# Patient Record
Sex: FEMALE | Race: WHITE | NOT HISPANIC OR LATINO | Employment: UNEMPLOYED | URBAN - NONMETROPOLITAN AREA
[De-identification: names, ages, dates, MRNs, and addresses within clinical notes are randomized per-mention and may not be internally consistent; named-entity substitution may affect disease eponyms.]

---

## 2019-01-24 ENCOUNTER — OFFICE VISIT (OUTPATIENT)
Dept: FAMILY MEDICINE CLINIC | Facility: CLINIC | Age: 44
End: 2019-01-24

## 2019-01-24 VITALS
HEART RATE: 78 BPM | HEIGHT: 62 IN | SYSTOLIC BLOOD PRESSURE: 120 MMHG | BODY MASS INDEX: 49.24 KG/M2 | OXYGEN SATURATION: 98 % | DIASTOLIC BLOOD PRESSURE: 82 MMHG | WEIGHT: 267.6 LBS | TEMPERATURE: 98.9 F | RESPIRATION RATE: 18 BRPM

## 2019-01-24 DIAGNOSIS — I10 ESSENTIAL HYPERTENSION: ICD-10-CM

## 2019-01-24 DIAGNOSIS — R07.89 CHEST TIGHTNESS: ICD-10-CM

## 2019-01-24 DIAGNOSIS — G89.4 CHRONIC PAIN SYNDROME: ICD-10-CM

## 2019-01-24 DIAGNOSIS — M79.7 FIBROMYALGIA: ICD-10-CM

## 2019-01-24 DIAGNOSIS — Z74.09 IMPAIRED MOBILITY: ICD-10-CM

## 2019-01-24 DIAGNOSIS — M25.562 ACUTE PAIN OF BOTH KNEES: ICD-10-CM

## 2019-01-24 DIAGNOSIS — Z13.29 SCREENING FOR HYPOTHYROIDISM: ICD-10-CM

## 2019-01-24 DIAGNOSIS — M25.561 ACUTE PAIN OF BOTH KNEES: ICD-10-CM

## 2019-01-24 DIAGNOSIS — E53.8 VITAMIN B12 DEFICIENCY: ICD-10-CM

## 2019-01-24 DIAGNOSIS — R00.0 TACHYCARDIA: ICD-10-CM

## 2019-01-24 DIAGNOSIS — R73.03 PREDIABETES: ICD-10-CM

## 2019-01-24 DIAGNOSIS — Z13.220 SCREENING FOR HYPERLIPIDEMIA: ICD-10-CM

## 2019-01-24 DIAGNOSIS — J98.9 RESPIRATORY DISEASE: ICD-10-CM

## 2019-01-24 DIAGNOSIS — E66.01 CLASS 3 SEVERE OBESITY WITH SERIOUS COMORBIDITY AND BODY MASS INDEX (BMI) OF 45.0 TO 49.9 IN ADULT, UNSPECIFIED OBESITY TYPE (HCC): Primary | ICD-10-CM

## 2019-01-24 DIAGNOSIS — R06.02 SOB (SHORTNESS OF BREATH): ICD-10-CM

## 2019-01-24 DIAGNOSIS — R05.9 COUGH: ICD-10-CM

## 2019-01-24 DIAGNOSIS — Z13.0 SCREENING FOR DEFICIENCY ANEMIA: ICD-10-CM

## 2019-01-24 DIAGNOSIS — E55.9 VITAMIN D DEFICIENCY: ICD-10-CM

## 2019-01-24 PROCEDURE — 99212 OFFICE O/P EST SF 10 MIN: CPT | Performed by: NURSE PRACTITIONER

## 2019-01-24 RX ORDER — AMITRIPTYLINE HYDROCHLORIDE 10 MG/1
10 TABLET, FILM COATED ORAL
Qty: 90 TABLET | Refills: 1 | Status: SHIPPED | OUTPATIENT
Start: 2019-01-24 | End: 2019-04-24 | Stop reason: SDUPTHER

## 2019-01-24 RX ORDER — LISINOPRIL 10 MG/1
10 TABLET ORAL DAILY
COMMUNITY
End: 2019-01-24 | Stop reason: SDUPTHER

## 2019-01-24 RX ORDER — AMITRIPTYLINE HYDROCHLORIDE 10 MG/1
10 TABLET, FILM COATED ORAL
COMMUNITY
End: 2019-01-24 | Stop reason: SDUPTHER

## 2019-01-24 RX ORDER — TRAMADOL HYDROCHLORIDE 50 MG/1
50 TABLET ORAL EVERY 6 HOURS PRN
COMMUNITY
End: 2019-01-24 | Stop reason: SDUPTHER

## 2019-01-24 RX ORDER — LISINOPRIL 10 MG/1
10 TABLET ORAL DAILY
Qty: 90 TABLET | Refills: 1 | Status: SHIPPED | OUTPATIENT
Start: 2019-01-24 | End: 2019-04-24 | Stop reason: SDUPTHER

## 2019-01-24 RX ORDER — ALBUTEROL SULFATE 90 UG/1
2 AEROSOL, METERED RESPIRATORY (INHALATION) EVERY 6 HOURS PRN
Qty: 1 INHALER | Refills: 5 | Status: SHIPPED | OUTPATIENT
Start: 2019-01-24 | End: 2019-04-24 | Stop reason: SDUPTHER

## 2019-01-24 RX ORDER — ALBUTEROL SULFATE 90 UG/1
2 AEROSOL, METERED RESPIRATORY (INHALATION) EVERY 6 HOURS PRN
COMMUNITY
End: 2019-01-24 | Stop reason: SDUPTHER

## 2019-01-24 RX ORDER — FINASTERIDE 5 MG/1
2.5 TABLET, FILM COATED ORAL DAILY
COMMUNITY
End: 2019-05-01 | Stop reason: ALTCHOICE

## 2019-01-24 RX ORDER — TRAMADOL HYDROCHLORIDE 50 MG/1
50 TABLET ORAL EVERY 6 HOURS PRN
Qty: 120 TABLET | Refills: 2 | Status: SHIPPED | OUTPATIENT
Start: 2019-01-24 | End: 2019-05-09 | Stop reason: ALTCHOICE

## 2019-01-24 RX ORDER — BUDESONIDE AND FORMOTEROL FUMARATE DIHYDRATE 160; 4.5 UG/1; UG/1
2 AEROSOL RESPIRATORY (INHALATION) 2 TIMES DAILY
COMMUNITY
End: 2019-01-24 | Stop reason: ALTCHOICE
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
## 2019-01-24 NOTE — PATIENT INSTRUCTIONS
Obesity   AMBULATORY CARE:   Obesity  is when your body mass index (BMI) is greater than 30  Your healthcare provider will use your height and weight to measure your BMI  The risks of obesity include  many health problems, such as injuries or physical disability  You may need tests to check for the following:  · Diabetes     · High blood pressure or high cholesterol     · Heart disease     · Gallbladder or liver disease     · Cancer of the colon, breast, prostate, liver, or kidney     · Sleep apnea     · Arthritis or gout  Seek care immediately if:   · You have a severe headache, confusion, or difficulty speaking  · You have weakness on one side of your body  · You have chest pain, sweating, or shortness of breath  Contact your healthcare provider if:   · You have symptoms of gallbladder or liver disease, such as pain in your upper abdomen  · You have knee or hip pain and discomfort while walking  · You have symptoms of diabetes, such as intense hunger and thirst, and frequent urination  · You have symptoms of sleep apnea, such as snoring or daytime sleepiness  · You have questions or concerns about your condition or care  Treatment for obesity  focuses on helping you lose weight to improve your health  Even a small decrease in BMI can reduce the risk for many health problems  Your healthcare provider will help you set a weight-loss goal   · Lifestyle changes  are the first step in treating obesity  These include making healthy food choices and getting regular physical activity  Your healthcare provider may suggest a weight-loss program that involves coaching, education, and therapy  · Medicine  may help you lose weight when it is used with a healthy diet and physical activity  · Surgery  can help you lose weight if you are very obese and have other health problems  There are several types of weight-loss surgery  Ask your healthcare provider for more information    Be successful losing weight:   · Set small, realistic goals  An example of a small goal is to walk for 20 minutes 5 days a week  Anther goal is to lose 5% of your body weight  · Tell friends, family members, and coworkers about your goals  and ask for their support  Ask a friend to lose weight with you, or join a weight-loss support group  · Identify foods or triggers that may cause you to overeat , and find ways to avoid them  Remove tempting high-calorie foods from your home and workplace  Place a bowl of fresh fruit on your kitchen counter  If stress causes you to eat, then find other ways to cope with stress  · Keep a diary to track what you eat and drink  Also write down how many minutes of physical activity you do each day  Weigh yourself once a week and record it in your diary  Eating changes: You will need to eat 500 to 1,000 fewer calories each day than you currently eat to lose 1 to 2 pounds a week  The following changes will help you cut calories:  · Eat smaller portions  Use small plates, no larger than 9 inches in diameter  Fill your plate half full of fruits and vegetables  Measure your food using measuring cups until you know what a serving size looks like  · Eat 3 meals and 1 or 2 snacks each day  Plan your meals in advance  Greg Smith and eat at home most of the time  Eat slowly  · Eat fruits and vegetables at every meal   They are low in calories and high in fiber, which makes you feel full  Do not add butter, margarine, or cream sauce to vegetables  Use herbs to season steamed vegetables  · Eat less fat and fewer fried foods  Eat more baked or grilled chicken and fish  These protein sources are lower in calories and fat than red meat  Limit fast food  Dress your salads with olive oil and vinegar instead of bottled dressing  · Limit the amount of sugar you eat  Do not drink sugary beverages  Limit alcohol  Activity changes:  Physical activity is good for your body in many ways   It helps you burn calories and build strong muscles  It decreases stress and depression, and improves your mood  It can also help you sleep better  Talk to your healthcare provider before you begin an exercise program   · Exercise for at least 30 minutes 5 days a week  Start slowly  Set aside time each day for physical activity that you enjoy and that is convenient for you  It is best to do both weight training and an activity that increases your heart rate, such as walking, bicycling, or swimming  · Find ways to be more active  Do yard work and housecleaning  Walk up the stairs instead of using elevators  Spend your leisure time going to events that require walking, such as outdoor festivals or fairs  This extra physical activity can help you lose weight and keep it off  Follow up with your healthcare provider as directed: You may need to meet with a dietitian  Write down your questions so you remember to ask them during your visits  © 2017 [address] Information is for End User's use only and may not be sold, redistributed or otherwise used for commercial purposes  All illustrations and images included in CareNotes® are the copyrighted property of [employer]  or Jose Alfredo Chavez  The above information is an  only  It is not intended as medical advice for individual conditions or treatments  Talk to your doctor, nurse or pharmacist before following any medical regimen to see if it is safe and effective for you  Weight Management   AMBULATORY CARE:   Why it is important to manage your weight:  Being overweight increases your risk of health conditions such as heart disease, high blood pressure, type 2 diabetes, and certain types of cancer  It can also increase your risk for osteoarthritis, sleep apnea, and other respiratory problems  Aim for a slow, steady weight loss  Even a small amount of weight loss can lower your risk of health problems    How to lose weight safely:  A safe and healthy way to lose weight is to eat fewer calories and get regular exercise  You can lose up about 1 pound a week by decreasing the number of calories you eat by 500 calories each day  You can decrease calories by eating smaller portion sizes or by cutting out high-calorie foods  Read labels to find out how many calories are in the foods you eat  You can also burn calories with exercise such as walking, swimming, or biking  You will be more likely to keep weight off if you make these changes part of your lifestyle  Healthy meal plan for weight management:  A healthy meal plan includes a variety of foods, contains fewer calories, and helps you stay healthy  A healthy meal plan includes the following:  · Eat whole-grain foods more often  A healthy meal plan should contain fiber  Fiber is the part of grains, fruits, and vegetables that is not broken down by your body  Whole-grain foods are healthy and provide extra fiber in your diet  Some examples of whole-grain foods are whole-wheat breads and pastas, oatmeal, brown rice, and bulgur  · Eat a variety of vegetables every day  Include dark, leafy greens such as spinach, kale, francois greens, and mustard greens  Eat yellow and orange vegetables such as carrots, sweet potatoes, and winter squash  · Eat a variety of fruits every day  Choose fresh or canned fruit (canned in its own juice or light syrup) instead of juice  Fruit juice has very little or no fiber  · Eat low-fat dairy foods  Drink fat-free (skim) milk or 1% milk  Eat fat-free yogurt and low-fat cottage cheese  Try low-fat cheeses such as mozzarella and other reduced-fat cheeses  · Choose meat and other protein foods that are low in fat  Choose beans or other legumes such as split peas or lentils  Choose fish, skinless poultry (chicken or turkey), or lean cuts of red meat (beef or pork)  Before you cook meat or poultry, cut off any visible fat  · Use less fat and oil  Try baking foods instead of frying them  Add less fat, such as margarine, sour cream, regular salad dressing and mayonnaise to foods  Eat fewer high-fat foods  Some examples of high-fat foods include french fries, doughnuts, ice cream, and cakes  · Eat fewer sweets  Limit foods and drinks that are high in sugar  This includes candy, cookies, regular soda, and sweetened drinks  Ways to decrease calories:   · Eat smaller portions  ¨ Use a small plate with smaller servings  ¨ Do not eat second helpings  ¨ When you eat at a restaurant, ask for a box and place half of your meal in the box before you eat  ¨ Share an entrée with someone else  · Replace high-calorie snacks with healthy, low-calorie snacks  ¨ Choose fresh fruit, vegetables, fat-free rice cakes, or air-popped popcorn instead of potato chips, nuts, or chocolate  ¨ Choose water or calorie-free drinks instead of soda or sweetened drinks  · Eat regular meals  Skipping meals can lead to overeating later in the day  Eat a healthy snack in place of a meal if you do not have time to eat a regular meal      · Do not shop for groceries when you are hungry  You may be more likely to make unhealthy food choices  Take a grocery list of healthy foods and shop after you have eaten  Exercise:  Exercise at least 30 minutes per day on most days of the week  Some examples of exercise include walking, biking, dancing, and swimming  You can also fit in more physical activity by taking the stairs instead of the elevator or parking farther away from stores  Ask your healthcare provider about the best exercise plan for you  Other things to consider as you try to lose weight:   · Be aware of situations that may give you the urge to overeat, such as eating while watching television  Find ways to avoid these situations  For example, read a book, go for a walk, or do crafts      · Meet with a weight loss support group or friends who are also trying to lose weight  This may help you stay motivated to continue working on your weight loss goals  © 2017 [address] Information is for End User's use only and may not be sold, redistributed or otherwise used for commercial purposes  All illustrations and images included in CareNotes® are the copyrighted property of [employer]  or Jose Alfredo Chvaez  The above information is an  only  It is not intended as medical advice for individual conditions or treatments  Talk to your doctor, nurse or pharmacist before following any medical regimen to see if it is safe and effective for you  Low Fat Diet   AMBULATORY CARE:   A low-fat diet  is an eating plan that is low in total fat, unhealthy fat, and cholesterol  You may need to follow a low-fat diet if you have trouble digesting or absorbing fat  You may also need to follow this diet if you have high cholesterol  You can also lower your cholesterol by increasing the amount of fiber in your diet  Soluble fiber is a type of fiber that helps to decrease cholesterol levels  Different types of fat in food:   · Limit unhealthy fats  A diet that is high in cholesterol, saturated fat, and trans fat may cause unhealthy cholesterol levels  Unhealthy cholesterol levels increase your risk of heart disease  ¨ Cholesterol:  Limit intake of cholesterol to less than 200 mg per day  Cholesterol is found in meat, eggs, and dairy  ¨ Saturated fat:  Limit saturated fat to less than 7% of your total daily calories  Ask your dietitian how many calories you need each day  Saturated fat is found in butter, cheese, ice cream, whole milk, and palm oil  Saturated fat is also found in meat, such as beef, pork, chicken skin, and processed meats  Processed meats include sausage, hot dogs, and bologna  ¨ Trans fat:  Avoid trans fat as much as possible  Trans fat is used in fried and baked foods   Foods that say trans fat free on the label may still have up to 0 5 grams of trans fat per serving  · Include healthy fats  Replace foods that are high in saturated and trans fat with foods high in healthy fats  This may help to decrease high cholesterol levels  ¨ Monounsaturated fats: These are found in avocados, nuts, and vegetable oils, such as olive, canola, and sunflower oil  ¨ Polyunsaturated fats: These can be found in vegetable oils, such as soybean or corn oil  Omega-3 fats can help to decrease the risk of heart disease  Omega-3 fats are found in fish, such as salmon, herring, trout, and tuna  Omega-3 fats can also be found in plant foods, such as walnuts, flaxseed, soybeans, and canola oil    Foods to limit or avoid:   · Grains:      ¨ Snacks that are made with partially hydrogenated oils, such as chips, regular crackers, and butter-flavored popcorn    ¨ High-fat baked goods, such as biscuits, croissants, doughnuts, pies, cookies, and pastries    · Dairy:      ¨ Whole milk, 2% milk, and yogurt and ice cream made with whole milk    ¨ Half and half creamer, heavy cream, and whipping cream    ¨ Cheese, cream cheese, and sour cream    · Meats and proteins:      ¨ High-fat cuts of meat (T-bone steak, regular hamburger, and ribs)    ¨ Fried meat, poultry (turkey and chicken), and fish    ¨ Poultry (chicken and turkey) with skin    ¨ Cold cuts (salami or bologna), hot dogs, roberts, and sausage    ¨ Whole eggs and egg yolks    · Vegetables and fruits with added fat:      ¨ Fried vegetables or vegetables in butter or high-fat sauces, such as cream or cheese sauces    ¨ Fried fruit or fruit served with butter or cream    · Fats:      ¨ Butter, stick margarine, and shortening    ¨ Coconut, palm oil, and palm kernel oil  Foods to include:   · Grains:      ¨ Whole-grain breads, cereals, pasta, and brown rice    ¨ Low-fat crackers and pretzels    · Vegetables and fruits:      ¨ Fresh, frozen, or canned vegetables (no salt or low-sodium)    ¨ Fresh, frozen, dried, or canned fruit (canned in light syrup or fruit juice)    ¨ Avocado    · Low-fat dairy products:      ¨ Nonfat (skim) or 1% milk    ¨ Nonfat or low-fat cheese, yogurt, and cottage cheese    · Meats and proteins:      ¨ Chicken or turkey with no skin    ¨ Baked or broiled fish    ¨ Lean beef and pork (loin, round, extra lean hamburger)    ¨ Beans and peas, unsalted nuts, soy products    ¨ Egg whites and substitutes    ¨ Seeds and nuts    · Fats:      ¨ Unsaturated oil, such as canola, olive, peanut, soybean, or sunflower oil    ¨ Soft or liquid margarine and vegetable oil spread    ¨ Low-fat salad dressing  Other ways to decrease fat:   · Read food labels before you buy foods  Choose foods that have less than 30% of calories from fat  Choose low-fat or fat-free dairy products  Remember that fat free does not mean calorie free  These foods still contain calories, and too many calories can lead to weight gain  · Trim fat from meat and avoid fried food  Trim all visible fat from meat before you cook it  Remove the skin from poultry  Do not trujillo meat, fish, or poultry  Bake, roast, boil, or broil these foods instead  Avoid fried foods  Eat a baked potato instead of [place] fries  Steam vegetables instead of sautéing them in butter  · Add less fat to foods  Use imitation roberts bits on salads and baked potatoes instead of regular roberts bits  Use fat-free or low-fat salad dressings instead of regular dressings  Use low-fat or nonfat butter-flavored topping instead of regular butter or margarine on popcorn and other foods  Ways to decrease fat in recipes:  Replace high-fat ingredients with low-fat or nonfat ones  This may cause baked goods to be drier than usual  You may need to use nonfat cooking spray on pans to prevent food from sticking  You also may need to change the amount of other ingredients, such as water, in the recipe   Try the following:  · Use low-fat or light margarine instead of regular margarine or shortening  · Use lean ground turkey breast or chicken, or lean ground beef (less than 5% fat) instead of hamburger  · Add 1 teaspoon of canola oil to 8 ounces of skim milk instead of using cream or half and half  · Use grated zucchini, carrots, or apples in breads instead of coconut  · Use blenderized, low-fat cottage cheese, plain tofu, or low-fat ricotta cheese instead of cream cheese  · Use 1 egg white and 1 teaspoon of canola oil, or use ¼ cup (2 ounces) of fat-free egg substitute instead of a whole egg  · Replace half of the oil that is called for in a recipe with applesauce when you bake  Use 3 tablespoons of cocoa powder and 1 tablespoon of canola oil instead of a square of baking chocolate  How to increase fiber:  Eat enough high-fiber foods to get 20 to 30 grams of fiber every day  Slowly increase your fiber intake to avoid stomach cramps, gas, and other problems  · Eat 3 ounces of whole-grain foods each day  An ounce is about 1 slice of bread  Eat whole-grain breads, such as whole-wheat bread  Whole wheat, whole-wheat flour, or other whole grains should be listed as the first ingredient on the food label  Replace white flour with whole-grain flour or use half of each in recipes  Whole-grain flour is heavier than white flour, so you may have to add more yeast or baking powder  · Eat a high-fiber cereal for breakfast   Oatmeal is a good source of soluble fiber  Look for cereals that have bran or fiber in the name  Choose whole-grain products, such as brown rice, barley, and whole-wheat pasta  · Eat more beans, peas, and lentils  For example, add beans to soups or salads  Eat at least 5 cups of fruits and vegetables each day  Eat fruits and vegetables with the peel because the peel is high in fiber  © 2017 [employer] [employer] [employer] Information is for End User's use only and may not be sold, redistributed or otherwise used for commercial purposes   All illustrations and images included in CareNotes® are the copyrighted property of [employer]  or Jose Alfredo Chavez  The above information is an  only  It is not intended as medical advice for individual conditions or treatments  Talk to your doctor, nurse or pharmacist before following any medical regimen to see if it is safe and effective for you  Heart Healthy Diet   AMBULATORY CARE:   A heart healthy diet  is an eating plan low in total fat, unhealthy fats, and sodium (salt)  A heart healthy diet helps decrease your risk for heart disease and stroke  Limit the amount of fat you eat to 25% to 35% of your total daily calories  Limit sodium to less than 2,300 mg each day  Healthy fats:  Healthy fats can help improve cholesterol levels  The risk for heart disease is decreased when cholesterol levels are normal  Choose healthy fats, such as the following:  · Unsaturated fat  is found in foods such as soybean, canola, olive, corn, and safflower oils  It is also found in soft tub margarine that is made with liquid vegetable oil  · Omega-3 fat  is found in certain fish, such as salmon, tuna, and trout, and in walnuts and flaxseed  Unhealthy fats:  Unhealthy fats can cause unhealthy cholesterol levels in your blood and increase your risk of heart disease  Limit unhealthy fats, such as the following:  · Cholesterol  is found in animal foods, such as eggs and lobster, and in dairy products made from whole milk  Limit cholesterol to less than 300 milligrams (mg) each day  You may need to limit cholesterol to 200 mg each day if you have heart disease  · Saturated fat  is found in meats, such as roberts and hamburger  It is also found in chicken or turkey skin, whole milk, and butter  Limit saturated fat to less than 7% of your total daily calories  Limit saturated fat to less than 6% if you have heart disease or are at increased risk for it       · Trans fat  is found in packaged foods, such as potato chips and cookies  It is also in hard margarine, some fried foods, and shortening  Avoid trans fats as much as possible    Heart healthy foods and drinks to include:  Ask your dietitian or healthcare provider how many servings to have from each of the following food groups:  · Grains:      ¨ Whole-wheat breads, cereals, and pastas, and brown rice    ¨ Low-fat, low-sodium crackers and chips    · Vegetables:      ¨ Broccoli, green beans, green peas, and spinach    ¨ Collards, kale, and lima beans    ¨ Carrots, sweet potatoes, tomatoes, and peppers    ¨ Canned vegetables with no salt added    · Fruits:      ¨ Bananas, peaches, pears, and pineapple    ¨ Grapes, raisins, and dates    ¨ Oranges, tangerines, grapefruit, orange juice, and grapefruit juice    ¨ Apricots, mangoes, melons, and papaya    ¨ Raspberries and strawberries    ¨ Canned fruit with no added sugar    · Low-fat dairy products:      ¨ Nonfat (skim) milk, 1% milk, and low-fat almond, cashew, or soy milks fortified with calcium    ¨ Low-fat cheese, regular or frozen yogurt, and cottage cheese    · Meats and proteins , such as lean cuts of beef and pork (loin, leg, round), skinless chicken and turkey, legumes, soy products, egg whites, and nuts  Foods and drinks to limit or avoid:  Ask your dietitian or healthcare provider about these and other foods that are high in unhealthy fat, sodium, and sugar:  · Snack or packaged foods , such as frozen dinners, cookies, macaroni and cheese, and cereals with more than 300 mg of sodium per serving    · Canned or dry mixes  for cakes, soups, sauces, or gravies    · Vegetables with added sodium , such as instant potatoes, vegetables with added sauces, or regular canned vegetables    · Other foods high in sodium , such as ketchup, barbecue sauce, salad dressing, pickles, olives, soy sauce, and miso    · High-fat dairy foods  such as whole or 2% milk, cream cheese, or sour cream, and cheeses     · High-fat protein foods  such as high-fat cuts of beef (T-bone steaks, ribs), chicken or turkey with skin, and organ meats, such as liver    · Cured or smoked meats , such as hot dogs, roberts, and sausage    · Unhealthy fats and oils , such as butter, stick margarine, shortening, and cooking oils such as coconut or palm oil    · Food and drinks high in sugar , such as soft drinks (soda), sports drinks, sweetened tea, candy, cake, cookies, pies, and doughnuts  Other diet guidelines to follow:   · Eat more foods containing omega-3 fats  Eat fish high in omega-3 fats at least 2 times a week  · Limit alcohol  Too much alcohol can damage your heart and raise your blood pressure  Women should limit alcohol to 1 drink a day  Men should limit alcohol to 2 drinks a day  A drink of alcohol is 12 ounces of beer, 5 ounces of wine, or 1½ ounces of liquor  · Choose low-sodium foods  High-sodium foods can lead to high blood pressure  Add little or no salt to food you prepare  Use herbs and spices in place of salt  · Eat more fiber  to help lower cholesterol levels  Eat at least 5 servings of fruits and vegetables each day  Eat 3 ounces of whole-grain foods each day  Legumes (beans) are also a good source of fiber  Lifestyle guidelines:   · Do not smoke  Nicotine and other chemicals in cigarettes and cigars can cause lung and heart damage  Ask your healthcare provider for information if you currently smoke and need help to quit  E-cigarettes or smokeless tobacco still contain nicotine  Talk to your healthcare provider before you use these products  · Exercise regularly  to help you maintain a healthy weight and improve your blood pressure and cholesterol levels  Ask your healthcare provider about the best exercise plan for you  Do not start an exercise program without asking your healthcare provider  Follow up with your healthcare provider as directed:  Write down your questions so you remember to ask them during your visits     © 2017 [facility] [address] is for End User's use only and may not be sold, redistributed or otherwise used for commercial purposes  All illustrations and images included in CareNotes® are the copyrighted property of [employer] M [employer]  or Jose Alfredo Chavez  The above information is an  only  It is not intended as medical advice for individual conditions or treatments  Talk to your doctor, nurse or pharmacist before following any medical regimen to see if it is safe and effective for you  Calorie Counting Diet   WHAT YOU NEED TO KNOW:   What is a calorie counting diet? It is a meal plan based on counting calories each day to reach a healthy body weight  You will need to eat fewer calories if you are trying to lose weight  Weight loss may decrease your risk for certain health problems or improve your health if you have health problems  Some of these health problems include heart disease, high blood pressure, and diabetes  What foods should I avoid? Your dietitian will tell you if you need to avoid certain foods based on your body weight and health condition  You may need to avoid high-fat foods if you are at risk for or have heart disease  You may need to eat fewer foods from the breads and starches food group if you have diabetes  How many calories are in foods? The following is a list of foods and drinks with the approximate number of calories in each  Check the food label to find the exact number of calories  A dietitian can tell you how many calories you should have from each food group each day    · Carbohydrate:      ¨ ½ of a 3-inch bagel, 1 slice of bread, or ½ of a hamburger bun or hot dog bun (80)    ¨ 1 (8-inch) flour tortilla or ½ cup of cooked rice (100)    ¨ 1 (6-inch) corn tortilla (80)    ¨ 1 (6-inch) pancake or 1 cup of bran flakes cereal (110)    ¨ ½ cup of cooked cereal (80)    ¨ ½ cup of cooked pasta (85)    ¨ 1 ounce of pretzels (100)    ¨ 3 cups of air-popped popcorn without butter or oil (80)    · Dairy:      ¨ 1 cup of skim or 1% milk (90)    ¨ 1 cup of 2% milk (120)    ¨ 1 cup of whole milk (160)    ¨ 1 cup of 2% chocolate milk (220)    ¨ 1 ounce of low-fat cheese with 3 grams of fat per ounce (70)    ¨ 1 ounce of cheddar cheese (114)    ¨ ½ cup of 1% fat cottage cheese (80)    ¨ 1 cup of plain or sugar-free, fat-free yogurt (90)    · Protein foods:      ¨ 3 ounces of fish (not breaded or fried) (95)    ¨ 3 ounces of breaded, fried fish (195)    ¨ ¾ cup of tuna canned in water (105)    ¨ 3 ounces of chicken breast without skin (105)    ¨ 1 fried chicken breast with skin (350)    ¨ ¼ cup of fat free egg substitute (40)    ¨ 1 large egg (75)    ¨ 3 ounces of lean beef or pork (165)    ¨ 3 ounces of fried pork chop or ham (185)    ¨ ½ cup of cooked dried beans, such as kidney, kennedy, lentils, or navy (115)    ¨ 3 ounces of bologna or lunch meat (225)    ¨ 2 links of breakfast sausage (140)    · Vegetables:      ¨ ½ cup of sliced mushrooms (10)    ¨ 1 cup of salad greens, such as lettuce, spinach, or sharad (15)    ¨ ½ cup of steamed asparagus (20)    ¨ ½ cup of cooked summer squash, zucchini squash, or green or wax beans (25)    ¨ 1 cup of broccoli or cauliflower florets, or 1 medium tomato (25)    ¨ 1 large raw carrot or ½ cup of cooked carrots (40)    ¨ ? of a medium cucumber or 1 stalk of celery (5)    ¨ 1 small baked potato (160)    ¨ 1 cup of breaded, fried vegetables (230)    · Fruit:      ¨ 1 (6-inch) banana (55)     ¨ ½ of a 4-inch grapefruit (55)    ¨ 15 grapes (60)    ¨ 1 medium orange or apple (70)    ¨ 1 large peach (65)    ¨ 1 cup of fresh pineapple chunks (75)    ¨ 1 cup of melon cubes (50)    ¨ 1¼ cups of whole strawberries (45)    ¨ ½ cup of fruit canned in juice (55)    ¨ ½ cup of fruit canned in heavy syrup (110)    ¨ ?  cup of raisins (130)    ¨ ½ cup of unsweetened fruit juice (60)    ¨ ½ cup of grape, cranberry, or prune juice (90)    · Fat: ¨ 10 peanuts or 2 teaspoons of peanut butter (55)    ¨ 2 tablespoons of avocado or 1 tablespoon of regular salad dressing (45)    ¨ 2 slices of roberts (90)    ¨ 1 teaspoon of oil, such as safflower, canola, corn, or olive oil (45)    ¨ 2 teaspoons of low-fat margarine, or 1 tablespoon of low-fat mayonnaise (50)    ¨ 1 teaspoon of regular margarine (40)    ¨ 1 tablespoon of regular mayonnaise (135)    ¨ 1 tablespoon of cream cheese or 2 tablespoons of low-fat cream cheese (45)    ¨ 2 tablespoons of vegetable shortening (215)    · Dessert and sweets:      ¨ 8 animal crackers or 5 vanilla wafers (80)    ¨ 1 frozen fruit juice bar (80)    ¨ ½ cup of ice milk or low-fat frozen yogurt (90)    ¨ ½ cup of sherbet or sorbet (125)    ¨ ½ cup of sugar-free pudding or custard (60)    ¨ ½ cup of ice cream (140)    ¨ ½ cup of pudding or custard (175)    ¨ 1 (2-inch) square chocolate brownie (185)    · Combination foods:      ¨ Bean burrito made with an 8-inch tortilla, without cheese (275)    ¨ Chicken breast sandwich with lettuce and tomato (325)    ¨ 1 cup of chicken noodle soup (60)    ¨ 1 beef taco (175)    ¨ Regular hamburger with lettuce and tomato (310)    ¨ Regular cheeseburger with lettuce and tomato (410)     ¨ ¼ of a 12-inch cheese pizza (280)    ¨ Fried fish sandwich with lettuce and tomato (425)    ¨ Hot dog and bun (275)    ¨ 1½ cups of macaroni and cheese (310)    ¨ Taco salad with a fried tortilla shell (870)    · Low-calorie foods:      ¨ 1 tablespoon of ketchup or 1 tablespoon of fat free sour cream (15)    ¨ 1 teaspoon of mustard (5)    ¨ ¼ cup of salsa (20)    ¨ 1 large dill pickle (15)    ¨ 1 tablespoon of fat free salad dressing (10)    ¨ 2 teaspoons of low-sugar, light jam or jelly, or 1 tablespoon of sugar-free syrup (15)    ¨ 1 sugar-free popsicle (15)    ¨ 1 cup of club soda, seltzer water, or diet soda (0)  CARE AGREEMENT:   You have the right to help plan your care   Discuss treatment options with your caregivers to decide what care you want to receive  You always have the right to refuse treatment  The above information is an  only  It is not intended as medical advice for individual conditions or treatments  Talk to your doctor, nurse or pharmacist before following any medical regimen to see if it is safe and effective for you  © 2017 [address] [employer] Information is for End User's use only and may not be sold, redistributed or otherwise used for commercial purposes  All illustrations and images included in CareNotes® are the copyrighted property of [employer]  or [employer]  Wellness Visit for Adults   WHAT YOU NEED TO KNOW:   What is a wellness visit? A wellness visit is when you see your healthcare provider to get screened for health problems  You can also get advice on how to stay healthy  Write down your questions so you remember to ask them  Ask your healthcare provider how often you should have a wellness visit  What happens at a wellness visit? Your healthcare provider will ask about your health, and your family history of health problems  This includes high blood pressure, heart disease, and cancer  He or she will ask if you have symptoms that concern you, if you smoke, and about your mood  You may also be asked about your intake of medicines, supplements, food, and alcohol  Any of the following may be done:  · Your weight  will be checked  Your height may also be checked so your body mass index (BMI) can be calculated  Your BMI shows if you are at a healthy weight  · Your blood pressure  and heart rate will be checked  Your temperature may also be checked  · Blood and urine tests  may be done  Blood tests may be done to check your cholesterol levels  Abnormal cholesterol levels increase your risk for heart disease and stroke  You may also need a blood or urine test to check for diabetes if you are at increased risk   Urine tests may be done to look for signs of an infection or kidney disease  · A physical exam  includes checking your heartbeat and lungs with a stethoscope  Your healthcare provider may also check your skin to look for sun damage  · Screening tests  may be recommended  A screening test is done to check for diseases that may not cause symptoms  The screening tests you may need depend on your age, gender, family history, and lifestyle habits  For example, colorectal screening may be recommended if you are 48years old or older  What screening tests do I need if I am a woman? · A Pap smear  is used to screen for cervical cancer  Pap smears are usually done every 3 to 5 years depending on your age  You may need them more often if you have had abnormal Pap smear test results in the past  Ask your healthcare provider how often you should have a Pap smear  · A mammogram  is an x-ray of your breasts to screen for breast cancer  Experts recommend mammograms every 2 years starting at age 48 years  You may need a mammogram at age 52 years or younger if you have an increased risk for breast cancer  Talk to your healthcare provider about when you should start having mammograms and how often you need them  What vaccines might I need? · Get an influenza vaccine  every year  The influenza vaccine protects you from the flu  Several types of viruses cause the flu  The viruses change over time, so new vaccines are made each year  · Get a tetanus-diphtheria (Td) booster vaccine  every 10 years  This vaccine protects you against tetanus and diphtheria  Tetanus is a severe infection that may cause painful muscle spasms and lockjaw  Diphtheria is a severe bacterial infection that causes a thick covering in the back of your mouth and throat  · Get a human papillomavirus (HPV) vaccine  if you are female and aged 23 to 32 or male 23 to 24 and never received it  This vaccine protects you from HPV infection   HPV is the most common infection spread by sexual contact  HPV may also cause vaginal, penile, and anal cancers  · Get a pneumococcal vaccine  if you are aged 72 years or older  The pneumococcal vaccine is an injection given to protect you from pneumococcal disease  Pneumococcal disease is an infection caused by pneumococcal bacteria  The infection may cause pneumonia, meningitis, or an ear infection  · Get a shingles vaccine  if you are aged 61 or older, even if you have had shingles before  The shingles vaccine is an injection to protect you from the varicella-zoster virus  This is the same virus that causes chickenpox  Shingles is a painful rash that develops in people who had chickenpox or have been exposed to the virus  How can I eat healthy? My Plate is a model for planning healthy meals  It shows the types and amounts of foods that should go on your plate  Fruits and vegetables make up about half of your plate, and grains and protein make up the other half  A serving of dairy is included on the side of your plate  The amount of calories and serving sizes you need depends on your age, gender, weight, and height  Examples of healthy foods are listed below:  · Eat a variety of vegetables  such as dark green, red, and orange vegetables  You can also include canned vegetables low in sodium (salt) and frozen vegetables without added butter or sauces  · Eat a variety of fresh fruits , canned fruit in 100% juice, frozen fruit, and dried fruit  · Include whole grains  At least half of the grains you eat should be whole grains  Examples include whole-wheat bread, wheat pasta, brown rice, and whole-grain cereals such as oatmeal     · Eat a variety of protein foods such as seafood (fish and shellfish), lean meat, and poultry without skin (turkey and chicken)  Examples of lean meats include pork leg, shoulder, or tenderloin, and beef round, sirloin, tenderloin, and extra lean ground beef   Other protein foods include eggs and egg substitutes, beans, peas, soy products, nuts, and seeds  · Choose low-fat dairy products such as skim or 1% milk or low-fat yogurt, cheese, and cottage cheese  · Limit unhealthy fats  such as butter, hard margarine, and shortening  How much exercise do I need? Exercise at least 30 minutes per day on most days of the week  Some examples of exercise include walking, biking, dancing, and swimming  You can also fit in more physical activity by taking the stairs instead of the elevator or parking farther away from stores  Include muscle strengthening activities 2 days each week  Regular exercise provides many health benefits  It helps you manage your weight, and decreases your risk for type 2 diabetes, heart disease, stroke, and high blood pressure  Exercise can also help improve your mood  Ask your healthcare provider about the best exercise plan for you  What are some general health and safety guidelines I should follow? · Do not smoke  Nicotine and other chemicals in cigarettes and cigars can cause lung damage  Ask your healthcare provider for information if you currently smoke and need help to quit  E-cigarettes or smokeless tobacco still contain nicotine  Talk to your healthcare provider before you use these products  · Limit alcohol  A drink of alcohol is 12 ounces of beer, 5 ounces of wine, or 1½ ounces of liquor  · Lose weight, if needed  Being overweight increases your risk of certain health conditions  These include heart disease, high blood pressure, type 2 diabetes, and certain types of cancer  · Protect your skin  Do not sunbathe or use tanning beds  Use sunscreen with a SPF 15 or higher  Apply sunscreen at least 15 minutes before you go outside  Reapply sunscreen every 2 hours  Wear protective clothing, hats, and sunglasses when you are outside  · Drive safely  Always wear your seatbelt  Make sure everyone in your car wears a seatbelt   A seatbelt can save your life if you are in an accident  Do not use your cell phone when you are driving  This could distract you and cause an accident  Pull over if you need to make a call or send a text message  · Practice safe sex  Use latex condoms if are sexually active and have more than one partner  Your healthcare provider may recommend screening tests for sexually transmitted infections (STIs)  · Wear helmets, lifejackets, and protective gear  Always wear a helmet when you ride a bike or motorcycle, go skiing, or play sports that could cause a head injury  Wear protective equipment when you play sports  Wear a lifejacket when you are on a boat or doing water sports  CARE AGREEMENT:   You have the right to help plan your care  Learn about your health condition and how it may be treated  Discuss treatment options with your caregivers to decide what care you want to receive  You always have the right to refuse treatment  The above information is an  only  It is not intended as medical advice for individual conditions or treatments  Talk to your doctor, nurse or pharmacist before following any medical regimen to see if it is safe and effective for you  © 2017 [facility] Information is for End User's use only and may not be sold, redistributed or otherwise used for commercial purposes  All illustrations and images included in CareNotes® are the copyrighted property of [employer]  or [employer]  Low-Sodium Diet   WHAT YOU NEED TO KNOW:   What is a low-sodium diet? A low-sodium diet limits foods that are high in sodium (salt)  You will need to follow a low-sodium diet if you have high blood pressure, kidney disease, or heart failure  You may also need to follow this diet if you have a condition that is causing your body to retain (hold) extra fluid  You may need to limit the amount of sodium you eat to 1,500 mg  Ask your healthcare provider how much sodium you can have each day    How can I use food labels to choose foods that are low in sodium? Read food labels to find the amount of sodium they contain  The amount of sodium is listed in milligrams (mg)  The % Daily Value (DV) column tells you how much of your daily needs are met by 1 serving of the food for each nutrient listed  Choose foods that have less than 5% of the DV of sodium  These foods are considered low in sodium  Foods that have 20% or more of the DV of sodium are considered high in sodium  Some food labels may also list any of the following terms that tell you about the sodium content in the food:  · Sodium-free:  Less than 5 mg in each serving    · Very low sodium:  35 mg of sodium or less in each serving    · Low sodium:  140 mg of sodium or less in each serving    · Reduced sodium: At least 25% less sodium in each serving than the regular type    · Light in sodium:  50% less sodium in each serving    · Unsalted or no added salt:  No extra salt is added during processing (the food may still contain sodium)  Which foods should I avoid? Salty foods are high in sodium   You should avoid the following:  · Processed foods:      ¨ Mixes for cornbread, biscuits, cake, and pudding     ¨ Instant foods, such as potatoes, cereals, noodles, and rice     ¨ Packaged foods, such as bread stuffing, rice and pasta mixes, snack dip mixes, and macaroni and cheese     ¨ Canned foods, such as canned vegetables, soups, broths, sauces, and vegetable or tomato juice    ¨ Snack foods, such as salted chips, popcorn, pretzels, pork rinds, salted crackers, and salted nuts    ¨ Frozen foods, such as dinners, entrees, vegetables with sauces, and breaded meats    ¨ Sauerkraut, pickled vegetables, and other foods prepared in brine    · Meats and cheeses:      ¨ Smoked or cured meat, such as corned beef, roberts, ham, hot dogs, and sausage    ¨ Canned meats or spreads, such as potted meats, sardines, anchovies, and imitation seafood    ¨ Deli or lunch meats, such as bologna, ham, turkey, and roast beef    ¨ Processed cheese, such as American cheese and cheese spreads    · Condiments, sauces, and seasonings:      ¨ Salt (¼ teaspoon of salt contains 575 mg of sodium)    ¨ Seasonings made with salt, such as garlic salt, celery salt, onion salt, and seasoned salt    ¨ Regular soy sauce, barbecue sauce, teriyaki sauce, steak sauce, Worcestershire sauce, and most flavored vinegars    ¨ Canned gravy and mixes     ¨ Regular condiments, such as mustard, ketchup, and salad dressings    ¨ Pickles and olives    ¨ Meat tenderizers and monosodium glutamate (MSG)  Which foods can I include? Read the food label to find the amount of sodium in each serving  · Bread and cereal:  Try to choose breads with less than 80 mg of sodium per serving  ¨ Bread, roll, farhana, tortilla, or unsalted crackers  ¨ Ready-to-eat cereals with less than 5% DV of sodium (examples include shredded wheat and puffed rice)    ¨ Pasta    · Vegetables and fruits:      ¨ Unsalted fresh, frozen, or canned vegetables    ¨ Fresh, frozen, or canned fruits    ¨ Fruit juice    · Dairy:  One serving has about 150 mg of sodium  ¨ Milk, all types    ¨ Yogurt    ¨ Hard cheese, such as cheddar, Swiss, [employer], or mozzarella    · Meat and other protein foods:  Some raw meats may have added sodium  ¨ Plain meats, fish, and poultry     ¨ Egg    · Other foods:      ¨ Homemade pudding    ¨ Unsalted nuts, popcorn, or pretzels    ¨ Unsalted butter or margarine  What are some ways that I can decrease sodium? · Add spices and herbs to foods instead of salt during cooking  Use salt-free seasonings to add flavor to foods  Examples include onion powder, garlic powder, basil, mota powder, paprika, and parsley  Try lemon or lime juice or vinegar to give foods a tart flavor  Use hot peppers, pepper, or cayenne pepper to add a spicy flavor to foods  · Do not keep a salt shaker at your kitchen table    This may help keep you from adding salt to food at the table  It may take time to get used to enjoying the natural flavor of food instead of adding salt  Talk to your healthcare provider before you use salt substitutes  Some salt substitutes have a high amount of potassium and need to be avoided if you have kidney disease  · Choose low-sodium foods at restaurants  Meals from restaurants are often high in sodium  Some restaurants have nutrition information on the menu that tells you the amount of sodium in their foods  If possible, ask for your food to be prepared with less, or no salt  · Shop for unsalted or low-sodium foods and snacks at the grocery store  Examples include unsalted or low-sodium broths, soups, and canned vegetables  Choose fresh or frozen vegetables instead  Choose unsalted nuts or seeds or fresh fruits or vegetables as snacks  Read food labels and choose salt-free, very low-sodium, or low-sodium foods  CARE AGREEMENT:   You have the right to help plan your care  Discuss treatment options with your caregivers to decide what care you want to receive  You always have the right to refuse treatment  The above information is an  only  It is not intended as medical advice for individual conditions or treatments  Talk to your doctor, nurse or pharmacist before following any medical regimen to see if it is safe and effective for you  © 2017 [address]  Information is for End User's use only and may not be sold, redistributed or otherwise used for commercial purposes  All illustrations and images included in CareNotes® are the copyrighted property of [employer]  or [employer]  Meal Planning with Diabetes Exchanges   AMBULATORY CARE:   Diabetes exchanges  are servings of food that contain similar amounts of carbohydrate, fat, protein, and calories within a food group   The exchanges can be used to develop a healthy meal plan that helps to keep your blood sugar within the recommended levels  A meal plan with the right amount of carbohydrates is especially important  Your blood sugar naturally rises after you eat carbohydrates  Too many carbohydrates in 1 meal or snack can raise your blood sugar level  Carbohydrates are found in starches, fruit, milk, yogurt, and sweets  How to create a meal plan with exchanges:  A dietitian will work with you to develop a healthy meal plan that is right for you  This meal plan will include the amount of exchanges you can have from each food group throughout the day  Follow your meal plan by keeping track of the amount of exchanges you eat for each meal and snack  Your meal plan will be based on your age, weight, blood sugar levels, medicine, and activity level  Starch food group exchanges:  Each exchange below contains about 15 grams of carbohydrate , 3 grams of protein, 1 gram of fat, and 80 calories  · 1 ounce of white, whole wheat or rye bread (1 slice)    · 1 ounce of bagel (about ¼ of a bagel)    · 1 6-inch flour or corn tortilla or 1 4-inch pancake (about ¼ inch thick)    · ? cup of cooked pasta or rice    · ¾ cup of dry, ready-to-eat cereal with no sugar added     · ½ cup of cooked cereal, such as oatmeal    · 3 alvino cracker squares or 8 animal crackers    · 6 saltine-type crackers or     · 3 cups of popcorn or ¾ ounce of pretzels     · Starchy vegetables and cooked legumes:      ¨ ½ cup of corn, green peas, sweet potatoes, or mashed potatoes     ¨ ¼ of a large baked potato     ¨ 1 cup of acorn, butternut squash, or pumpkin     ¨ ½ cup of beans, lentils, or peas (such as kennedy, kidney, or black-eyed)    ¨ ? cup of lima beans  Fruit group exchanges:  Each exchange contains about 15 grams of carbohydrate  and 60 calories    · 1 small (4 ounce) apple, banana orange, or nectarine    · ½ cup of canned or fresh fruit    · ½ cup (4 ounces) of unsweetened fruit juice    · 2 tablespoons of dried fruit  Milk group exchanges:  Each exchange contains about 12 grams of carbohydrate  and 8 grams of protein  The amount of fat and calories in each serving depends on the type of milk (such as whole, low-fat, or fat-free)  · 1 cup fat-free or low-fat milk    · ¾ cup of plain, nonfat yogurt    · 1 cup fat-free, flavored yogurt with artificial (no calorie) sweetener  Non-starchy vegetable group exchanges:  Each exchange contains about 5 grams of carbohydrate , 2 grams of protein, and 25 calories  Examples include beets, broccoli, cabbage, carrots, cauliflower, cucumber, mushrooms, tomatoes, and zucchini  · ½ cup of cooked vegetables or 1 cup of raw vegetables     · ½ cup of vegetable juice  Meat and meat substitute group exchanges:  Each exchange of a lean meat  listed below contains about 7 grams of protein, 0 to 3 grams of fat, and 45 calories  The meat and meat substitutes food group does not contain any carbohydrates  Medium and high-fat meats have more calories  · 1 ounce of chicken or turkey without skin, or 1 ounce of fish (not breaded or fried)     · 1 ounce of lean beef, pork, or lamb     · 1-inch cube or 1 ounce of low-fat cheese     · 2 egg whites or ¼ cup of egg substitute     · ½ cup of tofu  Sweets, desserts, and other carbohydrate group exchanges:   · Sweets and other desserts:  Each exchange has about 15 grams of carbohydrate   ¨ 1 ounce of raghu food cake or 2-inch square cake (unfrosted)    ¨ 2 small cookies     ¨ ½ cup of sugar-free, fat-free ice cream    ¨ 1 tablespoon of syrup, jam, jelly, table sugar, or honey    · Combination foods:     ¨ 1 cup of an entrée, such as lasagna, spaghetti with meatballs, macaroni and cheese, and chili with beans (each serving counts as 2 carbohydrate exchanges )     ¨ 1 cup of tomato or vegetable beef soup (each serving counts as 1 carbohydrate exchange )  Fat group exchanges:  Each exchange contains 5 grams of fat and 45 calories    · 1 teaspoon of oil (such as canola, olive, or corn oil) · 6 almonds or cashews, 10 peanuts, or 4 pecan halves     · 2 tablespoons of avocado     · ½ tablespoon of peanut butter     · 1 teaspoon of regular margarine or 2 teaspoons of low-fat margarine     · 1 teaspoon of regular butter or 1 tablespoon of low-fat butter     · 1 teaspoon of regular mayonnaise or 1 tablespoon of low-fat mayonnaise     · 1 tablespoon of regular salad dressing or 2 tablespoons of low-fat salad dressing  Free foods: The foods on this list are called free foods because they have very few calories  Free foods usually do not increase your blood sugar if you limit them  · 1 tablespoon of catsup or taco sauce     · ¼ cup of salsa     · 2 tablespoons of sugar-free syrup or 2 teaspoons of light jam or jelly     · 1 tablespoon of fat-free salad dressing     · 4 tablespoons of fat-free margarine or fat-free mayonnaise     · Sugar-free drinks: diet soda, sugar-free drink mixes, or mineral water     · Low-sodium bouillon or fat-free broth     · Mustard     · Seasonings such as spices, herbs, and garlic     · Sugar-free gelatin without added fruit  Other healthy nutrition guidelines:   · Eat more fiber  Choose foods that are good sources of fiber, such as fruits, vegetables, and whole grains  Cereals that contain 5 or more grams of fiber per serving are good sources of fiber  Legumes such as garbanzo, kennedy beans, kidney beans, and lentils are also good sources  · Limit fat  Ask your dietitian or healthcare provider how much fat you should eat each day  Choose foods low in fat, saturated fat, trans fat, and cholesterol  Examples include turkey or chicken without the skin, fish, lean cuts of meat, and beans  Low-fat dairy foods, such as low-fat or fat-free milk and low-fat yogurt are also good choices  Omega-3 fatty acids are healthy fats that are found in canola oil, soybean oil and fatty fish  [place], albacore tuna, and sardines are good sources of omega 3 fatty acids   Eat 2 servings of these types of fish each week  Do not eat fried fish  · Limit sugar  Sugar and sweets must be counted toward the carbohydrate exchanges that you can have within your meal plan  Limit sugar and sweets because they are usually also high in calories and fat  Eat smaller portions of sweets by sharing a dessert or asking for a child-size portion at a restaurant  · Limit sodium  (salt) to about 2,300 mg per day  You may need to eat even less sodium if you have certain medical conditions  Foods high in sodium include soy sauce, potato chips, and soup  · Limit alcohol  Ask your healthcare provider if it is safe for you to drink alcohol  If alcohol is safe for you to have, eat a meal when you drink alcohol  If you drink alcohol on an empty stomach, your blood sugar may drop to a low level  Women should limit alcohol to 1 drink per day  Men should limit alcohol to 2 drinks per day  A drink of alcohol is 5 ounces of wine, 12 ounces of beer, or 1½ ounces of liquor  Other ways to manage your diabetes:   · Control your blood sugar level  Test your blood sugar level regularly and keep a record of the results  Ask your healthcare provider when and how often to test your blood sugar  You may need to check your blood sugar level at least 3 times each day  · Talk to your healthcare provider about your weight  Ask if you need to lose weight, and how much you need to lose  If you are overweight, you may need to make other changes to lose weight  Ask your healthcare provider to help you create a weight loss program      · Exercise  can help to control your blood sugar levels and decrease your risk of heart disease  It can also help you lose or maintain your weight  Get at least 30 minutes of exercise, 5 times each week  Do resistance training (using weights) 2 times each week  Do not sit for longer than 90 minutes  Work with your healthcare provider to plan the best exercise program for you    Contact your healthcare provider if:   · You have high blood sugar levels during a certain time of day, or almost all of the time  · You often have low blood sugar levels  · You have questions or concerns about your condition or care  © 2017 [address] [employer] [employer] Information is for End User's use only and may not be sold, redistributed or otherwise used for commercial purposes  All illustrations and images included in CareNotes® are the copyrighted property of A D A M , Inc  or Jose Alfredo Chavez  The above information is an  only  It is not intended as medical advice for individual conditions or treatments  Talk to your doctor, nurse or pharmacist before following any medical regimen to see if it is safe and effective for you  Narcotic Pain Management   WHAT YOU NEED TO KNOW:   What do I need to know about narcotics? A narcotic is a type of medicine used to treat pain  Examples of narcotics are codeine, oxycodone, and fentanyl  Why is it important to manage my pain? Pain can cause changes in your physical and emotional health, such as depression and sleep problems  Pain control and management may help you rest, heal, and return to your daily activities  What are the side effects of narcotic medicines? The most common side effect is constipation  Drink more liquids and eat high-fiber foods to help prevent constipation  Ask your healthcare provider what liquids are right for you and how much you should drink  Also ask for a list of foods that contain fiber  Other side effects include nausea, sleepiness, and itchiness  You may need to take your narcotic medicine with food to decrease nausea  Ask your healthcare provider other ways to manage side effects  Why it is important that I take narcotic medicines as directed? · Health problems such as  trouble breathing, liver or kidney damage, or stomach bleeding may occur  Any of these problems can become life-threatening       · Acetaminophen or ibuprofen may be included in some narcotic medicines  Too much of these medicines can cause liver or kidney damage, or stomach bleeding  These problems can become life-threatening  · Dependence  means your body needs the medicine to keep it from going through withdrawal      · Tolerance  means the medicine does not control pain as well as it used to  You need higher doses of the medicine to get pain relief  · Addiction  means you are not able to control the use of the medicine  You use it when you do not have pain and you have cravings for the medicine  What do I need to know about narcotic safety? · Take your medicine as directed  Ask if you need more information on how to take your medicine correctly  Follow up with your healthcare provider regularly  You may need to have your dose adjusted  Do not use narcotic medicine if you are pregnant or breastfeeding  Narcotic medicines can be transferred to your baby through your blood and breast milk  · Give your healthcare provider a list of all your medicines  Include any over-the-counter medicines, vitamins, and herbs  It can be dangerous to take narcotics with certain other medicines, such as antihistamines  · Keep your medicine in a safe place  Store your narcotic medicine in a locked cabinet to keep it away from children and others  · Do not drink alcohol while you use narcotics  Alcohol use with a narcotic medicine can make you sleepy and slow your breathing rate  You may stop breathing completely  · Do not drive or operate heavy machinery after you take narcotic medicine  Narcotic medicine can make you drowsy and make it hard to concentrate  You may injure yourself or others if you drive or operate heavy machinery while taking your medicine  Call 911 or have someone call 911 for any of the following:   · You are breathing slower than normal, or you have trouble breathing  · You cannot be woken  · You have a seizure    When should I seek immediate care? · Your heart is beating slower than usual     · Your heart feels like it is jumping or fluttering  · You have trouble staying awake  · You have severe muscle pain or weakness  · You see or hear things that are not real   When should I contact my healthcare provider? · You are too dizzy to stand up  · Your pain gets worse or you have new pain  · Your pain does not get better after you use your narcotic medicine  · You cannot do your usual activities because of side effects from the narcotic  · You are constipated or have abdominal pain  · You have questions or concerns about your condition or care  CARE AGREEMENT:   You have the right to help plan your care  Learn about your health condition and how it may be treated  Discuss treatment options with your caregivers to decide what care you want to receive  You always have the right to refuse treatment  The above information is an  only  It is not intended as medical advice for individual conditions or treatments  Talk to your doctor, nurse or pharmacist before following any medical regimen to see if it is safe and effective for you  © 2017 [facility] Information is for End User's use only and may not be sold, redistributed or otherwise used for commercial purposes  All illustrations and images included in CareNotes® are the copyrighted property of A MILA A M , Inc  or Jose Alfredo GREENP Checked WNL

## 2019-01-24 NOTE — LETTER
2019     To Whom It May Concern:    Cristy Garcia : 1975 is currently under my professional care and is currently disabled with the diagnoses of Fibromyalgia causing Impaired Mobility requiring the use of a single point cane for ambulation in addition to a walker for walking long distances  She also has respiratory disease causing impaired breathing with exertion  Therefore, I request that you would consider her for housing assistance  Please contact me with any questions or concerns      Thank You,        Dr Moris Faulkner, JUAN ANTONIO, [facility] [place]
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
## 2019-04-11 ENCOUNTER — HOSPITAL ENCOUNTER (OUTPATIENT)
Dept: RADIOLOGY | Facility: HOSPITAL | Age: 44
Discharge: HOME | End: 2019-04-11
Payer: COMMERCIAL

## 2019-04-11 ENCOUNTER — APPOINTMENT (OUTPATIENT)
Dept: LAB | Facility: HOSPITAL | Age: 44
End: 2019-04-11
Payer: COMMERCIAL

## 2019-04-11 ENCOUNTER — HOSPITAL ENCOUNTER (OUTPATIENT)
Dept: NON INVASIVE DIAGNOSTICS | Facility: HOSPITAL | Age: 44
Discharge: HOME | End: 2019-04-11
Payer: COMMERCIAL

## 2019-04-11 ENCOUNTER — HOSPITAL ENCOUNTER (OUTPATIENT)
Dept: PULMONOLOGY | Facility: HOSPITAL | Age: 44
Discharge: HOME | End: 2019-04-11
Payer: COMMERCIAL

## 2019-04-11 DIAGNOSIS — M25.561 ACUTE PAIN OF BOTH KNEES: ICD-10-CM

## 2019-04-11 DIAGNOSIS — R05.9 COUGH: ICD-10-CM

## 2019-04-11 DIAGNOSIS — J98.9 RESPIRATORY DISEASE: ICD-10-CM

## 2019-04-11 DIAGNOSIS — R07.89 CHEST TIGHTNESS: ICD-10-CM

## 2019-04-11 DIAGNOSIS — R06.02 SOB (SHORTNESS OF BREATH): ICD-10-CM

## 2019-04-11 DIAGNOSIS — E53.8 VITAMIN B12 DEFICIENCY: ICD-10-CM

## 2019-04-11 DIAGNOSIS — Z13.0 SCREENING FOR DEFICIENCY ANEMIA: ICD-10-CM

## 2019-04-11 DIAGNOSIS — Z13.220 SCREENING FOR HYPERLIPIDEMIA: ICD-10-CM

## 2019-04-11 DIAGNOSIS — R73.03 PREDIABETES: ICD-10-CM

## 2019-04-11 DIAGNOSIS — Z13.29 SCREENING FOR HYPOTHYROIDISM: ICD-10-CM

## 2019-04-11 DIAGNOSIS — E55.9 VITAMIN D DEFICIENCY: ICD-10-CM

## 2019-04-11 DIAGNOSIS — R00.0 TACHYCARDIA: ICD-10-CM

## 2019-04-11 DIAGNOSIS — Z74.09 IMPAIRED MOBILITY: ICD-10-CM

## 2019-04-11 DIAGNOSIS — M25.562 ACUTE PAIN OF BOTH KNEES: ICD-10-CM

## 2019-04-11 LAB
25(OH)D3 SERPL-MCNC: 21.2 NG/ML (ref 30–100)
ALBUMIN SERPL BCP-MCNC: 3.6 G/DL (ref 3.5–5)
ALP SERPL-CCNC: 49 U/L (ref 46–116)
ALT SERPL W P-5'-P-CCNC: 43 U/L (ref 12–78)
ANION GAP SERPL CALCULATED.3IONS-SCNC: 12 MMOL/L (ref 4–13)
AST SERPL W P-5'-P-CCNC: 21 U/L (ref 5–45)
ATRIAL RATE: 84 BPM
BASOPHILS # BLD AUTO: 0.1 THOUSANDS/ΜL (ref 0–0.1)
BASOPHILS NFR BLD AUTO: 1 % (ref 0–1)
BILIRUB SERPL-MCNC: 0.6 MG/DL (ref 0.2–1)
BUN SERPL-MCNC: 13 MG/DL (ref 5–25)
CALCIUM SERPL-MCNC: 9 MG/DL (ref 8.3–10.1)
CHLORIDE SERPL-SCNC: 103 MMOL/L (ref 100–108)
CHOLEST SERPL-MCNC: 178 MG/DL (ref 50–200)
CO2 SERPL-SCNC: 24 MMOL/L (ref 21–32)
CREAT SERPL-MCNC: 0.74 MG/DL (ref 0.6–1.3)
EOSINOPHIL # BLD AUTO: 0.42 THOUSAND/ΜL (ref 0–0.61)
EOSINOPHIL NFR BLD AUTO: 5 % (ref 0–6)
ERYTHROCYTE [DISTWIDTH] IN BLOOD BY AUTOMATED COUNT: 12.5 % (ref 11.6–15.1)
EST. AVERAGE GLUCOSE BLD GHB EST-MCNC: 137 MG/DL
GFR SERPL CREATININE-BSD FRML MDRD: 98 ML/MIN/1.73SQ M
GLUCOSE P FAST SERPL-MCNC: 144 MG/DL (ref 65–99)
HBA1C MFR BLD: 6.4 % (ref 4.2–6.3)
HCT VFR BLD AUTO: 48.5 % (ref 34.8–46.1)
HDLC SERPL-MCNC: 45 MG/DL (ref 40–60)
HGB BLD-MCNC: 16 G/DL (ref 11.5–15.4)
IMM GRANULOCYTES # BLD AUTO: 0.06 THOUSAND/UL (ref 0–0.2)
IMM GRANULOCYTES NFR BLD AUTO: 1 % (ref 0–2)
INSULIN SERPL-ACNC: 25.9 MU/L (ref 3–25)
LDLC SERPL CALC-MCNC: 101 MG/DL (ref 0–100)
LYMPHOCYTES # BLD AUTO: 3.13 THOUSANDS/ΜL (ref 0.6–4.47)
LYMPHOCYTES NFR BLD AUTO: 36 % (ref 14–44)
MCH RBC QN AUTO: 30.1 PG (ref 26.8–34.3)
MCHC RBC AUTO-ENTMCNC: 33 G/DL (ref 31.4–37.4)
MCV RBC AUTO: 91 FL (ref 82–98)
MONOCYTES # BLD AUTO: 0.53 THOUSAND/ΜL (ref 0.17–1.22)
MONOCYTES NFR BLD AUTO: 6 % (ref 4–12)
NEUTROPHILS # BLD AUTO: 4.43 THOUSANDS/ΜL (ref 1.85–7.62)
NEUTS SEG NFR BLD AUTO: 51 % (ref 43–75)
NONHDLC SERPL-MCNC: 133 MG/DL
NRBC BLD AUTO-RTO: 0 /100 WBCS
P AXIS: 49 DEGREES
PLATELET # BLD AUTO: 218 THOUSANDS/UL (ref 149–390)
PMV BLD AUTO: 9.5 FL (ref 8.9–12.7)
POTASSIUM SERPL-SCNC: 3.6 MMOL/L (ref 3.5–5.3)
PR INTERVAL: 140 MS
PROT SERPL-MCNC: 7.6 G/DL (ref 6.4–8.2)
QRS AXIS: 44 DEGREES
QRSD INTERVAL: 86 MS
QT INTERVAL: 376 MS
QTC INTERVAL: 444 MS
RBC # BLD AUTO: 5.32 MILLION/UL (ref 3.81–5.12)
SODIUM SERPL-SCNC: 139 MMOL/L (ref 136–145)
T WAVE AXIS: 59 DEGREES
TRIGL SERPL-MCNC: 162 MG/DL
TSH SERPL DL<=0.05 MIU/L-ACNC: 1.49 UIU/ML (ref 0.36–3.74)
VENTRICULAR RATE: 84 BPM
VIT B12 SERPL-MCNC: 963 PG/ML (ref 100–900)
WBC # BLD AUTO: 8.67 THOUSAND/UL (ref 4.31–10.16)

## 2019-04-11 PROCEDURE — 94060 EVALUATION OF WHEEZING: CPT | Performed by: INTERNAL MEDICINE

## 2019-04-11 PROCEDURE — 94760 N-INVAS EAR/PLS OXIMETRY 1: CPT

## 2019-04-11 PROCEDURE — 36415 COLL VENOUS BLD VENIPUNCTURE: CPT

## 2019-04-11 PROCEDURE — 93010 ELECTROCARDIOGRAM REPORT: CPT | Performed by: INTERNAL MEDICINE

## 2019-04-11 PROCEDURE — 94727 GAS DIL/WSHOT DETER LNG VOL: CPT

## 2019-04-11 PROCEDURE — 73564 X-RAY EXAM KNEE 4 OR MORE: CPT

## 2019-04-11 PROCEDURE — 71046 X-RAY EXAM CHEST 2 VIEWS: CPT

## 2019-04-11 PROCEDURE — 84443 ASSAY THYROID STIM HORMONE: CPT

## 2019-04-11 PROCEDURE — 94729 DIFFUSING CAPACITY: CPT

## 2019-04-11 PROCEDURE — 82306 VITAMIN D 25 HYDROXY: CPT

## 2019-04-11 PROCEDURE — 83525 ASSAY OF INSULIN: CPT

## 2019-04-11 PROCEDURE — 94726 PLETHYSMOGRAPHY LUNG VOLUMES: CPT | Performed by: INTERNAL MEDICINE

## 2019-04-11 PROCEDURE — 94729 DIFFUSING CAPACITY: CPT | Performed by: INTERNAL MEDICINE

## 2019-04-11 PROCEDURE — 94060 EVALUATION OF WHEEZING: CPT

## 2019-04-11 PROCEDURE — 80053 COMPREHEN METABOLIC PANEL: CPT

## 2019-04-11 PROCEDURE — 93005 ELECTROCARDIOGRAM TRACING: CPT

## 2019-04-11 PROCEDURE — 82607 VITAMIN B-12: CPT

## 2019-04-11 PROCEDURE — 80061 LIPID PANEL: CPT

## 2019-04-11 PROCEDURE — 83036 HEMOGLOBIN GLYCOSYLATED A1C: CPT

## 2019-04-11 PROCEDURE — 85025 COMPLETE CBC W/AUTO DIFF WBC: CPT

## 2019-04-11 RX ORDER — ALBUTEROL SULFATE 2.5 MG/3ML
2.5 SOLUTION RESPIRATORY (INHALATION) ONCE AS NEEDED
Status: COMPLETED | OUTPATIENT
Start: 2019-04-11 | End: 2019-04-11

## 2019-04-11 RX ADMIN — ALBUTEROL SULFATE 2.5 MG: 2.5 SOLUTION RESPIRATORY (INHALATION) at 07:30

## 2019-04-24 ENCOUNTER — OFFICE VISIT (OUTPATIENT)
Dept: FAMILY MEDICINE CLINIC | Facility: CLINIC | Age: 44
End: 2019-04-24
Payer: COMMERCIAL

## 2019-04-24 VITALS
SYSTOLIC BLOOD PRESSURE: 120 MMHG | WEIGHT: 262.2 LBS | DIASTOLIC BLOOD PRESSURE: 80 MMHG | HEART RATE: 98 BPM | TEMPERATURE: 97.6 F | BODY MASS INDEX: 48.25 KG/M2 | HEIGHT: 62 IN | OXYGEN SATURATION: 97 % | RESPIRATION RATE: 18 BRPM

## 2019-04-24 DIAGNOSIS — M62.830 SPASM OF MUSCLE OF LOWER BACK: ICD-10-CM

## 2019-04-24 DIAGNOSIS — Z13.0 SCREENING FOR DEFICIENCY ANEMIA: ICD-10-CM

## 2019-04-24 DIAGNOSIS — J45.40 MODERATE PERSISTENT ASTHMA, UNSPECIFIED WHETHER COMPLICATED: Primary | ICD-10-CM

## 2019-04-24 DIAGNOSIS — R73.03 PREDIABETES: ICD-10-CM

## 2019-04-24 DIAGNOSIS — K21.9 GASTROESOPHAGEAL REFLUX DISEASE, ESOPHAGITIS PRESENCE NOT SPECIFIED: ICD-10-CM

## 2019-04-24 DIAGNOSIS — M54.50 ACUTE BILATERAL LOW BACK PAIN WITHOUT SCIATICA: ICD-10-CM

## 2019-04-24 DIAGNOSIS — Z12.39 BREAST CANCER SCREENING: ICD-10-CM

## 2019-04-24 DIAGNOSIS — J98.9 RESPIRATORY DISEASE: ICD-10-CM

## 2019-04-24 DIAGNOSIS — R06.02 SOB (SHORTNESS OF BREATH): ICD-10-CM

## 2019-04-24 DIAGNOSIS — M79.7 FIBROMYALGIA: ICD-10-CM

## 2019-04-24 DIAGNOSIS — I10 ESSENTIAL HYPERTENSION: ICD-10-CM

## 2019-04-24 DIAGNOSIS — R05.9 COUGH: ICD-10-CM

## 2019-04-24 DIAGNOSIS — Z13.29 SCREENING FOR THYROID DISORDER: ICD-10-CM

## 2019-04-24 DIAGNOSIS — Z12.4 CERVICAL CANCER SCREENING: ICD-10-CM

## 2019-04-24 DIAGNOSIS — R73.9 HYPERGLYCEMIA: ICD-10-CM

## 2019-04-24 DIAGNOSIS — Z99.89 OSA ON CPAP: ICD-10-CM

## 2019-04-24 DIAGNOSIS — E78.49 OTHER HYPERLIPIDEMIA: ICD-10-CM

## 2019-04-24 DIAGNOSIS — G47.33 OSA ON CPAP: ICD-10-CM

## 2019-04-24 DIAGNOSIS — R00.0 TACHYCARDIA: ICD-10-CM

## 2019-04-24 DIAGNOSIS — E53.8 VITAMIN B12 DEFICIENCY: ICD-10-CM

## 2019-04-24 DIAGNOSIS — E55.9 VITAMIN D DEFICIENCY: ICD-10-CM

## 2019-04-24 DIAGNOSIS — L65.9 ALOPECIA: ICD-10-CM

## 2019-04-24 PROCEDURE — 99214 OFFICE O/P EST MOD 30 MIN: CPT | Performed by: NURSE PRACTITIONER

## 2019-04-24 PROCEDURE — 3074F SYST BP LT 130 MM HG: CPT | Performed by: NURSE PRACTITIONER

## 2019-04-24 PROCEDURE — 3079F DIAST BP 80-89 MM HG: CPT | Performed by: NURSE PRACTITIONER

## 2019-04-24 RX ORDER — CYCLOBENZAPRINE HCL 10 MG
TABLET ORAL
Qty: 60 TABLET | Refills: 3 | Status: SHIPPED | OUTPATIENT
Start: 2019-04-24 | End: 2020-06-11 | Stop reason: SDUPTHER

## 2019-04-24 RX ORDER — ALBUTEROL SULFATE 90 UG/1
2 AEROSOL, METERED RESPIRATORY (INHALATION) EVERY 6 HOURS PRN
Qty: 1 INHALER | Refills: 5 | Status: SHIPPED | OUTPATIENT
Start: 2019-04-24 | End: 2020-06-11 | Stop reason: SDUPTHER

## 2019-04-24 RX ORDER — FLUTICASONE FUROATE AND VILANTEROL 200; 25 UG/1; UG/1
1 POWDER RESPIRATORY (INHALATION) DAILY
Qty: 1 INHALER | Refills: 5 | Status: SHIPPED | OUTPATIENT
Start: 2019-04-24 | End: 2019-09-05 | Stop reason: SDUPTHER

## 2019-04-24 RX ORDER — AMITRIPTYLINE HYDROCHLORIDE 10 MG/1
10 TABLET, FILM COATED ORAL
Qty: 90 TABLET | Refills: 1 | Status: SHIPPED | OUTPATIENT
Start: 2019-04-24 | End: 2020-06-11 | Stop reason: ALTCHOICE

## 2019-04-24 RX ORDER — LISINOPRIL 10 MG/1
10 TABLET ORAL DAILY
Qty: 90 TABLET | Refills: 1 | Status: SHIPPED | OUTPATIENT
Start: 2019-04-24 | End: 2019-08-20 | Stop reason: SDUPTHER

## 2019-04-25 ENCOUNTER — TELEPHONE (OUTPATIENT)
Dept: FAMILY MEDICINE CLINIC | Facility: CLINIC | Age: 44
End: 2019-04-25

## 2019-04-25 DIAGNOSIS — M25.559 ARTHRALGIA OF HIP, UNSPECIFIED LATERALITY: ICD-10-CM

## 2019-04-25 DIAGNOSIS — M79.7 FIBROMYALGIA: Primary | ICD-10-CM

## 2019-04-25 DIAGNOSIS — M16.50: ICD-10-CM

## 2019-04-25 DIAGNOSIS — Z87.81 HISTORY OF HIP FRACTURE: ICD-10-CM

## 2019-05-01 ENCOUNTER — OFFICE VISIT (OUTPATIENT)
Dept: OBGYN CLINIC | Facility: CLINIC | Age: 44
End: 2019-05-01
Payer: COMMERCIAL

## 2019-05-01 VITALS
WEIGHT: 268 LBS | BODY MASS INDEX: 49.32 KG/M2 | SYSTOLIC BLOOD PRESSURE: 135 MMHG | DIASTOLIC BLOOD PRESSURE: 99 MMHG | HEART RATE: 103 BPM | HEIGHT: 62 IN

## 2019-05-01 DIAGNOSIS — M16.50: ICD-10-CM

## 2019-05-01 DIAGNOSIS — G89.29 CHRONIC LEFT-SIDED LOW BACK PAIN WITHOUT SCIATICA: Primary | ICD-10-CM

## 2019-05-01 DIAGNOSIS — M25.562 CHRONIC PAIN OF LEFT KNEE: ICD-10-CM

## 2019-05-01 DIAGNOSIS — M54.50 CHRONIC LEFT-SIDED LOW BACK PAIN WITHOUT SCIATICA: Primary | ICD-10-CM

## 2019-05-01 DIAGNOSIS — M25.561 CHRONIC PAIN OF RIGHT KNEE: ICD-10-CM

## 2019-05-01 DIAGNOSIS — G89.29 CHRONIC PAIN OF LEFT KNEE: ICD-10-CM

## 2019-05-01 DIAGNOSIS — Z87.81 HISTORY OF HIP FRACTURE: ICD-10-CM

## 2019-05-01 DIAGNOSIS — M25.559 ARTHRALGIA OF HIP, UNSPECIFIED LATERALITY: ICD-10-CM

## 2019-05-01 DIAGNOSIS — G89.29 CHRONIC PAIN OF RIGHT KNEE: ICD-10-CM

## 2019-05-01 PROCEDURE — 99243 OFF/OP CNSLTJ NEW/EST LOW 30: CPT | Performed by: ORTHOPAEDIC SURGERY

## 2019-05-09 ENCOUNTER — TELEPHONE (OUTPATIENT)
Dept: FAMILY MEDICINE CLINIC | Facility: CLINIC | Age: 44
End: 2019-05-09

## 2019-05-09 DIAGNOSIS — E55.9 VITAMIN D DEFICIENCY: Primary | ICD-10-CM

## 2019-05-09 DIAGNOSIS — M79.7 FIBROMYALGIA: ICD-10-CM

## 2019-05-09 DIAGNOSIS — M15.9 PRIMARY OSTEOARTHRITIS INVOLVING MULTIPLE JOINTS: ICD-10-CM

## 2019-05-09 RX ORDER — CELECOXIB 100 MG/1
100 CAPSULE ORAL 2 TIMES DAILY
Qty: 60 CAPSULE | Refills: 5 | Status: SHIPPED | OUTPATIENT
Start: 2019-05-09 | End: 2020-06-11 | Stop reason: SDUPTHER

## 2019-05-09 RX ORDER — ERGOCALCIFEROL 1.25 MG/1
50000 CAPSULE ORAL WEEKLY
Qty: 4 CAPSULE | Refills: 5 | Status: SHIPPED | OUTPATIENT
Start: 2019-05-09 | End: 2020-06-11 | Stop reason: SDUPTHER

## 2019-05-16 ENCOUNTER — TELEPHONE (OUTPATIENT)
Dept: FAMILY MEDICINE CLINIC | Facility: CLINIC | Age: 44
End: 2019-05-16

## 2019-08-20 DIAGNOSIS — R73.03 PREDIABETES: ICD-10-CM

## 2019-08-20 DIAGNOSIS — I10 ESSENTIAL HYPERTENSION: ICD-10-CM

## 2019-08-20 RX ORDER — LISINOPRIL 10 MG/1
10 TABLET ORAL DAILY
Qty: 90 TABLET | Refills: 1 | Status: SHIPPED | OUTPATIENT
Start: 2019-08-20 | End: 2020-06-11 | Stop reason: SDUPTHER

## 2019-08-27 ENCOUNTER — EVALUATION (OUTPATIENT)
Dept: PHYSICAL THERAPY | Facility: CLINIC | Age: 44
End: 2019-08-27
Payer: COMMERCIAL

## 2019-08-27 DIAGNOSIS — G89.29 CHRONIC LEFT-SIDED LOW BACK PAIN WITHOUT SCIATICA: ICD-10-CM

## 2019-08-27 DIAGNOSIS — M54.50 ACUTE LEFT-SIDED LOW BACK PAIN WITHOUT SCIATICA: Primary | ICD-10-CM

## 2019-08-27 DIAGNOSIS — R26.2 DIFFICULTY WALKING: ICD-10-CM

## 2019-08-27 DIAGNOSIS — M54.50 CHRONIC LEFT-SIDED LOW BACK PAIN WITHOUT SCIATICA: ICD-10-CM

## 2019-08-27 DIAGNOSIS — M25.561 CHRONIC PAIN OF RIGHT KNEE: ICD-10-CM

## 2019-08-27 DIAGNOSIS — G89.29 CHRONIC PAIN OF RIGHT KNEE: ICD-10-CM

## 2019-08-27 DIAGNOSIS — S80.01XD CONTUSION OF RIGHT KNEE, SUBSEQUENT ENCOUNTER: ICD-10-CM

## 2019-08-27 PROCEDURE — 97140 MANUAL THERAPY 1/> REGIONS: CPT | Performed by: PHYSICAL THERAPIST

## 2019-08-27 PROCEDURE — 97162 PT EVAL MOD COMPLEX 30 MIN: CPT | Performed by: PHYSICAL THERAPIST

## 2019-08-27 PROCEDURE — 97535 SELF CARE MNGMENT TRAINING: CPT | Performed by: PHYSICAL THERAPIST

## 2019-08-27 PROCEDURE — 97014 ELECTRIC STIMULATION THERAPY: CPT | Performed by: PHYSICAL THERAPIST

## 2019-08-27 PROCEDURE — 97035 APP MDLTY 1+ULTRASOUND EA 15: CPT | Performed by: PHYSICAL THERAPIST

## 2019-08-27 PROCEDURE — 97112 NEUROMUSCULAR REEDUCATION: CPT | Performed by: PHYSICAL THERAPIST
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
## 2019-08-29 ENCOUNTER — OFFICE VISIT (OUTPATIENT)
Dept: PHYSICAL THERAPY | Facility: CLINIC | Age: 44
End: 2019-08-29
Payer: COMMERCIAL

## 2019-08-29 DIAGNOSIS — G89.29 CHRONIC PAIN OF RIGHT KNEE: ICD-10-CM

## 2019-08-29 DIAGNOSIS — M25.561 CHRONIC PAIN OF RIGHT KNEE: ICD-10-CM

## 2019-08-29 DIAGNOSIS — M54.50 ACUTE LEFT-SIDED LOW BACK PAIN WITHOUT SCIATICA: Primary | ICD-10-CM

## 2019-08-29 DIAGNOSIS — S80.01XD CONTUSION OF RIGHT KNEE, SUBSEQUENT ENCOUNTER: ICD-10-CM

## 2019-08-29 DIAGNOSIS — G89.29 CHRONIC LEFT-SIDED LOW BACK PAIN WITHOUT SCIATICA: ICD-10-CM

## 2019-08-29 DIAGNOSIS — M54.50 CHRONIC LEFT-SIDED LOW BACK PAIN WITHOUT SCIATICA: ICD-10-CM

## 2019-08-29 DIAGNOSIS — R26.2 DIFFICULTY WALKING: ICD-10-CM

## 2019-08-29 PROCEDURE — 97112 NEUROMUSCULAR REEDUCATION: CPT | Performed by: PHYSICAL THERAPIST

## 2019-08-29 PROCEDURE — 97014 ELECTRIC STIMULATION THERAPY: CPT | Performed by: PHYSICAL THERAPIST

## 2019-08-29 PROCEDURE — 97140 MANUAL THERAPY 1/> REGIONS: CPT | Performed by: PHYSICAL THERAPIST

## 2019-08-29 PROCEDURE — 97035 APP MDLTY 1+ULTRASOUND EA 15: CPT | Performed by: PHYSICAL THERAPIST

## 2019-08-29 NOTE — PROGRESS NOTES
Today's date: 2019  Patient name: Crow Soliz  : 1975  MRN: [id number]  Referring provider: Benjamin Garcia DO  Dx:   Encounter Diagnosis     ICD-10-CM    1  Acute left-sided low back pain without sciatica M54 5    2  Chronic left-sided low back pain without sciatica M54 5     G89 29    3  Chronic pain of right knee M25 561     G89 29    4  Difficulty walking R26 2    5  Contusion of right knee, subsequent encounter S80  01XD      Subjective:  Poppy states her knees are sore and so is her hip today  Objective: See treatment log below    Assessment: Tolerated treatment well  Patient exhibited good technique with therapeutic exercises and would benefit from continued PT    Plan: Continue per plan of care  Progress treatment as tolerated         Precautions:  Left Anterior Hip Pain / Right Anterior Medial Patellar Region Pain    Daily Treatment Log  Manual        MT, ROM 15' 15'      HEP 15'       Exercise Log       Balance Board  30x      Chair Squats        P-Bar-GT-Forward, Backward,Side-Even & Dips        BOSU-Walk  5'      Foam Pad SLR,Hip/KneeFl,Step Ups        Foam Beam        Fitter-Slalom        Monster Steps        BAPS- L-2        T/G-Squats PF        W/P-Hip-Abd,Add,Flex,Ext  7 5#-30x      WP-Squats        Trimax-TKE        Khrifzt-XU-Bl        NK Table Exer        NK Table ROM        TM        Stepper        Bike        ME, PE 15' 15'              Modalities       MH&ES 20' 20'      US 10' 10'

## 2019-09-03 ENCOUNTER — OFFICE VISIT (OUTPATIENT)
Dept: PHYSICAL THERAPY | Facility: CLINIC | Age: 44
End: 2019-09-03
Payer: COMMERCIAL

## 2019-09-03 DIAGNOSIS — G89.29 CHRONIC LEFT-SIDED LOW BACK PAIN WITHOUT SCIATICA: ICD-10-CM

## 2019-09-03 DIAGNOSIS — M25.561 CHRONIC PAIN OF RIGHT KNEE: ICD-10-CM

## 2019-09-03 DIAGNOSIS — M54.50 CHRONIC LEFT-SIDED LOW BACK PAIN WITHOUT SCIATICA: ICD-10-CM

## 2019-09-03 DIAGNOSIS — R26.2 DIFFICULTY WALKING: ICD-10-CM

## 2019-09-03 DIAGNOSIS — S80.01XD CONTUSION OF RIGHT KNEE, SUBSEQUENT ENCOUNTER: ICD-10-CM

## 2019-09-03 DIAGNOSIS — M54.50 ACUTE LEFT-SIDED LOW BACK PAIN WITHOUT SCIATICA: Primary | ICD-10-CM

## 2019-09-03 DIAGNOSIS — G89.29 CHRONIC PAIN OF RIGHT KNEE: ICD-10-CM

## 2019-09-03 PROCEDURE — 97140 MANUAL THERAPY 1/> REGIONS: CPT | Performed by: PHYSICAL THERAPIST

## 2019-09-03 PROCEDURE — 97035 APP MDLTY 1+ULTRASOUND EA 15: CPT | Performed by: PHYSICAL THERAPIST

## 2019-09-03 PROCEDURE — 97014 ELECTRIC STIMULATION THERAPY: CPT | Performed by: PHYSICAL THERAPIST

## 2019-09-03 PROCEDURE — 97112 NEUROMUSCULAR REEDUCATION: CPT | Performed by: PHYSICAL THERAPIST

## 2019-09-03 NOTE — PROGRESS NOTES
Today's date: 9/3/2019  Patient name: Yazmin Bautista  : 1975  MRN: [id number]  Referring provider: Adrianna Castro DO  Dx:   Encounter Diagnosis     ICD-10-CM    1  Acute left-sided low back pain without sciatica M54 5    2  Chronic left-sided low back pain without sciatica M54 5     G89 29    3  Chronic pain of right knee M25 561     G89 29    4  Difficulty walking R26 2    5  Contusion of right knee, subsequent encounter S80  01XD      Subjective:  Cristy states her right knee is slowly feeling better  Objective: See treatment log below    Assessment: Tolerated treatment well  Patient exhibited good technique with therapeutic exercises and would benefit from continued PT    Plan: Continue per plan of care  Progress treatment as tolerated         Precautions:  Left Anterior Hip Pain / Right Anterior Medial Patellar Region Pain    Daily Treatment Log  Manual  3     MT, ROM 15' 15' 15'     HEP 15'       Exercise Log 8/27 8/29 9/3     Balance Board  30x 30x     Chair Squats   30x     P-Bar-GT-Forward, Backward,Side-Even & Dips        BOSU-Walk  5' 5'     Foam Pad SLR,Hip/KneeFl,Step Ups        Foam Beam        Fitter-Slalom        Monster Steps        BAPS- L-2        T/G-Squats PF        W/P-Hip-Abd,Add,Flex,Ext  7 5#-30x 7 5#-30x     WP-Squats        Trimax-TKE        [id number]-BW-[id number]        NK Table Exer        NK Table ROM        [employer]        ME, PE 15' 15' 15'             Modalities 3     MH&ES 20' 20' 20'     US 10' 10' 10'

## 2019-09-05 ENCOUNTER — OFFICE VISIT (OUTPATIENT)
Dept: PHYSICAL THERAPY | Facility: CLINIC | Age: 44
End: 2019-09-05
Payer: COMMERCIAL

## 2019-09-05 DIAGNOSIS — M54.50 CHRONIC LEFT-SIDED LOW BACK PAIN WITHOUT SCIATICA: ICD-10-CM

## 2019-09-05 DIAGNOSIS — S80.01XD CONTUSION OF RIGHT KNEE, SUBSEQUENT ENCOUNTER: ICD-10-CM

## 2019-09-05 DIAGNOSIS — G89.29 CHRONIC LEFT-SIDED LOW BACK PAIN WITHOUT SCIATICA: ICD-10-CM

## 2019-09-05 DIAGNOSIS — R26.2 DIFFICULTY WALKING: ICD-10-CM

## 2019-09-05 DIAGNOSIS — G89.29 CHRONIC PAIN OF RIGHT KNEE: ICD-10-CM

## 2019-09-05 DIAGNOSIS — M54.50 ACUTE LEFT-SIDED LOW BACK PAIN WITHOUT SCIATICA: Primary | ICD-10-CM

## 2019-09-05 DIAGNOSIS — M25.561 CHRONIC PAIN OF RIGHT KNEE: ICD-10-CM

## 2019-09-05 DIAGNOSIS — J45.40 MODERATE PERSISTENT ASTHMA, UNSPECIFIED WHETHER COMPLICATED: ICD-10-CM

## 2019-09-05 PROCEDURE — 97035 APP MDLTY 1+ULTRASOUND EA 15: CPT | Performed by: PHYSICAL THERAPIST

## 2019-09-05 PROCEDURE — 97112 NEUROMUSCULAR REEDUCATION: CPT | Performed by: PHYSICAL THERAPIST

## 2019-09-05 PROCEDURE — 97140 MANUAL THERAPY 1/> REGIONS: CPT | Performed by: PHYSICAL THERAPIST

## 2019-09-05 PROCEDURE — 97014 ELECTRIC STIMULATION THERAPY: CPT | Performed by: PHYSICAL THERAPIST

## 2019-09-05 RX ORDER — FLUTICASONE FUROATE AND VILANTEROL 200; 25 UG/1; UG/1
1 POWDER RESPIRATORY (INHALATION) DAILY
Qty: 1 INHALER | Refills: 5 | Status: SHIPPED | OUTPATIENT
Start: 2019-09-05 | End: 2020-06-11 | Stop reason: SDUPTHER

## 2019-09-05 NOTE — PROGRESS NOTES
Today's date: 2019  Patient name: Logan Albarran  : 1975  MRN: [id number]  Referring provider: Leigh Connolly DO  Dx:   Encounter Diagnosis     ICD-10-CM    1  Acute left-sided low back pain without sciatica M54 5    2  Chronic left-sided low back pain without sciatica M54 5     G89 29    3  Chronic pain of right knee M25 561     G89 29    4  Difficulty walking R26 2    5  Contusion of right knee, subsequent encounter S80  01XD      Subjective:  Cristy states her right knee is feeling a little better  Objective: See treatment log below    Assessment: Tolerated treatment well  Patient exhibited good technique with therapeutic exercises and would benefit from continued PT    Plan: Continue per plan of care  Progress treatment as tolerated         Precautions:  Left Anterior Hip Pain / Right Anterior Medial Patellar Region Pain    Daily Treatment Log  Manual  8/27 8/29 9/3 9/5    MT, ROM 15' 15' 15' 15'    HEP 15'       Exercise Log 8/27 8/29 9/3 9/5    Balance Board  30x 30x 30x    Chair Squats   30x 30x    P-Bar-GT-Forward, Backward,Side-Even & Dips        BOSU-Walk  5' 5' 5'    Foam Pad SLR,Hip/KneeFl,Step Ups        Foam Beam        Fitter-Slalom        Monster Steps        BAPS- L-2        T/G-Squats PF        W/P-Hip-Abd,Add,Flex,Ext  7 5#-30x 7 5#-30x 7 5#-30x    WP-Squats        Trimax-TKE        Mkapczi-UK-[id number]        NK Table Exer        NK Table ROM        Jaelyn SCOTT, PE 15' 15' 15' 15'            Modalities 8/27 8/29 9/3 9/5    MH&ES 20' 20' 20' 20'    US 10' 10' 10' 10'

## 2019-09-10 ENCOUNTER — OFFICE VISIT (OUTPATIENT)
Dept: PHYSICAL THERAPY | Facility: CLINIC | Age: 44
End: 2019-09-10
Payer: COMMERCIAL

## 2019-09-10 DIAGNOSIS — M54.50 ACUTE LEFT-SIDED LOW BACK PAIN WITHOUT SCIATICA: Primary | ICD-10-CM

## 2019-09-10 DIAGNOSIS — M54.50 CHRONIC LEFT-SIDED LOW BACK PAIN WITHOUT SCIATICA: ICD-10-CM

## 2019-09-10 DIAGNOSIS — R26.2 DIFFICULTY WALKING: ICD-10-CM

## 2019-09-10 DIAGNOSIS — G89.29 CHRONIC PAIN OF RIGHT KNEE: ICD-10-CM

## 2019-09-10 DIAGNOSIS — G89.29 CHRONIC LEFT-SIDED LOW BACK PAIN WITHOUT SCIATICA: ICD-10-CM

## 2019-09-10 DIAGNOSIS — M25.561 CHRONIC PAIN OF RIGHT KNEE: ICD-10-CM

## 2019-09-10 DIAGNOSIS — S80.01XD CONTUSION OF RIGHT KNEE, SUBSEQUENT ENCOUNTER: ICD-10-CM

## 2019-09-10 PROCEDURE — 97140 MANUAL THERAPY 1/> REGIONS: CPT | Performed by: PHYSICAL THERAPIST

## 2019-09-10 PROCEDURE — 97035 APP MDLTY 1+ULTRASOUND EA 15: CPT | Performed by: PHYSICAL THERAPIST

## 2019-09-10 PROCEDURE — 97014 ELECTRIC STIMULATION THERAPY: CPT | Performed by: PHYSICAL THERAPIST

## 2019-09-10 PROCEDURE — 97112 NEUROMUSCULAR REEDUCATION: CPT | Performed by: PHYSICAL THERAPIST

## 2019-09-10 NOTE — PROGRESS NOTES
Today's date: 9/10/2019  Patient name: Joseluis Mcgee  : 1975  MRN: [id number]  Referring provider: Mer Arora DO  Dx:   Encounter Diagnosis     ICD-10-CM    1  Acute left-sided low back pain without sciatica M54 5    2  Chronic left-sided low back pain without sciatica M54 5     G89 29    3  Chronic pain of right knee M25 561     G89 29    4  Difficulty walking R26 2    5  Contusion of right knee, subsequent encounter S80  01XD      Subjective:  Cristy states her right knee is slowly feeling better  She reports she can stand longer and walk further  Objective: See treatment log below    Assessment: Tolerated treatment well  Patient exhibited good technique with therapeutic exercises and would benefit from continued PT    Plan: Continue per plan of care  Progress treatment as tolerated         Precautions:  Left Anterior Hip Pain / Right Anterior Medial Patellar Region Pain    Daily Treatment Log  Manual  8/27 8/29 9/3 9/5 9/10   MT, ROM 15' 15' 15' 15' 15'   HEP 15'       Exercise Log 8/27 8/29 9/3 9/5 9/10   Balance Board  30x 30x 30x 30x   Chair Squats   30x 30x 30x   P-Bar-GT-Forward, Backward,Side-Even & Dips        BOSU-Walk  5' 5' 5' 5'   Foam Pad SLR,Hip/KneeFl,Step Ups        Foam Beam        Fitter-Slalom        Monster Steps        BAPS- L-2        T/G-Squats PF     30x   W/P-Hip-Abd,Add,Flex,Ext  7 5#-30x 7 5#-30x 7 5#-30x 7 5#-30x   WP-Squats        Trimax-TKE        Tivzcog-EL-Cw        NK Table Exer        NK Table ROM        TM        Tangerine Gurinder        ME, PE 15' 15' 15' 15' 15'           Modalities 8/27 8/29 9/3 9/5 9/10   MH&ES 20' 20' 20' 20' 20'   US 10' 10' 10' 10' 10'

## 2019-09-12 ENCOUNTER — OFFICE VISIT (OUTPATIENT)
Dept: PHYSICAL THERAPY | Facility: CLINIC | Age: 44
End: 2019-09-12
Payer: COMMERCIAL

## 2019-09-12 DIAGNOSIS — R26.2 DIFFICULTY WALKING: ICD-10-CM

## 2019-09-12 DIAGNOSIS — G89.29 CHRONIC PAIN OF RIGHT KNEE: ICD-10-CM

## 2019-09-12 DIAGNOSIS — M25.561 CHRONIC PAIN OF RIGHT KNEE: ICD-10-CM

## 2019-09-12 DIAGNOSIS — G89.29 CHRONIC LEFT-SIDED LOW BACK PAIN WITHOUT SCIATICA: ICD-10-CM

## 2019-09-12 DIAGNOSIS — S80.01XD CONTUSION OF RIGHT KNEE, SUBSEQUENT ENCOUNTER: ICD-10-CM

## 2019-09-12 DIAGNOSIS — M54.50 CHRONIC LEFT-SIDED LOW BACK PAIN WITHOUT SCIATICA: ICD-10-CM

## 2019-09-12 DIAGNOSIS — M54.50 ACUTE LEFT-SIDED LOW BACK PAIN WITHOUT SCIATICA: Primary | ICD-10-CM

## 2019-09-12 PROCEDURE — 97140 MANUAL THERAPY 1/> REGIONS: CPT

## 2019-09-12 PROCEDURE — 97110 THERAPEUTIC EXERCISES: CPT

## 2019-09-12 PROCEDURE — 97014 ELECTRIC STIMULATION THERAPY: CPT

## 2019-09-12 NOTE — PROGRESS NOTES
Today's date: 2019  Patient name: Nadir Hein  : 1975  MRN: [id number]  Referring provider: Agnes Franco DO  Dx:   Encounter Diagnosis     ICD-10-CM    1  Acute left-sided low back pain without sciatica M54 5    2  Chronic left-sided low back pain without sciatica M54 5     G89 29    3  Chronic pain of right knee M25 561     G89 29    4  Difficulty walking R26 2    5  Contusion of right knee, subsequent encounter S80  01XD      Subjective:  No new c/o pain today  Objective: See treatment log below    Assessment: Tolerated treatment well  Patient exhibited good technique with therapeutic exercises and would benefit from continued PT to increase ROM/strength and endurance to improve mobility and gait  Plan: Continue per plan of care  Progress treatment as tolerated         Precautions:  Left Anterior Hip Pain / Right Anterior Medial Patellar Region Pain    Daily Treatment Log  Manual  9/12    9/10   MT, ROM 30'    15'   HEP        Exercise Log 9/12    9/10   Balance Board 30x ea    30x   Chair Squats     30x   P-Bar-GT-Forward, Backward,Side-Even & Dips        BOSU-Walk 5'    5'   Foam Pad SLR,Hip/KneeFl,Step Ups        Foam Beam        Fitter-Slalom        Monster Steps        BAPS- L-2        T/G-Squats PF 30x ea    30x   W/P-Hip-Abd,Add,Flex,Ext 7 5# 30x ea    7 5#-30x   WP-Squats        Trimax-TKE        Szbfdvy-EM-Ik 2x2'       NK Table Exer        NK Table ROM        TM        Stepper        Bike        ME, PE 15'    15'           Modalities 9/12    9/10   MH&ES 20'    20'   US NT    10'

## 2019-09-17 ENCOUNTER — OFFICE VISIT (OUTPATIENT)
Dept: PHYSICAL THERAPY | Facility: CLINIC | Age: 44
End: 2019-09-17
Payer: COMMERCIAL

## 2019-09-17 DIAGNOSIS — M54.50 CHRONIC LEFT-SIDED LOW BACK PAIN WITHOUT SCIATICA: ICD-10-CM

## 2019-09-17 DIAGNOSIS — S80.01XD CONTUSION OF RIGHT KNEE, SUBSEQUENT ENCOUNTER: ICD-10-CM

## 2019-09-17 DIAGNOSIS — M25.561 CHRONIC PAIN OF RIGHT KNEE: ICD-10-CM

## 2019-09-17 DIAGNOSIS — G89.29 CHRONIC PAIN OF RIGHT KNEE: ICD-10-CM

## 2019-09-17 DIAGNOSIS — M54.50 ACUTE LEFT-SIDED LOW BACK PAIN WITHOUT SCIATICA: Primary | ICD-10-CM

## 2019-09-17 DIAGNOSIS — R26.2 DIFFICULTY WALKING: ICD-10-CM

## 2019-09-17 DIAGNOSIS — G89.29 CHRONIC LEFT-SIDED LOW BACK PAIN WITHOUT SCIATICA: ICD-10-CM

## 2019-09-17 PROCEDURE — 97110 THERAPEUTIC EXERCISES: CPT

## 2019-09-17 PROCEDURE — 97140 MANUAL THERAPY 1/> REGIONS: CPT

## 2019-09-17 PROCEDURE — 97014 ELECTRIC STIMULATION THERAPY: CPT

## 2019-09-17 NOTE — PROGRESS NOTES
Today's date: 2019  Patient name: Jaz Mcghee  : 1975  MRN: [id number]  Referring provider: Collette File, DO  Dx:   Encounter Diagnosis     ICD-10-CM    1  Acute left-sided low back pain without sciatica M54 5    2  Chronic left-sided low back pain without sciatica M54 5     G89 29    3  Chronic pain of right knee M25 561     G89 29    4  Difficulty walking R26 2    5  Contusion of right knee, subsequent encounter S80  01XD      Subjective:  No new c/o pain today  Objective: See treatment log below    Assessment: Tolerated treatment well  Patient exhibited good technique with therapeutic exercises and would benefit from continued PT to increase ROM/strength and endurance to improve mobility and gait  Plan: Continue per plan of care  Progress treatment as tolerated         Precautions:  Left Anterior Hip Pain / Right Anterior Medial Patellar Region Pain    Daily Treatment Log  Manual        MT, ROM 30' 20'      HEP        Exercise Log       Balance Board 30x ea 30x ea      Chair Squats        P-Bar-GT-Forward, Backward,Side-Even & Dips        BOSU-Walk 5' 5'      Foam Pad SLR,Hip/KneeFl,Step Ups  30x ea      Foam Beam        Fitter-Slalom        Monster Steps        T/G-Squats PF 30x ea 30x ea      W/P-Hip-Abd,Add,Flex,Ext 7 5# 30x ea 7 5# 30x ea      WP-Squats        Trimax-TKE        Fqauvic-CN-Hh 2x2' 2x2'      NK Table Exer        NK Table ROM        TM        Bike        ME, PE 15' 15'              Modalities       MH&ES 20' 20'      US NT NT

## 2019-09-19 ENCOUNTER — OFFICE VISIT (OUTPATIENT)
Dept: PHYSICAL THERAPY | Facility: CLINIC | Age: 44
End: 2019-09-19
Payer: COMMERCIAL

## 2019-09-19 DIAGNOSIS — M54.50 ACUTE LEFT-SIDED LOW BACK PAIN WITHOUT SCIATICA: Primary | ICD-10-CM

## 2019-09-19 DIAGNOSIS — M25.561 CHRONIC PAIN OF RIGHT KNEE: ICD-10-CM

## 2019-09-19 DIAGNOSIS — G89.29 CHRONIC PAIN OF RIGHT KNEE: ICD-10-CM

## 2019-09-19 DIAGNOSIS — G89.29 CHRONIC LEFT-SIDED LOW BACK PAIN WITHOUT SCIATICA: ICD-10-CM

## 2019-09-19 DIAGNOSIS — S80.01XD CONTUSION OF RIGHT KNEE, SUBSEQUENT ENCOUNTER: ICD-10-CM

## 2019-09-19 DIAGNOSIS — R26.2 DIFFICULTY WALKING: ICD-10-CM

## 2019-09-19 DIAGNOSIS — M54.50 CHRONIC LEFT-SIDED LOW BACK PAIN WITHOUT SCIATICA: ICD-10-CM

## 2019-09-19 PROCEDURE — 97014 ELECTRIC STIMULATION THERAPY: CPT | Performed by: PHYSICAL THERAPIST

## 2019-09-19 PROCEDURE — 97110 THERAPEUTIC EXERCISES: CPT | Performed by: PHYSICAL THERAPIST

## 2019-09-19 PROCEDURE — 97112 NEUROMUSCULAR REEDUCATION: CPT | Performed by: PHYSICAL THERAPIST

## 2019-09-19 PROCEDURE — 97140 MANUAL THERAPY 1/> REGIONS: CPT | Performed by: PHYSICAL THERAPIST

## 2019-09-19 NOTE — PROGRESS NOTES
Today's date: 2019  Patient name: Jeffry Baca  : 1975  MRN: [id number]  Referring provider: Tamica Lee DO  Dx:   Encounter Diagnosis     ICD-10-CM    1  Acute left-sided low back pain without sciatica M54 5    2  Chronic left-sided low back pain without sciatica M54 5     G89 29    3  Chronic pain of right knee M25 561     G89 29    4  Difficulty walking R26 2    5  Contusion of right knee, subsequent encounter S80  01XD      Subjective:  Cristy states her back is feeling better  She can stand better and walk further  Objective: See treatment log below    Assessment: Tolerated treatment well  Patient exhibited good technique with therapeutic exercises and would benefit from continued PT    Plan: Continue per plan of care  Progress treatment as tolerated         Precautions:  Left Anterior Hip Pain / Right Anterior Medial Patellar Region Pain    Daily Treatment Log  Manual       MT, ROM 30' 20' 20'     HEP        Exercise Log      Balance Board 30x ea 30x ea 30x     Chair Squats   30x     P-Bar-GT-Forward, Backward,Side-Even & Dips        BOSU-Walk 5' 5' 5'     Foam Pad SLR,Hip/KneeFl,Step Ups  30x ea 30x     Foam Beam        Fitter-Slalom        Monster Steps        T/G-Squats PF 30x ea 30x ea 30x     W/P-Hip-Abd,Add,Flex,Ext 7 5# 30x ea 7 5# 30x ea 7 5#-30x     WP-Squats        Trimax-TKE        Euvxwlc-VF-Ja 2x2' 2x2' 2x2'     NK Table Exer        NK Table ROM        TM        Bike        ME, PE 15' 15' 15'             Modalities      MH&ES 20' 20' 20'     US NT NT 10'

## 2019-09-24 ENCOUNTER — OFFICE VISIT (OUTPATIENT)
Dept: PHYSICAL THERAPY | Facility: CLINIC | Age: 44
End: 2019-09-24
Payer: COMMERCIAL

## 2019-09-24 DIAGNOSIS — S80.01XD CONTUSION OF RIGHT KNEE, SUBSEQUENT ENCOUNTER: ICD-10-CM

## 2019-09-24 DIAGNOSIS — R26.2 DIFFICULTY WALKING: ICD-10-CM

## 2019-09-24 DIAGNOSIS — M25.561 CHRONIC PAIN OF RIGHT KNEE: ICD-10-CM

## 2019-09-24 DIAGNOSIS — M54.50 CHRONIC LEFT-SIDED LOW BACK PAIN WITHOUT SCIATICA: ICD-10-CM

## 2019-09-24 DIAGNOSIS — M54.50 ACUTE LEFT-SIDED LOW BACK PAIN WITHOUT SCIATICA: Primary | ICD-10-CM

## 2019-09-24 DIAGNOSIS — G89.29 CHRONIC PAIN OF RIGHT KNEE: ICD-10-CM

## 2019-09-24 DIAGNOSIS — G89.29 CHRONIC LEFT-SIDED LOW BACK PAIN WITHOUT SCIATICA: ICD-10-CM

## 2019-09-24 PROCEDURE — 97140 MANUAL THERAPY 1/> REGIONS: CPT | Performed by: PHYSICAL THERAPIST

## 2019-09-24 PROCEDURE — 97112 NEUROMUSCULAR REEDUCATION: CPT | Performed by: PHYSICAL THERAPIST

## 2019-09-24 PROCEDURE — 97110 THERAPEUTIC EXERCISES: CPT | Performed by: PHYSICAL THERAPIST

## 2019-09-24 PROCEDURE — 97014 ELECTRIC STIMULATION THERAPY: CPT | Performed by: PHYSICAL THERAPIST

## 2019-09-24 NOTE — PROGRESS NOTES
Today's date: 2019  Patient name: Nicole Coats  : 1975  MRN: [id number]  Referring provider: Leslie Calix DO  Dx:   Encounter Diagnosis     ICD-10-CM    1  Acute left-sided low back pain without sciatica M54 5    2  Chronic left-sided low back pain without sciatica M54 5     G89 29    3  Chronic pain of right knee M25 561     G89 29    4  Difficulty walking R26 2    5  Contusion of right knee, subsequent encounter S80  01XD      Subjective:  Poppy states her left hip and left lateral SIJ region is bothering her today  Her right knee feels pretty good today  Objective: See treatment log below    Assessment: Tolerated treatment well  Patient exhibited good technique with therapeutic exercises and would benefit from continued PT    Plan: Continue per plan of care  Progress treatment as tolerated         Precautions:  Left Anterior Hip Pain / Right Anterior Medial Patellar Region Pain    Daily Treatment Log  Manual      MT, ROM 30' 20' 20'     HEP        Exercise Log     Balance Board 30x ea 30x ea 30x 30x    Chair Squats   30x 30x    P-Bar-GT-Forward, Backward,Side-Even & Dips        BOSU-Walk 5' 5' 5' 5'    Foam Pad SLR,Hip/KneeFl,Step Ups  30x ea 30x 30x    Foam Beam    12x    Fitter-Slalom        Monster Steps        T/G-Squats PF 30x ea 30x ea 30x 30x    W/P-Hip-Abd,Add,Flex,Ext 7 5# 30x ea 7 5# 30x ea 7 5#-30x 7 5#-30x    WP-Squats        Trimax-TKE        Fnuanka-ZP-Iu 2x2' 2x2' 2x2' 2x2'    NK Table Exer        NK Table ROM        TM        Bike        ME, PE 15' 15' 15' 15'            Modalities     MH&ES 20' 20' 20' 20'    US NT NT 10' NA

## 2019-09-26 ENCOUNTER — OFFICE VISIT (OUTPATIENT)
Dept: PHYSICAL THERAPY | Facility: CLINIC | Age: 44
End: 2019-09-26
Payer: COMMERCIAL

## 2019-09-26 DIAGNOSIS — M25.561 CHRONIC PAIN OF RIGHT KNEE: ICD-10-CM

## 2019-09-26 DIAGNOSIS — S80.01XD CONTUSION OF RIGHT KNEE, SUBSEQUENT ENCOUNTER: ICD-10-CM

## 2019-09-26 DIAGNOSIS — M54.50 ACUTE LEFT-SIDED LOW BACK PAIN WITHOUT SCIATICA: Primary | ICD-10-CM

## 2019-09-26 DIAGNOSIS — G89.29 CHRONIC LEFT-SIDED LOW BACK PAIN WITHOUT SCIATICA: ICD-10-CM

## 2019-09-26 DIAGNOSIS — G89.29 CHRONIC PAIN OF RIGHT KNEE: ICD-10-CM

## 2019-09-26 DIAGNOSIS — R26.2 DIFFICULTY WALKING: ICD-10-CM

## 2019-09-26 DIAGNOSIS — M54.50 CHRONIC LEFT-SIDED LOW BACK PAIN WITHOUT SCIATICA: ICD-10-CM

## 2019-09-26 PROCEDURE — 97110 THERAPEUTIC EXERCISES: CPT | Performed by: PHYSICAL THERAPIST

## 2019-09-26 PROCEDURE — 97112 NEUROMUSCULAR REEDUCATION: CPT | Performed by: PHYSICAL THERAPIST

## 2019-09-26 PROCEDURE — 97140 MANUAL THERAPY 1/> REGIONS: CPT | Performed by: PHYSICAL THERAPIST

## 2019-09-26 PROCEDURE — 97014 ELECTRIC STIMULATION THERAPY: CPT | Performed by: PHYSICAL THERAPIST

## 2019-10-01 ENCOUNTER — OFFICE VISIT (OUTPATIENT)
Dept: PHYSICAL THERAPY | Facility: CLINIC | Age: 44
End: 2019-10-01
Payer: COMMERCIAL

## 2019-10-01 DIAGNOSIS — S80.01XD CONTUSION OF RIGHT KNEE, SUBSEQUENT ENCOUNTER: ICD-10-CM

## 2019-10-01 DIAGNOSIS — M25.561 CHRONIC PAIN OF RIGHT KNEE: ICD-10-CM

## 2019-10-01 DIAGNOSIS — R26.2 DIFFICULTY WALKING: ICD-10-CM

## 2019-10-01 DIAGNOSIS — G89.29 CHRONIC LEFT-SIDED LOW BACK PAIN WITHOUT SCIATICA: ICD-10-CM

## 2019-10-01 DIAGNOSIS — G89.29 CHRONIC PAIN OF RIGHT KNEE: ICD-10-CM

## 2019-10-01 DIAGNOSIS — M54.50 ACUTE LEFT-SIDED LOW BACK PAIN WITHOUT SCIATICA: Primary | ICD-10-CM

## 2019-10-01 DIAGNOSIS — M54.50 CHRONIC LEFT-SIDED LOW BACK PAIN WITHOUT SCIATICA: ICD-10-CM

## 2019-10-01 PROCEDURE — 97110 THERAPEUTIC EXERCISES: CPT | Performed by: PHYSICAL THERAPIST

## 2019-10-01 PROCEDURE — 97014 ELECTRIC STIMULATION THERAPY: CPT | Performed by: PHYSICAL THERAPIST

## 2019-10-01 PROCEDURE — 97112 NEUROMUSCULAR REEDUCATION: CPT | Performed by: PHYSICAL THERAPIST

## 2019-10-01 PROCEDURE — 97164 PT RE-EVAL EST PLAN CARE: CPT | Performed by: PHYSICAL THERAPIST

## 2019-10-01 PROCEDURE — 97140 MANUAL THERAPY 1/> REGIONS: CPT | Performed by: PHYSICAL THERAPIST

## 2019-10-01 NOTE — PROGRESS NOTES
Today's date: 10/1/2019  Patient name: Festus Brewster  : 1975  MRN: [id number]  Referring provider: Paulino Jimenez DO  Dx:   Encounter Diagnosis     ICD-10-CM    1  Acute left-sided low back pain without sciatica M54 5    2  Chronic left-sided low back pain without sciatica M54 5     G89 29    3  Chronic pain of right knee M25 561     G89 29    4  Difficulty walking R26 2    5  Contusion of right knee, subsequent encounter S80  01XD      Subjective:  Cristy states her back is slowly improving with occasional set backs but she is progressing  Objective: See treatment log below    Assessment: Tolerated treatment well  Patient exhibited good technique with therapeutic exercises and would benefit from continued PT    Plan: Continue per plan of care  Progress treatment as tolerated         Precautions:  Left Anterior Hip Pain / Right Anterior Medial Patellar Region Pain    Daily Treatment Log  Manual  10/1       MT, ROM 15' '      HEP        Exercise Log 10/1       Balance Board 30x       Chair Squats 30x       P-Bar-GT-Forward, Backward,Side-Even & Dips        BOSU-Walk 5'       Foam Pad SLR,Hip/KneeFl,Step Ups 30x       Foam Beam 12x       Fitter-Slalom        Monster Steps        T/G-Squats PF 30x       W/P-Hip-Abd,Add,Flex,Ext 7 5#-30x       WP-Squats        Trimax-TKE        Jfexrtn-TS-Rg 2x2'    '   NK Table Exer        NK Table ROM        TM        Bike        ME, PE 15'               Modalities 10/1       MH&ES 20'       US T

## 2019-10-01 NOTE — LETTER
2020  Signature Required / Plan Of Care / PT Reevaluation  Jovanna Watson DO  [address] [address]    Patient: Kateri Kanner   YOB: 1975   Date of Visit: 10/1/2019     Encounter Diagnosis     ICD-10-CM    1  Acute left-sided low back pain without sciatica M54 5    2  Chronic left-sided low back pain without sciatica M54 5     G89 29    3  Chronic pain of right knee M25 561     G89 29    4  Difficulty walking R26 2    5  Contusion of right knee, subsequent encounter S80  01XD      Dear Dr Adele Jiménez:   Thank you for your recent referral of Kateri Kanner  Please review the attached evaluation summary from Cristy's recent visit  Please verify that you agree with the plan of care by signing the attached order  If you have any questions or concerns, please do not hesitate to call  I sincerely appreciate the opportunity to share in the care of one of your patients and hope to have another opportunity to work with you in the near future  Sincerely,   Reno Garcia, PT    Referring Provider:    I certify that I have read the below Plan of Care and certify the need for these services furnished under this plan of treatment while under my care  Jovanna Watson DO  [address]  VIA In Basket    Please sign above and return this page to fax number [phone]          Today's date: 10/1/2019  Patient name: Kateri Kanner  : 1975  MRN: [id number]  Referring provider: Nate Merlos DO  Dx:   Encounter Diagnosis     ICD-10-CM    1  Acute left-sided low back pain without sciatica M54 5    2  Chronic left-sided low back pain without sciatica M54 5     G89 29    3  Chronic pain of right knee M25 561     G89 29    4  Difficulty walking R26 2    5  Contusion of right knee, subsequent encounter S80  01XD      Subjective:  Cristy states her back is slowly improving with occasional set backs but she is progressing      Objective: See treatment log below    Assessment: Tolerated treatment well  Patient exhibited good technique with therapeutic exercises and would benefit from continued PT    Plan: Continue per plan of care  Progress treatment as tolerated  Precautions:  Left Anterior Hip Pain / Right Anterior Medial Patellar Region Pain    Daily Treatment Log  Manual  10/1       MT, ROM 15' '      HEP        Exercise Log 10/1       Balance Board 30x       Chair Squats 30x       P-Bar-GT-Forward, Backward,Side-Even & Dips        BOSU-Walk 5'       Foam Pad SLR,Hip/KneeFl,Step Ups 30x       Foam Beam 12x       Fitter-Slalom        Monster Steps        T/G-Squats PF 30x       W/P-Hip-Abd,Add,Flex,Ext 7 5#-30x       WP-Squats        Trimax-TKE        Zwueiot-GH-Up 2x2'    '   NK Table Exer        NK Table ROM        TM        Bike        ME, PE 15'               Modalities 10/1       MH&ES 20'       US T           PT Re-Evaluation   Today's date: 10/1/2019    Patient name: Isaiah Figueroa  : 1975  MRN: [id number]  Referring provider: Carter Sorto DO  Dx:   Encounter Diagnosis     ICD-10-CM    1  Acute left-sided low back pain without sciatica M54 5    2  Chronic left-sided low back pain without sciatica M54 5     G89 29    3  Chronic pain of right knee M25 561     G89 29    4  Difficulty walking R26 2    5  Contusion of right knee, subsequent encounter S80  01XD      Assessment  Assessment details: Patient states she feels better since she started and is able to stand longer and walk further now  Impairments: abnormal gait, abnormal or restricted ROM, abnormal movement, activity intolerance, impaired balance, pain with function, safety issue and poor posture   Understanding of Dx/Px/POC: excellent   Prognosis: good    Goals  STG  2-4 weeks:   Increase lumbar spine AROM by 2-4 degrees  Increase lower extremity strength by 2-4 lbs in all weak areas  Improve sitting posture and body mechanics  Increase standing/ADL tolerance minutes  Decrease pain by 25-50% with standing, walking, and stairs  Initiate HEP  LTG  6-8 weeks:   Demonstrate normal lumbar rotation  Decrease pain to 1-2/10 with activity  Increase lower extremity strength by 10-20 lbs in all weak areas  Improve spinal stability  Improve gait pattern and balance  Decrease limitations with standing, walking and ADL's  DC with HEP  Plan  Patient would benefit from: PT eval and skilled physical therapy  Planned modality interventions: TENS, ultrasound and unattended electrical stimulation  Planned therapy interventions: joint mobilization, manual therapy, balance, balance/weight bearing training, patient education, postural training, self care, coordination, strengthening, stretching, therapeutic activities, therapeutic exercise, therapeutic training, transfer training, home exercise program, graded exercise, gait training and flexibility  Frequency: 3x week  Duration in weeks: 6  Treatment plan discussed with: patient      Subjective Evaluation    Pain  Quality: discomfort, knife-like, pulling, squeezing, sharp, tight and throbbing  Relieving factors: heat, medications, relaxation, rest and support  Aggravating factors: lifting, running, stair climbing, sitting, standing and walking  Progression: improved    Treatments  Previous treatment: physical therapy  Current treatment: physical therapy  Patient Goals  Patient goals for therapy: decreased pain, improved balance, increased motion, return to work, return to [employer] activities, independence with ADLs/IADLs and increased strength        Objective     Date of onset:  11/03/2018    Date of Surgery:  None    History of Present Episode: 8/27/2019  Poppy states she was at home and was walking out front carrying items from her car  She tripped on the driveway / side walk and fell  She injured herself immediately with impact  She has had a lot of issues since this accident    She has become worse and now comes here for treatment  Past Medical History:    8/27/2019  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     Previous Level of Functional Ability:  8/27/2019  Poppy states she was walking well before she fell  She now has limitations  Inspection / Palpation:  Knee:  8/27/2019  Mesomorphic / Endomorphic body type  No signs of infection  No signs of wounds  No signs of drainage  No signs of ecchymotic regions  No signs of erythremic regions  Moderate signs of muscle spasm  Moderate signs of muscle guarding  Moderate signs of tenderness reported to palpation  No signs of swelling  No signs of a surgery site  Current conditions appears consistent with recent acute episode  Chief Complaints:  8/27/2019  Poppy reports mild to moderate difficulty with standing  Poppy reports mild to moderate difficulty with walking  Poppy reports mild to moderate difficulty with movement / use of her right knee  Poppy reports moderate to severe difficulty with use of stairs  Poppy reports severe difficulty with running  Poppy reports severe difficulty with jumping  Poppy reports moderate difficulty with use of inclines  Poppy reports moderate difficulty with sleeping  Poppy reports moderate difficulty with her strength and endurance  Poppy reports mild to moderate limitations with her range of motion  Poppy reports moderate to severe difficulty lying on her right knee region      KNEE PAIN Resting Moving Palpation Standing Walking   8/27/2019 Lt 0 0 0 0 0   8/27/2019 Rt 3 3 7 4-7 3-6 3-6   10/1/2019 Rt 2 2-6 3-6 2-5 2-5     KNEE PAIN Running Stairs Sleeping Twisting Jumping   8/27/2019 Lt 0 0 0 0 0   8/27/2019 Rt NA 5-8 3 3-5 NA   10/1/2019 Rt NA 4-7 2 2-4 NA     KNEE AROM Flexion Extension SLR   8/27/2019 Lt 130° 0° 85°   8/27/2019 Rt 130° 0° 85°     KNEE MMT Flexion Extension Varus Stress Valgus Stress   8/27/2019 Rt 0/10  18 lbs 0/10  16 lbs 0/10  15 lbs 0/10  16 lbs   8/27/2019 Lt 0/10  17 lbs 0/10  17 lbs 0/10  15 lbs 0/10  16 lbs Knee Screen MCL LCL ACL PCL   8/27/2019 Rt Negative Negative Negative Negative   8/27/2019 Lt Negative Negative Negative Negative     Knee Screen Patellar Quad Stress Meniscal Medial Meniscal Lateral   8/27/2019 Rt POSITIVE Negative Negative Negative   8/27/2019 Lt Negative Negative Negative Negative     Precautions:  Left Anterior Hip Pain / Right Anterior Medial Patellar Region Pain

## 2019-10-02 NOTE — PROGRESS NOTES
PT Re-Evaluation   Today's date: 10/1/2019    Patient name: Cornel Osler  : 1975  MRN: [id number]  Referring provider: Danae Beasley DO  Dx:   Encounter Diagnosis     ICD-10-CM    1  Acute left-sided low back pain without sciatica M54 5    2  Chronic left-sided low back pain without sciatica M54 5     G89 29    3  Chronic pain of right knee M25 561     G89 29    4  Difficulty walking R26 2    5  Contusion of right knee, subsequent encounter S80  01XD      Assessment  Assessment details: Patient states she feels better since she started and is able to stand longer and walk further now  Impairments: abnormal gait, abnormal or restricted ROM, abnormal movement, activity intolerance, impaired balance, pain with function, safety issue and poor posture   Understanding of Dx/Px/POC: excellent   Prognosis: good    Goals  STG  2-4 weeks:   Increase lumbar spine AROM by 2-4 degrees  Increase lower extremity strength by 2-4 lbs in all weak areas  Improve sitting posture and body mechanics  Increase standing/ADL tolerance minutes  Decrease pain by 25-50% with standing, walking, and stairs  Initiate HEP  LTG  6-8 weeks:   Demonstrate normal lumbar rotation  Decrease pain to 1-2/10 with activity  Increase lower extremity strength by 10-20 lbs in all weak areas  Improve spinal stability  Improve gait pattern and balance  Decrease limitations with standing, walking and ADL's  DC with HEP        Plan  Patient would benefit from: PT eval and skilled physical therapy  Planned modality interventions: TENS, ultrasound and unattended electrical stimulation  Planned therapy interventions: joint mobilization, manual therapy, balance, balance/weight bearing training, patient education, postural training, self care, coordination, strengthening, stretching, therapeutic activities, therapeutic exercise, therapeutic training, transfer training, home exercise program, graded exercise, gait training and flexibility  Frequency: 3x week  Duration in weeks: 6  Treatment plan discussed with: patient      Subjective Evaluation    Pain  Quality: discomfort, knife-like, pulling, squeezing, sharp, tight and throbbing  Relieving factors: heat, medications, relaxation, rest and support  Aggravating factors: lifting, running, stair climbing, sitting, standing and walking  Progression: improved    Treatments  Previous treatment: physical therapy  Current treatment: physical therapy  Patient Goals  Patient goals for therapy: decreased pain, improved balance, increased motion, return to work, return to [employer] activities, independence with ADLs/IADLs and increased strength        Objective     Date of onset:  11/03/2018    Date of Surgery:  None    History of Present Episode: 8/27/2019  Poppy states she was at home and was walking out front carrying items from her car  She tripped on the driveway / side walk and fell  She injured herself immediately with impact  She has had a lot of issues since this accident  She has become worse and now comes here for treatment  Past Medical History:    8/27/2019  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     Previous Level of Functional Ability:  8/27/2019  Poppy states she was walking well before she fell  She now has limitations  Inspection / Palpation:  Knee:  8/27/2019  Mesomorphic / Endomorphic body type  No signs of infection  No signs of wounds  No signs of drainage  No signs of ecchymotic regions  No signs of erythremic regions  Moderate signs of muscle spasm  Moderate signs of muscle guarding  Moderate signs of tenderness reported to palpation  No signs of swelling  No signs of a surgery site  Current conditions appears consistent with recent acute episode  Chief Complaints:  8/27/2019  Poppy reports mild to moderate difficulty with standing  Poppy reports mild to moderate difficulty with walking    Poppy reports mild to moderate difficulty with movement / use of her right knee  Poppy reports moderate to severe difficulty with use of stairs  Poppy reports severe difficulty with running  Poppy reports severe difficulty with jumping  Poppy reports moderate difficulty with use of inclines  Poppy reports moderate difficulty with sleeping  Poppy reports moderate difficulty with her strength and endurance  Poppy reports mild to moderate limitations with her range of motion  Poppy reports moderate to severe difficulty lying on her right knee region      KNEE PAIN Resting Moving Palpation Standing Walking   8/27/2019 Lt 0 0 0 0 0   8/27/2019 Rt 3 3 7 4-7 3-6 3-6   10/1/2019 Rt 2 2-6 3-6 2-5 2-5     KNEE PAIN Running Stairs Sleeping Twisting Jumping   8/27/2019 Lt 0 0 0 0 0   8/27/2019 Rt NA 5-8 3 3-5 NA   10/1/2019 Rt NA 4-7 2 2-4 NA     KNEE AROM Flexion Extension SLR   8/27/2019 Lt 130° 0° 85°   8/27/2019 Rt 130° 0° 85°     KNEE MMT Flexion Extension Varus Stress Valgus Stress   8/27/2019 Rt 0/10  18 lbs 0/10  16 lbs 0/10  15 lbs 0/10  16 lbs   8/27/2019 Lt 0/10  17 lbs 0/10  17 lbs 0/10  15 lbs 0/10  16 lbs     Knee Screen MCL LCL ACL PCL   8/27/2019 Rt Negative Negative Negative Negative   8/27/2019 Lt Negative Negative Negative Negative     Knee Screen Patellar Quad Stress Meniscal Medial Meniscal Lateral   8/27/2019 Rt POSITIVE Negative Negative Negative   8/27/2019 Lt Negative Negative Negative Negative     Precautions:  Left Anterior Hip Pain / Right Anterior Medial Patellar Region Pain

## 2019-10-03 ENCOUNTER — OFFICE VISIT (OUTPATIENT)
Dept: PHYSICAL THERAPY | Facility: CLINIC | Age: 44
End: 2019-10-03
Payer: COMMERCIAL

## 2019-10-03 DIAGNOSIS — M25.561 CHRONIC PAIN OF RIGHT KNEE: ICD-10-CM

## 2019-10-03 DIAGNOSIS — R26.2 DIFFICULTY WALKING: ICD-10-CM

## 2019-10-03 DIAGNOSIS — G89.29 CHRONIC LEFT-SIDED LOW BACK PAIN WITHOUT SCIATICA: ICD-10-CM

## 2019-10-03 DIAGNOSIS — M54.50 CHRONIC LEFT-SIDED LOW BACK PAIN WITHOUT SCIATICA: ICD-10-CM

## 2019-10-03 DIAGNOSIS — S80.01XD CONTUSION OF RIGHT KNEE, SUBSEQUENT ENCOUNTER: ICD-10-CM

## 2019-10-03 DIAGNOSIS — G89.29 CHRONIC PAIN OF RIGHT KNEE: ICD-10-CM

## 2019-10-03 DIAGNOSIS — M54.50 ACUTE LEFT-SIDED LOW BACK PAIN WITHOUT SCIATICA: Primary | ICD-10-CM

## 2019-10-03 PROCEDURE — 97140 MANUAL THERAPY 1/> REGIONS: CPT | Performed by: PHYSICAL THERAPIST

## 2019-10-03 PROCEDURE — 97112 NEUROMUSCULAR REEDUCATION: CPT | Performed by: PHYSICAL THERAPIST

## 2019-10-03 PROCEDURE — 97014 ELECTRIC STIMULATION THERAPY: CPT | Performed by: PHYSICAL THERAPIST

## 2019-10-03 PROCEDURE — 97110 THERAPEUTIC EXERCISES: CPT | Performed by: PHYSICAL THERAPIST

## 2019-10-03 NOTE — PROGRESS NOTES
Today's date: 10/3/2019  Patient name: Kateri Kanner  : 1975  MRN: [id number]  Referring provider: Nate Merlos DO  Dx:   Encounter Diagnosis     ICD-10-CM    1  Acute left-sided low back pain without sciatica M54 5    2  Chronic left-sided low back pain without sciatica M54 5     G89 29    3  Chronic pain of right knee M25 561     G89 29    4  Difficulty walking R26 2    5  Contusion of right knee, subsequent encounter S80  01XD      Subjective:  Cristy states her back and knee are feeling better  Objective: See treatment log below    Assessment: Tolerated treatment well  Patient exhibited good technique with therapeutic exercises and would benefit from continued PT    Plan: Continue per plan of care  Progress treatment as tolerated         Precautions:  Left Anterior Hip Pain / Right Anterior Medial Patellar Region Pain    Daily Treatment Log  Manual  10/1 10/3      MT, ROM 15' '15'      HEP        Exercise Log 10/1 10/3      Balance Board 30x 30x      Chair Squats 30x 30x      P-Bar-GT-Forward, Backward,Side-Even & Dips        BOSU-Walk 5' 5'      Foam Pad SLR,Hip/KneeFl,Step Ups 30x 30x      Foam Beam 12x 12x      Fitter-Slalom        Monster Steps        T/G-Squats PF 30x 30x      W/P-Hip-Abd,Add,Flex,Ext 7 5#-30x 7 5#-30x      WP-Squats        Trimax-TKE        Dwhjqcu-VP-Qh 2x2' 2x2'   '   NK Table Exer        NK Table ROM        TM        Bike        ME, PE 15' 15'              Modalities 10/1 10/3      MH&ES 20' 20'      US T

## 2019-10-08 ENCOUNTER — OFFICE VISIT (OUTPATIENT)
Dept: PHYSICAL THERAPY | Facility: CLINIC | Age: 44
End: 2019-10-08
Payer: COMMERCIAL

## 2019-10-08 DIAGNOSIS — G89.29 CHRONIC PAIN OF RIGHT KNEE: ICD-10-CM

## 2019-10-08 DIAGNOSIS — S80.01XD CONTUSION OF RIGHT KNEE, SUBSEQUENT ENCOUNTER: ICD-10-CM

## 2019-10-08 DIAGNOSIS — G89.29 CHRONIC LEFT-SIDED LOW BACK PAIN WITHOUT SCIATICA: ICD-10-CM

## 2019-10-08 DIAGNOSIS — M54.50 CHRONIC LEFT-SIDED LOW BACK PAIN WITHOUT SCIATICA: ICD-10-CM

## 2019-10-08 DIAGNOSIS — M25.561 CHRONIC PAIN OF RIGHT KNEE: ICD-10-CM

## 2019-10-08 DIAGNOSIS — R26.2 DIFFICULTY WALKING: ICD-10-CM

## 2019-10-08 DIAGNOSIS — M54.50 ACUTE LEFT-SIDED LOW BACK PAIN WITHOUT SCIATICA: Primary | ICD-10-CM

## 2019-10-08 PROCEDURE — 97014 ELECTRIC STIMULATION THERAPY: CPT | Performed by: PHYSICAL THERAPIST

## 2019-10-08 PROCEDURE — 97035 APP MDLTY 1+ULTRASOUND EA 15: CPT | Performed by: PHYSICAL THERAPIST

## 2019-10-08 PROCEDURE — 97140 MANUAL THERAPY 1/> REGIONS: CPT | Performed by: PHYSICAL THERAPIST

## 2019-10-08 PROCEDURE — 97110 THERAPEUTIC EXERCISES: CPT | Performed by: PHYSICAL THERAPIST

## 2019-10-08 PROCEDURE — 97112 NEUROMUSCULAR REEDUCATION: CPT | Performed by: PHYSICAL THERAPIST

## 2019-10-08 NOTE — PROGRESS NOTES
Today's date: 10/8/2019  Patient name: Randal Rodrigues  : 1975  MRN: [id number]  Referring provider: Gómez Arenas DO  Dx:   Encounter Diagnosis     ICD-10-CM    1  Acute left-sided low back pain without sciatica M54 5    2  Chronic left-sided low back pain without sciatica M54 5     G89 29    3  Chronic pain of right knee M25 561     G89 29    4  Difficulty walking R26 2    5  Contusion of right knee, subsequent encounter S80  01XD      Subjective:  Cristy states her back is slowly feeling better  Objective: See treatment log below    Assessment: Tolerated treatment well  Patient exhibited good technique with therapeutic exercises and would benefit from continued PT    Plan: Continue per plan of care  Progress treatment as tolerated         Precautions:  Left Anterior Hip Pain / Right Anterior Medial Patellar Region Pain    Daily Treatment Log  Manual  10/1 10/3 10/8     MT, ROM 15' '15' 15'     HEP        Exercise Log 10/1 10/3 10/8     Balance Board 30x 30x 30x     Chair Squats 30x 30x 30x     P-Bar-GT-Forward, Backward,Side-Even & Dips        BOSU-Walk 5' 5' 5'     Foam Pad SLR,Hip/KneeFl,Step Ups 30x 30x 30x     Foam Beam 12x 12x 12x     Fitter-Slalom        Monster Steps        T/G-Squats PF 30x 30x 30x     W/P-Hip-Abd,Add,Flex,Ext 7 5#-30x 7 5#-30x 7 5#-30x     WP-Squats        Trimax-TKE        Bbeaftm-XH-Ie 2x2' 2x2' 2x2'  '   NK Table Exer        NK Table ROM        TM        Bike        ME, PE 15' 15' 15'             Modalities 10/1 10/3 10/8     MH&ES 20' 20' 20'     US   10'

## 2019-10-10 ENCOUNTER — OFFICE VISIT (OUTPATIENT)
Dept: PHYSICAL THERAPY | Facility: CLINIC | Age: 44
End: 2019-10-10
Payer: COMMERCIAL

## 2019-10-10 DIAGNOSIS — R26.2 DIFFICULTY WALKING: ICD-10-CM

## 2019-10-10 DIAGNOSIS — M54.50 CHRONIC LEFT-SIDED LOW BACK PAIN WITHOUT SCIATICA: ICD-10-CM

## 2019-10-10 DIAGNOSIS — S80.01XD CONTUSION OF RIGHT KNEE, SUBSEQUENT ENCOUNTER: ICD-10-CM

## 2019-10-10 DIAGNOSIS — M25.561 CHRONIC PAIN OF RIGHT KNEE: ICD-10-CM

## 2019-10-10 DIAGNOSIS — G89.29 CHRONIC LEFT-SIDED LOW BACK PAIN WITHOUT SCIATICA: ICD-10-CM

## 2019-10-10 DIAGNOSIS — G89.29 CHRONIC PAIN OF RIGHT KNEE: ICD-10-CM

## 2019-10-10 DIAGNOSIS — M54.50 ACUTE LEFT-SIDED LOW BACK PAIN WITHOUT SCIATICA: Primary | ICD-10-CM

## 2019-10-10 PROCEDURE — 97110 THERAPEUTIC EXERCISES: CPT | Performed by: PHYSICAL THERAPIST

## 2019-10-10 PROCEDURE — 97035 APP MDLTY 1+ULTRASOUND EA 15: CPT | Performed by: PHYSICAL THERAPIST

## 2019-10-10 PROCEDURE — 97014 ELECTRIC STIMULATION THERAPY: CPT | Performed by: PHYSICAL THERAPIST

## 2019-10-10 PROCEDURE — 97140 MANUAL THERAPY 1/> REGIONS: CPT | Performed by: PHYSICAL THERAPIST

## 2019-10-10 PROCEDURE — 97112 NEUROMUSCULAR REEDUCATION: CPT | Performed by: PHYSICAL THERAPIST

## 2019-10-10 NOTE — PROGRESS NOTES
Today's date: 10/10/2019  Patient name: Sally Lomeli  : 1975  MRN: [id number]  Referring provider: Lokesh Hector DO  Dx:   Encounter Diagnosis     ICD-10-CM    1  Acute left-sided low back pain without sciatica M54 5    2  Chronic pain of right knee M25 561     G89 29    3  Difficulty walking R26 2    4  Contusion of right knee, subsequent encounter S80  01XD    5  Chronic left-sided low back pain without sciatica M54 5     G89 29      Subjective:  Poppy states her back is slowly feeling better  Her right knee is also feeling a lot better  Objective: See treatment log below    Assessment: Tolerated treatment well  Patient exhibited good technique with therapeutic exercises and would benefit from continued PT    Plan: Continue per plan of care  Progress treatment as tolerated         Precautions:  Left Anterior Hip Pain / Right Anterior Medial Patellar Region Pain    Daily Treatment Log  Manual  10/1 10/3 10/8 10/10    MT, ROM 15' '15' 15' 15'    HEP        Exercise Log 10/1 10/3 10/8 10/10    Balance Board 30x 30x 30x 30x    Chair Squats 30x 30x 30x 30x    P-Bar-GT-Forward, Backward,Side-Even & Dips        BOSU-Walk 5' 5' 5' 5'    Foam Pad SLR,Hip/KneeFl,Step Ups 30x 30x 30x 30x    Foam Beam 12x 12x 12x 12x    Fitter-Slalom        Monster Steps        T/G-Squats PF 30x 30x 30x 30x    W/P-Hip-Abd,Add,Flex,Ext 7 5#-30x 7 5#-30x 7 5#-30x 7 5#-30x    WP-Squats        Trimax-TKE        Ydxfbyb-DL-Sb 2x2' 2x2' 2x2' 2x2' '   NK Table Exer        NK Table ROM        TM        Bike        ME, PE 15' 15' 15' 15'            Modalities 10/1 10/3 10/8 10/10    MH&ES 20' 20' 20' 20'    US   10' 10'

## 2019-10-15 ENCOUNTER — OFFICE VISIT (OUTPATIENT)
Dept: PHYSICAL THERAPY | Facility: CLINIC | Age: 44
End: 2019-10-15
Payer: COMMERCIAL

## 2019-10-15 DIAGNOSIS — R26.2 DIFFICULTY WALKING: ICD-10-CM

## 2019-10-15 DIAGNOSIS — G89.29 CHRONIC LEFT-SIDED LOW BACK PAIN WITHOUT SCIATICA: ICD-10-CM

## 2019-10-15 DIAGNOSIS — S80.01XD CONTUSION OF RIGHT KNEE, SUBSEQUENT ENCOUNTER: ICD-10-CM

## 2019-10-15 DIAGNOSIS — M54.50 CHRONIC LEFT-SIDED LOW BACK PAIN WITHOUT SCIATICA: ICD-10-CM

## 2019-10-15 DIAGNOSIS — M25.561 CHRONIC PAIN OF RIGHT KNEE: ICD-10-CM

## 2019-10-15 DIAGNOSIS — M54.50 ACUTE LEFT-SIDED LOW BACK PAIN WITHOUT SCIATICA: Primary | ICD-10-CM

## 2019-10-15 DIAGNOSIS — G89.29 CHRONIC PAIN OF RIGHT KNEE: ICD-10-CM

## 2019-10-15 PROCEDURE — 97014 ELECTRIC STIMULATION THERAPY: CPT

## 2019-10-15 PROCEDURE — 97140 MANUAL THERAPY 1/> REGIONS: CPT

## 2019-10-15 PROCEDURE — 97110 THERAPEUTIC EXERCISES: CPT

## 2019-10-15 NOTE — PROGRESS NOTES
Today's date: 10/15/2019  Patient name: Yoana Velasquez  : 1975  MRN: [id number]  Referring provider: Charito Mazariegos DO  Dx:   Encounter Diagnosis     ICD-10-CM    1  Acute left-sided low back pain without sciatica M54 5    2  Chronic pain of right knee M25 561     G89 29    3  Difficulty walking R26 2    4  Contusion of right knee, subsequent encounter S80  01XD    5  Chronic left-sided low back pain without sciatica M54 5     G89 29      Subjective:  No new c/o pain today  Objective: See treatment log below    Assessment: Tolerated treatment well  Patient exhibited good technique with therapeutic exercises and would benefit from continued PT to increase ROM/strength and endurance to improve mobility and gait  Plan: Continue per plan of care  Progress treatment as tolerated         Precautions:  Left Anterior Hip Pain / Right Anterior Medial Patellar Region Pain    Daily Treatment Log  Manual  10/1 10/3 10/8 10/10 10/15   MT, ROM 15' '15' 15' 15' 20'   HEP        Exercise Log 10/1 10/3 10/8 10/10 10/15   Balance Board 30x 30x 30x 30x 30x ea   Chair Squats 30x 30x 30x 30x    P-Bar-GT-Forward, Backward,Side-Even & Dips        BOSU-Walk 5' 5' 5' 5' 5'   Foam Pad SLR,Hip/KneeFl,Step Ups 30x 30x 30x 30x 30x ea   Foam Beam 12x 12x 12x 12x 6x ea   Fitter-Slalom        Monster Steps        T/G-Squats PF 30x 30x 30x 30x    W/P-Hip-Abd,Add,Flex,Ext 7 5#-30x 7 5#-30x 7 5#-30x 7 5#-30x    WP-Squats        Trimax-TKE        Jvobnlk-AJ-Jd 2x2' 2x2' 2x2' 2x2' '   NK Table Exer        NK Table ROM        TM        Bike        ME, PE 15' 15' 15' 15' 15'           Modalities 10/1 10/3 10/8 10/10 10/15   MH&ES 20' 20' 20' 20' 20'   US   10' 10' NT

## 2019-10-17 ENCOUNTER — OFFICE VISIT (OUTPATIENT)
Dept: PHYSICAL THERAPY | Facility: CLINIC | Age: 44
End: 2019-10-17
Payer: COMMERCIAL

## 2019-10-17 DIAGNOSIS — S80.01XD CONTUSION OF RIGHT KNEE, SUBSEQUENT ENCOUNTER: ICD-10-CM

## 2019-10-17 DIAGNOSIS — M54.50 CHRONIC LEFT-SIDED LOW BACK PAIN WITHOUT SCIATICA: ICD-10-CM

## 2019-10-17 DIAGNOSIS — M54.50 ACUTE LEFT-SIDED LOW BACK PAIN WITHOUT SCIATICA: Primary | ICD-10-CM

## 2019-10-17 DIAGNOSIS — R26.2 DIFFICULTY WALKING: ICD-10-CM

## 2019-10-17 DIAGNOSIS — G89.29 CHRONIC PAIN OF RIGHT KNEE: ICD-10-CM

## 2019-10-17 DIAGNOSIS — M25.561 CHRONIC PAIN OF RIGHT KNEE: ICD-10-CM

## 2019-10-17 DIAGNOSIS — G89.29 CHRONIC LEFT-SIDED LOW BACK PAIN WITHOUT SCIATICA: ICD-10-CM

## 2019-10-17 PROCEDURE — 97014 ELECTRIC STIMULATION THERAPY: CPT | Performed by: PHYSICAL THERAPIST

## 2019-10-17 PROCEDURE — 97140 MANUAL THERAPY 1/> REGIONS: CPT | Performed by: PHYSICAL THERAPIST

## 2019-10-17 PROCEDURE — 97112 NEUROMUSCULAR REEDUCATION: CPT | Performed by: PHYSICAL THERAPIST

## 2019-10-22 ENCOUNTER — OFFICE VISIT (OUTPATIENT)
Dept: PHYSICAL THERAPY | Facility: CLINIC | Age: 44
End: 2019-10-22
Payer: COMMERCIAL

## 2019-10-22 DIAGNOSIS — S80.01XD CONTUSION OF RIGHT KNEE, SUBSEQUENT ENCOUNTER: ICD-10-CM

## 2019-10-22 DIAGNOSIS — M54.50 CHRONIC LEFT-SIDED LOW BACK PAIN WITHOUT SCIATICA: ICD-10-CM

## 2019-10-22 DIAGNOSIS — M25.561 CHRONIC PAIN OF RIGHT KNEE: ICD-10-CM

## 2019-10-22 DIAGNOSIS — M54.50 ACUTE LEFT-SIDED LOW BACK PAIN WITHOUT SCIATICA: Primary | ICD-10-CM

## 2019-10-22 DIAGNOSIS — G89.29 CHRONIC LEFT-SIDED LOW BACK PAIN WITHOUT SCIATICA: ICD-10-CM

## 2019-10-22 DIAGNOSIS — R26.2 DIFFICULTY WALKING: ICD-10-CM

## 2019-10-22 DIAGNOSIS — G89.29 CHRONIC PAIN OF RIGHT KNEE: ICD-10-CM

## 2019-10-22 PROCEDURE — 97112 NEUROMUSCULAR REEDUCATION: CPT | Performed by: PHYSICAL THERAPIST

## 2019-10-22 PROCEDURE — 97014 ELECTRIC STIMULATION THERAPY: CPT | Performed by: PHYSICAL THERAPIST

## 2019-10-22 PROCEDURE — 97140 MANUAL THERAPY 1/> REGIONS: CPT | Performed by: PHYSICAL THERAPIST

## 2019-10-22 PROCEDURE — 97035 APP MDLTY 1+ULTRASOUND EA 15: CPT | Performed by: PHYSICAL THERAPIST

## 2019-10-22 NOTE — PROGRESS NOTES
Today's date: 10/22/2019  Patient name: Crystal Wayne  : 1975  MRN: [id number]  Referring provider: Indigo Bowser DO  Dx:   Encounter Diagnosis     ICD-10-CM    1  Acute left-sided low back pain without sciatica M54 5    2  Chronic pain of right knee M25 561     G89 29    3  Difficulty walking R26 2    4  Contusion of right knee, subsequent encounter S80  01XD    5  Chronic left-sided low back pain without sciatica M54 5     G89 29      Subjective:  Cristy states her back is slowly feeling better  She has more movement and less pain  Objective: See treatment log below    Assessment: Tolerated treatment well  Patient exhibited good technique with therapeutic exercises and would benefit from continued PT    Plan: Continue per plan of care  Progress treatment as tolerated         Precautions:  Left Anterior Hip Pain / Right Anterior Medial Patellar Region Pain    Daily Treatment Log  Manual  10/17 10/22      MT, ROM 15' 15'      HEP        Exercise Log 10/17 10/22      Balance Board 30x 30x      Chair Squats 30x 30x      P-Bar-GT-Forward, Backward,Side-Even & Dips        BOSU-Walk 5' 5'      Foam Pad SLR,Hip/KneeFl,Step Ups 30x 30x      Foam Beam 12x 12x      Fitter-Slalom        Monster Steps        T/G-Squats PF 30x 30x      W/P-Hip-Abd,Add,Flex,Ext 7 5#-30x 7 5#-30x      WP-Squats        Trimax-TKE        Llpllvu-SQ-In 2x2' 2x2'   '   NK Table Exer        NK Table ROM        TM        Bike        ME, PE 15' 15'              Modalities 10/17 11/22      MH&ES 20' 20'      US 10' 10'

## 2019-10-24 ENCOUNTER — OFFICE VISIT (OUTPATIENT)
Dept: PHYSICAL THERAPY | Facility: CLINIC | Age: 44
End: 2019-10-24
Payer: COMMERCIAL

## 2019-10-24 DIAGNOSIS — S80.01XD CONTUSION OF RIGHT KNEE, SUBSEQUENT ENCOUNTER: ICD-10-CM

## 2019-10-24 DIAGNOSIS — M54.50 ACUTE LEFT-SIDED LOW BACK PAIN WITHOUT SCIATICA: Primary | ICD-10-CM

## 2019-10-24 DIAGNOSIS — R26.2 DIFFICULTY WALKING: ICD-10-CM

## 2019-10-24 DIAGNOSIS — G89.29 CHRONIC LEFT-SIDED LOW BACK PAIN WITHOUT SCIATICA: ICD-10-CM

## 2019-10-24 DIAGNOSIS — M54.50 CHRONIC LEFT-SIDED LOW BACK PAIN WITHOUT SCIATICA: ICD-10-CM

## 2019-10-24 DIAGNOSIS — M25.561 CHRONIC PAIN OF RIGHT KNEE: ICD-10-CM

## 2019-10-24 DIAGNOSIS — G89.29 CHRONIC PAIN OF RIGHT KNEE: ICD-10-CM

## 2019-10-24 PROCEDURE — 97140 MANUAL THERAPY 1/> REGIONS: CPT | Performed by: PHYSICAL THERAPIST

## 2019-10-24 PROCEDURE — 97112 NEUROMUSCULAR REEDUCATION: CPT | Performed by: PHYSICAL THERAPIST

## 2019-10-24 PROCEDURE — 97014 ELECTRIC STIMULATION THERAPY: CPT | Performed by: PHYSICAL THERAPIST

## 2019-10-24 PROCEDURE — 97035 APP MDLTY 1+ULTRASOUND EA 15: CPT | Performed by: PHYSICAL THERAPIST

## 2019-10-24 NOTE — PROGRESS NOTES
Today's date: 10/24/2019  Patient name: Chani Wasserman  : 1975  MRN: [id number]  Referring provider: Claudene Favor, DO  Dx:   Encounter Diagnosis     ICD-10-CM    1  Acute left-sided low back pain without sciatica M54 5    2  Chronic pain of right knee M25 561     G89 29    3  Difficulty walking R26 2    4  Contusion of right knee, subsequent encounter S80  01XD    5  Chronic left-sided low back pain without sciatica M54 5     G89 29      Subjective:  Cristy states her back and hips are feeling better  Objective: See treatment log below    Assessment: Tolerated treatment well  Patient exhibited good technique with therapeutic exercises and would benefit from continued PT    Plan: Continue per plan of care  Progress treatment as tolerated         Precautions:  Left Anterior Hip Pain / Right Anterior Medial Patellar Region Pain    Daily Treatment Log  Manual  10/17 10/22 10/24     MT, ROM 15' 15' 15'     HEP        Exercise Log 10/17 10/22 10/24     Balance Board 30x 30x 30x     Chair Squats 30x 30x 30x     P-Bar-GT-Forward, Backward,Side-Even & Dips        BOSU-Walk 5' 5' 5'     Foam Pad SLR,Hip/KneeFl,Step Ups 30x 30x 30x     Foam Beam 12x 12x 15x     Fitter-Slalom        Monster Steps        T/G-Squats PF 30x 30x 30x     W/P-Hip-Abd,Add,Flex,Ext 7 5#-30x 7 5#-30x 7 5#-30x     WP-Squats        Trimax-TKE        Fzzvpfi-EA-Kq 2x2' 2x2' 2x2'  '   NK Table Exer        NK Table ROM        TM        Bike        ME, PE 15' 15' 15'             Modalities 10/17 10/22 10/24     MH&ES 20' 20' 20'     US 10' 10' 10'

## 2019-10-29 ENCOUNTER — OFFICE VISIT (OUTPATIENT)
Dept: PHYSICAL THERAPY | Facility: CLINIC | Age: 44
End: 2019-10-29
Payer: COMMERCIAL

## 2019-10-29 DIAGNOSIS — S80.01XD CONTUSION OF RIGHT KNEE, SUBSEQUENT ENCOUNTER: ICD-10-CM

## 2019-10-29 DIAGNOSIS — M54.50 CHRONIC LEFT-SIDED LOW BACK PAIN WITHOUT SCIATICA: ICD-10-CM

## 2019-10-29 DIAGNOSIS — M54.50 ACUTE LEFT-SIDED LOW BACK PAIN WITHOUT SCIATICA: Primary | ICD-10-CM

## 2019-10-29 DIAGNOSIS — G89.29 CHRONIC PAIN OF RIGHT KNEE: ICD-10-CM

## 2019-10-29 DIAGNOSIS — R26.2 DIFFICULTY WALKING: ICD-10-CM

## 2019-10-29 DIAGNOSIS — G89.29 CHRONIC LEFT-SIDED LOW BACK PAIN WITHOUT SCIATICA: ICD-10-CM

## 2019-10-29 DIAGNOSIS — M25.561 CHRONIC PAIN OF RIGHT KNEE: ICD-10-CM

## 2019-10-29 PROCEDURE — 97112 NEUROMUSCULAR REEDUCATION: CPT | Performed by: PHYSICAL THERAPIST

## 2019-10-29 PROCEDURE — 97035 APP MDLTY 1+ULTRASOUND EA 15: CPT | Performed by: PHYSICAL THERAPIST

## 2019-10-29 PROCEDURE — 97014 ELECTRIC STIMULATION THERAPY: CPT | Performed by: PHYSICAL THERAPIST

## 2019-10-29 PROCEDURE — 97140 MANUAL THERAPY 1/> REGIONS: CPT | Performed by: PHYSICAL THERAPIST

## 2019-10-29 NOTE — PROGRESS NOTES
Today's date: 10/29/2019  Patient name: Vincent Dawson  : 1975  MRN: [id number]  Referring provider: Kristene Soulier, DO  Dx:   Encounter Diagnosis     ICD-10-CM    1  Acute left-sided low back pain without sciatica M54 5    2  Chronic pain of right knee M25 561     G89 29    3  Difficulty walking R26 2    4  Contusion of right knee, subsequent encounter S80  01XD    5  Chronic left-sided low back pain without sciatica M54 5     G89 29      Subjective:  Cristy states her back and knees are feeling better  She is getting a little better with movement and walking  Objective: See treatment log below    Assessment: Tolerated treatment well  Patient exhibited good technique with therapeutic exercises and would benefit from continued PT    Plan: Continue per plan of care  Progress treatment as tolerated         Precautions:  Left Anterior Hip Pain / Right Anterior Medial Patellar Region Pain    Daily Treatment Log  Manual  10/17 10/22 10/24 10/29    MT, ROM 15' 15' 15' 15'    HEP        Exercise Log 10/17 10/22 10/24 10/29    Balance Board 30x 30x 30x 30x    Chair Squats 30x 30x 30x 30x    P-Bar-GT-Forward, Backward,Side-Even & Dips        BOSU-Walk 5' 5' 5' 5'    Foam Pad SLR,Hip/KneeFl,Step Ups 30x 30x 30x 30x    Foam Beam 12x 12x 15x 15x    Fitter-Slalom        Monster Steps        T/G-Squats PF 30x 30x 30x 30x    W/P-Hip-Abd,Add,Flex,Ext 7 5#-30x 7 5#-30x 7 5#-30x 7 5#-30x    WP-Squats        Trimax-TKE        Acpsmdx-SQ-Bx 2x2' 2x2' 2x2' 2x2' '   NK Table Exer        NK Table ROM        TM        Bike        ME, PE 15' 15' 15' 15'            Modalities 10/17 10/22 10/24 10/29    MH&ES 20' 20' 20' 20'    US 10' 10' 10' 10'

## 2019-10-31 ENCOUNTER — OFFICE VISIT (OUTPATIENT)
Dept: PHYSICAL THERAPY | Facility: CLINIC | Age: 44
End: 2019-10-31
Payer: COMMERCIAL

## 2019-10-31 DIAGNOSIS — R26.2 DIFFICULTY WALKING: ICD-10-CM

## 2019-10-31 DIAGNOSIS — G89.29 CHRONIC LEFT-SIDED LOW BACK PAIN WITHOUT SCIATICA: ICD-10-CM

## 2019-10-31 DIAGNOSIS — M25.561 CHRONIC PAIN OF RIGHT KNEE: ICD-10-CM

## 2019-10-31 DIAGNOSIS — G89.29 CHRONIC PAIN OF RIGHT KNEE: ICD-10-CM

## 2019-10-31 DIAGNOSIS — M54.50 ACUTE LEFT-SIDED LOW BACK PAIN WITHOUT SCIATICA: Primary | ICD-10-CM

## 2019-10-31 DIAGNOSIS — S80.01XD CONTUSION OF RIGHT KNEE, SUBSEQUENT ENCOUNTER: ICD-10-CM

## 2019-10-31 DIAGNOSIS — M54.50 CHRONIC LEFT-SIDED LOW BACK PAIN WITHOUT SCIATICA: ICD-10-CM

## 2019-10-31 PROCEDURE — 97112 NEUROMUSCULAR REEDUCATION: CPT | Performed by: PHYSICAL THERAPIST

## 2019-10-31 PROCEDURE — 97014 ELECTRIC STIMULATION THERAPY: CPT | Performed by: PHYSICAL THERAPIST

## 2019-10-31 PROCEDURE — 97035 APP MDLTY 1+ULTRASOUND EA 15: CPT | Performed by: PHYSICAL THERAPIST

## 2019-10-31 PROCEDURE — 97140 MANUAL THERAPY 1/> REGIONS: CPT | Performed by: PHYSICAL THERAPIST

## 2019-10-31 NOTE — PROGRESS NOTES
Today's date: 10/31/2019  Patient name: Yoana Velasquez  : 1975  MRN: [id number]  Referring provider: Charito Mazariegos DO  Dx:   Encounter Diagnosis     ICD-10-CM    1  Acute left-sided low back pain without sciatica M54 5    2  Chronic pain of right knee M25 561     G89 29    3  Difficulty walking R26 2    4  Contusion of right knee, subsequent encounter S80  01XD    5  Chronic left-sided low back pain without sciatica M54 5     G89 29      Subjective:  Cristy states her slowly feeling better  Objective: See treatment log below    Assessment: Tolerated treatment well  Patient exhibited good technique with therapeutic exercises and would benefit from continued PT    Plan: Continue per plan of care  Progress treatment as tolerated         Precautions:  Left Anterior Hip Pain / Right Anterior Medial Patellar Region Pain    Daily Treatment Log  Manual  10/17 10/22 10/24 10/29 10/31   MT, ROM 15' 15' 15' 15' 15'   HEP        Exercise Log 10/17 10/22 10/24 10/29 10/31   Balance Board 30x 30x 30x 30x 30x   Chair Squats 30x 30x 30x 30x 30x   P-Bar-GT-Forward, Backward,Side-Even & Dips        BOSU-Walk 5' 5' 5' 5' 5'   Foam Pad SLR,Hip/KneeFl,Step Ups 30x 30x 30x 30x 30x   Foam Beam 12x 12x 15x 15x 15x   Fitter-Slalom        Monster Steps        T/G-Squats PF 30x 30x 30x 30x 30x   W/P-Hip-Abd,Add,Flex,Ext 7 5#-30x 7 5#-30x 7 5#-30x 7 5#-30x 10#-30x   WP-Squats        Trimax-TKE        Bvpulnh-UH-Im 2x2' 2x2' 2x2' 2x2' '2x2'   NK Table Exer        NK Table ROM        TM        Bike        ME, PE 15' 15' 15' 15' 15'           Modalities 10/17 10/22 10/24 10/29 10/31   MH&ES 20' 20' 20' 20' 20'   US 10' 10' 10' 10' 10'

## 2019-11-05 ENCOUNTER — OFFICE VISIT (OUTPATIENT)
Dept: PHYSICAL THERAPY | Facility: CLINIC | Age: 44
End: 2019-11-05
Payer: COMMERCIAL

## 2019-11-05 DIAGNOSIS — G89.29 CHRONIC PAIN OF RIGHT KNEE: ICD-10-CM

## 2019-11-05 DIAGNOSIS — M54.50 CHRONIC LEFT-SIDED LOW BACK PAIN WITHOUT SCIATICA: ICD-10-CM

## 2019-11-05 DIAGNOSIS — S80.01XD CONTUSION OF RIGHT KNEE, SUBSEQUENT ENCOUNTER: ICD-10-CM

## 2019-11-05 DIAGNOSIS — M54.50 ACUTE LEFT-SIDED LOW BACK PAIN WITHOUT SCIATICA: Primary | ICD-10-CM

## 2019-11-05 DIAGNOSIS — R26.2 DIFFICULTY WALKING: ICD-10-CM

## 2019-11-05 DIAGNOSIS — G89.29 CHRONIC LEFT-SIDED LOW BACK PAIN WITHOUT SCIATICA: ICD-10-CM

## 2019-11-05 DIAGNOSIS — M25.561 CHRONIC PAIN OF RIGHT KNEE: ICD-10-CM

## 2019-11-05 PROCEDURE — 97035 APP MDLTY 1+ULTRASOUND EA 15: CPT | Performed by: PHYSICAL THERAPIST

## 2019-11-05 PROCEDURE — 97112 NEUROMUSCULAR REEDUCATION: CPT | Performed by: PHYSICAL THERAPIST

## 2019-11-05 PROCEDURE — 97164 PT RE-EVAL EST PLAN CARE: CPT | Performed by: PHYSICAL THERAPIST

## 2019-11-05 PROCEDURE — 97014 ELECTRIC STIMULATION THERAPY: CPT | Performed by: PHYSICAL THERAPIST

## 2019-11-05 PROCEDURE — 97140 MANUAL THERAPY 1/> REGIONS: CPT | Performed by: PHYSICAL THERAPIST

## 2019-11-05 NOTE — PROGRESS NOTES
Today's date: 2019  Patient name: Festus Brewster  : 1975  MRN: [id number]  Referring provider: Paulino Jimenez DO  Dx:   Encounter Diagnosis     ICD-10-CM    1  Acute left-sided low back pain without sciatica M54 5    2  Chronic pain of right knee M25 561     G89 29    3  Difficulty walking R26 2    4  Contusion of right knee, subsequent encounter S80  01XD    5  Chronic left-sided low back pain without sciatica M54 5     G89 29      Subjective:  Poppy states her back and knee are feeling better  She can stand longer and walk further  Objective: See treatment log below    Assessment: Tolerated treatment well  Patient exhibited good technique with therapeutic exercises and would benefit from continued PT    Plan: Continue per plan of care  Progress treatment as tolerated         Precautions:  Left Anterior Hip Pain / Right Anterior Medial Patellar Region Pain    Daily Treatment Log  Manual  11/5    10/31   MT, ROM 15'    15'   HEP        Exercise Log 11/5    10/31   Balance Board 30x    30x   Chair Squats 30x    30x   P-Bar-GT-Forward, Backward,Side-Even & Dips        BOSU-Walk 5'    5'   Foam Pad SLR,Hip/KneeFl,Step Ups 30x    30x   Foam Beam 15x    15x   Fitter-Slalom        Monster Steps        T/G-Squats PF 30x    30x   W/P-Hip-Abd,Add,Flex,Ext 10#-30x    10#-30x   WP-Squats        Trimax-TKE        Dtgbptd-GP-Rb 2x2'    '2x2'   NK Table Exer        NK Table ROM        TM        Bike        ME, PE 15'    15'           Modalities 11/5    10/31   MH&ES 20'    20'   US 10'    10'

## 2019-11-05 NOTE — LETTER
2019    Signature Required / [address] / PT Reevaluation  Jovanna Watson, DO  [address] [address]    Patient: Kateri Kanner   YOB: 1975   Date of Visit: 2019     Encounter Diagnosis     ICD-10-CM    1  Acute left-sided low back pain without sciatica M54 5    2  Chronic pain of right knee M25 561     G89 29    3  Difficulty walking R26 2    4  Contusion of right knee, subsequent encounter S80  01XD    5  Chronic left-sided low back pain without sciatica M54 5     G89 29      Dear Dr Adele Jiménez:   Thank you for your recent referral of Kateri Kanner  Please review the attached evaluation summary from Cristy's recent visit  Please verify that you agree with the plan of care by signing the attached order  If you have any questions or concerns, please do not hesitate to call  I sincerely appreciate the opportunity to share in the care of one of your patients and hope to have another opportunity to work with you in the near future  Sincerely,    Reno Garcia, PT    Referring Provider:   I certify that I have read the below Plan of Care and certify the need for these services furnished under this plan of treatment while under my care  Jovanna Watson,   [address] [facility] [address]  VIA In Basket          Today's date: 2019  Patient name: Kateri Kanner  : 1975  MRN: [id number]  Referring provider: Nate Merlos DO  Dx:   Encounter Diagnosis     ICD-10-CM    1  Acute left-sided low back pain without sciatica M54 5    2  Chronic pain of right knee M25 561     G89 29    3  Difficulty walking R26 2    4  Contusion of right knee, subsequent encounter S80  01XD    5  Chronic left-sided low back pain without sciatica M54 5     G89 29      Subjective:  Cristy states her back and knee are feeling better  She can stand longer and walk further  Objective: See treatment log below    Assessment: Tolerated treatment well   Patient exhibited good technique with therapeutic exercises and would benefit from continued PT    Plan: Continue per plan of care  Progress treatment as tolerated  Precautions:  Left Anterior Hip Pain / Right Anterior Medial Patellar Region Pain    Daily Treatment Log  Manual  11/5    10/31   MT, ROM 15'    15'   HEP        Exercise Log 11/5    10/31   Balance Board 30x    30x   Chair Squats 30x    30x   P-Bar-GT-Forward, Backward,Side-Even & Dips        BOSU-Walk 5'    5'   Foam Pad SLR,Hip/KneeFl,Step Ups 30x    30x   Foam Beam 15x    15x   Fitter-Slalom        Monster Steps        T/G-Squats PF 30x    30x   W/P-Hip-Abd,Add,Flex,Ext 10#-30x    10#-30x   WP-Squats        Trimax-TKE        Hcmvlxs-XG-Wv 2x2'    '2x2'   NK Table Exer        NK Table ROM        TM        Bike        ME, PE 15'    15'           Modalities 11/5    10/31   MH&ES 20'    20'   US 10'    10'       PT Re-Evaluation   Today's date: 2019    Patient name: Kateri Kanner  : 1975  MRN: [id number]  Referring provider: Nate Merlos DO  Dx:   Encounter Diagnosis     ICD-10-CM    1  Acute left-sided low back pain without sciatica M54 5    2  Chronic pain of right knee M25 561     G89 29    3  Difficulty walking R26 2    4  Contusion of right knee, subsequent encounter S80  01XD    5  Chronic left-sided low back pain without sciatica M54 5     G89 29      Assessment  Assessment details: Patient is making progress with her back, hip and knee regions  Impairments: abnormal gait, abnormal or restricted ROM, abnormal movement, activity intolerance, impaired balance, impaired physical strength, pain with function and safety issue  Understanding of Dx/Px/POC: excellent   Prognosis: good    Goals  STG  2-4 weeks:   Increase lumbar spine AROM by 2-4 degrees  Increase lower extremity strength by 2-4 lbs in all weak areas  Improve sitting posture and body mechanics  Increase standing/ADL tolerance minutes    Decrease pain by 25-50% with standing, walking, and stairs  Initiate HEP  LTG  6-8 weeks:   Demonstrate normal lumbar rotation  Decrease pain to 1-2/10 with activity  Increase lower extremity strength by 10-20 lbs in all weak areas  Improve spinal stability  Improve gait pattern and balance  Decrease limitations with standing, walking and ADL's  DC with HEP  Plan  Plan details: All planned modality interventions and planned therapy interventions are provided PRN  Patient would benefit from: PT eval and skilled physical therapy  Planned modality interventions: TENS, ultrasound and unattended electrical stimulation  Planned therapy interventions: manual therapy, joint mobilization, balance, balance/weight bearing training, neuromuscular re-education, patient education, self care, strengthening, stretching, therapeutic activities, therapeutic exercise, therapeutic training, transfer training, home exercise program, graded exercise, gait training, flexibility and coordination  Frequency: 3x week  Duration in weeks: 6  Treatment plan discussed with: patient      Subjective Evaluation    Pain  Quality: discomfort, knife-like, pulling, squeezing, sharp, tight and throbbing  Aggravating factors: running, lifting, stair climbing, walking and standing  Progression: improved    Treatments  Previous treatment: physical therapy  Current treatment: physical therapy  Patient Goals  Patient goals for therapy: decreased pain, improved balance, increased motion, return to work, return to [employer] activities, independence with ADLs/IADLs and increased strength        Objective     Date of onset:  11/03/2018    Date of Surgery:  None    History of Present Episode: 8/27/2019  Poppy states she was at home and was walking out front carrying items from her car  She tripped on the driveway / side walk and fell  She injured herself immediately with impact  She has had a lot of issues since this accident    She has become worse and now comes here for treatment  Past Medical History:    8/27/2019  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     Previous Level of Functional Ability:  8/27/2019  Poppy states she was walking well before she fell  She now has limitations  Inspection / Palpation:  Knee:  8/27/2019  Mesomorphic / Endomorphic body type  No signs of infection  No signs of wounds  No signs of drainage  No signs of ecchymotic regions  No signs of erythremic regions  Moderate signs of muscle spasm  Moderate signs of muscle guarding  Moderate signs of tenderness reported to palpation  No signs of swelling  No signs of a surgery site  Current conditions appears consistent with recent acute episode  Chief Complaints:  8/27/2019  Poppy reports mild to moderate difficulty with standing  Poppy reports mild to moderate difficulty with walking  Poppy reports mild to moderate difficulty with movement / use of her right knee  Poppy reports moderate to severe difficulty with use of stairs  Poppy reports severe difficulty with running  Poppy reports severe difficulty with jumping  Poppy reports moderate difficulty with use of inclines  Poppy reports moderate difficulty with sleeping  Poppy reports moderate difficulty with her strength and endurance  Poppy reports mild to moderate limitations with her range of motion  Poppy reports moderate to severe difficulty lying on her right knee region      KNEE PAIN Resting Moving Palpation Standing Walking   8/27/2019 Lt 0 0 0 0 0   8/27/2019 Rt 3 3 7 4-7 3-6 3-6   10/1/2019 Rt 2 2-6 3-6 2-5 2-5   11/5/2019 Rt 1-2 1-4 2-4 1-3 1-3     KNEE PAIN Running Stairs Sleeping Twisting Jumping   8/27/2019 Lt 0 0 0 0 0   8/27/2019 Rt NA 5-8 3 3-5 NA   10/1/2019 Rt NA 4-7 2 2-4 NA   11/5/2019 Rt NA 2-5 1 1-3 NA     KNEE AROM Flexion Extension SLR   8/27/2019 Lt 130° 0° 85°   8/27/2019 Rt 130° 0° 85°     KNEE MMT Flexion Extension Varus Stress Valgus Stress   8/27/2019 Rt 0/10  18 lbs 0/10  16 lbs 0/10  15 lbs 0/10  16 lbs 8/27/2019 Lt 0/10  17 lbs 0/10  17 lbs 0/10  15 lbs 0/10  16 lbs     Knee Screen MCL LCL ACL PCL   8/27/2019 Rt Negative Negative Negative Negative   8/27/2019 Lt Negative Negative Negative Negative     Knee Screen Patellar Quad Stress Meniscal Medial Meniscal Lateral   8/27/2019 Rt POSITIVE Negative Negative Negative   8/27/2019 Lt Negative Negative Negative Negative     Precautions:  Left Anterior Hip Pain / Right Anterior Medial Patellar Region Pain

## 2019-11-07 ENCOUNTER — OFFICE VISIT (OUTPATIENT)
Dept: PHYSICAL THERAPY | Facility: CLINIC | Age: 44
End: 2019-11-07
Payer: COMMERCIAL

## 2019-11-07 DIAGNOSIS — G89.29 CHRONIC PAIN OF RIGHT KNEE: ICD-10-CM

## 2019-11-07 DIAGNOSIS — M54.50 ACUTE LEFT-SIDED LOW BACK PAIN WITHOUT SCIATICA: Primary | ICD-10-CM

## 2019-11-07 DIAGNOSIS — S80.01XD CONTUSION OF RIGHT KNEE, SUBSEQUENT ENCOUNTER: ICD-10-CM

## 2019-11-07 DIAGNOSIS — M54.50 CHRONIC LEFT-SIDED LOW BACK PAIN WITHOUT SCIATICA: ICD-10-CM

## 2019-11-07 DIAGNOSIS — M25.561 CHRONIC PAIN OF RIGHT KNEE: ICD-10-CM

## 2019-11-07 DIAGNOSIS — R26.2 DIFFICULTY WALKING: ICD-10-CM

## 2019-11-07 DIAGNOSIS — G89.29 CHRONIC LEFT-SIDED LOW BACK PAIN WITHOUT SCIATICA: ICD-10-CM

## 2019-11-07 PROCEDURE — 97140 MANUAL THERAPY 1/> REGIONS: CPT

## 2019-11-07 PROCEDURE — 97110 THERAPEUTIC EXERCISES: CPT

## 2019-11-07 PROCEDURE — 97014 ELECTRIC STIMULATION THERAPY: CPT

## 2019-11-07 NOTE — PROGRESS NOTES
PT Re-Evaluation   Today's date: 2019    Patient name: Ramírez Otero  : 1975  MRN: [id number]  Referring provider: Kalee Butcher DO  Dx:   Encounter Diagnosis     ICD-10-CM    1  Acute left-sided low back pain without sciatica M54 5    2  Chronic pain of right knee M25 561     G89 29    3  Difficulty walking R26 2    4  Contusion of right knee, subsequent encounter S80  01XD    5  Chronic left-sided low back pain without sciatica M54 5     G89 29      Assessment  Assessment details: Patient is making progress with her back, hip and knee regions  Impairments: abnormal gait, abnormal or restricted ROM, abnormal movement, activity intolerance, impaired balance, impaired physical strength, pain with function and safety issue  Understanding of Dx/Px/POC: excellent   Prognosis: good    Goals  STG  2-4 weeks:   Increase lumbar spine AROM by 2-4 degrees  Increase lower extremity strength by 2-4 lbs in all weak areas  Improve sitting posture and body mechanics  Increase standing/ADL tolerance minutes  Decrease pain by 25-50% with standing, walking, and stairs  Initiate HEP  LTG  6-8 weeks:   Demonstrate normal lumbar rotation  Decrease pain to 1-2/10 with activity  Increase lower extremity strength by 10-20 lbs in all weak areas  Improve spinal stability  Improve gait pattern and balance  Decrease limitations with standing, walking and ADL's  DC with HEP  Plan  Plan details: All planned modality interventions and planned therapy interventions are provided PRN      Patient would benefit from: PT eval and skilled physical therapy  Planned modality interventions: TENS, ultrasound and unattended electrical stimulation  Planned therapy interventions: manual therapy, joint mobilization, balance, balance/weight bearing training, neuromuscular re-education, patient education, self care, strengthening, stretching, therapeutic activities, therapeutic exercise, therapeutic training, transfer training, home exercise program, graded exercise, gait training, flexibility and coordination  Frequency: 3x week  Duration in weeks: 6  Treatment plan discussed with: patient      Subjective Evaluation    Pain  Quality: discomfort, knife-like, pulling, squeezing, sharp, tight and throbbing  Aggravating factors: running, lifting, stair climbing, walking and standing  Progression: improved    Treatments  Previous treatment: physical therapy  Current treatment: physical therapy  Patient Goals  Patient goals for therapy: decreased pain, improved balance, increased motion, return to work, return to [employer] activities, independence with ADLs/IADLs and increased strength        Objective     Date of onset:  11/03/2018    Date of Surgery:  None    History of Present Episode: 8/27/2019  Poppy states she was at home and was walking out front carrying items from her car  She tripped on the driveway / side walk and fell  She injured herself immediately with impact  She has had a lot of issues since this accident  She has become worse and now comes here for treatment  Past Medical History:    8/27/2019  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     Previous Level of Functional Ability:  8/27/2019  Poppy states she was walking well before she fell  She now has limitations  Inspection / Palpation:  Knee:  8/27/2019  Mesomorphic / Endomorphic body type  No signs of infection  No signs of wounds  No signs of drainage  No signs of ecchymotic regions  No signs of erythremic regions  Moderate signs of muscle spasm  Moderate signs of muscle guarding  Moderate signs of tenderness reported to palpation  No signs of swelling  No signs of a surgery site  Current conditions appears consistent with recent acute episode  Chief Complaints:  8/27/2019  Poppy reports mild to moderate difficulty with standing  Poppy reports mild to moderate difficulty with walking    Poppy reports mild to moderate difficulty with movement / use of her right knee  Poppy reports moderate to severe difficulty with use of stairs  Poppy reports severe difficulty with running  Poppy reports severe difficulty with jumping  Poppy reports moderate difficulty with use of inclines  Poppy reports moderate difficulty with sleeping  Poppy reports moderate difficulty with her strength and endurance  Poppy reports mild to moderate limitations with her range of motion  Poppy reports moderate to severe difficulty lying on her right knee region      KNEE PAIN Resting Moving Palpation Standing Walking   8/27/2019 Lt 0 0 0 0 0   8/27/2019 Rt 3 3 7 4-7 3-6 3-6   10/1/2019 Rt 2 2-6 3-6 2-5 2-5   11/5/2019 Rt 1-2 1-4 2-4 1-3 1-3     KNEE PAIN Running Stairs Sleeping Twisting Jumping   8/27/2019 Lt 0 0 0 0 0   8/27/2019 Rt NA 5-8 3 3-5 NA   10/1/2019 Rt NA 4-7 2 2-4 NA   11/5/2019 Rt NA 2-5 1 1-3 NA     KNEE AROM Flexion Extension SLR   8/27/2019 Lt 130° 0° 85°   8/27/2019 Rt 130° 0° 85°     KNEE MMT Flexion Extension Varus Stress Valgus Stress   8/27/2019 Rt 0/10  18 lbs 0/10  16 lbs 0/10  15 lbs 0/10  16 lbs   8/27/2019 Lt 0/10  17 lbs 0/10  17 lbs 0/10  15 lbs 0/10  16 lbs     Knee Screen MCL LCL ACL PCL   8/27/2019 Rt Negative Negative Negative Negative   8/27/2019 Lt Negative Negative Negative Negative     Knee Screen Patellar Quad Stress Meniscal Medial Meniscal Lateral   8/27/2019 Rt POSITIVE Negative Negative Negative   8/27/2019 Lt Negative Negative Negative Negative     Precautions:  Left Anterior Hip Pain / Right Anterior Medial Patellar Region Pain

## 2019-11-07 NOTE — PROGRESS NOTES
Today's date: 2019  Patient name: Leslee Mercado  : 1975  MRN: [id number]  Referring provider: Annmarie Zhou DO  Dx:   Encounter Diagnosis     ICD-10-CM    1  Acute left-sided low back pain without sciatica M54 5    2  Chronic pain of right knee M25 561     G89 29    3  Difficulty walking R26 2    4  Contusion of right knee, subsequent encounter S80  01XD    5  Chronic left-sided low back pain without sciatica M54 5     G89 29      Subjective:  No new c/o pain today  Objective: See treatment log below    Assessment: Tolerated treatment well  Patient exhibited good technique with therapeutic exercises and would benefit from continued PT to increase ROM/strength and endurance to improve mobility and gait  Plan: Continue per plan of care  Progress treatment as tolerated         Precautions:  Left Anterior Hip Pain / Right Anterior Medial Patellar Region Pain    Daily Treatment Log  Manual        MT, ROM 15' 30'      HEP        Exercise Log       Balance Board 30x 30x ea      Chair Squats 30x       P-Bar-GT-Forward, Backward,Side-Even & Dips        BOSU-Walk 5' 5'      Foam Pad SLR,Hip/KneeFl,Step Ups 30x 30x ea      Foam Beam 15x 12x ea      Monster Steps        T/G-Squats PF 30x 30x ea      W/P-Hip-Abd,Add,Flex,Ext 10#-30x 10# 30x ea      WP-Squats        Trimax-TKE        Eazdksp-YY-Bl 2x2' 2x2'      NK Table Exer        StandingBackExtCounter  30x       SeatedTrunkFlexionGreen  TBall  3x Lata Dad        ME, PE 15' 15'              Modalities       MH&ES 20' 20'      US 10' NT

## 2019-11-12 ENCOUNTER — OFFICE VISIT (OUTPATIENT)
Dept: PHYSICAL THERAPY | Facility: CLINIC | Age: 44
End: 2019-11-12
Payer: COMMERCIAL

## 2019-11-12 DIAGNOSIS — M54.50 ACUTE LEFT-SIDED LOW BACK PAIN WITHOUT SCIATICA: Primary | ICD-10-CM

## 2019-11-12 DIAGNOSIS — G89.29 CHRONIC LEFT-SIDED LOW BACK PAIN WITHOUT SCIATICA: ICD-10-CM

## 2019-11-12 DIAGNOSIS — M54.50 CHRONIC LEFT-SIDED LOW BACK PAIN WITHOUT SCIATICA: ICD-10-CM

## 2019-11-12 DIAGNOSIS — G89.29 CHRONIC PAIN OF RIGHT KNEE: ICD-10-CM

## 2019-11-12 DIAGNOSIS — S80.01XD CONTUSION OF RIGHT KNEE, SUBSEQUENT ENCOUNTER: ICD-10-CM

## 2019-11-12 DIAGNOSIS — R26.2 DIFFICULTY WALKING: ICD-10-CM

## 2019-11-12 DIAGNOSIS — M25.561 CHRONIC PAIN OF RIGHT KNEE: ICD-10-CM

## 2019-11-12 PROCEDURE — 97110 THERAPEUTIC EXERCISES: CPT | Performed by: PHYSICAL THERAPIST

## 2019-11-12 PROCEDURE — 97112 NEUROMUSCULAR REEDUCATION: CPT | Performed by: PHYSICAL THERAPIST

## 2019-11-12 PROCEDURE — 97014 ELECTRIC STIMULATION THERAPY: CPT | Performed by: PHYSICAL THERAPIST

## 2019-11-12 PROCEDURE — 97140 MANUAL THERAPY 1/> REGIONS: CPT | Performed by: PHYSICAL THERAPIST

## 2019-11-14 ENCOUNTER — OFFICE VISIT (OUTPATIENT)
Dept: PHYSICAL THERAPY | Facility: CLINIC | Age: 44
End: 2019-11-14
Payer: COMMERCIAL

## 2019-11-14 DIAGNOSIS — M54.50 ACUTE LEFT-SIDED LOW BACK PAIN WITHOUT SCIATICA: Primary | ICD-10-CM

## 2019-11-14 DIAGNOSIS — S80.01XD CONTUSION OF RIGHT KNEE, SUBSEQUENT ENCOUNTER: ICD-10-CM

## 2019-11-14 DIAGNOSIS — M25.561 CHRONIC PAIN OF RIGHT KNEE: ICD-10-CM

## 2019-11-14 DIAGNOSIS — G89.29 CHRONIC LEFT-SIDED LOW BACK PAIN WITHOUT SCIATICA: ICD-10-CM

## 2019-11-14 DIAGNOSIS — G89.29 CHRONIC PAIN OF RIGHT KNEE: ICD-10-CM

## 2019-11-14 DIAGNOSIS — R26.2 DIFFICULTY WALKING: ICD-10-CM

## 2019-11-14 DIAGNOSIS — M54.50 CHRONIC LEFT-SIDED LOW BACK PAIN WITHOUT SCIATICA: ICD-10-CM

## 2019-11-14 PROCEDURE — 97110 THERAPEUTIC EXERCISES: CPT | Performed by: PHYSICAL THERAPIST

## 2019-11-14 PROCEDURE — 97140 MANUAL THERAPY 1/> REGIONS: CPT | Performed by: PHYSICAL THERAPIST

## 2019-11-14 PROCEDURE — 97112 NEUROMUSCULAR REEDUCATION: CPT | Performed by: PHYSICAL THERAPIST

## 2019-11-14 NOTE — PROGRESS NOTES
Today's date: 2019  Patient name: Louis Larson  : 1975  MRN: [id number]  Referring provider: Luciano Bronson DO  Dx:   Encounter Diagnosis     ICD-10-CM    1  Acute left-sided low back pain without sciatica M54 5    2  Chronic pain of right knee M25 561     G89 29    3  Difficulty walking R26 2    4  Contusion of right knee, subsequent encounter S80  01XD    5  Chronic left-sided low back pain without sciatica M54 5     G89 29      Subjective:  Cristy states her back and hips are feeling better  She is to call her doctor today and discuss her case  Objective: See treatment log below    Assessment: Tolerated treatment well  Patient exhibited good technique with therapeutic exercises and would benefit from continued PT    Plan: Continue per plan of care  Progress treatment as tolerated         Precautions:  Left Anterior Hip Pain / Right Anterior Medial Patellar Region Pain    Daily Treatment Log  Manual      MT, ROM 15' 30' 30' 30'    HEP        Exercise Log     Balance Board 30x 30x ea 30x 30x    Chair Squats 30x       P-Bar-GT-Forward, Backward,Side-Even & Dips        BOSU-Walk 5' 5' 5' 5'    Foam Pad SLR,Hip/KneeFl,Step Ups 30x 30x ea 30x 30x    Foam Beam 15x 12x ea 15x 15x    Monster Steps        T/G-Squats PF 30x 30x ea 30x 30x    W/P-Hip-Abd,Add,Flex,Ext 10#-30x 10# 30x ea 10#-30x 10#-30x    WP-Squats        Trimax-TKE        Pijfsch-XT-Dg 2x2' 2x2' 2x2' 2x2'    NK Table Exer        StandingBackExtCounter  30x  30x 30x    SeatedTrunkFlexionGreen  TBall  3x ea 3x     Bike        ME, PE 15' 15' 15' 15'            Modalities     MH&ES 20' 20' 20' 20'    US 10' NT  10'

## 2019-11-14 NOTE — LETTER
2020  Signature Required / Plan Of Care / PT Discharge    Patient: Louis Larson   YOB: 1975   Date of Visit: 2019     Encounter Diagnosis     ICD-10-CM    1  Acute left-sided low back pain without sciatica M54 5    2  Chronic pain of right knee M25 561     G89 29    3  Difficulty walking R26 2    4  Contusion of right knee, subsequent encounter S80  01XD    5  Chronic left-sided low back pain without sciatica M54 5     G89 29      Dear Dr Brynn Walker:   Thank you for your recent referral of Louis Larson  Please review the attached evaluation summary from Cristy's recent visit  Please verify that you agree with the plan of care by signing the attached order  If you have any questions or concerns, please do not hesitate to call  I sincerely appreciate the opportunity to share in the care of one of your patients and hope to have another opportunity to work with you in the near future  Sincerely,    Chana Marie, PT    Referring Provider:   I certify that I have read the below Plan of Care and certify the need for these services furnished under this plan of treatment while under my care  Keirra Palafox DO  [address]  VIA In Basket    Please SIGN ABOVE and return THIS PAGE ONLY to Fax # [phone]        Today's date: 2019  Patient name: Louis Larson  : 1975  MRN: [id number]  Referring provider: Luciano Bronson DO  Dx:   Encounter Diagnosis     ICD-10-CM    1  Acute left-sided low back pain without sciatica M54 5    2  Chronic pain of right knee M25 561     G89 29    3  Difficulty walking R26 2    4  Contusion of right knee, subsequent encounter S80  01XD    5  Chronic left-sided low back pain without sciatica M54 5     G89 29      Subjective:  Cristy states her back and hips are feeling better  She is to call her doctor today and discuss her case      Objective: See treatment log below    Assessment: Tolerated treatment well  Patient exhibited good technique with therapeutic exercises and would benefit from continued PT    Plan: Continue per plan of care  Progress treatment as tolerated  Precautions:  Left Anterior Hip Pain / Right Anterior Medial Patellar Region Pain    Daily Treatment Log  Manual      MT, ROM 15' 30' 30' 30'    HEP        Exercise Log     Balance Board 30x 30x ea 30x 30x    Chair Squats 30x       P-Bar-GT-Forward, Backward,Side-Even & Dips        BOSU-Walk 5' 5' 5' 5'    Foam Pad SLR,Hip/KneeFl,Step Ups 30x 30x ea 30x 30x    Foam Beam 15x 12x ea 15x 15x    Monster Steps        T/G-Squats PF 30x 30x ea 30x 30x    W/P-Hip-Abd,Add,Flex,Ext 10#-30x 10# 30x ea 10#-30x 10#-30x    WP-Squats        Trimax-TKE        Waqnugk-FM-Wz 2x2' 2x2' 2x2' 2x2'    NK Table Exer        StandingBackExtCounter  30x  30x 30x    SeatedTrunkFlexionGreen  TBall  3x ea 3x     Bike        ME, PE 15' 15' 15' 15'            Modalities     MH&ES 20' 20' 20' 20'    US 10' NT  10'        PT Discharge  Today's date: 2019  Patient name: José Miguel Pantoja  : 1975  MRN: [id number]  Referring provider: James Hernandez DO  Dx:   Encounter Diagnosis     ICD-10-CM    1  Acute left-sided low back pain without sciatica M54 5    2  Chronic pain of right knee M25 561     G89 29    3  Difficulty walking R26 2    4  Contusion of right knee, subsequent encounter S80  01XD    5  Chronic left-sided low back pain without sciatica M54 5     G89 29      Assessment/Plan    Subjective    Objective     2019  Cristy Garcia has not returned for more treatment  Cristy Garcia did not attend today's appointment  I cannot provide you with a current progress report but I can provide you with information based on previous performance  José Miguel Pantoja is discharged at this time

## 2019-12-05 NOTE — PROGRESS NOTES
PT Discharge  Today's date: 2019  Patient name: Cornel Osler  : 1975  MRN: [id number]  Referring provider: Danae Beasley DO  Dx:   Encounter Diagnosis     ICD-10-CM    1  Acute left-sided low back pain without sciatica M54 5    2  Chronic pain of right knee M25 561     G89 29    3  Difficulty walking R26 2    4  Contusion of right knee, subsequent encounter S80  01XD    5  Chronic left-sided low back pain without sciatica M54 5     G89 29      Assessment/Plan    Subjective    Objective     2019  Poppy Jose has not returned for more treatment  Poppy Salamanca did not attend today's appointment  I cannot provide you with a current progress report but I can provide you with information based on previous performance  Cornel Osler is discharged at this time

## 2020-06-11 ENCOUNTER — OFFICE VISIT (OUTPATIENT)
Dept: FAMILY MEDICINE CLINIC | Facility: CLINIC | Age: 45
End: 2020-06-11
Payer: COMMERCIAL

## 2020-06-11 VITALS
WEIGHT: 269 LBS | TEMPERATURE: 98.6 F | BODY MASS INDEX: 47.66 KG/M2 | OXYGEN SATURATION: 97 % | HEIGHT: 63 IN | HEART RATE: 84 BPM

## 2020-06-11 DIAGNOSIS — Z87.81 HISTORY OF HIP FRACTURE: ICD-10-CM

## 2020-06-11 DIAGNOSIS — M25.552 LEFT HIP PAIN: ICD-10-CM

## 2020-06-11 DIAGNOSIS — G89.29 CHRONIC BILATERAL LOW BACK PAIN WITHOUT SCIATICA: ICD-10-CM

## 2020-06-11 DIAGNOSIS — E78.2 MIXED HYPERLIPIDEMIA: ICD-10-CM

## 2020-06-11 DIAGNOSIS — M25.562 CHRONIC PAIN OF BOTH KNEES: ICD-10-CM

## 2020-06-11 DIAGNOSIS — M79.7 FIBROMYALGIA: ICD-10-CM

## 2020-06-11 DIAGNOSIS — Z01.419 WELL WOMAN EXAM WITH ROUTINE GYNECOLOGICAL EXAM: ICD-10-CM

## 2020-06-11 DIAGNOSIS — Z12.39 SCREENING FOR BREAST CANCER: ICD-10-CM

## 2020-06-11 DIAGNOSIS — Z13.0 SCREENING FOR DEFICIENCY ANEMIA: ICD-10-CM

## 2020-06-11 DIAGNOSIS — E55.9 VITAMIN D DEFICIENCY: ICD-10-CM

## 2020-06-11 DIAGNOSIS — R73.03 PREDIABETES: ICD-10-CM

## 2020-06-11 DIAGNOSIS — M15.9 PRIMARY OSTEOARTHRITIS INVOLVING MULTIPLE JOINTS: ICD-10-CM

## 2020-06-11 DIAGNOSIS — Z00.00 ENCOUNTER FOR ANNUAL PHYSICAL EXAM: Primary | ICD-10-CM

## 2020-06-11 DIAGNOSIS — M54.50 CHRONIC BILATERAL LOW BACK PAIN WITHOUT SCIATICA: ICD-10-CM

## 2020-06-11 DIAGNOSIS — I10 ESSENTIAL HYPERTENSION: ICD-10-CM

## 2020-06-11 DIAGNOSIS — J45.30 MILD PERSISTENT ASTHMA WITHOUT COMPLICATION: ICD-10-CM

## 2020-06-11 DIAGNOSIS — G89.29 CHRONIC PAIN OF BOTH KNEES: ICD-10-CM

## 2020-06-11 DIAGNOSIS — Z13.29 SCREENING FOR THYROID DISORDER: ICD-10-CM

## 2020-06-11 DIAGNOSIS — Z12.4 SCREENING FOR CERVICAL CANCER: ICD-10-CM

## 2020-06-11 DIAGNOSIS — M25.561 CHRONIC PAIN OF BOTH KNEES: ICD-10-CM

## 2020-06-11 PROBLEM — M15.0 PRIMARY OSTEOARTHRITIS INVOLVING MULTIPLE JOINTS: Status: ACTIVE | Noted: 2020-06-11

## 2020-06-11 PROCEDURE — 4010F ACE/ARB THERAPY RXD/TAKEN: CPT | Performed by: PHYSICIAN ASSISTANT

## 2020-06-11 PROCEDURE — 99396 PREV VISIT EST AGE 40-64: CPT | Performed by: NURSE PRACTITIONER

## 2020-06-11 RX ORDER — ERGOCALCIFEROL 1.25 MG/1
50000 CAPSULE ORAL WEEKLY
Qty: 4 CAPSULE | Refills: 5 | Status: SHIPPED | OUTPATIENT
Start: 2020-06-11 | End: 2021-01-07 | Stop reason: SDUPTHER

## 2020-06-11 RX ORDER — CYCLOBENZAPRINE HCL 10 MG
TABLET ORAL
Qty: 60 TABLET | Refills: 3 | Status: SHIPPED | OUTPATIENT
Start: 2020-06-11 | End: 2021-06-25

## 2020-06-11 RX ORDER — FLUTICASONE FUROATE AND VILANTEROL 200; 25 UG/1; UG/1
1 POWDER RESPIRATORY (INHALATION) DAILY
Qty: 1 INHALER | Refills: 5 | Status: SHIPPED | OUTPATIENT
Start: 2020-06-11 | End: 2020-06-18

## 2020-06-11 RX ORDER — LISINOPRIL 10 MG/1
10 TABLET ORAL DAILY
Qty: 90 TABLET | Refills: 1 | Status: SHIPPED | OUTPATIENT
Start: 2020-06-11 | End: 2021-01-07 | Stop reason: SDUPTHER

## 2020-06-11 RX ORDER — ALBUTEROL SULFATE 90 UG/1
2 AEROSOL, METERED RESPIRATORY (INHALATION) EVERY 6 HOURS PRN
Qty: 1 INHALER | Refills: 5 | Status: SHIPPED | OUTPATIENT
Start: 2020-06-11 | End: 2021-07-25 | Stop reason: SDUPTHER

## 2020-06-11 RX ORDER — CELECOXIB 100 MG/1
100 CAPSULE ORAL 2 TIMES DAILY
Qty: 60 CAPSULE | Refills: 5 | Status: SHIPPED | OUTPATIENT
Start: 2020-06-11 | End: 2021-01-07 | Stop reason: SDUPTHER

## 2020-06-18 ENCOUNTER — TELEPHONE (OUTPATIENT)
Dept: FAMILY MEDICINE CLINIC | Facility: CLINIC | Age: 45
End: 2020-06-18

## 2020-06-18 DIAGNOSIS — J45.30 MILD PERSISTENT ASTHMA WITHOUT COMPLICATION: Primary | ICD-10-CM

## 2020-06-19 ENCOUNTER — APPOINTMENT (OUTPATIENT)
Dept: LAB | Facility: HOSPITAL | Age: 45
End: 2020-06-19
Payer: COMMERCIAL

## 2020-06-19 ENCOUNTER — HOSPITAL ENCOUNTER (OUTPATIENT)
Dept: RADIOLOGY | Facility: HOSPITAL | Age: 45
Discharge: HOME | End: 2020-06-19
Payer: COMMERCIAL

## 2020-06-19 DIAGNOSIS — Z13.29 SCREENING FOR THYROID DISORDER: ICD-10-CM

## 2020-06-19 DIAGNOSIS — E55.9 VITAMIN D DEFICIENCY: ICD-10-CM

## 2020-06-19 DIAGNOSIS — E78.2 MIXED HYPERLIPIDEMIA: ICD-10-CM

## 2020-06-19 DIAGNOSIS — M54.50 CHRONIC BILATERAL LOW BACK PAIN WITHOUT SCIATICA: ICD-10-CM

## 2020-06-19 DIAGNOSIS — G89.29 CHRONIC BILATERAL LOW BACK PAIN WITHOUT SCIATICA: ICD-10-CM

## 2020-06-19 DIAGNOSIS — M25.552 LEFT HIP PAIN: ICD-10-CM

## 2020-06-19 DIAGNOSIS — Z00.00 ENCOUNTER FOR ANNUAL PHYSICAL EXAM: ICD-10-CM

## 2020-06-19 DIAGNOSIS — R73.03 PREDIABETES: ICD-10-CM

## 2020-06-19 DIAGNOSIS — Z13.0 SCREENING FOR DEFICIENCY ANEMIA: ICD-10-CM

## 2020-06-19 DIAGNOSIS — Z87.81 HISTORY OF HIP FRACTURE: ICD-10-CM

## 2020-06-19 LAB
ALBUMIN SERPL BCP-MCNC: 3.4 G/DL (ref 3.5–5)
ALP SERPL-CCNC: 46 U/L (ref 46–116)
ALT SERPL W P-5'-P-CCNC: 43 U/L (ref 12–78)
ANION GAP SERPL CALCULATED.3IONS-SCNC: 12 MMOL/L (ref 4–13)
AST SERPL W P-5'-P-CCNC: 19 U/L (ref 5–45)
BASOPHILS # BLD AUTO: 0.08 THOUSANDS/ΜL (ref 0–0.1)
BASOPHILS NFR BLD AUTO: 1 % (ref 0–1)
BILIRUB SERPL-MCNC: 0.56 MG/DL (ref 0.2–1)
BUN SERPL-MCNC: 11 MG/DL (ref 5–25)
CALCIUM SERPL-MCNC: 8.5 MG/DL (ref 8.3–10.1)
CHLORIDE SERPL-SCNC: 103 MMOL/L (ref 100–108)
CHOLEST SERPL-MCNC: 157 MG/DL (ref 50–200)
CO2 SERPL-SCNC: 23 MMOL/L (ref 21–32)
CREAT SERPL-MCNC: 0.78 MG/DL (ref 0.6–1.3)
EOSINOPHIL # BLD AUTO: 0.4 THOUSAND/ΜL (ref 0–0.61)
EOSINOPHIL NFR BLD AUTO: 5 % (ref 0–6)
ERYTHROCYTE [DISTWIDTH] IN BLOOD BY AUTOMATED COUNT: 12.6 % (ref 11.6–15.1)
EST. AVERAGE GLUCOSE BLD GHB EST-MCNC: 151 MG/DL
GFR SERPL CREATININE-BSD FRML MDRD: 91 ML/MIN/1.73SQ M
GLUCOSE P FAST SERPL-MCNC: 190 MG/DL (ref 65–99)
HBA1C MFR BLD: 6.9 %
HCT VFR BLD AUTO: 44.9 % (ref 34.8–46.1)
HDLC SERPL-MCNC: 44 MG/DL
HGB BLD-MCNC: 15.1 G/DL (ref 11.5–15.4)
IMM GRANULOCYTES # BLD AUTO: 0.05 THOUSAND/UL (ref 0–0.2)
IMM GRANULOCYTES NFR BLD AUTO: 1 % (ref 0–2)
LDLC SERPL CALC-MCNC: 72 MG/DL (ref 0–100)
LYMPHOCYTES # BLD AUTO: 2.27 THOUSANDS/ΜL (ref 0.6–4.47)
LYMPHOCYTES NFR BLD AUTO: 27 % (ref 14–44)
MCH RBC QN AUTO: 29.7 PG (ref 26.8–34.3)
MCHC RBC AUTO-ENTMCNC: 33.6 G/DL (ref 31.4–37.4)
MCV RBC AUTO: 88 FL (ref 82–98)
MONOCYTES # BLD AUTO: 0.5 THOUSAND/ΜL (ref 0.17–1.22)
MONOCYTES NFR BLD AUTO: 6 % (ref 4–12)
NEUTROPHILS # BLD AUTO: 4.98 THOUSANDS/ΜL (ref 1.85–7.62)
NEUTS SEG NFR BLD AUTO: 60 % (ref 43–75)
NONHDLC SERPL-MCNC: 113 MG/DL
NRBC BLD AUTO-RTO: 0 /100 WBCS
PLATELET # BLD AUTO: 219 THOUSANDS/UL (ref 149–390)
PMV BLD AUTO: 9.9 FL (ref 8.9–12.7)
POTASSIUM SERPL-SCNC: 4.2 MMOL/L (ref 3.5–5.3)
PROT SERPL-MCNC: 7.2 G/DL (ref 6.4–8.2)
RBC # BLD AUTO: 5.09 MILLION/UL (ref 3.81–5.12)
SODIUM SERPL-SCNC: 138 MMOL/L (ref 136–145)
TRIGL SERPL-MCNC: 207 MG/DL
TSH SERPL DL<=0.05 MIU/L-ACNC: 1.17 UIU/ML (ref 0.36–3.74)
WBC # BLD AUTO: 8.28 THOUSAND/UL (ref 4.31–10.16)

## 2020-06-19 PROCEDURE — 82652 VIT D 1 25-DIHYDROXY: CPT

## 2020-06-19 PROCEDURE — 84443 ASSAY THYROID STIM HORMONE: CPT

## 2020-06-19 PROCEDURE — 73502 X-RAY EXAM HIP UNI 2-3 VIEWS: CPT

## 2020-06-19 PROCEDURE — 72110 X-RAY EXAM L-2 SPINE 4/>VWS: CPT

## 2020-06-19 PROCEDURE — 83036 HEMOGLOBIN GLYCOSYLATED A1C: CPT

## 2020-06-19 PROCEDURE — 36415 COLL VENOUS BLD VENIPUNCTURE: CPT

## 2020-06-19 PROCEDURE — 80053 COMPREHEN METABOLIC PANEL: CPT

## 2020-06-19 PROCEDURE — 85025 COMPLETE CBC W/AUTO DIFF WBC: CPT

## 2020-06-19 PROCEDURE — 80061 LIPID PANEL: CPT

## 2020-06-22 LAB — 1,25(OH)2D3 SERPL-MCNC: 30.8 PG/ML (ref 19.9–79.3)

## 2020-06-23 ENCOUNTER — EVALUATION (OUTPATIENT)
Dept: PHYSICAL THERAPY | Facility: CLINIC | Age: 45
End: 2020-06-23
Payer: COMMERCIAL

## 2020-06-23 ENCOUNTER — TELEPHONE (OUTPATIENT)
Dept: FAMILY MEDICINE CLINIC | Facility: CLINIC | Age: 45
End: 2020-06-23

## 2020-06-23 DIAGNOSIS — R26.2 DIFFICULTY WALKING: ICD-10-CM

## 2020-06-23 DIAGNOSIS — E11.65 TYPE 2 DIABETES MELLITUS WITH HYPERGLYCEMIA, WITHOUT LONG-TERM CURRENT USE OF INSULIN (HCC): Primary | ICD-10-CM

## 2020-06-23 DIAGNOSIS — M54.50 ACUTE LEFT-SIDED LOW BACK PAIN WITHOUT SCIATICA: Primary | ICD-10-CM

## 2020-06-23 DIAGNOSIS — M25.552 LEFT HIP PAIN: ICD-10-CM

## 2020-06-23 DIAGNOSIS — E78.2 MIXED HYPERLIPIDEMIA: ICD-10-CM

## 2020-06-23 PROCEDURE — 97162 PT EVAL MOD COMPLEX 30 MIN: CPT | Performed by: PHYSICAL THERAPIST

## 2020-06-23 PROCEDURE — 97140 MANUAL THERAPY 1/> REGIONS: CPT | Performed by: PHYSICAL THERAPIST

## 2020-06-23 NOTE — TELEPHONE ENCOUNTER
A1C elevated @ 6 9  Fasting glucose high @ 190 mg/dL  Plan:  Patient currently taking Metformin 1,000 mg BID; we will continue therapy and recheck labs in 6 months  Xray of hip indicates mild degenerative joint disease and Xray of lumbar spine indicates mild degenerative disc disease  No acute findings revealed  Plan: At patient's recent office visit, we had discussed possible evaluation by Orthopedics vs  Pain Management  Based on mild nature of degenerative disease, I think evaluation by Pain Management would be more beneficial for patient's symptoms

## 2020-06-24 ENCOUNTER — OFFICE VISIT (OUTPATIENT)
Dept: PHYSICAL THERAPY | Facility: CLINIC | Age: 45
End: 2020-06-24
Payer: COMMERCIAL

## 2020-06-24 DIAGNOSIS — G89.29 CHRONIC PAIN OF RIGHT KNEE: ICD-10-CM

## 2020-06-24 DIAGNOSIS — S80.01XD CONTUSION OF RIGHT KNEE, SUBSEQUENT ENCOUNTER: ICD-10-CM

## 2020-06-24 DIAGNOSIS — G89.29 CHRONIC LEFT-SIDED LOW BACK PAIN WITHOUT SCIATICA: ICD-10-CM

## 2020-06-24 DIAGNOSIS — M25.552 LEFT HIP PAIN: ICD-10-CM

## 2020-06-24 DIAGNOSIS — M54.50 ACUTE LEFT-SIDED LOW BACK PAIN WITHOUT SCIATICA: Primary | ICD-10-CM

## 2020-06-24 DIAGNOSIS — M54.50 CHRONIC LEFT-SIDED LOW BACK PAIN WITHOUT SCIATICA: ICD-10-CM

## 2020-06-24 DIAGNOSIS — M25.561 CHRONIC PAIN OF RIGHT KNEE: ICD-10-CM

## 2020-06-24 DIAGNOSIS — R26.2 DIFFICULTY WALKING: ICD-10-CM

## 2020-06-24 PROCEDURE — 97014 ELECTRIC STIMULATION THERAPY: CPT

## 2020-06-24 PROCEDURE — 97112 NEUROMUSCULAR REEDUCATION: CPT

## 2020-06-24 PROCEDURE — 97140 MANUAL THERAPY 1/> REGIONS: CPT

## 2020-06-29 ENCOUNTER — APPOINTMENT (OUTPATIENT)
Dept: PHYSICAL THERAPY | Facility: CLINIC | Age: 45
End: 2020-06-29
Payer: COMMERCIAL

## 2020-06-30 ENCOUNTER — OFFICE VISIT (OUTPATIENT)
Dept: PHYSICAL THERAPY | Facility: CLINIC | Age: 45
End: 2020-06-30
Payer: COMMERCIAL

## 2020-06-30 DIAGNOSIS — M25.552 LEFT HIP PAIN: ICD-10-CM

## 2020-06-30 DIAGNOSIS — R26.2 DIFFICULTY WALKING: ICD-10-CM

## 2020-06-30 DIAGNOSIS — M54.50 ACUTE LEFT-SIDED LOW BACK PAIN WITHOUT SCIATICA: Primary | ICD-10-CM

## 2020-06-30 PROCEDURE — 97140 MANUAL THERAPY 1/> REGIONS: CPT | Performed by: PHYSICAL THERAPIST

## 2020-06-30 PROCEDURE — 97014 ELECTRIC STIMULATION THERAPY: CPT | Performed by: PHYSICAL THERAPIST

## 2020-06-30 PROCEDURE — 97110 THERAPEUTIC EXERCISES: CPT | Performed by: PHYSICAL THERAPIST

## 2020-06-30 PROCEDURE — 97112 NEUROMUSCULAR REEDUCATION: CPT | Performed by: PHYSICAL THERAPIST

## 2020-07-02 ENCOUNTER — OFFICE VISIT (OUTPATIENT)
Dept: PHYSICAL THERAPY | Facility: CLINIC | Age: 45
End: 2020-07-02
Payer: COMMERCIAL

## 2020-07-02 DIAGNOSIS — G89.29 CHRONIC PAIN OF RIGHT KNEE: ICD-10-CM

## 2020-07-02 DIAGNOSIS — M25.561 CHRONIC PAIN OF RIGHT KNEE: ICD-10-CM

## 2020-07-02 DIAGNOSIS — G89.29 CHRONIC LEFT-SIDED LOW BACK PAIN WITHOUT SCIATICA: ICD-10-CM

## 2020-07-02 DIAGNOSIS — M54.50 ACUTE LEFT-SIDED LOW BACK PAIN WITHOUT SCIATICA: Primary | ICD-10-CM

## 2020-07-02 DIAGNOSIS — M54.50 CHRONIC LEFT-SIDED LOW BACK PAIN WITHOUT SCIATICA: ICD-10-CM

## 2020-07-02 DIAGNOSIS — R26.2 DIFFICULTY WALKING: ICD-10-CM

## 2020-07-02 DIAGNOSIS — M25.552 LEFT HIP PAIN: ICD-10-CM

## 2020-07-02 DIAGNOSIS — S80.01XD CONTUSION OF RIGHT KNEE, SUBSEQUENT ENCOUNTER: ICD-10-CM

## 2020-07-02 PROCEDURE — 97140 MANUAL THERAPY 1/> REGIONS: CPT | Performed by: PHYSICAL THERAPIST

## 2020-07-02 PROCEDURE — 97112 NEUROMUSCULAR REEDUCATION: CPT | Performed by: PHYSICAL THERAPIST

## 2020-07-02 PROCEDURE — 97110 THERAPEUTIC EXERCISES: CPT | Performed by: PHYSICAL THERAPIST

## 2020-07-02 NOTE — PROGRESS NOTES
Today's date: 2020  Patient name: Danilo Mckeon  : 1975  MRN: [id number]  Referring provider: CHRIS Gonzalez  Dx:   Encounter Diagnosis     ICD-10-CM    1  Acute left-sided low back pain without sciatica M54 5    2  Left hip pain M25 552    3  Difficulty walking R26 2    4  Chronic pain of right knee M25 561     G89 29    5  Contusion of right knee, subsequent encounter S80  01XD    6  Chronic left-sided low back pain without sciatica M54 5     G89 29      Subjective:  Cristy states her lower left lumbar region hurts the most   My right lower lumbar also hurts  Objective: See treatment log below  Cristy continues to be advised to follow her home exercise program as tolerated  When Cristy is feeling good she follows her rehab exercises in the gym and on other days she follows her home exercise program at home as tolerated  In the future we plan on having Cristy stay at home and follow her home exercise program for four to six weeks and than assess for either more Rehab treatments or discharge from Rehab care  Assessment: Tolerated treatment well  Patient exhibited good technique with therapeutic exercises and would benefit from continued PT  Cristy's goals are to continue to improve with her rehab program and improve with functional mobility, speed, repetition and decreased c/o pain with her gym and home exercise program   Cristy's final goal for her rehab is to be discharged from Rehab care after obtaining her full functional rehab potential     Plan: Continue per plan of care  Progress treatment as tolerated         Precautions:  Low Back Pain and Left Hip Pain    All treatments below will be provided with a focus on strengthening, flexibility, ROM, postural,   endurance and any possible swelling and pain which may be present without ignoring   neural issues involving balance, coordination and proprioception which is also important   and necessary to provide full functional mobility and quality care  Daily Treatment Log  Manual  6/23 6/24 6/30 7/2    MT, ROM 15' 30' 15' STM to lumbar musculature, Grade III mobilization 15'    HEP 15'       Exercise Log 6/23 6/24 6/30 7/2    Balance Board        Chair Squats   2x10     BOSU-Walk        Foam Pad SLR,Hip/KneeFl,Step Ups        Foam Beam        P-Bar-GT-Forward, Backward,Side-Even & Dips  Back Ext  3x10 Back Ext  3x10     Monster Steps        Fitter-Slalom        T/G-Squats,PF   30x     W/P-PNF,IR,ER,PU,PS:Top,Mid,Bot   Standing rows, lat pull down   5# 3x10 ea     NuStep   5' L3 5' L3    Meena-BP,Lats,PD,Row        Hbuqiws-XP-Le  2x2'  2x2'    NK Table        TM        UBC        Bike        Stepper        MYRNA Grewal 15' 15'      Bridges    2x10     Supine march   With ab brace, 2x20     Transverse abdominus activation (ab brace)   Supine 5" x10     Modalities 6/23 6/24 6/30 7/2    MH / PE &ES 20' 20' 20' 20'

## 2020-07-07 ENCOUNTER — OFFICE VISIT (OUTPATIENT)
Dept: PHYSICAL THERAPY | Facility: CLINIC | Age: 45
End: 2020-07-07
Payer: COMMERCIAL

## 2020-07-07 DIAGNOSIS — M54.50 ACUTE LEFT-SIDED LOW BACK PAIN WITHOUT SCIATICA: Primary | ICD-10-CM

## 2020-07-07 DIAGNOSIS — S80.01XD CONTUSION OF RIGHT KNEE, SUBSEQUENT ENCOUNTER: ICD-10-CM

## 2020-07-07 DIAGNOSIS — R26.2 DIFFICULTY WALKING: ICD-10-CM

## 2020-07-07 DIAGNOSIS — M25.552 LEFT HIP PAIN: ICD-10-CM

## 2020-07-07 DIAGNOSIS — M25.561 CHRONIC PAIN OF RIGHT KNEE: ICD-10-CM

## 2020-07-07 DIAGNOSIS — M54.50 CHRONIC LEFT-SIDED LOW BACK PAIN WITHOUT SCIATICA: ICD-10-CM

## 2020-07-07 DIAGNOSIS — G89.29 CHRONIC PAIN OF RIGHT KNEE: ICD-10-CM

## 2020-07-07 DIAGNOSIS — G89.29 CHRONIC LEFT-SIDED LOW BACK PAIN WITHOUT SCIATICA: ICD-10-CM

## 2020-07-07 PROCEDURE — 97110 THERAPEUTIC EXERCISES: CPT | Performed by: PHYSICAL THERAPIST

## 2020-07-07 PROCEDURE — 97140 MANUAL THERAPY 1/> REGIONS: CPT | Performed by: PHYSICAL THERAPIST

## 2020-07-07 PROCEDURE — 97112 NEUROMUSCULAR REEDUCATION: CPT | Performed by: PHYSICAL THERAPIST

## 2020-07-07 NOTE — PROGRESS NOTES
Today's date: 2020  Patient name: Fred Downs  : 1975  MRN: [id number]  Referring provider: CHRIS Linda  Dx:   Encounter Diagnosis     ICD-10-CM    1  Acute left-sided low back pain without sciatica M54 5    2  Left hip pain M25 552    3  Difficulty walking R26 2    4  Chronic pain of right knee M25 561     G89 29    5  Contusion of right knee, subsequent encounter S80  01XD    6  Chronic left-sided low back pain without sciatica M54 5     G89 29      Subjective:  Cristy states her low back is sore today but feeling better  Objective: See treatment log below  Cristy continues to be advised to follow her home exercise program as tolerated  When Cristy is feeling good she follows her rehab exercises in the gym and on other days she follows her home exercise program at home as tolerated  In the future we plan on having Cristy stay at home and follow her home exercise program for four to six weeks and than assess for either more Rehab treatments or discharge from Rehab care  Assessment: Tolerated treatment well  Patient exhibited good technique with therapeutic exercises and would benefit from continued PT  Cristy's goals are to continue to improve with her rehab program and improve with functional mobility, speed, repetition and decreased c/o pain with her gym and home exercise program   Cristy's final goal for her rehab is to be discharged from Rehab care after obtaining her full functional rehab potential     Plan: Continue per plan of care  Progress treatment as tolerated  Precautions:  Low Back Pain and Left Hip Pain    All treatments below will be provided with a focus on strengthening, flexibility, ROM, postural,   endurance and any possible swelling and pain which may be present without ignoring   neural issues involving balance, coordination and proprioception which is also important   and necessary to provide full functional mobility and quality care        Daily Treatment Log  Manual  6/23 6/24 6/30 7/2 7/7   MT, ROM 15' 30' 15' STM to lumbar musculature, Grade III mobilization 15' 15'   HEP 15'       Exercise Log 6/23 6/24 6/30 7/2 7/7   Balance Board        Chair Squats   2x10     BOSU-Walk        Foam Pad SLR,Hip/KneeFl,Step Ups        Foam Beam        P-Bar-GT-Forward, Backward,Side-Even & Dips  Back Ext  3x10 Back Ext  3x10     Monster Steps        Fitter-Slalom        T/G-Squats,PF   30x     W/P-PNF,IR,ER,PU,PS:Top,Mid,Bot   Standing rows, lat pull down   5# 3x10 ea     NuStep   5' L3 5' L3    Meena-BP,Lats,PD,Row        Vpzbqxm-ZN-Oc  2x2'  2x2' 2x2'   NK Table        TM        UBC        Bike        Stepper        MYRNA Grewal 15' 15'   15'   Bridges    2x10     Supine march   With ab brace, 2x20     Transverse abdominus activation (ab brace)   Supine 5" x10     Modalities 6/23 6/24 6/30 7/2 7/7   MH / PE &ES 20' 20' 20' 20' 20'   US

## 2020-07-09 ENCOUNTER — OFFICE VISIT (OUTPATIENT)
Dept: PHYSICAL THERAPY | Facility: CLINIC | Age: 45
End: 2020-07-09
Payer: COMMERCIAL

## 2020-07-09 DIAGNOSIS — M54.50 ACUTE LEFT-SIDED LOW BACK PAIN WITHOUT SCIATICA: Primary | ICD-10-CM

## 2020-07-09 DIAGNOSIS — M25.561 CHRONIC PAIN OF RIGHT KNEE: ICD-10-CM

## 2020-07-09 DIAGNOSIS — S80.01XD CONTUSION OF RIGHT KNEE, SUBSEQUENT ENCOUNTER: ICD-10-CM

## 2020-07-09 DIAGNOSIS — G89.29 CHRONIC LEFT-SIDED LOW BACK PAIN WITHOUT SCIATICA: ICD-10-CM

## 2020-07-09 DIAGNOSIS — R26.2 DIFFICULTY WALKING: ICD-10-CM

## 2020-07-09 DIAGNOSIS — M54.50 CHRONIC LEFT-SIDED LOW BACK PAIN WITHOUT SCIATICA: ICD-10-CM

## 2020-07-09 DIAGNOSIS — M25.552 LEFT HIP PAIN: ICD-10-CM

## 2020-07-09 DIAGNOSIS — G89.29 CHRONIC PAIN OF RIGHT KNEE: ICD-10-CM

## 2020-07-09 PROCEDURE — 97112 NEUROMUSCULAR REEDUCATION: CPT | Performed by: PHYSICAL THERAPIST

## 2020-07-09 PROCEDURE — 97110 THERAPEUTIC EXERCISES: CPT | Performed by: PHYSICAL THERAPIST

## 2020-07-09 PROCEDURE — 97140 MANUAL THERAPY 1/> REGIONS: CPT | Performed by: PHYSICAL THERAPIST

## 2020-07-09 NOTE — PROGRESS NOTES
Today's date: 2020  Patient name: Crio Matthews  : 1975  MRN: [id number]  Referring provider: CHRIS Richard  Dx:   Encounter Diagnosis     ICD-10-CM    1  Acute left-sided low back pain without sciatica M54 5    2  Left hip pain M25 552    3  Difficulty walking R26 2    4  Chronic pain of right knee M25 561     G89 29    5  Contusion of right knee, subsequent encounter S80  01XD    6  Chronic left-sided low back pain without sciatica M54 5     G89 29      Subjective:  Cristy states her back is feeling a little better  She can stand longer and walk further  Objective: See treatment log below  Cristy continues to be advised to follow her home exercise program as tolerated  When Cristy is feeling good she follows her rehab exercises in the gym and on other days she follows her home exercise program at home as tolerated  In the future we plan on having Cristy stay at home and follow her home exercise program for four to six weeks and than assess for either more Rehab treatments or discharge from Rehab care  Assessment: Tolerated treatment well  Patient exhibited good technique with therapeutic exercises and would benefit from continued PT  Cristy's goals are to continue to improve with her rehab program and improve with functional mobility, speed, repetition and decreased c/o pain with her gym and home exercise program   Cristy's final goal for her rehab is to be discharged from Rehab care after obtaining her full functional rehab potential     Plan: Continue per plan of care  Progress treatment as tolerated         Precautions:  Low Back Pain and Left Hip Pain    All treatments below will be provided with a focus on strengthening, flexibility, ROM, postural,   endurance and any possible swelling and pain which may be present without ignoring   neural issues involving balance, coordination and proprioception which is also important   and necessary to provide full functional mobility and quality care       Daily Treatment Log  Manual  7/9 7/7   MT, ROM 45'    15'   HEP        Exercise Log 7/9 7/7   Balance Board        Chair Squats        BOSU-Walk        Foam Pad SLR,Hip/KneeFl,Step Ups        Foam Beam        P-Bar-GT-Forward, Backward,Side-Even & Dips        Monster Steps        Fitter-Slalom        T/G-Squats,PF        W/P-PNF,IR,ER,PU,PS:Top,Mid,Bot        NuStep        Meena-BP,Lats,PD,Row        Wfxoouo-DY-Fw     2x2'   NK Table        TM        UBC        Bike        Stepper        ME, PE 15' 15'   15'   Bridges    2x10     Supine march   With ab brace, 2x20     Transverse abdominus activation (ab brace)   Supine 5" x10     Modalities 7/9 6/24 6/30 7/2 7/7   MH / PE & ES 20' 20' 20' 20' 20'   US

## 2020-07-10 ENCOUNTER — TELEPHONE (OUTPATIENT)
Dept: PHYSICAL THERAPY | Facility: OTHER | Age: 45
End: 2020-07-10

## 2020-07-10 NOTE — TELEPHONE ENCOUNTER
Called patient regarding the online request form she filled out  Voice message left for patient to call back  Phone number and hours of business provided

## 2020-07-14 ENCOUNTER — OFFICE VISIT (OUTPATIENT)
Dept: PHYSICAL THERAPY | Facility: CLINIC | Age: 45
End: 2020-07-14
Payer: COMMERCIAL

## 2020-07-14 ENCOUNTER — NURSE TRIAGE (OUTPATIENT)
Dept: PHYSICAL THERAPY | Facility: OTHER | Age: 45
End: 2020-07-14

## 2020-07-14 DIAGNOSIS — M25.561 CHRONIC PAIN OF RIGHT KNEE: ICD-10-CM

## 2020-07-14 DIAGNOSIS — G89.29 CHRONIC LEFT-SIDED LOW BACK PAIN WITHOUT SCIATICA: ICD-10-CM

## 2020-07-14 DIAGNOSIS — M54.50 CHRONIC MIDLINE LOW BACK PAIN WITHOUT SCIATICA: Primary | ICD-10-CM

## 2020-07-14 DIAGNOSIS — G89.29 CHRONIC MIDLINE LOW BACK PAIN WITHOUT SCIATICA: Primary | ICD-10-CM

## 2020-07-14 DIAGNOSIS — M54.50 CHRONIC LEFT-SIDED LOW BACK PAIN WITHOUT SCIATICA: ICD-10-CM

## 2020-07-14 DIAGNOSIS — G89.29 CHRONIC PAIN OF RIGHT KNEE: ICD-10-CM

## 2020-07-14 DIAGNOSIS — M25.552 LEFT HIP PAIN: ICD-10-CM

## 2020-07-14 DIAGNOSIS — M54.50 ACUTE LEFT-SIDED LOW BACK PAIN WITHOUT SCIATICA: Primary | ICD-10-CM

## 2020-07-14 DIAGNOSIS — R26.2 DIFFICULTY WALKING: ICD-10-CM

## 2020-07-14 DIAGNOSIS — S80.01XD CONTUSION OF RIGHT KNEE, SUBSEQUENT ENCOUNTER: ICD-10-CM

## 2020-07-14 PROCEDURE — 97110 THERAPEUTIC EXERCISES: CPT | Performed by: PHYSICAL THERAPIST

## 2020-07-14 PROCEDURE — 97140 MANUAL THERAPY 1/> REGIONS: CPT | Performed by: PHYSICAL THERAPIST

## 2020-07-14 PROCEDURE — 97112 NEUROMUSCULAR REEDUCATION: CPT | Performed by: PHYSICAL THERAPIST

## 2020-07-14 NOTE — PROGRESS NOTES
Today's date: 2020  Patient name: Magalys Gaffney  : 1975  MRN: [id number]  Referring provider: CHRIS Ramirez  Dx:   Encounter Diagnosis     ICD-10-CM    1  Acute left-sided low back pain without sciatica M54 5    2  Left hip pain M25 552    3  Difficulty walking R26 2    4  Chronic pain of right knee M25 561     G89 29    5  Contusion of right knee, subsequent encounter S80  01XD    6  Chronic left-sided low back pain without sciatica M54 5     G89 29      Subjective:  Cristy states her low back and left side / SIJ region is flared up and sore / tender today  Objective: See treatment log below  Cristy continues to be advised to follow her home exercise program as tolerated  When Cristy is feeling good she follows her rehab exercises in the gym and on other days she follows her home exercise program at home as tolerated  In the future we plan on having Cristy stay at home and follow her home exercise program for four to six weeks and than assess for either more Rehab treatments or discharge from Rehab care  Assessment: Tolerated treatment well  Patient exhibited good technique with therapeutic exercises and would benefit from continued PT  Cristy's goals are to continue to improve with her rehab program and improve with functional mobility, speed, repetition and decreased c/o pain with her gym and home exercise program   Cristy's final goal for her rehab is to be discharged from Rehab care after obtaining her full functional rehab potential     Plan: Continue per plan of care  Progress treatment as tolerated         Precautions:  Low Back Pain and Left Hip Pain    All treatments below will be provided with a focus on strengthening, flexibility, ROM, postural,   endurance and any possible swelling and pain which may be present without ignoring   neural issues involving balance, coordination and proprioception which is also important   and necessary to provide full functional mobility and quality care        Daily Treatment Log  Manual  7/9 7/14      MT, ROM 45' 45'      HEP        Exercise Log 7/9 7/14      Balance Board        Chair Squats        BOSU-Walk        Foam Pad SLR,Hip/KneeFl,Step Ups        Foam Beam        P-Bar-GT-Forward, Backward,Side-Even & Dips        Monster Steps        Fitter-Slalom        T/G-Squats,PF        W/P-PNF,IR,ER,PU,PS:Top,Mid,Bot        NuStep        Meena-BP,Lats,PD,Row        [id number]-UK-[id number]        NK Table        TM        [facility]        Bike        Stepper        ME, PE 15' 15'      [facility]         Supine march        Transverse abdominus activation (ab brace)        Modalities 7/9 7/14      MH / PE / ES 21' 20'      US
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
## 2020-07-16 ENCOUNTER — OFFICE VISIT (OUTPATIENT)
Dept: PHYSICAL THERAPY | Facility: CLINIC | Age: 45
End: 2020-07-16
Payer: COMMERCIAL

## 2020-07-16 ENCOUNTER — APPOINTMENT (OUTPATIENT)
Dept: LAB | Facility: HOSPITAL | Age: 45
End: 2020-07-16
Payer: COMMERCIAL

## 2020-07-16 DIAGNOSIS — S80.01XD CONTUSION OF RIGHT KNEE, SUBSEQUENT ENCOUNTER: ICD-10-CM

## 2020-07-16 DIAGNOSIS — G89.29 CHRONIC PAIN OF RIGHT KNEE: ICD-10-CM

## 2020-07-16 DIAGNOSIS — M54.50 ACUTE LEFT-SIDED LOW BACK PAIN WITHOUT SCIATICA: ICD-10-CM

## 2020-07-16 DIAGNOSIS — M54.50 CHRONIC LEFT-SIDED LOW BACK PAIN WITHOUT SCIATICA: ICD-10-CM

## 2020-07-16 DIAGNOSIS — E78.2 MIXED HYPERLIPIDEMIA: ICD-10-CM

## 2020-07-16 DIAGNOSIS — G89.29 CHRONIC MIDLINE LOW BACK PAIN WITHOUT SCIATICA: Primary | ICD-10-CM

## 2020-07-16 DIAGNOSIS — G89.29 CHRONIC LEFT-SIDED LOW BACK PAIN WITHOUT SCIATICA: ICD-10-CM

## 2020-07-16 DIAGNOSIS — E11.65 TYPE 2 DIABETES MELLITUS WITH HYPERGLYCEMIA, WITHOUT LONG-TERM CURRENT USE OF INSULIN (HCC): ICD-10-CM

## 2020-07-16 DIAGNOSIS — M25.552 LEFT HIP PAIN: ICD-10-CM

## 2020-07-16 DIAGNOSIS — M25.561 CHRONIC PAIN OF RIGHT KNEE: ICD-10-CM

## 2020-07-16 DIAGNOSIS — M54.50 CHRONIC MIDLINE LOW BACK PAIN WITHOUT SCIATICA: Primary | ICD-10-CM

## 2020-07-16 LAB
ALBUMIN SERPL BCP-MCNC: 3.7 G/DL (ref 3.5–5)
ALP SERPL-CCNC: 42 U/L (ref 46–116)
ALT SERPL W P-5'-P-CCNC: 58 U/L (ref 12–78)
ANION GAP SERPL CALCULATED.3IONS-SCNC: 12 MMOL/L (ref 4–13)
AST SERPL W P-5'-P-CCNC: 23 U/L (ref 5–45)
BILIRUB SERPL-MCNC: 0.66 MG/DL (ref 0.2–1)
BUN SERPL-MCNC: 14 MG/DL (ref 5–25)
CALCIUM SERPL-MCNC: 8.9 MG/DL (ref 8.3–10.1)
CHLORIDE SERPL-SCNC: 103 MMOL/L (ref 100–108)
CHOLEST SERPL-MCNC: 191 MG/DL (ref 50–200)
CO2 SERPL-SCNC: 23 MMOL/L (ref 21–32)
CREAT SERPL-MCNC: 0.65 MG/DL (ref 0.6–1.3)
EST. AVERAGE GLUCOSE BLD GHB EST-MCNC: 154 MG/DL
GFR SERPL CREATININE-BSD FRML MDRD: 107 ML/MIN/1.73SQ M
GLUCOSE P FAST SERPL-MCNC: 191 MG/DL (ref 65–99)
HBA1C MFR BLD: 7 %
HDLC SERPL-MCNC: 43 MG/DL
LDLC SERPL CALC-MCNC: 99 MG/DL (ref 0–100)
NONHDLC SERPL-MCNC: 148 MG/DL
POTASSIUM SERPL-SCNC: 3.9 MMOL/L (ref 3.5–5.3)
PROT SERPL-MCNC: 7.3 G/DL (ref 6.4–8.2)
SODIUM SERPL-SCNC: 138 MMOL/L (ref 136–145)
TRIGL SERPL-MCNC: 245 MG/DL

## 2020-07-16 PROCEDURE — 83036 HEMOGLOBIN GLYCOSYLATED A1C: CPT

## 2020-07-16 PROCEDURE — 80061 LIPID PANEL: CPT

## 2020-07-16 PROCEDURE — 97140 MANUAL THERAPY 1/> REGIONS: CPT | Performed by: PHYSICAL THERAPIST

## 2020-07-16 PROCEDURE — 3051F HG A1C>EQUAL 7.0%<8.0%: CPT | Performed by: PHYSICIAN ASSISTANT

## 2020-07-16 PROCEDURE — 80053 COMPREHEN METABOLIC PANEL: CPT

## 2020-07-16 PROCEDURE — 36415 COLL VENOUS BLD VENIPUNCTURE: CPT

## 2020-07-16 PROCEDURE — 97110 THERAPEUTIC EXERCISES: CPT | Performed by: PHYSICAL THERAPIST

## 2020-07-16 PROCEDURE — 97112 NEUROMUSCULAR REEDUCATION: CPT | Performed by: PHYSICAL THERAPIST

## 2020-07-16 NOTE — PROGRESS NOTES
Today's date: 2020  Patient name: Myra Whitt  : 1975  MRN: [id number]  Referring provider: CHRIS Harris  Dx:   Encounter Diagnosis     ICD-10-CM    1  Chronic midline low back pain without sciatica M54 5     G89 29    2  Acute left-sided low back pain without sciatica M54 5    3  Left hip pain M25 552    4  Chronic pain of right knee M25 561     G89 29    5  Contusion of right knee, subsequent encounter S80  01XD    6  Chronic left-sided low back pain without sciatica M54 5     G89 29      Subjective:  Cristy states her back is slowly feeling better  She still has some bad days  Objective: See treatment log below  Cristy continues to be advised to follow her home exercise program as tolerated  When Cristy is feeling good she follows her rehab exercises in the gym and on other days she follows her home exercise program at home as tolerated  In the future we plan on having Cristy stay at home and follow her home exercise program for four to six weeks and than assess for either more Rehab treatments or discharge from Rehab care  Assessment: Tolerated treatment well  Patient exhibited good technique with therapeutic exercises and would benefit from continued PT  Cristy's goals are to continue to improve with her rehab program and improve with functional mobility, speed, repetition and decreased c/o pain with her gym and home exercise program   Cristy's final goal for her rehab is to be discharged from Rehab care after obtaining her full functional rehab potential     Plan: Continue per plan of care  Progress treatment as tolerated         Precautions:  Low Back Pain and Left Hip Pain    All treatments below will be provided with a focus on strengthening, flexibility, ROM, postural,   endurance and any possible swelling and pain which may be present without ignoring   neural issues involving balance, coordination and proprioception which is also important   and necessary to provide full functional mobility and quality care        Daily Treatment Log  Manual  7/9 7/14 7/16     MT, ROM 45' 45' 45'     HEP        Exercise Log 7/9 7/14 7/16     Balance Board        Chair Squats        BOSU-Walk        Foam Pad SLR,Hip/KneeFl,Step Ups        Foam Beam        P-Bar-GT-Forward, Backward,Side-Even & Dips        Monster Steps        Fitter-Slalom        T/G-Squats,PF        W/P-PNF,IR,ER,PU,PS:Top,Mid,Bot        NuStep        Meena-BP,Lats,PD,Row        [id number]        NK Table        TM        [facility]        Bike        Stepper        ME, PE 15' 15' 15'     [facility]         Supine march        Transverse abdominus activation (ab brace)        Modalities 7/9 7/14 7/16     MH / PE / ES 21' 20' 20'     US

## 2020-07-21 ENCOUNTER — OFFICE VISIT (OUTPATIENT)
Dept: PHYSICAL THERAPY | Facility: CLINIC | Age: 45
End: 2020-07-21
Payer: COMMERCIAL

## 2020-07-21 ENCOUNTER — TELEPHONE (OUTPATIENT)
Dept: BEHAVIORAL/MENTAL HEALTH CLINIC | Facility: CLINIC | Age: 45
End: 2020-07-21

## 2020-07-21 ENCOUNTER — OFFICE VISIT (OUTPATIENT)
Dept: FAMILY MEDICINE CLINIC | Facility: CLINIC | Age: 45
End: 2020-07-21
Payer: COMMERCIAL

## 2020-07-21 VITALS — TEMPERATURE: 98.7 F | OXYGEN SATURATION: 98 % | BODY MASS INDEX: 47.12 KG/M2 | WEIGHT: 266 LBS

## 2020-07-21 DIAGNOSIS — M54.50 CHRONIC MIDLINE LOW BACK PAIN WITHOUT SCIATICA: Primary | ICD-10-CM

## 2020-07-21 DIAGNOSIS — Z12.31 BREAST CANCER SCREENING BY MAMMOGRAM: ICD-10-CM

## 2020-07-21 DIAGNOSIS — M25.552 LEFT HIP PAIN: ICD-10-CM

## 2020-07-21 DIAGNOSIS — M54.50 CHRONIC LEFT-SIDED LOW BACK PAIN WITHOUT SCIATICA: ICD-10-CM

## 2020-07-21 DIAGNOSIS — M54.50 CHRONIC BILATERAL LOW BACK PAIN WITHOUT SCIATICA: ICD-10-CM

## 2020-07-21 DIAGNOSIS — M51.36 DDD (DEGENERATIVE DISC DISEASE), LUMBAR: Primary | ICD-10-CM

## 2020-07-21 DIAGNOSIS — G89.29 CHRONIC PAIN OF RIGHT KNEE: ICD-10-CM

## 2020-07-21 DIAGNOSIS — M25.561 CHRONIC PAIN OF RIGHT KNEE: ICD-10-CM

## 2020-07-21 DIAGNOSIS — M54.32 LEFT SCIATIC NERVE PAIN: ICD-10-CM

## 2020-07-21 DIAGNOSIS — G89.29 CHRONIC BILATERAL LOW BACK PAIN WITHOUT SCIATICA: ICD-10-CM

## 2020-07-21 DIAGNOSIS — F33.9 MAJOR DEPRESSION, RECURRENT, CHRONIC (HCC): ICD-10-CM

## 2020-07-21 DIAGNOSIS — G89.29 CHRONIC MIDLINE LOW BACK PAIN WITHOUT SCIATICA: Primary | ICD-10-CM

## 2020-07-21 DIAGNOSIS — E11.65 TYPE 2 DIABETES MELLITUS WITH HYPERGLYCEMIA, WITHOUT LONG-TERM CURRENT USE OF INSULIN (HCC): ICD-10-CM

## 2020-07-21 DIAGNOSIS — F32.9 REACTIVE DEPRESSION: ICD-10-CM

## 2020-07-21 DIAGNOSIS — S80.01XD CONTUSION OF RIGHT KNEE, SUBSEQUENT ENCOUNTER: ICD-10-CM

## 2020-07-21 DIAGNOSIS — M16.0 PRIMARY OSTEOARTHRITIS OF BOTH HIPS: ICD-10-CM

## 2020-07-21 DIAGNOSIS — R26.2 DIFFICULTY WALKING: ICD-10-CM

## 2020-07-21 DIAGNOSIS — G89.29 CHRONIC LEFT-SIDED LOW BACK PAIN WITHOUT SCIATICA: ICD-10-CM

## 2020-07-21 DIAGNOSIS — M54.50 ACUTE LEFT-SIDED LOW BACK PAIN WITHOUT SCIATICA: ICD-10-CM

## 2020-07-21 PROCEDURE — 3080F DIAST BP >= 90 MM HG: CPT | Performed by: NURSE PRACTITIONER

## 2020-07-21 PROCEDURE — 97112 NEUROMUSCULAR REEDUCATION: CPT | Performed by: PHYSICAL THERAPIST

## 2020-07-21 PROCEDURE — 97110 THERAPEUTIC EXERCISES: CPT | Performed by: PHYSICAL THERAPIST

## 2020-07-21 PROCEDURE — 99214 OFFICE O/P EST MOD 30 MIN: CPT | Performed by: NURSE PRACTITIONER

## 2020-07-21 PROCEDURE — 3051F HG A1C>EQUAL 7.0%<8.0%: CPT | Performed by: NURSE PRACTITIONER

## 2020-07-21 PROCEDURE — 3075F SYST BP GE 130 - 139MM HG: CPT | Performed by: NURSE PRACTITIONER

## 2020-07-21 PROCEDURE — 97140 MANUAL THERAPY 1/> REGIONS: CPT | Performed by: PHYSICAL THERAPIST

## 2020-07-21 PROCEDURE — 1036F TOBACCO NON-USER: CPT | Performed by: NURSE PRACTITIONER

## 2020-07-21 RX ORDER — SERTRALINE HYDROCHLORIDE 25 MG/1
25 TABLET, FILM COATED ORAL
Qty: 30 TABLET | Refills: 0 | Status: SHIPPED | OUTPATIENT
Start: 2020-07-21 | End: 2020-08-24

## 2020-07-21 RX ORDER — LANCETS
EACH MISCELLANEOUS
Qty: 100 EACH | Refills: 5 | Status: SHIPPED | OUTPATIENT
Start: 2020-07-21 | End: 2021-07-25 | Stop reason: SDUPTHER

## 2020-07-21 RX ORDER — GLIPIZIDE 5 MG/1
5 TABLET ORAL
Qty: 30 TABLET | Refills: 5 | Status: SHIPPED | OUTPATIENT
Start: 2020-07-21 | End: 2021-01-07 | Stop reason: SDUPTHER
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
## 2020-07-21 NOTE — PATIENT INSTRUCTIONS
Wellness Visit for Adults   WHAT YOU NEED TO KNOW:   What is a wellness visit? A wellness visit is when you see your healthcare provider to get screened for health problems  You can also get advice on how to stay healthy  Write down your questions so you remember to ask them  Ask your healthcare provider how often you should have a wellness visit  What happens at a wellness visit? Your healthcare provider will ask about your health, and your family history of health problems  This includes high blood pressure, heart disease, and cancer  He or she will ask if you have symptoms that concern you, if you smoke, and about your mood  You may also be asked about your intake of medicines, supplements, food, and alcohol  Any of the following may be done:  · Your weight  will be checked  Your height may also be checked so your body mass index (BMI) can be calculated  Your BMI shows if you are at a healthy weight  · Your blood pressure  and heart rate will be checked  Your temperature may also be checked  · Blood and urine tests  may be done  Blood tests may be done to check your cholesterol levels  Abnormal cholesterol levels increase your risk for heart disease and stroke  You may also need a blood or urine test to check for diabetes if you are at increased risk  Urine tests may be done to look for signs of an infection or kidney disease  · A physical exam  includes checking your heartbeat and lungs with a stethoscope  Your healthcare provider may also check your skin to look for sun damage  · Screening tests  may be recommended  A screening test is done to check for diseases that may not cause symptoms  The screening tests you may need depend on your age, gender, family history, and lifestyle habits  For example, colorectal screening may be recommended if you are 48years old or older  What screening tests do I need if I am a woman? · A Pap smear  is used to screen for cervical cancer   Pap smears are usually done every 3 to 5 years depending on your age  You may need them more often if you have had abnormal Pap smear test results in the past  Ask your healthcare provider how often you should have a Pap smear  · A mammogram  is an x-ray of your breasts to screen for breast cancer  Experts recommend mammograms every 2 years starting at age [address] years  You may need a mammogram at age 52 years or younger if you have an increased risk for breast cancer  Talk to your healthcare provider about when you should start having mammograms and how often you need them  What vaccines might I need? · Get an influenza vaccine  every year  The influenza vaccine protects you from the flu  Several types of viruses cause the flu  The viruses change over time, so new vaccines are made each year  · Get a tetanus-diphtheria (Td) booster vaccine  every 10 years  This vaccine protects you against tetanus and diphtheria  Tetanus is a severe infection that may cause painful muscle spasms and lockjaw  Diphtheria is a severe bacterial infection that causes a thick covering in the back of your mouth and throat  · Get a human papillomavirus (HPV) vaccine  if you are female and aged 23 to 32 or male 23 to 24 and never received it  This vaccine protects you from HPV infection  HPV is the most common infection spread by sexual contact  HPV may also cause vaginal, penile, and anal cancers  · Get a pneumococcal vaccine  if you are aged 72 years or older  The pneumococcal vaccine is an injection given to protect you from pneumococcal disease  Pneumococcal disease is an infection caused by pneumococcal bacteria  The infection may cause pneumonia, meningitis, or an ear infection  · Get a shingles vaccine  if you are aged 61 or older, even if you have had shingles before  The shingles vaccine is an injection to protect you from the varicella-zoster virus  This is the same virus that causes chickenpox   Shingles is a painful rash that develops in people who had chickenpox or have been exposed to the virus  How can I eat healthy? My Plate is a model for planning healthy meals  It shows the types and amounts of foods that should go on your plate  Fruits and vegetables make up about half of your plate, and grains and protein make up the other half  A serving of dairy is included on the side of your plate  The amount of calories and serving sizes you need depends on your age, gender, weight, and height  Examples of healthy foods are listed below:  · Eat a variety of vegetables  such as dark green, red, and orange vegetables  You can also include canned vegetables low in sodium (salt) and frozen vegetables without added butter or sauces  · Eat a variety of fresh fruits , canned fruit in 100% juice, frozen fruit, and dried fruit  · Include whole grains  At least half of the grains you eat should be whole grains  Examples include whole-wheat bread, wheat pasta, brown rice, and whole-grain cereals such as oatmeal     · Eat a variety of protein foods such as seafood (fish and shellfish), lean meat, and poultry without skin (turkey and chicken)  Examples of lean meats include pork leg, shoulder, or tenderloin, and beef round, sirloin, tenderloin, and extra lean ground beef  Other protein foods include eggs and egg substitutes, beans, peas, soy products, nuts, and seeds  · Choose low-fat dairy products such as skim or 1% milk or low-fat yogurt, cheese, and cottage cheese  · Limit unhealthy fats  such as butter, hard margarine, and shortening  How much exercise do I need? Exercise at least 30 minutes per day on most days of the week  Some examples of exercise include walking, biking, dancing, and swimming  You can also fit in more physical activity by taking the stairs instead of the elevator or parking farther away from stores  Include muscle strengthening activities 2 days each week  Regular exercise provides many health benefits  It helps you manage your weight, and decreases your risk for type 2 diabetes, heart disease, stroke, and high blood pressure  Exercise can also help improve your mood  Ask your healthcare provider about the best exercise plan for you  What are some general health and safety guidelines I should follow? · Do not smoke  Nicotine and other chemicals in cigarettes and cigars can cause lung damage  Ask your healthcare provider for information if you currently smoke and need help to quit  E-cigarettes or smokeless tobacco still contain nicotine  Talk to your healthcare provider before you use these products  · Limit alcohol  A drink of alcohol is 12 ounces of beer, 5 ounces of wine, or 1½ ounces of liquor  · Lose weight, if needed  Being overweight increases your risk of certain health conditions  These include heart disease, high blood pressure, type 2 diabetes, and certain types of cancer  · Protect your skin  Do not sunbathe or use tanning beds  Use sunscreen with a SPF 15 or higher  Apply sunscreen at least 15 minutes before you go outside  Reapply sunscreen every 2 hours  Wear protective clothing, hats, and sunglasses when you are outside  · Drive safely  Always wear your seatbelt  Make sure everyone in your car wears a seatbelt  A seatbelt can save your life if you are in an accident  Do not use your cell phone when you are driving  This could distract you and cause an accident  Pull over if you need to make a call or send a text message  · Practice safe sex  Use latex condoms if are sexually active and have more than one partner  Your healthcare provider may recommend screening tests for sexually transmitted infections (STIs)  · Wear helmets, lifejackets, and protective gear  Always wear a helmet when you ride a bike or motorcycle, go skiing, or play sports that could cause a head injury  Wear protective equipment when you play sports   Wear a lifejacket when you are on a boat or doing water sports  CARE AGREEMENT:   You have the right to help plan your care  Learn about your health condition and how it may be treated  Discuss treatment options with your caregivers to decide what care you want to receive  You always have the right to refuse treatment  The above information is an  only  It is not intended as medical advice for individual conditions or treatments  Talk to your doctor, nurse or pharmacist before following any medical regimen to see if it is safe and effective for you  © 2017 [facility] Information is for End User's use only and may not be sold, redistributed or otherwise used for commercial purposes  All illustrations and images included in CareNotes® are the copyrighted property of A D A M , Inc  or Jose Alfredo Chavez  Depression, Ambulatory Care   GENERAL INFORMATION:   Depression  is a medical condition that causes feelings of sadness or hopelessness that do not go away  Depression may cause you to lose interest in things you used to enjoy  These feelings may interfere with your daily life  Common symptoms include the following:   · Appetite changes, or weight gain or loss    · Trouble going to sleep or staying asleep, or sleeping too much    · Fatigue or lack of energy    · Feeling restless, irritable, or withdrawn    · Feeling worthless, hopeless, discouraged, or very guilty    · Trouble concentrating, remembering things, doing daily tasks, or making decisions    · Thoughts about hurting or killing yourself  Seek immediate care for the following symptoms:   · You think about harming yourself or someone else  Treatment for depression  may include medicine to improve or balance your mood  Therapy may also be used to treat your depression  A therapist will help you learn to cope with your thoughts and feelings  Therapy can be done alone or in a group  It may also be done with family members or a significant other     Manage depression:   · Get regular physical activity  Try to exercise for 30 minutes, 3 to 5 days a week  Work with your healthcare provider to develop an exercise plan that you enjoy  · Get enough sleep  Create a routine to help you relax before bed  Try to go to bed and wake up at the same time every day  Sleep is important for emotional health  · Eat a variety of healthy foods  Healthy foods include fruits, vegetables, whole-grain breads, low-fat dairy products, beans, lean meats, and fish  A healthy meal plan is low in fat, salt, and added sugar  · Avoid or limit alcohol  Ask your healthcare provider how much alcohol is safe for you to drink  A drink of alcohol is 12 ounces of beer, 5 ounces of wine, or 1½ ounces of liquor  Follow up with your healthcare provider as directed: You will need to return so your healthcare provider can monitor your progress  Write down your questions so you remember to ask them during your visits  CARE AGREEMENT:   You have the right to help plan your care  Learn about your health condition and how it may be treated  Discuss treatment options with your caregivers to decide what care you want to receive  You always have the right to refuse treatment  The above information is an  only  It is not intended as medical advice for individual conditions or treatments  Talk to your doctor, nurse or pharmacist before following any medical regimen to see if it is safe and effective for you  © 2014 [address] is for End User's use only and may not be sold, redistributed or otherwise used for commercial purposes  All illustrations and images included in CareNotes® are the copyrighted property of [employer]  or Jose Alfredo Chavez

## 2020-07-21 NOTE — PROGRESS NOTES
Today's date: 2020  Patient name: Festus Helm  : 1975  MRN: [id number]  Referring provider: CHRIS Malik  Dx:   Encounter Diagnosis     ICD-10-CM    1  Chronic midline low back pain without sciatica M54 5     G89 29    2  Acute left-sided low back pain without sciatica M54 5    3  Left hip pain M25 552    4  Chronic pain of right knee M25 561     G89 29    5  Contusion of right knee, subsequent encounter S80  01XD    6  Chronic left-sided low back pain without sciatica M54 5     G89 29    7  Difficulty walking R26 2      Subjective:  Cristy states her back feels better today  She still has issues but improvement today  Better weekend than before  Objective: See treatment log below  Cristy continues to be advised to follow her home exercise program as tolerated  When Cristy is feeling good she follows her rehab exercises in the gym and on other days she follows her home exercise program at home as tolerated  In the future we plan on having Cristy stay at home and follow her home exercise program for four to six weeks and than assess for either more Rehab treatments or discharge from Rehab care  Assessment: Tolerated treatment well  Patient exhibited good technique with therapeutic exercises and would benefit from continued PT  Cristy's goals are to continue to improve with her rehab program and improve with functional mobility, speed, repetition and decreased c/o pain with her gym and home exercise program   Cristy's final goal for her rehab is to be discharged from Rehab care after obtaining her full functional rehab potential     Plan: Continue per plan of care  Progress treatment as tolerated         Precautions:  Low Back Pain and Left Hip Pain    All treatments below will be provided with a focus on strengthening, flexibility, ROM, postural,   endurance and any possible swelling and pain which may be present without ignoring   neural issues involving balance, coordination and proprioception which is also important   and necessary to provide full functional mobility and quality care        Daily Treatment Log  Manual  7/9 7/14 7/16     MT, ROM 45' 45' 45'     HEP        Exercise Log 7/9 7/14 7/16     Balance Board        Chair Squats        BOSU-Walk        Foam Pad SLR,Hip/KneeFl,Step Ups        Foam Beam        P-Bar-GT-Forward, Backward,Side-Even & Dips        Monster Steps        Fitter-Slalom        T/G-Squats,PF        W/P-PNF,IR,ER,PU,PS:Top,Mid,Bot        NuStep   15' L1     Meena-BP,Lats,PD,Row        Rfjzuuv-EO-Am        NK Table        TM        UBC        Bike        Stepper        ME, PE 15' 15' 15'     Bridges         Supine march        Transverse abdominus activation (ab brace)        Modalities 7/9 7/14 7/16     MH / PE / ES 21' 20' 20'     US

## 2020-07-21 NOTE — TELEPHONE ENCOUNTER
Appointment was scheduled for intake for 08/03/2020 at the [facility] office for mental health [facility]

## 2020-07-22 ENCOUNTER — OFFICE VISIT (OUTPATIENT)
Dept: PAIN MEDICINE | Facility: CLINIC | Age: 45
End: 2020-07-22
Payer: COMMERCIAL

## 2020-07-22 VITALS
HEIGHT: 63 IN | SYSTOLIC BLOOD PRESSURE: 130 MMHG | WEIGHT: 264 LBS | DIASTOLIC BLOOD PRESSURE: 98 MMHG | BODY MASS INDEX: 46.78 KG/M2 | HEART RATE: 122 BPM | TEMPERATURE: 99.7 F

## 2020-07-22 DIAGNOSIS — M54.42 CHRONIC BILATERAL LOW BACK PAIN WITH LEFT-SIDED SCIATICA: Primary | ICD-10-CM

## 2020-07-22 DIAGNOSIS — G89.29 CHRONIC BILATERAL LOW BACK PAIN WITH LEFT-SIDED SCIATICA: Primary | ICD-10-CM

## 2020-07-22 PROCEDURE — 3051F HG A1C>EQUAL 7.0%<8.0%: CPT | Performed by: PHYSICIAN ASSISTANT

## 2020-07-22 PROCEDURE — 3080F DIAST BP >= 90 MM HG: CPT | Performed by: PHYSICIAN ASSISTANT

## 2020-07-22 PROCEDURE — 3075F SYST BP GE 130 - 139MM HG: CPT | Performed by: PHYSICIAN ASSISTANT

## 2020-07-22 PROCEDURE — 3008F BODY MASS INDEX DOCD: CPT | Performed by: PHYSICIAN ASSISTANT

## 2020-07-22 PROCEDURE — 1036F TOBACCO NON-USER: CPT | Performed by: PHYSICIAN ASSISTANT

## 2020-07-22 PROCEDURE — 99204 OFFICE O/P NEW MOD 45 MIN: CPT | Performed by: PHYSICIAN ASSISTANT

## 2020-07-22 NOTE — PROGRESS NOTES
Assessment/Plan:      Diagnoses and all orders for this visit:    Chronic bilateral low back pain with left-sided sciatica  -     MRI lumbar spine wo contrast; Future      discussion:  75-year-old female presenting for chronic back and left leg pain  As she has attempted over 6 weeks of physical therapy without significant gains in pain or symptom relief I do feel it appropriate to move forward with MRI of the lumbar spine at this time  She was advised that someone from this office will call her to review results of imaging and discuss next steps  She may continue to utilize medications prescribed from her PCP for her fibromyalgia also to be utilized for her back and joint pains  Patient expresses understanding and agrees to above plan  Subjective:     Patient ID: Festus Helm is a 53 y o  female  HPI referral from [facility] for chronic back pain  She additionally admits to history of chronic hip pain secondary to left hip fracture  She additionally has a history of fibromyalgia  Pain in back has been present since fall occurring in November of 2018 when she broke her hip  Since that time pain is primarily axial low back pain but recently has noticed pain radiating into right buttocks thigh and now numbness and tingling into left lower extremity and foot  She does admit to some weakness but denies any changes or falls  She denies any bowel or bladder changes or saddle anesthesia  She has been actively completing physical therapy but states overall it provides her with no relief of her pain or symptoms  No true comfortable position however pain is severely exacerbated with sitting for more than 20 minutes or prolonged standing  She currently utilizes cyclobenzaprine and Celebrex for pain relief  They do help with her fibromyalgia type pain but states do not benefit her regarding her back or leg pain  Recent plain films reveal mild degenerative changes      PAST MEDICAL HISTORY  Past Medical History:   Diagnosis Date    Allergic     Seasonal Allergies    Alopecia of scalp     Treated with Proscar    Asthma     Astigmatism     Carbon monoxide exposure     Carpal tunnel syndrome, left     Fibromyalgia     GERD (gastroesophageal reflux disease)     Hip fracture (HCC)     Hip fx, left, closed, with routine healing, subsequent encounter 05/2017    Hypertension     Insomnia     Irregular heartbeat     Lung disease     Mononucleosis     resolved    Obesity     VICTOR HUGO on CPAP     Prediabetes     Sleep apnea     Tachycardia      PAST SURGICAL HISTORY  Past Surgical History:   Procedure Laterality Date    CARPAL TUNNEL RELEASE Left     UPPER GASTROINTESTINAL ENDOSCOPY         FAMILY HISTORY  Family History   Problem Relation Age of Onset    Cancer Father     Hepatitis Father     Asthma Onetha Farrow Hypertension Paternal Grandmother      HOME MEDICATIONS  Current Outpatient Medications   Medication Sig Dispense Refill    albuterol (Ventolin HFA) 90 mcg/act inhaler Inhale 2 puffs every 6 (six) hours as needed for wheezing 1 Inhaler 5    celecoxib (CeleBREX) 100 mg capsule Take 1 capsule (100 mg total) by mouth 2 (two) times a day 60 capsule 5    cyclobenzaprine (FLEXERIL) 10 mg tablet 1 tab po every 12 hours x 7 days then prn spasms or pain 60 tablet 3    ergocalciferol (VITAMIN D2) 50,000 units Take 1 capsule (50,000 Units total) by mouth once a week 4 capsule 5    fluticasone-salmeterol (ADVAIR, WIXELA) 250-50 mcg/dose inhaler Inhale 1 puff 2 (two) times a day Rinse mouth after use   1 Inhaler 5    glipiZIDE (GLUCOTROL) 5 mg tablet Take 1 tablet (5 mg total) by mouth daily with breakfast 30 tablet 5    glucose blood test strip Use as instructed 100 each 5    Lancets (ONETOUCH ULTRASOFT) lancets Use as instructed 100 each 5    lisinopril (ZESTRIL) 10 mg tablet Take 1 tablet (10 mg total) by mouth daily 90 tablet 1    metFORMIN (GLUCOPHAGE) 1000 MG tablet Take 1 tablet (1,000 mg total) by mouth 2 (two) times a day with meals 180 tablet 1    sertraline (ZOLOFT) 25 mg tablet Take 1 tablet (25 mg total) by mouth daily at bedtime 30 tablet 0     No current facility-administered medications for this visit  ALLERGIES  Tdap [tetanus-diphth-acell pertussis]      SOCIAL HISTORY  Social History     Substance and Sexual Activity   Alcohol Use No     Social History     Substance and Sexual Activity   Drug Use No     Social History     Tobacco Use   Smoking Status Former Smoker   Smokeless Tobacco Never Used       Review of Systems   Constitutional: Negative for chills, diaphoresis, fever and unexpected weight change  HENT: Negative for trouble swallowing  Eyes: Negative for visual disturbance  Respiratory: Negative for shortness of breath  Cardiovascular: Negative for chest pain and leg swelling  Gastrointestinal: Negative for constipation and diarrhea  Endocrine: Negative for cold intolerance and heat intolerance  Genitourinary: Negative for decreased urine volume and difficulty urinating  Musculoskeletal: Positive for arthralgias, back pain and gait problem  Skin: Negative for color change and rash  Allergic/Immunologic: Negative for immunocompromised state  Neurological: Positive for weakness and numbness  Negative for dizziness, syncope and light-headedness  Objective:  Vitals:    07/22/20 1006   BP: 130/98   Pulse: (!) 122   Temp: 99 7 °F (37 6 °C)      Physical Exam   Constitutional: She is oriented to person, place, and time  She appears well-developed and well-nourished  No distress  HENT:   Head: Normocephalic and atraumatic  Eyes: Pupils are equal, round, and reactive to light  Conjunctivae are normal    Neck: Neck supple  Cardiovascular: Intact distal pulses  Pulmonary/Chest: Effort normal  No respiratory distress     Musculoskeletal:        Lumbar back: She exhibits decreased range of motion, tenderness (SIJs, lumbar paraspinals and midline L3-S1) and bony tenderness  Neurological: She is alert and oriented to person, place, and time  She has normal strength  She is not disoriented  She displays no atrophy  No cranial nerve deficit or sensory deficit  She exhibits normal muscle tone  Reflex Scores:       Patellar reflexes are 1+ on the right side and 1+ on the left side  Achilles reflexes are 1+ on the right side and 1+ on the left side  Skin: Skin is warm and dry  No rash noted  She is not diaphoretic  No pallor  Psychiatric: She has a normal mood and affect  Her behavior is normal  Judgment and thought content normal  She is attentive  Vitals reviewed

## 2020-07-23 ENCOUNTER — OFFICE VISIT (OUTPATIENT)
Dept: PHYSICAL THERAPY | Facility: CLINIC | Age: 45
End: 2020-07-23
Payer: COMMERCIAL

## 2020-07-23 DIAGNOSIS — G89.29 CHRONIC PAIN OF RIGHT KNEE: ICD-10-CM

## 2020-07-23 DIAGNOSIS — R26.2 DIFFICULTY WALKING: ICD-10-CM

## 2020-07-23 DIAGNOSIS — M54.50 CHRONIC MIDLINE LOW BACK PAIN WITHOUT SCIATICA: Primary | ICD-10-CM

## 2020-07-23 DIAGNOSIS — M25.552 LEFT HIP PAIN: ICD-10-CM

## 2020-07-23 DIAGNOSIS — S80.01XD CONTUSION OF RIGHT KNEE, SUBSEQUENT ENCOUNTER: ICD-10-CM

## 2020-07-23 DIAGNOSIS — M54.50 CHRONIC LEFT-SIDED LOW BACK PAIN WITHOUT SCIATICA: ICD-10-CM

## 2020-07-23 DIAGNOSIS — M54.50 ACUTE LEFT-SIDED LOW BACK PAIN WITHOUT SCIATICA: ICD-10-CM

## 2020-07-23 DIAGNOSIS — M25.561 CHRONIC PAIN OF RIGHT KNEE: ICD-10-CM

## 2020-07-23 DIAGNOSIS — G89.29 CHRONIC LEFT-SIDED LOW BACK PAIN WITHOUT SCIATICA: ICD-10-CM

## 2020-07-23 DIAGNOSIS — G89.29 CHRONIC MIDLINE LOW BACK PAIN WITHOUT SCIATICA: Primary | ICD-10-CM

## 2020-07-23 PROCEDURE — 97140 MANUAL THERAPY 1/> REGIONS: CPT | Performed by: PHYSICAL THERAPIST

## 2020-07-23 PROCEDURE — 97112 NEUROMUSCULAR REEDUCATION: CPT | Performed by: PHYSICAL THERAPIST

## 2020-07-23 PROCEDURE — 97164 PT RE-EVAL EST PLAN CARE: CPT | Performed by: PHYSICAL THERAPIST

## 2020-07-23 PROCEDURE — 97110 THERAPEUTIC EXERCISES: CPT | Performed by: PHYSICAL THERAPIST

## 2020-07-23 NOTE — LETTER
2020  PT Reevaluation Plan Of Care  Patient: El Rodriguez   YOB: 1975   Date of Visit: 2020     Encounter Diagnosis     ICD-10-CM    1  Chronic midline low back pain without sciatica M54 5     G89 29    2  Acute left-sided low back pain without sciatica M54 5    3  Left hip pain M25 552    4  Chronic pain of right knee M25 561     G89 29    5  Difficulty walking R26 2    6  Chronic left-sided low back pain without sciatica M54 5     G89 29    7  Contusion of right knee, subsequent encounter S80  XD      Dear Dr Perkins Tracy:  Thank you for your recent referral of Cristy Garcia  Please review the attached evaluation summary from Cristy's recent visit  Please verify that you agree with the plan of care by signing the attached order  If you have any questions or concerns, please do not hesitate to call  I sincerely appreciate the opportunity to share in the care of one of your patients and hope to have another opportunity to work with you in the near future  Sincerely,    Villa Echavarria, PT    Referring Provider:   I certify that I have read the below Plan of Care and certify the need for these services furnished under this plan of treatment while under my care  Morrison Apgar, [facility] [address]  VIA In Basket    Please SIGN ABOVE and return THIS PAGE ONLY to Fax # [phone]        PT Re-Evaluation   Today's date: 2020   Patient name: El Rodriguez  : 1975  MRN: [id number]  Referring provider: CHRIS Ge  Dx:   Encounter Diagnosis     ICD-10-CM    1  Chronic midline low back pain without sciatica M54 5     G89 29    2  Acute left-sided low back pain without sciatica M54 5    3  Left hip pain M25 552    4  Chronic pain of right knee M25 561     G89 29    5  Difficulty walking R26 2    6  Chronic left-sided low back pain without sciatica M54 5     G89 29    7  Contusion of right knee, subsequent encounter S80  01XD Assessment  Assessment details: Patient is progressing  She has less pain levels with her back at this time  Impairments: abnormal gait, abnormal or restricted ROM, abnormal movement, activity intolerance, impaired balance, pain with function and safety issue  Understanding of Dx/Px/POC: excellent   Prognosis: good    Goals  STG  2-4 weeks:   Increase lumbar spine AROM by 2-4 degrees  Increase lower extremity strength by 2-4 lbs in all weak areas  Improve sitting posture and body mechanics  Increase standing/ADL tolerance minutes  Decrease pain by 25-50% with standing, walking, and stairs  Initiate HEP  LTG  6-8 weeks:   Demonstrate normal lumbar rotation  Decrease pain to 1-2/10 with activity  Increase lower extremity strength by 10-20 lbs in all weak areas  Improve spinal stability  Improve gait pattern and balance  Decrease limitations with standing, walking and ADL's  DC with HEP  Plan  Plan details: All planned modality interventions and planned therapy interventions are provided PRN    Patient would benefit from: PT eval and skilled physical therapy  Planned modality interventions: ultrasound, unattended electrical stimulation and TENS  Planned therapy interventions: joint mobilization, manual therapy, neuromuscular re-education, patient education, postural training, self care, balance, balance/weight bearing training, coordination, strengthening, stretching, therapeutic activities, therapeutic exercise, therapeutic training, transfer training, flexibility, gait training, graded exercise and home exercise program  Frequency: 3x week  Duration in weeks: 12      Subjective Evaluation    Pain  Quality: discomfort, knife-like, pulling, sharp, squeezing, tight and radiating  Aggravating factors: running, stair climbing, walking and standing  Progression: improved    Treatments  Previous treatment: physical therapy  Current treatment: physical therapy  Patient Goals  Patient goals for therapy: decreased pain, improved balance, return to work, return to [employer] activities, independence with ADLs/IADLs, increased strength and increased motion      Date of onset:  5/15/2020    Date of Surgery:  None    History of Present Episode: 6/23/2020  Poppy states she has had low back pain and left hip pain since she fell last year  Past Medical History:    6/23/2020  Poppy reports chronic back and left hip pain since she fell 1 1/2 year ago  Previous Level of Functional Ability:  6/23/2020  Poppy states she was doing OK but recently her back and left hip flared up and are getting worse  Inspection / Palpation:  Lumbar:  6/23/2020  Endomorphic body type  No signs of infection  No signs of wounds  No signs of drainage  No signs of ecchymotic regions  No signs of erythremic regions  Moderate signs of muscle spasm  Moderate signs of muscle guarding  Moderate signs of tenderness reported to palpation  No signs of swelling  No signs of a surgery site  Current conditions appears consistent with recent acute episode  Chief Complaints:  6/23/2020  Poppy reports moderate to severe difficulty with standing  Poppy reports moderate to severe difficulty with walking  Poppy reports moderate difficulty with movement / use of her lumbar region  Poppy reports moderate to severe difficulty with use of stairs  Poppy reports severe difficulty with running  Poppy reports severe difficulty with jumping  Poppy reports moderate to severe difficulty with use of inclines  Poppy reports moderate difficulty with sleeping  Poppy reports moderate difficulty with her strength and endurance  Poppy reports moderate limitations with her range of motion  Poppy reports moderate difficulty lying on her lumbar region  Poppy reports moderate difficulty twisting / turning her lumbar region      LUMBAR PAIN     Resting Palpation Bending Extending Walking Lifting   6/23/2020 4-5 5-8 5-6 6-8 5-8 5-8   7/23/20 3-4 4-7 4-5 5-7 4-7 4-7     LUMBAR PAIN     Pulling Pushing Sleeping Twisting Standing   2020 4-6 4-6 4-8 4-8 5-8   20 3-5 3-5 3-7 3-7 4-7     LUMBAR AROM Flexion Extension Rotation Right   2020 45° 4° 23°   20 49° 6° 26°     LUMBAR AROM Rotation Left Side Bending Right Side Bending Left   2020 22° 21° 20°   20 25° 24° 24°     LUMBAR MMT / PAIN Flexion Extension Rotation Right   2020 0/10  18 lbs 0/10  19 lbs 0/10  15 lbs   20 0/10  21 lbs 0/10  22 lbs 0/10  18 lbs     LUMBAR MMT / PAIN Rotation Left Side Bend Right Side Bend Left   2020 0/10  15 lbs 0/10  16 lbs 0/10  15 lbs   20 0/10  18 lbs 0/10  19 lbs 0/10  18 lbs     LE MMT / PAIN Dorsiflexion Plantarflexion Knee flexion Knee extension   2020 Rt 0/10  32 lbs 0/10  41 lbs 0/10  31 lbs 0/10  32 lbs   2020 Lt 0/10  31 lbs 0/10  40 lbs 0/10  29 lbs 0/10  33 lbs     LE MMT / PAIN Hip Flexion Hip Abduction Hip Adduction   2020  Rt 0/10  21 lbs 0/10  23 lbs 0/10  22 lbs   2020  Lt 0/10  20 lbs 0/10  24 lbs 0/10  23 lbs     LUMBAR SCREEN Referred Pain Sacroiliac Joint test Squish Test Straight Leg  Raise   2020 Rt Negative Negative Negative Negative   2020 Lt Negative Negative Negative Negative     LUMBAR SCREEN Valsalva Gapping Bowstring sign Hip Bursitis   2020 Rt Negative Negative Negative Negative   2020 Lt Negative Negative Negative Negative     Precautions:  Low Back Pain and Left Hip Pain    Today's date: 2020  Patient name: Fred Downs  : 1975  MRN: [id number]  Referring provider: CHRSI Linda  Dx:   Encounter Diagnosis     ICD-10-CM    1  Chronic midline low back pain without sciatica M54 5     G89 29    2  Acute left-sided low back pain without sciatica M54 5    3  Left hip pain M25 552    4  Chronic pain of right knee M25 561     G89 29    5  Difficulty walking R26 2    6  Chronic left-sided low back pain without sciatica M54 5     G89 29    7  Contusion of right knee, subsequent encounter S80  01XD      Subjective:  Cristy states her back is feeling better  Objective: See treatment log below  Cristy continues to be advised to follow her home exercise program as tolerated  When Cristy is feeling good she follows her rehab exercises in the gym and on other days she follows her home exercise program at home as tolerated  In the future we plan on having Cristy stay at home and follow her home exercise program for four to six weeks and than assess for either more Rehab treatments or discharge from Rehab care  Assessment: Tolerated treatment well  Patient exhibited good technique with therapeutic exercises and would benefit from continued PT  Cristy's goals are to continue to improve with her rehab program and improve with functional mobility, speed, repetition and decreased c/o pain with her gym and home exercise program   Cristy's final goal for her rehab is to be discharged from Rehab care after obtaining her full functional rehab potential     Plan: Continue per plan of care  Progress treatment as tolerated  Precautions:  Low Back Pain and Left Hip Pain    All treatments below will be provided with a focus on strengthening, flexibility, ROM, postural,   endurance and any possible swelling and pain which may be present without ignoring   neural issues involving balance, coordination and proprioception which is also important   and necessary to provide full functional mobility and quality care        Daily Treatment Log  Manual  7/9 7/14 7/16 7/23    MT, ROM 45' 45' 45' 45'    HEP        Exercise Log 7/9 7/14 7/16 7/23    Balance Board        Chair Squats        BOSU-Walk        Foam Pad SLR,Hip/KneeFl,Step Ups        Foam Beam        P-Bar-GT-Forward, Backward,Side-Even & Dips        Monster Steps        Fitter-Slalom        T/G-Squats,PF        W/P-PNF,IR,ER,PU,PS:Top,Mid,Bot        NuStep   15' L1 15' L1    Meena-BP,Lats,PD,Row Fkynxna-MM-Kt        NK Table        TM        UBC        Bike        Stepper        ME, PE 15' 15' 15' 15'    Bridges         Supine march Modalities 7/9 7/14 7/16 7/23    MH / PE / ES 21' 20' 20' 20'    US

## 2020-07-23 NOTE — PROGRESS NOTES
PT Re-Evaluation   Today's date: 2020   Patient name: Francisca Barraza  : 1975  MRN: [id number]  Referring provider: CHRIS Rios  Dx:   Encounter Diagnosis     ICD-10-CM    1  Chronic midline low back pain without sciatica M54 5     G89 29    2  Acute left-sided low back pain without sciatica M54 5    3  Left hip pain M25 552    4  Chronic pain of right knee M25 561     G89 29    5  Difficulty walking R26 2    6  Chronic left-sided low back pain without sciatica M54 5     G89 29    7  Contusion of right knee, subsequent encounter S80  01XD      Assessment  Assessment details: Patient is progressing  She has less pain levels with her back at this time  Impairments: abnormal gait, abnormal or restricted ROM, abnormal movement, activity intolerance, impaired balance, pain with function and safety issue  Understanding of Dx/Px/POC: excellent   Prognosis: good    Goals  STG  2-4 weeks:   Increase lumbar spine AROM by 2-4 degrees  Increase lower extremity strength by 2-4 lbs in all weak areas  Improve sitting posture and body mechanics  Increase standing/ADL tolerance minutes  Decrease pain by 25-50% with standing, walking, and stairs  Initiate HEP  LTG  6-8 weeks:   Demonstrate normal lumbar rotation  Decrease pain to 1-2/10 with activity  Increase lower extremity strength by 10-20 lbs in all weak areas  Improve spinal stability  Improve gait pattern and balance  Decrease limitations with standing, walking and ADL's  DC with HEP  Plan  Plan details: All planned modality interventions and planned therapy interventions are provided PRN    Patient would benefit from: PT eval and skilled physical therapy  Planned modality interventions: ultrasound, unattended electrical stimulation and TENS  Planned therapy interventions: joint mobilization, manual therapy, neuromuscular re-education, patient education, postural training, self care, balance, balance/weight bearing training, coordination, strengthening, stretching, therapeutic activities, therapeutic exercise, therapeutic training, transfer training, flexibility, gait training, graded exercise and home exercise program  Frequency: 3x week  Duration in weeks: 12      Subjective Evaluation    Pain  Quality: discomfort, knife-like, pulling, sharp, squeezing, tight and radiating  Aggravating factors: running, stair climbing, walking and standing  Progression: improved    Treatments  Previous treatment: physical therapy  Current treatment: physical therapy  Patient Goals  Patient goals for therapy: decreased pain, improved balance, return to work, return to [employer] activities, independence with ADLs/IADLs, increased strength and increased motion      Date of onset:  5/15/2020    Date of Surgery:  None    History of Present Episode: 6/23/2020  Poppy states she has had low back pain and left hip pain since she fell last year  Past Medical History:    6/23/2020  Poppy reports chronic back and left hip pain since she fell 1 1/2 year ago  Previous Level of Functional Ability:  6/23/2020  Poppy states she was doing OK but recently her back and left hip flared up and are getting worse  Inspection / Palpation:  Lumbar:  6/23/2020  Endomorphic body type  No signs of infection  No signs of wounds  No signs of drainage  No signs of ecchymotic regions  No signs of erythremic regions  Moderate signs of muscle spasm  Moderate signs of muscle guarding  Moderate signs of tenderness reported to palpation  No signs of swelling  No signs of a surgery site  Current conditions appears consistent with recent acute episode  Chief Complaints:  6/23/2020  Poppy reports moderate to severe difficulty with standing  Poppy reports moderate to severe difficulty with walking  Poppy reports moderate difficulty with movement / use of her lumbar region  Poppy reports moderate to severe difficulty with use of stairs    Poppy reports severe difficulty with running  Poppy reports severe difficulty with jumping  Poppy reports moderate to severe difficulty with use of inclines  Poppy reports moderate difficulty with sleeping  Poppy reports moderate difficulty with her strength and endurance  Poppy reports moderate limitations with her range of motion  Poppy reports moderate difficulty lying on her lumbar region  Poppy reports moderate difficulty twisting / turning her lumbar region      LUMBAR PAIN     Resting Palpation Bending Extending Walking Lifting   6/23/2020 4-5 5-8 5-6 6-8 5-8 5-8   7/23/20 3-4 4-7 4-5 5-7 4-7 4-7     LUMBAR PAIN     Pulling Pushing Sleeping Twisting Standing   6/23/2020 4-6 4-6 4-8 4-8 5-8   7/23/20 3-5 3-5 3-7 3-7 4-7     LUMBAR AROM Flexion Extension Rotation Right   6/23/2020 45° 4° 23°   7/23/20 49° 6° 26°     LUMBAR AROM Rotation Left Side Bending Right Side Bending Left   6/23/2020 22° 21° 20°   7/23/20 25° 24° 24°     LUMBAR MMT / PAIN Flexion Extension Rotation Right   6/23/2020 0/10  18 lbs 0/10  19 lbs 0/10  15 lbs   7/23/20 0/10  21 lbs 0/10  22 lbs 0/10  18 lbs     LUMBAR MMT / PAIN Rotation Left Side Bend Right Side Bend Left   6/23/2020 0/10  15 lbs 0/10  16 lbs 0/10  15 lbs   7/23/20 0/10  18 lbs 0/10  19 lbs 0/10  18 lbs     LE MMT / PAIN Dorsiflexion Plantarflexion Knee flexion Knee extension   6/23/2020 Rt 0/10  32 lbs 0/10  41 lbs 0/10  31 lbs 0/10  32 lbs   6/23/2020 Lt 0/10  31 lbs 0/10  40 lbs 0/10  29 lbs 0/10  33 lbs     LE MMT / PAIN Hip Flexion Hip Abduction Hip Adduction   6/23/2020  Rt 0/10  21 lbs 0/10  23 lbs 0/10  22 lbs   6/23/2020  Lt 0/10  20 lbs 0/10  24 lbs 0/10  23 lbs     LUMBAR SCREEN Referred Pain Sacroiliac Joint test Squish Test Straight Leg  Raise   6/23/2020 Rt Negative Negative Negative Negative   6/23/2020 Lt Negative Negative Negative Negative     LUMBAR SCREEN Valsalva Gapping Bowstring sign Hip Bursitis   6/23/2020 Rt Negative Negative Negative Negative   6/23/2020 Lt Negative Negative Negative Negative     Precautions:  Low Back Pain and Left Hip Pain    Today's date: 2020  Patient name: Festus Helm  : 1975  MRN: [id number]  Referring provider: CHRIS Malik  Dx:   Encounter Diagnosis     ICD-10-CM    1  Chronic midline low back pain without sciatica M54 5     G89 29    2  Acute left-sided low back pain without sciatica M54 5    3  Left hip pain M25 552    4  Chronic pain of right knee M25 561     G89 29    5  Difficulty walking R26 2    6  Chronic left-sided low back pain without sciatica M54 5     G89 29    7  Contusion of right knee, subsequent encounter S80  01XD      Subjective:  Cristy states her back is feeling better  Objective: See treatment log below  Cristy continues to be advised to follow her home exercise program as tolerated  When Cristy is feeling good she follows her rehab exercises in the gym and on other days she follows her home exercise program at home as tolerated  In the future we plan on having Cristy stay at home and follow her home exercise program for four to six weeks and than assess for either more Rehab treatments or discharge from Rehab care  Assessment: Tolerated treatment well  Patient exhibited good technique with therapeutic exercises and would benefit from continued PT  Cristy's goals are to continue to improve with her rehab program and improve with functional mobility, speed, repetition and decreased c/o pain with her gym and home exercise program   Cristy's final goal for her rehab is to be discharged from Rehab care after obtaining her full functional rehab potential     Plan: Continue per plan of care  Progress treatment as tolerated         Precautions:  Low Back Pain and Left Hip Pain    All treatments below will be provided with a focus on strengthening, flexibility, ROM, postural,   endurance and any possible swelling and pain which may be present without ignoring   neural issues involving balance, coordination and proprioception which is also important   and necessary to provide full functional mobility and quality care        Daily Treatment Log  Manual  7/9 7/14 7/16 7/23    MT, ROM 45' 45' 45' 45'    HEP        Exercise Log 7/9 7/14 7/16 7/23    Balance Board        Chair Squats        BOSU-Walk        Foam Pad SLR,Hip/KneeFl,Step Ups        Foam Beam        P-Bar-GT-Forward, Backward,Side-Even & Dips        Monster Steps        Fitter-Slalom        T/G-Squats,PF        W/P-PNF,IR,ER,PU,PS:Top,Mid,Bot        NuStep   15' L1 15' L1    Meena-BP,Lats,PD,Row        [id number]-MM-[id number]        NK Table        TM        UBC        Bike        Stepper        ME, PE 15' 15' 15' 15'    Bridges         Supine march Modalities 7/9 7/14 7/16 7/23    MH / PE / ES 20' 20' 20' 20'    US

## 2020-07-28 ENCOUNTER — OFFICE VISIT (OUTPATIENT)
Dept: PHYSICAL THERAPY | Facility: CLINIC | Age: 45
End: 2020-07-28
Payer: COMMERCIAL

## 2020-07-28 DIAGNOSIS — M25.552 LEFT HIP PAIN: ICD-10-CM

## 2020-07-28 DIAGNOSIS — M25.561 CHRONIC PAIN OF RIGHT KNEE: ICD-10-CM

## 2020-07-28 DIAGNOSIS — M54.50 ACUTE LEFT-SIDED LOW BACK PAIN WITHOUT SCIATICA: ICD-10-CM

## 2020-07-28 DIAGNOSIS — S80.01XD CONTUSION OF RIGHT KNEE, SUBSEQUENT ENCOUNTER: ICD-10-CM

## 2020-07-28 DIAGNOSIS — G89.29 CHRONIC MIDLINE LOW BACK PAIN WITHOUT SCIATICA: Primary | ICD-10-CM

## 2020-07-28 DIAGNOSIS — M54.50 CHRONIC MIDLINE LOW BACK PAIN WITHOUT SCIATICA: Primary | ICD-10-CM

## 2020-07-28 DIAGNOSIS — M54.50 CHRONIC LEFT-SIDED LOW BACK PAIN WITHOUT SCIATICA: ICD-10-CM

## 2020-07-28 DIAGNOSIS — G89.29 CHRONIC LEFT-SIDED LOW BACK PAIN WITHOUT SCIATICA: ICD-10-CM

## 2020-07-28 DIAGNOSIS — G89.29 CHRONIC PAIN OF RIGHT KNEE: ICD-10-CM

## 2020-07-28 DIAGNOSIS — R26.2 DIFFICULTY WALKING: ICD-10-CM

## 2020-07-28 PROCEDURE — 97110 THERAPEUTIC EXERCISES: CPT | Performed by: PHYSICAL THERAPIST

## 2020-07-28 PROCEDURE — 97140 MANUAL THERAPY 1/> REGIONS: CPT | Performed by: PHYSICAL THERAPIST

## 2020-07-28 PROCEDURE — 97112 NEUROMUSCULAR REEDUCATION: CPT | Performed by: PHYSICAL THERAPIST

## 2020-07-28 NOTE — PROGRESS NOTES
Today's date: 2020  Patient name: Fred Downs  : 1975  MRN: [id number]  Referring provider: CHRIS Linda  Dx:   Encounter Diagnosis     ICD-10-CM    1  Chronic midline low back pain without sciatica M54 5     G89 29    2  Acute left-sided low back pain without sciatica M54 5    3  Left hip pain M25 552    4  Chronic pain of right knee M25 561     G89 29    5  Difficulty walking R26 2    6  Chronic left-sided low back pain without sciatica M54 5     G89 29    7  Contusion of right knee, subsequent encounter S80  01XD      Subjective:  Cristy states her left lower back is painful  Objective: See treatment log below  Cristy continues to be advised to follow her home exercise program as tolerated  When Cristy is feeling good she follows her rehab exercises in the gym and on other days she follows her home exercise program at home as tolerated  In the future we plan on having Cristy stay at home and follow her home exercise program for four to six weeks and than assess for either more Rehab treatments or discharge from Rehab care  Assessment: Tolerated treatment well  Patient exhibited good technique with therapeutic exercises and would benefit from continued PT  Cristy's goals are to continue to improve with her rehab program and improve with functional mobility, speed, repetition and decreased c/o pain with her gym and home exercise program   Cristy's final goal for her rehab is to be discharged from Rehab care after obtaining her full functional rehab potential     Plan: Continue per plan of care  Progress treatment as tolerated         Precautions:  Low Back Pain and Left Hip Pain    All treatments below will be provided with a focus on strengthening, flexibility, ROM, postural,   endurance and any possible swelling and pain which may be present without ignoring   neural issues involving balance, coordination and proprioception which is also important   and necessary to provide full functional mobility and quality care        Daily Treatment Log  Manual  7/9 7/14 7/16 7/23 7/28   MT, ROM 45' 45' 45' 45' 45'   HEP        Exercise Log 7/9 7/14 7/16 7/23 7/28   Balance Board        Chair Squats        BOSU-Walk        Foam Pad SLR,Hip/KneeFl,Step Ups        Foam Beam        P-Bar-GT-Forward, Backward,Side-Even & Dips        Monster Steps        Fitter-Slalom        T/G-Squats,PF        W/P-PNF,IR,ER,PU,PS:Top,Mid,Bot        NuStep   15' L1 15' L1 15' L1   Meena-BP,Lats,PD,Row        [id number]-SS-Rc        NK Table        TM        UBC        Bike        Stepper        ME, PE 15' 15' 15' 15' 15'   Bridges         Supine march Modalities 7/9 7/14 7/16 7/23 7/27   MH / PE / ES 20' 20' 20' 20' 20'   US

## 2020-07-30 ENCOUNTER — OFFICE VISIT (OUTPATIENT)
Dept: PHYSICAL THERAPY | Facility: CLINIC | Age: 45
End: 2020-07-30
Payer: COMMERCIAL

## 2020-07-30 DIAGNOSIS — M54.50 ACUTE LEFT-SIDED LOW BACK PAIN WITHOUT SCIATICA: ICD-10-CM

## 2020-07-30 DIAGNOSIS — G89.29 CHRONIC PAIN OF RIGHT KNEE: ICD-10-CM

## 2020-07-30 DIAGNOSIS — S80.01XD CONTUSION OF RIGHT KNEE, SUBSEQUENT ENCOUNTER: ICD-10-CM

## 2020-07-30 DIAGNOSIS — G89.29 CHRONIC LEFT-SIDED LOW BACK PAIN WITHOUT SCIATICA: ICD-10-CM

## 2020-07-30 DIAGNOSIS — M25.552 LEFT HIP PAIN: ICD-10-CM

## 2020-07-30 DIAGNOSIS — R26.2 DIFFICULTY WALKING: ICD-10-CM

## 2020-07-30 DIAGNOSIS — M54.50 CHRONIC LEFT-SIDED LOW BACK PAIN WITHOUT SCIATICA: ICD-10-CM

## 2020-07-30 DIAGNOSIS — M25.561 CHRONIC PAIN OF RIGHT KNEE: ICD-10-CM

## 2020-07-30 DIAGNOSIS — M54.50 CHRONIC MIDLINE LOW BACK PAIN WITHOUT SCIATICA: Primary | ICD-10-CM

## 2020-07-30 DIAGNOSIS — G89.29 CHRONIC MIDLINE LOW BACK PAIN WITHOUT SCIATICA: Primary | ICD-10-CM

## 2020-07-30 PROCEDURE — 97110 THERAPEUTIC EXERCISES: CPT | Performed by: PHYSICAL THERAPIST

## 2020-07-30 PROCEDURE — 97140 MANUAL THERAPY 1/> REGIONS: CPT | Performed by: PHYSICAL THERAPIST

## 2020-07-30 PROCEDURE — 97112 NEUROMUSCULAR REEDUCATION: CPT | Performed by: PHYSICAL THERAPIST

## 2020-07-30 NOTE — PROGRESS NOTES
Today's date: 2020  Patient name: Ciro Matthews  : 1975  MRN: [id number]  Referring provider: CHRIS Richard  Dx:   Encounter Diagnosis     ICD-10-CM    1  Chronic midline low back pain without sciatica M54 5     G89 29    2  Acute left-sided low back pain without sciatica M54 5    3  Left hip pain M25 552    4  Chronic pain of right knee M25 561     G89 29    5  Difficulty walking R26 2    6  Contusion of right knee, subsequent encounter S80  01XD    7  Chronic left-sided low back pain without sciatica M54 5     G89 29      Subjective:  Cristy states her back is slowly improving  She still has issues and pain but better  Objective: See treatment log below  Cristy continues to be advised to follow her home exercise program as tolerated  When Cristy is feeling good she follows her rehab exercises in the gym and on other days she follows her home exercise program at home as tolerated  In the future we plan on having Cristy stay at home and follow her home exercise program for four to six weeks and than assess for either more Rehab treatments or discharge from Rehab care  Assessment: Tolerated treatment well  Patient exhibited good technique with therapeutic exercises and would benefit from continued PT  Cristy's goals are to continue to improve with her rehab program and improve with functional mobility, speed, repetition and decreased c/o pain with her gym and home exercise program   Cristy's final goal for her rehab is to be discharged from Rehab care after obtaining her full functional rehab potential     Plan: Continue per plan of care  Progress treatment as tolerated         Precautions:  Low Back Pain and Left Hip Pain    All treatments below will be provided with a focus on strengthening, flexibility, ROM, postural,   endurance and any possible swelling and pain which may be present without ignoring   neural issues involving balance, coordination and proprioception which is also important   and necessary to provide full functional mobility and quality care        Daily Treatment Log  Manual  7/30       MT, ROM 45'       HEP        Exercise Log 7/30       Balance Board        Chair Squats        BOSU-Walk        Foam Pad SLR,Hip/KneeFl,Step Ups        Foam Beam        P-Bar-GT-Forward, Backward,Side-Even & Dips        Monster Steps        Fitter-Slalom        T/G-Squats,PF        W/P-PNF,IR,ER,PU,PS:Top,Mid,Bot        NuStep 15' L5       Meena-BP,Lats,PD,Row        [id number]-WH-Js        NK Table        TM        UBC        Bike        Stepper        ME, PE 15'       Bridges         Supine march        [facility] 7/30       MH / PE / ES 21'       US

## 2020-07-31 ENCOUNTER — HOSPITAL ENCOUNTER (OUTPATIENT)
Dept: MRI IMAGING | Facility: HOSPITAL | Age: 45
Discharge: HOME | End: 2020-07-31
Payer: COMMERCIAL

## 2020-07-31 ENCOUNTER — HOSPITAL ENCOUNTER (OUTPATIENT)
Dept: RADIOLOGY | Facility: CLINIC | Age: 45
Discharge: HOME | End: 2020-07-31
Payer: COMMERCIAL

## 2020-07-31 VITALS — WEIGHT: 264 LBS | BODY MASS INDEX: 46.78 KG/M2 | HEIGHT: 63 IN

## 2020-07-31 DIAGNOSIS — M54.42 CHRONIC BILATERAL LOW BACK PAIN WITH LEFT-SIDED SCIATICA: ICD-10-CM

## 2020-07-31 DIAGNOSIS — G89.29 CHRONIC BILATERAL LOW BACK PAIN WITH LEFT-SIDED SCIATICA: ICD-10-CM

## 2020-07-31 DIAGNOSIS — Z12.31 BREAST CANCER SCREENING BY MAMMOGRAM: ICD-10-CM

## 2020-07-31 PROCEDURE — 72148 MRI LUMBAR SPINE W/O DYE: CPT

## 2020-07-31 PROCEDURE — 77067 SCR MAMMO BI INCL CAD: CPT

## 2020-07-31 PROCEDURE — 77063 BREAST TOMOSYNTHESIS BI: CPT

## 2020-08-03 ENCOUNTER — TELEPHONE (OUTPATIENT)
Dept: PAIN MEDICINE | Facility: CLINIC | Age: 45
End: 2020-08-03

## 2020-08-03 ENCOUNTER — SOCIAL WORK (OUTPATIENT)
Dept: BEHAVIORAL/MENTAL HEALTH CLINIC | Facility: CLINIC | Age: 45
End: 2020-08-03
Payer: COMMERCIAL

## 2020-08-03 DIAGNOSIS — F33.1 MAJOR DEPRESSIVE DISORDER, RECURRENT EPISODE, MODERATE (HCC): Primary | ICD-10-CM

## 2020-08-03 PROCEDURE — T1015 CLINIC SERVICE: HCPCS | Performed by: SOCIAL WORKER

## 2020-08-03 NOTE — TELEPHONE ENCOUNTER
Please see task below regarding significant MRI findings-        RM has not seen this pt before but has a consult on 8/13  MRI was ordered by Jacque LOPEZ  And office note was signed by Dr Ellis Manuel
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
## 2020-08-03 NOTE — PSYCH
Psychotherapy Provided: Individual Psychotherapy 60  minutes 11:00 Am to 12:33 PM          Goals addressed in session:(Intake) The Client was referred for mental health services by her PCP due to an increase in her anxiety and depression  During the session we created her recovery intake, screenings on depression, anxiety, trauma, D&A, stress inventory  She was offered a referral for supports coordination but at the present time the Client turned down the referral, information was given on medical transportation, and community supports program    It is hoped that by addressing her weaknesses this will act as a preventive measure against the possible need for higher level of care and services  Interventions: Supportive Therapy, Strengths Therapy,  and Cognitive Behavioral Therapy where the treatment modalities utilized during the session  Assessment and Plan:The Client presented depressed anxious mood that was congruent in effect  She was alert, goal directed and participated with prompts during the session The Client was oriented to person, place, time, situation, and reported no suicidal/homicidal ideation, plan, or intent  As team we created the Client's intake, conducted screening on her depression using the PHQ-9 screening with the Client scoring in the moderate severe depression severity range  On the anxiety screening EVELINA-7 the Client scored in the mild anxiety range, and did not meet the criteria for trauma on the PCL-5   She had a negative screening on CHEIKH and AUDIT-C substance usage screening so no other interventions are needed at this time  The Client also had a negative screening for suicide on the ask suicide question screening, so again no other interventions are needed at this time  On the Homes-Carolyn scale she scored 190 points on the life crisis units so she has 50% chance of stress-induced health breakdown in the next 2 years   As her home commitment the Client and her support team set for her to create a list of 3 short term and 3 long term goals  Next scheduled appointment was set for 2 weeks  Pain:      PSYCH MENTAL STATUS PAIN : 5 as reported by the client     PHYSICAL PAIN SCALE NUMBERS:8 as reported by the Client     Current suicide risk : Low/Phone number for [facility] was given  to the Client [phone]  The Client was given phone number for the [facility] [phone]  Behavioral Health Treatment Plan [facility]: Diagnosis and Treatment Plan explained to Cristy Garcia, Cristy Garcia  relates understanding diagnosis and is agreeable to Treatment Plan  Yes

## 2020-08-03 NOTE — TELEPHONE ENCOUNTER
Will Garcia from Radiology has "significant findings" MRI Lumbar  Will Garcia can be reached [phone]    Please advise       Thank you

## 2020-08-03 NOTE — TELEPHONE ENCOUNTER
Please let patient know that MRI showed disc bulging that Dr Elmer Harmon will review at upcoming appt  Also, they saw fluid    If she is postmenopausal, consider follow-up ultrasound imaging which her GYN can order but if she's got normal cycles it's normal

## 2020-08-04 ENCOUNTER — OFFICE VISIT (OUTPATIENT)
Dept: PHYSICAL THERAPY | Facility: CLINIC | Age: 45
End: 2020-08-04
Payer: COMMERCIAL

## 2020-08-04 DIAGNOSIS — S80.01XD CONTUSION OF RIGHT KNEE, SUBSEQUENT ENCOUNTER: ICD-10-CM

## 2020-08-04 DIAGNOSIS — M25.561 CHRONIC PAIN OF RIGHT KNEE: ICD-10-CM

## 2020-08-04 DIAGNOSIS — M54.50 ACUTE LEFT-SIDED LOW BACK PAIN WITHOUT SCIATICA: ICD-10-CM

## 2020-08-04 DIAGNOSIS — M54.50 CHRONIC LEFT-SIDED LOW BACK PAIN WITHOUT SCIATICA: ICD-10-CM

## 2020-08-04 DIAGNOSIS — G89.29 CHRONIC MIDLINE LOW BACK PAIN WITHOUT SCIATICA: Primary | ICD-10-CM

## 2020-08-04 DIAGNOSIS — G89.29 CHRONIC LEFT-SIDED LOW BACK PAIN WITHOUT SCIATICA: ICD-10-CM

## 2020-08-04 DIAGNOSIS — M54.50 CHRONIC MIDLINE LOW BACK PAIN WITHOUT SCIATICA: Primary | ICD-10-CM

## 2020-08-04 DIAGNOSIS — G89.29 CHRONIC PAIN OF RIGHT KNEE: ICD-10-CM

## 2020-08-04 DIAGNOSIS — M25.552 LEFT HIP PAIN: ICD-10-CM

## 2020-08-04 DIAGNOSIS — R26.2 DIFFICULTY WALKING: ICD-10-CM

## 2020-08-04 PROCEDURE — 97140 MANUAL THERAPY 1/> REGIONS: CPT

## 2020-08-04 PROCEDURE — 97112 NEUROMUSCULAR REEDUCATION: CPT

## 2020-08-04 PROCEDURE — 97014 ELECTRIC STIMULATION THERAPY: CPT

## 2020-08-04 NOTE — PROGRESS NOTES
Today's date: 2020  Patient name: Talmage Holstein  : 1975  MRN: [id number]  Referring provider: CHRIS Bean  Dx:   Encounter Diagnosis     ICD-10-CM    1  Chronic midline low back pain without sciatica  M54 5     G89 29    2  Acute left-sided low back pain without sciatica  M54 5    3  Left hip pain  M25 552    4  Chronic pain of right knee  M25 561     G89 29    5  Difficulty walking  R26 2    6  Contusion of right knee, subsequent encounter  S80  01XD    7  Chronic left-sided low back pain without sciatica  M54 5     G89 29      Subjective:  No new c/o pain today  Objective: See treatment log below  Cristy continues to be advised to follow her home exercise program as tolerated  When Cristy is feeling good she follows her rehab exercises in the gym and on other days she follows her home exercise program at home as tolerated  In the future we plan on having Cristy stay at home and follow her home exercise program for four to six weeks and than assess for either more Rehab treatments or discharge from Rehab care  Assessment: Tolerated treatment well  Patient exhibited good technique with therapeutic exercises and would benefit from continued PT to increase ROM/strength and endurance to improve mobility and gait  Cristy's goals are to continue to improve with her rehab program and improve with functional mobility, speed, repetition and decreased c/o pain with her gym and home exercise program   Cristy's final goal for her rehab is to be discharged from Rehab care after obtaining her full functional rehab potential     Plan: Continue per plan of care  Progress treatment as tolerated         Precautions:  Low Back Pain and Left Hip Pain    All treatments below will be provided with a focus on strengthening, flexibility, ROM, postural,   endurance and any possible swelling and pain which may be present without ignoring   neural issues involving balance, coordination and proprioception which is also important   and necessary to provide full functional mobility and quality care        Daily Treatment Log  Manual  7/30 8/4      MT, ROM 45' 30'      HEP        Exercise Log 7/30 8/4      Balance Board        Chair Squats        BOSU-Walk        Foam Pad SLR,Hip/KneeFl,Step Ups        Foam Beam        P-Bar-GT-Forward, Backward,Side-Even & Dips        Monster Steps        Fitter-Slalom        T/G-Squats,PF        W/P-PNF,IR,ER,PU,PS:Top,Mid,Bot        NuStep 15' L5 10' L5      Meena-BP,Lats,PD,Row        Elzjrbi-WS-Ny  2x2'      NK Table        TM        UBC        Bike        Stepper        ME, PE 15' 15'      Bridges         Supine march        Modalities 7/30 8/4      MH / PE / ES 21' 20'      US

## 2020-08-04 NOTE — TELEPHONE ENCOUNTER
S/w pt, advised of FQ's notation  Pt verbalized understanding and appreciation  Advised to cb with any further questions or concerns

## 2020-08-06 ENCOUNTER — OFFICE VISIT (OUTPATIENT)
Dept: PHYSICAL THERAPY | Facility: CLINIC | Age: 45
End: 2020-08-06
Payer: COMMERCIAL

## 2020-08-06 DIAGNOSIS — M54.50 CHRONIC LEFT-SIDED LOW BACK PAIN WITHOUT SCIATICA: ICD-10-CM

## 2020-08-06 DIAGNOSIS — M54.50 ACUTE LEFT-SIDED LOW BACK PAIN WITHOUT SCIATICA: ICD-10-CM

## 2020-08-06 DIAGNOSIS — M25.552 LEFT HIP PAIN: ICD-10-CM

## 2020-08-06 DIAGNOSIS — M25.561 CHRONIC PAIN OF RIGHT KNEE: ICD-10-CM

## 2020-08-06 DIAGNOSIS — G89.29 CHRONIC LEFT-SIDED LOW BACK PAIN WITHOUT SCIATICA: ICD-10-CM

## 2020-08-06 DIAGNOSIS — S80.01XD CONTUSION OF RIGHT KNEE, SUBSEQUENT ENCOUNTER: ICD-10-CM

## 2020-08-06 DIAGNOSIS — R26.2 DIFFICULTY WALKING: ICD-10-CM

## 2020-08-06 DIAGNOSIS — G89.29 CHRONIC MIDLINE LOW BACK PAIN WITHOUT SCIATICA: Primary | ICD-10-CM

## 2020-08-06 DIAGNOSIS — M54.50 CHRONIC MIDLINE LOW BACK PAIN WITHOUT SCIATICA: Primary | ICD-10-CM

## 2020-08-06 DIAGNOSIS — G89.29 CHRONIC PAIN OF RIGHT KNEE: ICD-10-CM

## 2020-08-06 PROCEDURE — 97110 THERAPEUTIC EXERCISES: CPT | Performed by: PHYSICAL THERAPIST

## 2020-08-06 PROCEDURE — 97140 MANUAL THERAPY 1/> REGIONS: CPT | Performed by: PHYSICAL THERAPIST

## 2020-08-06 PROCEDURE — 97112 NEUROMUSCULAR REEDUCATION: CPT | Performed by: PHYSICAL THERAPIST

## 2020-08-06 NOTE — PROGRESS NOTES
Today's date: 2020  Patient name: Talmage Holstein  : 1975  MRN: [id number]  Referring provider: CHRIS Bean  Dx:   Encounter Diagnosis     ICD-10-CM    1  Chronic midline low back pain without sciatica  M54 5     G89 29    2  Acute left-sided low back pain without sciatica  M54 5    3  Left hip pain  M25 552    4  Chronic pain of right knee  M25 561     G89 29    5  Difficulty walking  R26 2    6  Chronic left-sided low back pain without sciatica  M54 5     G89 29    7  Contusion of right knee, subsequent encounter  S80  01XD      Subjective:  Cristy states her back is slowly improving  Objective: See treatment log below  Cristy continues to be advised to follow her home exercise program as tolerated  When Cristy is feeling good she follows her rehab exercises in the gym and on other days she follows her home exercise program at home as tolerated  In the future we plan on having Cristy stay at home and follow her home exercise program for four to six weeks and than assess for either more Rehab treatments or discharge from Rehab care  Assessment: Tolerated treatment well  Patient exhibited good technique with therapeutic exercises and would benefit from continued PT  Cristy's goals are to continue to improve with her rehab program and improve with functional mobility, speed, repetition and decreased c/o pain with her gym and home exercise program   Cristy's final goal for her rehab is to be discharged from Rehab care after obtaining her full functional rehab potential     Plan: Continue per plan of care  Progress treatment as tolerated         Precautions:  Low Back Pain and Left Hip Pain    All treatments below will be provided with a focus on strengthening, flexibility, ROM, postural,   endurance and any possible swelling and pain which may be present without ignoring   neural issues involving balance, coordination and proprioception which is also important   and necessary to provide full functional mobility and quality care        Daily Treatment Log  Manual  7/30 8/4 8/6     MT, ROM 45' 30' 30'     HEP        Exercise Log 7/30 8/4 8/6     Balance Board        Chair Squats        BOSU-Walk        Foam Pad SLR,Hip/KneeFl,Step Ups        Foam Beam        P-Bar-GT-Forward, Backward,Side-Even & Dips        Monster Steps        Fitter-Slalom        T/G-Squats,PF        W/P-PNF,IR,ER,PU,PS:Top,Mid,Bot        NuStep 15' L5 10' L5 10' L5     Meena-BP,Lats,PD,Row        Dmdtcwn-GD-Se  2x2' 2x2'     NK Table        TM        UBC        Bike        Stepper        [place], [place] 40' 15' 15'     Bridges         Supine march        [facility] 7/30 8/4 8/6     MH / PE / ES 20' 20' 20'     US

## 2020-08-10 ENCOUNTER — OFFICE VISIT (OUTPATIENT)
Dept: PHYSICAL THERAPY | Facility: CLINIC | Age: 45
End: 2020-08-10
Payer: COMMERCIAL

## 2020-08-10 DIAGNOSIS — G89.29 CHRONIC MIDLINE LOW BACK PAIN WITHOUT SCIATICA: Primary | ICD-10-CM

## 2020-08-10 DIAGNOSIS — M54.50 CHRONIC MIDLINE LOW BACK PAIN WITHOUT SCIATICA: Primary | ICD-10-CM

## 2020-08-10 DIAGNOSIS — G89.29 CHRONIC LEFT-SIDED LOW BACK PAIN WITHOUT SCIATICA: ICD-10-CM

## 2020-08-10 DIAGNOSIS — G89.29 CHRONIC PAIN OF RIGHT KNEE: ICD-10-CM

## 2020-08-10 DIAGNOSIS — M54.50 ACUTE LEFT-SIDED LOW BACK PAIN WITHOUT SCIATICA: ICD-10-CM

## 2020-08-10 DIAGNOSIS — M25.561 CHRONIC PAIN OF RIGHT KNEE: ICD-10-CM

## 2020-08-10 DIAGNOSIS — S80.01XD CONTUSION OF RIGHT KNEE, SUBSEQUENT ENCOUNTER: ICD-10-CM

## 2020-08-10 DIAGNOSIS — R26.2 DIFFICULTY WALKING: ICD-10-CM

## 2020-08-10 DIAGNOSIS — M25.552 LEFT HIP PAIN: ICD-10-CM

## 2020-08-10 DIAGNOSIS — M54.50 CHRONIC LEFT-SIDED LOW BACK PAIN WITHOUT SCIATICA: ICD-10-CM

## 2020-08-10 PROCEDURE — 97140 MANUAL THERAPY 1/> REGIONS: CPT | Performed by: PHYSICAL THERAPIST

## 2020-08-10 PROCEDURE — 97110 THERAPEUTIC EXERCISES: CPT | Performed by: PHYSICAL THERAPIST

## 2020-08-10 NOTE — PROGRESS NOTES
Daily Note     Today's date: 8/10/2020  Patient name: Juanjo De Los Santos  : 1975  MRN: [id number]  Referring provider: CHRIS Turner  Dx:   Encounter Diagnosis     ICD-10-CM    1  Chronic midline low back pain without sciatica  M54 5     G89 29    2  Acute left-sided low back pain without sciatica  M54 5    3  Left hip pain  M25 552    4  Chronic pain of right knee  M25 561     G89 29    5  Difficulty walking  R26 2    6  Chronic left-sided low back pain without sciatica  M54 5     G89 29    7  Contusion of right knee, subsequent encounter  S80  01XD                         Subjective:  Cristy states her back is still hurting her and that she will be seeing pain management later this week  Objective: See treatment log below  Cristy continues to be advised to follow her home exercise program as tolerated  When Cristy is feeling good she follows her rehab exercises in the gym and on other days she follows her home exercise program at home as tolerated  In the future we plan on having Cristy stay at home and follow her home exercise program for four to six weeks and than assess for either more Rehab treatments or discharge from Rehab care  Assessment: Pt demonstrated a favorable response to gr 4 lumbar mobilizations with verbal reports of improvement  Pt was also educated on the importance of footwear, as she is wearing flip flops with no support and demonstrated over pronation at the feet  Instructed patient on proper footwear and the importance of wearing a supportive shoe with having back pain  Pt verbalized and demonstrated understanding  Plan: Continue per plan of care  Progress treatment as tolerated         Precautions:  Low Back Pain and Left Hip Pain    All treatments below will be provided with a focus on strengthening, flexibility, ROM, postural,   endurance and any possible swelling and pain which may be present without ignoring   neural issues involving balance, coordination and proprioception which is also important   and necessary to provide full functional mobility and quality care        Daily Treatment Log  Manual  7/30 8/4 8/6 8/10    MT, ROM 45' 30' 30' x20min    HEP        Prone grade 4 PA lumbar mobilizations     x5min    Exercise Log 7/30 8/4 8/6 8/10    Balance Board        Chair Squats        BOSU-Walk        Foam Pad SLR,Hip/KneeFl,Step Ups        Foam Beam        P-Bar-GT-Forward, Backward,Side-Even & Dips        Monster Steps        Fitter-Slalom        T/G-Squats,PF        W/P-PNF,IR,ER,PU,PS:Top,Mid,Bot        NuStep 15' L5 10' L5 10' L5 10' L5    Meena-BP,Lats,PD,Row        Nsudtpz-UE-Oz  2x2' 2x2' 2x2'    NK Table        TM        UBC        Bike        Stepper        [place] 83' 15' 15'     Bridges         Supine march        Modalities 7/30 8/4 8/6 8/10    MH / PE / ES 20' 20' 20'     US

## 2020-08-11 ENCOUNTER — OFFICE VISIT (OUTPATIENT)
Dept: FAMILY MEDICINE CLINIC | Facility: CLINIC | Age: 45
End: 2020-08-11
Payer: COMMERCIAL

## 2020-08-11 VITALS
HEART RATE: 88 BPM | TEMPERATURE: 97.3 F | RESPIRATION RATE: 18 BRPM | OXYGEN SATURATION: 97 % | WEIGHT: 264.8 LBS | HEIGHT: 63 IN | SYSTOLIC BLOOD PRESSURE: 140 MMHG | DIASTOLIC BLOOD PRESSURE: 96 MMHG | BODY MASS INDEX: 46.92 KG/M2

## 2020-08-11 DIAGNOSIS — M79.7 FIBROMYALGIA: ICD-10-CM

## 2020-08-11 DIAGNOSIS — M15.9 PRIMARY OSTEOARTHRITIS INVOLVING MULTIPLE JOINTS: ICD-10-CM

## 2020-08-11 DIAGNOSIS — Z02.71 DISABILITY EXAMINATION: Primary | ICD-10-CM

## 2020-08-11 DIAGNOSIS — J45.30 MILD PERSISTENT ASTHMA WITHOUT COMPLICATION: ICD-10-CM

## 2020-08-11 DIAGNOSIS — Z71.89 CARE PLAN DISCUSSED WITH PATIENT: ICD-10-CM

## 2020-08-11 PROCEDURE — 3077F SYST BP >= 140 MM HG: CPT | Performed by: NURSE PRACTITIONER

## 2020-08-11 PROCEDURE — 99213 OFFICE O/P EST LOW 20 MIN: CPT | Performed by: NURSE PRACTITIONER

## 2020-08-11 PROCEDURE — 3008F BODY MASS INDEX DOCD: CPT | Performed by: NURSE PRACTITIONER

## 2020-08-11 PROCEDURE — 99905: CPT | Performed by: NURSE PRACTITIONER

## 2020-08-11 PROCEDURE — 3051F HG A1C>EQUAL 7.0%<8.0%: CPT | Performed by: NURSE PRACTITIONER

## 2020-08-11 PROCEDURE — 3080F DIAST BP >= 90 MM HG: CPT | Performed by: NURSE PRACTITIONER

## 2020-08-11 PROCEDURE — 1036F TOBACCO NON-USER: CPT | Performed by: NURSE PRACTITIONER

## 2020-08-11 NOTE — PROGRESS NOTES
Assessment/Plan:     Diagnoses and all orders for this visit:    Disability examination    Care plan discussed with patient    Fibromyalgia    Primary osteoarthritis involving multiple joints    Mild persistent asthma without complication        Subjective:      Patient ID: Marcia Duval is a 53 y o  female  Patient presents to [address] [place] [address] to discuss Disability paperwork  Allergies, medical history and current medications reviewed with patient; patient reports taking all medications as prescribed without issues  Patient is requesting additional documentation regarding disability due to difficulty obtaining approval  Patient states she is scheduled to follow up with Pain Management Dr Arina West on 8/13/20 and continues to follow with Physical Therapy  Patient Care Team:  Maegan Voss as PCP - General (Family Medicine)  Ti Machado MD as PCP - [address] [place] [address] (RTE)    Review of Systems   Musculoskeletal: Positive for arthralgias and back pain  All other systems reviewed and are negative  Objective:    /96 (BP Location: Left arm, Patient Position: Sitting, Cuff Size: Large)   Pulse 88   Temp (!) 97 3 °F (36 3 °C) (Temporal)   Resp 18   Ht 5' 3" (1 6 m)   Wt 120 kg (264 lb 12 8 oz)   LMP 07/17/2020   SpO2 97%   BMI 46 91 kg/m²      Physical Exam  Vitals signs and nursing note reviewed  Constitutional:       General: She is not in acute distress  Appearance: Normal appearance  She is well-developed  HENT:      Head: Normocephalic and atraumatic  Eyes:      General: Lids are normal       Conjunctiva/sclera: Conjunctivae normal    Neck:      Musculoskeletal: Normal range of motion  Trachea: No tracheal deviation  Cardiovascular:      Rate and Rhythm: Normal rate  Pulmonary:      Effort: Pulmonary effort is normal  No respiratory distress  Abdominal:      General: There is no distension  Tenderness: There is no guarding  Musculoskeletal: Normal range of motion  Skin:     General: Skin is warm and dry  Neurological:      Mental Status: She is alert and oriented to person, place, and time     Psychiatric:         Speech: Speech normal

## 2020-08-12 ENCOUNTER — TELEPHONE (OUTPATIENT)
Dept: MAMMOGRAPHY | Facility: CLINIC | Age: 45
End: 2020-08-12

## 2020-08-12 ENCOUNTER — OFFICE VISIT (OUTPATIENT)
Dept: PHYSICAL THERAPY | Facility: CLINIC | Age: 45
End: 2020-08-12
Payer: COMMERCIAL

## 2020-08-12 DIAGNOSIS — S80.01XD CONTUSION OF RIGHT KNEE, SUBSEQUENT ENCOUNTER: ICD-10-CM

## 2020-08-12 DIAGNOSIS — G89.29 CHRONIC LEFT-SIDED LOW BACK PAIN WITHOUT SCIATICA: ICD-10-CM

## 2020-08-12 DIAGNOSIS — M54.50 CHRONIC MIDLINE LOW BACK PAIN WITHOUT SCIATICA: Primary | ICD-10-CM

## 2020-08-12 DIAGNOSIS — M54.50 CHRONIC LEFT-SIDED LOW BACK PAIN WITHOUT SCIATICA: ICD-10-CM

## 2020-08-12 DIAGNOSIS — G89.29 CHRONIC PAIN OF RIGHT KNEE: ICD-10-CM

## 2020-08-12 DIAGNOSIS — M25.561 CHRONIC PAIN OF RIGHT KNEE: ICD-10-CM

## 2020-08-12 DIAGNOSIS — R26.2 DIFFICULTY WALKING: ICD-10-CM

## 2020-08-12 DIAGNOSIS — G89.29 CHRONIC MIDLINE LOW BACK PAIN WITHOUT SCIATICA: Primary | ICD-10-CM

## 2020-08-12 DIAGNOSIS — M25.552 LEFT HIP PAIN: ICD-10-CM

## 2020-08-12 DIAGNOSIS — M54.50 ACUTE LEFT-SIDED LOW BACK PAIN WITHOUT SCIATICA: ICD-10-CM

## 2020-08-12 PROCEDURE — 97140 MANUAL THERAPY 1/> REGIONS: CPT | Performed by: PHYSICAL THERAPIST

## 2020-08-12 PROCEDURE — 97110 THERAPEUTIC EXERCISES: CPT | Performed by: PHYSICAL THERAPIST

## 2020-08-12 NOTE — PROGRESS NOTES
Daily Note     Today's date: 2020  Patient name: David Cardenas  : 1975  MRN: [id number]  Referring provider: CHRIS Coppola  Dx:   Encounter Diagnosis     ICD-10-CM    1  Chronic midline low back pain without sciatica  M54 5     G89 29    2  Acute left-sided low back pain without sciatica  M54 5    3  Left hip pain  M25 552    4  Chronic pain of right knee  M25 561     G89 29    5  Difficulty walking  R26 2    6  Chronic left-sided low back pain without sciatica  M54 5     G89 29    7  Contusion of right knee, subsequent encounter  S80  01XD                         Subjective:  Cristy notes that she felt good after last time but then the next day she had to do a lot of driving which started giving her back issues  Objective: See treatment log below  Cristy continues to be advised to follow her home exercise program as tolerated  When Cristy is feeling good she follows her rehab exercises in the gym and on other days she follows her home exercise program at home as tolerated  In the future we plan on having Cristy stay at home and follow her home exercise program for four to six weeks and than assess for either more Rehab treatments or discharge from Rehab care  Assessment: Pt was able to integrate light exercises without reproduction of symptoms  Pt had difficulty with abdominal recruitment  Continue to progress core stabilization as able  Plan: Continue per plan of care  Progress treatment as tolerated  Precautions:  Low Back Pain and Left Hip Pain    All treatments below will be provided with a focus on strengthening, flexibility, ROM, postural,   endurance and any possible swelling and pain which may be present without ignoring   neural issues involving balance, coordination and proprioception which is also important   and necessary to provide full functional mobility and quality care        Daily Treatment Log  Manual  7/30 8/4 8/6 8/10 8/12   MT, ROM 45' 30' 30' x20min x20min   HEP        Prone grade 4 PA lumbar mobilizations     x5min x5min   Exercise Log 7/30 8/4 8/6 8/10 8/12   Balance Board        Chair Squats        BOSU-Walk        Foam Pad SLR,Hip/KneeFl,Step Ups        Foam Beam        P-Bar-GT-Forward, Backward,Side-Even & Dips        Monster Steps        Fitter-Slalom        T/G-Squats,PF        W/P-PNF,IR,ER,PU,PS:Top,Mid,Bot        NuStep 15' L5 10' L5 10' L5 10' L5 10' L5   Meena-BP,Lats,PD,Row        Odyvejx-IR-Hs  2x2' 2x2' 2x2' 2x2'   NK Table        TM        UBC        Bike        Stepper        [place], [place] 22' 15' 15'     Bridges      2x10   Supine march     2x20   Modalities 7/30 8/4 8/6 8/10 8/12   MH / PE / ES 20' 20' 20'     US

## 2020-08-13 ENCOUNTER — CONSULT (OUTPATIENT)
Dept: PAIN MEDICINE | Facility: CLINIC | Age: 45
End: 2020-08-13
Payer: COMMERCIAL

## 2020-08-13 VITALS
TEMPERATURE: 98.2 F | SYSTOLIC BLOOD PRESSURE: 132 MMHG | HEIGHT: 63 IN | DIASTOLIC BLOOD PRESSURE: 88 MMHG | WEIGHT: 264 LBS | BODY MASS INDEX: 46.78 KG/M2

## 2020-08-13 DIAGNOSIS — M25.552 LEFT HIP PAIN: ICD-10-CM

## 2020-08-13 DIAGNOSIS — M54.16 LUMBAR RADICULOPATHY: Primary | ICD-10-CM

## 2020-08-13 DIAGNOSIS — G89.29 CHRONIC BILATERAL LOW BACK PAIN WITHOUT SCIATICA: ICD-10-CM

## 2020-08-13 DIAGNOSIS — M54.50 CHRONIC BILATERAL LOW BACK PAIN WITHOUT SCIATICA: ICD-10-CM

## 2020-08-13 DIAGNOSIS — M16.0 PRIMARY OSTEOARTHRITIS OF BOTH HIPS: ICD-10-CM

## 2020-08-13 PROCEDURE — 3075F SYST BP GE 130 - 139MM HG: CPT | Performed by: ANESTHESIOLOGY

## 2020-08-13 PROCEDURE — 3008F BODY MASS INDEX DOCD: CPT | Performed by: ANESTHESIOLOGY

## 2020-08-13 PROCEDURE — 99244 OFF/OP CNSLTJ NEW/EST MOD 40: CPT | Performed by: ANESTHESIOLOGY

## 2020-08-13 PROCEDURE — 3051F HG A1C>EQUAL 7.0%<8.0%: CPT | Performed by: ANESTHESIOLOGY

## 2020-08-13 PROCEDURE — 1036F TOBACCO NON-USER: CPT | Performed by: ANESTHESIOLOGY

## 2020-08-13 PROCEDURE — 3079F DIAST BP 80-89 MM HG: CPT | Performed by: ANESTHESIOLOGY

## 2020-08-13 NOTE — PROGRESS NOTES
Assessment:  1  Lumbar radiculopathy    2  Primary osteoarthritis of both hips    3  Chronic bilateral low back pain without sciatica    4  Left hip pain        Plan:  Patient is a 52-year-old female with complaints of low back pain and left hip pain in addition to left leg pain with chronic pain syndrome secondary to lumbar disc herniation, lumbar radiculopathy presents office for initial consultation  MRI lumbar spine was reviewed with the patient which shows evidence of L3-4 disc herniation Gold towards the left, L4-5 small disc herniation which is central   1  We will trial Lyrica 50 mg p o  T i d  For peripheral neuropathy  2  We will schedule patient for a left L3-L4 transforaminal epidural steroid injection for lumbar radicular symptoms  3  Follow-up in 1 month after injection      [place] Prescription Drug Monitoring Program report was reviewed and was appropriate     Complete risks and benefits including bleeding, infection, tissue reaction, nerve injury and allergic reaction were discussed  The approach was demonstrated using models and literature was provided  Verbal and written consent was obtained  History of Present Illness: The patient is a 53 y o  female who presents for consultation in regards to low back pain and left leg pain  Symptoms have been present for 1 year  Symptoms began following a non work related injury  Pain is reported to be 7 on the numeric rating scale  Symptoms are felt constantly and worst in the morning  Symptoms are characterized as sharp and dull/aching  Symptoms are associated with left leg weakness  Aggravating factors include standing, bending, leaning forward, leaning bckward, sitting, walking and exercise  Relieving factors include lying down and relaxation  No change in symptoms with kneeling, turning the head, coughing/sneezing and bowel movements  Treatments that have been helpful include nothing   physical therapy, chiropractic manipulation, home exercise, TENS unit and heat/ice have provided no relief  Medications to relieve symptoms include Savella, Celebrex  Review of Systems:    Review of Systems   Respiratory: Positive for shortness of breath  Musculoskeletal: Positive for arthralgias and myalgias  Psychiatric/Behavioral:        Anxiety   Depression     All other systems reviewed and are negative          Past Medical History:   Diagnosis Date    Allergic     Seasonal Allergies    Alopecia of scalp     Treated with Proscar    Asthma     Astigmatism     Carbon monoxide exposure     Carpal tunnel syndrome, left     Fibromyalgia     GERD (gastroesophageal reflux disease)     Hip fracture (HCC)     Hip fx, left, closed, with routine healing, subsequent encounter 05/2017    Hypertension     Insomnia     Irregular heartbeat     Lung disease     Mononucleosis     resolved    Obesity     VICTOR HUGO on CPAP     Prediabetes     Sleep apnea     Tachycardia        Past Surgical History:   Procedure Laterality Date    CARPAL TUNNEL RELEASE Left     UPPER GASTROINTESTINAL ENDOSCOPY         Family History   Problem Relation Age of Onset    Cancer Father     Hepatitis Father     Asthma Cousin     Hypertension Paternal Grandmother     No Known Problems Mother     No Known Problems Sister     No Known Problems Maternal Grandmother     No Known Problems Maternal Grandfather     No Known Problems Paternal Grandfather     No Known Problems Paternal Aunt        Social History     Occupational History    Not on file   Tobacco Use    Smoking status: Former Smoker    Smokeless tobacco: Never Used   Substance and Sexual Activity    Alcohol use: No    Drug use: No    Sexual activity: Not on file         Current Outpatient Medications:     albuterol (Ventolin HFA) 90 mcg/act inhaler, Inhale 2 puffs every 6 (six) hours as needed for wheezing, Disp: 1 Inhaler, Rfl: 5    celecoxib (CeleBREX) 100 mg capsule, Take 1 capsule (100 mg total) by mouth 2 (two) times a day, Disp: 60 capsule, Rfl: 5    cyclobenzaprine (FLEXERIL) 10 mg tablet, 1 tab po every 12 hours x 7 days then prn spasms or pain, Disp: 60 tablet, Rfl: 3    ergocalciferol (VITAMIN D2) 50,000 units, Take 1 capsule (50,000 Units total) by mouth once a week, Disp: 4 capsule, Rfl: 5    fluticasone-salmeterol (ADVAIR, WIXELA) 250-50 mcg/dose inhaler, Inhale 1 puff 2 (two) times a day Rinse mouth after use , Disp: 1 Inhaler, Rfl: 5    glipiZIDE (GLUCOTROL) 5 mg tablet, Take 1 tablet (5 mg total) by mouth daily with breakfast, Disp: 30 tablet, Rfl: 5    glucose blood test strip, Use as instructed, Disp: 100 each, Rfl: 5    Lancets (ONETOUCH ULTRASOFT) lancets, Use as instructed, Disp: 100 each, Rfl: 5    lisinopril (ZESTRIL) 10 mg tablet, Take 1 tablet (10 mg total) by mouth daily, Disp: 90 tablet, Rfl: 1    metFORMIN (GLUCOPHAGE) 1000 MG tablet, Take 1 tablet (1,000 mg total) by mouth 2 (two) times a day with meals, Disp: 180 tablet, Rfl: 1    sertraline (ZOLOFT) 25 mg tablet, Take 1 tablet (25 mg total) by mouth daily at bedtime, Disp: 30 tablet, Rfl: 0    Allergies   Allergen Reactions    Tdap [Tetanus-Diphth-Acell Pertussis] Rash       Physical Exam:    /88 (BP Location: Left arm, Patient Position: Sitting, Cuff Size: Large)   Temp 98 2 °F (36 8 °C)   Ht 5' 3" (1 6 m)   Wt 120 kg (264 lb)   LMP 07/17/2020   BMI 46 77 kg/m²     Constitutional: normal, well developed, well nourished, alert, in no distress and non-toxic and no overt pain behavior  and obese  Eyes: anicteric  HEENT: grossly intact  Neck: supple, symmetric, trachea midline and no masses   Pulmonary:even and unlabored  Cardiovascular:No edema or pitting edema present  Skin:Normal without rashes or lesions and well hydrated  Psychiatric:Mood and affect appropriate  Neurologic:Cranial Nerves II-XII grossly intact  Musculoskeletal:antalgic     Lumbar/Sacral Spine examination demonstrates    Full range of motion lumbar spine with pain upon: flexion, lateral rotation to the left/right, and bending to the left/right  Bilateral lumbar paraspinals tender to palpation  Muscle spasms noted in the lumbar area bilaterally  4/5 left lower extremity strength in all quadriceps, hip flexors, hip abductors, hip adductors  Positive seated straight leg raise for bilateral lower extremities  Sensitivity to light touch intact bilateral lower extremities  2+ reflexes in the patella and Achilles  No ankle clonus     Imaging    MRI LUMBAR SPINE WITHOUT CONTRAST     INDICATION: M54 42: Lumbago with sciatica, left side  G89 29: Other chronic pain      COMPARISON:  None      TECHNIQUE:  Sagittal T1, sagittal T2, sagittal inversion recovery, axial T1 and axial T2, coronal T2     IMAGE QUALITY:  Diagnostic     FINDINGS:     VERTEBRAL BODIES:  There are 5 lumbar type vertebral bodies  Normal alignment of the lumbar spine  No spondylolysis or spondylolisthesis  No scoliosis  No compression fracture  Normal marrow signal is identified within the visualized bony   structures  No discrete marrow lesion      SACRUM:  Normal signal within the sacrum  No evidence of insufficiency or stress fracture      DISTAL CORD AND CONUS:  Normal size and signal within the distal cord and conus      PARASPINAL SOFT TISSUES:  There is thickening of the endometrial cavity partially visualized on this examination towards the fundus  There is a dominant follicle identified within the left ovary      LOWER THORACIC DISC SPACES:  Normal disc height and signal   No disc herniation, canal stenosis or foraminal narrowing      LUMBAR DISC SPACES:     L1-L2:  Normal      L2-L3:  Normal      L3-L4:  Annular bulging with a small broad-based left foraminal and extraforaminal disc protrusion best seen on series 6 image 13  There is no canal stenosis    Only minimal left foraminal narrowing without nerve impingement      L4-L5:  Disc desiccation and loss of disc height with mild annular bulging  Small central disc herniation without canal stenosis or foraminal nerve impingement      L5-S1:  Normal disc height and signal without canal stenosis or foraminal narrowing      IMPRESSION:     Mild noncompressive lumbar degenerative change at the L3-4 and L4-5 levels      Fluid signal in the endometrial cavity partially visualized at the fundus  If patient is postmenopausal, consider follow-up ultrasound imaging  No orders of the defined types were placed in this encounter

## 2020-08-17 ENCOUNTER — OFFICE VISIT (OUTPATIENT)
Dept: PHYSICAL THERAPY | Facility: CLINIC | Age: 45
End: 2020-08-17
Payer: COMMERCIAL

## 2020-08-17 DIAGNOSIS — M54.50 CHRONIC MIDLINE LOW BACK PAIN WITHOUT SCIATICA: Primary | ICD-10-CM

## 2020-08-17 DIAGNOSIS — G89.29 CHRONIC MIDLINE LOW BACK PAIN WITHOUT SCIATICA: Primary | ICD-10-CM

## 2020-08-17 DIAGNOSIS — M25.552 LEFT HIP PAIN: ICD-10-CM

## 2020-08-17 DIAGNOSIS — R26.2 DIFFICULTY WALKING: ICD-10-CM

## 2020-08-17 DIAGNOSIS — M54.50 ACUTE LEFT-SIDED LOW BACK PAIN WITHOUT SCIATICA: ICD-10-CM

## 2020-08-17 DIAGNOSIS — S80.01XD CONTUSION OF RIGHT KNEE, SUBSEQUENT ENCOUNTER: ICD-10-CM

## 2020-08-17 DIAGNOSIS — M54.50 CHRONIC LEFT-SIDED LOW BACK PAIN WITHOUT SCIATICA: ICD-10-CM

## 2020-08-17 DIAGNOSIS — M25.561 CHRONIC PAIN OF RIGHT KNEE: ICD-10-CM

## 2020-08-17 DIAGNOSIS — G89.29 CHRONIC LEFT-SIDED LOW BACK PAIN WITHOUT SCIATICA: ICD-10-CM

## 2020-08-17 DIAGNOSIS — G89.29 CHRONIC PAIN OF RIGHT KNEE: ICD-10-CM

## 2020-08-17 PROCEDURE — 97140 MANUAL THERAPY 1/> REGIONS: CPT | Performed by: PHYSICAL THERAPIST

## 2020-08-17 PROCEDURE — 97110 THERAPEUTIC EXERCISES: CPT | Performed by: PHYSICAL THERAPIST

## 2020-08-17 NOTE — PROGRESS NOTES
Today's date: 2020  Patient name: Anthony Keyes  : 1975  MRN: [id number]  Referring provider: CHRIS Rahman  Dx:   Encounter Diagnosis     ICD-10-CM    1  Chronic midline low back pain without sciatica  M54 5     G89 29    2  Acute left-sided low back pain without sciatica  M54 5    3  Left hip pain  M25 552    4  Chronic pain of right knee  M25 561     G89 29    5  Difficulty walking  R26 2    6  Chronic left-sided low back pain without sciatica  M54 5     G89 29    7  Contusion of right knee, subsequent encounter  S80  01XD      Subjective:  Cristy states her back is slowly feeling better  Objective: See treatment log below  Cristy continues to be advised to follow her home exercise program as tolerated  When Cristy is feeling good she follows her rehab exercises in the gym and on other days she follows her home exercise program at home as tolerated  In the future we plan on having Cristy stay at home and follow her home exercise program for four to six weeks and than assess for either more Rehab treatments or discharge from Rehab care  Assessment: Tolerated treatment well  Patient exhibited good technique with therapeutic exercises and would benefit from continued PT  Cristy's goals are to continue to improve with her rehab program and improve with functional mobility, speed, repetition and decreased c/o pain with her gym and home exercise program   Cristy's final goal for her rehab is to be discharged from Rehab care after obtaining her full functional rehab potential     Plan: Continue per plan of care  Progress treatment as tolerated         Precautions:  Low Back Pain and Left Hip Pain    All treatments below will be provided with a focus on strengthening, flexibility, ROM, postural,   endurance and any possible swelling and pain which may be present without ignoring   neural issues involving balance, coordination and proprioception which is also important   and necessary to provide full functional mobility and quality care        Daily Treatment Log  Manual  8/17       MT, ROM 45'       HEP        Prone grade 4 PA lumbar mobilizations         Exercise Log 8/17       Balance Board        Chair Squats        BOSU-Walk        Foam Pad SLR,Hip/KneeFl,Step Ups        Foam Beam        P-Bar-GT-Forward, Backward,Side-Even & Dips        Monster Steps        Fitter-Slalom        T/G-Squats,PF        W/P-PNF,IR,ER,PU,PS:Top,Mid,Bot        NuStep 15' L5       Meena-BP,Lats,PD,Row        Xqrtcwd-KP-An        NK Table        TM        UBC        Bike        Stepper        ME, PE 15'       Bridges         Supine march        Modalities 8/17       MH / PE / ES 21'       US

## 2020-08-19 ENCOUNTER — OFFICE VISIT (OUTPATIENT)
Dept: PHYSICAL THERAPY | Facility: CLINIC | Age: 45
End: 2020-08-19
Payer: COMMERCIAL

## 2020-08-19 ENCOUNTER — TELEPHONE (OUTPATIENT)
Dept: PAIN MEDICINE | Facility: CLINIC | Age: 45
End: 2020-08-19

## 2020-08-19 DIAGNOSIS — G89.29 CHRONIC MIDLINE LOW BACK PAIN WITHOUT SCIATICA: Primary | ICD-10-CM

## 2020-08-19 DIAGNOSIS — G89.29 CHRONIC PAIN OF RIGHT KNEE: ICD-10-CM

## 2020-08-19 DIAGNOSIS — M54.50 CHRONIC MIDLINE LOW BACK PAIN WITHOUT SCIATICA: Primary | ICD-10-CM

## 2020-08-19 DIAGNOSIS — G89.29 CHRONIC LEFT-SIDED LOW BACK PAIN WITHOUT SCIATICA: ICD-10-CM

## 2020-08-19 DIAGNOSIS — M25.561 CHRONIC PAIN OF RIGHT KNEE: ICD-10-CM

## 2020-08-19 DIAGNOSIS — M54.50 CHRONIC LEFT-SIDED LOW BACK PAIN WITHOUT SCIATICA: ICD-10-CM

## 2020-08-19 DIAGNOSIS — M54.50 ACUTE LEFT-SIDED LOW BACK PAIN WITHOUT SCIATICA: ICD-10-CM

## 2020-08-19 DIAGNOSIS — R26.2 DIFFICULTY WALKING: ICD-10-CM

## 2020-08-19 DIAGNOSIS — S80.01XD CONTUSION OF RIGHT KNEE, SUBSEQUENT ENCOUNTER: ICD-10-CM

## 2020-08-19 DIAGNOSIS — M25.552 LEFT HIP PAIN: ICD-10-CM

## 2020-08-19 PROCEDURE — 97140 MANUAL THERAPY 1/> REGIONS: CPT | Performed by: PHYSICAL THERAPIST

## 2020-08-19 PROCEDURE — 97110 THERAPEUTIC EXERCISES: CPT | Performed by: PHYSICAL THERAPIST

## 2020-08-19 NOTE — TELEPHONE ENCOUNTER
Patient called requesting to reschedule her procedure scheduled on 9/3/20   Please advise, sadie    Call back# [phone]

## 2020-08-19 NOTE — PROGRESS NOTES
Today's date: 2020  Patient name: Kamari Walters  : 1975  MRN: [id number]  Referring provider: CHRIS Gupta  Dx:   Encounter Diagnosis     ICD-10-CM    1  Chronic midline low back pain without sciatica  M54 5     G89 29    2  Acute left-sided low back pain without sciatica  M54 5    3  Left hip pain  M25 552    4  Chronic pain of right knee  M25 561     G89 29    5  Difficulty walking  R26 2    6  Chronic left-sided low back pain without sciatica  M54 5     G89 29    7  Contusion of right knee, subsequent encounter  S80  01XD      Subjective:  Cristy states her she is slowly feeling better  Objective: See treatment log below  Cristy continues to be advised to follow her home exercise program as tolerated  When Cristy is feeling good she follows her rehab exercises in the gym and on other days she follows her home exercise program at home as tolerated  In the future we plan on having Cristy stay at home and follow her home exercise program for four to six weeks and than assess for either more Rehab treatments or discharge from Rehab care  Assessment: Tolerated treatment well  Patient exhibited good technique with therapeutic exercises and would benefit from continued PT  Cristy's goals are to continue to improve with her rehab program and improve with functional mobility, speed, repetition and decreased c/o pain with her gym and home exercise program   Cristy's final goal for her rehab is to be discharged from Rehab care after obtaining her full functional rehab potential     Plan: Continue per plan of care  Progress treatment as tolerated         Precautions:  Low Back Pain and Left Hip Pain    All treatments below will be provided with a focus on strengthening, flexibility, ROM, postural,   endurance and any possible swelling and pain which may be present without ignoring   neural issues involving balance, coordination and proprioception which is also important   and necessary to provide full functional mobility and quality care        Daily Treatment Log  Manual  8/17 8/19      MT, ROM 45' 45'      HEP        Exercise Log 8/17 8/19      Balance Board        Chair Squats        BOSU-Walk        Foam Pad SLR,Hip/KneeFl,Step Ups        Foam Beam        P-Bar-GT-Forward, Backward,Side-Even & Dips        Monster Steps        Fitter-Slalom        T/G-Squats,PF        W/P-PNF,IR,ER,PU,PS:Top,Mid,Bot        NuStep 15' L5 10' L5      Meena-BP,Lats,PD,Row        Ectqwvv-ZX-Is        NK Table        TM        UBC        Bike        Stepper        ME, PE 15' 15'      Bridges         Supine march        Modalities 8/17 8/19      MH / PE / ES 21' 20'      US

## 2020-08-20 ENCOUNTER — TELEMEDICINE (OUTPATIENT)
Dept: BEHAVIORAL/MENTAL HEALTH CLINIC | Facility: CLINIC | Age: 45
End: 2020-08-20
Payer: COMMERCIAL

## 2020-08-20 DIAGNOSIS — F33.1 MAJOR DEPRESSIVE DISORDER, RECURRENT EPISODE, MODERATE (HCC): Primary | ICD-10-CM

## 2020-08-20 PROCEDURE — T1015 CLINIC SERVICE: HCPCS | Performed by: SOCIAL WORKER

## 2020-08-20 NOTE — PSYCH
Cristy Garcia  1975       Date of Initial Treatment Plan:08/20/20  Date of Current Treatment Plan: 08/20/20    Treatment Plan Number: Initial Treatment Plan    Strengths/Personal Resources for Self Care: The Client has supportive extended family of origin, and peer relationships  She is able to utilize community supports when needed  At the present time she is receiving her physical health care through [facility] in [place], and mental health therapy with this therapist Rosa OSWALD LCSW    Diagnosis:   1  Major depressive disorder, recurrent episode, moderate ([address] )         Area of Needs: The Client has a long history of depression and anxiety, which is effecting her different relationships and environments  It is hoped that The Client will achieve or maintain maximum functional capacity in performing daily activizes, taking into account both the functional capacity of the individual and those functional capacities appropriate for the individuals of the same age  Reduce or ameliorate the physical mental, behavioral, or developmental effects of an illness, condition, injury or disability  Present treatment plan will cover 4 months or 12 visits which ever comes first            Long Term Goal 1: The Client's depression score on her PHQ-9 will decrease from 17 to 8 or less    Target Date: 08/20/2021  Completion Date:          Short Term Objectives for Goal 1: The Client will learn 4 coping skills in addressing her depression  Long Term Goal 2: The Client will become more aware of her anxiety when presented 3 out of 5 times    Target Date: 08/20/2021  Completion Date:     Short Term Objectives for Goal 2: The Client will learn 3 coping skills in addressing her anxiety  Long Term Goal # 3:The Client will attend all of her medical appointments 100% of the time       Target Date: 08/20/21  Completion Date:         GOAL 1: Modality: The person(s) responsible for carrying out the plan is  The [facility] and this therapist Carleen [facility] LCSW    GOAL 2: Modality: The person(s) responsible for carrying out the plan is  The [facility] and this therapist Carleen OSWALD LCSW     GOAL 3: Modality: The person(s) responsible for carrying out the plan is  The [facility]       [address]: Diagnosis and Treatment Plan explained to Gordo Arriaga relates understanding diagnosis and is agreeable to Treatment Plan         Client Comments : Please share your thoughts, feelings, need and/or experiences regarding your treatment plan: "It's good"  The Client's short term treatment goals will be reviewed and or completed on or before 12/20/20  __________________________________________________________________     __________________________________________________________________     __________________________________________________________________     __________________________________________________________________     _______________________________________                 Patient signature, Date Time: __________________________________________        Physician cosigner signature, Date, Time: ________________________________

## 2020-08-20 NOTE — PSYCH
Treatment Plan Tracking    Initial Treatment Plan not completed within required time limits due to: to Client's medical appointments and this therapist being on vacation

## 2020-08-20 NOTE — TELEPHONE ENCOUNTER
Pt called stating that she needs to change her procedure to 9/18 if it is open     Pt can be reached at [phone]

## 2020-08-20 NOTE — PSYCH
Psychotherapy Provided: Individual Psychotherapy 60  minutes 08:55 AM to 9:51 Am          Goals addressed in session:(Initial Treatment Plan) The Client reported that she had a bad mammogram, and will need to be revaluated within the next week  She reported that this issue has increased her anxiety and depression  During the session we created her recovery treatment plan addressing her anxiety and depression  It is hoped that by addressing her weaknesses this will act as a preventive measure against the possible need for higher level of care and services  Interventions: Supportive Therapy, Strengths Therapy,  and Cognitive Behavioral Therapy where the treatment modalities utilized during the session  Assessment and Plan:The Client presented a depressed anxious mood that was congruent in effect  She was alert, goal directed and participated with prompts during the session  The Client was oriented to person, place, time, situation, and reported no suicidal/homicidal ideation, plan, or intent  As team we created her recovery treatment plan addressing her depression and anxiety, which has effected her different relationships and environments  The Client's present treatment plan will cover 4 months or 12 visits  As her home commitment the Client will contact [facility] ([phone]) to obtain supports coordination  Next scheduled appointment was set for 1 week    Pain:      PSYCH MENTAL STATUS PAIN :6 as reported that by Client     PHYSICAL PAIN SCALE NUMBERS:8 as reported by the Client     Current suicide risk : Low/Phone number for [facility] was given  to the Client [phone]  The Client was given phone number for the [facility] [phone]  Behavioral Health Treatment Plan [facility]: Diagnosis and Treatment Plan explained to Cristy Garcia, Cristy Garcia  relates understanding diagnosis and is agreeable to Treatment Plan  Yes    Virtual Regular Visit      Assessment/Plan:    Problem List Items Addressed This Visit     None      Visit Diagnoses     Major depressive disorder, recurrent episode, moderate ([facility] [address] )    -  Primary               Reason for visit is to create the Client's recovery treatment Plan     Encounter provider YONATAN Bray    Provider located at [address] [address]      Recent Visits  No visits were found meeting these conditions  Showing recent visits within past 7 days and meeting all other requirements     Future Appointments  No visits were found meeting these conditions  Showing future appointments within next 150 days and meeting all other requirements        The patient was identified by name and date of birth  Liset Carrasquillo was informed that this is a telemedicine visit and that the visit is being conducted through [employer] and patient was informed that this is not a secure, HIPAA-complaint platform  She agrees to proceed     My office door was closed  No one else was in the room  She acknowledged consent and understanding of privacy and security of the video platform  The patient has agreed to participate and understands they can discontinue the visit at any time  Patient is aware this is a billable service  Subjective  Liset Carrasquillo is a 53 y o  female , who was seen for individual mental health         HPI     Past Medical History:   Diagnosis Date    Allergic     Seasonal Allergies    Alopecia of scalp     Treated with Proscar    Asthma     Astigmatism     Carbon monoxide exposure     Carpal tunnel syndrome, left     Fibromyalgia     GERD (gastroesophageal reflux disease)     Hip fracture (HCC)     Hip fx, left, closed, with routine healing, subsequent encounter 05/2017    Hypertension     Insomnia     Irregular heartbeat     Lung disease     Mononucleosis     resolved    Obesity     VICTOR HUGO on CPAP     Prediabetes     Sleep apnea     Tachycardia        Past Surgical History:   Procedure Laterality Date    CARPAL TUNNEL RELEASE Left     UPPER GASTROINTESTINAL ENDOSCOPY         Current Outpatient Medications   Medication Sig Dispense Refill    albuterol (Ventolin HFA) 90 mcg/act inhaler Inhale 2 puffs every 6 (six) hours as needed for wheezing 1 Inhaler 5    celecoxib (CeleBREX) 100 mg capsule Take 1 capsule (100 mg total) by mouth 2 (two) times a day 60 capsule 5    cyclobenzaprine (FLEXERIL) 10 mg tablet 1 tab po every 12 hours x 7 days then prn spasms or pain 60 tablet 3    ergocalciferol (VITAMIN D2) 50,000 units Take 1 capsule (50,000 Units total) by mouth once a week 4 capsule 5    fluticasone-salmeterol (ADVAIR, WIXELA) 250-50 mcg/dose inhaler Inhale 1 puff 2 (two) times a day Rinse mouth after use  1 Inhaler 5    glipiZIDE (GLUCOTROL) 5 mg tablet Take 1 tablet (5 mg total) by mouth daily with breakfast 30 tablet 5    glucose blood test strip Use as instructed 100 each 5    Lancets (ONETOUCH ULTRASOFT) lancets Use as instructed 100 each 5    lisinopril (ZESTRIL) 10 mg tablet Take 1 tablet (10 mg total) by mouth daily 90 tablet 1    metFORMIN (GLUCOPHAGE) 1000 MG tablet Take 1 tablet (1,000 mg total) by mouth 2 (two) times a day with meals 180 tablet 1    sertraline (ZOLOFT) 25 mg tablet Take 1 tablet (25 mg total) by mouth daily at bedtime 30 tablet 0     No current facility-administered medications for this visit  Allergies   Allergen Reactions    Tdap [Tetanus-Diphth-Acell Pertussis] Rash       Review of Systems    Video Exam    There were no vitals filed for this visit  Physical Exam     I spent 60 minutes directly with the patient during this visit      [facility] acknowledges that she has consented to an online visit or consultation   She understands that the online visit is based solely on information provided by her, and that, in the absence of a face-to-face physical evaluation by the physician, the diagnosis she receives is both limited and provisional in terms of accuracy and completeness  This is not intended to replace a full medical face-to-face evaluation by the physician  Cristy Garcia understands and accepts these terms

## 2020-08-21 ENCOUNTER — TELEPHONE (OUTPATIENT)
Dept: PAIN MEDICINE | Facility: CLINIC | Age: 45
End: 2020-08-21

## 2020-08-21 NOTE — TELEPHONE ENCOUNTER
Pt is calling again to re-schedule    Pt has been trying for the past 2 days to get a call    Call was escalated to practice admin

## 2020-08-21 NOTE — TELEPHONE ENCOUNTER
Patient called the call center today asking to speak to someone regarding her procedure that is scheduled in September  I returned the patients phone call and left a voicemail for her to call me

## 2020-08-24 ENCOUNTER — OFFICE VISIT (OUTPATIENT)
Dept: PHYSICAL THERAPY | Facility: CLINIC | Age: 45
End: 2020-08-24
Payer: COMMERCIAL

## 2020-08-24 DIAGNOSIS — G89.29 CHRONIC MIDLINE LOW BACK PAIN WITHOUT SCIATICA: Primary | ICD-10-CM

## 2020-08-24 DIAGNOSIS — G89.29 CHRONIC PAIN OF RIGHT KNEE: ICD-10-CM

## 2020-08-24 DIAGNOSIS — M54.50 CHRONIC LEFT-SIDED LOW BACK PAIN WITHOUT SCIATICA: ICD-10-CM

## 2020-08-24 DIAGNOSIS — S80.01XD CONTUSION OF RIGHT KNEE, SUBSEQUENT ENCOUNTER: ICD-10-CM

## 2020-08-24 DIAGNOSIS — R26.2 DIFFICULTY WALKING: ICD-10-CM

## 2020-08-24 DIAGNOSIS — G89.29 CHRONIC LEFT-SIDED LOW BACK PAIN WITHOUT SCIATICA: ICD-10-CM

## 2020-08-24 DIAGNOSIS — M25.561 CHRONIC PAIN OF RIGHT KNEE: ICD-10-CM

## 2020-08-24 DIAGNOSIS — M54.50 ACUTE LEFT-SIDED LOW BACK PAIN WITHOUT SCIATICA: ICD-10-CM

## 2020-08-24 DIAGNOSIS — F33.9 MAJOR DEPRESSION, RECURRENT, CHRONIC (HCC): ICD-10-CM

## 2020-08-24 DIAGNOSIS — M54.50 CHRONIC MIDLINE LOW BACK PAIN WITHOUT SCIATICA: Primary | ICD-10-CM

## 2020-08-24 DIAGNOSIS — M25.552 LEFT HIP PAIN: ICD-10-CM

## 2020-08-24 PROCEDURE — 97110 THERAPEUTIC EXERCISES: CPT | Performed by: PHYSICAL THERAPIST

## 2020-08-24 PROCEDURE — 97140 MANUAL THERAPY 1/> REGIONS: CPT | Performed by: PHYSICAL THERAPIST

## 2020-08-24 PROCEDURE — 97164 PT RE-EVAL EST PLAN CARE: CPT | Performed by: PHYSICAL THERAPIST

## 2020-08-24 RX ORDER — SERTRALINE HYDROCHLORIDE 25 MG/1
TABLET, FILM COATED ORAL
Qty: 30 TABLET | Refills: 0 | Status: SHIPPED | OUTPATIENT
Start: 2020-08-24 | End: 2020-09-01 | Stop reason: SDUPTHER

## 2020-08-24 NOTE — PROGRESS NOTES
Today's date: 2020  Patient name: Ambrose Amador  : 1975  MRN: [id number]  Referring provider: CHRIS Lindsay  Dx:   Encounter Diagnosis     ICD-10-CM    1  Chronic midline low back pain without sciatica  M54 5     G89 29    2  Acute left-sided low back pain without sciatica  M54 5    3  Left hip pain  M25 552    4  Chronic pain of right knee  M25 561     G89 29    5  Difficulty walking  R26 2    6  Chronic left-sided low back pain without sciatica  M54 5     G89 29    7  Contusion of right knee, subsequent encounter  S80  01XD      Subjective:  Cristy states her back feels a little better  She had a better week end  She is sleeping a little better  She can still have a lot of pain at times but the constant pain is reducing  Objective: See treatment log below  Cristy continues to be advised to follow her home exercise program as tolerated  When Cristy is feeling good she follows her rehab exercises in the gym and on other days she follows her home exercise program at home as tolerated  In the future we plan on having Cristy stay at home and follow her home exercise program for four to six weeks and than assess for either more Rehab treatments or discharge from Rehab care  Assessment: Tolerated treatment well  Patient exhibited good technique with therapeutic exercises and would benefit from continued PT  Cristy's goals are to continue to improve with her rehab program and improve with functional mobility, speed, repetition and decreased c/o pain with her gym and home exercise program   Cristy's final goal for her rehab is to be discharged from Rehab care after obtaining her full functional rehab potential     Plan: Continue per plan of care  Progress treatment as tolerated         Precautions:  Low Back Pain and Left Hip Pain    All treatments below will be provided with a focus on strengthening, flexibility, ROM, postural,   endurance and any possible swelling and pain which may be present without ignoring   neural issues involving balance, coordination and proprioception which is also important   and necessary to provide full functional mobility and quality care        Daily Treatment Log  Manual  8/17 8/19 8/24     MT, ROM 45' 45' 45'     HEP        Exercise Log 8/17 8/19 8/24     Balance Board        Chair Squats        BOSU-Walk        Foam Pad SLR,Hip/KneeFl,Step Ups        Foam Beam        P-Bar-GT-Forward, Backward,Side-Even & Dips        Monster Steps        Fitter-Slalom        T/G-Squats,PF        W/P-PNF,IR,ER,PU,PS:Top,Mid,Bot        NuStep 15' L5 10' L5 10' L5     Meena-BP,Lats,PD,Row        Wsnqbyr-SJ-Ne        NK Table        TM        UBC        Bike        Stepper        ME, PE 15' 15' 15'     Bridges         Supine march        [facility] 8/17 8/19 8/24     MH / PE / ES 20' 20' 20'

## 2020-08-24 NOTE — LETTER
2020  PT Reevaluation Plan Of Care  Patient: Wendy Medina   YOB: 1975   Date of Visit: 2020     Encounter Diagnosis     ICD-10-CM    1  Chronic midline low back pain without sciatica  M54 5     G89 29    2  Acute left-sided low back pain without sciatica  M54 5    3  Left hip pain  M25 552    4  Chronic pain of right knee  M25 561     G89 29    5  Difficulty walking  R26 2    6  Chronic left-sided low back pain without sciatica  M54 5     G89 29    7  Contusion of right knee, subsequent encounter  S80  01XD      Dear Dr Sierra Rubio:  Thank you for your recent referral of Cristy Briggss  Please review the attached evaluation summary from Cristy's recent visit  Please verify that you agree with the plan of care by signing the attached order  If you have any questions or concerns, please do not hesitate to call  I sincerely appreciate the opportunity to share in the care of one of your patients and hope to have another opportunity to work with you in the near future  Sincerely,    Caterina Aaron, PT    Referring Provider:   I certify that I have read the below Plan of Care and certify the need for these services furnished under this plan of treatment while under my care  Naveen Hamlin [facility] [address]  VIA In Basket    Please SIGN ABOVE and return THIS PAGE ONLY to Fax # [phone]                Today's date: 2020  Patient name: Wendy Medina  : 1975  MRN: [id number]  Referring provider: CHRIS Perez  Dx:   Encounter Diagnosis     ICD-10-CM    1  Chronic midline low back pain without sciatica  M54 5     G89 29    2  Acute left-sided low back pain without sciatica  M54 5    3  Left hip pain  M25 552    4  Chronic pain of right knee  M25 561     G89 29    5  Difficulty walking  R26 2    6  Chronic left-sided low back pain without sciatica  M54 5     G89 29    7   Contusion of right knee, subsequent encounter S80 01XD      Subjective:  Cristy states her back feels a little better  She had a better week end  She is sleeping a little better  She can still have a lot of pain at times but the constant pain is reducing  Objective: See treatment log below  Cristy continues to be advised to follow her home exercise program as tolerated  When Cristy is feeling good she follows her rehab exercises in the gym and on other days she follows her home exercise program at home as tolerated  In the future we plan on having Cristy stay at home and follow her home exercise program for four to six weeks and than assess for either more Rehab treatments or discharge from Rehab care  Assessment: Tolerated treatment well  Patient exhibited good technique with therapeutic exercises and would benefit from continued PT  Cristy's goals are to continue to improve with her rehab program and improve with functional mobility, speed, repetition and decreased c/o pain with her gym and home exercise program   Cristy's final goal for her rehab is to be discharged from Rehab care after obtaining her full functional rehab potential     Plan: Continue per plan of care  Progress treatment as tolerated  Precautions:  Low Back Pain and Left Hip Pain    All treatments below will be provided with a focus on strengthening, flexibility, ROM, postural,   endurance and any possible swelling and pain which may be present without ignoring   neural issues involving balance, coordination and proprioception which is also important   and necessary to provide full functional mobility and quality care        Daily Treatment Log  Manual  8/17 8/19 8/24     MT, ROM 45' 45' 45'     HEP        Exercise Log 8/17 8/19 8/24     Balance Board        Chair Squats        BOSU-Walk        Foam Pad SLR,Hip/KneeFl,Step Ups        Foam Beam        P-Bar-GT-Forward, Backward,Side-Even & Dips        Monster Steps        Fitter-Slalom        T/G-Squats,PF W/P-PNF,IR,ER,PU,PS:Top,Mid,Bot        NuStep 15' L5 10' L5 10' L5     Meena-BP,Lats,PD,Row        Sqeqxqe-AH-Pa        NK Table        TM        UBC        Bike        Stepper        ME, PE 15' 15' 15'     Bridges         Supine      MH / PE / ES 20' 20' 20'     US            PT Re-Evaluation   Today's date: 2020     Patient name: Talmage Holstein  : 1975  MRN: [id number]  Referring provider: CHRIS Bean  Dx:   Encounter Diagnosis     ICD-10-CM    1  Chronic midline low back pain without sciatica  M54 5     G89 29    2  Acute left-sided low back pain without sciatica  M54 5    3  Left hip pain  M25 552    4  Chronic pain of right knee  M25 561     G89 29    5  Difficulty walking  R26 2    6  Chronic left-sided low back pain without sciatica  M54 5     G89 29    7  Contusion of right knee, subsequent encounter  S80  01XD      Assessment  Assessment details: Patient is progressing well  Impairments: abnormal gait, abnormal or restricted ROM, abnormal movement, activity intolerance, impaired balance, pain with function and safety issue  Understanding of Dx/Px/POC: excellent   Prognosis: good    Goals  STG  2-4 weeks:   Increase lumbar spine AROM by 2-4 degrees  Increase lower extremity strength by 2-4 lbs in all weak areas  Improve sitting posture and body mechanics  Increase standing/ADL tolerance minutes  Decrease pain by 25-50% with standing, walking, and stairs  Initiate HEP  LTG  6-8 weeks:   Demonstrate normal lumbar rotation  Decrease pain to 1-2/10 with activity  Increase lower extremity strength by 10-20 lbs in all weak areas  Improve spinal stability  Improve gait pattern and balance  Decrease limitations with standing, walking and ADL's  DC with HEP  Plan  Plan details: All planned modality interventions and planned therapy interventions are provided PRN    Patient would benefit from: PT eval and skilled physical therapy  Planned modality interventions: unattended electrical stimulation, ultrasound and TENS  Planned therapy interventions: joint mobilization, manual therapy, balance, balance/weight bearing training, neuromuscular re-education, patient education, self care, strengthening, stretching, therapeutic activities, therapeutic exercise, therapeutic training, transfer training, coordination, flexibility, gait training, graded exercise and home exercise program  Frequency: 3x week  Duration in weeks: 12  Treatment plan discussed with: patient      Subjective Evaluation    Pain  Quality: discomfort, knife-like, pulling, squeezing, sharp, tight and throbbing  Relieving factors: change in position, relaxation, rest and support  Aggravating factors: lifting, running, stair climbing, walking and standing  Progression: improved    Treatments  Previous treatment: physical therapy  Current treatment: physical therapy  Patient Goals  Patient goals for therapy: decreased pain, improved balance, increased motion, return to work, return to [employer] activities, independence with ADLs/IADLs and increased strength      Date of onset:  5/15/2020    Date of Surgery:  None    History of Present Episode: 6/23/2020  Poppy states she has had low back pain and left hip pain since she fell last year  Past Medical History:    6/23/2020  Poppy reports chronic back and left hip pain since she fell 1 1/2 year ago  Previous Level of Functional Ability:  6/23/2020  Poppy states she was doing OK but recently her back and left hip flared up and are getting worse  Inspection / Palpation:  Lumbar:  6/23/2020  Endomorphic body type  No signs of infection  No signs of wounds  No signs of drainage  No signs of ecchymotic regions  No signs of erythremic regions  Moderate signs of muscle spasm  Moderate signs of muscle guarding  Moderate signs of tenderness reported to palpation  No signs of swelling  No signs of a surgery site  Current conditions appears consistent with recent acute episode  Chief Complaints:  6/23/2020  Poppy reports moderate to severe difficulty with standing  Poppy reports moderate to severe difficulty with walking  Poppy reports moderate difficulty with movement / use of her lumbar region  Poppy reports moderate to severe difficulty with use of stairs  Poppy reports severe difficulty with running  Poppy reports severe difficulty with jumping  Poppy reports moderate to severe difficulty with use of inclines  Poppy reports moderate difficulty with sleeping  Poppy reports moderate difficulty with her strength and endurance  Poppy reports moderate limitations with her range of motion  Poppy reports moderate difficulty lying on her lumbar region  Poppy reports moderate difficulty twisting / turning her lumbar region      LUMBAR PAIN     Resting Palpation Bending Extending Walking Lifting   6/23/2020 4-5 5-8 5-6 6-8 5-8 5-8   7/23/20 3-4 4-7 4-5 5-7 4-7 4-7   8/24/2020 2-3 3-6 3-4 4-6 3-6 3-6     LUMBAR PAIN     Pulling Pushing Sleeping Twisting Standing   6/23/2020 4-6 4-6 4-8 4-8 5-8   7/23/20 3-5 3-5 3-7 3-7 4-7   8/24/2020 2-4 2-4 2-6 2-6 3-6     LUMBAR AROM Flexion Extension Rotation Right   6/23/2020 45° 4° 23°   7/23/20 49° 6° 26°   8/24/2020 56° 8° 30°     LUMBAR AROM Rotation Left Side Bending Right Side Bending Left   6/23/2020 22° 21° 20°   7/23/20 25° 24° 24°   8/24/2020 29° 28° 29°     LUMBAR MMT / PAIN Flexion Extension Rotation Right   6/23/2020 0/10  18 lbs 0/10  19 lbs 0/10  15 lbs   7/23/20 0/10  21 lbs 0/10  22 lbs 0/10  18 lbs   8/24/2020 0/10  25 lbs 0/10  25 lbs 0/10  22 lbs     LUMBAR MMT / PAIN Rotation Left Side Bend Right Side Bend Left   6/23/2020 0/10  15 lbs 0/10  16 lbs 0/10  15 lbs   7/23/20 0/10  18 lbs 0/10  19 lbs 0/10  18 lbs   8/24/2020 0/10  22 lbs 0/10  22 lbs 0/10  22 lbs     LE MMT / PAIN Dorsiflexion Plantarflexion Knee flexion Knee extension   6/23/2020 Rt 0/10  32 lbs 0/10  41 lbs 0/10  31 lbs 0/10  32 lbs   6/23/2020 Lt 0/10  31 lbs 0/10  40 lbs 0/10  29 lbs 0/10  33 lbs     LE MMT / PAIN Hip Flexion Hip Abduction Hip Adduction   6/23/2020  Rt 0/10  21 lbs 0/10  23 lbs 0/10  22 lbs   6/23/2020  Lt 0/10  20 lbs 0/10  24 lbs 0/10  23 lbs     LUMBAR SCREEN Referred Pain Sacroiliac Joint test Squish Test Straight Leg  Raise   6/23/2020 Rt Negative Negative Negative Negative   6/23/2020 Lt Negative Negative Negative Negative     LUMBAR SCREEN Valsalva Gapping Bowstring sign Hip Bursitis   6/23/2020 Rt Negative Negative Negative Negative   6/23/2020 Lt Negative Negative Negative Negative     Precautions:  Low Back Pain and Left Hip Pain

## 2020-08-25 ENCOUNTER — TELEMEDICINE (OUTPATIENT)
Dept: BEHAVIORAL/MENTAL HEALTH CLINIC | Facility: CLINIC | Age: 45
End: 2020-08-25
Payer: COMMERCIAL

## 2020-08-25 ENCOUNTER — OFFICE VISIT (OUTPATIENT)
Dept: PAIN MEDICINE | Facility: CLINIC | Age: 45
End: 2020-08-25
Payer: COMMERCIAL

## 2020-08-25 ENCOUNTER — TELEPHONE (OUTPATIENT)
Dept: PAIN MEDICINE | Facility: CLINIC | Age: 45
End: 2020-08-25

## 2020-08-25 VITALS
WEIGHT: 267 LBS | TEMPERATURE: 99.2 F | DIASTOLIC BLOOD PRESSURE: 90 MMHG | HEIGHT: 63 IN | HEART RATE: 76 BPM | SYSTOLIC BLOOD PRESSURE: 120 MMHG | BODY MASS INDEX: 47.31 KG/M2

## 2020-08-25 DIAGNOSIS — M54.40 CHRONIC BILATERAL LOW BACK PAIN WITH SCIATICA, SCIATICA LATERALITY UNSPECIFIED: Primary | ICD-10-CM

## 2020-08-25 DIAGNOSIS — G89.29 CHRONIC BILATERAL LOW BACK PAIN WITH SCIATICA, SCIATICA LATERALITY UNSPECIFIED: Primary | ICD-10-CM

## 2020-08-25 DIAGNOSIS — F33.1 MAJOR DEPRESSIVE DISORDER, RECURRENT EPISODE, MODERATE (HCC): Primary | ICD-10-CM

## 2020-08-25 DIAGNOSIS — M54.16 LUMBAR RADICULOPATHY: ICD-10-CM

## 2020-08-25 PROCEDURE — T1015 CLINIC SERVICE: HCPCS | Performed by: SOCIAL WORKER

## 2020-08-25 PROCEDURE — 3074F SYST BP LT 130 MM HG: CPT | Performed by: PHYSICIAN ASSISTANT

## 2020-08-25 PROCEDURE — 3051F HG A1C>EQUAL 7.0%<8.0%: CPT | Performed by: PHYSICIAN ASSISTANT

## 2020-08-25 PROCEDURE — 1036F TOBACCO NON-USER: CPT | Performed by: PHYSICIAN ASSISTANT

## 2020-08-25 PROCEDURE — 99213 OFFICE O/P EST LOW 20 MIN: CPT | Performed by: PHYSICIAN ASSISTANT

## 2020-08-25 PROCEDURE — 3080F DIAST BP >= 90 MM HG: CPT | Performed by: PHYSICIAN ASSISTANT

## 2020-08-25 PROCEDURE — 3008F BODY MASS INDEX DOCD: CPT | Performed by: PHYSICIAN ASSISTANT

## 2020-08-25 NOTE — TELEPHONE ENCOUNTER
Patient is calling to reschedule her procedure with Liset on 9/3     Please advise,     Thank you     [phone]

## 2020-08-25 NOTE — PSYCH
Psychotherapy Provided: Individual Psychotherapy 60  minutes 4:00 Pm to 4:53 Pm          Goals addressed in session:(Long Term Goal Number One) The Client reported that she has been having pain in her back, which has caused an increase in her depression and anxiety  During the session we explored the Cycle of her depression and the effects on her different relationships and environments  It is hoped that by addressing her weaknesses this will act as a preventive measure against the possible need for higher level of care and services  Interventions: Supportive Therapy, Strengths Therapy,  and Cognitive Behavioral Therapy where the treatment modalities utilized during the session  Assessment and Plan: The client presented a depressed anxious mood that was congruent in effect  She was alert, goal directed and participated with prompts during the session  The Client was oriented to person, place, time, situation, and reported no suicidal/homicidal ideation, plan, or intent  As team we explored the Client's cycle of her depression  She identified her major stressors being her physical health and financial issues, as well as her thoughts, feelings, physical symptoms, and behavioral response  Coping/Relaxation skills where reviewed during the session  As her home commitment the Client will practice her coping skills grounding when presented with anxiety and or depression  Next scheduled appointment was set for 1 week  Pain:      PSYCH MENTAL STATUS PAIN : 6 as reported by the Client due to economic issues     PHYSICAL PAIN SCALE NUMBERS: 8 as reported by the client due to back pain    Current suicide risk : Low/Phone number for [facility] was given  to the Client [phone]  The Client was given phone number for the [facility] [phone]           Behavioral Health Treatment Plan [facility]: Diagnosis and Treatment Plan explained to Voice123, Voice123 relates understanding diagnosis and is agreeable to Treatment Plan  Yes  Virtual Regular Visit      Assessment/Plan:    Problem List Items Addressed This Visit     None      Visit Diagnoses     Major depressive disorder, recurrent episode, moderate ([address] )    -  Primary               Reason for visit is No chief complaint on file  Encounter provider Rosa YONATAN Kinsey    Provider located at [address] [address]      Recent Visits  Date Type Provider Dept   08/20/20 Telemedicine RosaYONATAN [facility]   Showing recent visits within past 7 days and meeting all other requirements     Today's Visits  Date Type Provider Dept   08/25/20 Telemedicine [facility]ONATAN [facility]   Showing today's visits and meeting all other requirements     Future Appointments  No visits were found meeting these conditions  Showing future appointments within next 150 days and meeting all other requirements        The patient was identified by name and date of birth  Kaiden Reynoso was informed that this is a telemedicine visit and that the visit is being conducted through [facility] and patient was informed that this is not a secure, HIPAA-complaint platform  She agrees to proceed     My office door was closed  No one else was in the room  She acknowledged consent and understanding of privacy and security of the video platform  The patient has agreed to participate and understands they can discontinue the visit at any time  Patient is aware this is a billable service  Subjective  Kaiden Reynoso is a 53 y o  female , was seen for individual mental health therapy         HPI     Past Medical History:   Diagnosis Date    Allergic     Seasonal Allergies    Alopecia of scalp     Treated with Proscar    Asthma     Astigmatism     Carbon monoxide exposure     Carpal tunnel syndrome, left     Fibromyalgia     GERD (gastroesophageal reflux disease)     Hip fracture (HCC)     Hip fx, left, closed, with routine healing, subsequent encounter 05/2017    Hypertension     Insomnia     Irregular heartbeat     Lung disease     Mononucleosis     resolved    Obesity     VICTOR HUGO on CPAP     Prediabetes     Sleep apnea     Tachycardia        Past Surgical History:   Procedure Laterality Date    CARPAL TUNNEL RELEASE Left     UPPER GASTROINTESTINAL ENDOSCOPY         Current Outpatient Medications   Medication Sig Dispense Refill    albuterol (Ventolin HFA) 90 mcg/act inhaler Inhale 2 puffs every 6 (six) hours as needed for wheezing 1 Inhaler 5    celecoxib (CeleBREX) 100 mg capsule Take 1 capsule (100 mg total) by mouth 2 (two) times a day 60 capsule 5    cyclobenzaprine (FLEXERIL) 10 mg tablet 1 tab po every 12 hours x 7 days then prn spasms or pain 60 tablet 3    ergocalciferol (VITAMIN D2) 50,000 units Take 1 capsule (50,000 Units total) by mouth once a week 4 capsule 5    fluticasone-salmeterol (ADVAIR, WIXELA) 250-50 mcg/dose inhaler Inhale 1 puff 2 (two) times a day Rinse mouth after use  1 Inhaler 5    glipiZIDE (GLUCOTROL) 5 mg tablet Take 1 tablet (5 mg total) by mouth daily with breakfast 30 tablet 5    glucose blood test strip Use as instructed 100 each 5    Lancets (ONETOUCH ULTRASOFT) lancets Use as instructed 100 each 5    lisinopril (ZESTRIL) 10 mg tablet Take 1 tablet (10 mg total) by mouth daily 90 tablet 1    metFORMIN (GLUCOPHAGE) 1000 MG tablet Take 1 tablet (1,000 mg total) by mouth 2 (two) times a day with meals 180 tablet 1    sertraline (ZOLOFT) 25 mg tablet TAKE (1) TABLET DAILY AT BEDTIME  30 tablet 0     No current facility-administered medications for this visit  Allergies   Allergen Reactions    Tdap [Tetanus-Diphth-Acell Pertussis] Rash       Review of Systems    Video Exam    There were no vitals filed for this visit      Physical Exam     I spent 60 minutes directly with the patient during this visit      [address] [place] [address] acknowledges that she has consented to an online visit or consultation  She understands that the online visit is based solely on information provided by her, and that, in the absence of a face-to-face physical evaluation by the physician, the diagnosis she receives is both limited and provisional in terms of accuracy and completeness  This is not intended to replace a full medical face-to-face evaluation by the physician  Cristy Garcia understands and accepts these terms

## 2020-08-25 NOTE — PROGRESS NOTES
Assessment:  1  Chronic bilateral low back pain with sciatica, sciatica laterality unspecified    2  Lumbar radiculopathy        Plan:  Discussed in length MRI results, current pain, and treatment options  She was encouraged to follow up with pain management regarding any further questions about her upcoming procedure  She was additionally encouraged to continue PT  The patient will follow-up with pain management as scheduled  The patient was advised to contact the office should their symptoms worsen in the interim  The patient was agreeable and verbalized an understanding  History of Present Illness: The patient is a 53 y o  female last seen on  7/22/20 who presents for a follow up office visit in regards to chronic pain secondary to degenerative disc disease  The patient currently reports unchanged pain since last visit  Has started physical therap and additionally seen pain management for consult  She is scheduled for [facility] next month  She presents today to review her MRI  Denies any new sx  Finds physical therapy overall is not providing her any additional pain or symptom relief  It was discussed potentially starting Lyrica at Pain Management visit however she denies being provided prescription for this  She does continue to utilize Celebrex and Flexeril from her PCP  I have personally reviewed and/or updated the patient's past medical history, past surgical history, family history, social history, current medications, allergies, and vital signs today  Review of Systems:    Review of Systems   Constitutional: Negative for chills, diaphoresis, fever and unexpected weight change  HENT: Negative for trouble swallowing  Eyes: Negative for visual disturbance  Respiratory: Negative for shortness of breath  Cardiovascular: Negative for chest pain and leg swelling  Gastrointestinal: Negative for constipation and diarrhea     Endocrine: Negative for cold intolerance and heat intolerance  Genitourinary: Negative for decreased urine volume and difficulty urinating  Musculoskeletal: Positive for back pain  Skin: Negative for color change and rash  Allergic/Immunologic: Negative for immunocompromised state  Neurological: Negative for dizziness, syncope and light-headedness           Past Medical History:   Diagnosis Date    Allergic     Seasonal Allergies    Alopecia of scalp     Treated with Proscar    Asthma     Astigmatism     Carbon monoxide exposure     Carpal tunnel syndrome, left     Fibromyalgia     GERD (gastroesophageal reflux disease)     Hip fracture (HCC)     Hip fx, left, closed, with routine healing, subsequent encounter 05/2017    Hypertension     Insomnia     Irregular heartbeat     Lung disease     Mononucleosis     resolved    Obesity     VICTOR HUGO on CPAP     Prediabetes     Sleep apnea     Tachycardia        Past Surgical History:   Procedure Laterality Date    CARPAL TUNNEL RELEASE Left     UPPER GASTROINTESTINAL ENDOSCOPY         Family History   Problem Relation Age of Onset    Cancer Father     Hepatitis Father     Asthma Cousin     Hypertension Paternal Grandmother     No Known Problems Mother     No Known Problems Sister     No Known Problems Maternal Grandmother     No Known Problems Maternal Grandfather     No Known Problems Paternal Grandfather     No Known Problems Paternal Aunt        Social History     Occupational History    Not on file   Tobacco Use    Smoking status: Former Smoker    Smokeless tobacco: Never Used   Substance and Sexual Activity    Alcohol use: No    Drug use: No    Sexual activity: Not on file         Current Outpatient Medications:     albuterol (Ventolin HFA) 90 mcg/act inhaler, Inhale 2 puffs every 6 (six) hours as needed for wheezing, Disp: 1 Inhaler, Rfl: 5    celecoxib (CeleBREX) 100 mg capsule, Take 1 capsule (100 mg total) by mouth 2 (two) times a day, Disp: 60 capsule, Rfl: 5   cyclobenzaprine (FLEXERIL) 10 mg tablet, 1 tab po every 12 hours x 7 days then prn spasms or pain, Disp: 60 tablet, Rfl: 3    ergocalciferol (VITAMIN D2) 50,000 units, Take 1 capsule (50,000 Units total) by mouth once a week, Disp: 4 capsule, Rfl: 5    fluticasone-salmeterol (ADVAIR, WIXELA) 250-50 mcg/dose inhaler, Inhale 1 puff 2 (two) times a day Rinse mouth after use , Disp: 1 Inhaler, Rfl: 5    glipiZIDE (GLUCOTROL) 5 mg tablet, Take 1 tablet (5 mg total) by mouth daily with breakfast, Disp: 30 tablet, Rfl: 5    glucose blood test strip, Use as instructed, Disp: 100 each, Rfl: 5    Lancets (ONETOUCH ULTRASOFT) lancets, Use as instructed, Disp: 100 each, Rfl: 5    lisinopril (ZESTRIL) 10 mg tablet, Take 1 tablet (10 mg total) by mouth daily, Disp: 90 tablet, Rfl: 1    metFORMIN (GLUCOPHAGE) 1000 MG tablet, Take 1 tablet (1,000 mg total) by mouth 2 (two) times a day with meals, Disp: 180 tablet, Rfl: 1    sertraline (ZOLOFT) 25 mg tablet, TAKE (1) TABLET DAILY AT BEDTIME , Disp: 30 tablet, Rfl: 0    Allergies   Allergen Reactions    Tdap [Tetanus-Diphth-Acell Pertussis] Rash       Physical Exam:    /90   Pulse 76   Temp 99 2 °F (37 3 °C) (Tympanic)   Ht 5' 3" (1 6 m)   Wt 121 kg (267 lb)   BMI 47 30 kg/m²     Constitutional:normal, well developed, well nourished, alert, in no distress and non-toxic and no overt pain behavior  and overweight  Eyes:anicteric  HEENT:grossly intact  Neck:supple, symmetric, trachea midline and no masses   Pulmonary:even and unlabored  Cardiovascular:No edema or pitting edema present  Skin:Normal without rashes or lesions and well hydrated  Psychiatric:Mood and affect appropriate  Neurologic:Cranial Nerves II-XII grossly intact  Musculoskeletal:normal      Imaging  No orders to display         No orders of the defined types were placed in this encounter

## 2020-08-26 ENCOUNTER — HOSPITAL ENCOUNTER (OUTPATIENT)
Dept: RADIOLOGY | Facility: CLINIC | Age: 45
Discharge: HOME | End: 2020-08-26
Payer: COMMERCIAL

## 2020-08-26 ENCOUNTER — OFFICE VISIT (OUTPATIENT)
Dept: PHYSICAL THERAPY | Facility: CLINIC | Age: 45
End: 2020-08-26
Payer: COMMERCIAL

## 2020-08-26 VITALS — BODY MASS INDEX: 47.31 KG/M2 | WEIGHT: 267 LBS | HEIGHT: 63 IN

## 2020-08-26 DIAGNOSIS — R26.2 DIFFICULTY WALKING: ICD-10-CM

## 2020-08-26 DIAGNOSIS — G89.29 CHRONIC MIDLINE LOW BACK PAIN WITHOUT SCIATICA: Primary | ICD-10-CM

## 2020-08-26 DIAGNOSIS — R92.8 ABNORMAL MAMMOGRAM: ICD-10-CM

## 2020-08-26 DIAGNOSIS — M54.16 LUMBAR RADICULOPATHY: Primary | ICD-10-CM

## 2020-08-26 DIAGNOSIS — M54.50 CHRONIC LEFT-SIDED LOW BACK PAIN WITHOUT SCIATICA: ICD-10-CM

## 2020-08-26 DIAGNOSIS — G89.29 CHRONIC PAIN OF RIGHT KNEE: ICD-10-CM

## 2020-08-26 DIAGNOSIS — R92.8 ABNORMAL MAMMOGRAM: Primary | ICD-10-CM

## 2020-08-26 DIAGNOSIS — S80.01XD CONTUSION OF RIGHT KNEE, SUBSEQUENT ENCOUNTER: ICD-10-CM

## 2020-08-26 DIAGNOSIS — M54.50 ACUTE LEFT-SIDED LOW BACK PAIN WITHOUT SCIATICA: ICD-10-CM

## 2020-08-26 DIAGNOSIS — G89.29 CHRONIC LEFT-SIDED LOW BACK PAIN WITHOUT SCIATICA: ICD-10-CM

## 2020-08-26 DIAGNOSIS — M25.552 LEFT HIP PAIN: ICD-10-CM

## 2020-08-26 DIAGNOSIS — M54.50 CHRONIC MIDLINE LOW BACK PAIN WITHOUT SCIATICA: Primary | ICD-10-CM

## 2020-08-26 DIAGNOSIS — M25.561 CHRONIC PAIN OF RIGHT KNEE: ICD-10-CM

## 2020-08-26 PROCEDURE — 76642 ULTRASOUND BREAST LIMITED: CPT

## 2020-08-26 PROCEDURE — 97110 THERAPEUTIC EXERCISES: CPT | Performed by: PHYSICAL THERAPIST

## 2020-08-26 PROCEDURE — 77065 DX MAMMO INCL CAD UNI: CPT

## 2020-08-26 PROCEDURE — 97140 MANUAL THERAPY 1/> REGIONS: CPT | Performed by: PHYSICAL THERAPIST

## 2020-08-26 PROCEDURE — G0279 TOMOSYNTHESIS, MAMMO: HCPCS

## 2020-08-26 NOTE — LETTER
2020  PT Discharge Moriah Poon [facility] [address]    Patient: Maru Edwards   YOB: 1975   Date of Visit: 2020     Encounter Diagnosis     ICD-10-CM    1  Chronic midline low back pain without sciatica  M54 5     G89 29    2  Acute left-sided low back pain without sciatica  M54 5    3  Left hip pain  M25 552    4  Chronic pain of right knee  M25 561     G89 29    5  Difficulty walking  R26 2    6  Chronic left-sided low back pain without sciatica  M54 5     G89 29    7  Contusion of right knee, subsequent encounter  S80  01XD      Dear Dr Jimmy Wilson:  Thank you for your recent referral of Cristy Garcia  Please review the attached evaluation summary from Cristy's recent visit  Please verify that you agree with the plan of care by signing the attached order  If you have any questions or concerns, please do not hesitate to call  I sincerely appreciate the opportunity to share in the care of one of your patients and hope to have another opportunity to work with you in the near future  Sincerely,    Gordon Gómez, PT    Referring Provider:   I certify that I have read the below Plan of Care and certify the need for these services furnished under this plan of treatment while under my care  Jose Manuel Costello [facility] [address]  VIA In Basket    Please SIGN ABOVE and return THIS PAGE ONLY to Fax # [phone]        Today's date: 2020  Patient name: Maru Edwards  : 1975  MRN: [id number]  Referring provider: CHRIS Lozano  Dx:   Encounter Diagnosis     ICD-10-CM    1  Chronic midline low back pain without sciatica  M54 5     G89 29    2  Acute left-sided low back pain without sciatica  M54 5    3  Left hip pain  M25 552    4  Chronic pain of right knee  M25 561     G89 29    5  Difficulty walking  R26 2    6  Chronic left-sided low back pain without sciatica  M54 5     G89 29    7  Contusion of right knee, subsequent encounter  S80  01XD      Subjective:  Cristy states her back is feeling better  She is sleeping better  Objective: See treatment log below  Cristy continues to be advised to follow her home exercise program as tolerated  When Cristy is feeling good she follows her rehab exercises in the gym and on other days she follows her home exercise program at home as tolerated  In the future we plan on having Cristy stay at home and follow her home exercise program for four to six weeks and than assess for either more Rehab treatments or discharge from Rehab care  Assessment: Tolerated treatment well  Patient exhibited good technique with therapeutic exercises and would benefit from continued PT  rCisty's goals are to continue to improve with her rehab program and improve with functional mobility, speed, repetition and decreased c/o pain with her gym and home exercise program   Cristy's final goal for her rehab is to be discharged from Rehab care after obtaining her full functional rehab potential     Plan: Continue per plan of care  Progress treatment as tolerated  Precautions:  Low Back Pain and Left Hip Pain    All treatments below will be provided with a focus on strengthening, flexibility, ROM, postural,   endurance and any possible swelling and pain which may be present without ignoring   neural issues involving balance, coordination and proprioception which is also important   and necessary to provide full functional mobility and quality care        Daily Treatment Log  Manual  8/17 8/19 8/24 8/26    MT, ROM 45' 45' 45' 30'    HEP        Exercise Log 8/17 8/19 8/24 8/26    Balance Board        Chair Squats        BOSU-Walk        Foam Pad SLR,Hip/KneeFl,Step Ups        Foam Beam        P-Bar-GT-Forward, Backward,Side-Even & Dips        Monster Steps        Fitter-Slalom        T/G-Squats,PF        W/P-PNF,IR,ER,PU,PS:Top,Mid,Bot        NuStep 15' L5 10' L5 10' L5 10' L5 Meena-BP,Lats,PD,Row        Sgtynur-TL-St        NK Table        TM        UBC        Bike        Stepper        ME, PE 15' 15' 15' 15'    Bridges         Supine     MH / PE / ES 20' 20' 20' 20'    US            PT Discharge  Today's date: 2020  Patient name: Luda Wilcox  : 1975  MRN: [id number]  Referring provider: Chanda Essex, CRNP  Dx:   Encounter Diagnosis     ICD-10-CM    1  Chronic midline low back pain without sciatica  M54 5     G89 29    2  Acute left-sided low back pain without sciatica  M54 5    3  Left hip pain  M25 552    4  Chronic pain of right knee  M25 561     G89 29    5  Difficulty walking  R26 2    6  Chronic left-sided low back pain without sciatica  M54 5     G89 29    7  Contusion of right knee, subsequent encounter  S80  01XD      Assessment/Plan    Subjective    Objective     2020  Cristy Letcher has not returned for more treatment  Poppy Letcher did not attend today's appointment  I cannot provide you with a current progress report but I can provide you with information based on previous performance  Luda Wilcox is discharged at this time

## 2020-08-26 NOTE — PROGRESS NOTES
Today's date: 2020  Patient name: David Cardenas  : 1975  MRN: [id number]  Referring provider: CHRIS Coppola  Dx:   Encounter Diagnosis     ICD-10-CM    1  Chronic midline low back pain without sciatica  M54 5     G89 29    2  Acute left-sided low back pain without sciatica  M54 5    3  Left hip pain  M25 552    4  Chronic pain of right knee  M25 561     G89 29    5  Difficulty walking  R26 2    6  Chronic left-sided low back pain without sciatica  M54 5     G89 29    7  Contusion of right knee, subsequent encounter  S80  01XD      Subjective:  Cristy states her back is feeling better  She is sleeping better  Objective: See treatment log below  Cristy continues to be advised to follow her home exercise program as tolerated  When Cristy is feeling good she follows her rehab exercises in the gym and on other days she follows her home exercise program at home as tolerated  In the future we plan on having Cristy stay at home and follow her home exercise program for four to six weeks and than assess for either more Rehab treatments or discharge from Rehab care  Assessment: Tolerated treatment well  Patient exhibited good technique with therapeutic exercises and would benefit from continued PT  Cristy's goals are to continue to improve with her rehab program and improve with functional mobility, speed, repetition and decreased c/o pain with her gym and home exercise program   Cristy's final goal for her rehab is to be discharged from Rehab care after obtaining her full functional rehab potential     Plan: Continue per plan of care  Progress treatment as tolerated         Precautions:  Low Back Pain and Left Hip Pain    All treatments below will be provided with a focus on strengthening, flexibility, ROM, postural,   endurance and any possible swelling and pain which may be present without ignoring   neural issues involving balance, coordination and proprioception which is also important   and necessary to provide full functional mobility and quality care        Daily Treatment Log  Manual  8/17 8/19 8/24 8/26    MT, ROM 45' 45' 45' 30'    HEP        Exercise Log 8/17 8/19 8/24 8/26    Balance Board        Chair Squats        BOSU-Walk        Foam Pad SLR,Hip/KneeFl,Step Ups        Foam Beam        P-Bar-GT-Forward, Backward,Side-Even & Dips        Monster Steps        Fitter-Slalom        T/G-Squats,PF        W/P-PNF,IR,ER,PU,PS:Top,Mid,Bot        NuStep 15' L5 10' L5 10' L5 10' L5    Meena-BP,Lats,PD,Row        Hzuxygh-HX-Dh        NK Table        TM        UBC        Bike        Stepper        ME, PE 15' 15' 15' 15'    Bridges         Supine march        [facility] 8/17 8/19 8/24 8/26    Marciej [address] / PE / ES 20' 20' 20' 20'    US

## 2020-08-27 NOTE — PROGRESS NOTES
PT Re-Evaluation   Today's date: 2020     Patient name: Sophie Sommers  : 1975  MRN: [id number]  Referring provider: CHRIS Lucero  Dx:   Encounter Diagnosis     ICD-10-CM    1  Chronic midline low back pain without sciatica  M54 5     G89 29    2  Acute left-sided low back pain without sciatica  M54 5    3  Left hip pain  M25 552    4  Chronic pain of right knee  M25 561     G89 29    5  Difficulty walking  R26 2    6  Chronic left-sided low back pain without sciatica  M54 5     G89 29    7  Contusion of right knee, subsequent encounter  S80  01XD      Assessment  Assessment details: Patient is progressing well  Impairments: abnormal gait, abnormal or restricted ROM, abnormal movement, activity intolerance, impaired balance, pain with function and safety issue  Understanding of Dx/Px/POC: excellent   Prognosis: good    Goals  STG  2-4 weeks:   Increase lumbar spine AROM by 2-4 degrees  Increase lower extremity strength by 2-4 lbs in all weak areas  Improve sitting posture and body mechanics  Increase standing/ADL tolerance minutes  Decrease pain by 25-50% with standing, walking, and stairs  Initiate HEP  LTG  6-8 weeks:   Demonstrate normal lumbar rotation  Decrease pain to 1-2/10 with activity  Increase lower extremity strength by 10-20 lbs in all weak areas  Improve spinal stability  Improve gait pattern and balance  Decrease limitations with standing, walking and ADL's  DC with HEP  Plan  Plan details: All planned modality interventions and planned therapy interventions are provided PRN    Patient would benefit from: PT eval and skilled physical therapy  Planned modality interventions: unattended electrical stimulation, ultrasound and TENS  Planned therapy interventions: joint mobilization, manual therapy, balance, balance/weight bearing training, neuromuscular re-education, patient education, self care, strengthening, stretching, therapeutic activities, therapeutic exercise, therapeutic training, transfer training, coordination, flexibility, gait training, graded exercise and home exercise program  Frequency: 3x week  Duration in weeks: 12  Treatment plan discussed with: patient      Subjective Evaluation    Pain  Quality: discomfort, knife-like, pulling, squeezing, sharp, tight and throbbing  Relieving factors: change in position, relaxation, rest and support  Aggravating factors: lifting, running, stair climbing, walking and standing  Progression: improved    Treatments  Previous treatment: physical therapy  Current treatment: physical therapy  Patient Goals  Patient goals for therapy: decreased pain, improved balance, increased motion, return to work, return to [employer] activities, independence with ADLs/IADLs and increased strength      Date of onset:  5/15/2020    Date of Surgery:  None    History of Present Episode: 6/23/2020  Forrestpy states she has had low back pain and left hip pain since she fell last year  Past Medical History:    6/23/2020  Poppy reports chronic back and left hip pain since she fell 1 1/2 year ago  Previous Level of Functional Ability:  6/23/2020  Poppy states she was doing OK but recently her back and left hip flared up and are getting worse  Inspection / Palpation:  Lumbar:  6/23/2020  Endomorphic body type  No signs of infection  No signs of wounds  No signs of drainage  No signs of ecchymotic regions  No signs of erythremic regions  Moderate signs of muscle spasm  Moderate signs of muscle guarding  Moderate signs of tenderness reported to palpation  No signs of swelling  No signs of a surgery site  Current conditions appears consistent with recent acute episode  Chief Complaints:  6/23/2020  Poppy reports moderate to severe difficulty with standing  Poppy reports moderate to severe difficulty with walking  Poppy reports moderate difficulty with movement / use of her lumbar region    Poppy reports moderate to severe difficulty with use of stairs  Poppy reports severe difficulty with running  Poppy reports severe difficulty with jumping  Poppy reports moderate to severe difficulty with use of inclines  Poppy reports moderate difficulty with sleeping  Poppy reports moderate difficulty with her strength and endurance  Poppy reports moderate limitations with her range of motion  Poppy reports moderate difficulty lying on her lumbar region  Poppy reports moderate difficulty twisting / turning her lumbar region      LUMBAR PAIN     Resting Palpation Bending Extending Walking Lifting   6/23/2020 4-5 5-8 5-6 6-8 5-8 5-8   7/23/20 3-4 4-7 4-5 5-7 4-7 4-7   8/24/2020 2-3 3-6 3-4 4-6 3-6 3-6     LUMBAR PAIN     Pulling Pushing Sleeping Twisting Standing   6/23/2020 4-6 4-6 4-8 4-8 5-8   7/23/20 3-5 3-5 3-7 3-7 4-7   8/24/2020 2-4 2-4 2-6 2-6 3-6     LUMBAR AROM Flexion Extension Rotation Right   6/23/2020 45° 4° 23°   7/23/20 49° 6° 26°   8/24/2020 56° 8° 30°     LUMBAR AROM Rotation Left Side Bending Right Side Bending Left   6/23/2020 22° 21° 20°   7/23/20 25° 24° 24°   8/24/2020 29° 28° 29°     LUMBAR MMT / PAIN Flexion Extension Rotation Right   6/23/2020 0/10  18 lbs 0/10  19 lbs 0/10  15 lbs   7/23/20 0/10  21 lbs 0/10  22 lbs 0/10  18 lbs   8/24/2020 0/10  25 lbs 0/10  25 lbs 0/10  22 lbs     LUMBAR MMT / PAIN Rotation Left Side Bend Right Side Bend Left   6/23/2020 0/10  15 lbs 0/10  16 lbs 0/10  15 lbs   7/23/20 0/10  18 lbs 0/10  19 lbs 0/10  18 lbs   8/24/2020 0/10  22 lbs 0/10  22 lbs 0/10  22 lbs     LE MMT / PAIN Dorsiflexion Plantarflexion Knee flexion Knee extension   6/23/2020 Rt 0/10  32 lbs 0/10  41 lbs 0/10  31 lbs 0/10  32 lbs   6/23/2020 Lt 0/10  31 lbs 0/10  40 lbs 0/10  29 lbs 0/10  33 lbs     LE MMT / PAIN Hip Flexion Hip Abduction Hip Adduction   6/23/2020  Rt 0/10  21 lbs 0/10  23 lbs 0/10  22 lbs   6/23/2020  Lt 0/10  20 lbs 0/10  24 lbs 0/10  23 lbs     LUMBAR SCREEN Referred Pain Sacroiliac Joint test Squish Test Straight Leg  Raise   6/23/2020 Rt Negative Negative Negative Negative   6/23/2020 Lt Negative Negative Negative Negative     LUMBAR SCREEN Valsalva Gapping Bowstring sign Hip Bursitis   6/23/2020 Rt Negative Negative Negative Negative   6/23/2020 Lt Negative Negative Negative Negative     Precautions:  Low Back Pain and Left Hip Pain

## 2020-09-01 ENCOUNTER — OFFICE VISIT (OUTPATIENT)
Dept: FAMILY MEDICINE CLINIC | Facility: CLINIC | Age: 45
End: 2020-09-01
Payer: COMMERCIAL

## 2020-09-01 VITALS
BODY MASS INDEX: 46.95 KG/M2 | HEART RATE: 89 BPM | HEIGHT: 63 IN | DIASTOLIC BLOOD PRESSURE: 80 MMHG | SYSTOLIC BLOOD PRESSURE: 118 MMHG | WEIGHT: 265 LBS | TEMPERATURE: 98.1 F | OXYGEN SATURATION: 97 %

## 2020-09-01 DIAGNOSIS — I95.1 ORTHOSTATIC HYPOTENSION: ICD-10-CM

## 2020-09-01 DIAGNOSIS — F33.9 MAJOR DEPRESSION, RECURRENT, CHRONIC (HCC): Primary | ICD-10-CM

## 2020-09-01 PROCEDURE — 1036F TOBACCO NON-USER: CPT | Performed by: NURSE PRACTITIONER

## 2020-09-01 PROCEDURE — 3079F DIAST BP 80-89 MM HG: CPT | Performed by: NURSE PRACTITIONER

## 2020-09-01 PROCEDURE — 99213 OFFICE O/P EST LOW 20 MIN: CPT | Performed by: NURSE PRACTITIONER

## 2020-09-01 NOTE — PROGRESS NOTES
Assessment/Plan:     Diagnoses and all orders for this visit:    Major depression, recurrent, chronic ([facility] [address] )  Comments:  Increase Zoloft  Orders:  -     sertraline (ZOLOFT) 50 mg tablet; Take 1 tablet (50 mg total) by mouth daily at bedtime    Orthostatic hypotension  Comments:  Discussed symptoms and recommended slow position changes        Subjective:      Patient ID: Sophie Sommers is a 53 y o  female  Patient presents to [address] [place] [address] as follow up  Allergies, medical history and current medications reviewed with patient; patient reports taking all medications as prescribed without issues  Patient was previously started on Zoloft with counseling for treatment of chronic depression  Patient states she has been tolerating the medication well and has noticed an improvement of symptoms  Patient continues to follow with Rowdy Schaeffer LCSW for counseling as well  Patient also reports feeling occasional dizziness with headaches when sitting up from a laying position  Patient states symptoms last several minutes before spontaneously resolving  Patient is also requesting completion of additional forms, per the request of her disability   Patient Care Team:  Ervin Bey as PCP - General (Family Medicine)  Chavo Ocapmo MD as PCP - [address] [place] [address] ([facility])    Review of Systems   Neurological: Positive for light-headedness and headaches  Psychiatric/Behavioral: Positive for dysphoric mood  All other systems reviewed and are negative  Objective:    /80   Pulse 89   Temp 98 1 °F (36 7 °C)   Ht 5' 3" (1 6 m)   Wt 120 kg (265 lb)   LMP 08/17/2020   SpO2 97%   BMI 46 94 kg/m²      Physical Exam  Vitals signs and nursing note reviewed  Constitutional:       General: She is not in acute distress  Appearance: Normal appearance  She is well-developed  HENT:      Head: Normocephalic and atraumatic     Eyes:      General: Lids are normal  Conjunctiva/sclera: Conjunctivae normal    Neck:      Musculoskeletal: Normal range of motion  Trachea: No tracheal deviation  Cardiovascular:      Rate and Rhythm: Normal rate and regular rhythm  Heart sounds: Normal heart sounds, S1 normal and S2 normal  No murmur  Pulmonary:      Effort: Pulmonary effort is normal  No respiratory distress  Breath sounds: Normal breath sounds  Abdominal:      General: There is no distension  Tenderness: There is no guarding  Musculoskeletal: Normal range of motion  Skin:     General: Skin is warm and dry  Neurological:      Mental Status: She is alert and oriented to person, place, and time     Psychiatric:         Speech: Speech normal

## 2020-09-01 NOTE — PATIENT INSTRUCTIONS
Hypotension   WHAT YOU NEED TO KNOW:   What is hypotension? Hypotension is a condition that causes your blood pressure (BP) to drop lower than it should be  Hypotension may be mild, serious, or life-threatening  What are the most common types of hypotension? · Acute:  Acute hypotension is a sudden drop in your BP that may be life-threatening  · Constitutional:  Constitutional hypotension means your BP is lower than it should be most or all of the time  This is a chronic condition that occurs with no known medical cause  · Orthostatic:  Orthostatic hypotension normally occurs when you stand up from a sitting or lying position  It is also called postural hypotension  · Postprandial:  Postprandial hypotension means your BP becomes too low after you eat a meal  Your BP may drop within 2 hours after you eat, and is more common when you eat meals high in carbohydrates  What causes hypotension? · Anemia or blood loss    · Nervous system, heart, or adrenal disorders    · Dehydration from not drinking enough liquids, frequent vomiting, diarrhea, or severe burns    · Some medicines, such as those used to treat high blood pressure, heart conditions, pain, depression, or cancer    · A blood infection (sepsis)  What increases my risk for hypotension? · Increasing age    · Drug and alcohol use    · Being bedridden for a long period of time    · Low body weight    · Hemodialysis    · Medical conditions such as diabetes, Parkinson disease, and Alzheimer disease  What are the signs and symptoms of hypotension? · Feeling anxious, nauseated, tired, or weak    · Lightheadedness, dizziness, or fainting    · Increased sweating, palpitations (fast, forceful heartbeats), tremors, or a seizure     · Headache or pain in your neck, shoulders, chest, lower back, buttocks, or legs    · Blurred vision or changes in vision    · Confusion, decreased memory, or inability to pay attention  How is hypotension diagnosed?   Your healthcare provider will ask about your symptoms, health conditions, and medicines  Tell him how often you have symptoms, and if they change during the day  Tell him if you recently have had diarrhea, vomiting, or blood loss  Your healthcare provider will carefully examine you, listen to your heart, and may check your eyes  He may check your body movements and sensations (ability to feel something that touches you)  You may also need the following:  · Blood pressure testing: This may be done while you lie down, sit, and stand  You may need to wear a BP monitor to record your BP for up to 24 hours  · Tilt table testing: You are secured on a table that changes your position  Your BP is checked when the table moves you into each position  What tests may help find the cause of my hypotension? Many times, hypotension is a symptom of another condition  You may need any of the following tests to find the cause of your hypotension:  · Blood tests:  A sample of your blood or urine may be checked for anemia or other conditions causing your hypotension  · EKG: This test records the electrical activity of your heart  It is used to check for damage or other heart problems that may be causing your hypotension  · Autonomic nervous system tests:  Your healthcare provider may check for changes in how fast your heart beats when you take deep breaths  He may also check for changes in your BP while you put your hand in ice cold water  · 24 hour urine test: During this test you will need to collect all of your urine for 24 hours  You will urinate into a container and the urine will be put into a jug  The jug will need to be kept cold  If you urinate during the night, you will need to save that urine  Caregivers will measure and record how much you urinate  At the end of 24 hours, the urine will be sent to a lab for tests  · Echocardiogram:  This is a type of ultrasound, also called an echo   An ultrasound uses sound waves to show pictures of your heart on a monitor  An ultrasound may be done to show how your heart moves when it beats  How is hypotension treated? Your healthcare provider will work with you to find the cause of your hypotension and help treat your symptoms  You may need the following:  · Compression stockings or abdominal binder: These may help blood return to your heart and decrease your hypotension  · IV fluids: These may be used to increase your BP if you are dehydrated or have blood loss or sepsis  · Medicines:      ¨ Alpha-adrenoreceptor agonists: These medicines may increase your BP and decrease your symptoms  ¨ Steroids: This medicine helps prevent salt loss from your body  Steroids may also help increase the amount of fluid in your body and raise your BP  ¨ Vasopressors: These medicines help constrict (make smaller) your blood vessels and increase your BP  Vasopressor medicines may increase the blood flow to your brain and help decrease your symptoms  ¨ Antidiuretic hormone: This medicine helps control your BP and helps decrease your need to urinate during the night  ¨ Antiparkinson medicine: This medicine may help increase your standing BP and decrease your symptoms  What are the risks of hypotension? Without treatment, your symptoms may get worse  You may faint or fall often, which can lead to injuries, such as a broken bone  You may be at increased risk for depression, confusion, and memory problems  Hypotension may cause decreased blood flow to your brain and heart  This may lead to a stroke or heart attack and can be life-threatening  Sepsis-related hypotension is life-threatening without treatment  How can I manage my symptoms? · Change your position slowly:  When you get out of bed, sit up first, then slowly move your legs to the side of the bed  If you are not having any symptoms, slowly stand up  If you have symptoms, sit down right away      · Avoid straining: Activities and movements that cause you to strain can cause a drop in your BP  Activities to avoid include lifting, coughing, and other movements that increase the feeling of pressure in your chest     · Avoid the heat: This can cause a decrease in your BP  Stay inside during very hot days, or limit the amount of time you are outside  Do not take hot baths  · Exercise and do physical counter maneuvers:  Ask your healthcare provider about the best exercise plan for you  Physical counter maneuvers may help to increase your BP and increase blood flow to your heart  They include crossing your legs, squatting, and bending at the waist  You can also rise up on your toes while you are standing, and tighten your thigh muscles  · Drink liquids as directed:  Ask your healthcare provider how much liquid to drink each day and which liquids are best for you  Drink 500 milliliters (½ liter) of liquid quickly in the morning or before meals to help increase your BP  Your healthcare provider may tell you to drink 2 cups of coffee with, or after, breakfast and lunch  The caffeine in the coffee can help prevent a drop in your BP  Your healthcare provider may also give you caffeine pills  · Change how you eat meals: If your BP drops after you eat large meals, try to eat smaller meals more often  Eat foods low in carbohydrates and cholesterol to help prevent BP drops after you eat  Ask if you need to increase the amount of sodium (salt) you eat each day  · Raise the head of your bed:  Raise the head of your bed 4 to 8 inches  This can help prevent morning BP drops and decrease the need to urinate during the night  · Do not drink alcohol:  Alcohol can make your symptoms worse  Ask for information if you need help quitting  When should I contact my healthcare provider? · You vomit several times or have diarrhea, and you cannot drink liquid  · You have a fever       · You have new or increased symptoms, such as dizziness, weakness, or fainting  · Your legs, ankles, and feet are swollen, or you gain weight for no known reason  · You have questions or concerns about your condition or care  When should I seek immediate care or call 911? · You become confused or cannot speak  · You urinate very little or not at all  · You have a seizure  · You have chest pain or trouble breathing  · You have changes in vision or cannot see  CARE AGREEMENT:   You have the right to help plan your care  Learn about your health condition and how it may be treated  Discuss treatment options with your caregivers to decide what care you want to receive  You always have the right to refuse treatment  The above information is an  only  It is not intended as medical advice for individual conditions or treatments  Talk to your doctor, nurse or pharmacist before following any medical regimen to see if it is safe and effective for you  © 2017 [address] [employer] Information is for End User's use only and may not be sold, redistributed or otherwise used for commercial purposes  All illustrations and images included in CareNotes® are the copyrighted property of [employer]  or [employer] Alfredo Chavez  Wellness Visit for Adults   WHAT YOU NEED TO KNOW:   What is a wellness visit? A wellness visit is when you see your healthcare provider to get screened for health problems  You can also get advice on how to stay healthy  Write down your questions so you remember to ask them  Ask your healthcare provider how often you should have a wellness visit  What happens at a wellness visit? Your healthcare provider will ask about your health, and your family history of health problems  This includes high blood pressure, heart disease, and cancer  He or she will ask if you have symptoms that concern you, if you smoke, and about your mood   You may also be asked about your intake of medicines, supplements, food, and alcohol  Any of the following may be done:  · Your weight  will be checked  Your height may also be checked so your body mass index (BMI) can be calculated  Your BMI shows if you are at a healthy weight  · Your blood pressure  and heart rate will be checked  Your temperature may also be checked  · Blood and urine tests  may be done  Blood tests may be done to check your cholesterol levels  Abnormal cholesterol levels increase your risk for heart disease and stroke  You may also need a blood or urine test to check for diabetes if you are at increased risk  Urine tests may be done to look for signs of an infection or kidney disease  · A physical exam  includes checking your heartbeat and lungs with a stethoscope  Your healthcare provider may also check your skin to look for sun damage  · Screening tests  may be recommended  A screening test is done to check for diseases that may not cause symptoms  The screening tests you may need depend on your age, gender, family history, and lifestyle habits  For example, colorectal screening may be recommended if you are 48years old or older  What screening tests do I need if I am a woman? · A Pap smear  is used to screen for cervical cancer  Pap smears are usually done every 3 to 5 years depending on your age  You may need them more often if you have had abnormal Pap smear test results in the past  Ask your healthcare provider how often you should have a Pap smear  · A mammogram  is an x-ray of your breasts to screen for breast cancer  Experts recommend mammograms every 2 years starting at age 48 years  You may need a mammogram at age 52 years or younger if you have an increased risk for breast cancer  Talk to your healthcare provider about when you should start having mammograms and how often you need them  What vaccines might I need? · Get an influenza vaccine  every year  The influenza vaccine protects you from the flu   Several types of viruses cause the flu  The viruses change over time, so new vaccines are made each year  · Get a tetanus-diphtheria (Td) booster vaccine  every 10 years  This vaccine protects you against tetanus and diphtheria  Tetanus is a severe infection that may cause painful muscle spasms and lockjaw  Diphtheria is a severe bacterial infection that causes a thick covering in the back of your mouth and throat  · Get a human papillomavirus (HPV) vaccine  if you are female and aged 23 to 32 or male 23 to 24 and never received it  This vaccine protects you from HPV infection  HPV is the most common infection spread by sexual contact  HPV may also cause vaginal, penile, and anal cancers  · Get a pneumococcal vaccine  if you are aged 72 years or older  The pneumococcal vaccine is an injection given to protect you from pneumococcal disease  Pneumococcal disease is an infection caused by pneumococcal bacteria  The infection may cause pneumonia, meningitis, or an ear infection  · Get a shingles vaccine  if you are aged 61 or older, even if you have had shingles before  The shingles vaccine is an injection to protect you from the varicella-zoster virus  This is the same virus that causes chickenpox  Shingles is a painful rash that develops in people who had chickenpox or have been exposed to the virus  How can I eat healthy? My Plate is a model for planning healthy meals  It shows the types and amounts of foods that should go on your plate  Fruits and vegetables make up about half of your plate, and grains and protein make up the other half  A serving of dairy is included on the side of your plate  The amount of calories and serving sizes you need depends on your age, gender, weight, and height  Examples of healthy foods are listed below:  · Eat a variety of vegetables  such as dark green, red, and orange vegetables   You can also include canned vegetables low in sodium (salt) and frozen vegetables without added butter or sauces  · Eat a variety of fresh fruits , canned fruit in 100% juice, frozen fruit, and dried fruit  · Include whole grains  At least half of the grains you eat should be whole grains  Examples include whole-wheat bread, wheat pasta, brown rice, and whole-grain cereals such as oatmeal     · Eat a variety of protein foods such as seafood (fish and shellfish), lean meat, and poultry without skin (turkey and chicken)  Examples of lean meats include pork leg, shoulder, or tenderloin, and beef round, sirloin, tenderloin, and extra lean ground beef  Other protein foods include eggs and egg substitutes, beans, peas, soy products, nuts, and seeds  · Choose low-fat dairy products such as skim or 1% milk or low-fat yogurt, cheese, and cottage cheese  · Limit unhealthy fats  such as butter, hard margarine, and shortening  How much exercise do I need? Exercise at least 30 minutes per day on most days of the week  Some examples of exercise include walking, biking, dancing, and swimming  You can also fit in more physical activity by taking the stairs instead of the elevator or parking farther away from stores  Include muscle strengthening activities 2 days each week  Regular exercise provides many health benefits  It helps you manage your weight, and decreases your risk for type 2 diabetes, heart disease, stroke, and high blood pressure  Exercise can also help improve your mood  Ask your healthcare provider about the best exercise plan for you  What are some general health and safety guidelines I should follow? · Do not smoke  Nicotine and other chemicals in cigarettes and cigars can cause lung damage  Ask your healthcare provider for information if you currently smoke and need help to quit  E-cigarettes or smokeless tobacco still contain nicotine  Talk to your healthcare provider before you use these products  · Limit alcohol    A drink of alcohol is 12 ounces of beer, 5 ounces of wine, or 1½ ounces of liquor  · Lose weight, if needed  Being overweight increases your risk of certain health conditions  These include heart disease, high blood pressure, type 2 diabetes, and certain types of cancer  · Protect your skin  Do not sunbathe or use tanning beds  Use sunscreen with a SPF 15 or higher  Apply sunscreen at least 15 minutes before you go outside  Reapply sunscreen every 2 hours  Wear protective clothing, hats, and sunglasses when you are outside  · Drive safely  Always wear your seatbelt  Make sure everyone in your car wears a seatbelt  A seatbelt can save your life if you are in an accident  Do not use your cell phone when you are driving  This could distract you and cause an accident  Pull over if you need to make a call or send a text message  · Practice safe sex  Use latex condoms if are sexually active and have more than one partner  Your healthcare provider may recommend screening tests for sexually transmitted infections (STIs)  · Wear helmets, lifejackets, and protective gear  Always wear a helmet when you ride a bike or motorcycle, go skiing, or play sports that could cause a head injury  Wear protective equipment when you play sports  Wear a lifejacket when you are on a boat or doing water sports  CARE AGREEMENT:   You have the right to help plan your care  Learn about your health condition and how it may be treated  Discuss treatment options with your caregivers to decide what care you want to receive  You always have the right to refuse treatment  The above information is an  only  It is not intended as medical advice for individual conditions or treatments  Talk to your doctor, nurse or pharmacist before following any medical regimen to see if it is safe and effective for you  © 2017 [employer] Information is for End User's use only and may not be sold, redistributed or otherwise used for commercial purposes   All illustrations and images included in CareNotes® are the copyrighted property of [employer]  or Jose Alfredo Chavez

## 2020-09-03 ENCOUNTER — TELEMEDICINE (OUTPATIENT)
Dept: BEHAVIORAL/MENTAL HEALTH CLINIC | Facility: CLINIC | Age: 45
End: 2020-09-03
Payer: COMMERCIAL

## 2020-09-03 DIAGNOSIS — F33.1 MAJOR DEPRESSIVE DISORDER, RECURRENT EPISODE, MODERATE (HCC): Primary | ICD-10-CM

## 2020-09-03 PROCEDURE — T1015 CLINIC SERVICE: HCPCS | Performed by: SOCIAL WORKER

## 2020-09-03 NOTE — PSYCH
Psychotherapy Provided: Individual Psychotherapy 60  minutes 10:58 Am to 11:51 AM           Goals addressed in session:(Long Term Goal Number Two) The Client reported that she is still having pain throughout her whole body, which has caused continued anxiety and depression  " I can't get to sleep due to the pain" During the session we explored her stress and the effects on her different relationship and environments  It is hoped that by addressing her weaknesses this will act as a preventive measure against the possible need for higher level of care and services  Interventions: Supportive Therapy, Strengths Therapy,  and Cognitive Behavioral Therapy where the treatment modalities utilized during the session  Assessment and Plan: The Client presented a depressed anxious mood that was congruent in effect  She was alert, goal directed and participated with prompts during the session  The Client  Was oriented to person, place, time, situation, and reported no suicidal/homcidal ideation, plan, or intent  As team we explored factors that contribute to her stress exploring her daily hassles, major life changes, and life circumstances  During the session we also explored factors that protect daily uplifts, healthy coping strategies, and protective factors  Coping skills grounding was reviewed during the session, with the Client reporting feeling calmer  The Client's next scheduled  Appointment was set for 2 weeks  Pain:      PSYCH MENTAL STATUS PAIN :4 as reported by the Client     PHYSICAL PAIN SCALE NUMBERS:7 as reported by the Client due to pain in body    Current suicide risk : Low/Phone number for [facility] was given  to the Client [phone]  The Client was given phone number for the [facility] [phone]           Behavioral Health Treatment Plan [facility]: Diagnosis and Treatment Plan explained to Cristy Garcia, Cristy Garcia  relates understanding diagnosis and is agreeable to Treatment Plan  Yes  Virtual Regular Visit      Assessment/Plan:    Problem List Items Addressed This Visit     None      Visit Diagnoses     Major depressive disorder, recurrent episode, moderate ([facility] [address] )    -  Primary               Reason for visit is No chief complaint on file  Encounter provider YONATAN Bray    Provider located at [place] [address] PSYCH  [address] [facility] [address]      Recent Visits  No visits were found meeting these conditions  Showing recent visits within past 7 days and meeting all other requirements     Future Appointments  No visits were found meeting these conditions  Showing future appointments within next 150 days and meeting all other requirements        The patient was identified by name and date of birth  Liset Carrasquillo was informed that this is a telemedicine visit and that the visit is being conducted through [employer] and patient was informed that this is not a secure, HIPAA-complaint platform  She agrees to proceed     My office door was closed  No one else was in the room  She acknowledged consent and understanding of privacy and security of the video platform  The patient has agreed to participate and understands they can discontinue the visit at any time  Patient is aware this is a billable service  Subjective  Liset Carrasquillo is a 53 y o  female , [facility]         HPI     Past Medical History:   Diagnosis Date    Allergic     Seasonal Allergies    Alopecia of scalp     Treated with Proscar    Asthma     Astigmatism     Carbon monoxide exposure     Carpal tunnel syndrome, left     Fibromyalgia     GERD (gastroesophageal reflux disease)     Hip fracture (HCC)     Hip fx, left, closed, with routine healing, subsequent encounter 05/2017    Hypertension     Insomnia     Irregular heartbeat     Lung disease     Mononucleosis resolved    Obesity     VICTOR HUGO on CPAP     Prediabetes     Sleep apnea     Tachycardia        Past Surgical History:   Procedure Laterality Date    CARPAL TUNNEL RELEASE Left     UPPER GASTROINTESTINAL ENDOSCOPY         Current Outpatient Medications   Medication Sig Dispense Refill    albuterol (Ventolin HFA) 90 mcg/act inhaler Inhale 2 puffs every 6 (six) hours as needed for wheezing 1 Inhaler 5    celecoxib (CeleBREX) 100 mg capsule Take 1 capsule (100 mg total) by mouth 2 (two) times a day 60 capsule 5    cyclobenzaprine (FLEXERIL) 10 mg tablet 1 tab po every 12 hours x 7 days then prn spasms or pain 60 tablet 3    ergocalciferol (VITAMIN D2) 50,000 units Take 1 capsule (50,000 Units total) by mouth once a week 4 capsule 5    fluticasone-salmeterol (ADVAIR, WIXELA) 250-50 mcg/dose inhaler Inhale 1 puff 2 (two) times a day Rinse mouth after use  1 Inhaler 5    glipiZIDE (GLUCOTROL) 5 mg tablet Take 1 tablet (5 mg total) by mouth daily with breakfast 30 tablet 5    glucose blood test strip Use as instructed 100 each 5    Lancets (ONETOUCH ULTRASOFT) lancets Use as instructed 100 each 5    lisinopril (ZESTRIL) 10 mg tablet Take 1 tablet (10 mg total) by mouth daily 90 tablet 1    metFORMIN (GLUCOPHAGE) 1000 MG tablet Take 1 tablet (1,000 mg total) by mouth 2 (two) times a day with meals 180 tablet 1    sertraline (ZOLOFT) 50 mg tablet Take 1 tablet (50 mg total) by mouth daily at bedtime 30 tablet 5     No current facility-administered medications for this visit  Allergies   Allergen Reactions    Tdap [Tetanus-Diphth-Acell Pertussis] Rash       Review of Systems    Video Exam    There were no vitals filed for this visit  Physical Exam     I spent 60 minutes directly with the patient during this visit      [address] [place] [address] acknowledges that she has consented to an online visit or consultation   She understands that the online visit is based solely on information provided by her, and that, in the absence of a face-to-face physical evaluation by the physician, the diagnosis she receives is both limited and provisional in terms of accuracy and completeness  This is not intended to replace a full medical face-to-face evaluation by the physician  Cristy Garcia understands and accepts these terms

## 2020-09-10 ENCOUNTER — TELEPHONE (OUTPATIENT)
Dept: GYNECOLOGY | Facility: CLINIC | Age: 45
End: 2020-09-10

## 2020-09-17 ENCOUNTER — DOCUMENTATION (OUTPATIENT)
Dept: BEHAVIORAL/MENTAL HEALTH CLINIC | Facility: CLINIC | Age: 45
End: 2020-09-17

## 2020-09-17 ENCOUNTER — TELEMEDICINE (OUTPATIENT)
Dept: BEHAVIORAL/MENTAL HEALTH CLINIC | Facility: CLINIC | Age: 45
End: 2020-09-17
Payer: COMMERCIAL

## 2020-09-17 ENCOUNTER — TELEPHONE (OUTPATIENT)
Dept: BEHAVIORAL/MENTAL HEALTH CLINIC | Facility: CLINIC | Age: 45
End: 2020-09-17

## 2020-09-17 DIAGNOSIS — F33.1 MAJOR DEPRESSIVE DISORDER, RECURRENT EPISODE, MODERATE (HCC): Primary | ICD-10-CM

## 2020-09-17 PROCEDURE — T1015 CLINIC SERVICE: HCPCS | Performed by: SOCIAL WORKER

## 2020-09-17 NOTE — PSYCH
Psychotherapy Provided: Individual Psychotherapy 60  minutes 10:58 Am to 11:52 Am          Goals addressed in session:(Long Term Goal Number One) The Client reported that she has increased her socialization with her peers, which has helped "decrease" her depression  During the session we explored and created her recovery crisis plan, with hopes that by addressing her weaknesses this will act as a preventive measure against the possible need for higher level of care and services  Interventions: Supportive Therapy, Strengths Therapy,  and Cognitive Behavioral Therapy where the treatment modalities utilized during the session  Assessment and Plan:The Client presented a depressed mood that was congruent in effect  She as alert, goal directed and participated with prompts during the session  The Client was oriented to person, place, time, situation, and reported no suicidal/homicidal ideation, plan, or intent  As team we created her recovery crisis plan using the [employer], addressing her warning signs, internal coping strategies, people and social setting that provide distractions, individuals who she could ask for help, professional she could contact in a crisis, and ways to make environment safe  Coping skills were reviewed during the session  As her home commitment the Client will practice her coping skill grounding  Next scheduled appointment was set for 2 weeks  Pain:      PSYCH MENTAL STATUS PAIN : 5 as reported by the client     PHYSICAL PAIN SCALE NUMBERS: 6 as reported by the Client     Current suicide risk : Low/Phone number for [facility] was given  to the Client [phone]  The Client was given phone number for the [facility] [phone]  Behavioral Health Treatment Plan [facility]: Diagnosis and Treatment Plan explained to Cristy Garcia, Cristy Garcia  relates understanding diagnosis and is agreeable to Treatment Plan  Yes   Virtual Regular Visit      Assessment/Plan:    Problem List Items Addressed This Visit     None      Visit Diagnoses     Major depressive disorder, recurrent episode, moderate ([address] )    -  Primary               Reason for visit is to create the Client's Crisis plan     Encounter provider YONATAN Gomez    Provider located at [address] [address]      Recent Visits  No visits were found meeting these conditions  Showing recent visits within past 7 days and meeting all other requirements     Future Appointments  No visits were found meeting these conditions  Showing future appointments within next 150 days and meeting all other requirements        The patient was identified by name and date of birth  Festus Helm was informed that this is a telemedicine visit and that the visit is being conducted through [employer] and patient was informed that this is not a secure, HIPAA-complaint platform  She agrees to proceed     My office door was closed  No one else was in the room  She acknowledged consent and understanding of privacy and security of the video platform  The patient has agreed to participate and understands they can discontinue the visit at any time  Patient is aware this is a billable service  Subjective  Festus Helm is a 53 y o  female , who was seen for individual mental health therapy         HPI     Past Medical History:   Diagnosis Date    Allergic     Seasonal Allergies    Alopecia of scalp     Treated with Proscar    Asthma     Astigmatism     Carbon monoxide exposure     Carpal tunnel syndrome, left     Fibromyalgia     GERD (gastroesophageal reflux disease)     Hip fracture (HCC)     Hip fx, left, closed, with routine healing, subsequent encounter 05/2017    Hypertension     Insomnia     Irregular heartbeat     Lung disease     Mononucleosis     resolved    Obesity     VICTOR HUGO on CPAP     Prediabetes     Sleep apnea     Tachycardia        Past Surgical History:   Procedure Laterality Date    CARPAL TUNNEL RELEASE Left     UPPER GASTROINTESTINAL ENDOSCOPY         Current Outpatient Medications   Medication Sig Dispense Refill    albuterol (Ventolin HFA) 90 mcg/act inhaler Inhale 2 puffs every 6 (six) hours as needed for wheezing 1 Inhaler 5    celecoxib (CeleBREX) 100 mg capsule Take 1 capsule (100 mg total) by mouth 2 (two) times a day 60 capsule 5    cyclobenzaprine (FLEXERIL) 10 mg tablet 1 tab po every 12 hours x 7 days then prn spasms or pain 60 tablet 3    ergocalciferol (VITAMIN D2) 50,000 units Take 1 capsule (50,000 Units total) by mouth once a week 4 capsule 5    fluticasone-salmeterol (ADVAIR, WIXELA) 250-50 mcg/dose inhaler Inhale 1 puff 2 (two) times a day Rinse mouth after use  1 Inhaler 5    glipiZIDE (GLUCOTROL) 5 mg tablet Take 1 tablet (5 mg total) by mouth daily with breakfast 30 tablet 5    glucose blood test strip Use as instructed 100 each 5    Lancets (ONETOUCH ULTRASOFT) lancets Use as instructed 100 each 5    lisinopril (ZESTRIL) 10 mg tablet Take 1 tablet (10 mg total) by mouth daily 90 tablet 1    metFORMIN (GLUCOPHAGE) 1000 MG tablet Take 1 tablet (1,000 mg total) by mouth 2 (two) times a day with meals 180 tablet 1    sertraline (ZOLOFT) 50 mg tablet Take 1 tablet (50 mg total) by mouth daily at bedtime 30 tablet 5     No current facility-administered medications for this visit  Allergies   Allergen Reactions    Tdap [Tetanus-Diphth-Acell Pertussis] Rash       Review of Systems    Video Exam    There were no vitals filed for this visit  Physical Exam     I spent 60 minutes directly with the patient during this visit      [facility] acknowledges that she has consented to an online visit or consultation   She understands that the online visit is based solely on information provided by her, and that, in the absence of a face-to-face physical evaluation by the physician, the diagnosis she receives is both limited and provisional in terms of accuracy and completeness  This is not intended to replace a full medical face-to-face evaluation by the physician  Cristy Garcia understands and accepts these terms

## 2020-09-17 NOTE — TELEPHONE ENCOUNTER
Contacted [facility] for MH case management  (release signed) Talked with  [facility] supports coordinator, who will e-mail the intake application to this therapist for referral to [facility] for supports coordination for the Client

## 2020-09-18 ENCOUNTER — HOSPITAL ENCOUNTER (OUTPATIENT)
Dept: RADIOLOGY | Facility: HOSPITAL | Age: 45
Discharge: HOME | End: 2020-09-18
Admitting: ANESTHESIOLOGY
Payer: COMMERCIAL

## 2020-09-18 VITALS
HEART RATE: 112 BPM | SYSTOLIC BLOOD PRESSURE: 141 MMHG | OXYGEN SATURATION: 96 % | RESPIRATION RATE: 20 BRPM | TEMPERATURE: 96.7 F | DIASTOLIC BLOOD PRESSURE: 74 MMHG

## 2020-09-18 DIAGNOSIS — M54.16 LUMBAR RADICULOPATHY: ICD-10-CM

## 2020-09-18 RX ORDER — LIDOCAINE HYDROCHLORIDE 10 MG/ML
5 INJECTION, SOLUTION EPIDURAL; INFILTRATION; INTRACAUDAL; PERINEURAL ONCE
Status: COMPLETED | OUTPATIENT
Start: 2020-09-18 | End: 2020-09-18

## 2020-09-18 RX ORDER — PAPAVERINE HCL 150 MG
7.5 CAPSULE, EXTENDED RELEASE ORAL ONCE
Status: COMPLETED | OUTPATIENT
Start: 2020-09-18 | End: 2020-09-18

## 2020-09-18 RX ADMIN — IOHEXOL 1 ML: 300 INJECTION, SOLUTION INTRAVENOUS at 10:37

## 2020-09-18 RX ADMIN — DEXAMETHASONE SODIUM PHOSPHATE 7.5 MG: 10 INJECTION, SOLUTION INTRAMUSCULAR; INTRAVENOUS at 10:38

## 2020-09-18 RX ADMIN — LIDOCAINE HYDROCHLORIDE 3 ML: 10 INJECTION, SOLUTION EPIDURAL; INFILTRATION; INTRACAUDAL; PERINEURAL at 10:35

## 2020-09-18 NOTE — H&P
Assessment:  1  Lumbar radiculopathy  FL spine and pain procedure    FL spine and pain procedure         Plan:  Nils Flood is a 53 y o  female with complaints of left leg pain presents to surgical center for procedure  1  We will perform a left L3-L4 transforaminal epidural steroid injection  2  2  Follow-up 1 month after injection    Complete risks and benefits including bleeding, infection, tissue reaction, nerve injury and allergic reaction were discussed  The approach was demonstrated using models and literature was provided  Verbal and written consent was obtained  My impressions and treatment recommendations were discussed in detail with the patient who verbalized understanding and had no further questions  Discharge instructions were provided  I personally saw and examined the patient and I agree with the above discussed plan of care  Orders Placed This Encounter   Procedures    FL spine and pain procedure     Standing Status:   Standing     Number of Occurrences:   1     Order Specific Question:   Reason for Exam:     Answer:   L3-4 transforaminal epidural steriod injection     Order Specific Question:   Is the patient pregnant? Answer:   Unknown     Order Specific Question:   Anticoagulant hold needed? Answer:   no     No orders of the defined types were placed in this encounter  History of Present Illness:  Nils Flood is a 53 y o  female who presents for a follow up office visit in regards to left leg pain  The patients current symptoms include 7/10 constant throbbing, cramping, shooting, numbness, pins and needles any particular time pattern  I have personally reviewed and/or updated the patient's past medical history, past surgical history, family history, social history, current medications, allergies, and vital signs today  Review of Systems   All other systems reviewed and are negative        Patient Active Problem List   Diagnosis    SOB (shortness of breath)  Chronic bilateral low back pain without sciatica    Chronic pain of left knee    Chronic pain of right knee    Fibromyalgia    Primary osteoarthritis involving multiple joints    Mild persistent asthma without complication    Lumbar radiculopathy    Primary osteoarthritis of both hips    Left hip pain    Major depression, recurrent, chronic (HCC)       Past Medical History:   Diagnosis Date    Allergic     Seasonal Allergies    Alopecia of scalp     Treated with Proscar    Asthma     Astigmatism     Carbon monoxide exposure     Carpal tunnel syndrome, left     Fibromyalgia     GERD (gastroesophageal reflux disease)     Hip fracture (HCC)     Hip fx, left, closed, with routine healing, subsequent encounter 05/2017    Hypertension     Insomnia     Irregular heartbeat     Lung disease     Mononucleosis     resolved    Obesity     VICTOR HUGO on CPAP     Prediabetes     Sleep apnea     Tachycardia        Past Surgical History:   Procedure Laterality Date    CARPAL TUNNEL RELEASE Left     UPPER GASTROINTESTINAL ENDOSCOPY         Family History   Problem Relation Age of Onset    Cancer Father     Hepatitis Father     Asthma Cousin     Hypertension Paternal Grandmother     No Known Problems Mother     No Known Problems Sister     No Known Problems Maternal Grandmother     No Known Problems Maternal Grandfather     No Known Problems Paternal Grandfather     No Known Problems Paternal Aunt        Social History     Occupational History    Not on file   Tobacco Use    Smoking status: Former Smoker    Smokeless tobacco: Never Used   Substance and Sexual Activity    Alcohol use: No    Drug use: No    Sexual activity: Not on file       Current Outpatient Medications on File Prior to Encounter   Medication Sig    albuterol (Ventolin HFA) 90 mcg/act inhaler Inhale 2 puffs every 6 (six) hours as needed for wheezing    celecoxib (CeleBREX) 100 mg capsule Take 1 capsule (100 mg total) by mouth 2 (two) times a day    cyclobenzaprine (FLEXERIL) 10 mg tablet 1 tab po every 12 hours x 7 days then prn spasms or pain    ergocalciferol (VITAMIN D2) 50,000 units Take 1 capsule (50,000 Units total) by mouth once a week    fluticasone-salmeterol (ADVAIR, WIXELA) 250-50 mcg/dose inhaler Inhale 1 puff 2 (two) times a day Rinse mouth after use   glipiZIDE (GLUCOTROL) 5 mg tablet Take 1 tablet (5 mg total) by mouth daily with breakfast    glucose blood test strip Use as instructed    Lancets (ONETOUCH ULTRASOFT) lancets Use as instructed    lisinopril (ZESTRIL) 10 mg tablet Take 1 tablet (10 mg total) by mouth daily    metFORMIN (GLUCOPHAGE) 1000 MG tablet Take 1 tablet (1,000 mg total) by mouth 2 (two) times a day with meals    sertraline (ZOLOFT) 50 mg tablet Take 1 tablet (50 mg total) by mouth daily at bedtime     No current facility-administered medications on file prior to encounter  Allergies   Allergen Reactions    Tdap [Tetanus-Diphth-Acell Pertussis] Rash       Physical Exam:    /62   Pulse (!) 115   Temp (!) 96 7 °F (35 9 °C) (Temporal)   Resp 20   SpO2 98%     Constitutional:normal, well developed, well nourished, alert, in no distress and non-toxic and no overt pain behavior   and obese  Eyes:anicteric  HEENT:grossly intact  Neck:supple, symmetric, trachea midline and no masses   Pulmonary:even and unlabored  Cardiovascular:No edema or pitting edema present  Skin:Normal without rashes or lesions and well hydrated  Psychiatric:Mood and affect appropriate  Neurologic:Cranial Nerves II-XII grossly intact  Musculoskeletal:normal

## 2020-09-18 NOTE — DISCHARGE INSTRUCTIONS
Epidural Steroid Injection   WHAT YOU NEED TO KNOW:   An epidural steroid injection (MELI) is a procedure to inject steroid medicine into the epidural space  The epidural space is between your spinal cord and vertebrae  Steroids reduce inflammation and fluid buildup in your spine that may be causing pain  You may be given pain medicine along with the steroids  ACTIVITY  · Do not drive or operate machinery today  · No strenuous activity today - bending, lifting, etc   · You may resume normal activites starting tomorrow - start slowly and as tolerated  · You may shower today, but no tub baths or hot tubs  · You may have numbness for several hours from the local anesthetic  Please use caution and common sense, especially with weight-bearing activities  CARE OF THE INJECTION SITE  · If you have soreness or pain, apply ice to the area today (20 minutes on/20 minutes off)  · Starting tomorrow, you may use warm, moist heat or ice if needed  · You may have an increase or change in your discomfort for 36-48 hours after your treatment  · Apply ice and continue with any pain medication you have been prescribed  · [facility]ify [facility] if you have any of the following: redness, drainage, swelling, headache, stiff neck or fever above 100°F     SPECIAL INSTRUCTIONS  · Our office will contact you in approximately 7 days for a progress report  MEDICATIONS  · Continue to take all routine medications  · Our office may have instructed you to hold some medications  If you have a problem specifically related to your procedure, please call our office at [phone]  Problems not related to your procedure should be directed to your primary care physician

## 2020-09-22 ENCOUNTER — DOCUMENTATION (OUTPATIENT)
Dept: BEHAVIORAL/MENTAL HEALTH CLINIC | Facility: CLINIC | Age: 45
End: 2020-09-22

## 2020-09-22 NOTE — PROGRESS NOTES
PT Discharge  Today's date: 2020  Patient name: Festus Helm  : 1975  MRN: [id number]  Referring provider: CHRIS Malik  Dx:   Encounter Diagnosis     ICD-10-CM    1  Chronic midline low back pain without sciatica  M54 5     G89 29    2  Acute left-sided low back pain without sciatica  M54 5    3  Left hip pain  M25 552    4  Chronic pain of right knee  M25 561     G89 29    5  Difficulty walking  R26 2    6  Chronic left-sided low back pain without sciatica  M54 5     G89 29    7  Contusion of right knee, subsequent encounter  S80  01XD      Assessment/Plan    Subjective    Objective     2020  Poppy Bonaparte has not returned for more treatment  Poppy Bonaparte did not attend today's appointment  I cannot provide you with a current progress report but I can provide you with information based on previous performance  Festus Helm is discharged at this time

## 2020-09-22 NOTE — PROGRESS NOTES
09/22/20 Client was referred to [facility] for supports coordination paperwork was sent by e-mail

## 2020-09-25 ENCOUNTER — TELEPHONE (OUTPATIENT)
Dept: PAIN MEDICINE | Facility: CLINIC | Age: 45
End: 2020-09-25

## 2020-10-01 ENCOUNTER — TELEMEDICINE (OUTPATIENT)
Dept: BEHAVIORAL/MENTAL HEALTH CLINIC | Facility: CLINIC | Age: 45
End: 2020-10-01
Payer: COMMERCIAL

## 2020-10-01 DIAGNOSIS — F33.1 MAJOR DEPRESSIVE DISORDER, RECURRENT EPISODE, MODERATE (HCC): Primary | ICD-10-CM

## 2020-10-01 PROCEDURE — T1015 CLINIC SERVICE: HCPCS | Performed by: SOCIAL WORKER

## 2020-10-15 ENCOUNTER — TELEMEDICINE (OUTPATIENT)
Dept: BEHAVIORAL/MENTAL HEALTH CLINIC | Facility: CLINIC | Age: 45
End: 2020-10-15
Payer: COMMERCIAL

## 2020-10-15 DIAGNOSIS — F33.9 MAJOR DEPRESSION, RECURRENT, CHRONIC (HCC): Primary | ICD-10-CM

## 2020-10-15 PROCEDURE — T1015 CLINIC SERVICE: HCPCS | Performed by: SOCIAL WORKER

## 2020-10-29 ENCOUNTER — TELEMEDICINE (OUTPATIENT)
Dept: BEHAVIORAL/MENTAL HEALTH CLINIC | Facility: CLINIC | Age: 45
End: 2020-10-29
Payer: COMMERCIAL

## 2020-10-29 DIAGNOSIS — F33.1 MAJOR DEPRESSIVE DISORDER, RECURRENT EPISODE, MODERATE (HCC): ICD-10-CM

## 2020-10-29 DIAGNOSIS — F33.9 MAJOR DEPRESSION, RECURRENT, CHRONIC (HCC): Primary | ICD-10-CM

## 2020-10-29 PROCEDURE — T1015 CLINIC SERVICE: HCPCS | Performed by: SOCIAL WORKER

## 2020-11-13 NOTE — PROGRESS NOTES
Today's date: 10/17/2019  Patient name: Ronel Joy  : 1975  MRN: [id number]  Referring provider: Marisabel Garcia DO  Dx:   Encounter Diagnosis     ICD-10-CM    1  Acute left-sided low back pain without sciatica M54 5    2  Chronic pain of right knee M25 561     G89 29    3  Difficulty walking R26 2    4  Contusion of right knee, subsequent encounter S80  01XD    5   Chronic left-sided low back pain without sciatica M54 5     G89 29
Today's date: 10/17/2019  Patient name: Vincent Dwason  : 1975  MRN: [id number]  Referring provider: Kristene Soulier, DO  Dx:   Encounter Diagnosis     ICD-10-CM    1  Acute left-sided low back pain without sciatica M54 5    2  Chronic pain of right knee M25 561     G89 29    3  Difficulty walking R26 2    4  Contusion of right knee, subsequent encounter S80  01XD    5  Chronic left-sided low back pain without sciatica M54 5     G89 29      Subjective:  Cristy states her back is feeling better today  Objective: See treatment log below    Assessment: Tolerated treatment well  Patient exhibited good technique with therapeutic exercises and would benefit from continued PT    Plan: Continue per plan of care  Progress treatment as tolerated         Precautions:  Left Anterior Hip Pain / Right Anterior Medial Patellar Region Pain    Daily Treatment Log  Manual  10/17       MT, ROM 15'       HEP        Exercise Log 10/17       Balance Board 30x       Chair Squats 30x       P-Bar-GT-Forward, Backward,Side-Even & Dips        BOSU-Walk 5'       Foam Pad SLR,Hip/KneeFl,Step Ups 30x       Foam Beam 12x       Fitter-Slalom        Monster Steps        T/G-Squats PF 30x       W/P-Hip-Abd,Add,Flex,Ext 7 5#-30x       WP-Squats        Trimax-TKE        Uaxxhqm-SQ-Qi 2x2'    '   NK Table Exer        NK Table ROM        TM        Bike        ME, PE 15'               Modalities 10/17       MH&ES 20'       US 10'
(4) rarely moist

## 2020-11-18 ENCOUNTER — TELEMEDICINE (OUTPATIENT)
Dept: BEHAVIORAL/MENTAL HEALTH CLINIC | Facility: CLINIC | Age: 45
End: 2020-11-18
Payer: COMMERCIAL

## 2020-11-18 DIAGNOSIS — F33.9 MAJOR DEPRESSION, RECURRENT, CHRONIC (HCC): Primary | ICD-10-CM

## 2020-11-18 PROCEDURE — T1015 CLINIC SERVICE: HCPCS | Performed by: SOCIAL WORKER

## 2020-12-02 ENCOUNTER — TELEMEDICINE (OUTPATIENT)
Dept: BEHAVIORAL/MENTAL HEALTH CLINIC | Facility: CLINIC | Age: 45
End: 2020-12-02
Payer: COMMERCIAL

## 2020-12-02 DIAGNOSIS — F33.9 MAJOR DEPRESSION, RECURRENT, CHRONIC (HCC): Primary | ICD-10-CM

## 2020-12-02 PROCEDURE — T1015 CLINIC SERVICE: HCPCS | Performed by: SOCIAL WORKER

## 2020-12-08 ENCOUNTER — APPOINTMENT (OUTPATIENT)
Dept: LAB | Facility: HOSPITAL | Age: 45
End: 2020-12-08
Payer: COMMERCIAL

## 2020-12-08 DIAGNOSIS — E11.65 TYPE 2 DIABETES MELLITUS WITH HYPERGLYCEMIA, WITHOUT LONG-TERM CURRENT USE OF INSULIN (HCC): ICD-10-CM

## 2020-12-08 LAB
ALBUMIN SERPL BCP-MCNC: 3.7 G/DL (ref 3.5–5)
ALP SERPL-CCNC: 44 U/L (ref 46–116)
ALT SERPL W P-5'-P-CCNC: 50 U/L (ref 12–78)
ANION GAP SERPL CALCULATED.3IONS-SCNC: 11 MMOL/L (ref 4–13)
AST SERPL W P-5'-P-CCNC: 22 U/L (ref 5–45)
BILIRUB SERPL-MCNC: 0.63 MG/DL (ref 0.2–1)
BUN SERPL-MCNC: 13 MG/DL (ref 5–25)
CALCIUM SERPL-MCNC: 9.6 MG/DL (ref 8.3–10.1)
CHLORIDE SERPL-SCNC: 102 MMOL/L (ref 100–108)
CO2 SERPL-SCNC: 22 MMOL/L (ref 21–32)
CREAT SERPL-MCNC: 0.81 MG/DL (ref 0.6–1.3)
EST. AVERAGE GLUCOSE BLD GHB EST-MCNC: 123 MG/DL
GFR SERPL CREATININE-BSD FRML MDRD: 87 ML/MIN/1.73SQ M
GLUCOSE P FAST SERPL-MCNC: 163 MG/DL (ref 65–99)
HBA1C MFR BLD: 5.9 %
POTASSIUM SERPL-SCNC: 4 MMOL/L (ref 3.5–5.3)
PROT SERPL-MCNC: 7.6 G/DL (ref 6.4–8.2)
SODIUM SERPL-SCNC: 135 MMOL/L (ref 136–145)

## 2020-12-08 PROCEDURE — 3044F HG A1C LEVEL LT 7.0%: CPT | Performed by: NURSE PRACTITIONER

## 2020-12-08 PROCEDURE — 36415 COLL VENOUS BLD VENIPUNCTURE: CPT

## 2020-12-08 PROCEDURE — 83036 HEMOGLOBIN GLYCOSYLATED A1C: CPT

## 2020-12-08 PROCEDURE — 80053 COMPREHEN METABOLIC PANEL: CPT

## 2020-12-09 ENCOUNTER — OFFICE VISIT (OUTPATIENT)
Dept: FAMILY MEDICINE CLINIC | Facility: CLINIC | Age: 45
End: 2020-12-09
Payer: COMMERCIAL

## 2020-12-09 VITALS
OXYGEN SATURATION: 96 % | HEART RATE: 110 BPM | TEMPERATURE: 97.7 F | WEIGHT: 268 LBS | HEIGHT: 63 IN | SYSTOLIC BLOOD PRESSURE: 128 MMHG | DIASTOLIC BLOOD PRESSURE: 76 MMHG | BODY MASS INDEX: 47.48 KG/M2

## 2020-12-09 DIAGNOSIS — Z23 NEED FOR INFLUENZA VACCINATION: ICD-10-CM

## 2020-12-09 DIAGNOSIS — Z00.00 ROUTINE CHECK-UP: Primary | ICD-10-CM

## 2020-12-09 DIAGNOSIS — Z13.29 SCREENING FOR THYROID DISORDER: ICD-10-CM

## 2020-12-09 DIAGNOSIS — E11.65 TYPE 2 DIABETES MELLITUS WITH HYPERGLYCEMIA, WITHOUT LONG-TERM CURRENT USE OF INSULIN (HCC): ICD-10-CM

## 2020-12-09 DIAGNOSIS — Z13.220 SCREENING FOR LIPID DISORDERS: ICD-10-CM

## 2020-12-09 PROCEDURE — 3725F SCREEN DEPRESSION PERFORMED: CPT | Performed by: NURSE PRACTITIONER

## 2020-12-09 PROCEDURE — 3078F DIAST BP <80 MM HG: CPT | Performed by: NURSE PRACTITIONER

## 2020-12-09 PROCEDURE — 90682 RIV4 VACC RECOMBINANT DNA IM: CPT | Performed by: NURSE PRACTITIONER

## 2020-12-09 PROCEDURE — 3074F SYST BP LT 130 MM HG: CPT | Performed by: NURSE PRACTITIONER

## 2020-12-09 PROCEDURE — 3008F BODY MASS INDEX DOCD: CPT | Performed by: NURSE PRACTITIONER

## 2020-12-09 PROCEDURE — 90471 IMMUNIZATION ADMIN: CPT | Performed by: NURSE PRACTITIONER

## 2020-12-09 PROCEDURE — 99213 OFFICE O/P EST LOW 20 MIN: CPT | Performed by: NURSE PRACTITIONER

## 2020-12-09 PROCEDURE — 1036F TOBACCO NON-USER: CPT | Performed by: NURSE PRACTITIONER

## 2020-12-16 ENCOUNTER — TELEMEDICINE (OUTPATIENT)
Dept: BEHAVIORAL/MENTAL HEALTH CLINIC | Facility: CLINIC | Age: 45
End: 2020-12-16
Payer: COMMERCIAL

## 2020-12-16 DIAGNOSIS — F33.9 MAJOR DEPRESSION, RECURRENT, CHRONIC (HCC): Primary | ICD-10-CM

## 2020-12-16 PROCEDURE — T1015 CLINIC SERVICE: HCPCS | Performed by: SOCIAL WORKER

## 2020-12-23 ENCOUNTER — CONSULT (OUTPATIENT)
Dept: PAIN MEDICINE | Facility: CLINIC | Age: 45
End: 2020-12-23
Payer: COMMERCIAL

## 2020-12-23 ENCOUNTER — TELEPHONE (OUTPATIENT)
Dept: PAIN MEDICINE | Facility: CLINIC | Age: 45
End: 2020-12-23

## 2020-12-23 VITALS — RESPIRATION RATE: 18 BRPM | HEIGHT: 62 IN | BODY MASS INDEX: 49.5 KG/M2 | TEMPERATURE: 96.8 F | WEIGHT: 269 LBS

## 2020-12-23 DIAGNOSIS — M47.816 LUMBAR FACET JOINT SYNDROME: Primary | ICD-10-CM

## 2020-12-23 PROCEDURE — 99204 OFFICE O/P NEW MOD 45 MIN: CPT | Performed by: ANESTHESIOLOGY

## 2020-12-23 PROCEDURE — 3008F BODY MASS INDEX DOCD: CPT | Performed by: NURSE PRACTITIONER

## 2020-12-23 PROCEDURE — 3008F BODY MASS INDEX DOCD: CPT | Performed by: ANESTHESIOLOGY

## 2020-12-23 PROCEDURE — 1036F TOBACCO NON-USER: CPT | Performed by: ANESTHESIOLOGY

## 2020-12-23 RX ORDER — METHYLPREDNISOLONE ACETATE 80 MG/ML
80 INJECTION, SUSPENSION INTRA-ARTICULAR; INTRALESIONAL; INTRAMUSCULAR; PARENTERAL; SOFT TISSUE ONCE
Status: CANCELLED | OUTPATIENT
Start: 2020-12-23 | End: 2020-12-23

## 2020-12-23 RX ORDER — LIDOCAINE HYDROCHLORIDE 10 MG/ML
10 INJECTION, SOLUTION EPIDURAL; INFILTRATION; INTRACAUDAL; PERINEURAL ONCE
Status: CANCELLED | OUTPATIENT
Start: 2020-12-23 | End: 2020-12-23

## 2020-12-23 RX ORDER — BUPIVACAINE HCL/PF 2.5 MG/ML
10 VIAL (ML) INJECTION ONCE
Status: CANCELLED | OUTPATIENT
Start: 2020-12-23 | End: 2020-12-23

## 2020-12-23 NOTE — H&P (VIEW-ONLY)
Assessment  1  Lumbar facet joint syndrome  -     Case request operating room: BLOCK MEDIAL BRANCH Bilateral L2-L5; Standing  -     Case request operating room: BLOCK MEDIAL BRANCH Bilateral L2-L5    Chronic axial low back pain described by primarily arthritic features  Positive facet loading maneuvers as noted below  Mild noncompressive lumbar degenerative change at the L3-4 and L4-5 levels  Previously had a left L3 transforaminal steroid injection with Dr Clare Love which helped for about a week and a half  Arthritic symptomatology greater than radicular features at this time  Reasonable trial medial branch blocks as part of multimodal pain therapy plan  Plan  -continue physical therapy and core exercises for lumbar facet syndrome  -continue Celebrex 100 mg b i d  As needed for lumbar facet syndrome  Counseled regarding bleeding risk of taking this medication   -lifestyle modifications including diet, exercise and weight loss extensively discussed  -will plan for bilateral L2 through L5 medial branch blocks    There are risks associated with opioid medications, including dependence, addiction and tolerance  The patient understands and agrees to use these medications only as prescribed  Potential side effects of the medications include, but are not limited to, constipation, drowsiness, addiction, impaired judgment and risk of fatal overdose if not taken as prescribed  The patient was warned against driving while taking sedation medications  Sharing medications is a felony  At this point in time, the patient is showing no signs of addiction, abuse, diversion or suicidal ideation  [place] Prescription Drug Monitoring Program report was reviewed and was appropriate     Complete risks and benefits including bleeding, infection, tissue reaction, nerve injury and allergic reaction were discussed  The approach was demonstrated using models and literature was provided   Verbal and written consent was obtained  My impressions and treatment recommendations were discussed in detail with the patient who verbalized understanding and had no further questions  Discharge instructions were provided  I personally saw and examined the patient and I agree with the above discussed plan of care  No orders of the defined types were placed in this encounter  History of Present Illness    Randal Rodrigues is a 50 y o  female with past medical history of axial low back pain described primarily by arthritic features  She presents with a 2 year history of chronic low back pain described primarily as arthritic in nature  she describes 8/10 low back pain that is worse in the mornings and worse at the end of the day  The pain is characterized by achy, nagging, indolent, crampy, stabbing pain in his/her axial low back  The patient describes that the pain is worse with standing for long periods of time on hard surfaces as well as with walking  The patient is a very active individual and feels as though this pain compromises her participation with independent activities of daily living  The pain can be debilitating at times and contribute to significant disability, compromising overall activity and independent activities of daily living  She has tried physical therapy with limited relief of symptoms  Medications the patient has tried in the past include   Celebrex,  and Flexeril  She describes minor radicular symptoms in the L3 and L4 dermatomal distribution but otherwise has good strength  Previously she had left L3 transforaminal epidural steroid injection with relief for about 2 weeks  She denies any weakness numbness or paresthesias  The patient denies any bowel or bladder dysfunction as well  I have personally reviewed and/or updated the patient's past medical history, past surgical history, family history, social history, current medications, allergies, and vital signs today       Review of Systems    Patient Active Problem List   Diagnosis    SOB (shortness of breath)    Chronic bilateral low back pain without sciatica    Chronic pain of left knee    Chronic pain of right knee    Fibromyalgia    Primary osteoarthritis involving multiple joints    Mild persistent asthma without complication    Lumbar radiculopathy    Primary osteoarthritis of both hips    Left hip pain    Major depression, recurrent, chronic (HCC)    Lumbar facet joint syndrome       Past Medical History:   Diagnosis Date    Allergic     Seasonal Allergies    Alopecia of scalp     Treated with Proscar    Asthma     Astigmatism     Carbon monoxide exposure     Carpal tunnel syndrome, left     Fibromyalgia     GERD (gastroesophageal reflux disease)     Hip fracture (HCC)     Hip fx, left, closed, with routine healing, subsequent encounter 05/2017    Hypertension     Insomnia     Irregular heartbeat     Lung disease     Mononucleosis     resolved    Obesity     VICTOR HUGO on CPAP     Prediabetes     Sleep apnea     Tachycardia        Past Surgical History:   Procedure Laterality Date    CARPAL TUNNEL RELEASE Left     LUMBAR EPIDURAL INJECTION      UPPER GASTROINTESTINAL ENDOSCOPY         Family History   Problem Relation Age of Onset    Cancer Father     Hepatitis Father     Asthma Cousin     Hypertension Paternal Grandmother     No Known Problems Mother     No Known Problems Sister     No Known Problems Maternal Grandmother     No Known Problems Maternal Grandfather     No Known Problems Paternal Grandfather     No Known Problems Paternal Aunt        Social History     Occupational History    Not on file   Tobacco Use    Smoking status: Former Smoker    Smokeless tobacco: Never Used   Substance and Sexual Activity    Alcohol use: No    Drug use: No    Sexual activity: Not on file       Current Outpatient Medications on File Prior to Visit   Medication Sig    albuterol (Ventolin HFA) 90 mcg/act inhaler Inhale 2 puffs every 6 (six) hours as needed for wheezing    celecoxib (CeleBREX) 100 mg capsule Take 1 capsule (100 mg total) by mouth 2 (two) times a day    cyclobenzaprine (FLEXERIL) 10 mg tablet 1 tab po every 12 hours x 7 days then prn spasms or pain    ergocalciferol (VITAMIN D2) 50,000 units Take 1 capsule (50,000 Units total) by mouth once a week    fluticasone-salmeterol (ADVAIR, WIXELA) 250-50 mcg/dose inhaler Inhale 1 puff 2 (two) times a day Rinse mouth after use   glipiZIDE (GLUCOTROL) 5 mg tablet Take 1 tablet (5 mg total) by mouth daily with breakfast    glucose blood test strip Use as instructed    Lancets (ONETOUCH ULTRASOFT) lancets Use as instructed    lisinopril (ZESTRIL) 10 mg tablet Take 1 tablet (10 mg total) by mouth daily    metFORMIN (GLUCOPHAGE) 1000 MG tablet Take 1 tablet (1,000 mg total) by mouth 2 (two) times a day with meals    sertraline (ZOLOFT) 50 mg tablet Take 1 tablet (50 mg total) by mouth daily at bedtime     No current facility-administered medications on file prior to visit  Allergies   Allergen Reactions    Tdap [Tetanus-Diphth-Acell Pertussis] Rash         Physical Exam    Temp (!) 96 8 °F (36 °C)   Resp 18   Ht 5' 2" (1 575 m)   Wt 122 kg (269 lb)   BMI 49 20 kg/m²     Constitutional: normal, well developed, well nourished, alert, in no distress and non-toxic and no overt pain behavior  and obese  Eyes: anicteric  HEENT: grossly intact  Neck: supple, symmetric, trachea midline and no masses   Pulmonary:even and unlabored  Cardiovascular:No edema or pitting edema present  Skin:Normal without rashes or lesions and well hydrated  Psychiatric:Mood and affect appropriate  Neurologic:Cranial Nerves II-XII grossly intact Sensation grossly intact; no clonus negative dupree's  Reflexes 2+ and brisk  SLR negative bilaterally  Musculoskeletal:  Steppage gait  Normal heel toe and tip toe walking    Significant pain with lumbar facet loading bilaterally and with lateral spine rotation left greater than right  TTP over lumbar paraspinal muscles  Negative wilda's test, negative gaenslen's negative SIJ loading bilaterally  Imaging    MRI LUMBAR SPINE WITHOUT CONTRAST     INDICATION: M54 42: Lumbago with sciatica, left side  G89 29: Other chronic pain      COMPARISON:  None      TECHNIQUE:  Sagittal T1, sagittal T2, sagittal inversion recovery, axial T1 and axial T2, coronal T2     IMAGE QUALITY:  Diagnostic     FINDINGS:     VERTEBRAL BODIES:  There are 5 lumbar type vertebral bodies  Normal alignment of the lumbar spine  No spondylolysis or spondylolisthesis  No scoliosis  No compression fracture  Normal marrow signal is identified within the visualized bony   structures  No discrete marrow lesion      SACRUM:  Normal signal within the sacrum  No evidence of insufficiency or stress fracture      DISTAL CORD AND CONUS:  Normal size and signal within the distal cord and conus      PARASPINAL SOFT TISSUES:  There is thickening of the endometrial cavity partially visualized on this examination towards the fundus  There is a dominant follicle identified within the left ovary      LOWER THORACIC DISC SPACES:  Normal disc height and signal   No disc herniation, canal stenosis or foraminal narrowing      LUMBAR DISC SPACES:     L1-L2:  Normal      L2-L3:  Normal      L3-L4:  Annular bulging with a small broad-based left foraminal and extraforaminal disc protrusion best seen on series 6 image 13  There is no canal stenosis  Only minimal left foraminal narrowing without nerve impingement      L4-L5:  Disc desiccation and loss of disc height with mild annular bulging    Small central disc herniation without canal stenosis or foraminal nerve impingement      L5-S1:  Normal disc height and signal without canal stenosis or foraminal narrowing      IMPRESSION:     Mild noncompressive lumbar degenerative change at the L3-4 and L4-5 levels      Fluid signal in the endometrial cavity partially visualized at the fundus    If patient is postmenopausal, consider follow-up ultrasound imaging      Signal

## 2020-12-29 ENCOUNTER — APPOINTMENT (OUTPATIENT)
Dept: RADIOLOGY | Facility: HOSPITAL | Age: 45
End: 2020-12-29
Payer: COMMERCIAL

## 2020-12-29 ENCOUNTER — HOSPITAL ENCOUNTER (OUTPATIENT)
Facility: HOSPITAL | Age: 45
Setting detail: OUTPATIENT SURGERY
Discharge: HOME | End: 2020-12-29
Attending: ANESTHESIOLOGY | Admitting: ANESTHESIOLOGY
Payer: COMMERCIAL

## 2020-12-29 VITALS
OXYGEN SATURATION: 98 % | DIASTOLIC BLOOD PRESSURE: 90 MMHG | RESPIRATION RATE: 18 BRPM | HEART RATE: 98 BPM | WEIGHT: 269 LBS | HEIGHT: 62 IN | BODY MASS INDEX: 49.5 KG/M2 | SYSTOLIC BLOOD PRESSURE: 145 MMHG | TEMPERATURE: 98.7 F

## 2020-12-29 LAB — GLUCOSE SERPL-MCNC: 182 MG/DL (ref 65–140)

## 2020-12-29 PROCEDURE — 76000 FLUOROSCOPY <1 HR PHYS/QHP: CPT

## 2020-12-29 PROCEDURE — 82948 REAGENT STRIP/BLOOD GLUCOSE: CPT

## 2020-12-29 PROCEDURE — 64494 INJ PARAVERT F JNT L/S 2 LEV: CPT | Performed by: ANESTHESIOLOGY

## 2020-12-29 PROCEDURE — 64495 INJ PARAVERT F JNT L/S 3 LEV: CPT | Performed by: ANESTHESIOLOGY

## 2020-12-29 PROCEDURE — 64493 INJ PARAVERT F JNT L/S 1 LEV: CPT | Performed by: ANESTHESIOLOGY

## 2020-12-29 RX ORDER — BUPIVACAINE HYDROCHLORIDE 2.5 MG/ML
INJECTION, SOLUTION EPIDURAL; INFILTRATION; INTRACAUDAL AS NEEDED
Status: DISCONTINUED | OUTPATIENT
Start: 2020-12-29 | End: 2020-12-29 | Stop reason: HOSPADM

## 2020-12-29 RX ORDER — METHYLPREDNISOLONE ACETATE 80 MG/ML
INJECTION, SUSPENSION INTRA-ARTICULAR; INTRALESIONAL; INTRAMUSCULAR; SOFT TISSUE AS NEEDED
Status: DISCONTINUED | OUTPATIENT
Start: 2020-12-29 | End: 2020-12-29 | Stop reason: HOSPADM

## 2020-12-29 RX ORDER — LIDOCAINE HYDROCHLORIDE 10 MG/ML
INJECTION, SOLUTION EPIDURAL; INFILTRATION; INTRACAUDAL; PERINEURAL AS NEEDED
Status: DISCONTINUED | OUTPATIENT
Start: 2020-12-29 | End: 2020-12-29 | Stop reason: HOSPADM

## 2020-12-29 RX ORDER — ALPRAZOLAM 0.5 MG/1
0.5 TABLET ORAL ONCE
Status: COMPLETED | OUTPATIENT
Start: 2020-12-29 | End: 2020-12-29

## 2020-12-29 RX ADMIN — ALPRAZOLAM 0.5 MG: 0.5 TABLET ORAL at 10:31

## 2021-01-04 ENCOUNTER — TELEMEDICINE (OUTPATIENT)
Dept: BEHAVIORAL/MENTAL HEALTH CLINIC | Facility: CLINIC | Age: 46
End: 2021-01-04
Payer: COMMERCIAL

## 2021-01-04 ENCOUNTER — DOCUMENTATION (OUTPATIENT)
Dept: BEHAVIORAL/MENTAL HEALTH CLINIC | Facility: CLINIC | Age: 46
End: 2021-01-04

## 2021-01-04 DIAGNOSIS — F33.9 MAJOR DEPRESSION, RECURRENT, CHRONIC (HCC): Primary | ICD-10-CM

## 2021-01-04 PROCEDURE — T1015 CLINIC SERVICE: HCPCS | Performed by: SOCIAL WORKER

## 2021-01-04 NOTE — PSYCH
Psychotherapy Provided: Individual Psychotherapy 45  minutes 11;00 Am to 11:45Am          Goals addressed in session:(Long Term Goal Number one) The Client reported that she received back injections over the holiday season, which has helped with the pain  She reported that continued depression which has caused continued isolation  It is hoped that by addressing her weaknesses this will act as a preventive measure against the possible need for higher level of care and services  Interventions: Supportive Therapy, Strengths Therapy,  and Cognitive Behavioral Therapy where the treatment modalities utilized during the session  Assessment and Plan: The client presented a depressed mood that was congruent in effect  She was alert, goal directed and participated with prompts during the session  The Client was oriented to person, place, time, situation, and reported no suicidal/homicidal ideation, plan, or intent  As team we explore and processed the Client's depression and the effects on her different relationships  The client was able to verbalize the main trigger being the "gossipy people" in the building  During the session we explored healthy boundaries and coping skills were explored during the session to handle increased stress  The Client was an active team ember during the session  Next scheduled for 3 weeks  As her home commitment the Client will practice her coping skills when presented with anxiety and or depresssion    Pain:      PSYCH MENTAL STATUS PAIN : 4 as reported by the Client     PHYSICAL PAIN SCALE NUMBERS: 8 as reported by the Client     Current suicide risk : Low/Phone number for [facility] was given  to the Client [phone]  The Client was given phone number for the [facility] [phone]           Behavioral Health Treatment Plan [facility]: Diagnosis and Treatment Plan explained to Cristy Garcia, Cristy Garcia  relates understanding diagnosis and is agreeable to Treatment Plan  Yes  Virtual Regular Visit      Assessment/Plan:    Problem List Items Addressed This Visit        Other    Major depression, recurrent, chronic ([facility] [address] ) - Primary               Reason for visit is to address the Client's depression and the effects on her different relationships and environments     Encounter provider YONATAN David    Provider located at [address] [address]      Recent Visits  No visits were found meeting these conditions  Showing recent visits within past 7 days and meeting all other requirements     Future Appointments  No visits were found meeting these conditions  Showing future appointments within next 150 days and meeting all other requirements        The patient was identified by name and date of birth  Asif Oquendo was informed that this is a telemedicine visit and that the visit is being conducted through [employer] and patient was informed that this is not a secure, HIPAA-compliant platform  She agrees to proceed     My office door was closed  No one else was in the room  She acknowledged consent and understanding of privacy and security of the video platform  The patient has agreed to participate and understands they can discontinue the visit at any time  Patient is aware this is a billable service  Subjective  Asif Oquendo is a 50 y o  female , who was seen for individual mental health therapy         HPI     Past Medical History:   Diagnosis Date    Allergic     Seasonal Allergies    Alopecia of scalp     Treated with Proscar    Asthma     Chronic pain disorder     back, bilat knees, bilat hips    CPAP (continuous positive airway pressure) dependence     Depression     Diabetes mellitus (HCC)     Fibromyalgia, primary     GERD (gastroesophageal reflux disease)     Hip fx, left, closed, with routine healing, subsequent encounter 05/2017    Hypertension     Insomnia     Irregular heartbeat     Obesity     VICTOR HUGO on CPAP     Shortness of breath     Sleep apnea        Past Surgical History:   Procedure Laterality Date    CARPAL TUNNEL RELEASE Left     LUMBAR EPIDURAL INJECTION      NERVE BLOCK Bilateral 12/29/2020    Procedure: L2 L3 L4 L5 MEDIAL BRANCH BLOCK #1;  Surgeon: Daniella Mccollum MD;  Location[facility];  Service: Pain Management     UPPER GASTROINTESTINAL ENDOSCOPY         Current Outpatient Medications   Medication Sig Dispense Refill    albuterol (Ventolin HFA) 90 mcg/act inhaler Inhale 2 puffs every 6 (six) hours as needed for wheezing 1 Inhaler 5    celecoxib (CeleBREX) 100 mg capsule Take 1 capsule (100 mg total) by mouth 2 (two) times a day 60 capsule 5    cyclobenzaprine (FLEXERIL) 10 mg tablet 1 tab po every 12 hours x 7 days then prn spasms or pain (Patient taking differently: Take 10 mg by mouth 3 (three) times a day as needed 1 tab po every 12 hours x 7 days then prn spasms or pain) 60 tablet 3    ergocalciferol (VITAMIN D2) 50,000 units Take 1 capsule (50,000 Units total) by mouth once a week 4 capsule 5    fluticasone-salmeterol (ADVAIR, WIXELA) 250-50 mcg/dose inhaler Inhale 1 puff 2 (two) times a day Rinse mouth after use  1 Inhaler 5    glipiZIDE (GLUCOTROL) 5 mg tablet Take 1 tablet (5 mg total) by mouth daily with breakfast 30 tablet 5    glucose blood test strip Use as instructed 100 each 5    Lancets (ONETOUCH ULTRASOFT) lancets Use as instructed 100 each 5    lisinopril (ZESTRIL) 10 mg tablet Take 1 tablet (10 mg total) by mouth daily 90 tablet 1    metFORMIN (GLUCOPHAGE) 1000 MG tablet Take 1 tablet (1,000 mg total) by mouth 2 (two) times a day with meals 180 tablet 1    sertraline (ZOLOFT) 50 mg tablet Take 1 tablet (50 mg total) by mouth daily at bedtime 30 tablet 5     No current facility-administered medications for this visit           Allergies   Allergen Reactions    Tdap [Tetanus-Diphth-Acell Pertussis] Rash       Review of Systems    Video Exam    There were no vitals filed for this visit  Physical Exam     I spent 45 minutes directly with the patient during this visit      [facility] acknowledges that she has consented to an online visit or consultation  She understands that the online visit is based solely on information provided by her, and that, in the absence of a face-to-face physical evaluation by the physician, the diagnosis she receives is both limited and provisional in terms of accuracy and completeness  This is not intended to replace a full medical face-to-face evaluation by the physician  Cristy Garcia understands and accepts these terms

## 2021-01-04 NOTE — PROGRESS NOTES
1/4/21 Client was notified that annual paperwork/releases  will be sent to her home to be completed  She was asked to send the paperwork back in the envelope provided back to the [address] [place] [address]

## 2021-01-05 ENCOUNTER — TELEPHONE (OUTPATIENT)
Dept: PAIN MEDICINE | Facility: CLINIC | Age: 46
End: 2021-01-05

## 2021-01-05 NOTE — TELEPHONE ENCOUNTER
Patient states she had 85-90% relief with a great increase in IADL's that lasted 7 days from Bilateral L2-L5 MBB #1  Pain level is currently 8/10  Please advise

## 2021-01-05 NOTE — TELEPHONE ENCOUNTER
Patient is scheduled for bilateral L2-L5 MBB #2 on 1/19/21  Instructions given verbally:  Nothing to eat or drink one hour prior to procedure  Wear comfortable clothing  Will require transportation  If patient becomes ill or is placed on antibiotics will call to inform  No vaccines 2 weeks before and 2 weeks after procedure  No PRN pain meds 6 hours before and 6-8 hours after procedure  Pain level must be at least 5/10 day of procedure  Patient agrees to above

## 2021-01-07 DIAGNOSIS — I10 ESSENTIAL HYPERTENSION: ICD-10-CM

## 2021-01-07 DIAGNOSIS — E11.65 TYPE 2 DIABETES MELLITUS WITH HYPERGLYCEMIA, WITHOUT LONG-TERM CURRENT USE OF INSULIN (HCC): ICD-10-CM

## 2021-01-07 DIAGNOSIS — M79.7 FIBROMYALGIA: ICD-10-CM

## 2021-01-07 DIAGNOSIS — E55.9 VITAMIN D DEFICIENCY: ICD-10-CM

## 2021-01-07 DIAGNOSIS — M15.9 PRIMARY OSTEOARTHRITIS INVOLVING MULTIPLE JOINTS: ICD-10-CM

## 2021-01-07 PROCEDURE — 4010F ACE/ARB THERAPY RXD/TAKEN: CPT | Performed by: ANESTHESIOLOGY

## 2021-01-07 PROCEDURE — 4010F ACE/ARB THERAPY RXD/TAKEN: CPT | Performed by: NURSE PRACTITIONER

## 2021-01-07 RX ORDER — GLIPIZIDE 5 MG/1
5 TABLET ORAL
Qty: 30 TABLET | Refills: 5 | Status: SHIPPED | OUTPATIENT
Start: 2021-01-07 | End: 2021-08-19 | Stop reason: SDUPTHER

## 2021-01-07 RX ORDER — CELECOXIB 100 MG/1
100 CAPSULE ORAL 2 TIMES DAILY
Qty: 60 CAPSULE | Refills: 5 | Status: SHIPPED | OUTPATIENT
Start: 2021-01-07 | End: 2021-07-25 | Stop reason: SDUPTHER

## 2021-01-07 RX ORDER — ERGOCALCIFEROL 1.25 MG/1
50000 CAPSULE ORAL WEEKLY
Qty: 4 CAPSULE | Refills: 5 | Status: SHIPPED | OUTPATIENT
Start: 2021-01-07 | End: 2021-08-23 | Stop reason: SDUPTHER

## 2021-01-07 RX ORDER — LISINOPRIL 10 MG/1
10 TABLET ORAL DAILY
Qty: 90 TABLET | Refills: 1 | Status: SHIPPED | OUTPATIENT
Start: 2021-01-07 | End: 2021-09-08 | Stop reason: SDUPTHER

## 2021-01-18 ENCOUNTER — DOCUMENTATION (OUTPATIENT)
Dept: BEHAVIORAL/MENTAL HEALTH CLINIC | Facility: CLINIC | Age: 46
End: 2021-01-18

## 2021-01-18 NOTE — PROGRESS NOTES
01/18/21Yearly paperwork was sent via the [address] [facility] Postal service to the Client's home address on file, to be completed and returned in a self address envelope as discussed in the packet letter and by prior communication with the Client

## 2021-01-19 ENCOUNTER — APPOINTMENT (OUTPATIENT)
Dept: RADIOLOGY | Facility: HOSPITAL | Age: 46
End: 2021-01-19
Payer: COMMERCIAL

## 2021-01-19 ENCOUNTER — HOSPITAL ENCOUNTER (OUTPATIENT)
Facility: HOSPITAL | Age: 46
Setting detail: OUTPATIENT SURGERY
Discharge: HOME | End: 2021-01-19
Attending: ANESTHESIOLOGY | Admitting: ANESTHESIOLOGY
Payer: COMMERCIAL

## 2021-01-19 VITALS
BODY MASS INDEX: 47.84 KG/M2 | HEIGHT: 62 IN | OXYGEN SATURATION: 98 % | TEMPERATURE: 97.1 F | HEART RATE: 84 BPM | WEIGHT: 260 LBS | RESPIRATION RATE: 18 BRPM | DIASTOLIC BLOOD PRESSURE: 68 MMHG | SYSTOLIC BLOOD PRESSURE: 136 MMHG

## 2021-01-19 LAB
EXT PREGNANCY TEST URINE: NEGATIVE
EXT. CONTROL: NORMAL
GLUCOSE SERPL-MCNC: 90 MG/DL (ref 65–140)

## 2021-01-19 PROCEDURE — 64494 INJ PARAVERT F JNT L/S 2 LEV: CPT | Performed by: ANESTHESIOLOGY

## 2021-01-19 PROCEDURE — 82948 REAGENT STRIP/BLOOD GLUCOSE: CPT

## 2021-01-19 PROCEDURE — 64493 INJ PARAVERT F JNT L/S 1 LEV: CPT | Performed by: ANESTHESIOLOGY

## 2021-01-19 PROCEDURE — 81025 URINE PREGNANCY TEST: CPT | Performed by: ANESTHESIOLOGY

## 2021-01-19 PROCEDURE — 76000 FLUOROSCOPY <1 HR PHYS/QHP: CPT

## 2021-01-19 PROCEDURE — 64495 INJ PARAVERT F JNT L/S 3 LEV: CPT | Performed by: ANESTHESIOLOGY

## 2021-01-19 RX ORDER — ALPRAZOLAM 0.5 MG/1
1 TABLET ORAL ONCE
Status: COMPLETED | OUTPATIENT
Start: 2021-01-19 | End: 2021-01-19

## 2021-01-19 RX ORDER — LIDOCAINE HYDROCHLORIDE 10 MG/ML
INJECTION, SOLUTION EPIDURAL; INFILTRATION; INTRACAUDAL; PERINEURAL AS NEEDED
Status: DISCONTINUED | OUTPATIENT
Start: 2021-01-19 | End: 2021-01-19 | Stop reason: HOSPADM

## 2021-01-19 RX ORDER — METHYLPREDNISOLONE ACETATE 80 MG/ML
INJECTION, SUSPENSION INTRA-ARTICULAR; INTRALESIONAL; INTRAMUSCULAR; SOFT TISSUE AS NEEDED
Status: DISCONTINUED | OUTPATIENT
Start: 2021-01-19 | End: 2021-01-19 | Stop reason: HOSPADM

## 2021-01-19 RX ORDER — BUPIVACAINE HYDROCHLORIDE 2.5 MG/ML
INJECTION, SOLUTION EPIDURAL; INFILTRATION; INTRACAUDAL AS NEEDED
Status: DISCONTINUED | OUTPATIENT
Start: 2021-01-19 | End: 2021-01-19 | Stop reason: HOSPADM

## 2021-01-19 RX ADMIN — ALPRAZOLAM 1 MG: 0.5 TABLET ORAL at 08:20

## 2021-01-19 NOTE — INTERVAL H&P NOTE
H&P reviewed  After examining the patient I find no changes in the patients condition since the H&P had been written  >80% relief of pain with increased participation with IADLs after recent bilateral L2-L5 medial branch blocks  Will proceed with confirmatory procedure #2      Vitals:    01/19/21 0805   BP: 120/60   Pulse: 88   Resp: 18   Temp: (!) 96 °F (35 6 °C)   SpO2: 97%

## 2021-01-19 NOTE — PROCEDURES
Pre-procedure Diagnosis: Lumbar Facet Arthropathy  Post-procedure Diagnosis: Lumbar Facet Arthropathy  Procedure Title(s):  [Bilateral L2-L5] medial branch nerve blocks [(CONFIRMATORY)]  Attending Surgeon:   Brianne Carballo MD  Anesthesia:   Local     Indications: The patient is a 50y o  year-old female with a diagnosis of lumbar facet arthropathy  The patient's history and physical exam were reviewed  The risks, benefits and alternatives to the procedure were discussed, and all questions were answered to the patient's satisfaction  The patient agreed to proceed, and written informed consent was obtained  Procedure in Detail: The patient was brought into the procedure room and placed in the prone position on the fluoroscopy table  The area of the lumbar spine was prepped with chloraprep and then draped in a sterile manner  AP fluoroscopy was used to identify the [L2-L5] levels on the [LEFT] side  The C-arm was obliqued to visualize the junction of the superior articulate process and transverse process  The sacral ala was identified and marked  The skin in these identified areas was anesthetized with 1% lidocaine  A 22-gauge, 5-inch spinal needle was advanced toward each of these points under fluoroscopic guidance  Once bone was contacted, negative aspiration was confirmed and [1-mL] of a [8mL]mixture of [7mL] [0 25% bupivicaine] and 1mL of 80mg/mL Depomedrol was injected at each level  (The same procedure was performed on the opposite side )    After the procedure was completed, the patient's back was cleaned and bandages were placed at the needle insertion sites  Disposition: The patient tolerated the procedure well, and there were no apparent complications  The patient was taken to the recovery area where written discharge instructions for the procedure were given  The patient was given a pain diary to determine if the patient's pain improves following the injection   Once the diary is returned we will consider next appropriate course of treatment      Postoperative pain relief [WAS] significant    Estimated Blood Loss: None  Specimens Obtained: N/A

## 2021-01-19 NOTE — OP NOTE
OPERATIVE REPORT  PATIENT NAME: Cristy Garcia    :  1975  MRN: [id number]  Pt Location:  GI ROOM 01    SURGERY DATE: 2021    Surgeon(s) and Role:      Barry Harper MD - Primary    Preop Diagnosis:  Lumbar facet joint syndrome [M47 816]    Post-Op Diagnosis Codes:     * Lumbar facet joint syndrome [M47 816]    Procedure(s) (LRB):  L2 L3 L4 L5 MEDIAL BRANCH BLOCK #2 (Bilateral)    Specimen(s):  * No specimens in log *    Estimated Blood Loss:   Minimal    Drains:  * No LDAs found *    Anesthesia Type:   Local    Operative Indications:  Lumbar facet joint syndrome [M47 816]    Operative Findings:  Bilateral L2-L5 medial branch regions identified under fluoroscopic guidance    Complications:   None    Procedure and Technique:  Please see detailed procedure note     I was present for the entire procedure    Patient Disposition:  PACU     SIGNATURE: Daiana Juares MD  DATE: 2021  TIME: 9:38 AM

## 2021-01-19 NOTE — DISCHARGE INSTRUCTIONS
JANUARY MATSON WILL BE REACHING OUT WITHIN THE NEXT WEEK REGARDING THE DEGREE OF YOUR PAIN RELIEF AND TO ASK YOU ABOUT ANY INCREASE IN ABILITY TO PARTICIPATE WITH YOUR DAILY ACTIVITIES  BASED ON YOUR RESPONSE, WE WILL BE IN CONTACT WITH REGARD TO THE NEXT STEPS                                                    PLEASE CONTACT US BY CALLING THE [facility] AT [facility] WITH ANY QUESTIONS: [phone]    MEDIAL BRANCH BLOCK DISCHARGE INSTRUCTIONS      ACTIVITY  · Please do activities that will bring the normal pain that we are rating  For example, if vacuuming or walking increases the painm do that  Clarke twill give the most accurate response to the diary  · You may shower, but no tub baths today, or applied heat  CARE OF THE INJECTION SITE  · This area may be numb for several hours after the injection  · Notify the [facility] if you have any of the following: redness, drainage, swelling or fever above 100°F     SPECIAL INSTRUCTIONS  · Please return the MBB diary to our office by mail, fax, or drop it off  MEDICATIONS  · Please do not take any break through or short acting pain medications for 8 hours after the block  · Continue to take all routine medications  · Our office may have instructed you to hold some medications  · You may resume _______________________________________________  If you have any problems specifically related to your procedure, please call our office at [phone]  Problems not related to your procedure should be directed at your primary care physician  Lumbar Radiofrequency Ablation   WHAT YOU NEED TO KNOW:   What do I need to know about lumbar radiofrequency ablation? Lumbar radiofrequency ablation (RFA) is a procedure used to treat facet joint pain in your lower back  Facet joints are found at the back of each vertebra  A needle electrode is used to send electrical currents to the nerves in your facet joint   The electrical currents create heat that damages the nerve so it cannot send pain signals  How do I prepare for lumbar RFA? Your healthcare provider will talk to you about how to prepare for this procedure  He may tell you not to eat or drink anything after midnight on the day of your procedure  He will tell you what medicines to take or not take on the day of your procedure  What will happen during lumbar RFA? · You will lie on your stomach  You will be given local anesthesia to numb the area of your back where the needle electrode will be inserted  You may be given a sedative to help keep you relaxed  You may still feel pressure or pushing during the procedure, but you should not feel any pain  Your healthcare provider will use fluoroscopy (a type of x-ray) to guide the needle electrode to the nerves near your facet joint  · Your healthcare provider may touch the affected nerve to make sure the needle electrode is in the right place  You will feel tingling or pressure when he does this  He will then apply local anesthesia to the nerve to numb it  This will prevent you from feeling pain when he applies heat to the nerve  Your healthcare provider will then apply heat to the nerve using the needle electrode  He may need to apply heat to more than one nerve  He will remove the needle electrode and apply a bandage over the area  What are the risks of lumbar RFA? You may have pain, numbness, tingling, or burning in the area where the lumbar RFA was done  These normally go away within 6 weeks  The needle electrode may injure your spinal nerves  This may cause permanent leg weakness or nerve pain  CARE AGREEMENT:   You have the right to help plan your care  Learn about your health condition and how it may be treated  Discuss treatment options with your healthcare providers to decide what care you want to receive  You always have the right to refuse treatment  The above information is an  only   It is not intended as medical advice for individual conditions or treatments  Talk to your doctor, nurse or pharmacist before following any medical regimen to see if it is safe and effective for you  © Copyright [address] Information is for End User's use only and may not be sold, redistributed or otherwise used for commercial purposes   All illustrations and images included in CareNotes® are the copyrighted property of A D A M , Inc  or [address] [place] [address]

## 2021-01-25 ENCOUNTER — TELEMEDICINE (OUTPATIENT)
Dept: BEHAVIORAL/MENTAL HEALTH CLINIC | Facility: CLINIC | Age: 46
End: 2021-01-25
Payer: COMMERCIAL

## 2021-01-25 ENCOUNTER — DOCUMENTATION (OUTPATIENT)
Dept: BEHAVIORAL/MENTAL HEALTH CLINIC | Facility: CLINIC | Age: 46
End: 2021-01-25

## 2021-01-25 DIAGNOSIS — F33.9 MAJOR DEPRESSION, RECURRENT, CHRONIC (HCC): Primary | ICD-10-CM

## 2021-01-25 PROCEDURE — T1015 CLINIC SERVICE: HCPCS | Performed by: SOCIAL WORKER

## 2021-01-25 NOTE — PSYCH
Psychotherapy Provided: Individual Psychotherapy 45  minutes 11:00 Am 11:47 AM          Goals addressed in session:(Crisis Plan) The Client reported that she was turned down for SSDI and has applied to the [employer] for evaluation of her case  The Client reported continued anxiety and depression which has led to isolation  During the session we created a new crisis plan to address her relationship and environmental weaknesses  It is hoped that by addressing her weaknesses this will act as a preventive measure against the possible need for higher level of care and services  Interventions: Supportive Therapy, Strengths Therapy,  and Cognitive Behavioral Therapy where the treatment modalities utilized during the session  Assessment and Plan: The Client presented a depressed anxious mood that was congruent in effect  She was alert, goal directed and participated with prompts during the session  The Client was oriented to person, place, time, situation, and reported no suicidal/homicidal ideation, plan, or intent  As team we created the Client's recovery crisis plan addressing her warning signs, internal coping strategies, individuals and places that provide distractions, individuals she could ask for help, professions who she could contact in crisis, and ways to make environment safe  (The Client's crisis plan was sent to her residence) As her home commitment the Client will practice her coping skill grounding when presented with anxiety and or depression  Next scheduled appointment was set for 2 weeks  Pain:      PSYCH MENTAL STATUS PAIN :5 as reported by the Client     PHYSICAL PAIN SCALE NUMBERS:8 as reported by the Client due to back pain     Current suicide risk : Low/Phone number for [facility] was given  to the Client [phone]  The Client was given phone number for the [facility] [phone] [address]: Diagnosis and Treatment Plan explained to [id number] [facility]  relates understanding diagnosis and is agreeable to Treatment Plan  Yes  Virtual Regular Visit      Assessment/Plan:    Problem List Items Addressed This Visit        Other    Major depression, recurrent, chronic ([address] ) - Primary               Reason for visit is to create the Client's crisis plan     Encounter provider YONATAN Dickinson    Provider located at [place] [address] PSYCH  [address] [facility] [address]      Recent Visits  No visits were found meeting these conditions  Showing recent visits within past 7 days and meeting all other requirements     Today's Visits  Date Type Provider Dept   01/25/21 Telemedicine YONATAN Dickinson [facility]   Showing today's visits and meeting all other requirements     Future Appointments  No visits were found meeting these conditions  Showing future appointments within next 150 days and meeting all other requirements        The patient was identified by name and date of birth  Ronel Joy was informed that this is a telemedicine visit and that the visit is being conducted through [employer] and patient was informed that this is not a secure, HIPAA-compliant platform  She agrees to proceed     My office door was closed  No one else was in the room  She acknowledged consent and understanding of privacy and security of the video platform  The patient has agreed to participate and understands they can discontinue the visit at any time  Patient is aware this is a billable service  Subjective  Ronel Joy is a 50 y o  female , who was seen for individual mental health therapy         HPI     Past Medical History:   Diagnosis Date    Allergic     Seasonal Allergies    Alopecia of scalp     Treated with Proscar    Asthma     Chronic pain disorder     back, bilat knees, bilat hips    CPAP (continuous positive airway pressure) dependence     Depression     Diabetes mellitus ([facility] [address] )     Fibromyalgia, primary     GERD (gastroesophageal reflux disease)     Hip fx, left, closed, with routine healing, subsequent encounter 05/2017    Hypertension     Insomnia     Irregular heartbeat     Obesity     VICTOR HUGO on CPAP     Shortness of breath     Sleep apnea        Past Surgical History:   Procedure Laterality Date    CARPAL TUNNEL RELEASE Left     LUMBAR EPIDURAL INJECTION      NERVE BLOCK Bilateral 12/29/2020    Procedure: L2 L3 L4 L5 MEDIAL BRANCH BLOCK #1;  Surgeon: Gwynn Duane, MD;  [facility];  Service: Pain Management     NERVE BLOCK Bilateral 1/19/2021    Procedure: L2 L3 L4 L5 MEDIAL BRANCH BLOCK #2;  Surgeon: Gwynn Duane, MD;  [facility];  Service: Pain Management     UPPER GASTROINTESTINAL ENDOSCOPY         Current Outpatient Medications   Medication Sig Dispense Refill    albuterol (Ventolin HFA) 90 mcg/act inhaler Inhale 2 puffs every 6 (six) hours as needed for wheezing 1 Inhaler 5    celecoxib (CeleBREX) 100 mg capsule Take 1 capsule (100 mg total) by mouth 2 (two) times a day 60 capsule 5    cyclobenzaprine (FLEXERIL) 10 mg tablet 1 tab po every 12 hours x 7 days then prn spasms or pain (Patient taking differently: Take 10 mg by mouth 3 (three) times a day as needed 1 tab po every 12 hours x 7 days then prn spasms or pain) 60 tablet 3    ergocalciferol (VITAMIN D2) 50,000 units Take 1 capsule (50,000 Units total) by mouth once a week 4 capsule 5    fluticasone-salmeterol (ADVAIR, WIXELA) 250-50 mcg/dose inhaler Inhale 1 puff 2 (two) times a day Rinse mouth after use   1 Inhaler 5    glipiZIDE (GLUCOTROL) 5 mg tablet Take 1 tablet (5 mg total) by mouth daily with breakfast 30 tablet 5    glucose blood test strip Use as instructed 100 each 5    Lancets (ONETOUCH ULTRASOFT) lancets Use as instructed 100 each 5    lisinopril (ZESTRIL) 10 mg tablet Take 1 tablet (10 mg total) by mouth daily 90 tablet 1    metFORMIN (GLUCOPHAGE) 1000 MG tablet Take 1 tablet (1,000 mg total) by mouth 2 (two) times a day with meals 180 tablet 1    sertraline (ZOLOFT) 50 mg tablet Take 1 tablet (50 mg total) by mouth daily at bedtime 30 tablet 5     No current facility-administered medications for this visit  Allergies   Allergen Reactions    Tdap [Tetanus-Diphth-Acell Pertussis] Rash       Review of Systems    Video Exam    There were no vitals filed for this visit  Physical Exam     I spent 45 minutes directly with the patient during this visit      [address] [place] [address] acknowledges that she has consented to an online visit or consultation  She understands that the online visit is based solely on information provided by her, and that, in the absence of a face-to-face physical evaluation by the physician, the diagnosis she receives is both limited and provisional in terms of accuracy and completeness  This is not intended to replace a full medical face-to-face evaluation by the physician  Cristy Garcia understands and accepts these terms

## 2021-01-26 ENCOUNTER — TELEPHONE (OUTPATIENT)
Dept: PAIN MEDICINE | Facility: CLINIC | Age: 46
End: 2021-01-26

## 2021-01-26 NOTE — TELEPHONE ENCOUNTER
Patient is scheduled for left L2-L5 RFA on 2/2/21 and right L2-L5 RFA on 2/16/21  Instructions given verbally:  Nothing to eat or drink one hour prior to procedure  Wear comfortable clothing  Will require transportation  If patient becomes ill or is placed on antibiotics will call to inform  No vaccines 2 weeks before and 2 weeks after procedure  Patient agrees to above

## 2021-01-26 NOTE — TELEPHONE ENCOUNTER
Patient states she had 60-70% relief from bilateral L2-L5 MBB #2 that lasted 6 days  A great increase in IADL's, pain is now an 8/10  Please advise

## 2021-02-09 ENCOUNTER — TELEMEDICINE (OUTPATIENT)
Dept: BEHAVIORAL/MENTAL HEALTH CLINIC | Facility: CLINIC | Age: 46
End: 2021-02-09
Payer: COMMERCIAL

## 2021-02-09 DIAGNOSIS — F33.9 MAJOR DEPRESSION, RECURRENT, CHRONIC (HCC): Primary | ICD-10-CM

## 2021-02-09 PROCEDURE — T1015 CLINIC SERVICE: HCPCS | Performed by: SOCIAL WORKER

## 2021-02-09 NOTE — PSYCH
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, which she is obtaining approximately $400  During the session we explored the Client's continued depression over her physical health and "Personal Situation" It is hoped that by addressing her weaknesses this will act as preventive measure against the possible need for higher level of care and services  Interventions: Supportive Therapy, Strengths Therapy,  and Cognitive Behavioral Therapy where the treatment modalities utilized during the session  Assessment and Plan: The Client presented a depressed, anxious mood that was congruent in effect  She was alert, goal directed and participated with prompts during the session  The Client was oriented to person, place, time, situation, and reported no suicidal/homicidal ideation, plan, or intent  As team we explored and processed her continued anxiety and depression and how her environment has a connection with her mental health  During the session we explored her coping skills to address her anxiety and depression  As her home commitment the Client will contact the [place] [address] office to address her auto title issue  Next appointment was set for 2 weeks  Pain:      PSYCH MENTAL STATUS PAIN : 6 as reported by the Client due to her depression and anxiety  PHYSICAL PAIN SCALE NUMBERS:8 as reported by the Client due to back pain     Current suicide risk : Low/Phone number for [facility] was given  to the Client [phone]  The Client was given phone number for the [facility] [phone]  Behavioral Health Treatment Plan [facility]: Diagnosis and Treatment Plan explained to Cristy Garcia, Cristy Garcia  relates understanding diagnosis and is agreeable to Treatment Plan  Yes  Virtual Brief Visit    Assessment/Plan:    Problem List Items Addressed This Visit        Other    Major depression, recurrent, chronic ([facility] [address] ) - Primary                Reason for visit is No chief complaint on file  Encounter provider YONATAN Draper    Provider located at [address] [place] [id number]    Recent Visits  No visits were found meeting these conditions  Showing recent visits within past 7 days and meeting all other requirements     Today's Visits  Date Type Provider Dept   02/09/21 Telemedicine YONATAN Draper [facility]   Showing today's visits and meeting all other requirements     Future Appointments  No visits were found meeting these conditions  Showing future appointments within next 150 days and meeting all other requirements        After connecting through telephone, the patient was identified by name and date of birth  Clydie Najjar was informed that this is a telemedicine visit and that the visit is being conducted through telephone  My office door was closed  No one else was in the room  She acknowledged consent and understanding of privacy and security of the platform  The patient has agreed to participate and understands she can discontinue the visit at any time  Patient is aware this is a billable service  Subjective    Clydie Najjar is a 50 y o  female , who was seen for individual mental healthy therapy    HPI     Past Medical History:   Diagnosis Date    Allergic     Seasonal Allergies    Alopecia of scalp     Treated with Proscar    Asthma     Chronic pain disorder     back, bilat knees, bilat hips    CPAP (continuous positive airway pressure) dependence     Depression  Diabetes mellitus ([facility] [address] )     Fibromyalgia, primary     GERD (gastroesophageal reflux disease)     Hip fx, left, closed, with routine healing, subsequent encounter 05/2017    Hypertension     Insomnia     Irregular heartbeat     Obesity     VICTOR HUGO on CPAP     Shortness of breath     Sleep apnea        Past Surgical History:   Procedure Laterality Date    CARPAL TUNNEL RELEASE Left     LUMBAR EPIDURAL INJECTION      NERVE BLOCK Bilateral 12/29/2020    Procedure: L2 L3 L4 L5 MEDIAL BRANCH BLOCK #1;  Surgeon: Low Marques MD;  [facility];  Service: Pain Management     NERVE BLOCK Bilateral 1/19/2021    Procedure: L2 L3 L4 L5 MEDIAL BRANCH BLOCK #2;  Surgeon: Low Marques MD;  [facility];  Service: Pain Management     UPPER GASTROINTESTINAL ENDOSCOPY         Current Outpatient Medications   Medication Sig Dispense Refill    albuterol (Ventolin HFA) 90 mcg/act inhaler Inhale 2 puffs every 6 (six) hours as needed for wheezing 1 Inhaler 5    celecoxib (CeleBREX) 100 mg capsule Take 1 capsule (100 mg total) by mouth 2 (two) times a day 60 capsule 5    cyclobenzaprine (FLEXERIL) 10 mg tablet 1 tab po every 12 hours x 7 days then prn spasms or pain (Patient taking differently: Take 10 mg by mouth 3 (three) times a day as needed 1 tab po every 12 hours x 7 days then prn spasms or pain) 60 tablet 3    ergocalciferol (VITAMIN D2) 50,000 units Take 1 capsule (50,000 Units total) by mouth once a week 4 capsule 5    fluticasone-salmeterol (ADVAIR, WIXELA) 250-50 mcg/dose inhaler Inhale 1 puff 2 (two) times a day Rinse mouth after use   1 Inhaler 5    glipiZIDE (GLUCOTROL) 5 mg tablet Take 1 tablet (5 mg total) by mouth daily with breakfast 30 tablet 5    glucose blood test strip Use as instructed 100 each 5    Lancets (ONETOUCH ULTRASOFT) lancets Use as instructed 100 each 5    lisinopril (ZESTRIL) 10 mg tablet Take 1 tablet (10 mg total) by mouth daily 90 tablet 1    metFORMIN (GLUCOPHAGE) 1000 MG tablet Take 1 tablet (1,000 mg total) by mouth 2 (two) times a day with meals 180 tablet 1    sertraline (ZOLOFT) 50 mg tablet Take 1 tablet (50 mg total) by mouth daily at bedtime 30 tablet 5     No current facility-administered medications for this visit  Allergies   Allergen Reactions    Tdap [Tetanus-Diphth-Acell Pertussis] Rash       Review of Systems    There were no vitals filed for this visit  I spent 45 minutes directly with the patient during this visit    [address] [place] [address] acknowledges that she has consented to an online visit or consultation  She understands that the online visit is based solely on information provided by her, and that, in the absence of a face-to-face physical evaluation by the physician, the diagnosis she receives is both limited and provisional in terms of accuracy and completeness  This is not intended to replace a full medical face-to-face evaluation by the physician  Cristy Garcia understands and accepts these terms

## 2021-02-23 ENCOUNTER — TELEMEDICINE (OUTPATIENT)
Dept: BEHAVIORAL/MENTAL HEALTH CLINIC | Facility: CLINIC | Age: 46
End: 2021-02-23
Payer: COMMERCIAL

## 2021-02-23 DIAGNOSIS — F33.9 MAJOR DEPRESSION, RECURRENT, CHRONIC (HCC): Primary | ICD-10-CM

## 2021-02-23 PROCEDURE — T1015 CLINIC SERVICE: HCPCS | Performed by: SOCIAL WORKER

## 2021-02-23 NOTE — PSYCH
Psychotherapy Provided: Individual Psychotherapy 45  minutes 12:50 Pm to 1: 35M     Goals addressed in session: (Long Term Goal Number One) The Client reported that her surgical procedure was cancelled due to weather  to help with her back pain  She reported continued anxiety and depression over her back pain  During the session we explored her depression and anxiety and the effects on her different relationships and environments    Interventions: Supportive Therapy, Strengths Therapy,  and Cognitive Behavioral Therapy where the treatment modalities utilized during the session  Assessment and Plan: The client presented a depressed anxious mood that was congruent in effect  She was alert, goal directed and participated with prompts during the session  The client was oriented to person, place, time, situation, and reported no suicidal/homicidal ideation, plan, or intent  As team we explored and processed her depression and anxiety and the effects on her different relationships and environment  The client was able to verbalize  that the weather and pain is the main as causes for her depression and anxiety  Coping skills were reviewed during the session  As her home commitment the Client will practice 45 minutes of self-care  Next scheduled appointment was set for 2 weeks  Pain:      PSYCH MENTAL STATUS PAIN :7 as reported by the client     PHYSICAL PAIN SCALE NUMBERS:8 as reported by the client Due to pain     Current suicide risk : Low/Phone number for [facility] was given  to the Client [phone]  The Client was given phone number for the [facility] [phone]  Behavioral Health Treatment Plan [facility]: Diagnosis and Treatment Plan explained to Cristy Garcia, Cristy Briggss  relates understanding diagnosis and is agreeable to Treatment Plan  Yes    Virtual Regular Visit      Assessment/Plan:    Problem List Items Addressed This Visit        Other    Major depression, recurrent, chronic ([facility] [address] ) - Primary               Reason for visit is No chief complaint on file  Encounter provider YONATAN Gomez    Provider located at [address] [place] [phone]      Recent Visits  No visits were found meeting these conditions  Showing recent visits within past 7 days and meeting all other requirements     Future Appointments  No visits were found meeting these conditions  Showing future appointments within next 150 days and meeting all other requirements        The patient was identified by name and date of birth  Darryl Amador was informed that this is a telemedicine visit and that the visit is being conducted through [employer] and patient was informed that this is not a secure, HIPAA-compliant platform  She agrees to proceed     My office door was closed  No one else was in the room  She acknowledged consent and understanding of privacy and security of the video platform  The patient has agreed to participate and understands they can discontinue the visit at any time  Patient is aware this is a billable service  Subjective  Darryl Amador is a 50 y o  female , who was seen for individual mental health therapy          HPI     Past Medical History:   Diagnosis Date    Allergic     Seasonal Allergies    Alopecia of scalp     Treated with Proscar    Asthma     Chronic pain disorder     back, bilat knees, bilat hips    CPAP (continuous positive airway pressure) dependence     Depression     Diabetes mellitus (HCC)     Fibromyalgia, primary     GERD (gastroesophageal reflux disease)     Hip fx, left, closed, with routine healing, subsequent encounter 05/2017    Hypertension     Insomnia     Irregular heartbeat     Obesity     VICTOR HUGO on CPAP     Shortness of breath     Sleep apnea        Past Surgical History:   Procedure Laterality Date    CARPAL TUNNEL RELEASE Left     LUMBAR EPIDURAL INJECTION      NERVE BLOCK Bilateral 12/29/2020    Procedure: L2 L3 L4 L5 MEDIAL BRANCH BLOCK #1;  Surgeon: Graciela Corbett MD;  Location: [facility];  Service: Pain Management     NERVE BLOCK Bilateral 1/19/2021    Procedure: L2 L3 L4 L5 MEDIAL BRANCH BLOCK #2;  Surgeon: Graciela Corbett MD;  [facility]: [facility];  Service: Pain Management     UPPER GASTROINTESTINAL ENDOSCOPY         Current Outpatient Medications   Medication Sig Dispense Refill    albuterol (Ventolin HFA) 90 mcg/act inhaler Inhale 2 puffs every 6 (six) hours as needed for wheezing 1 Inhaler 5    celecoxib (CeleBREX) 100 mg capsule Take 1 capsule (100 mg total) by mouth 2 (two) times a day 60 capsule 5    cyclobenzaprine (FLEXERIL) 10 mg tablet 1 tab po every 12 hours x 7 days then prn spasms or pain (Patient taking differently: Take 10 mg by mouth 3 (three) times a day as needed 1 tab po every 12 hours x 7 days then prn spasms or pain) 60 tablet 3    ergocalciferol (VITAMIN D2) 50,000 units Take 1 capsule (50,000 Units total) by mouth once a week 4 capsule 5    fluticasone-salmeterol (ADVAIR, WIXELA) 250-50 mcg/dose inhaler Inhale 1 puff 2 (two) times a day Rinse mouth after use  1 Inhaler 5    glipiZIDE (GLUCOTROL) 5 mg tablet Take 1 tablet (5 mg total) by mouth daily with breakfast 30 tablet 5    glucose blood test strip Use as instructed 100 each 5    Lancets (ONETOUCH ULTRASOFT) lancets Use as instructed 100 each 5    lisinopril (ZESTRIL) 10 mg tablet Take 1 tablet (10 mg total) by mouth daily 90 tablet 1    metFORMIN (GLUCOPHAGE) 1000 MG tablet Take 1 tablet (1,000 mg total) by mouth 2 (two) times a day with meals 180 tablet 1    sertraline (ZOLOFT) 50 mg tablet Take 1 tablet (50 mg total) by mouth daily at bedtime 30 tablet 5     No current facility-administered medications for this visit           Allergies   Allergen Reactions    Tdap [Tetanus-Diphth-Acell Pertussis] Rash       Review of Systems    Video Exam    There were no vitals filed for this visit  Physical Exam     I spent 45 minutes directly with the patient during this visit      [facility] acknowledges that she has consented to an online visit or consultation  She understands that the online visit is based solely on information provided by her, and that, in the absence of a face-to-face physical evaluation by the physician, the diagnosis she receives is both limited and provisional in terms of accuracy and completeness  This is not intended to replace a full medical face-to-face evaluation by the physician  Cirsty Garcia understands and accepts these terms

## 2021-02-24 DIAGNOSIS — J45.30 MILD PERSISTENT ASTHMA WITHOUT COMPLICATION: ICD-10-CM

## 2021-02-25 ENCOUNTER — HOSPITAL ENCOUNTER (OUTPATIENT)
Facility: HOSPITAL | Age: 46
Setting detail: OUTPATIENT SURGERY
Discharge: HOME | End: 2021-02-25
Attending: ANESTHESIOLOGY | Admitting: ANESTHESIOLOGY
Payer: COMMERCIAL

## 2021-02-25 ENCOUNTER — APPOINTMENT (OUTPATIENT)
Dept: RADIOLOGY | Facility: HOSPITAL | Age: 46
End: 2021-02-25
Payer: COMMERCIAL

## 2021-02-25 VITALS
OXYGEN SATURATION: 97 % | BODY MASS INDEX: 47.84 KG/M2 | HEIGHT: 62 IN | HEART RATE: 100 BPM | RESPIRATION RATE: 18 BRPM | DIASTOLIC BLOOD PRESSURE: 89 MMHG | WEIGHT: 260 LBS | SYSTOLIC BLOOD PRESSURE: 152 MMHG | TEMPERATURE: 97.9 F

## 2021-02-25 LAB
EXT PREGNANCY TEST URINE: NEGATIVE
EXT. CONTROL: NORMAL
GLUCOSE SERPL-MCNC: 158 MG/DL (ref 65–140)

## 2021-02-25 PROCEDURE — 76000 FLUOROSCOPY <1 HR PHYS/QHP: CPT

## 2021-02-25 PROCEDURE — 64636 DESTROY L/S FACET JNT ADDL: CPT | Performed by: ANESTHESIOLOGY

## 2021-02-25 PROCEDURE — 81025 URINE PREGNANCY TEST: CPT | Performed by: ANESTHESIOLOGY

## 2021-02-25 PROCEDURE — 82948 REAGENT STRIP/BLOOD GLUCOSE: CPT

## 2021-02-25 PROCEDURE — 64635 DESTROY LUMB/SAC FACET JNT: CPT | Performed by: ANESTHESIOLOGY

## 2021-02-25 RX ORDER — LIDOCAINE HYDROCHLORIDE 20 MG/ML
INJECTION, SOLUTION EPIDURAL; INFILTRATION; INTRACAUDAL; PERINEURAL AS NEEDED
Status: DISCONTINUED | OUTPATIENT
Start: 2021-02-25 | End: 2021-02-25 | Stop reason: HOSPADM

## 2021-02-25 RX ORDER — BUPIVACAINE HYDROCHLORIDE 2.5 MG/ML
INJECTION, SOLUTION EPIDURAL; INFILTRATION; INTRACAUDAL AS NEEDED
Status: DISCONTINUED | OUTPATIENT
Start: 2021-02-25 | End: 2021-02-25 | Stop reason: HOSPADM

## 2021-02-25 RX ORDER — OXYCODONE HYDROCHLORIDE AND ACETAMINOPHEN 5; 325 MG/1; MG/1
1 TABLET ORAL ONCE
Status: DISCONTINUED | OUTPATIENT
Start: 2021-02-25 | End: 2021-02-25

## 2021-02-25 RX ORDER — METHYLPREDNISOLONE ACETATE 80 MG/ML
INJECTION, SUSPENSION INTRA-ARTICULAR; INTRALESIONAL; INTRAMUSCULAR; SOFT TISSUE AS NEEDED
Status: DISCONTINUED | OUTPATIENT
Start: 2021-02-25 | End: 2021-02-25 | Stop reason: HOSPADM

## 2021-02-25 RX ORDER — LIDOCAINE HYDROCHLORIDE 10 MG/ML
INJECTION, SOLUTION EPIDURAL; INFILTRATION; INTRACAUDAL; PERINEURAL AS NEEDED
Status: DISCONTINUED | OUTPATIENT
Start: 2021-02-25 | End: 2021-02-25 | Stop reason: HOSPADM

## 2021-02-25 RX ORDER — OXYCODONE HYDROCHLORIDE AND ACETAMINOPHEN 5; 325 MG/1; MG/1
2 TABLET ORAL ONCE
Status: COMPLETED | OUTPATIENT
Start: 2021-02-25 | End: 2021-02-25

## 2021-02-25 RX ADMIN — OXYCODONE HYDROCHLORIDE AND ACETAMINOPHEN 2 TABLET: 5; 325 TABLET ORAL at 07:48

## 2021-02-25 NOTE — OP NOTE
OPERATIVE REPORT  PATIENT NAME: Cristy Garcia    :  1975  MRN: [id number]  Pt Location:  GI ROOM 01    SURGERY DATE: 2021    Surgeon(s) and Role:      Tatiana Ambrocio MD - Primary    Preop Diagnosis:  Facet syndrome, lumbar [M47 816]    Post-Op Diagnosis Codes:     * Facet syndrome, lumbar [M47 816]    Procedure(s) (LRB):  L2 L3 L4 L5 RADIO FREQUENCY ABLATION right side (Right)    Specimen(s):  * No specimens in log *    Estimated Blood Loss:   Minimal    Drains:  * No LDAs found *    Anesthesia Type:   Local    Operative Indications:  Facet syndrome, lumbar [M47 816]    Operative Findings:    Right L2, L3, L4, L5 medial branch regions identified under fluoroscopic guidance     Complications:   None    Procedure and Technique:  Please see detailed procedure note   I was present for the entire procedure    Patient Disposition:  PACU     SIGNATURE: Graciela Corbett MD  DATE: 2021  TIME: 8:23 AM

## 2021-02-25 NOTE — DISCHARGE INSTRUCTIONS
PLEASE SCHEDULE 2 WEEK FOLLOW UP BY CALLING THE [facility] AT [facility]: [phone]      Lumbar Radiofrequency Ablation   WHAT YOU NEED TO KNOW:   Lumbar radiofrequency ablation (RFA) is a procedure used to treat facet joint pain in your lower back  Facet joints are found at the back of each vertebra  A needle electrode is used to send electrical currents to the nerves in your facet joint  The electrical currents create heat that damages the nerve so it cannot send pain signals  DISCHARGE INSTRUCTIONS:   Seek care immediately if:   · You cannot move your leg  · You cannot control your urine or bowel movements  · You have severe pain in your lower back  Contact your healthcare provider if:   · You have leg weakness  · You develop new symptoms  · You have questions or concerns about your condition or care  Medicines:   · Pain medicine  may be given  Ask how to take this medicine safely  · Take your medicine as directed  Contact your healthcare provider if you think your medicine is not helping or if you have side effects  Tell him or her if you are allergic to any medicine  Keep a list of the medicines, vitamins, and herbs you take  Include the amounts, and when and why you take them  Bring the list or the pill bottles to follow-up visits  Carry your medicine list with you in case of an emergency  Follow up with your healthcare provider as directed:  Write down your questions so you remember to ask them during your visits  Activity:  Do not drive a car or operate machinery within 24 hours after your procedure  Ask your healthcare provider about any other activities you should avoid  © Copyright [address] Information is for End User's use only and may not be sold, redistributed or otherwise used for commercial purposes   All illustrations and images included in CareNotes® are the copyrighted property of [employer]  or Brien Lopez  The above information is an  only  It is not intended as medical advice for individual conditions or treatments  Talk to your doctor, nurse or pharmacist before following any medical regimen to see if it is safe and effective for you

## 2021-02-25 NOTE — H&P (VIEW-ONLY)
Assessment     1  Lumbar facet joint syndrome   - Case request operating room: BLOCK MEDIAL BRANCH Bilateral L2-L5; Standing   - Case request operating room: BLOCK MEDIAL BRANCH Bilateral L2-L5   Chronic axial low back pain described by primarily arthritic features  Positive facet loading maneuvers as noted below  Mild noncompressive lumbar degenerative change at the L3-4 and L4-5 levels  Previously had a left L3 transforaminal steroid injection with Dr Brady Duncan which helped for about a week and a half  Arthritic symptomatology greater than radicular features at this time  >80% reduction of pain with improved ability to participate with IADLs after series of medial branch blocks bilateral L2-L5 #1, and #2; will proceed with RF lesioning  Plan     -continue physical therapy and core exercises for lumbar facet syndrome   -continue Celebrex 100 mg b i d  As needed for lumbar facet syndrome  Counseled regarding bleeding risk of taking this medication    -lifestyle modifications including diet, exercise and weight loss extensively discussed   -will plan for bilateral L2 through L5 medial branchRFA    There are risks associated with opioid medications, including dependence, addiction and tolerance  The patient understands and agrees to use these medications only as prescribed  Potential side effects of the medications include, but are not limited to, constipation, drowsiness, addiction, impaired judgment and risk of fatal overdose if not taken as prescribed  The patient was warned against driving while taking sedation medications  Sharing medications is a felony  At this point in time, the patient is showing no signs of addiction, abuse, diversion or suicidal ideation  [facility] Prescription Drug Monitoring Program report was reviewed and was appropriate     Complete risks and benefits including bleeding, infection, tissue reaction, nerve injury and allergic reaction were discussed   The approach was demonstrated using models and literature was provided  Verbal and written consent was obtained  My impressions and treatment recommendations were discussed in detail with the patient who verbalized understanding and had no further questions  Discharge instructions were provided  I personally saw and examined the patient and I agree with the above discussed plan of care  No orders of the defined types were placed in this encounter  History of Present Illness   Stephany Belcher is a 50 y o  female with past medical history of axial low back pain described primarily by arthritic features  She presents with a 2 year history of chronic low back pain described primarily as arthritic in nature  she describes 8/10 low back pain that is worse in the mornings and worse at the end of the day  The pain is characterized by achy, nagging, indolent, crampy, stabbing pain in his/her axial low back  The patient describes that the pain is worse with standing for long periods of time on hard surfaces as well as with walking  The patient is a very active individual and feels as though this pain compromises her participation with independent activities of daily living  The pain can be debilitating at times and contribute to significant disability, compromising overall activity and independent activities of daily living  She has tried physical therapy with limited relief of symptoms  Medications the patient has tried in the past include Celebrex, and Flexeril  She describes minor radicular symptoms in the L3 and L4 dermatomal distribution but otherwise has good strength  Previously she had left L3 transforaminal epidural steroid injection with relief for about 2 weeks  She denies any weakness numbness or paresthesias  The patient denies any bowel or bladder dysfunction as well       I have personally reviewed and/or updated the patient's past medical history, past surgical history, family history, social history, current medications, allergies, and vital signs today         Review of Systems       Patient Active Problem List   Diagnosis    SOB (shortness of breath)    Chronic bilateral low back pain without sciatica    Chronic pain of left knee    Chronic pain of right knee    Fibromyalgia    Primary osteoarthritis involving multiple joints    Mild persistent asthma without complication    Lumbar radiculopathy    Primary osteoarthritis of both hips    Left hip pain    Major depression, recurrent, chronic (HCC)    Lumbar facet joint syndrome     Medical History                                                                                                                                                           Surgical History                                                 Family History   Problem Relation Age of Onset    Cancer Father     Hepatitis Father     Asthma Cousin     Hypertension Paternal Grandmother     No Known Problems Mother     No Known Problems Sister     No Known Problems Maternal Grandmother     No Known Problems Maternal Grandfather     No Known Problems Paternal Grandfather     No Known Problems Paternal Aunt      Social History          Occupational History    Not on file   Tobacco Use    Smoking status: Former Smoker    Smokeless tobacco: Never Used   Substance and Sexual Activity    Alcohol use: No    Drug use: No    Sexual activity: Not on file          Current Outpatient Medications on File Prior to Visit   Medication Sig    albuterol (Ventolin HFA) 90 mcg/act inhaler Inhale 2 puffs every 6 (six) hours as needed for wheezing    celecoxib (CeleBREX) 100 mg capsule Take 1 capsule (100 mg total) by mouth 2 (two) times a day    cyclobenzaprine (FLEXERIL) 10 mg tablet 1 tab po every 12 hours x 7 days then prn spasms or pain    ergocalciferol (VITAMIN D2) 50,000 units Take 1 capsule (50,000 Units total) by mouth once a week    fluticasone-salmeterol (ADVAIR, WIXELA) 250-50 mcg/dose inhaler Inhale 1 puff 2 (two) times a day Rinse mouth after use   glipiZIDE (GLUCOTROL) 5 mg tablet Take 1 tablet (5 mg total) by mouth daily with breakfast    glucose blood test strip Use as instructed    Lancets (ONETOUCH ULTRASOFT) lancets Use as instructed    lisinopril (ZESTRIL) 10 mg tablet Take 1 tablet (10 mg total) by mouth daily    metFORMIN (GLUCOPHAGE) 1000 MG tablet Take 1 tablet (1,000 mg total) by mouth 2 (two) times a day with meals    sertraline (ZOLOFT) 50 mg tablet Take 1 tablet (50 mg total) by mouth daily at bedtime     No current facility-administered medications on file prior to visit  Allergies   Allergen Reactions    Tdap [Tetanus-Diphth-Acell Pertussis] Rash     Physical Exam   Temp (!) 96 8 °F (36 °C)  Resp 18  Ht 5' 2" (1 575 m)  Wt 122 kg (269 lb)  BMI 49 20 kg/m²   Constitutional: normal, well developed, well nourished, alert, in no distress and non-toxic and no overt pain behavior  and obese   Eyes: anicteric   HEENT: grossly intact   Neck: supple, symmetric, trachea midline and no masses   Pulmonary:even and unlabored   Cardiovascular:No edema or pitting edema present   Skin:Normal without rashes or lesions and well hydrated   Psychiatric:Mood and affect appropriate   Neurologic:Cranial Nerves II-XII grossly intact Sensation grossly intact; no clonus negative dupree's  Reflexes 2+ and brisk  SLR negative bilaterally  Musculoskeletal: Steppage gait  Normal heel toe and tip toe walking  Significant pain with lumbar facet loading bilaterally and with lateral spine rotation left greater than right  TTP over lumbar paraspinal muscles  Negative wilda's test, negative gaenslen's negative SIJ loading bilaterally  Imaging   MRI LUMBAR SPINE WITHOUT CONTRAST   INDICATION: M54 42: Lumbago with sciatica, left side   G89 29: Other chronic pain  COMPARISON: None     TECHNIQUE: Sagittal T1, sagittal T2, sagittal inversion recovery, axial T1 and axial T2, coronal T2    IMAGE QUALITY: Diagnostic   FINDINGS:   VERTEBRAL BODIES: There are 5 lumbar type vertebral bodies  Normal alignment of the lumbar spine  No spondylolysis or spondylolisthesis  No scoliosis  No compression fracture  Normal marrow signal is identified within the visualized bony   structures  No discrete marrow lesion  SACRUM: Normal signal within the sacrum  No evidence of insufficiency or stress fracture  DISTAL CORD AND CONUS: Normal size and signal within the distal cord and conus  PARASPINAL SOFT TISSUES: There is thickening of the endometrial cavity partially visualized on this examination towards the fundus  There is a dominant follicle identified within the left ovary  LOWER THORACIC DISC SPACES: Normal disc height and signal  No disc herniation, canal stenosis or foraminal narrowing  LUMBAR DISC SPACES:   L1-L2: Normal    L2-L3: Normal    L3-L4: Annular bulging with a small broad-based left foraminal and extraforaminal disc protrusion best seen on series 6 image 13  There is no canal stenosis  Only minimal left foraminal narrowing without nerve impingement  L4-L5: Disc desiccation and loss of disc height with mild annular bulging  Small central disc herniation without canal stenosis or foraminal nerve impingement  L5-S1: Normal disc height and signal without canal stenosis or foraminal narrowing  IMPRESSION:   Mild noncompressive lumbar degenerative change at the L3-4 and L4-5 levels  Fluid signal in the endometrial cavity partially visualized at the fundus  If patient is postmenopausal, consider follow-up ultrasound imaging

## 2021-02-25 NOTE — PROCEDURES
Pre-procedure Diagnosis: Lumbar facet arthropathy  Post-procedure Diagnosis: Lumbar facet arthropathy  Operation Title(s):  1  Radiofrequency ablation of [RIGHT L2, L3, L4, L5] medial branch nerves      2  Intraoperative fluoroscopy  Attending Surgeon:   Gino Ambrocio MD  Anesthesia:   Local    Indications: The patient is a 50y o  year-old female with a diagnosis of lumbar facet arthropathy  The patient's history and physical exam were reviewed  The patient has previously undergone diagnostic and confirmatory lumbar medial branch blocks  Fluoroscopy is being used for the precise placement of the needles at the lumbar medial branch nerves  The risks, benefits and alternatives to the procedure were discussed, and all questions were answered to the patient's satisfaction  The patient agreed to proceed, and written informed consent was obtained  Procedure in Detail: The patient was brought into the procedure room and placed in the prone position on the fluoroscopy table  The low back and upper buttock were prepped with chloraprep times two and draped in a sterile manner  AP fluoroscopy was used to identify the lumbar levels on the [RIGHT] side  The fluoroscope beam was then obliqued to better visualize the junction of the superior articular process and transverse process on the [RIGHT] side and then tilted caudally about 25 degrees  An 18-gauge, 150mm length, 10mm curved active tip radiofrequency probe was advanced toward the targeted points until bone was contacted  Multiple fluoroscopic views were made to ensure placement of the needle tip at the appropriate location of the medial branch nerve  Motor stimulation was performed at 2 Hz and 1 2 volts generating a twitch in the paraspinal muscles with no motor activity in the lower extremities  Next, AP fluoroscopy was used to identify the L5-S1 level  The fluoroscope been was tilted cephalad to visualize the sacral ala   The fluoroscope beam was then tilted about 45 degrees from that point and the skin and subcutaneous tissues in these identified areas were anesthetized with 1% lidocaine  An 18-gauge, 150mm length, 10mm curved active tip radiofrequency probe was advanced toward the targeted points until bone was contacted  Motor stimulation was performed at 2 Hz and 1 2 volts generating a twitch in the paraspinal muscles with no motor activity in the lower extremities  1ml of 2% lidocaine was injected prior to lesioning, which was performed for 90 seconds at 80 degrees centigrade  The lesion was repeated once more after slight repositioning of the needles on an oblique view  Once the lesions were complete, 1ml of a solution consisting of 5mL 0 25% bupivacaine and 1 mL Depo-medrol (80mg/mL) was injected through each needle and then removed with a 1% lidocaine flush  The patient's back was cleaned, and bandages were placed at the needle insertion site  Disposition: The patient tolerated the procedure well and there were no apparent complications  Vital signs remained stable throughout the procedure  The patient was taken to the recovery area where written discharge instructions for the procedure were given      Estimated Blood Loss: None  Specimens Obtained: N/A

## 2021-02-26 ENCOUNTER — TELEPHONE (OUTPATIENT)
Dept: RADIOLOGY | Facility: CLINIC | Age: 46
End: 2021-02-26

## 2021-02-26 NOTE — TELEPHONE ENCOUNTER
S/w pt, stated that she does have some pain s/t procedure however her pain is under control  Advised pt to allow 24 - 48 hrs for pain s/t procedure to improve and 2-6 weeks for the full effect  Cb if s/s infection present: redness, drainage, swelling at the site or fever 100+, or if a sunburn like sensation in the area of the procedure presents  Ok to continue w/ rest, ice / heat, medication as prescribed / directed  Pt verbalized understanding and appreciation  Confirmed 3/31 fu ov  Pt questioned 3/16 procedure  Advised pt, will confirm w/ Dr Rhett Curran and cb to advise

## 2021-03-10 ENCOUNTER — TELEMEDICINE (OUTPATIENT)
Dept: BEHAVIORAL/MENTAL HEALTH CLINIC | Facility: CLINIC | Age: 46
End: 2021-03-10
Payer: COMMERCIAL

## 2021-03-10 DIAGNOSIS — Z23 ENCOUNTER FOR IMMUNIZATION: ICD-10-CM

## 2021-03-10 DIAGNOSIS — F33.9 MAJOR DEPRESSION, RECURRENT, CHRONIC (HCC): Primary | ICD-10-CM

## 2021-03-10 PROCEDURE — T1015 CLINIC SERVICE: HCPCS | Performed by: SOCIAL WORKER

## 2021-03-10 NOTE — PSYCH
Psychotherapy Provided: Individual Psychotherapy 60  minutes 12:55 Pm to 1:36 PM It was my intent to perform this visit via video technology, but the patient was not able to do a video connection, so the visit was completed via audio telephone only  Goals addressed in session: (Long Term Goal Number One) The Client reported that her nerve block surgery went well, and that she is feeling less pain  During the session we explored the connection with her physical health and that of her mental health  It is hoped that by addressing her weaknesses this will act as a preventive measure against the possible need for higher level of care and services  Interventions: Supportive Therapy, Strengths Therapy,  and Cognitive Behavioral Therapy where the treatment modalities utilized during the session  Assessment and Plan: The Client presented a depressed anxious mood that was congruent in effect  She was alert, goal directed and participated with prompts during the session  The Client was oriented to person, place, time, situation, and reported no suicidal/homicidal ideation, plan, or intent  As team we explored the connection between her physical and mental health  The Client was able to verbalize that she has continued to be aware with her physical and mental health connection  During the session we explored her coping skills  Next scheduled appointment was set for 3 weeks  As her home commitment the Client will practice the coping skill grounding when presented with anxiety and or depression  Pain:      PSYCH MENTAL STATUS PAIN :5 as reported by the Client     PHYSICAL PAIN SCALE NUMBERS:8 as reported by the Client     Current suicide risk : Low/Phone number for [facility] was given  to the Client [phone]  The Client was given phone number for the [facility] [phone]           Behavioral Health Treatment Plan [facility]: Diagnosis and Treatment Plan explained to [address] [facility]  relates understanding diagnosis and is agreeable to Treatment Plan  Yes  Virtual Regular Visit      Assessment/Plan:    Problem List Items Addressed This Visit        Other    Major depression, recurrent, chronic ([facility] [address] ) - Primary               Reason for visit is No chief complaint on file  Encounter provider YONATAN Laguerre    Provider located at [address] [place] [address] [address]  [place] [address]      Recent Visits  No visits were found meeting these conditions  Showing recent visits within past 7 days and meeting all other requirements     Today's Visits  Date Type Provider Dept   03/10/21 Telemedicine YONATAN [facility]uer[facility]   Showing today's visits and meeting all other requirements     Future Appointments  No visits were found meeting these conditions  Showing future appointments within next 150 days and meeting all other requirements        The patient was identified by name and date of birth  Ariana Todd was informed that this is a telemedicine visit and that the visit is being conducted through [employer] and patient was informed that this is not a secure, HIPAA-compliant platform  She agrees to proceed     My office door was closed  No one else was in the room  She acknowledged consent and understanding of privacy and security of the video platform  The patient has agreed to participate and understands they can discontinue the visit at any time  Patient is aware this is a billable service  Subjective  Ariana Todd is a 50 y o  female , was seen for individual mental health therapy         HPI     Past Medical History:   Diagnosis Date    Allergic     Seasonal Allergies    Alopecia of scalp     Treated with Proscar    Asthma     Chronic pain disorder     back, bilat knees, bilat hips    CPAP (continuous positive airway pressure) dependence     Depression     Diabetes mellitus ([facility] [address] )     Fibromyalgia, primary     GERD (gastroesophageal reflux disease)     Hip fx, left, closed, with routine healing, subsequent encounter 05/2017    Hypertension     Insomnia     Irregular heartbeat     Obesity     VICTOR HUGO on CPAP     Shortness of breath     Sleep apnea        Past Surgical History:   Procedure Laterality Date    CARPAL TUNNEL RELEASE Left     LUMBAR EPIDURAL INJECTION      NERVE BLOCK Bilateral 12/29/2020    Procedure: L2 L3 L4 L5 MEDIAL BRANCH BLOCK #1;  Surgeon: Britney Mendoza MD;  [facility];  Service: Pain Management     NERVE BLOCK Bilateral 1/19/2021    Procedure: L2 L3 L4 L5 MEDIAL BRANCH BLOCK #2;  Surgeon: Britney Mendoza MD;  [facility];  Service: Pain Management     RADIOFREQUENCY ABLATION Right 2/25/2021    Procedure: L2 L3 L4 L5 RADIO FREQUENCY ABLATION right side;  Surgeon: Britney Mendoza MD;  [facility];  Service: Pain Management     UPPER GASTROINTESTINAL ENDOSCOPY         Current Outpatient Medications   Medication Sig Dispense Refill    albuterol (Ventolin HFA) 90 mcg/act inhaler Inhale 2 puffs every 6 (six) hours as needed for wheezing 1 Inhaler 5    celecoxib (CeleBREX) 100 mg capsule Take 1 capsule (100 mg total) by mouth 2 (two) times a day 60 capsule 5    cyclobenzaprine (FLEXERIL) 10 mg tablet 1 tab po every 12 hours x 7 days then prn spasms or pain (Patient taking differently: Take 10 mg by mouth 3 (three) times a day as needed 1 tab po every 12 hours x 7 days then prn spasms or pain) 60 tablet 3    ergocalciferol (VITAMIN D2) 50,000 units Take 1 capsule (50,000 Units total) by mouth once a week 4 capsule 5    fluticasone-salmeterol (ADVAIR, WIXELA) 250-50 mcg/dose inhaler ONE INHALATION TWICE DAILY, RINSE MOUTH AFTER EACH USE   60 each 0    glipiZIDE (GLUCOTROL) 5 mg tablet Take 1 tablet (5 mg total) by mouth daily with breakfast 30 tablet 5    glucose blood test strip Use as instructed 100 each 5    Lancets (ONETOUCH ULTRASOFT) lancets Use as instructed 100 each 5    lisinopril (ZESTRIL) 10 mg tablet Take 1 tablet (10 mg total) by mouth daily 90 tablet 1    metFORMIN (GLUCOPHAGE) 1000 MG tablet Take 1 tablet (1,000 mg total) by mouth 2 (two) times a day with meals 180 tablet 1    sertraline (ZOLOFT) 50 mg tablet Take 1 tablet (50 mg total) by mouth daily at bedtime 30 tablet 5     No current facility-administered medications for this visit  Allergies   Allergen Reactions    Tdap [Tetanus-Diphth-Acell Pertussis] Rash       Review of Systems    Video Exam    There were no vitals filed for this visit  Physical Exam     I spent 45 minutes directly with the patient during this visit      [facility] acknowledges that she has consented to an online visit or consultation  She understands that the online visit is based solely on information provided by her, and that, in the absence of a face-to-face physical evaluation by the physician, the diagnosis she receives is both limited and provisional in terms of accuracy and completeness  This is not intended to replace a full medical face-to-face evaluation by the physician  Cristy Garcia understands and accepts these terms

## 2021-03-15 RX ORDER — OXYCODONE HYDROCHLORIDE AND ACETAMINOPHEN 5; 325 MG/1; MG/1
1 TABLET ORAL ONCE
Status: CANCELLED | OUTPATIENT
Start: 2021-03-15 | End: 2021-03-15

## 2021-03-16 ENCOUNTER — HOSPITAL ENCOUNTER (OUTPATIENT)
Facility: HOSPITAL | Age: 46
Setting detail: OUTPATIENT SURGERY
Discharge: HOME | End: 2021-03-16
Attending: ANESTHESIOLOGY | Admitting: ANESTHESIOLOGY
Payer: COMMERCIAL

## 2021-03-16 ENCOUNTER — APPOINTMENT (OUTPATIENT)
Dept: RADIOLOGY | Facility: HOSPITAL | Age: 46
End: 2021-03-16
Payer: COMMERCIAL

## 2021-03-16 VITALS
TEMPERATURE: 98.8 F | DIASTOLIC BLOOD PRESSURE: 85 MMHG | RESPIRATION RATE: 20 BRPM | HEIGHT: 62 IN | SYSTOLIC BLOOD PRESSURE: 126 MMHG | WEIGHT: 260 LBS | OXYGEN SATURATION: 96 % | HEART RATE: 96 BPM | BODY MASS INDEX: 47.84 KG/M2

## 2021-03-16 LAB
EXT PREGNANCY TEST URINE: NEGATIVE
EXT. CONTROL: NORMAL
GLUCOSE SERPL-MCNC: 193 MG/DL (ref 65–140)

## 2021-03-16 PROCEDURE — 82948 REAGENT STRIP/BLOOD GLUCOSE: CPT

## 2021-03-16 PROCEDURE — 64635 DESTROY LUMB/SAC FACET JNT: CPT | Performed by: ANESTHESIOLOGY

## 2021-03-16 PROCEDURE — 81025 URINE PREGNANCY TEST: CPT | Performed by: ANESTHESIOLOGY

## 2021-03-16 PROCEDURE — 64636 DESTROY L/S FACET JNT ADDL: CPT | Performed by: ANESTHESIOLOGY

## 2021-03-16 PROCEDURE — 76000 FLUOROSCOPY <1 HR PHYS/QHP: CPT

## 2021-03-16 RX ORDER — METHYLPREDNISOLONE ACETATE 80 MG/ML
INJECTION, SUSPENSION INTRA-ARTICULAR; INTRALESIONAL; INTRAMUSCULAR; SOFT TISSUE AS NEEDED
Status: DISCONTINUED | OUTPATIENT
Start: 2021-03-16 | End: 2021-03-16 | Stop reason: HOSPADM

## 2021-03-16 RX ORDER — LIDOCAINE HYDROCHLORIDE 10 MG/ML
INJECTION, SOLUTION EPIDURAL; INFILTRATION; INTRACAUDAL; PERINEURAL AS NEEDED
Status: DISCONTINUED | OUTPATIENT
Start: 2021-03-16 | End: 2021-03-16 | Stop reason: HOSPADM

## 2021-03-16 RX ORDER — LIDOCAINE HYDROCHLORIDE 20 MG/ML
INJECTION, SOLUTION INFILTRATION; PERINEURAL AS NEEDED
Status: DISCONTINUED | OUTPATIENT
Start: 2021-03-16 | End: 2021-03-16 | Stop reason: HOSPADM

## 2021-03-16 RX ORDER — BUPIVACAINE HYDROCHLORIDE 2.5 MG/ML
INJECTION, SOLUTION EPIDURAL; INFILTRATION; INTRACAUDAL AS NEEDED
Status: DISCONTINUED | OUTPATIENT
Start: 2021-03-16 | End: 2021-03-16 | Stop reason: HOSPADM

## 2021-03-16 RX ORDER — OXYCODONE HYDROCHLORIDE AND ACETAMINOPHEN 5; 325 MG/1; MG/1
1 TABLET ORAL ONCE
Status: COMPLETED | OUTPATIENT
Start: 2021-03-16 | End: 2021-03-16

## 2021-03-16 RX ADMIN — OXYCODONE HYDROCHLORIDE AND ACETAMINOPHEN 1 TABLET: 5; 325 TABLET ORAL at 08:00

## 2021-03-16 NOTE — DISCHARGE INSTRUCTIONS
PLEASE SCHEDULE 2 WEEK FOLLOW UP BY CALLING THE [facility] AT [place]: [phone]      Lumbar Radiofrequency Ablation   WHAT YOU NEED TO KNOW:   Lumbar radiofrequency ablation (RFA) is a procedure used to treat facet joint pain in your lower back  Facet joints are found at the back of each vertebra  A needle electrode is used to send electrical currents to the nerves in your facet joint  The electrical currents create heat that damages the nerve so it cannot send pain signals  DISCHARGE INSTRUCTIONS:   Seek care immediately if:   · You cannot move your leg  · You cannot control your urine or bowel movements  · You have severe pain in your lower back  Contact your healthcare provider if:   · You have leg weakness  · You develop new symptoms  · You have questions or concerns about your condition or care  Medicines:   · Pain medicine  may be given  Ask how to take this medicine safely  · Take your medicine as directed  Contact your healthcare provider if you think your medicine is not helping or if you have side effects  Tell him or her if you are allergic to any medicine  Keep a list of the medicines, vitamins, and herbs you take  Include the amounts, and when and why you take them  Bring the list or the pill bottles to follow-up visits  Carry your medicine list with you in case of an emergency  Follow up with your healthcare provider as directed:  Write down your questions so you remember to ask them during your visits  Activity:  Do not drive a car or operate machinery within 24 hours after your procedure  Ask your healthcare provider about any other activities you should avoid  © Copyright [address] Information is for End User's use only and may not be sold, redistributed or otherwise used for commercial purposes   All illustrations and images included in CareNotes® are the copyrighted property of [employer]  or Brien Lopez  The above information is an  only  It is not intended as medical advice for individual conditions or treatments  Talk to your doctor, nurse or pharmacist before following any medical regimen to see if it is safe and effective for you

## 2021-03-16 NOTE — OP NOTE
OPERATIVE REPORT  PATIENT NAME: Cristy Garcia    :  1975  MRN: [id number]  Pt Location:  GI ROOM 01    SURGERY DATE: 3/16/2021    Surgeon(s) and Role:      Heri Castillo MD - Primary    Preop Diagnosis:  Facet syndrome, lumbar [M47 816]    Post-Op Diagnosis Codes:     * Facet syndrome, lumbar [M47 816]    Procedure(s) (LRB):  L2 L3 L4 L5 RADIO FREQUENCY ABLATION (Left)    Specimen(s):  * No specimens in log *    Estimated Blood Loss:   Minimal    Drains:  * No LDAs found *    Anesthesia Type:   Local    Operative Indications:  Facet syndrome, lumbar [M47 816]    Operative Findings:  Left L2, L3, L4, L5 medial branch nerve regions identified under fluoroscopic guidance    Complications:   None    Procedure and Technique:  Please see detailed procedure note     I was present for the entire procedure    Patient Disposition:  PACU     SIGNATURE: Petra Wilkerson MD  DATE: 2021  TIME: 8:58 AM

## 2021-03-16 NOTE — INTERVAL H&P NOTE
H&P reviewed  After examining the patient I find no changes in the patients condition since the H&P had been written      Vitals:    03/16/21 0800   BP: 127/85   Pulse: (!) 114   Resp: 18   Temp: (!) 97 °F (36 1 °C)   SpO2: 96%

## 2021-03-16 NOTE — PROCEDURES
Pre-procedure Diagnosis:       Lumbar facet arthropathy  Post-procedure Diagnosis:     Lumbar facet arthropathy  Operation Title(s):                   1  Radiofrequency ablation of [LEFT L2, L3, L4, L5] medial branch nerves                                                  2  Intraoperative fluoroscopy  Attending Surgeon:                 Abdulkadir Maciel MD  Anesthesia:                             Local     Indications: The patient is a 50y o  year-old female with a diagnosis of lumbar facet arthropathy  The patient's history and physical exam were reviewed  The patient has previously undergone diagnostic and confirmatory lumbar medial branch blocks  Fluoroscopy is being used for the precise placement of the needles at the lumbar medial branch nerves  The risks, benefits and alternatives to the procedure were discussed, and all questions were answered to the patient's satisfaction  The patient agreed to proceed, and written informed consent was obtained      Procedure in Detail: The patient was brought into the procedure room and placed in the prone position on the fluoroscopy table  The low back and upper buttock were prepped with chloraprep times two and draped in a sterile manner       AP fluoroscopy was used to identify the lumbar levels on the [LEFT] side  The fluoroscope beam was then obliqued to better visualize the junction of the superior articular process and transverse process on the [LEFT] side and then tilted caudally about 25 degrees  An 18-gauge, 150mm length, 10mm curved active tip radiofrequency probe was advanced toward the targeted points until bone was contacted  Multiple fluoroscopic views were made to ensure placement of the needle tip at the appropriate location of the medial branch nerve    Motor stimulation was performed at 2 Hz and 1 2 volts generating a twitch in the paraspinal muscles with no motor activity in the lower extremities      Next, AP fluoroscopy was used to identify the L5-S1 level  The fluoroscope been was tilted cephalad to visualize the sacral ala  The fluoroscope beam was then tilted about 45 degrees from that point and the skin and subcutaneous tissues in these identified areas were anesthetized with 1% lidocaine  An 18-gauge, 150mm length, 10mm curved active tip radiofrequency probe was advanced toward the targeted points until bone was contacted  Motor stimulation was performed at 2 Hz and 1 2 volts generating a twitch in the paraspinal muscles with no motor activity in the lower extremities     1ml of 2% lidocaine was injected prior to lesioning, which was performed for 90 seconds at 80 degrees centigrade  The lesion was repeated once more after slight repositioning of the needles on an oblique view  Once the lesions were complete, 1ml of a solution consisting of 5mL 0 25% bupivacaine and 1 mL Depo-medrol (80mg/mL) was injected through each needle and then removed with a 1% lidocaine flush  The patient's back was cleaned, and bandages were placed at the needle insertion site      Disposition: The patient tolerated the procedure well and there were no apparent complications  Vital signs remained stable throughout the procedure   The patient was taken to the recovery area where written discharge instructions for the procedure were given      Estimated Blood Loss: None  Specimens Obtained: N/A

## 2021-03-17 ENCOUNTER — TELEPHONE (OUTPATIENT)
Dept: RADIOLOGY | Facility: CLINIC | Age: 46
End: 2021-03-17

## 2021-03-17 NOTE — TELEPHONE ENCOUNTER
FYI-      RN s/w pt  Pt states " I feel good " pt denies s/s of infection and or sun burn like sensation  Pt reminded it takes 2 weeks to notice a difference and 4-6 weeks for the full effect  Pt verbalized understanding   RN confirmed OV for 3/31 with 0930 arrival time with VS

## 2021-03-22 ENCOUNTER — OPTICAL OFFICE (OUTPATIENT)
Dept: URBAN - NONMETROPOLITAN AREA CLINIC 4 | Facility: CLINIC | Age: 46
Setting detail: OPHTHALMOLOGY
End: 2021-03-22
Payer: COMMERCIAL

## 2021-03-22 ENCOUNTER — DOCTOR'S OFFICE (OUTPATIENT)
Dept: URBAN - NONMETROPOLITAN AREA CLINIC 1 | Facility: CLINIC | Age: 46
Setting detail: OPHTHALMOLOGY
End: 2021-03-22
Payer: COMMERCIAL

## 2021-03-22 DIAGNOSIS — E11.3293: ICD-10-CM

## 2021-03-22 DIAGNOSIS — H52.4: ICD-10-CM

## 2021-03-22 DIAGNOSIS — H52.223: ICD-10-CM

## 2021-03-22 PROBLEM — H25.013 CORTICAL CATARACT; BOTH EYES: Status: ACTIVE | Noted: 2021-03-22

## 2021-03-22 PROCEDURE — V2750 ANTI-REFLECTIVE COATING: HCPCS | Performed by: OPTOMETRIST

## 2021-03-22 PROCEDURE — 92134 CPTRZ OPH DX IMG PST SGM RTA: CPT | Performed by: OPTOMETRIST

## 2021-03-22 PROCEDURE — V2744 TINT PHOTOCHROMATIC LENS/ES: HCPCS | Performed by: OPTOMETRIST

## 2021-03-22 PROCEDURE — V2108 SPHEROCYLINDER 4.25D/2.12-4D: HCPCS | Performed by: OPTOMETRIST

## 2021-03-22 PROCEDURE — V2020 VISION SVCS FRAMES PURCHASES: HCPCS | Performed by: OPTOMETRIST

## 2021-03-22 PROCEDURE — 92004 COMPRE OPH EXAM NEW PT 1/>: CPT | Performed by: OPTOMETRIST

## 2021-03-22 PROCEDURE — 92015 DETERMINE REFRACTIVE STATE: CPT | Performed by: OPTOMETRIST

## 2021-03-22 PROCEDURE — V2103 SPHEROCYLINDR 4.00D/12-2.00D: HCPCS | Performed by: OPTOMETRIST

## 2021-03-22 ASSESSMENT — REFRACTION_MANIFEST
OD_AXIS: 015
OD_SPHERE: -3.00
OD_ADD: +2.00
OS_AXIS: 165
OD_VA2: 20/25-2
OS_VA2: 20/30-2
OS_ADD: +2.00
OS_VA1: 20/30-2
OD_VA1: 20/25-2
OD_CYLINDER: -1.00
OS_CYLINDER: -2.25
OS_SPHERE: -4.75

## 2021-03-22 ASSESSMENT — REFRACTION_CURRENTRX
OS_SPHERE: -2.50
OD_SPHERE: -2.50
OS_VPRISM_DIRECTION: SV
OD_OVR_VA: 20/
OD_AXIS: 034
OS_AXIS: 157
OD_VPRISM_DIRECTION: SV
OS_CYLINDER: -1.00
OS_OVR_VA: 20/
OD_CYLINDER: -0.75

## 2021-03-22 ASSESSMENT — REFRACTION_AUTOREFRACTION
OD_AXIS: 012
OS_AXIS: 166
OD_CYLINDER: -2.75
OS_CYLINDER: -2.00
OS_SPHERE: -4.50
OD_SPHERE: -2.75

## 2021-03-22 ASSESSMENT — TONOMETRY
OD_IOP_MMHG: 14
OS_IOP_MMHG: 14

## 2021-03-22 ASSESSMENT — SPHEQUIV_DERIVED
OS_SPHEQUIV: -5.5
OD_SPHEQUIV: -3.5
OS_SPHEQUIV: -5.875
OD_SPHEQUIV: -4.125

## 2021-03-22 ASSESSMENT — VISUAL ACUITY
OD_BCVA: 20/80-1
OS_BCVA: 20/30

## 2021-03-22 ASSESSMENT — CONFRONTATIONAL VISUAL FIELD TEST (CVF)
OD_FINDINGS: FULL
OS_FINDINGS: FULL

## 2021-03-29 ENCOUNTER — TELEMEDICINE (OUTPATIENT)
Dept: BEHAVIORAL/MENTAL HEALTH CLINIC | Facility: CLINIC | Age: 46
End: 2021-03-29
Payer: COMMERCIAL

## 2021-03-29 DIAGNOSIS — F33.9 MAJOR DEPRESSION, RECURRENT, CHRONIC (HCC): Primary | ICD-10-CM

## 2021-03-29 PROCEDURE — T1015 CLINIC SERVICE: HCPCS | Performed by: SOCIAL WORKER

## 2021-03-29 NOTE — PSYCH
Psychotherapy Provided: Individual Psychotherapy 45  minutes 12:00 Pm to 12:45 It was my intent to perform an in office visit, but the visit was completed via video technology [employer]  The Client is aware that this platform might be compromised during the therapeutic session  Goals addressed in session:(Long Term Goal Number One) The Client reported that she is still having economic issues which has caused continued and anxiety and depression  During the session we explored her boundaries in her different relationships and environments  It is hoped that by addressing her weaknesses this will act as a preventive measure against the possible need for higher level of care and services  Interventions: Supportive Therapy, Strengths Therapy,  and Cognitive Behavioral Therapy where the treatment modalities utilized during the session  Assessment and Plan: The Client presented a depressed anxious mood that was congruent in effect  She was alert, goal directed and participated with prompts during the session  The Client was oriented to person, place, time, situation, and reported no suicidal/homicidal ideation, plan, or intent  During the session we explored the Client's personal boundaries within her different relationships and enviroments  The Client was able to verbalize that she has problems with emotional, time and material boundaries within her different relationships  During the session we explored her coping skills with the client reporting feeling calmer  As her home commitment the Client will practice her coping skills grounding when presented with anxiety and or depression  Next scheduled appointment was set for 2 weeks       Pain:      PSYCH MENTAL STATUS PAIN :5 as reported by the Client     PHYSICAL PAIN SCALE NUMBERS:8 as reported by the Client     Current suicide risk : Low/Phone number for [facility] was given  to the Client [address] [place] [address] [place]: Diagnosis and Treatment Plan explained to David Shay, Cristy Garcia  relates understanding diagnosis and is agreeable to Treatment Plan  Yes  Virtual Regular Visit      Assessment/Plan:    Problem List Items Addressed This Visit        Other    Major depression, recurrent, chronic ([facility] [address] ) - Primary               Reason for visit is No chief complaint on file  Encounter provider Jasmina Mitchell YONATAN    Provider located at [place] [address] PSYCH  [address] [facility] [address]      Recent Visits  No visits were found meeting these conditions  Showing recent visits within past 7 days and meeting all other requirements     Today's Visits  Date Type Provider Dept   03/29/21 Telemedicine YONATAN [facility]   Showing today's visits and meeting all other requirements     Future Appointments  No visits were found meeting these conditions  Showing future appointments within next 150 days and meeting all other requirements        The patient was identified by name and date of birth  David Shay was informed that this is a telemedicine visit and that the visit is being conducted through [employer] and patient was informed that this is not a secure, HIPAA-compliant platform  She agrees to proceed     My office door was closed  No one else was in the room  She acknowledged consent and understanding of privacy and security of the video platform  The patient has agreed to participate and understands they can discontinue the visit at any time  Patient is aware this is a billable service  Subjective  David Shay is a 50 y o  female , was seen for individual mental health therapy          HPI     Past Medical History:   Diagnosis Date    Allergic     Seasonal Allergies    Alopecia of scalp     Treated with Proscar    Asthma     Chronic pain disorder     back, bilat knees, bilat hips    CPAP (continuous positive airway pressure) dependence     Depression     Diabetes mellitus (HCC)     Fibromyalgia, primary     GERD (gastroesophageal reflux disease)     Hip fx, left, closed, with routine healing, subsequent encounter 05/2017    Hypertension     Insomnia     Irregular heartbeat     Obesity     VICTOR HUGO on CPAP     Shortness of breath     Sleep apnea        Past Surgical History:   Procedure Laterality Date    CARPAL TUNNEL RELEASE Left     LUMBAR EPIDURAL INJECTION      NERVE BLOCK Bilateral 12/29/2020    Procedure: L2 L3 L4 L5 MEDIAL BRANCH BLOCK #1;  Surgeon: Gwynn Duane, MD;  [facility];  Service: Pain Management     NERVE BLOCK Bilateral 1/19/2021    Procedure: L2 L3 L4 L5 MEDIAL BRANCH BLOCK #2;  Surgeon: Gwynn Duane, MD;  [facility];  Service: Pain Management     RADIOFREQUENCY ABLATION Right 2/25/2021    Procedure: L2 L3 L4 L5 RADIO FREQUENCY ABLATION right side;  Surgeon: Gwynn Duane, MD;  [facility];  Service: Pain Management     RADIOFREQUENCY ABLATION Left 3/16/2021    Procedure: L2 L3 L4 L5 RADIO FREQUENCY ABLATION;  Surgeon: Gwynn Duane, MD;  [facility];  Service: Pain Management     UPPER GASTROINTESTINAL ENDOSCOPY         Current Outpatient Medications   Medication Sig Dispense Refill    albuterol (Ventolin HFA) 90 mcg/act inhaler Inhale 2 puffs every 6 (six) hours as needed for wheezing 1 Inhaler 5    celecoxib (CeleBREX) 100 mg capsule Take 1 capsule (100 mg total) by mouth 2 (two) times a day 60 capsule 5    cyclobenzaprine (FLEXERIL) 10 mg tablet 1 tab po every 12 hours x 7 days then prn spasms or pain (Patient taking differently: Take 10 mg by mouth 3 (three) times a day as needed 1 tab po every 12 hours x 7 days then prn spasms or pain) 60 tablet 3    ergocalciferol (VITAMIN D2) 50,000 units Take 1 capsule (50,000 Units total) by mouth once a week 4 capsule 5    fluticasone-salmeterol (ADVAIR, WIXELA) 250-50 mcg/dose inhaler ONE INHALATION TWICE DAILY, RINSE MOUTH AFTER EACH USE  60 each 0    glipiZIDE (GLUCOTROL) 5 mg tablet Take 1 tablet (5 mg total) by mouth daily with breakfast 30 tablet 5    glucose blood test strip Use as instructed 100 each 5    Lancets (ONETOUCH ULTRASOFT) lancets Use as instructed 100 each 5    lisinopril (ZESTRIL) 10 mg tablet Take 1 tablet (10 mg total) by mouth daily 90 tablet 1    metFORMIN (GLUCOPHAGE) 1000 MG tablet Take 1 tablet (1,000 mg total) by mouth 2 (two) times a day with meals 180 tablet 1    sertraline (ZOLOFT) 50 mg tablet Take 1 tablet (50 mg total) by mouth daily at bedtime 30 tablet 5     No current facility-administered medications for this visit  Allergies   Allergen Reactions    Tdap [Tetanus-Diphth-Acell Pertussis] Rash       Review of Systems    Video Exam    There were no vitals filed for this visit  Physical Exam     I spent 45 minutes directly with the patient during this visit      [facility] acknowledges that she has consented to an online visit or consultation  She understands that the online visit is based solely on information provided by her, and that, in the absence of a face-to-face physical evaluation by the physician, the diagnosis she receives is both limited and provisional in terms of accuracy and completeness  This is not intended to replace a full medical face-to-face evaluation by the physician  Cristy Garcia understands and accepts these terms

## 2021-03-31 ENCOUNTER — OFFICE VISIT (OUTPATIENT)
Dept: PAIN MEDICINE | Facility: CLINIC | Age: 46
End: 2021-03-31
Payer: COMMERCIAL

## 2021-03-31 VITALS
RESPIRATION RATE: 20 BRPM | HEIGHT: 62 IN | BODY MASS INDEX: 47.84 KG/M2 | DIASTOLIC BLOOD PRESSURE: 76 MMHG | HEART RATE: 95 BPM | TEMPERATURE: 97 F | SYSTOLIC BLOOD PRESSURE: 112 MMHG | WEIGHT: 260 LBS

## 2021-03-31 DIAGNOSIS — M47.816 LUMBAR FACET ARTHROPATHY: Primary | ICD-10-CM

## 2021-03-31 PROCEDURE — 1036F TOBACCO NON-USER: CPT | Performed by: ANESTHESIOLOGY

## 2021-03-31 PROCEDURE — 99214 OFFICE O/P EST MOD 30 MIN: CPT | Performed by: ANESTHESIOLOGY

## 2021-03-31 PROCEDURE — 3008F BODY MASS INDEX DOCD: CPT | Performed by: ANESTHESIOLOGY

## 2021-03-31 NOTE — PATIENT INSTRUCTIONS
Core Strengthening Exercises   WHAT YOU NEED TO KNOW:   Your core includes the muscles of your lower back, hip, pelvis, and abdomen  Core strengthening exercises help heal and strengthen these muscles  This helps prevent another injury, and keeps your pelvis, spine, and hips in the correct position  DISCHARGE INSTRUCTIONS:   Contact your healthcare provider if:   · You have sharp or worsening pain during exercise or at rest     · You have questions or concerns about your shoulder exercises  Safety tips:  Talk to your healthcare provider before you start an exercise program  A physical therapist can teach you how to do core strengthening exercises safely  · Do the exercises on a mat or firm surface  A firm surface will support your spine and prevent low back pain  Do not do these exercises on a bed  · Move slowly and smoothly  Avoid fast or jerky motions  · Stop if you feel pain  Core exercises should not be painful  Stop if you feel pain  · Breathe normally during core exercises  Do not hold your breath  This may cause an increase in blood pressure and prevent muscle strengthening  Your healthcare provider will tell you when to inhale and exhale during the exercise  · Begin all of your exercises with abdominal bracing  Abdominal bracing helps warm up your core muscles  You can also practice abdominal bracing throughout the day  Lie on your back with your knees bent and feet flat on the floor  Place your arms in a relaxed position beside your body  Tighten your abdominal muscles  Pull your belly button in and up toward your spine  Hold for 5 seconds  Relax your muscles  Repeat 10 times  Core strengthening exercises: Your healthcare provider will tell you how often to do these exercises  The provider will also tell you how many repetitions of each exercise you should do  Hold each exercise for 5 seconds or as directed  As you get stronger, increase your hold to 10 to 15 seconds   You can do some of these exercises on a stability ball, or with a weight  Ask your healthcare provider how to use a stability ball or weight for these exercises:  · Bridging:  Lie on your back with your knees bent and feet flat on the floor  Rest your arms at your side  Tighten your buttocks, and then lift your hips 1 inch off the floor  Hold for 5 seconds  When you can do this exercise without pain for 10 seconds, increase the distance you lift your hips  A good goal is to be able to lift your hips so that your shoulders, hips, and knees are in a straight line  · Dead bug:  Lie on your back with your knees bent and feet flat on the floor  Place your arms in a relaxed position beside your body  Begin with abdominal bracing  Next, raise one leg, keeping your knee bent  Hold for 5 seconds  Repeat with the other leg  When you can do this exercise without pain for 10 to 15 seconds, you may raise one straight leg and hold  Repeat with the other leg  · Quadruped:  Place your hands and knees on the floor  Keep your wrists directly below your shoulders and your knees directly below your hips  Pull your belly button in toward your spine  Do not flatten or arch your back  Tighten your abdominal muscles below your belly button  Hold for 5 seconds  When you can do this exercise without pain for 10 to 15 seconds, you may extend one arm and hold  Repeat on the other side  · Side bridge exercises:      ? Standing side bridge:  Stand next to a wall and extend one arm toward the wall  Place your palm flat on the wall with your fingers pointing upward  Begin with abdominal bracing  Next, without moving your feet, slowly bend your arm to 90 degrees  Hold for 5 seconds  Repeat on the other side  When you can do this exercise without pain for 10 to 15 seconds, you may do the bent leg side bridge on the floor  ? Bent leg side bridge:  Lie on one side with your legs, hips, and shoulders in a straight line   Prop yourself up onto your forearm so your elbow is directly below your shoulder  Bend your knees back to 90 degrees  Begin with abdominal bracing  Next, lift your hips and balance yourself on your forearm and knees  Hold for 5 seconds  Repeat on the other side  When you can do this exercise without pain for 10 to 15 seconds, you may do the straight leg side bridge on the floor  ? Straight leg side bridge:  Lie on one side with your legs, hips, and shoulders in a straight line  Prop yourself up onto your forearm so your elbow is directly below your shoulder  Begin with abdominal bracing  Lift your hips off the floor and balance yourself on your forearm and the outside of your flexed foot  Do not let your ankle bend sideways  Hold for 5 seconds  Repeat on the other side  When you can do this exercise without pain for 10 to 15 seconds, ask your healthcare provider for more advanced exercises  · Superman:  Lie on your stomach  Extend your arms forward on the floor  Tighten your abdominal muscles and lift your right hand and left leg off the floor  Hold this position  Slowly return to the starting position  Tighten your abdominal muscles and lift your left hand and right leg off the floor  Hold this position  Slowly return to the starting position  · Clam:  Lie on your side with your knees bent  Put your bottom arm under your head to keep your neck in line  Put your top hand on your hip to keep your pelvis from moving  Put your heels together, and keep them together during this exercise  Slowly raise your top knee toward the ceiling  Then lower your leg so your knees are together  Repeat this exercise 10 times  Then switch sides and do the exercise 10 times with the other leg  · Curl up:  Lie on your back with your knees bent and feet flat on the floor  Place your hands, palms down, underneath your lower back   Next, with your elbows on the floor, lift your shoulders and chest 2 to 3 inches off the floor  Keep your head in line with your shoulders  Hold this position  Slowly return to the starting position  · Straight leg raises:  Lie on your back with one leg straight  Bend the other knee and place your foot flat on the floor  Tighten your abdominal muscles  Keep your leg straight and slowly lift it straight up 6 to 12 inches off the floor  Hold this position  Lower your leg slowly  Do as many repetitions as directed on this side  Repeat with the other leg  · Plank:  Lie on your stomach  Bend your elbows and place your forearms flat on the floor  Lift your chest, stomach, and knees off the floor  Make sure your elbows are below your shoulders  Your body should be in a straight line  Do not let your hips or lower back sink to the ground  Squeeze your abdominal muscles together and hold for 15 seconds  To make this exercise harder, hold for 30 seconds or lift 1 leg at a time  · Bicycles:  Lie on your back  Bend both knees and bring them toward your chest  Your calves should be parallel to the floor  Place the palms of your hands on the back of your head  Straighten your right leg and keep it lifted 2 inches off the floor  Raise your head and shoulders off the floor and twist towards your left  Keep your head and shoulders lifted  Bend your right knee while you straighten your left leg  Keep your left leg 2 inches off the floor  Twist your head and chest towards the left leg  Continue to straighten 1 leg at a time and twist        Follow up with your healthcare provider as directed:  Write down your questions so you remember to ask them during your visits  © Copyright [address] Information is for End User's use only and may not be sold, redistributed or otherwise used for commercial purposes  All illustrations and images included in CareNotes® are the copyrighted property of [employer]  or [address] [place], [place] [address]cecilia Machado   The above information is an  only   It is not intended as medical advice for individual conditions or treatments  Talk to your doctor, nurse or pharmacist before following any medical regimen to see if it is safe and effective for you

## 2021-03-31 NOTE — PROGRESS NOTES
Assessment  1  Lumbar facet arthropathy  -     Ambulatory referral to Physical Therapy; Future    Greater than 65% improvement with recent radiofrequency ablation of bilateral Medial branch nerves at L2-L5; experiences improved ability to participate with IADLs in significantly less pain; previously reported the following symptomatology:    Chronic axial low back pain described by primarily arthritic features  Positive facet loading maneuvers as noted below  Mild noncompressive lumbar degenerative change at the L3-4 and L4-5 levels  Previously had a left L3 transforaminal steroid injection with Dr [facility] which helped for about a week and a half  Arthritic symptomatology greater than radicular features at this time  Reasonable to continue multimodal pain therapy plan  Plan  -continue physical therapy and core exercises for lumbar facet syndrome  -continue Celebrex 100 mg b i d  As needed for lumbar facet syndrome  Counseled regarding bleeding risk of taking this medication   -lifestyle modifications including diet, exercise and weight loss extensively discussed  -may follow up in 3 weeks if left sided pain in hip doesn't resolve; likely etiology is left L3 transforaminal stenosis  There are risks associated with opioid medications, including dependence, addiction and tolerance  The patient understands and agrees to use these medications only as prescribed  Potential side effects of the medications include, but are not limited to, constipation, drowsiness, addiction, impaired judgment and risk of fatal overdose if not taken as prescribed  The patient was warned against driving while taking sedation medications  Sharing medications is a felony  At this point in time, the patient is showing no signs of addiction, abuse, diversion or suicidal ideation      [place] Prescription Drug Monitoring Program report was reviewed and was appropriate     Complete risks and benefits including bleeding, infection, tissue reaction, nerve injury and allergic reaction were discussed  The approach was demonstrated using models and literature was provided  Verbal and written consent was obtained  My impressions and treatment recommendations were discussed in detail with the patient who verbalized understanding and had no further questions  Discharge instructions were provided  I personally saw and examined the patient and I agree with the above discussed plan of care  No orders of the defined types were placed in this encounter  History of Present Illness    Greater than 65% improvement with recent radiofrequency ablation of bilateral Medial branch nerves at L2-L5; experiences improved ability to participate with IADLs in significantly less pain; previously reported the following symptomatology:    Randal Rodrigues is a 50 y o  female with past medical history of axial low back pain described primarily by arthritic features  She presents with a 2 year history of chronic low back pain described primarily as arthritic in nature  she describes 8/10 low back pain that is worse in the mornings and worse at the end of the day  The pain is characterized by achy, nagging, indolent, crampy, stabbing pain in his/her axial low back  The patient describes that the pain is worse with standing for long periods of time on hard surfaces as well as with walking  The patient is a very active individual and feels as though this pain compromises her participation with independent activities of daily living  The pain can be debilitating at times and contribute to significant disability, compromising overall activity and independent activities of daily living  She has tried physical therapy with limited relief of symptoms  Medications the patient has tried in the past include   Celebrex,  and Flexeril  She describes minor radicular symptoms in the L3 and L4 dermatomal distribution but otherwise has good strength  Previously she had left L3 transforaminal epidural steroid injection with relief for about 2 weeks  She denies any weakness numbness or paresthesias  The patient denies any bowel or bladder dysfunction as well  I have personally reviewed and/or updated the patient's past medical history, past surgical history, family history, social history, current medications, allergies, and vital signs today  Review of Systems   Constitutional: Positive for activity change  HENT: Negative  Eyes: Negative  Respiratory: Negative  Cardiovascular: Negative  Gastrointestinal: Negative  Endocrine: Negative  Genitourinary: Negative  Musculoskeletal: Positive for arthralgias, back pain, gait problem and myalgias  Skin: Negative  Allergic/Immunologic: Negative  Hematological: Negative  Psychiatric/Behavioral: Negative  All other systems reviewed and are negative        Patient Active Problem List   Diagnosis    SOB (shortness of breath)    Chronic bilateral low back pain without sciatica    Chronic pain of left knee    Chronic pain of right knee    Fibromyalgia    Primary osteoarthritis involving multiple joints    Mild persistent asthma without complication    Lumbar radiculopathy    Primary osteoarthritis of both hips    Left hip pain    Major depression, recurrent, chronic (HCC)    Lumbar facet joint syndrome       Past Medical History:   Diagnosis Date    Allergic     Seasonal Allergies    Alopecia of scalp     Treated with Proscar    Asthma     Chronic pain disorder     back, bilat knees, bilat hips    CPAP (continuous positive airway pressure) dependence     Depression     Diabetes mellitus (HCC)     Fibromyalgia, primary     GERD (gastroesophageal reflux disease)     Hip fx, left, closed, with routine healing, subsequent encounter 05/2017    Hypertension     Insomnia     Irregular heartbeat     Obesity     VICTOR HUGO on CPAP     Shortness of breath     Sleep apnea        Past Surgical History:   Procedure Laterality Date    CARPAL TUNNEL RELEASE Left     LUMBAR EPIDURAL INJECTION      NERVE BLOCK Bilateral 12/29/2020    Procedure: L2 L3 L4 L5 MEDIAL BRANCH BLOCK #1;  Surgeon: Hadley Delaney MD;  [facility]: [facility];  Service: Pain Management     NERVE BLOCK Bilateral 1/19/2021    Procedure: L2 L3 L4 L5 MEDIAL BRANCH BLOCK #2;  Surgeon: Hadley Delaney MD;  [facility];  Service: Pain Management     RADIOFREQUENCY ABLATION Right 2/25/2021    Procedure: L2 L3 L4 L5 RADIO FREQUENCY ABLATION right side;  Surgeon: Hadley Delaney MD;  [facility];  Service: Pain Management     RADIOFREQUENCY ABLATION Left 3/16/2021    Procedure: L2 L3 L4 L5 RADIO FREQUENCY ABLATION;  Surgeon: Hadley Delaney MD;  [facility];  Service: Pain Management     UPPER GASTROINTESTINAL ENDOSCOPY         Family History   Problem Relation Age of Onset    Cancer Father     Hepatitis Father     Asthma Cousin     Hypertension Paternal Grandmother     No Known Problems Mother     No Known Problems Sister     No Known Problems Maternal Grandmother     No Known Problems Maternal Grandfather     No Known Problems Paternal Grandfather     No Known Problems Paternal Aunt        Social History     Occupational History    Not on file   Tobacco Use    Smoking status: Former Smoker    Smokeless tobacco: Never Used   Substance and Sexual Activity    Alcohol use: No    Drug use: No    Sexual activity: Not on file       Current Outpatient Medications on File Prior to Visit   Medication Sig    albuterol (Ventolin HFA) 90 mcg/act inhaler Inhale 2 puffs every 6 (six) hours as needed for wheezing    celecoxib (CeleBREX) 100 mg capsule Take 1 capsule (100 mg total) by mouth 2 (two) times a day    cyclobenzaprine (FLEXERIL) 10 mg tablet 1 tab po every 12 hours x 7 days then prn spasms or pain (Patient taking differently: Take 10 mg by mouth 3 (three) times a day as needed 1 tab po every 12 hours x 7 days then prn spasms or pain)    ergocalciferol (VITAMIN D2) 50,000 units Take 1 capsule (50,000 Units total) by mouth once a week    fluticasone-salmeterol (ADVAIR, WIXELA) 250-50 mcg/dose inhaler ONE INHALATION TWICE DAILY, RINSE MOUTH AFTER EACH USE   glipiZIDE (GLUCOTROL) 5 mg tablet Take 1 tablet (5 mg total) by mouth daily with breakfast    glucose blood test strip Use as instructed    Lancets (ONETOUCH ULTRASOFT) lancets Use as instructed    lisinopril (ZESTRIL) 10 mg tablet Take 1 tablet (10 mg total) by mouth daily    metFORMIN (GLUCOPHAGE) 1000 MG tablet Take 1 tablet (1,000 mg total) by mouth 2 (two) times a day with meals    sertraline (ZOLOFT) 50 mg tablet Take 1 tablet (50 mg total) by mouth daily at bedtime     No current facility-administered medications on file prior to visit  Allergies   Allergen Reactions    Tdap [Tetanus-Diphth-Acell Pertussis] Rash         Physical Exam    /76   Pulse 95   Temp (!) 97 °F (36 1 °C)   Resp 20   Ht 5' 2" (1 575 m)   Wt 118 kg (260 lb)   BMI 47 55 kg/m²     Constitutional: normal, well developed, well nourished, alert, in no distress and non-toxic and no overt pain behavior  and obese  Eyes: anicteric  HEENT: grossly intact  Neck: supple, symmetric, trachea midline and no masses   Pulmonary:even and unlabored  Cardiovascular:No edema or pitting edema present  Skin:Normal without rashes or lesions and well hydrated  Psychiatric:Mood and affect appropriate  Neurologic:Cranial Nerves II-XII grossly intact Sensation grossly intact; no clonus negative dupree's  Reflexes 2+ and brisk  SLR negative bilaterally  Musculoskeletal:  Steppage gait  Normal heel toe and tip toe walking  Significant pain with lumbar facet loading bilaterally and with lateral spine rotation left greater than right  TTP over lumbar paraspinal muscles   Negative wilda's test, negative gaenslen's negative SIJ loading bilaterally  Imaging    MRI LUMBAR SPINE WITHOUT CONTRAST     INDICATION: [id number]: Lumbago with sciatica, left side  G89 29: Other chronic pain      COMPARISON:  None      TECHNIQUE:  Sagittal T1, sagittal T2, sagittal inversion recovery, axial T1 and axial T2, coronal T2     IMAGE QUALITY:  Diagnostic     FINDINGS:     VERTEBRAL BODIES:  There are 5 lumbar type vertebral bodies  Normal alignment of the lumbar spine  No spondylolysis or spondylolisthesis  No scoliosis  No compression fracture  Normal marrow signal is identified within the visualized bony   structures  No discrete marrow lesion      SACRUM:  Normal signal within the sacrum  No evidence of insufficiency or stress fracture      DISTAL CORD AND CONUS:  Normal size and signal within the distal cord and conus      PARASPINAL SOFT TISSUES:  There is thickening of the endometrial cavity partially visualized on this examination towards the fundus  There is a dominant follicle identified within the left ovary      LOWER THORACIC DISC SPACES:  Normal disc height and signal   No disc herniation, canal stenosis or foraminal narrowing      LUMBAR DISC SPACES:     L1-L2:  Normal      L2-L3:  Normal      L3-L4:  Annular bulging with a small broad-based left foraminal and extraforaminal disc protrusion best seen on series 6 image 13  There is no canal stenosis  Only minimal left foraminal narrowing without nerve impingement      L4-L5:  Disc desiccation and loss of disc height with mild annular bulging  Small central disc herniation without canal stenosis or foraminal nerve impingement      L5-S1:  Normal disc height and signal without canal stenosis or foraminal narrowing      IMPRESSION:     Mild noncompressive lumbar degenerative change at the L3-4 and L4-5 levels      Fluid signal in the endometrial cavity partially visualized at the fundus    If patient is postmenopausal, consider follow-up ultrasound imaging      Signal

## 2021-04-07 ENCOUNTER — TRANSCRIBE ORDERS (OUTPATIENT)
Dept: CARDIOLOGY CLINIC | Facility: CLINIC | Age: 46
End: 2021-04-07

## 2021-04-07 DIAGNOSIS — Z01.419 ROUTINE GYNECOLOGICAL EXAMINATION: Primary | ICD-10-CM

## 2021-04-07 DIAGNOSIS — Z12.4 SCREENING FOR CERVICAL CANCER: ICD-10-CM

## 2021-04-08 ENCOUNTER — TELEPHONE (OUTPATIENT)
Dept: INTERNAL MEDICINE CLINIC | Facility: CLINIC | Age: 46
End: 2021-04-08

## 2021-04-08 ENCOUNTER — EVALUATION (OUTPATIENT)
Dept: PHYSICAL THERAPY | Facility: CLINIC | Age: 46
End: 2021-04-08
Payer: COMMERCIAL

## 2021-04-08 DIAGNOSIS — M47.816 LUMBAR FACET ARTHROPATHY: ICD-10-CM

## 2021-04-08 DIAGNOSIS — J45.30 MILD PERSISTENT ASTHMA WITHOUT COMPLICATION: ICD-10-CM

## 2021-04-08 PROCEDURE — 97162 PT EVAL MOD COMPLEX 30 MIN: CPT | Performed by: PHYSICAL THERAPIST

## 2021-04-08 PROCEDURE — 97535 SELF CARE MNGMENT TRAINING: CPT | Performed by: PHYSICAL THERAPIST

## 2021-04-08 PROCEDURE — 97112 NEUROMUSCULAR REEDUCATION: CPT | Performed by: PHYSICAL THERAPIST

## 2021-04-08 PROCEDURE — 97140 MANUAL THERAPY 1/> REGIONS: CPT | Performed by: PHYSICAL THERAPIST

## 2021-04-08 NOTE — LETTER
2021  [facility]  [address] [facility] [address]    Patient: Rose Hess   YOB: 1975   Date of Visit: 2021     Encounter Diagnosis     ICD-10-CM    1  Lumbar facet arthropathy  M47 [id number] Ambulatory referral to Physical Therapy       Dear Dr Mati Menendez: Thank you for your recent referral of Rose Hess  Please review the attached evaluation summary from Poppy's recent visit  Please verify that you agree with the plan of care by signing the attached order  If you have any questions or concerns, please do not hesitate to call  I sincerely appreciate the opportunity to share in the care of one of your patients and hope to have another opportunity to work with you in the near future  Sincerely,    Clyde Castrejon, PT      Referring Provider:      I certify that I have read the below Plan of Care and certify the need for these services furnished under this plan of treatment while under my care  Natalie Salcedo MD  [facility]  [id number] [place] [id number]  [place] [place]          PT Evaluation   Today's date: 2021  Patient name: Rose Hess  : 1975  MRN: [id number]  Referring provider: Tamie Lemus MD  Dx:   Encounter Diagnosis     ICD-10-CM    1  Lumbar facet arthropathy  M47 [id number] Ambulatory referral to Physical Therapy     Assessment  Assessment details: Patient has received nerve burn procedures along with spinal injections  She now comes here for treatment  Patient states she feels weak    Impairments: abnormal gait, abnormal or restricted ROM, abnormal movement, activity intolerance, impaired balance, impaired physical strength, lacks appropriate home exercise program, pain with function, safety issue, scapular dyskinesis and weight-bearing intolerance  Understanding of Dx/Px/POC: excellent   Prognosis: good    Goals  STG  2-4 weeks:   Increase lumbar spine AROM by 2-4 degrees  Increase lower extremity strength by 2-4 lbs in all weak areas  Improve sitting posture and body mechanics  Increase standing/ADL tolerance minutes  Decrease pain by 25-50% with standing, walking, and stairs  Initiate HEP  LTG  6-8 weeks:   Demonstrate normal lumbar rotation  Decrease pain to 1-2/10 with activity  Increase lower extremity strength by 10-20 lbs in all weak areas  Improve spinal stability  Improve gait pattern and balance  Decrease limitations with standing, walking and ADL's  DC with HEP  Plan  Plan details: All planned modality interventions and planned therapy interventions are provided PRN    Patient would benefit from: PT eval and skilled physical therapy  Planned modality interventions: TENS, ultrasound and unattended electrical stimulation  Planned therapy interventions: balance, balance/weight bearing training, joint mobilization, neuromuscular re-education, coordination, self care, postural training, patient education, strengthening, stretching, therapeutic activities, therapeutic exercise, therapeutic training, whirlpool, graded exercise, gait training, flexibility, home exercise program and transfer training  Frequency: 3x week  Duration in weeks: 12  Treatment plan discussed with: patient        Subjective Evaluation    Pain  Quality: discomfort, knife-like, pulling, squeezing, sharp, throbbing and tight  Relieving factors: support, rest, relaxation and change in position  Aggravating factors: lifting, running, overhead activity, stair climbing, sitting, standing and walking  Progression: worsening    Treatments  Current treatment: physical therapy  Patient Goals  Patient goals for therapy: decreased pain, improved balance, increased strength, independence with ADLs/IADLs, return to sport/leisure activities and return to work          Objective    Date of onset:  2/15/2021    Date of Surgery:  None    History of Present Episode: 4/8/2021  Poppy states she has had low back pain issues for many years  She recently had injections and nerve burns and now comes here for treatment  Patient states she did fall three weeks ago after her nerve burn but thinks she only hurt herself a little bit  Past Medical History:    4/8/2021  Poppy reports chronic low back pain with acute episodes  Asthma  Previous Level of Functional Ability:  4/8/2021  Poppy states she had been doing OK  She has some limitations but she functions  Inspection / Palpation:  Lumbar:  4/8/2021  Mesomorphic / Endomorphic body type  No signs of infection  No signs of wounds  No signs of drainage  No signs of ecchymotic regions  No signs of erythremic regions  Moderate signs of muscle spasm  Moderate signs of muscle guarding  Mild to moderate signs of tenderness reported to palpation  No signs of atrophy noted  No signs of swelling  No signs of a surgery site  Current conditions appears consistent with recent acute episode  Chief Complaints:  4/8/2021  Poppy reports mild to moderate difficulty with standing  Poppy reports mild to moderate difficulty with walking  Poppy reports moderate difficulty with movement / use of her lumbar region  Poppy reports moderate difficulty with use of stairs  Poppy reports severe difficulty with running  Poppy reports severe difficulty with jumping  Poppy reports moderate difficulty with use of inclines  Poppy reports moderate to severe difficulty with sleeping  Poppy reports moderate difficulty with her strength and endurance  Poppy reports moderate limitations with her range of motion  Poppy reports moderate to severe difficulty lying on her lumbar region  Poppy reports moderate difficulty twisting / turning her lumbar region      LUMBAR PAIN     Resting Palpation Bending Extending Walking Lifting   4/8/2021 4 7-8 4-5 4-5 4-8 4-8     LUMBAR PAIN     Pulling Pushing Sleeping Twisting Standing   4/8/2021 4-8 4-8 5-8 6-9 4-8     LUMBAR AROM Flexion Extension Rotation Right   4/8/2021 45° 4° 24°     LUMBAR AROM Rotation Left Side Bending Right Side Bending Left   4/8/2021 25° 16° 15°     LUMBAR MMT / PAIN Flexion Extension Rotation Right   4/8/2021 5/10  12 lbs 6/10  13 lbs 6/10  9 lbs     LUMBAR MMT / PAIN Rotation Left Side Bend Right Side Bend Left   4/8/2021 6/10  9 lbs 6/10  10 lbs 6/10  10 lbs     LE MMT / PAIN Dorsiflexion Plantarflexion Knee flexion Knee extension   4/8/2021 Rt 0/10  8 lbs 0/10  10 lbs 0/10  14 lbs 0/10  13 lbs   4/8/2021 Lt 0/10  7 lbs 0/10  11 lbs 0/10  13 lbs 0/10  12 lbs     LE MMT / PAIN Hip Flexion Hip Abduction Hip Adduction   4/8/2021  Rt 0/10  9 lbs 0/10  10 lbs 0/10  12 lbs   4/8/2021  Lt 0/10  10 lbs 0/10  9 lbs 0/10  11 lbs     LUMBAR SCREEN Referred Pain Sacroiliac Joint test Squish Test Straight Leg  Raise   4/8/2021 Rt Negative Negative Negative Negative   4/8/2021 Lt Negative Negative Negative Negative     LUMBAR SCREEN Valsalva Gapping Bowstring sign Hip Bursitis   4/8/2021 Rt Negative Negative Negative Negative   4/8/2021 Lt Negative Negative Negative Negative     Precautions:  Low Back Pain and Limitations    All treatments below will be provided with a focus on strengthening, flexibility, ROM, postural,   endurance and any possible swelling and pain which may be present without ignoring   neural issues involving balance, coordination and proprioception which is also important   and necessary to provide full functional mobility and quality care        Daily Treatment Log  Manual  4/8       MT, ROM 25'       HEP 15'       Neuro-Re-Ed 4/8       Balance Board        Chair Squats        BOSU-Walk        Foam Pad SLR,Hip/KneeFl,Step Ups        Foam Beam        P-Bar-GT-Forward, Backward,Side-Even & Dips        Monster Steps        Fitter-Slalom        Ther Exer        T/G-Squats,PF        W/P-PNF,IR,ER,PU,PS:Top,Mid,Bot        NuStep        Meena-BP,Lats,PD,Row        [id number]        NK Table        TM [facility]        Nichole Tomlinson        [place], [place] 33'               Modalities 4/8       MH / PE / ES 21'       US

## 2021-04-08 NOTE — PROGRESS NOTES
PT Evaluation   Today's date: 2021  Patient name: Ciro Matthews  : 1975  MRN: [id number]  Referring provider: Hang Tom MD  Dx:   Encounter Diagnosis     ICD-10-CM    1  Lumbar facet arthropathy  M47 [id number] Ambulatory referral to Physical Therapy     Assessment  Assessment details: Patient has received nerve burn procedures along with spinal injections  She now comes here for treatment  Patient states she feels weak  Impairments: abnormal gait, abnormal or restricted ROM, abnormal movement, activity intolerance, impaired balance, impaired physical strength, lacks appropriate home exercise program, pain with function, safety issue, scapular dyskinesis and weight-bearing intolerance  Understanding of Dx/Px/POC: excellent   Prognosis: good    Goals  STG  2-4 weeks:   Increase lumbar spine AROM by 2-4 degrees  Increase lower extremity strength by 2-4 lbs in all weak areas  Improve sitting posture and body mechanics  Increase standing/ADL tolerance minutes  Decrease pain by 25-50% with standing, walking, and stairs  Initiate HEP  LTG  6-8 weeks:   Demonstrate normal lumbar rotation  Decrease pain to 1-2/10 with activity  Increase lower extremity strength by 10-20 lbs in all weak areas  Improve spinal stability  Improve gait pattern and balance  Decrease limitations with standing, walking and ADL's  DC with HEP  Plan  Plan details: All planned modality interventions and planned therapy interventions are provided PRN    Patient would benefit from: PT eval and skilled physical therapy  Planned modality interventions: TENS, ultrasound and unattended electrical stimulation  Planned therapy interventions: balance, balance/weight bearing training, joint mobilization, neuromuscular re-education, coordination, self care, postural training, patient education, strengthening, stretching, therapeutic activities, therapeutic exercise, therapeutic training, whirlpool, graded exercise, gait training, flexibility, home exercise program and transfer training  Frequency: 3x week  Duration in weeks: 12  Treatment plan discussed with: patient        Subjective Evaluation    Pain  Quality: discomfort, knife-like, pulling, squeezing, sharp, throbbing and tight  Relieving factors: support, rest, relaxation and change in position  Aggravating factors: lifting, running, overhead activity, stair climbing, sitting, standing and walking  Progression: worsening    Treatments  Current treatment: physical therapy  Patient Goals  Patient goals for therapy: decreased pain, improved balance, increased strength, independence with ADLs/IADLs, return to sport/leisure activities and return to work          Objective    Date of onset:  2/15/2021    Date of Surgery:  None    History of Present Episode: 4/8/2021  Cristy states she has had low back pain issues for many years  She recently had injections and nerve burns and now comes here for treatment  Patient states she did fall three weeks ago after her nerve burn but thinks she only hurt herself a little bit  Past Medical History:    4/8/2021  Poppy reports chronic low back pain with acute episodes  Asthma  Previous Level of Functional Ability:  4/8/2021  Poppy states she had been doing OK  She has some limitations but she functions  Inspection / Palpation:  Lumbar:  4/8/2021  Mesomorphic / Endomorphic body type  No signs of infection  No signs of wounds  No signs of drainage  No signs of ecchymotic regions  No signs of erythremic regions  Moderate signs of muscle spasm  Moderate signs of muscle guarding  Mild to moderate signs of tenderness reported to palpation  No signs of atrophy noted  No signs of swelling  No signs of a surgery site  Current conditions appears consistent with recent acute episode  Chief Complaints:  4/8/2021  Poppy reports mild to moderate difficulty with standing  Poppy reports mild to moderate difficulty with walking  Poppy reports moderate difficulty with movement / use of her lumbar region  Poppy reports moderate difficulty with use of stairs  Poppy reports severe difficulty with running  Poppy reports severe difficulty with jumping  Poppy reports moderate difficulty with use of inclines  Poppy reports moderate to severe difficulty with sleeping  Poppy reports moderate difficulty with her strength and endurance  Poppy reports moderate limitations with her range of motion  Poppy reports moderate to severe difficulty lying on her lumbar region  Poppy reports moderate difficulty twisting / turning her lumbar region      LUMBAR PAIN     Resting Palpation Bending Extending Walking Lifting   4/8/2021 4 7-8 4-5 4-5 4-8 4-8     LUMBAR PAIN     Pulling Pushing Sleeping Twisting Standing   4/8/2021 4-8 4-8 5-8 6-9 4-8     LUMBAR AROM Flexion Extension Rotation Right   4/8/2021 45° 4° 24°     LUMBAR AROM Rotation Left Side Bending Right Side Bending Left   4/8/2021 25° 16° 15°     LUMBAR MMT / PAIN Flexion Extension Rotation Right   4/8/2021 5/10  12 lbs 6/10  13 lbs 6/10  9 lbs     LUMBAR MMT / PAIN Rotation Left Side Bend Right Side Bend Left   4/8/2021 6/10  9 lbs 6/10  10 lbs 6/10  10 lbs     LE MMT / PAIN Dorsiflexion Plantarflexion Knee flexion Knee extension   4/8/2021 Rt 0/10  8 lbs 0/10  10 lbs 0/10  14 lbs 0/10  13 lbs   4/8/2021 Lt 0/10  7 lbs 0/10  11 lbs 0/10  13 lbs 0/10  12 lbs     LE MMT / PAIN Hip Flexion Hip Abduction Hip Adduction   4/8/2021  Rt 0/10  9 lbs 0/10  10 lbs 0/10  12 lbs   4/8/2021  Lt 0/10  10 lbs 0/10  9 lbs 0/10  11 lbs     LUMBAR SCREEN Referred Pain Sacroiliac Joint test Squish Test Straight Leg  Raise   4/8/2021 Rt Negative Negative Negative Negative   4/8/2021 Lt Negative Negative Negative Negative     LUMBAR SCREEN Valsalva Gapping Bowstring sign Hip Bursitis   4/8/2021 Rt Negative Negative Negative Negative   4/8/2021 Lt Negative Negative Negative Negative     Precautions:  Low Back Pain and Limitations    All treatments below will be provided with a focus on strengthening, flexibility, ROM, postural,   endurance and any possible swelling and pain which may be present without ignoring   neural issues involving balance, coordination and proprioception which is also important   and necessary to provide full functional mobility and quality care        Daily Treatment Log  Manual  4/8       MT, ROM 25'       HEP 15'       Neuro-Re-Ed 4/8       Balance Board        Chair Squats        BOSU-Walk        Foam Pad SLR,Hip/KneeFl,Step Ups        Foam Beam        P-Bar-GT-Forward, Backward,Side-Even & Dips        Monster Steps        Fitter-Slalom        Ther Exer        T/G-Squats,PF        W/P-PNF,IR,ER,PU,PS:Top,Mid,Bot        NuStep        Meena-BP,Lats,PD,Row        [id number]        NK Table        TM        UBC        Bike        Stepper        ME, PE 15'               Modalities 4/8       MH / PE / ES 21'

## 2021-04-12 ENCOUNTER — TELEMEDICINE (OUTPATIENT)
Dept: BEHAVIORAL/MENTAL HEALTH CLINIC | Facility: CLINIC | Age: 46
End: 2021-04-12
Payer: COMMERCIAL

## 2021-04-12 ENCOUNTER — APPOINTMENT (OUTPATIENT)
Dept: PHYSICAL THERAPY | Facility: CLINIC | Age: 46
End: 2021-04-12
Payer: COMMERCIAL

## 2021-04-12 DIAGNOSIS — F33.9 MAJOR DEPRESSION, RECURRENT, CHRONIC (HCC): Primary | ICD-10-CM

## 2021-04-12 PROCEDURE — T1015 CLINIC SERVICE: HCPCS | Performed by: SOCIAL WORKER

## 2021-04-12 NOTE — PSYCH
Cristy Jose  1975         Date of Initial Treatment Plan:08/20/20  Date of Current Treatment Plan: 04/12/2021     Treatment Plan Number: 3nd Treatment Plan     Strengths/Personal Resources for Self Care: The Client has supportive extended family of origin, and peer relationships  She is able to utilize community supports when needed  At the present time she is receiving her physical health care through [facility] in [place], and mental health therapy with this therapist Emanuel Stover MSW LCSW     Diagnosis:   1  Major depressive disorder, recurrent episode, moderate (HCC)            Area of Needs: The Client has a long history of depression and anxiety, which is effecting her different relationships and environments  It is hoped that The Client will achieve or maintain maximum functional capacity in performing daily activizes, taking into account both the functional capacity of the individual and those functional capacities appropriate for the individuals of the same age  Reduce or ameliorate the physical mental, behavioral, or developmental effects of an illness, condition, injury or disability   Present treatment plan will cover 4 months or 12 visits which ever comes first                Long Term Goal 1: The Client's depression score on her PHQ-9 will decrease from 17 to 8 or less(04/12/2021-Score decreased by 2 points over original screening)     Target Date: 08/20/2021  Completion Date:          Short Term Objectives for Goal 1: The Client will utilize her  coping skills 3 out of 5 when presented with depression       Long Term Goal 2: The Client will become more aware of her anxiety when presented 3 out of 5 times(emerging)     Target Date: 08/20/2021  Completion Date:      Short Term Objectives for Goal 2: The Client will utilize her coping skill 3 out of 5 times when presented with anxiety            Long Term Goal # 3:The Client will attend all of her medical appointments 100% of the time       Target Date: 08/20/21  Completion Date:            GOAL 1: Modality: The person(s) responsible for carrying out the plan is  The [facility] and this therapist Nemesio OSWALD LCSW     GOAL 2: Modality: The person(s) responsible for carrying out the plan is  The [facility] and this therapist Nemesio OSWALD LCSW      GOAL 3: Modality: The person(s) responsible for carrying out the plan is  The [address] [facility]: Diagnosis and Treatment Plan explained to Poppy, Poppy relates understanding diagnosis and is agreeable to Treatment Plan          Client Comments : Please share your thoughts, feelings, need and/or experiences regarding your treatment plan:    The Client's short term treatment goals will be reviewed and or completed on or before 08/12/2021  __________________________________________________________________     __________________________________________________________________     __________________________________________________________________     __________________________________________________________________     _______________________________________                 Patient signature, Date Time: __________________________________________        Physician cosigner signature, Date, Time: ________________________________

## 2021-04-12 NOTE — PSYCH
Psychotherapy Provided: Individual Psychotherapy 45  minutes 12:50 Pm to 1:38Pm It was my intent to perform this visit via video technology, but the patient was not able to do a video connection, so the visit was completed via audio telephone only  Goals addressed in session:(Creation of 3rd Treatment Plan) The Client reported that her home passed inspection by her municipal Authority  During the session we created her recovery treatment plan, and screenings on depression and anxiety  It is hoped that by addressing her weaknesses this will act as a preventive measure against the possible need for higher level of care and services  Interventions: Supportive Therapy, Strengths Therapy,  and Cognitive Behavioral Therapy where the treatment modalities utilized during the session  Assessment and Plan:The Client presented a depressed anxious mood that was congruent in effect  She was alert, goal directed and participated with prompts during the session the session  The Client was oriented to person, place, time, situation, and reported no suicidal/homicidal ideation, plan, or intent  As team we created the Client's recovery treatment plan, as well as conducted the depression screening PHQ-9 with the Client's score decreasing by 2 points over her original screening  During the session we also conducted the anxiety  Screening EVELINA-7 with the client scored in the moderated anxiety severity screening  Her present treatment plan will cover 12 visits or 4 months which ever comes first  Next scheduled appointment was set for 2 weeks  Pain:      PSYCH MENTAL STATUS PAIN :6  as reported by the Client   PHYSICAL PAIN SCALE NUMBERS:8 as reported by the Client due to back pain    Current suicide risk : Low/Phone number for [facility] was given  to the Client [phone]  The Client was given phone number for the [facility] [phone]           Behavioral Health Treatment Plan [facility]: Diagnosis and Treatment Plan explained to [id number] [facility]  relates understanding diagnosis and is agreeable to Treatment Plan  Yes  Virtual Brief Visit    Assessment/Plan:    Problem List Items Addressed This Visit        Other    Major depression, recurrent, chronic ([facility] [address] ) - Primary                Reason for visit is No chief complaint on file  Encounter provider YONATAN Casillas    Provider located at [place] [address] [facility]  [address] [facility] [address]    Recent Visits  No visits were found meeting these conditions  Showing recent visits within past 7 days and meeting all other requirements     Today's Visits  Date Type Provider Dept   04/12/21 Telemedicine YONATAN Casillas [facility]   Showing today's visits and meeting all other requirements     Future Appointments  No visits were found meeting these conditions  Showing future appointments within next 150 days and meeting all other requirements        After connecting through telephone, the patient was identified by name and date of birth  Kamari Walters was informed that this is a telemedicine visit and that the visit is being conducted through telephone  My office door was closed  No one else was in the room  She acknowledged consent and understanding of privacy and security of the platform  The patient has agreed to participate and understands she can discontinue the visit at any time  Patient is aware this is a billable service  Subjective    Kamari Walters is a 50 y o  female, who was seen for individual mental health therapy    HPI     Past Medical History:   Diagnosis Date    Allergic     Seasonal Allergies    Alopecia of scalp     Treated with Proscar    Asthma     Chronic pain disorder     back, bilat knees, bilat hips    CPAP (continuous positive airway pressure) dependence     Depression     Diabetes mellitus ([facility] [address] )     Fibromyalgia, primary     GERD (gastroesophageal reflux disease)     Hip fx, left, closed, with routine healing, subsequent encounter 05/2017    Hypertension     Insomnia     Irregular heartbeat     Obesity     VICTOR HUGO on CPAP     Shortness of breath     Sleep apnea        Past Surgical History:   Procedure Laterality Date    CARPAL TUNNEL RELEASE Left     LUMBAR EPIDURAL INJECTION      NERVE BLOCK Bilateral 12/29/2020    Procedure: L2 L3 L4 L5 MEDIAL BRANCH BLOCK #1;  Surgeon: Harriett Drummond MD;  [facility]: [facility];  Service: Pain Management     NERVE BLOCK Bilateral 1/19/2021    Procedure: L2 L3 L4 L5 MEDIAL BRANCH BLOCK #2;  Surgeon: Harriett Drummond MD;  [facility]: [facility];  Service: Pain Management     RADIOFREQUENCY ABLATION Right 2/25/2021    Procedure: L2 L3 L4 L5 RADIO FREQUENCY ABLATION right side;  Surgeon: Harriett Drummond MD;  [facility]: [facility];  Service: Pain Management     RADIOFREQUENCY ABLATION Left 3/16/2021    Procedure: L2 L3 L4 L5 RADIO FREQUENCY ABLATION;  Surgeon: Harriett Drummond MD;  [facility]: [facility];  Service: Pain Management     UPPER GASTROINTESTINAL ENDOSCOPY         Current Outpatient Medications   Medication Sig Dispense Refill    albuterol (Ventolin HFA) 90 mcg/act inhaler Inhale 2 puffs every 6 (six) hours as needed for wheezing 1 Inhaler 5    celecoxib (CeleBREX) 100 mg capsule Take 1 capsule (100 mg total) by mouth 2 (two) times a day 60 capsule 5    cyclobenzaprine (FLEXERIL) 10 mg tablet 1 tab po every 12 hours x 7 days then prn spasms or pain (Patient taking differently: Take 10 mg by mouth 3 (three) times a day as needed 1 tab po every 12 hours x 7 days then prn spasms or pain) 60 tablet 3    ergocalciferol (VITAMIN D2) 50,000 units Take 1 capsule (50,000 Units total) by mouth once a week 4 capsule 5    fluticasone-salmeterol (ADVAIR, WIXELA) 250-50 mcg/dose inhaler ONE INHALATION TWICE DAILY, RINSE MOUTH AFTER EACH USE   60 each 0    glipiZIDE (GLUCOTROL) 5 mg tablet Take 1 tablet (5 mg total) by mouth daily with breakfast 30 tablet 5    glucose blood test strip Use as instructed 100 each 5    Lancets (ONETOUCH ULTRASOFT) lancets Use as instructed 100 each 5    lisinopril (ZESTRIL) 10 mg tablet Take 1 tablet (10 mg total) by mouth daily 90 tablet 1    metFORMIN (GLUCOPHAGE) 1000 MG tablet Take 1 tablet (1,000 mg total) by mouth 2 (two) times a day with meals 180 tablet 1    sertraline (ZOLOFT) 50 mg tablet Take 1 tablet (50 mg total) by mouth daily at bedtime 30 tablet 5     No current facility-administered medications for this visit  Allergies   Allergen Reactions    Tdap [Tetanus-Diphth-Acell Pertussis] Rash       Review of Systems    There were no vitals filed for this visit  I spent 45 minutes directly with the patient during this visit    [facility] acknowledges that she has consented to an online visit or consultation  She understands that the online visit is based solely on information provided by her, and that, in the absence of a face-to-face physical evaluation by the physician, the diagnosis she receives is both limited and provisional in terms of accuracy and completeness  This is not intended to replace a full medical face-to-face evaluation by the physician  Cristy Garcia understands and accepts these terms

## 2021-04-13 ENCOUNTER — OFFICE VISIT (OUTPATIENT)
Dept: PHYSICAL THERAPY | Facility: CLINIC | Age: 46
End: 2021-04-13
Payer: COMMERCIAL

## 2021-04-13 DIAGNOSIS — M47.816 LUMBAR FACET ARTHROPATHY: Primary | ICD-10-CM

## 2021-04-13 PROCEDURE — 97112 NEUROMUSCULAR REEDUCATION: CPT | Performed by: PHYSICAL THERAPIST

## 2021-04-13 PROCEDURE — 97140 MANUAL THERAPY 1/> REGIONS: CPT | Performed by: PHYSICAL THERAPIST

## 2021-04-13 PROCEDURE — 97014 ELECTRIC STIMULATION THERAPY: CPT | Performed by: PHYSICAL THERAPIST

## 2021-04-13 NOTE — PROGRESS NOTES
Daily Note     Today's date: 2021  Patient name: Lilly Berrios  : 1975  MRN: [id number]  Referring provider: Дмитрий Hobbs MD  Dx:   Encounter Diagnosis     ICD-10-CM    1  Lumbar facet arthropathy  M47 816                   Subjective: The patient states that she wasn't too sore after her visit the other day  She has complaints of pain today  Objective: See treatment diary below  Assessment: Initiated TE as per daily treatment diary  She had good tolerance to all TE today and no increase in pain noted  EStim/IFC with HP x 15 minutes to end session  Pain level remains the same at end of session  Continued PT would be beneficial to improve function  Plan: Continue per plan of care  Progress as able in upcoming visits         Precautions:  Low Back Pain and Limitations     All treatments below will be provided with a focus on strengthening, flexibility, ROM, postural,   endurance and any possible swelling and pain which may be present without ignoring   neural issues involving balance, coordination and proprioception which is also important   and necessary to provide full functional mobility and quality care        Daily Treatment Log  Manual  21         MT, ROM 25' 20'         HEP 15'           Neuro-Re-Ed          Balance Board             Chair Squats             BOSU-Walk             Foam Pad SLR,Hip/KneeFl,Step Ups   10x each Flex/Abd  Marches  Stepups         Foam Beam             P-Bar-GT-Forward, Backward,Side-Even & Dips             Monster Steps             Fitter-Slalom             Ther Exer             T/G-Squats,PF   30x each         W/P-PNF,IR,ER,PU,PS:Top,Mid,Bot   Seated Hip Add/Abd 10# 30x ea         NuStep   L3 10'         Meena-BP,Lats,PD,Row             Aissbvr-BV-Av             NK Table             TM             UBC             Bike             Stepper             ME, PE 13'                         Modalities           / PE / ES 20' 20' Prone         US

## 2021-04-15 ENCOUNTER — OFFICE VISIT (OUTPATIENT)
Dept: PHYSICAL THERAPY | Facility: CLINIC | Age: 46
End: 2021-04-15
Payer: COMMERCIAL

## 2021-04-15 DIAGNOSIS — M47.816 LUMBAR FACET ARTHROPATHY: Primary | ICD-10-CM

## 2021-04-15 PROCEDURE — 97112 NEUROMUSCULAR REEDUCATION: CPT | Performed by: PHYSICAL THERAPIST

## 2021-04-15 PROCEDURE — 97110 THERAPEUTIC EXERCISES: CPT | Performed by: PHYSICAL THERAPIST

## 2021-04-15 PROCEDURE — 97140 MANUAL THERAPY 1/> REGIONS: CPT | Performed by: PHYSICAL THERAPIST

## 2021-04-15 NOTE — PROGRESS NOTES
Daily Note     Today's date: 4/15/2021  Patient name: Maru Edwards  : 1975  MRN: [id number]  Referring provider: John Hart MD  Dx:   Encounter Diagnosis     ICD-10-CM    1  Lumbar facet arthropathy  M47 816                   Subjective: Patient states she is feeling a little better  She is happy for her care and treatment  Objective: See treatment diary below      Assessment: Tolerated treatment well  Patient exhibited good technique with therapeutic exercises and would benefit from continued PT      Plan: Continue per plan of care  Progress treatment as tolerated         Precautions:  Low Back Pain and Limitations     All treatments below will be provided with a focus on strengthening, flexibility, ROM, postural,   endurance and any possible swelling and pain which may be present without ignoring   neural issues involving balance, coordination and proprioception which is also important   and necessary to provide full functional mobility and quality care        Daily Treatment Log  Manual  4/8 4/13/21  4/15       MT, ROM 25' 20'  25'       HEP 15'           Neuro-Re-Ed 4/8 4/13  4/15       Balance Board             Chair Squats             BOSU-Walk             Foam Pad SLR,Hip/KneeFl,Step Ups   10x each Flex/Abd  Marches  Stepups  20x       Foam Beam             P-Bar-GT-Forward, Backward,Side-Even & Dips             Monster Steps             Fitter-Slalom             Ther Exer             T/G-Squats,PF   30x each  30x       W/P-PNF,IR,ER,PU,PS:Top,Mid,Bot   Seated Hip Add/Abd 10# 30x ea  10#-30x       NuStep   L3 10'  10' L3       Meena-BP,Lats,PD,Row             Ckqkueb-JR-Zg             NK Table             TM             UBC             Bike             Stepper             ME, PE 13'    15'                     Modalities 4/8 4/13  4/15       MH / PE / ES 21' 20' Prone  20'       US

## 2021-04-19 ENCOUNTER — OFFICE VISIT (OUTPATIENT)
Dept: PHYSICAL THERAPY | Facility: CLINIC | Age: 46
End: 2021-04-19
Payer: COMMERCIAL

## 2021-04-19 DIAGNOSIS — M47.816 LUMBAR FACET ARTHROPATHY: Primary | ICD-10-CM

## 2021-04-19 DIAGNOSIS — R73.03 PREDIABETES: ICD-10-CM

## 2021-04-19 PROCEDURE — 97140 MANUAL THERAPY 1/> REGIONS: CPT

## 2021-04-19 PROCEDURE — 97112 NEUROMUSCULAR REEDUCATION: CPT

## 2021-04-19 PROCEDURE — 97014 ELECTRIC STIMULATION THERAPY: CPT

## 2021-04-19 NOTE — PROGRESS NOTES
Daily Note     Today's date: 2021  Patient name: Marcia Duval  : 1975  MRN: [id number]  Referring provider: Marylen Miu, MD  Dx:   Encounter Diagnosis     ICD-10-CM    1  Lumbar facet arthropathy  M47 816                   Subjective: No new c/o pain today  Objective: See treatment diary below      Assessment: Tolerated treatment well  Patient exhibited good technique with therapeutic exercises and would benefit from continued PT to increase ROM/strength and endurance to improve mobility  Plan: Continue per plan of care  Progress treatment as tolerated         Precautions:  Low Back Pain and Limitations     All treatments below will be provided with a focus on strengthening, flexibility, ROM, postural,   endurance and any possible swelling and pain which may be present without ignoring   neural issues involving balance, coordination and proprioception which is also important   and necessary to provide full functional mobility and quality care        Daily Treatment Log  Manual  4/8 4/13/21  4/15  4/19     MT, ROM 25' 20'  25'  20'     HEP 15'           Neuro-Re-Ed 4/8 4/13  4/15  4/19     Balance Board             Chair Squats             BOSU-Walk             Foam Pad SLR,Hip/KneeFl,Step Ups   10x each Flex/Abd  Marches  Stepups  20x       Foam Beam             P-Bar-GT-Forward, Backward,Side-Even & Dips             Monster Steps             Fitter-Slalom             Ther Exer             T/G-Squats,PF   30x each  30x       W/P-PNF,IR,ER,PU,PS:Top,Mid,Bot   Seated Hip Add/Abd 10# 30x ea  10#-30x       NuStep   L3 10'  10' L3  10' L3     Meena-BP,Lats,PD,Row             Bzsvuyv-CN-Mn        2x2'     NK Table             TM             UBC             Bike             Stepper             ME, PE 13'    15'  15'                   Modalities 4/8 4/13  4/15  4/19     MH / PE / ES 20' 20' Prone  20'  20'     US

## 2021-04-20 ENCOUNTER — OFFICE VISIT (OUTPATIENT)
Dept: PHYSICAL THERAPY | Facility: CLINIC | Age: 46
End: 2021-04-20
Payer: COMMERCIAL

## 2021-04-20 DIAGNOSIS — M47.816 LUMBAR FACET ARTHROPATHY: Primary | ICD-10-CM

## 2021-04-20 PROCEDURE — 97112 NEUROMUSCULAR REEDUCATION: CPT | Performed by: PHYSICAL THERAPIST

## 2021-04-20 PROCEDURE — 97140 MANUAL THERAPY 1/> REGIONS: CPT | Performed by: PHYSICAL THERAPIST

## 2021-04-20 PROCEDURE — 97110 THERAPEUTIC EXERCISES: CPT | Performed by: PHYSICAL THERAPIST

## 2021-04-20 NOTE — PROGRESS NOTES
Daily Note     Today's date: 2021  Patient name: Talmage Holstein  : 1975  MRN: [id number]  Referring provider: Darrius Echeverria MD  Dx:   Encounter Diagnosis     ICD-10-CM    1  Lumbar facet arthropathy  M47 816                   Subjective: Patient states she is feeling better today  She slept better last night  She can perform her chores better  Objective: See treatment diary below      Assessment: Tolerated treatment well  Patient exhibited good technique with therapeutic exercises and would benefit from continued PT      Plan: Continue per plan of care  Progress treatment as tolerated         Precautions:  Low Back Pain and Limitations     All treatments below will be provided with a focus on strengthening, flexibility, ROM, postural,   endurance and any possible swelling and pain which may be present without ignoring   neural issues involving balance, coordination and proprioception which is also important   and necessary to provide full functional mobility and quality care        Daily Treatment Log  Manual  4/8 4/13/21  4/15  4/19  4/20   MT, ROM 25' 20'  25'  20'  25'   HEP 15'           Neuro-Re-Ed 4/8 4/13  4/15  4/19  4/20   Balance Board             Chair Squats             BOSU-Walk             Foam Pad SLR,Hip/KneeFl,Step Ups   10x each Flex/Abd  Marches  Stepups  20x    20x   Foam Beam             P-Bar-GT-Forward, Backward,Side-Even & Dips             Monster Steps             Fitter-Slalom             Ther Exer             T/G-Squats,PF   30x each  30x    30x   W/P-PNF,IR,ER,PU,PS:Top,Mid,Bot   Seated Hip Add/Abd 10# 30x ea  10#-30x    10#-30x   NuStep   L3 10'  10' L3  10' L3  10' L4   Meena-BP,Lats,PD,Row             Lsaqsys-HQ-Ia        2x2'  2x2'   NK Table             TM             UBC             Bike             Stepper             ME, PE 13'    15'  15'  15'                 Modalities 4/8 4/13  4/15  4/19  4/20   MH / PE / ES 20' 20' Prone  20'  20'  20'   US

## 2021-04-26 ENCOUNTER — OFFICE VISIT (OUTPATIENT)
Dept: PHYSICAL THERAPY | Facility: CLINIC | Age: 46
End: 2021-04-26
Payer: COMMERCIAL

## 2021-04-26 DIAGNOSIS — M47.816 LUMBAR FACET ARTHROPATHY: Primary | ICD-10-CM

## 2021-04-26 PROCEDURE — 97140 MANUAL THERAPY 1/> REGIONS: CPT

## 2021-04-26 PROCEDURE — 97112 NEUROMUSCULAR REEDUCATION: CPT

## 2021-04-26 PROCEDURE — 97110 THERAPEUTIC EXERCISES: CPT

## 2021-04-26 NOTE — PROGRESS NOTES
Daily Note     Today's date: 2021  Patient name: Sophie Sommers  : 1975  MRN: [id number]  Referring provider: Brittany Brock MD  Dx:   Encounter Diagnosis     ICD-10-CM    1  Lumbar facet arthropathy  M47 816        Start Time: 1300  Stop Time: 1400  Total time in clinic (min): 60 minutes    Subjective: No new c/o pain today, just sore and stiffness  Objective: PTA directed patient in TE program, with warm up on Nustep, See treatment diary below  Assessment: Tolerated treatment well  Patient exhibited good technique with therapeutic exercises and would benefit from continued PT to increase ROM/strength and endurance to improve mobility  Plan: Continue per plan of care  Progress treatment as tolerated         Precautions:  Low Back Pain and Limitations     All treatments below will be provided with a focus on strengthening, flexibility, ROM, postural,   endurance and any possible swelling and pain which may be present without ignoring   neural issues involving balance, coordination and proprioception which is also important   and necessary to provide full functional mobility and quality care        Daily Treatment Log  Manual  4/13/21  4/15  4/19  4/20 4/26   MT, ROM 20'  25'  20'  25' 20'   HEP            Neuro-Re-Ed 4/13  4/15  4/19  4/20 4/26   Balance Board            Chair Squats            BOSU-Walk            Foam Pad SLR,Hip/KneeFl,Step Ups 10x each Flex/Abd  Marches  Stepups  20x    20x 20x   Foam Beam            P-Bar-GT-Forward, Backward,Side-Even & Dips            Monster Steps            Fitter-Slalom            Ther Exer            T/G-Squats,PF 30x each  30x    30x 30x    W/P-PNF,IR,ER,PU,PS:Top,Mid,Bot Seated Hip Add/Abd 10# 30x ea  10#-30x    10#-30x 10# 30x    NuStep L3 10'  10' L3  10' L3  10' L4 10' L4   Meena-BP,Lats,PD,Row            Fuimsnb-BS-Am      2x2'  2x2' 2x2'   NK Table            TM            UBC            Bike            Stepper         ME, PE    15'  15'  15' 15'                Modalities 4/13  4/15  4/19  4/20 4/26   MH / PE / ES 20' Prone  20'  20'  20' 20'   US

## 2021-04-27 ENCOUNTER — TELEMEDICINE (OUTPATIENT)
Dept: BEHAVIORAL/MENTAL HEALTH CLINIC | Facility: CLINIC | Age: 46
End: 2021-04-27
Payer: COMMERCIAL

## 2021-04-27 ENCOUNTER — OFFICE VISIT (OUTPATIENT)
Dept: PHYSICAL THERAPY | Facility: CLINIC | Age: 46
End: 2021-04-27
Payer: COMMERCIAL

## 2021-04-27 DIAGNOSIS — F33.9 MAJOR DEPRESSION, RECURRENT, CHRONIC (HCC): Primary | ICD-10-CM

## 2021-04-27 DIAGNOSIS — M47.816 LUMBAR FACET ARTHROPATHY: Primary | ICD-10-CM

## 2021-04-27 DIAGNOSIS — F33.1 MAJOR DEPRESSIVE DISORDER, RECURRENT EPISODE, MODERATE (HCC): ICD-10-CM

## 2021-04-27 DIAGNOSIS — F33.9 MAJOR DEPRESSION, RECURRENT, CHRONIC (HCC): ICD-10-CM

## 2021-04-27 PROCEDURE — 97140 MANUAL THERAPY 1/> REGIONS: CPT

## 2021-04-27 PROCEDURE — 97110 THERAPEUTIC EXERCISES: CPT

## 2021-04-27 PROCEDURE — 97112 NEUROMUSCULAR REEDUCATION: CPT

## 2021-04-27 PROCEDURE — T1015 CLINIC SERVICE: HCPCS | Performed by: SOCIAL WORKER

## 2021-04-27 NOTE — PSYCH
Psychotherapy Provided: Individual Psychotherapy 45 minutes 3:00 Pm to 3:45 Pm It was my intent to perform this visit via video technology, but the patient was not able to do a video connection, so the visit was completed via audio telephone only  Goals addressed in session:(Long Term Goal Number One) The client reported that she was closed from [facility] due to no further information services as being needed  As team we explored and processed the Client's relationship weaknesses and the effects on her mental health and different relationships and environments  It is hoped that by addressing her weaknesses this will act as a preventive measure against the possible need for higher level of care and services  Interventions: Supportive Therapy, Strengths Therapy,  and Cognitive Behavioral Therapy where the treatment modalities utilized during the session  Assessment and Plan: The Client presented a depressed anxious mood that was congruent in effect  She was alert, goal directed and participated with prompts during the session  The Client was oriented to person, place, time, situation, and reported no suicidal/homicidal ideation, plan, or intent  As team we explored the Client's relationship weaknesses with communication with criticism, defensiveness, contempt and stonewalling  During the session we also explored communication resolve with gentle startup, taking responsibility, sharing admiration and using self soothing  The client was an active team member during the session  The Client set as her home commitment to chart her feelings and emotions  Next scheduled appointment was set for 2 weeks       Pain:      PSYCH MENTAL STATUS PAIN :4 as reported by the Client     PHYSICAL PAIN SCALE NUMBERS:8 as reported by the Client     Current suicide risk : Low/Phone number for [facility] was given  to the Client [phone]  The Client was given phone number for the [facility] Gini Murphy Line [phone]  Behavioral Health Treatment Plan [facility]: Diagnosis and Treatment Plan explained to Cristy Garcia, Cristy Garcia  relates understanding diagnosis and is agreeable to Treatment Plan  Yes  Virtual Brief Visit    Assessment/Plan:    Problem List Items Addressed This Visit        Other    Major depression, recurrent, chronic ([facility] [address] ) - Primary      Other Visit Diagnoses     Major depressive disorder, recurrent episode, moderate ([facility] [address] )                    Reason for visit is No chief complaint on file  Encounter provider YONATAN Montgomery    Provider located at [address] [place] [id number]    Recent Visits  No visits were found meeting these conditions  Showing recent visits within past 7 days and meeting all other requirements     Future Appointments  No visits were found meeting these conditions  Showing future appointments within next 150 days and meeting all other requirements        After connecting through telephone, the patient was identified by name and date of birth  Kaiden Reynoso was informed that this is a telemedicine visit and that the visit is being conducted through telephone  My office door was closed  No one else was in the room  She acknowledged consent and understanding of privacy and security of the platform  The patient has agreed to participate and understands she can discontinue the visit at any time  Patient is aware this is a billable service  Subjective    Kaiden Reynoso is a 50 y o  female , who was seen for individual mental health therapy     HPI     Past Medical History:   Diagnosis Date    Allergic     Seasonal Allergies    Alopecia of scalp     Treated with Proscar    Asthma     Chronic pain disorder     back, bilat knees, bilat hips    CPAP (continuous positive airway pressure) dependence     Depression     Diabetes mellitus (HCC)     Fibromyalgia, primary  GERD (gastroesophageal reflux disease)     Hip fx, left, closed, with routine healing, subsequent encounter 05/2017    Hypertension     Insomnia     Irregular heartbeat     Obesity     VICTOR HUGO on CPAP     Shortness of breath     Sleep apnea        Past Surgical History:   Procedure Laterality Date    CARPAL TUNNEL RELEASE Left     LUMBAR EPIDURAL INJECTION      NERVE BLOCK Bilateral 12/29/2020    Procedure: L2 L3 L4 L5 MEDIAL BRANCH BLOCK #1;  Surgeon: Shay Cevallos MD;  [facility]: [facility];  Service: Pain Management     NERVE BLOCK Bilateral 1/19/2021    Procedure: L2 L3 L4 L5 MEDIAL BRANCH BLOCK #2;  Surgeon: Shay Cevallos MD;  [facility];  Service: Pain Management     RADIOFREQUENCY ABLATION Right 2/25/2021    Procedure: L2 L3 L4 L5 RADIO FREQUENCY ABLATION right side;  Surgeon: Shay Cevallos MD;  [facility]: [facility];  Service: Pain Management     RADIOFREQUENCY ABLATION Left 3/16/2021    Procedure: L2 L3 L4 L5 RADIO FREQUENCY ABLATION;  Surgeon: Shay Cevallos MD;  [facility]: [facility];  Service: Pain Management     UPPER GASTROINTESTINAL ENDOSCOPY         Current Outpatient Medications   Medication Sig Dispense Refill    albuterol (Ventolin HFA) 90 mcg/act inhaler Inhale 2 puffs every 6 (six) hours as needed for wheezing 1 Inhaler 5    celecoxib (CeleBREX) 100 mg capsule Take 1 capsule (100 mg total) by mouth 2 (two) times a day 60 capsule 5    cyclobenzaprine (FLEXERIL) 10 mg tablet 1 tab po every 12 hours x 7 days then prn spasms or pain (Patient taking differently: Take 10 mg by mouth 3 (three) times a day as needed 1 tab po every 12 hours x 7 days then prn spasms or pain) 60 tablet 3    ergocalciferol (VITAMIN D2) 50,000 units Take 1 capsule (50,000 Units total) by mouth once a week 4 capsule 5    fluticasone-salmeterol (ADVAIR, WIXELA) 250-50 mcg/dose inhaler ONE INHALATION TWICE DAILY, RINSE MOUTH AFTER EACH USE   60 each 0    glipiZIDE (GLUCOTROL) 5 mg tablet Take 1 tablet (5 mg total) by mouth daily with breakfast 30 tablet 5    glucose blood test strip Use as instructed 100 each 5    Lancets (ONETOUCH ULTRASOFT) lancets Use as instructed 100 each 5    lisinopril (ZESTRIL) 10 mg tablet Take 1 tablet (10 mg total) by mouth daily 90 tablet 1    metFORMIN (GLUCOPHAGE) 1000 MG tablet Take 1 tablet (1,000 mg total) by mouth 2 (two) times a day with meals 180 tablet 1    sertraline (ZOLOFT) 50 mg tablet Take 1 tablet (50 mg total) by mouth daily at bedtime 30 tablet 5     No current facility-administered medications for this visit  Allergies   Allergen Reactions    Tdap [Tetanus-Diphth-Acell Pertussis] Rash       Review of Systems    There were no vitals filed for this visit  I spent 45 minutes directly with the patient during this visit    [address] [place] [address] acknowledges that she has consented to an online visit or consultation  She understands that the online visit is based solely on information provided by her, and that, in the absence of a face-to-face physical evaluation by the physician, the diagnosis she receives is both limited and provisional in terms of accuracy and completeness  This is not intended to replace a full medical face-to-face evaluation by the physician  Cristy Garcia understands and accepts these terms

## 2021-04-27 NOTE — PROGRESS NOTES
Daily Note     Today's date: 2021  Patient name: Wendy Medina  : 1975  MRN: [id number]  Referring provider: Reji Matt MD  Dx:   Encounter Diagnosis     ICD-10-CM    1  Lumbar facet arthropathy  M47 816                   Subjective: Pt states she is a bit sore today  Objective:  See treatment diary below  Assessment: Tolerated treatment well with no increase in pain and min to moderate fatigue  Pt demonstrates good form with standing exercises on foam pad  Pt required VC on foot placement with TG squats to decrease pressure on her knees  Patient would benefit from continued PT   Plan: Continue per plan of care  Progress treatment as tolerated         Precautions:  Low Back Pain and Limitations     All treatments below will be provided with a focus on strengthening, flexibility, ROM, postural,   endurance and any possible swelling and pain which may be present without ignoring   neural issues involving balance, coordination and proprioception which is also important   and necessary to provide full functional mobility and quality care        Daily Treatment Log  Manual     MT, ROM CF   20'  25' 20'   HEP          Neuro-Re-Ed    Balance Board          Chair Squats          BOSU-Walk          Foam Pad SLR,Hip/KneeFl,Step Ups 20x      20x 20x   Foam Beam          P-Bar-GT-Forward, Backward,Side-Even & Dips          Monster Steps          Fitter-Slalom          Ther Exer          T/G-Squats,PF 30x      30x 30x    W/P-PNF,IR,ER,PU,PS:Top,Mid,Bot 10# x 30      10#-30x 10# 30x    NuStep 10' L4    10' L3  10' L4 10' L4   Meena-BP,Lats,PD,Row          Ldzaewc-QW-Un 2 x 2'    2x2'  2x2' 2x2'   NK Table          TM          UBC          Bike          Stepper          ME, PE    15'  15' 15'              Modalities    MH / PE / ES 15"    20'  20' 20'   US       [Follow-Up - From Hospitalization] : follow-up of a recent hospitalization for [Atrial Fibrillation] : atrial fibrillation

## 2021-04-28 ENCOUNTER — APPOINTMENT (OUTPATIENT)
Dept: RADIOLOGY | Facility: CLINIC | Age: 46
End: 2021-04-28
Payer: COMMERCIAL

## 2021-04-28 ENCOUNTER — HOSPITAL ENCOUNTER (OUTPATIENT)
Dept: RADIOLOGY | Facility: CLINIC | Age: 46
Discharge: HOME | End: 2021-04-28
Payer: COMMERCIAL

## 2021-04-28 VITALS — HEIGHT: 62 IN | BODY MASS INDEX: 47.84 KG/M2 | WEIGHT: 260 LBS

## 2021-04-28 DIAGNOSIS — R92.8 ABNORMAL MAMMOGRAM: ICD-10-CM

## 2021-04-28 PROCEDURE — 76642 ULTRASOUND BREAST LIMITED: CPT

## 2021-04-28 PROCEDURE — 77065 DX MAMMO INCL CAD UNI: CPT

## 2021-04-28 PROCEDURE — G0279 TOMOSYNTHESIS, MAMMO: HCPCS

## 2021-04-29 ENCOUNTER — OFFICE VISIT (OUTPATIENT)
Dept: PHYSICAL THERAPY | Facility: CLINIC | Age: 46
End: 2021-04-29
Payer: COMMERCIAL

## 2021-04-29 ENCOUNTER — IMMUNIZATIONS (OUTPATIENT)
Dept: FAMILY MEDICINE CLINIC | Facility: HOSPITAL | Age: 46
End: 2021-04-29

## 2021-04-29 DIAGNOSIS — Z23 ENCOUNTER FOR IMMUNIZATION: Primary | ICD-10-CM

## 2021-04-29 DIAGNOSIS — M47.816 LUMBAR FACET ARTHROPATHY: Primary | ICD-10-CM

## 2021-04-29 PROCEDURE — 97140 MANUAL THERAPY 1/> REGIONS: CPT

## 2021-04-29 PROCEDURE — 97110 THERAPEUTIC EXERCISES: CPT

## 2021-04-29 PROCEDURE — 91300 SARS-COV-2 / COVID-19 MRNA VACCINE (PFIZER-BIONTECH) 30 MCG: CPT

## 2021-04-29 PROCEDURE — 0001A SARS-COV-2 / COVID-19 MRNA VACCINE (PFIZER-BIONTECH) 30 MCG: CPT

## 2021-04-29 PROCEDURE — 97014 ELECTRIC STIMULATION THERAPY: CPT

## 2021-04-29 NOTE — PROGRESS NOTES
Daily Note     Today's date: 2021  Patient name: Myra Whitt  : 1975  MRN: [id number]  Referring provider: Tan Galicia MD  Dx:   Encounter Diagnosis     ICD-10-CM    1  Lumbar facet arthropathy  M47 816                   Subjective: Patient reports her back is feeling sore at this time  Objective: See treatment diary below      Assessment: Tolerated treatment well  Patient would benefit from continued PT      Plan: Continue per plan of care        Precautions:  Low Back Pain and Limitations     All treatments below will be provided with a focus on strengthening, flexibility, ROM, postural,   endurance and any possible swelling and pain which may be present without ignoring   neural issues involving balance, coordination and proprioception which is also important   and necessary to provide full functional mobility and quality care        Daily Treatment Log  Manual     MT, ROM CF  20'  25' 20' HS   HEP          Neuro-Re-Ed    Balance Board          Chair Squats          BOSU-Walk          Foam Pad SLR,Hip/KneeFl,Step Ups 20x     20x 20x    Foam Beam          P-Bar-GT-Forward, Backward,Side-Even & Dips          Monster Steps          Fitter-Slalom          Ther Exer        T/G-Squats,PF 30x     30x 30x     W/P-PNF,IR,ER,PU,PS:Top,Mid,Bot 10# x 30     10#-30x 10# 30x  10# 30x   NuStep 10' L4   10' L3  10' L4 10' L4 10' L4   Meena-BP,Lats,PD,Row          Lkpgqhm-KE-Ij 2 x 2'   2x2'  2x2' 2x2' 2x2'   NK Table          TM          UBC          Bike          Stepper          ME, PE   15'  15' 15'               Modalities    MH / PE / ES 15"   20'  ' 20'    US

## 2021-05-10 ENCOUNTER — OFFICE VISIT (OUTPATIENT)
Dept: PHYSICAL THERAPY | Facility: CLINIC | Age: 46
End: 2021-05-10
Payer: COMMERCIAL

## 2021-05-10 DIAGNOSIS — M47.816 LUMBAR FACET ARTHROPATHY: Primary | ICD-10-CM

## 2021-05-10 PROCEDURE — 97110 THERAPEUTIC EXERCISES: CPT

## 2021-05-10 PROCEDURE — 97112 NEUROMUSCULAR REEDUCATION: CPT

## 2021-05-10 PROCEDURE — 97164 PT RE-EVAL EST PLAN CARE: CPT | Performed by: PHYSICAL THERAPIST

## 2021-05-10 PROCEDURE — 97140 MANUAL THERAPY 1/> REGIONS: CPT

## 2021-05-10 NOTE — LETTER
May 11, 2021    [address]  [address]  [address] [place] [address] Drive [id number]    Patient: Maru Edwards   YOB: 1975   Date of Visit: 5/10/2021     Encounter Diagnosis     ICD-10-CM    1  Lumbar facet arthropathy  M47 816        Dear Dr Garcia Sarai: Thank you for your recent referral of Maru Edwards  Please review the attached evaluation summary from Popkailee's recent visit  Please verify that you agree with the plan of care by signing the attached order  If you have any questions or concerns, please do not hesitate to call  I sincerely appreciate the opportunity to share in the care of one of your patients and hope to have another opportunity to work with you in the near future  Sincerely,    Gordon Gómez, PT      Referring Provider:      I certify that I have read the below Plan of Care and certify the need for these services furnished under this plan of treatment while under my care  [address]  [address]  [address] [place] [id number]  [address] [place]          Daily Note     Today's date: 5/10/2021  Patient name: Maru Edwards  : 1975  MRN: [id number]  Referring provider: John Hart MD  Dx:   Encounter Diagnosis     ICD-10-CM    1  Lumbar facet arthropathy  M47 816                   Subjective: Pt reports her LB has been "a little stiff and sore the past few days " "I think it was the weather yesterday "      Objective: See treatment diary below      Assessment: Tolerated treatment well  Continued with program as outlined below  Pt is challenged with current program, but was able to complete all assigned interventions  Slight soft tissue restriction across LB, L > R  Patient would benefit from continued PT to further improve strength, decrease pain, and maximize function  Plan: Continue per plan of care  Progress as able       Precautions:  Low Back Pain and Limitations     All treatments below will be provided with a focus on strengthening, flexibility, ROM, postural,   endurance and any possible swelling and pain which may be present without ignoring   neural issues involving balance, coordination and proprioception which is also important   and necessary to provide full functional mobility and quality care        Daily Treatment Log  Manual   4/19  4/20 4/26 4/29 5/10   MT, ROM  20'  25' 20' HS 20'   HEP          Neuro-Re-Ed  4/19  4/20 4/26 4/29 5/10   Balance Board          Chair Squats          BOSU-Walk          Foam Pad SLR,Hip/KneeFl,Step Ups    20x 20x  Step up   30x   Foam Beam          P-Bar-GT-Forward, Backward,Side-Even & Dips          Monster Steps          Fitter-Slalom          Ther Exer    4/20  4/29 5/10   T/G-Squats,PF    30x 30x   30x   W/P-PNF,IR,ER,PU,PS:Top,Mid,Bot    10#-30x 10# 30x  10# 30x 10# 30x   NuStep  10' L3  10' L4 10' L4 10' L4 10' L4   Meena-BP,Lats,PD,Row          Callqlo-SN-Ba  2x2'  2x2' 2x2' 2x2' 2x2'   NK Table          TM          UBC          Bike          Stepper          ME, PE  15'  15' 15'                Modalities  4/19  4/20 4/26 4/29 5/10   MH / PE / ES  20'  20' 20'  [address] 20'   US                                 Attestation signed by Mary Mera PT at 5/10/2021  5:32 PM:  I supervised the visit  We discussed the case to ensure appropriate continuation and progression of care and I reviewed the documentation  PT Re-Evaluation   Today's date: 5/10/2021    Patient name: Fuentes Miles  : 1975  MRN: [id number]  Referring provider: Vicky Rodriguez MD  Dx:   Encounter Diagnosis     ICD-10-CM    1  Lumbar facet arthropathy  M47 816      Assessment  Assessment details: Patient states her back is feeling a little better      Impairments: abnormal gait, abnormal or restricted ROM, abnormal movement, activity intolerance, impaired balance, impaired physical strength, pain with function and safety issue  Understanding of Dx/Px/POC: excellent   Prognosis: good    Goals  STG  2-4 weeks:   Increase lumbar spine AROM by 2-4 degrees  Increase lower extremity strength by 2-4 lbs in all weak areas  Improve sitting posture and body mechanics  Increase standing/ADL tolerance minutes  Decrease pain by 25-50% with standing, walking, and stairs  Initiate HEP  LTG  6-8 weeks:   Demonstrate normal lumbar rotation  Decrease pain to 1-2/10 with activity  Increase lower extremity strength by 10-20 lbs in all weak areas  Improve spinal stability  Improve gait pattern and balance  Decrease limitations with standing, walking and ADL's  DC with HEP  Plan  Plan details: All planned modality interventions and planned therapy interventions are provided PRN    Patient would benefit from: PT eval and skilled physical therapy  Planned modality interventions: unattended electrical stimulation, ultrasound and TENS  Planned therapy interventions: joint mobilization, manual therapy, balance, balance/weight bearing training, neuromuscular re-education, patient education, postural training, self care, strengthening, stretching, therapeutic activities, therapeutic exercise, therapeutic training, transfer training, home exercise program, graded exercise, gait training, flexibility and coordination  Frequency: 3x week  Duration in weeks: 12  Treatment plan discussed with: patient        Subjective Evaluation    Pain  Quality: discomfort, knife-like, pulling, squeezing, sharp, tight and throbbing  Relieving factors: support, rest and relaxation  Aggravating factors: lifting, running, stair climbing, walking and standing  Progression: improved    Treatments  Previous treatment: physical therapy  Current treatment: physical therapy  Patient Goals  Patient goals for therapy: decreased pain, improved balance, increased motion, return to work, return to [employer] activities, independence with ADLs/IADLs and increased strength          Objective     Date of onset:  2/15/2021    Date of Surgery:  None    History of Present Episode: 4/8/2021  Poppy states she has had low back pain issues for many years  She recently had injections and nerve burns and now comes here for treatment  Patient states she did fall three weeks ago after her nerve burn but thinks she only hurt herself a little bit  Past Medical History:    4/8/2021  Poppy reports chronic low back pain with acute episodes  Asthma  Previous Level of Functional Ability:  4/8/2021  Poppy states she had been doing OK  She has some limitations but she functions  Inspection / Palpation:  Lumbar:  4/8/2021  Mesomorphic / Endomorphic body type  No signs of infection  No signs of wounds  No signs of drainage  No signs of ecchymotic regions  No signs of erythremic regions  Moderate signs of muscle spasm  Moderate signs of muscle guarding  Mild to moderate signs of tenderness reported to palpation  No signs of atrophy noted  No signs of swelling  No signs of a surgery site  Current conditions appears consistent with recent acute episode  Chief Complaints:  4/8/2021  Poppy reports mild to moderate difficulty with standing  Poppy reports mild to moderate difficulty with walking  Poppy reports moderate difficulty with movement / use of her lumbar region  Poppy reports moderate difficulty with use of stairs  Poppy reports severe difficulty with running  Poppy reports severe difficulty with jumping  Poppy reports moderate difficulty with use of inclines  Poppy reports moderate to severe difficulty with sleeping  Poppy reports moderate difficulty with her strength and endurance  Poppy reports moderate limitations with her range of motion  Poppy reports moderate to severe difficulty lying on her lumbar region  Poppy reports moderate difficulty twisting / turning her lumbar region      LUMBAR PAIN     Resting Palpation Bending Extending Walking Lifting   4/8/2021 4 7-8 4-5 4-5 4-8 4-8   5/10/2021 3 5-7 3-4 3-4 3-7 3-7 LUMBAR PAIN     Pulling Pushing Sleeping Twisting Standing   4/8/2021 4-8 4-8 5-8 6-9 4-8   5/10/2021 3-6 3-6 4-7 5-8 3-7     LUMBAR AROM Flexion Extension Rotation Right   4/8/2021 45° 4° 24°   5/10/2021 47° 6° 27°      LUMBAR AROM Rotation Left Side Bending Right Side Bending Left   4/8/2021 25° 16° 15°   5/10/2021 28° 20° 18°     LUMBAR MMT / PAIN Flexion Extension Rotation Right   4/8/2021 5/10  12 lbs 6/10  13 lbs 6/10  9 lbs   5/10/2021 4/10  15 lbs 5/10  16 lbs 5/10  12 lbs     LUMBAR MMT / PAIN Rotation Left Side Bend Right Side Bend Left   4/8/2021 6/10  9 lbs 6/10  10 lbs 6/10  10 lbs   5/10/2021 5/10  13 lbs 5/10  14 lbs 5/10  14 lbs     LE MMT / PAIN Dorsiflexion Plantarflexion Knee flexion Knee extension   4/8/2021 Rt 0/10  8 lbs 0/10  10 lbs 0/10  14 lbs 0/10  13 lbs   4/8/2021 Lt 0/10  7 lbs 0/10  11 lbs 0/10  13 lbs 0/10  12 lbs     LE MMT / PAIN Hip Flexion Hip Abduction Hip Adduction   4/8/2021  Rt 0/10  9 lbs 0/10  10 lbs 0/10  12 lbs   4/8/2021  Lt 0/10  10 lbs 0/10  9 lbs 0/10  11 lbs     LUMBAR SCREEN Referred Pain Sacroiliac Joint test Squish Test Straight Leg  Raise   4/8/2021 Rt Negative Negative Negative Negative   4/8/2021 Lt Negative Negative Negative Negative     LUMBAR SCREEN Valsalva Gapping Bowstring sign Hip Bursitis   4/8/2021 Rt Negative Negative Negative Negative   4/8/2021 Lt Negative Negative Negative Negative     Precautions:  Low Back Pain and Limitations

## 2021-05-10 NOTE — PROGRESS NOTES
Daily Note     Today's date: 5/10/2021  Patient name: Saundra Adams  : 1975  MRN: [id number]  Referring provider: Cande Green MD  Dx:   Encounter Diagnosis     ICD-10-CM    1  Lumbar facet arthropathy  M47 816                   Subjective: Pt reports her LB has been "a little stiff and sore the past few days " "I think it was the weather yesterday "      Objective: See treatment diary below      Assessment: Tolerated treatment well  Continued with program as outlined below  Pt is challenged with current program, but was able to complete all assigned interventions  Slight soft tissue restriction across LB, L > R  Patient would benefit from continued PT to further improve strength, decrease pain, and maximize function  Plan: Continue per plan of care  Progress as able       Precautions:  Low Back Pain and Limitations     All treatments below will be provided with a focus on strengthening, flexibility, ROM, postural,   endurance and any possible swelling and pain which may be present without ignoring   neural issues involving balance, coordination and proprioception which is also important   and necessary to provide full functional mobility and quality care        Daily Treatment Log  Manual   4/19  4/20 4/26 4/29 5/10   MT, ROM  20'  25' 20' HS 20'   HEP          Neuro-Re-Ed   5/10   Balance Board          Chair Squats          BOSU-Walk          Foam Pad SLR,Hip/KneeFl,Step Ups    20x 20x  Step up   30x   Foam Beam          P-Bar-GT-Forward, Backward,Side-Even & Dips          Monster Steps          Fitter-Slalom          Ther Exer     510   T/G-Squats,PF    30x 30x   30x   W/P-PNF,IR,ER,PU,PS:Top,Mid,Bot    10#-30x 10# 30x  10# 30x 10# 30x   NuStep  10' L3  10' L4 10' L4 10' L4 10' L4   Meena-BP,Lats,PD,Row          Bbcsbvb-GI-Ba  2x2'  2x2' 2x2' 2x2' 2x2'   NK Table          TM          UBC          Bike          Stepper          ME, PE  15'  15' 15'                Modalities  4/19  4/20 4/26 4/29 5/10   MH / PE / ES  20'  20' 20'  [address] 20'   US

## 2021-05-11 NOTE — PROGRESS NOTES
PT Re-Evaluation   Today's date: 5/10/2021    Patient name: Saundra Adams  : 1975  MRN: [id number]  Referring provider: Cande Green MD  Dx:   Encounter Diagnosis     ICD-10-CM    1  Lumbar facet arthropathy  M47 816      Assessment  Assessment details: Patient states her back is feeling a little better  Impairments: abnormal gait, abnormal or restricted ROM, abnormal movement, activity intolerance, impaired balance, impaired physical strength, pain with function and safety issue  Understanding of Dx/Px/POC: excellent   Prognosis: good    Goals  STG  2-4 weeks:   Increase lumbar spine AROM by 2-4 degrees  Increase lower extremity strength by 2-4 lbs in all weak areas  Improve sitting posture and body mechanics  Increase standing/ADL tolerance minutes  Decrease pain by 25-50% with standing, walking, and stairs  Initiate HEP  LTG  6-8 weeks:   Demonstrate normal lumbar rotation  Decrease pain to 1-2/10 with activity  Increase lower extremity strength by 10-20 lbs in all weak areas  Improve spinal stability  Improve gait pattern and balance  Decrease limitations with standing, walking and ADL's  DC with HEP  Plan  Plan details: All planned modality interventions and planned therapy interventions are provided PRN    Patient would benefit from: PT eval and skilled physical therapy  Planned modality interventions: unattended electrical stimulation, ultrasound and TENS  Planned therapy interventions: joint mobilization, manual therapy, balance, balance/weight bearing training, neuromuscular re-education, patient education, postural training, self care, strengthening, stretching, therapeutic activities, therapeutic exercise, therapeutic training, transfer training, home exercise program, graded exercise, gait training, flexibility and coordination  Frequency: 3x week  Duration in weeks: 12  Treatment plan discussed with: patient        Subjective Evaluation    Pain  Quality: discomfort, knife-like, pulling, squeezing, sharp, tight and throbbing  Relieving factors: support, rest and relaxation  Aggravating factors: lifting, running, stair climbing, walking and standing  Progression: improved    Treatments  Previous treatment: physical therapy  Current treatment: physical therapy  Patient Goals  Patient goals for therapy: decreased pain, improved balance, increased motion, return to work, return to [employer] activities, independence with ADLs/IADLs and increased strength          Objective     Date of onset:  2/15/2021    Date of Surgery:  None    History of Present Episode: 4/8/2021  Forrestpy states she has had low back pain issues for many years  She recently had injections and nerve burns and now comes here for treatment  Patient states she did fall three weeks ago after her nerve burn but thinks she only hurt herself a little bit  Past Medical History:    4/8/2021  Forrestpy reports chronic low back pain with acute episodes  Asthma  Previous Level of Functional Ability:  4/8/2021  Poppy states she had been doing OK  She has some limitations but she functions  Inspection / Palpation:  Lumbar:  4/8/2021  Mesomorphic / Endomorphic body type  No signs of infection  No signs of wounds  No signs of drainage  No signs of ecchymotic regions  No signs of erythremic regions  Moderate signs of muscle spasm  Moderate signs of muscle guarding  Mild to moderate signs of tenderness reported to palpation  No signs of atrophy noted  No signs of swelling  No signs of a surgery site  Current conditions appears consistent with recent acute episode  Chief Complaints:  4/8/2021  Poppy reports mild to moderate difficulty with standing  Poppy reports mild to moderate difficulty with walking  Poppy reports moderate difficulty with movement / use of her lumbar region  Poppy reports moderate difficulty with use of stairs  Poppy reports severe difficulty with running    Poppy reports severe difficulty with jumping  Poppy reports moderate difficulty with use of inclines  Poppy reports moderate to severe difficulty with sleeping  Poppy reports moderate difficulty with her strength and endurance  Poppy reports moderate limitations with her range of motion  Poppy reports moderate to severe difficulty lying on her lumbar region  Poppy reports moderate difficulty twisting / turning her lumbar region      LUMBAR PAIN     Resting Palpation Bending Extending Walking Lifting   4/8/2021 4 7-8 4-5 4-5 4-8 4-8   5/10/2021 3 5-7 3-4 3-4 3-7 3-7     LUMBAR PAIN     Pulling Pushing Sleeping Twisting Standing   4/8/2021 4-8 4-8 5-8 6-9 4-8   5/10/2021 3-6 3-6 4-7 5-8 3-7     LUMBAR AROM Flexion Extension Rotation Right   4/8/2021 45° 4° 24°   5/10/2021 47° 6° 27°      LUMBAR AROM Rotation Left Side Bending Right Side Bending Left   4/8/2021 25° 16° 15°   5/10/2021 28° 20° 18°     LUMBAR MMT / PAIN Flexion Extension Rotation Right   4/8/2021 5/10  12 lbs 6/10  13 lbs 6/10  9 lbs   5/10/2021 4/10  15 lbs 5/10  16 lbs 5/10  12 lbs     LUMBAR MMT / PAIN Rotation Left Side Bend Right Side Bend Left   4/8/2021 6/10  9 lbs 6/10  10 lbs 6/10  10 lbs   5/10/2021 5/10  13 lbs 5/10  14 lbs 5/10  14 lbs     LE MMT / PAIN Dorsiflexion Plantarflexion Knee flexion Knee extension   4/8/2021 Rt 0/10  8 lbs 0/10  10 lbs 0/10  14 lbs 0/10  13 lbs   4/8/2021 Lt 0/10  7 lbs 0/10  11 lbs 0/10  13 lbs 0/10  12 lbs     LE MMT / PAIN Hip Flexion Hip Abduction Hip Adduction   4/8/2021  Rt 0/10  9 lbs 0/10  10 lbs 0/10  12 lbs   4/8/2021  Lt 0/10  10 lbs 0/10  9 lbs 0/10  11 lbs     LUMBAR SCREEN Referred Pain Sacroiliac Joint test Squish Test Straight Leg  Raise   4/8/2021 Rt Negative Negative Negative Negative   4/8/2021 Lt Negative Negative Negative Negative     LUMBAR SCREEN Valsalva Gapping Bowstring sign Hip Bursitis   4/8/2021 Rt Negative Negative Negative Negative   4/8/2021 Lt Negative Negative Negative Negative Precautions:  Low Back Pain and Limitations

## 2021-05-12 ENCOUNTER — TELEMEDICINE (OUTPATIENT)
Dept: BEHAVIORAL/MENTAL HEALTH CLINIC | Facility: CLINIC | Age: 46
End: 2021-05-12
Payer: COMMERCIAL

## 2021-05-12 ENCOUNTER — OFFICE VISIT (OUTPATIENT)
Dept: PHYSICAL THERAPY | Facility: CLINIC | Age: 46
End: 2021-05-12
Payer: COMMERCIAL

## 2021-05-12 DIAGNOSIS — F33.9 MAJOR DEPRESSION, RECURRENT, CHRONIC (HCC): Primary | ICD-10-CM

## 2021-05-12 DIAGNOSIS — M47.816 LUMBAR FACET ARTHROPATHY: Primary | ICD-10-CM

## 2021-05-12 PROCEDURE — 97112 NEUROMUSCULAR REEDUCATION: CPT | Performed by: PHYSICAL THERAPIST

## 2021-05-12 PROCEDURE — 97110 THERAPEUTIC EXERCISES: CPT | Performed by: PHYSICAL THERAPIST

## 2021-05-12 PROCEDURE — T1015 CLINIC SERVICE: HCPCS | Performed by: SOCIAL WORKER

## 2021-05-12 PROCEDURE — 97140 MANUAL THERAPY 1/> REGIONS: CPT | Performed by: PHYSICAL THERAPIST

## 2021-05-12 NOTE — PSYCH
Psychotherapy Provided: Individual Psychotherapy 30  minutes 2:00 PM to to 2:36 Pm          Goals addressed in session:(Long Term Goal Number 1) The Client reported that she has her anxiety has increased over the past few weeks due to her physical and economic issues  During the session we explored her communication weaknesses, in her different relationships and enviorments    It is hoped that by addressing her weaknesses this will act as a preventive measure against the possible need for higher level of care and services  Interventions: Supportive Therapy, Strengths Therapy,  and Cognitive Behavioral Therapy where the treatment modalities utilized during the session  Assessment and Plan: The Client presented a depressed anxious mood that was congruent in effect  She was alert, goal directed and particpated with prompts during the session  The Client was oriented to person, place, time, situation, and reported no suicidal/homcidal ideation, plan, or intent  As team we explored and processed the Client's communication issues with the Client reporting problems using I statements in her relationships  Healthy communication patterns and coping skills were reviewed  As her home commitment the client will practice her coping skills when presented with anxiety and or depression  Next scheduled appointment was set for     Pain:      [facility] MENTAL STATUS PAIN :5 as reported by the Client     PHYSICAL PAIN SCALE NUMBERS:3 as reported by the client     Current suicide risk : Low/Phone number for [facility] was given  to the Client [phone]  The Client was given phone number for the [facility] [phone]  Behavioral Health Treatment Plan [facility]: Diagnosis and Treatment Plan explained to Cristy Garcia, Cristy Garcia  relates understanding diagnosis and is agreeable to Treatment Plan  Yes      Virtual Regular Visit      Assessment/Plan:    Problem List Items Addressed This Visit        Other    Major depression, recurrent, chronic ([facility] [address] ) - Primary          Goals addressed in session: Goal 1          Reason for visit is No chief complaint on file  Encounter provider Nicholaus Rinne, SW    Provider located at [address] [place] [id number]      Recent Visits  No visits were found meeting these conditions  Showing recent visits within past 7 days and meeting all other requirements     Today's Visits  Date Type Provider Dept   21 Telemedicine Nicholaus Rinne, SW [facility]   Showing today's visits and meeting all other requirements     Future Appointments  No visits were found meeting these conditions  Showing future appointments within next 150 days and meeting all other requirements        The patient was identified by name and date of birth  Magalys Gaffney was informed that this is a telemedicine visit and that the visit is being conducted through [address] [place] [address] Road [employer] and patient was informed that this is a secure, HIPAA-compliant platform  She agrees to proceed     My office door was closed  No one else was in the room  She acknowledged consent and understanding of privacy and security of the video platform  The patient has agreed to participate and understands they can discontinue the visit at any time  Patient is aware this is a billable service  Subjective  Magalys Gaffney is a 50 y o  female [facility]     Past Medical History:   Diagnosis Date    Allergic     Seasonal Allergies    Alopecia of scalp     Treated with Proscar    Asthma     Chronic pain disorder     back, bilat knees, bilat hips    CPAP (continuous positive airway pressure) dependence     Depression     Diabetes mellitus (HCC)     Fibromyalgia, primary     GERD (gastroesophageal reflux disease)     Hip fx, left, closed, with routine healing, subsequent encounter 05/2017    Hypertension     Insomnia     Irregular heartbeat     Obesity     VICTOR HUGO on CPAP     Shortness of breath     Sleep apnea        Past Surgical History:   Procedure Laterality Date    CARPAL TUNNEL RELEASE Left     LUMBAR EPIDURAL INJECTION      NERVE BLOCK Bilateral 12/29/2020    Procedure: L2 L3 L4 L5 MEDIAL BRANCH BLOCK #1;  Surgeon: Skye Mtz MD;  [facility];  Service: Pain Management     NERVE BLOCK Bilateral 1/19/2021    Procedure: L2 L3 L4 L5 MEDIAL BRANCH BLOCK #2;  Surgeon: Skye Mtz MD;  [facility];  Service: Pain Management     RADIOFREQUENCY ABLATION Right 2/25/2021    Procedure: L2 L3 L4 L5 RADIO FREQUENCY ABLATION right side;  Surgeon: Skye Mtz MD;  [facility];  Service: Pain Management     RADIOFREQUENCY ABLATION Left 3/16/2021    Procedure: L2 L3 L4 L5 RADIO FREQUENCY ABLATION;  Surgeon: Skye Mtz MD;  [facility];  Service: Pain Management     UPPER GASTROINTESTINAL ENDOSCOPY         Current Outpatient Medications   Medication Sig Dispense Refill    albuterol (Ventolin HFA) 90 mcg/act inhaler Inhale 2 puffs every 6 (six) hours as needed for wheezing 1 Inhaler 5    celecoxib (CeleBREX) 100 mg capsule Take 1 capsule (100 mg total) by mouth 2 (two) times a day 60 capsule 5    cyclobenzaprine (FLEXERIL) 10 mg tablet 1 tab po every 12 hours x 7 days then prn spasms or pain (Patient taking differently: Take 10 mg by mouth 3 (three) times a day as needed 1 tab po every 12 hours x 7 days then prn spasms or pain) 60 tablet 3    ergocalciferol (VITAMIN D2) 50,000 units Take 1 capsule (50,000 Units total) by mouth once a week 4 capsule 5    fluticasone-salmeterol (ADVAIR, WIXELA) 250-50 mcg/dose inhaler ONE INHALATION TWICE DAILY, RINSE MOUTH AFTER EACH USE   60 each 0    glipiZIDE (GLUCOTROL) 5 mg tablet Take 1 tablet (5 mg total) by mouth daily with breakfast 30 tablet 5    glucose blood test strip Use as instructed 100 each 5    Lancets (ONETOUCH ULTRASOFT) lancets Use as instructed 100 each 5    lisinopril (ZESTRIL) 10 mg tablet Take 1 tablet (10 mg total) by mouth daily 90 tablet 1    metFORMIN (GLUCOPHAGE) 1000 MG tablet Take 1 tablet (1,000 mg total) by mouth 2 (two) times a day with meals 180 tablet 1    sertraline (ZOLOFT) 50 mg tablet TAKE (1) TABLET DAILY AT BEDTIME  30 tablet 0     No current facility-administered medications for this visit  Allergies   Allergen Reactions    Tdap [Tetanus-Diphth-Acell Pertussis] Rash       Review of Systems    Video Exam    There were no vitals filed for this visit  Physical Exam     I spent 30 minutes directly with the patient during this visit      [facility] acknowledges that she has consented to an online visit or consultation  She understands that the online visit is based solely on information provided by her, and that, in the absence of a face-to-face physical evaluation by the physician, the diagnosis she receives is both limited and provisional in terms of accuracy and completeness  This is not intended to replace a full medical face-to-face evaluation by the physician  Cristy Garcia understands and accepts these terms

## 2021-05-12 NOTE — PROGRESS NOTES
Daily Note     Today's date: 2021  Patient name: Saundra Adams  : 1975  MRN: [id number]  Referring provider: Cande Green MD  Dx:   Encounter Diagnosis     ICD-10-CM    1  Lumbar facet arthropathy  M47 816                   Subjective: Patient states her back is feeling a little better  She is sleeping better  Objective: See treatment diary below      Assessment: Tolerated treatment well  Patient exhibited good technique with therapeutic exercises and would benefit from continued PT      Plan: Continue per plan of care  Progress treatment as tolerated         Precautions:  Low Back Pain and Limitations     All treatments below will be provided with a focus on strengthening, flexibility, ROM, postural,   endurance and any possible swelling and pain which may be present without ignoring   neural issues involving balance, coordination and proprioception which is also important   and necessary to provide full functional mobility and quality care        Daily Treatment Log  Manual   4/20 4/26 4/29 5/10 5/12   MT, ROM  25' 20' HS 20' 20'   HEP         Neuro-Re-Ed  4/20 4/26 4/29 5/10 5/12   Balance Board      30x   Chair Squats         BOSU-Walk         Foam Pad SLR,Hip/KneeFl,Step Ups  20x 20x  Step up   30x 30x   Foam Beam         P-Bar-GT-Forward, Backward,Side-Even & Dips         Monster Steps         NuStep 10' L4 10' L4 10' L4 10# L4 10# L4   Fitter-Slalom         Ther Exer  4/20  4/29 5/10 5/12   T/G-Squats,PF  30x 30x   30x 30x   W/P-PNF,IR,ER,PU,PS:Top,Mid,Bot  10#-30x 10# 30x  10# 30x 10# 30x 10#-30x   Meena-BP,Lats,PD,Row         Eedmklb-ZB-Pa  2x2' 2x2' 2x2' 2x2' 2x2'   NK Table         TM         UBC         Bike         Stepper         ME, PE  15' 15'   15'             Modalities  4/20 4/26 4/29 5/10 5/12   MH / PE / ES  20' 20'  Saralyn Drewsey 20' 20'   US

## 2021-05-13 ENCOUNTER — OFFICE VISIT (OUTPATIENT)
Dept: PHYSICAL THERAPY | Facility: CLINIC | Age: 46
End: 2021-05-13
Payer: COMMERCIAL

## 2021-05-13 DIAGNOSIS — M47.816 LUMBAR FACET ARTHROPATHY: Primary | ICD-10-CM

## 2021-05-13 PROCEDURE — 97110 THERAPEUTIC EXERCISES: CPT

## 2021-05-13 PROCEDURE — 97140 MANUAL THERAPY 1/> REGIONS: CPT

## 2021-05-13 PROCEDURE — 97112 NEUROMUSCULAR REEDUCATION: CPT

## 2021-05-13 NOTE — PROGRESS NOTES
Daily Note     Today's date: 2021  Patient name: Saundra Adams  : 1975  MRN: [id number]  Referring provider: Cande Green MD  Dx:   Encounter Diagnosis     ICD-10-CM    1  Lumbar facet arthropathy  M47 816                   Subjective: No new c/o pain today  Objective: See treatment diary below      Assessment: Tolerated treatment well  Patient exhibited good technique with therapeutic exercises and would benefit from continued PT to increase ROM/strength and endurance to improve mobility  Plan: Continue per plan of care  Progress treatment as tolerated         Precautions:  Low Back Pain and Limitations     All treatments below will be provided with a focus on strengthening, flexibility, ROM, postural,   endurance and any possible swelling and pain which may be present without ignoring   neural issues involving balance, coordination and proprioception which is also important   and necessary to provide full functional mobility and quality care        Daily Treatment Log  Manual  4/26 4/29 5/10 5/12 5/13   MT, ROM 20' HS 20' 20' 15'   HEP        Neuro-Re-Ed 4/26 4/29 5/10 5/12 5/13   Balance Board    30x 30x   Chair Squats        BOSU-Walk        Foam Pad SLR,Hip/KneeFl,Step Ups 20x  Step up   30x 30x    Foam Beam        P-Bar-GT-Forward, Backward,Side-Even & Dips        Monster Steps        NuStep 10' L4 10' L4 10# L4 10# L4 10' L4   Fitter-Slalom        Ther Exer  4/29 5/10 5/12 5/13   T/G-Squats,PF 30x   30x 30x 30x   W/P-PNF,IR,ER,PU,PS:Top,Mid,Bot 10# 30x  10# 30x 10# 30x 10#-30x    Meena-BP,Lats,PD,Row        Ubdipoj-QL-Hj 2x2' 2x2' 2x2' 2x2' 2x2'   NK Table        TM        UBC        Bike        Stepper        [place], [place] 82'   15' 15'            Modalities 4/26 4/29 5/10 5/12 5/13   MH / PE / ES 20'  [address] 20' 20' [facility]

## 2021-05-17 ENCOUNTER — OFFICE VISIT (OUTPATIENT)
Dept: PHYSICAL THERAPY | Facility: CLINIC | Age: 46
End: 2021-05-17
Payer: COMMERCIAL

## 2021-05-17 DIAGNOSIS — M47.816 LUMBAR FACET ARTHROPATHY: Primary | ICD-10-CM

## 2021-05-17 PROCEDURE — 97110 THERAPEUTIC EXERCISES: CPT

## 2021-05-17 PROCEDURE — 97112 NEUROMUSCULAR REEDUCATION: CPT

## 2021-05-17 PROCEDURE — 97140 MANUAL THERAPY 1/> REGIONS: CPT

## 2021-05-17 NOTE — PROGRESS NOTES
Daily Note     Today's date: 2021  Patient name: José Miguel Faulkner  : 1975  MRN: [id number]  Referring provider: Lane Leroy MD  Dx:   Encounter Diagnosis     ICD-10-CM    1  Lumbar facet arthropathy  M47 816                   Subjective: No new c/o pain today  Objective: See treatment diary below      Assessment: Tolerated treatment well  Patient exhibited good technique with therapeutic exercises and would benefit from continued PT to increase ROM/strength and endurance to improve mobility  Plan: Continue per plan of care  Progress treatment as tolerated         Precautions:  Low Back Pain and Limitations     All treatments below will be provided with a focus on strengthening, flexibility, ROM, postural,   endurance and any possible swelling and pain which may be present without ignoring   neural issues involving balance, coordination and proprioception which is also important   and necessary to provide full functional mobility and quality care        Daily Treatment Log  Manual  4/29 5/10 5/12 5/13 5/17   MT, ROM HS 20' 20' 15' 15'   HEP        Neuro-Re-Ed 4/29 5/10 5/12 5/13 5/17   Balance Board   30x 30x    Chair Squats        BOSU-Walk        Foam Pad SLR,Hip/KneeFl,Step Ups  Step up   30x 30x     Foam Beam        P-Bar-GT-Forward, Backward,Side-Even & Dips        Monster Steps        NuStep 10' L4 10# L4 10# L4 10' L4 10' L4   Fitter-Slalom        Ther Exer 4/29 5/10 5/12 5/13 5/17   T/G-Squats,PF  30x 30x 30x 30x   W/P-PNF,IR,ER,PU,PS:Top,Mid,Bot 10# 30x 10# 30x 10#-30x  10#[id number]   WP-Hip ABD/  ADD     10# 30x   Meena-BP,Lats,PD,Row        Ysjjezs-MU-Nn 2x2' 2x2' 2x2' 2x2' 2x2'   NK Table        [place], [place]   52' 15' 15'            Modalities 4/29 5/10 5/12 5/13 5/17   MH / PE / ES   20' 20' [address]

## 2021-05-18 ENCOUNTER — OFFICE VISIT (OUTPATIENT)
Dept: PHYSICAL THERAPY | Facility: CLINIC | Age: 46
End: 2021-05-18
Payer: COMMERCIAL

## 2021-05-18 DIAGNOSIS — M47.816 LUMBAR FACET ARTHROPATHY: Primary | ICD-10-CM

## 2021-05-18 PROCEDURE — 97014 ELECTRIC STIMULATION THERAPY: CPT

## 2021-05-18 PROCEDURE — 97110 THERAPEUTIC EXERCISES: CPT

## 2021-05-18 PROCEDURE — 97140 MANUAL THERAPY 1/> REGIONS: CPT

## 2021-05-18 NOTE — PROGRESS NOTES
Daily Note     Today's date: 2021  Patient name: Danilo Mckeon  : 1975  MRN: [id number]  Referring provider: Stan Negrete MD  Dx:   Encounter Diagnosis     ICD-10-CM    1  Lumbar facet arthropathy  M47 816                   Subjective: No new c/o pain today  Objective: See treatment diary below      Assessment: Tolerated treatment well  Patient exhibited good technique with therapeutic exercises and would benefit from continued PT to increase ROM/strength and endurance to improve mobility  Plan: Continue per plan of care  Progress treatment as tolerated         Precautions:  Low Back Pain and Limitations     All treatments below will be provided with a focus on strengthening, flexibility, ROM, postural,   endurance and any possible swelling and pain which may be present without ignoring   neural issues involving balance, coordination and proprioception which is also important   and necessary to provide full functional mobility and quality care        Daily Treatment Log  Manual  5/10 5/12 5/13 5/17 5/18   MT, ROM 20' 20' 15' 15' 20'   HEP        Neuro-Re-Ed 5/10 5/12 5/13 5/17 5/18   Balance Board  30x 30x     Chair Squats        BOSU-Walk        Foam Pad SLR,Hip/KneeFl,Step Ups Step up   30x 30x      Foam Beam        P-Bar-GT-Forward, Backward,Side-Even & Dips        Monster Steps        NuStep 10# L4 10# L4 10' L4 10' L4    Fitter-Slalom        Ther Exer 5/10 5/12 5/13 5/17 5/18   T/G-Squats,PF 30x 30x 30x 30x 30x   W/P-PNF,IR,ER,PU,PS:Top,Mid,Bot 10# 30x 10#-30x  10#[id number] 10#TopMid  20x   WP-Hip ABD/  ADD    10# 30x 10# 30x   Meena-BP,Lats,PD,Row        Lpbvfci-DM-Kw 2x2' 2x2' 2x2' 2x2' 2x2'   NK Table        TM        UBC        Bike        Stepper        [place], [place]  29' 15' 15' 15'            Modalities 5/10 5/12 5/13 5/17 5/18   MH / PE / ES  20' 20' [address]

## 2021-05-19 ENCOUNTER — APPOINTMENT (OUTPATIENT)
Dept: PHYSICAL THERAPY | Facility: CLINIC | Age: 46
End: 2021-05-19
Payer: COMMERCIAL

## 2021-05-20 ENCOUNTER — OFFICE VISIT (OUTPATIENT)
Dept: PHYSICAL THERAPY | Facility: CLINIC | Age: 46
End: 2021-05-20
Payer: COMMERCIAL

## 2021-05-20 ENCOUNTER — IMMUNIZATIONS (OUTPATIENT)
Dept: FAMILY MEDICINE CLINIC | Facility: HOSPITAL | Age: 46
End: 2021-05-20

## 2021-05-20 DIAGNOSIS — M47.816 LUMBAR FACET ARTHROPATHY: Primary | ICD-10-CM

## 2021-05-20 DIAGNOSIS — Z23 ENCOUNTER FOR IMMUNIZATION: Primary | ICD-10-CM

## 2021-05-20 PROCEDURE — 97110 THERAPEUTIC EXERCISES: CPT

## 2021-05-20 PROCEDURE — 0002A SARS-COV-2 / COVID-19 MRNA VACCINE (PFIZER-BIONTECH) 30 MCG: CPT

## 2021-05-20 PROCEDURE — 91300 SARS-COV-2 / COVID-19 MRNA VACCINE (PFIZER-BIONTECH) 30 MCG: CPT

## 2021-05-20 PROCEDURE — 97140 MANUAL THERAPY 1/> REGIONS: CPT

## 2021-05-20 NOTE — PROGRESS NOTES
Daily Note     Today's date: 2021  Patient name: Liset Carrasquillo  : 1975  MRN: [id number]  Referring provider: Karen Knox MD  Dx:   Encounter Diagnosis     ICD-10-CM    1  Lumbar facet arthropathy  M47 816                   Subjective: Pt reports some upper body soreness from upper body exercises last session  Objective: See treatment diary below      Assessment: Tolerated treatment well  Patient would benefit from continued PT  Tenderness along R upper trap  Plan: Continue per plan of care        Precautions:  Low Back Pain and Limitations     All treatments below will be provided with a focus on strengthening, flexibility, ROM, postural,   endurance and any possible swelling and pain which may be present without ignoring   neural issues involving balance, coordination and proprioception which is also important   and necessary to provide full functional mobility and quality care        Daily Treatment Log  Manual  5/10 5/12 5/13 5/17 5/18 5/20   MT, ROM 20' 20' 15' 15' 20' 15'   HEP         Neuro-Re-Ed 5/10 5/12 5/13 5/17 5/18    Balance Board  30x 30x      Chair Squats         BOSU-Walk         Foam Pad SLR,Hip/KneeFl,Step Ups Step up   30x 30x       Foam Beam         P-Bar-GT-Forward, Backward,Side-Even & Dips         Monster Steps         NuStep 10# L4 10# L4 10' L4 10' L4  10' L4   Fitter-Slalom         Ther Exer 5/10 5/12 5/13 5/17 5/18 5/20   T/G-Squats,PF 30x 30x 30x 30x 30x 30x   W/P-PNF,IR,ER,PU,PS:Top,Mid,Bot 10# 30x 10#-30x  10#[id number] 10#TopMid  20x 10# top Mid 20x   WP-Hip ABD/  ADD    10# 30x 10# 30x 10# x30   Meena-BP,Lats,PD,Row         Twywqjz-FU-Bq 2x2' 2x2' 2x2' 2x2' 2x2' 2x2'   NK Table         TM         UBC         Bike         [place]  07' 15' 15' 15'              Modalities 5/10 5/12 5/13 5/17 5/18 5/20   MH / PE / ES  20' 20' [facility] 21' [facility]

## 2021-05-21 DIAGNOSIS — J45.30 MILD PERSISTENT ASTHMA WITHOUT COMPLICATION: ICD-10-CM

## 2021-05-24 ENCOUNTER — OFFICE VISIT (OUTPATIENT)
Dept: PHYSICAL THERAPY | Facility: CLINIC | Age: 46
End: 2021-05-24
Payer: COMMERCIAL

## 2021-05-24 DIAGNOSIS — M47.816 LUMBAR FACET ARTHROPATHY: Primary | ICD-10-CM

## 2021-05-24 PROCEDURE — 97112 NEUROMUSCULAR REEDUCATION: CPT

## 2021-05-24 PROCEDURE — 97140 MANUAL THERAPY 1/> REGIONS: CPT

## 2021-05-24 PROCEDURE — 97110 THERAPEUTIC EXERCISES: CPT

## 2021-05-24 NOTE — PROGRESS NOTES
Daily Note     Today's date: 2021  Patient name: Saundra Adams  : 1975  MRN: [id number]  Referring provider: Cande Green MD  Dx:   Encounter Diagnosis     ICD-10-CM    1  Lumbar facet arthropathy  M47 816                   Subjective: No new c/o pain today  Objective: See treatment diary below      Assessment: Tolerated treatment well  Patient exhibited good technique with therapeutic exercises and would benefit from continued PT to increase ROM/strength and endurance to improve mobility  Plan: Continue per plan of care  Progress treatment as tolerated         Precautions:  Low Back Pain and Limitations     All treatments below will be provided with a focus on strengthening, flexibility, ROM, postural,   endurance and any possible swelling and pain which may be present without ignoring   neural issues involving balance, coordination and proprioception which is also important   and necessary to provide full functional mobility and quality care        Daily Treatment Log  Manual     MT, ROM 15' 15' 20' 15' 15'   HEP        Neuro-Re-Ed    Balance Board 30x       Chair Squats        BOSU-Walk        Foam Pad SLR,Hip/KneeFl,Step Ups        Foam Beam        P-Bar-GT-Forward, Backward,Side-Even & Dips        Mat-PelvicTilts     20x   Mat-Bridges     20x   NuStep 10' L4 10' L4  10' L4 10'L4   Fitter-Slalom        Ther Exer    T/G-Squats,PF 30x 30x 30x 30x 30x   W/P-PNF,IR,ER,PU,PS:Top,Mid,Bot  10#[id number] 10#TopMid  20x 10# top Mid 20x    WP-Hip ABD/  ADD  10# 30x 10# 30x 10# x30    Meena-BP,Lats,PD,Row        Rkkqtjq-EX-Sh 2x2' 2x2' 2x2' 2x2' 2x2'   NK Table        TM        UBC        Bike        Stepper        [place], [place] 66' 15' 15'  15'            Modalities    [address] / PE / ES [facility]' [facility]

## 2021-05-26 ENCOUNTER — OFFICE VISIT (OUTPATIENT)
Dept: PHYSICAL THERAPY | Facility: CLINIC | Age: 46
End: 2021-05-26
Payer: COMMERCIAL

## 2021-05-26 DIAGNOSIS — M47.816 LUMBAR FACET ARTHROPATHY: Primary | ICD-10-CM

## 2021-05-26 PROCEDURE — 97112 NEUROMUSCULAR REEDUCATION: CPT

## 2021-05-26 PROCEDURE — 97140 MANUAL THERAPY 1/> REGIONS: CPT

## 2021-05-26 PROCEDURE — 97110 THERAPEUTIC EXERCISES: CPT

## 2021-05-26 NOTE — PROGRESS NOTES
Daily Note     Today's date: 2021  Patient name: Wilbur Tena  : 1975  MRN: [id number]  Referring provider: Ashlee Ramírez MD  Dx:   Encounter Diagnosis     ICD-10-CM    1  Lumbar facet arthropathy  M47 816        Start Time: 1257          Subjective: Pt reports feeling a little sore today  Objective: See treatment diary below      Assessment: Tolerated treatment well  Patient does well with there ex today  Pt with good mobility in B LE  Pt with tenderness to palpation in L PSIS  Pt would benefit from continued therapy for strengthening for functional mobility  Plan: Continue per plan of care        Precautions:  Low Back Pain and Limitations     All treatments below will be provided with a focus on strengthening, flexibility, ROM, postural,   endurance and any possible swelling and pain which may be present without ignoring   neural issues involving balance, coordination and proprioception which is also important   and necessary to provide full functional mobility and quality care        Daily Treatment Log  Manual     MT, ROM 15' 15' 20' 15' 15'   HEP        Neuro-Re-Ed    Balance Board        Chair Squats        BOSU-Walk        Foam Pad SLR,Hip/KneeFl,Step Ups        Foam Beam        P-Bar-GT-Forward, Backward,Side-Even & Dips        Mat-PelvicTilts 20x    20x   Mat-Bridges 20x    20x   NuStep 10' L4 10' L4  10' L4 10'L4   Fitter-Slalom        Ther Exer    T/G-Squats,PF 30x 30x 30x 30x 30x   W/P-PNF,IR,ER,PU,PS:Top,Mid,Bot  10#[id number] 10#TopMid  20x 10# top Mid 20x    WP-Hip ABD/  ADD  10# 30x 10# 30x 10# x30    Meena-BP,Lats,PD,Row        Onriipa-VC-Hb 2x2' 2x2' 2x2' 2x2' 2x2'   NK Table        TM        UBC        Bike        Stepper        [place], [place] 06' 15' 15'  15'            Modalities    [address] / PE / ES [facility] 20'[facility] 20' [facility] [address] [place] [address]

## 2021-05-26 NOTE — PROGRESS NOTES
Today's date: 2019  Patient name: Raegan Maynard  : 1975  MRN: [id number]  Referring provider: Belen Qureshi DO  Dx:   Encounter Diagnosis     ICD-10-CM    1  Acute left-sided low back pain without sciatica M54 5    2  Chronic left-sided low back pain without sciatica M54 5     G89 29    3  Chronic pain of right knee M25 561     G89 29    4  Difficulty walking R26 2    5  Contusion of right knee, subsequent encounter S80  01XD      Subjective:  Cristy states her back is slowly feeling better along with her left hip region  Objective: See treatment log below    Assessment: Tolerated treatment well  Patient exhibited good technique with therapeutic exercises and would benefit from continued PT    Plan: Continue per plan of care  Progress treatment as tolerated         Precautions:  Left Anterior Hip Pain / Right Anterior Medial Patellar Region Pain    Daily Treatment Log  Manual     MT, ROM 30' 20' 20' 15' 15'   HEP        Exercise Log    Balance Board 30x ea 30x ea 30x 30x 30x   Chair Squats   30x 30x 30x   P-Bar-GT-Forward, Backward,Side-Even & Dips        BOSU-Walk 5' 5' 5' 5' 5'   Foam Pad SLR,Hip/KneeFl,Step Ups  30x ea 30x 30x 30x   Foam Beam    12x 12x   Fitter-Slalom        Monster Steps        T/G-Squats PF 30x ea 30x ea 30x 30x 30x   W/P-Hip-Abd,Add,Flex,Ext 7 5# 30x ea 7 5# 30x ea 7 5#-30x 7 5#-30x 7 5#-30x   WP-Squats        Trimax-TKE        Hwfnwyv-EZ-Zh 2x2' 2x2' 2x2' 2x2' 2x2'   NK Table Exer        NK Table ROM        TM        Bike        ME, PE 15' 15' 15' 15' 15'           Modalities    MH&ES 20' 20' 20' 20' 20'   US NT NT 10' NA NA Alert and oriented to person, place and time/Awake

## 2021-05-27 ENCOUNTER — OFFICE VISIT (OUTPATIENT)
Dept: PHYSICAL THERAPY | Facility: CLINIC | Age: 46
End: 2021-05-27
Payer: COMMERCIAL

## 2021-05-27 ENCOUNTER — TELEMEDICINE (OUTPATIENT)
Dept: BEHAVIORAL/MENTAL HEALTH CLINIC | Facility: CLINIC | Age: 46
End: 2021-05-27
Payer: COMMERCIAL

## 2021-05-27 DIAGNOSIS — M47.816 LUMBAR FACET ARTHROPATHY: Primary | ICD-10-CM

## 2021-05-27 DIAGNOSIS — F33.9 MAJOR DEPRESSION, RECURRENT, CHRONIC (HCC): Primary | ICD-10-CM

## 2021-05-27 PROCEDURE — 97110 THERAPEUTIC EXERCISES: CPT

## 2021-05-27 PROCEDURE — T1015 CLINIC SERVICE: HCPCS | Performed by: SOCIAL WORKER

## 2021-05-27 PROCEDURE — 97112 NEUROMUSCULAR REEDUCATION: CPT

## 2021-05-27 PROCEDURE — 97140 MANUAL THERAPY 1/> REGIONS: CPT

## 2021-05-27 NOTE — PROGRESS NOTES
Daily Note     Today's date: 2021  Patient name: Sophie Sommers  : 1975  MRN: [id number]  Referring provider: Brittany Brock MD  Dx:   Encounter Diagnosis     ICD-10-CM    1  Lumbar facet arthropathy  M47 816                   Subjective: No new c/o pain today  Objective: See treatment diary below      Assessment: Tolerated treatment well  Patient exhibited good technique with therapeutic exercises and would benefit from continued PT to increase ROM/strength and endurance to improve mobility  Plan: Continue per plan of care  Progress treatment as tolerated         Precautions:  Low Back Pain and Limitations     All treatments below will be provided with a focus on strengthening, flexibility, ROM, postural,   endurance and any possible swelling and pain which may be present without ignoring   neural issues involving balance, coordination and proprioception which is also important   and necessary to provide full functional mobility and quality care        Daily Treatment Log  Manual        MT, ROM 15' 15'      HEP        Neuro-Re-Ed       Balance Board        Chair Squats        BOSU-Walk        Foam Pad SLR,Hip/KneeFl,Step Ups        Foam Beam        P-Bar-GT-Forward, Backward,Side-Even & Dips        Mat-PelvicTilts 20x       Mat-Bridges 20x       NuStep 10' L4 10' L4      Fitter-Slalom        Ther Exer       T/G-Squats,PF 30x       W/P-PNF,IR,ER,PU,PS:Top,Mid,Bot  10# 20x      WP-Hip ABD/  ADD        Meena-BP,Lats,PD,Row        [place]-ZZ-Rd 2x2' 2x2'      NK Table        TM        UBC        Bike        Stepper        ME, PE 15' 15'               Modalities       [address] / Fayrene Roy / ES [facility]

## 2021-05-27 NOTE — PSYCH
Psychotherapy Provided: Individual Psychotherapy 30  minutes 09:59 Am to 10:30 Am          Goals addressed in session:(Long Term Goal Number 1) The Client reported that she adopted a kitten, which has helped with her depression and anxiety  " I got a larry which has helped with my depression and anxiety  " During the session we explored her strengths and weaknesses within her different relationships and environments and the effects on her mental health  It is hoped that by addressing her weaknesses this will act as a preventive measure against the possible need for higher level of care and services  Interventions: Supportive Therapy, Strengths Therapy,  and Cognitive Behavioral Therapy where the treatment modalities utilized during the session  Assessment and Plan: The Client presented an anxious depressed mood that was congruent in effect  She was alert, goal directed and participated with prompts during the session  The Client was oriented to person, place, time, situation, and reported no suicidal/homicidal ideation, plan, or intent  As team we explored and processed with the client's mental health and the effects with the addition of her kitten  She was able to verbalize that the addition of her kitten has given meaning to her life, and has helped decrease in her anxiety and depression  During the session we explored her coping skills  As her home commitment the Client will practice her coping skill when presented with anxiety and or depression  Next scheduled appointment was set for 3 weeks  Pain:      PSYCH MENTAL STATUS PAIN :5 as reported by the Client     PHYSICAL PAIN SCALE NUMBERS:3 as reported by the Client     Current suicide risk : Low/Phone number for [facility] was given  to the Client [phone]  The Client was given phone number for the [facility] [phone]           Behavioral Health Treatment Plan [facility]: Diagnosis and Treatment Plan explained to David Shay, Cristy Garcia  relates understanding diagnosis and is agreeable to Treatment Plan  Yes  Virtual Regular Visit      Assessment/Plan:    Problem List Items Addressed This Visit        Other    Major depression, recurrent, chronic ([facility] [address] ) - Primary          Goals addressed in session: Goal 1          Reason for visit is No chief complaint on file  Encounter provider YONATAN Lopes    Provider located at [place] [address] PSYCH  [address] [facility] [address]      Recent Visits  No visits were found meeting these conditions  Showing recent visits within past 7 days and meeting all other requirements     Today's Visits  Date Type Provider Dept   05/27/21 Telemedicine YONATAN Lopes [facility]   Showing today's visits and meeting all other requirements     Future Appointments  No visits were found meeting these conditions  Showing future appointments within next 150 days and meeting all other requirements        The patient was identified by name and date of birth  David Shay was informed that this is a telemedicine visit and that the visit is being conducted through [address] [place] [address] Now and patient was informed that this is a secure, HIPAA-compliant platform  She agrees to proceed     My office door was closed  No one else was in the room  She acknowledged consent and understanding of privacy and security of the video platform  The patient has agreed to participate and understands they can discontinue the visit at any time  Patient is aware this is a billable service  Subjective  David Shay is a 50 y o  female ,who was seen for individual mental health therapy          HPI     Past Medical History:   Diagnosis Date    Allergic     Seasonal Allergies    Alopecia of scalp     Treated with Proscar    Asthma     Chronic pain disorder     back, bilat knees, bilat hips    CPAP (continuous positive airway pressure) dependence     Depression     Diabetes mellitus (HCC)     Fibromyalgia, primary     GERD (gastroesophageal reflux disease)     Hip fx, left, closed, with routine healing, subsequent encounter 05/2017    Hypertension     Insomnia     Irregular heartbeat     Obesity     VICTOR HUGO on CPAP     Shortness of breath     Sleep apnea        Past Surgical History:   Procedure Laterality Date    CARPAL TUNNEL RELEASE Left     LUMBAR EPIDURAL INJECTION      NERVE BLOCK Bilateral 12/29/2020    Procedure: L2 L3 L4 L5 MEDIAL BRANCH BLOCK #1;  Surgeon: Alvin Gerardo MD;  [facility];  Service: Pain Management     NERVE BLOCK Bilateral 1/19/2021    Procedure: L2 L3 L4 L5 MEDIAL BRANCH BLOCK #2;  Surgeon: Alvin Gerardo MD;  [facility];  Service: Pain Management     RADIOFREQUENCY ABLATION Right 2/25/2021    Procedure: L2 L3 L4 L5 RADIO FREQUENCY ABLATION right side;  Surgeon: Alvin Gerardo MD;  [facility];  Service: Pain Management     RADIOFREQUENCY ABLATION Left 3/16/2021    Procedure: L2 L3 L4 L5 RADIO FREQUENCY ABLATION;  Surgeon: Alvin Gerardo MD;  [facility];  Service: Pain Management     UPPER GASTROINTESTINAL ENDOSCOPY         Current Outpatient Medications   Medication Sig Dispense Refill    albuterol (Ventolin HFA) 90 mcg/act inhaler Inhale 2 puffs every 6 (six) hours as needed for wheezing 1 Inhaler 5    celecoxib (CeleBREX) 100 mg capsule Take 1 capsule (100 mg total) by mouth 2 (two) times a day 60 capsule 5    cyclobenzaprine (FLEXERIL) 10 mg tablet 1 tab po every 12 hours x 7 days then prn spasms or pain (Patient taking differently: Take 10 mg by mouth 3 (three) times a day as needed 1 tab po every 12 hours x 7 days then prn spasms or pain) 60 tablet 3    ergocalciferol (VITAMIN D2) 50,000 units Take 1 capsule (50,000 Units total) by mouth once a week 4 capsule 5    fluticasone-salmeterol (ADVAIR, WIXELA) 250-50 mcg/dose inhaler Inhale 1 puff 2 (two) times a day Rinse mouth after use  60 each 5    glipiZIDE (GLUCOTROL) 5 mg tablet Take 1 tablet (5 mg total) by mouth daily with breakfast 30 tablet 5    glucose blood test strip Use as instructed 100 each 5    Lancets (ONETOUCH ULTRASOFT) lancets Use as instructed 100 each 5    lisinopril (ZESTRIL) 10 mg tablet Take 1 tablet (10 mg total) by mouth daily 90 tablet 1    metFORMIN (GLUCOPHAGE) 1000 MG tablet Take 1 tablet (1,000 mg total) by mouth 2 (two) times a day with meals 180 tablet 1    sertraline (ZOLOFT) 50 mg tablet TAKE (1) TABLET DAILY AT BEDTIME  30 tablet 0     No current facility-administered medications for this visit  Allergies   Allergen Reactions    Tdap [Tetanus-Diphth-Acell Pertussis] Rash       Review of Systems    Video Exam    There were no vitals filed for this visit  Physical Exam     I spent 30 minutes directly with the patient during this visit      [address] [facility] acknowledges that she has consented to an online visit or consultation  She understands that the online visit is based solely on information provided by her, and that, in the absence of a face-to-face physical evaluation by the physician, the diagnosis she receives is both limited and provisional in terms of accuracy and completeness  This is not intended to replace a full medical face-to-face evaluation by the physician  Cristy Garcia understands and accepts these terms

## 2021-05-28 DIAGNOSIS — F33.9 MAJOR DEPRESSION, RECURRENT, CHRONIC (HCC): ICD-10-CM

## 2021-06-01 ENCOUNTER — OFFICE VISIT (OUTPATIENT)
Dept: PHYSICAL THERAPY | Facility: CLINIC | Age: 46
End: 2021-06-01
Payer: COMMERCIAL

## 2021-06-01 DIAGNOSIS — M47.816 LUMBAR FACET ARTHROPATHY: Primary | ICD-10-CM

## 2021-06-01 PROCEDURE — 97140 MANUAL THERAPY 1/> REGIONS: CPT

## 2021-06-01 PROCEDURE — 97112 NEUROMUSCULAR REEDUCATION: CPT

## 2021-06-01 NOTE — PROGRESS NOTES
Daily Note     Today's date: 2021  Patient name: Myah Delgado  : 1975  MRN: [id number]  Referring provider: Chuck Duval MD  Dx:   Encounter Diagnosis     ICD-10-CM    1  Lumbar facet arthropathy  M47 816                   Subjective: No new c/o pain today  "I'm sore today  I haven't really been getting enough sleep since my new larry came to my house "      Objective: See treatment diary below      Assessment: Tolerated treatment well  Patient exhibited good technique with therapeutic exercises and would benefit from continued PT to increase ROM/strength and endurance to improve mobility  Plan: Continue per plan of care       Precautions:  Low Back Pain and Limitations     All treatments below will be provided with a focus on strengthening, flexibility, ROM, postural,   endurance and any possible swelling and pain which may be present without ignoring   neural issues involving balance, coordination and proprioception which is also important   and necessary to provide full functional mobility and quality care        Daily Treatment Log  Manual       MT, ROM 15' 15' 20'     HEP        Neuro-Re-Ed      Balance Board        Chair Squats        BOSU-Walk        Foam Pad SLR,Hip/KneeFl,Step Ups        Foam Beam        P-Bar-GT-Forward, Backward,Side-Even & Dips        Mat-PelvicTilts 20x       Mat-Bridges 20x  10x     NuStep 10' L4 10' L4 10' L4     Fitter-Slalom        Ther Exer      T/G-Squats,PF 30x       W/P-PNF,IR,ER,PU,PS:Top,Mid,Bot  10# 20x      WP-Hip ABD/  ADD        Meena-BP,Lats,PD,Row        Uqknmbw-IU-Ol 2x2' 2x2' 2x2'     NK Table        TM        UBC        Bike        Stepper        ME, PE 15' 15' 15'              Modalities      [address] / Shaune Foil / ES [facility]

## 2021-06-14 ENCOUNTER — TELEMEDICINE (OUTPATIENT)
Dept: BEHAVIORAL/MENTAL HEALTH CLINIC | Facility: CLINIC | Age: 46
End: 2021-06-14
Payer: COMMERCIAL

## 2021-06-14 DIAGNOSIS — F33.9 MAJOR DEPRESSION, RECURRENT, CHRONIC (HCC): Primary | ICD-10-CM

## 2021-06-14 PROCEDURE — T1015 CLINIC SERVICE: HCPCS | Performed by: SOCIAL WORKER

## 2021-06-14 NOTE — PSYCH
Psychotherapy Provided: Individual Psychotherapy 60  minutes 10:00 Am to 10:28 Am It was my intent to perform this visit via video technology, but the patient was not able to do a video connection, so the visit was completed via audio telephone only  Goals addressed in session:(Long Term Goal Number One) The Client reported continued pain in her back that has been causing continued anxiety and depression  During the session we explored and processed the connection between her physical health issues and that of her mental health issues  It is hoped that by addressing her weaknesses this will act as a preventive measure against the possible need for higher level of care ans services  Interventions: Supportive Therapy, Strengths Therapy,  and Cognitive Behavioral Therapy where the treatment modalities utilized during the session  Assessment and Plan: The Client presented a depressed anxious mood that was congruent in effect  She was alert, goal directed and participated with prompts during the session  The Client was oriented to person, place, time, situation, and reported no suicidal/homicidal ideation, plan, or intent  As team we explored and processed the Client's anxiety and depression and the effects on her mental health  During the session we explored ways for the Client to utilize her coping skills to address her anxiety and depression  As her home commitment the Client will practice her coping skills when  presented with anxiety and or depression  Next scheduled appointment was set for 1 month  Pain:      PSYCH MENTAL STATUS PAIN :1 as reported by the Client     PHYSICAL PAIN SCALE NUMBERS:8 as reported by the Client     Current suicide risk : Low/Phone number for [facility] was given  to the Client [phone]  The Client was given phone number for the [facility] [phone]           Behavioral Health Treatment Plan [facility]: Diagnosis and Treatment Plan explained to [id number] [facility][note 2]  relates understanding diagnosis and is agreeable to Treatment Plan  Yes  Virtual Brief Visit    Assessment/Plan:    Problem List Items Addressed This Visit        Other    Major depression, recurrent, chronic ([address] ) - Primary                Reason for visit is   Chief Complaint   Patient presents with    Virtual Brief Visit        Encounter provider [employer],     Provider located at [place] [address] PSYCH  [address] [facility] [address]    Recent Visits  No visits were found meeting these conditions  Showing recent visits within past 7 days and meeting all other requirements  Today's Visits  Date Type Provider Dept   06/14/21 Telemedicine [employer],  [facility]   Showing today's visits and meeting all other requirements  Future Appointments  No visits were found meeting these conditions  Showing future appointments within next 150 days and meeting all other requirements       After connecting through telephone, the patient was identified by name and date of birth  Alen Simpson was informed that this is a telemedicine visit and that the visit is being conducted through telephone  My office door was closed  No one else was in the room  She acknowledged consent and understanding of privacy and security of the platform  The patient has agreed to participate and understands she can discontinue the visit at any time  Patient is aware this is a billable service  Subjective    Alen Simpson is a 50 y o  female , [facility]     Past Medical History:   Diagnosis Date    Allergic     Seasonal Allergies    Alopecia of scalp     Treated with Proscar    Asthma     Chronic pain disorder     back, bilat knees, bilat hips    CPAP (continuous positive airway pressure) dependence     Depression     Diabetes mellitus (HCC)     Fibromyalgia, primary     GERD (gastroesophageal reflux disease)     Hip fx, left, closed, with routine healing, subsequent encounter 05/2017    Hypertension     Insomnia     Irregular heartbeat     Obesity     VICTOR HUGO on CPAP     Shortness of breath     Sleep apnea        Past Surgical History:   Procedure Laterality Date    CARPAL TUNNEL RELEASE Left     LUMBAR EPIDURAL INJECTION      NERVE BLOCK Bilateral 12/29/2020    Procedure: L2 L3 L4 L5 MEDIAL BRANCH BLOCK #1;  Surgeon: Bijan Cooley MD;  [facility];  Service: Pain Management     NERVE BLOCK Bilateral 1/19/2021    Procedure: L2 L3 L4 L5 MEDIAL BRANCH BLOCK #2;  Surgeon: Bijan Cooley MD;  [facility];  Service: Pain Management     RADIOFREQUENCY ABLATION Right 2/25/2021    Procedure: L2 L3 L4 L5 RADIO FREQUENCY ABLATION right side;  Surgeon: Bijan Cooley MD;  [facility];  Service: Pain Management     RADIOFREQUENCY ABLATION Left 3/16/2021    Procedure: L2 L3 L4 L5 RADIO FREQUENCY ABLATION;  Surgeon: Bijan Cooley MD;  [facility];  Service: Pain Management     UPPER GASTROINTESTINAL ENDOSCOPY         Current Outpatient Medications   Medication Sig Dispense Refill    albuterol (Ventolin HFA) 90 mcg/act inhaler Inhale 2 puffs every 6 (six) hours as needed for wheezing 1 Inhaler 5    celecoxib (CeleBREX) 100 mg capsule Take 1 capsule (100 mg total) by mouth 2 (two) times a day 60 capsule 5    cyclobenzaprine (FLEXERIL) 10 mg tablet 1 tab po every 12 hours x 7 days then prn spasms or pain (Patient taking differently: Take 10 mg by mouth 3 (three) times a day as needed 1 tab po every 12 hours x 7 days then prn spasms or pain) 60 tablet 3    ergocalciferol (VITAMIN D2) 50,000 units Take 1 capsule (50,000 Units total) by mouth once a week 4 capsule 5    fluticasone-salmeterol (ADVAIR, WIXELA) 250-50 mcg/dose inhaler Inhale 1 puff 2 (two) times a day Rinse mouth after use   60 each 5    glipiZIDE (GLUCOTROL) 5 mg tablet Take 1 tablet (5 mg total) by mouth daily with breakfast 30 tablet 5    glucose blood test strip Use as instructed 100 each 5    Lancets (ONETOUCH ULTRASOFT) lancets Use as instructed 100 each 5    lisinopril (ZESTRIL) 10 mg tablet Take 1 tablet (10 mg total) by mouth daily 90 tablet 1    metFORMIN (GLUCOPHAGE) 1000 MG tablet Take 1 tablet (1,000 mg total) by mouth 2 (two) times a day with meals 180 tablet 1    sertraline (ZOLOFT) 50 mg tablet Take 1 tablet (50 mg total) by mouth daily at bedtime 30 tablet 0     No current facility-administered medications for this visit  Allergies   Allergen Reactions    Tdap [Tetanus-Diphth-Acell Pertussis] Rash       Review of Systems    There were no vitals filed for this visit  I spent 30 minutes directly with the patient during this visit    [address] [place] [address] acknowledges that she has consented to an online visit or consultation  She understands that the online visit is based solely on information provided by her, and that, in the absence of a face-to-face physical evaluation by the physician, the diagnosis she receives is both limited and provisional in terms of accuracy and completeness  This is not intended to replace a full medical face-to-face evaluation by the physician  Cristy Garcia understands and accepts these terms

## 2021-06-17 NOTE — PROGRESS NOTES
PT notes the patient with no further contact with clinic to update status or re-schedule appointments so PT with decision to DC with [facility]

## 2021-06-18 NOTE — H&P
Assessment     1  Lumbar facet joint syndrome   - Case request operating room: BLOCK MEDIAL BRANCH Bilateral L2-L5; Standing   - Case request operating room: BLOCK MEDIAL BRANCH Bilateral L2-L5   Chronic axial low back pain described by primarily arthritic features  Positive facet loading maneuvers as noted below  Mild noncompressive lumbar degenerative change at the L3-4 and L4-5 levels  Previously had a left L3 transforaminal steroid injection with Dr Brianne Cee which helped for about a week and a half  Arthritic symptomatology greater than radicular features at this time  >80% reduction of pain with improved ability to participate with IADLs after series of medial branch blocks bilateral L2-L5 #1, and #2; will proceed with RF lesioning  Plan     -continue physical therapy and core exercises for lumbar facet syndrome   -continue Celebrex 100 mg b i d  As needed for lumbar facet syndrome  Counseled regarding bleeding risk of taking this medication    -lifestyle modifications including diet, exercise and weight loss extensively discussed   -will plan for bilateral L2 through L5 medial branchRFA    There are risks associated with opioid medications, including dependence, addiction and tolerance  The patient understands and agrees to use these medications only as prescribed  Potential side effects of the medications include, but are not limited to, constipation, drowsiness, addiction, impaired judgment and risk of fatal overdose if not taken as prescribed  The patient was warned against driving while taking sedation medications  Sharing medications is a felony  At this point in time, the patient is showing no signs of addiction, abuse, diversion or suicidal ideation  [place] Prescription Drug Monitoring Program report was reviewed and was appropriate     Complete risks and benefits including bleeding, infection, tissue reaction, nerve injury and allergic reaction were discussed   The approach was demonstrated using models and literature was provided  Verbal and written consent was obtained  My impressions and treatment recommendations were discussed in detail with the patient who verbalized understanding and had no further questions  Discharge instructions were provided  I personally saw and examined the patient and I agree with the above discussed plan of care  No orders of the defined types were placed in this encounter  History of Present Illness   Humza Decker is a 50 y o  female with past medical history of axial low back pain described primarily by arthritic features  She presents with a 2 year history of chronic low back pain described primarily as arthritic in nature  she describes 8/10 low back pain that is worse in the mornings and worse at the end of the day  The pain is characterized by achy, nagging, indolent, crampy, stabbing pain in his/her axial low back  The patient describes that the pain is worse with standing for long periods of time on hard surfaces as well as with walking  The patient is a very active individual and feels as though this pain compromises her participation with independent activities of daily living  The pain can be debilitating at times and contribute to significant disability, compromising overall activity and independent activities of daily living  She has tried physical therapy with limited relief of symptoms  Medications the patient has tried in the past include Celebrex, and Flexeril  She describes minor radicular symptoms in the L3 and L4 dermatomal distribution but otherwise has good strength  Previously she had left L3 transforaminal epidural steroid injection with relief for about 2 weeks  She denies any weakness numbness or paresthesias  The patient denies any bowel or bladder dysfunction as well       I have personally reviewed and/or updated the patient's past medical history, past surgical history, family history, social history, current medications, allergies, and vital signs today         Review of Systems       Patient Active Problem List   Diagnosis    SOB (shortness of breath)    Chronic bilateral low back pain without sciatica    Chronic pain of left knee    Chronic pain of right knee    Fibromyalgia    Primary osteoarthritis involving multiple joints    Mild persistent asthma without complication    Lumbar radiculopathy    Primary osteoarthritis of both hips    Left hip pain    Major depression, recurrent, chronic (HCC)    Lumbar facet joint syndrome     Medical History                                                                                                                                                           Surgical History                                                 Family History   Problem Relation Age of Onset    Cancer Father     Hepatitis Father     Asthma Cousin     Hypertension Paternal Grandmother     No Known Problems Mother     No Known Problems Sister     No Known Problems Maternal Grandmother     No Known Problems Maternal Grandfather     No Known Problems Paternal Grandfather     No Known Problems Paternal Aunt      Social History          Occupational History    Not on file   Tobacco Use    Smoking status: Former Smoker    Smokeless tobacco: Never Used   Substance and Sexual Activity    Alcohol use: No    Drug use: No    Sexual activity: Not on file          Current Outpatient Medications on File Prior to Visit   Medication Sig    albuterol (Ventolin HFA) 90 mcg/act inhaler Inhale 2 puffs every 6 (six) hours as needed for wheezing    celecoxib (CeleBREX) 100 mg capsule Take 1 capsule (100 mg total) by mouth 2 (two) times a day    cyclobenzaprine (FLEXERIL) 10 mg tablet 1 tab po every 12 hours x 7 days then prn spasms or pain    ergocalciferol (VITAMIN D2) 50,000 units Take 1 capsule (50,000 Units total) by mouth once a week    fluticasone-salmeterol (ADVAIR, WIXELA) 250-50 mcg/dose inhaler Inhale 1 puff 2 (two) times a day Rinse mouth after use   glipiZIDE (GLUCOTROL) 5 mg tablet Take 1 tablet (5 mg total) by mouth daily with breakfast    glucose blood test strip Use as instructed    Lancets (ONETOUCH ULTRASOFT) lancets Use as instructed    lisinopril (ZESTRIL) 10 mg tablet Take 1 tablet (10 mg total) by mouth daily    metFORMIN (GLUCOPHAGE) 1000 MG tablet Take 1 tablet (1,000 mg total) by mouth 2 (two) times a day with meals    sertraline (ZOLOFT) 50 mg tablet Take 1 tablet (50 mg total) by mouth daily at bedtime     No current facility-administered medications on file prior to visit  Allergies   Allergen Reactions    Tdap [Tetanus-Diphth-Acell Pertussis] Rash     Physical Exam   Temp (!) 96 8 °F (36 °C)  Resp 18  Ht 5' 2" (1 575 m)  Wt 122 kg (269 lb)  BMI 49 20 kg/m²   Constitutional: normal, well developed, well nourished, alert, in no distress and non-toxic and no overt pain behavior  and obese   Eyes: anicteric   HEENT: grossly intact   Neck: supple, symmetric, trachea midline and no masses   Pulmonary:even and unlabored   Cardiovascular:No edema or pitting edema present   Skin:Normal without rashes or lesions and well hydrated   Psychiatric:Mood and affect appropriate   Neurologic:Cranial Nerves II-XII grossly intact Sensation grossly intact; no clonus negative dupree's  Reflexes 2+ and brisk  SLR negative bilaterally  Musculoskeletal: Steppage gait  Normal heel toe and tip toe walking  Significant pain with lumbar facet loading bilaterally and with lateral spine rotation left greater than right  TTP over lumbar paraspinal muscles  Negative wilda's test, negative gaenslen's negative SIJ loading bilaterally  Imaging   MRI LUMBAR SPINE WITHOUT CONTRAST   INDICATION: M54 [id number]: Lumbago with sciatica, left side   G89 29: Other chronic pain  COMPARISON: None     TECHNIQUE: Sagittal T1, sagittal T2, sagittal inversion recovery, axial T1 and axial T2, coronal T2    IMAGE QUALITY: Diagnostic   FINDINGS:   VERTEBRAL BODIES: There are 5 lumbar type vertebral bodies  Normal alignment of the lumbar spine  No spondylolysis or spondylolisthesis  No scoliosis  No compression fracture  Normal marrow signal is identified within the visualized bony   structures  No discrete marrow lesion  SACRUM: Normal signal within the sacrum  No evidence of insufficiency or stress fracture  DISTAL CORD AND CONUS: Normal size and signal within the distal cord and conus  PARASPINAL SOFT TISSUES: There is thickening of the endometrial cavity partially visualized on this examination towards the fundus  There is a dominant follicle identified within the left ovary  LOWER THORACIC DISC SPACES: Normal disc height and signal  No disc herniation, canal stenosis or foraminal narrowing  LUMBAR DISC SPACES:   L1-L2: Normal    L2-L3: Normal    L3-L4: Annular bulging with a small broad-based left foraminal and extraforaminal disc protrusion best seen on series 6 image 13  There is no canal stenosis  Only minimal left foraminal narrowing without nerve impingement  L4-L5: Disc desiccation and loss of disc height with mild annular bulging  Small central disc herniation without canal stenosis or foraminal nerve impingement  L5-S1: Normal disc height and signal without canal stenosis or foraminal narrowing  IMPRESSION:   Mild noncompressive lumbar degenerative change at the L3-4 and L4-5 levels  Fluid signal in the endometrial cavity partially visualized at the fundus  If patient is postmenopausal, consider follow-up ultrasound imaging  No

## 2021-06-25 DIAGNOSIS — G89.29 CHRONIC BILATERAL LOW BACK PAIN WITHOUT SCIATICA: ICD-10-CM

## 2021-06-25 DIAGNOSIS — M54.50 CHRONIC BILATERAL LOW BACK PAIN WITHOUT SCIATICA: ICD-10-CM

## 2021-06-25 RX ORDER — CYCLOBENZAPRINE HCL 10 MG
TABLET ORAL
Qty: 60 TABLET | Refills: 0 | Status: SHIPPED | OUTPATIENT
Start: 2021-06-25 | End: 2021-08-07 | Stop reason: SDUPTHER

## 2021-07-07 DIAGNOSIS — F33.9 MAJOR DEPRESSION, RECURRENT, CHRONIC (HCC): ICD-10-CM

## 2021-07-12 ENCOUNTER — OFFICE VISIT (OUTPATIENT)
Dept: FAMILY MEDICINE CLINIC | Facility: CLINIC | Age: 46
End: 2021-07-12
Payer: COMMERCIAL

## 2021-07-12 VITALS
RESPIRATION RATE: 16 BRPM | TEMPERATURE: 98.4 F | SYSTOLIC BLOOD PRESSURE: 118 MMHG | OXYGEN SATURATION: 96 % | WEIGHT: 260 LBS | HEIGHT: 62 IN | HEART RATE: 101 BPM | BODY MASS INDEX: 47.84 KG/M2 | DIASTOLIC BLOOD PRESSURE: 78 MMHG

## 2021-07-12 DIAGNOSIS — M54.16 LUMBAR RADICULOPATHY: ICD-10-CM

## 2021-07-12 DIAGNOSIS — Z99.89 OSA ON CPAP: ICD-10-CM

## 2021-07-12 DIAGNOSIS — Z13.220 SCREENING FOR HYPERLIPIDEMIA: ICD-10-CM

## 2021-07-12 DIAGNOSIS — Z11.59 NEED FOR HEPATITIS C SCREENING TEST: ICD-10-CM

## 2021-07-12 DIAGNOSIS — E11.65 TYPE 2 DIABETES MELLITUS WITH HYPERGLYCEMIA, WITHOUT LONG-TERM CURRENT USE OF INSULIN (HCC): ICD-10-CM

## 2021-07-12 DIAGNOSIS — E55.9 VITAMIN D DEFICIENCY: ICD-10-CM

## 2021-07-12 DIAGNOSIS — Z13.29 SCREENING FOR THYROID DISORDER: ICD-10-CM

## 2021-07-12 DIAGNOSIS — E53.8 VITAMIN B12 DEFICIENCY: ICD-10-CM

## 2021-07-12 DIAGNOSIS — M15.9 PRIMARY OSTEOARTHRITIS INVOLVING MULTIPLE JOINTS: ICD-10-CM

## 2021-07-12 DIAGNOSIS — J45.30 MILD PERSISTENT ASTHMA WITHOUT COMPLICATION: ICD-10-CM

## 2021-07-12 DIAGNOSIS — Z11.4 SCREENING FOR HIV (HUMAN IMMUNODEFICIENCY VIRUS): ICD-10-CM

## 2021-07-12 DIAGNOSIS — Z13.0 SCREENING FOR DEFICIENCY ANEMIA: ICD-10-CM

## 2021-07-12 DIAGNOSIS — Z23 ENCOUNTER FOR IMMUNIZATION: ICD-10-CM

## 2021-07-12 DIAGNOSIS — E66.01 CLASS 3 SEVERE OBESITY WITH SERIOUS COMORBIDITY AND BODY MASS INDEX (BMI) OF 45.0 TO 49.9 IN ADULT, UNSPECIFIED OBESITY TYPE (HCC): ICD-10-CM

## 2021-07-12 DIAGNOSIS — G47.33 OSA ON CPAP: ICD-10-CM

## 2021-07-12 DIAGNOSIS — Z00.00 ENCOUNTER FOR ANNUAL PHYSICAL EXAM: Primary | ICD-10-CM

## 2021-07-12 DIAGNOSIS — Z12.4 SCREENING FOR CERVICAL CANCER: ICD-10-CM

## 2021-07-12 PROCEDURE — 3008F BODY MASS INDEX DOCD: CPT | Performed by: NURSE PRACTITIONER

## 2021-07-12 PROCEDURE — 99396 PREV VISIT EST AGE 40-64: CPT | Performed by: NURSE PRACTITIONER

## 2021-07-12 PROCEDURE — 1036F TOBACCO NON-USER: CPT | Performed by: NURSE PRACTITIONER

## 2021-07-12 NOTE — PROGRESS NOTES
Assessment/Plan:      Diagnoses and all orders for this visit:    Encounter for annual physical exam    Type 2 diabetes mellitus with hyperglycemia, without long-term current use of insulin ([facility] )  -     Comprehensive metabolic panel; Future  -     Hemoglobin A1C; Future  -     Insulin, fasting; Future    Need for hepatitis C screening test  -     Hepatitis C Antibody ([facility]); Future    Encounter for immunization    Screening for HIV (human immunodeficiency virus)  -     HIV 1/2 Antigen/Antibody (4th Generation) w Reflex [facility]; Future  -     CBC and differential; Future    Screening for cervical cancer  -     Ambulatory referral to Obstetrics / Gynecology; Future    Mild persistent asthma without complication    Lumbar radiculopathy    Primary osteoarthritis involving multiple joints    Class 3 severe obesity with serious comorbidity and body mass index (BMI) of 45 0 to 49 9 in adult, unspecified obesity type ([facility] )    Screening for deficiency anemia  -     CBC and differential; Future    Screening for hyperlipidemia  -     Lipid panel; Future    Screening for thyroid disorder  -     TSH, 3rd generation; Future    Vitamin B12 deficiency  -     Vitamin B12; Future    Vitamin D deficiency  -     Vitamin D 25 hydroxy; Future    VICTOR HUGO on CPAP  -     Ambulatory referral to Sleep Medicine; Future    Other orders  -     Cancel: PNEUMOCOCCAL POLYSACCHARIDE VACCINE 23-VALENT =>1YO SQ IM  -     Cancel: TDAP VACCINE GREATER THAN OR EQUAL TO 6YO IM          Subjective:     Patient ID: [id number] Mireles is a 50 y o  female  Patient presents to office for annual physical exam   Complete medical history and medications reviewed with patient and tolerating all medications well without any problems  Patient is requesting STS forms to be completed for transportation to appointments  Patient continues to be followed by Pain Medicine for Lumbar Facet Ablation which she is scheduled for a follow up appointment    Patient thinks she may need a new C-Pap machine for her VICTOR HUGO due to hers is 6years old and does not feel it is working properly  Denies any new problems or concerns at the present time  Review of Systems    GENERAL:  Feels well, denies any significant changes in weight without trying  SKIN:  Denies rashes, lesions, opened areas, wounds, change in moles or any other skin changes  HEENT:  Denies any head injury or headaches  Negative blurred vision, floaters, spots before eyes, infections, or other vision problems  Negative significant changes in vision or hearing  Negative tinnitus, vertigo, or infections  Negative hay fever, sinus trouble, nasal discharge, bloody noses, or problems with smell  Negative sore throat, bleeding gums, ulcers, or sores  NECK:  Denies lumps, goiter, pain, swollen glands, or lymphadenopathy  BREASTS:  Denies lumps, pain, nipple discharge, swelling, redness, or any other changes  RESPIRATORY:  Denies cough, wheezing, shortness of breath, dyspnea, or orthopnea  Hx VICTOR HUGO using C-Pap but feels it is not working properly  CARDIOVASCULAR:  Denies chest pain or palpitations  GASTROINTESTINAL:  Appetite good, denies nausea, vomiting, or indigestion  Bowel movements normal occurring about once daily or every other day  URINARY:  Denies frequency, urgency, incontinence, dysuria, hematuria, nocturia, or recent flank pain  GENITAL:  Denies vaginal discharge, pelvic infection, lesions, ulcers, or pain  Negative dyspareunia or abnormal vaginal bleeding  PERIPHERAL VASCULAR:  Denies varicosities, swelling, skin changes, or pain  MUSCULOSKELETAL:  Continues with chronic low back pain due to Lumbar Facet Arthropathy and followed by Pain Mgt  Negative problems with mobility or movement  PSYCHIATRIC:  Denies problems with depression, anxiety, anger, or other psychiatric symptoms  NEUROLOGIC:  Denies fainting, dizziness, memory problems, seizures, tingling, motor or sensory loss     HEMATOLOGIC: Denies easy bruising, bleeding, or anemia  ENDOCRINE:  Denies thyroid problems, temperature intolerance, excessive sweating, or other endocrine symptoms  Objective:     Physical Exam  Vitals and nursing note reviewed  GENERAL:  Appears well nourished, well groomed, in no acute distress  SKIN:  Palms warm, dry, color good  Nails without clubbing or cyanosis  No lesions, ulcerations, or wounds  HEAD:  Hair is average texture  Scalp without lesions, normocephalic, and atraumatic  EYES:  Visual fields full by confrontation  Conjunctiva pink, sclera white  EARS:  B/L ear canals clear  B/L TMs clear with + light reflex  NOSE: Mucosa pink, moist, septum midline  Negative sinus tenderness  B/L turbinates pink, moist, non-edematous without exudate  MOUTH:  Oral mucosa pink  Pharynx pink, moist, without swelling, redness, or exudate  Dentition ok  Tongue midline  NECK:  Supple, trachea midline, Negative thyromegaly, lymphadenopathy, or swollen glands  LYMPH NODES:  Negative enlargement of neck, axillary, epitrochlear, or inguinal nodes  THORAX/LUNGS  Thorax symmetric with good excursion  Lungs resonant  Breath sounds vesicular with no added sounds  Diaphragm descends within normal limits  CARDIOVASCULAR:  Carotid upstrokes brisk and without bruits  Apical impulse discrete and tapping, barely palpable in the 5th ICS/MCL  Normal S1 and Normal S2, Negative S3 or S4  Negative murmurs, thrills, lifts, or heaves  ABDOMEN:  Protuberant, bowel sounds normal active x 4 quadrants  Negative tenderness  Negative masses  Negative hepatomegaly  Negative splenomegaly  Negative costovertebral tenderness  EXTREMITIES:  Warm, calves supple, non-tender, negative for edema  Negative stasis pigmentation or ulcers  +2 pulses throughout  MUSCULOSKELETAL:  Negative joint deformities      Good range of motion in hands, wrists, elbows, shoulders, spine, hips, knees, and ankles  NEUROLOGICAL:  Mental status:  Awake, alert, and oriented to person, place, time, and event  Normal thought processes  BMI Counseling: Body mass index is 47 55 kg/m²  The BMI is above normal  Nutrition recommendations include decreasing portion sizes, encouraging healthy choices of fruits and vegetables, decreasing fast food intake, consuming healthier snacks, limiting drinks that contain sugar, moderation in carbohydrate intake, increasing intake of lean protein, reducing intake of saturated and trans fat and reducing intake of cholesterol  Exercise recommendations include exercising 3-5 times per week  No pharmacotherapy was ordered  Patient referred to PCP due to patient being overweight

## 2021-07-12 NOTE — PATIENT INSTRUCTIONS
Wellness Visit for Adults   WHAT YOU NEED TO KNOW:   What is a wellness visit? A wellness visit is when you see your healthcare provider to get screened for health problems  Your healthcare provider will also give you advice on how to stay healthy  Write down your questions so you remember to ask them  Ask your healthcare provider how often you should have a wellness visit  What happens at a wellness visit? Your healthcare provider will ask about your health, and your family history of health problems  This includes high blood pressure, heart disease, and cancer  He or she will ask if you have symptoms that concern you, if you smoke, and about your mood  You may also be asked about your intake of medicines, supplements, food, and alcohol  Any of the following may be done:  · Your weight  will be checked  Your height may also be checked so your body mass index (BMI) can be calculated  Your BMI shows if you are at a healthy weight  · Your blood pressure  and heart rate will be checked  Your temperature may also be checked  · Blood and urine tests  may be done  Blood tests may be done to check your cholesterol levels  Abnormal cholesterol levels increase your risk for heart disease and stroke  You may also need a blood or urine test to check for diabetes if you are at increased risk  Urine tests may be done to look for signs of an infection or kidney disease  · A physical exam  includes checking your heartbeat and lungs with a stethoscope  Your healthcare provider may also check your skin to look for sun damage  · Screening tests  may be recommended  A screening test is done to check for diseases that may not cause symptoms  The screening tests you may need depend on your age, gender, family history, and lifestyle habits  For example, colorectal screening may be recommended if you are 48years old or older  What screening tests do I need if I am a woman?    · A Pap smear  is used to screen for cervical cancer  Pap smears are usually done every 3 to 5 years depending on your age  You may need them more often if you have had abnormal Pap smear test results in the past  Ask your healthcare provider how often you should have a Pap smear  · A mammogram  is an x-ray of your breasts to screen for breast cancer  Experts recommend mammograms every 2 years starting at age 48 years  You may need a mammogram at age 52 years or younger if you have an increased risk for breast cancer  Talk to your healthcare provider about when you should start having mammograms and how often you need them  What vaccines might I need? · Get an influenza vaccine  every year  The influenza vaccine protects you from the flu  Several types of viruses cause the flu  The viruses change over time, so new vaccines are made each year  · Get a tetanus-diphtheria (Td) booster vaccine  every 10 years  This vaccine protects you against tetanus and diphtheria  Tetanus is a severe infection that may cause painful muscle spasms and lockjaw  Diphtheria is a severe bacterial infection that causes a thick covering in the back of your mouth and throat  · Get a human papillomavirus (HPV) vaccine  if you are female and aged 23 to 32 or male 23 to 24 and never received it  This vaccine protects you from HPV infection  HPV is the most common infection spread by sexual contact  HPV may also cause vaginal, penile, and anal cancers  · Get a pneumococcal vaccine  if you are aged 72 years or older  The pneumococcal vaccine is an injection given to protect you from pneumococcal disease  Pneumococcal disease is an infection caused by pneumococcal bacteria  The infection may cause pneumonia, meningitis, or an ear infection  · Get a shingles vaccine  if you are 60 or older, even if you have had shingles before  The shingles vaccine is an injection to protect you from the varicella-zoster virus  This is the same virus that causes chickenpox   Shingles is a painful rash that develops in people who had chickenpox or have been exposed to the virus  How can I eat healthy? My Plate is a model for planning healthy meals  It shows the types and amounts of foods that should go on your plate  Fruits and vegetables make up about half of your plate, and grains and protein make up the other half  A serving of dairy is included on the side of your plate  The amount of calories and serving sizes you need depends on your age, gender, weight, and height  Examples of healthy foods are listed below:  · Eat a variety of vegetables  such as dark green, red, and orange vegetables  You can also include canned vegetables low in sodium (salt) and frozen vegetables without added butter or sauces  · Eat a variety of fresh fruits , canned fruit in 100% juice, frozen fruit, and dried fruit  · Include whole grains  At least half of the grains you eat should be whole grains  Examples include whole-wheat bread, wheat pasta, brown rice, and whole-grain cereals such as oatmeal     · Eat a variety of protein foods such as seafood (fish and shellfish), lean meat, and poultry without skin (turkey and chicken)  Examples of lean meats include pork leg, shoulder, or tenderloin, and beef round, sirloin, tenderloin, and extra lean ground beef  Other protein foods include eggs and egg substitutes, beans, peas, soy products, nuts, and seeds  · Choose low-fat dairy products such as skim or 1% milk or low-fat yogurt, cheese, and cottage cheese  · Limit unhealthy fats  such as butter, hard margarine, and shortening  How much exercise do I need? Exercise at least 30 minutes per day on most days of the week  Some examples of exercise include walking, biking, dancing, and swimming  You can also fit in more physical activity by taking the stairs instead of the elevator or parking farther away from stores  Include muscle strengthening activities 2 days each week   Regular exercise provides many health benefits  It helps you manage your weight, and decreases your risk for type 2 diabetes, heart disease, stroke, and high blood pressure  Exercise can also help improve your mood  Ask your healthcare provider about the best exercise plan for you  What are some general health and safety guidelines I should follow? · Do not smoke  Nicotine and other chemicals in cigarettes and cigars can cause lung damage  Ask your healthcare provider for information if you currently smoke and need help to quit  E-cigarettes or smokeless tobacco still contain nicotine  Talk to your healthcare provider before you use these products  · Limit alcohol  A drink of alcohol is 12 ounces of beer, 5 ounces of wine, or 1½ ounces of liquor  · Lose weight, if needed  Being overweight increases your risk of certain health conditions  These include heart disease, high blood pressure, type 2 diabetes, and certain types of cancer  · Protect your skin  Do not sunbathe or use tanning beds  Use sunscreen with a SPF 15 or higher  Apply sunscreen at least 15 minutes before you go outside  Reapply sunscreen every 2 hours  Wear protective clothing, hats, and sunglasses when you are outside  · Drive safely  Always wear your seatbelt  Make sure everyone in your car wears a seatbelt  A seatbelt can save your life if you are in an accident  Do not use your cell phone when you are driving  This could distract you and cause an accident  Pull over if you need to make a call or send a text message  · Practice safe sex  Use latex condoms if are sexually active and have more than one partner  Your healthcare provider may recommend screening tests for sexually transmitted infections (STIs)  · Wear helmets, lifejackets, and protective gear  Always wear a helmet when you ride a bike or motorcycle, go skiing, or play sports that could cause a head injury  Wear protective equipment when you play sports   Wear a lifejacket when you are on a boat or doing water sports  CARE AGREEMENT:   You have the right to help plan your care  Learn about your health condition and how it may be treated  Discuss treatment options with your healthcare providers to decide what care you want to receive  You always have the right to refuse treatment  The above information is an  only  It is not intended as medical advice for individual conditions or treatments  Talk to your doctor, nurse or pharmacist before following any medical regimen to see if it is safe and effective for you  © Copyright [address] Information is for End User's use only and may not be sold, redistributed or otherwise used for commercial purposes   All illustrations and images included in CareNotes® are the copyrighted property of [employer]  or [address] [place] [address]

## 2021-07-13 ENCOUNTER — TELEMEDICINE (OUTPATIENT)
Dept: BEHAVIORAL/MENTAL HEALTH CLINIC | Facility: CLINIC | Age: 46
End: 2021-07-13
Payer: COMMERCIAL

## 2021-07-13 DIAGNOSIS — F33.9 MAJOR DEPRESSION, RECURRENT, CHRONIC (HCC): Primary | ICD-10-CM

## 2021-07-13 PROCEDURE — T1015 CLINIC SERVICE: HCPCS | Performed by: SOCIAL WORKER

## 2021-07-13 NOTE — PSYCH
Psychotherapy Provided: Individual Psychotherapy 30 minutes 12:00 Pm to 12:30 Pm          Goals addressed in session:(Long Term Goal Number One) The Client reported that she has been having continue problems with her back due to basic housing care  During the session we explored the client's depression and the effects on her different relationships and environments  It is hoped that by addressing her weaknesses this will act as a preventive measure against the possible need for higher level of care and services  Interventions: Supportive Therapy, Strengths Therapy,  and Cognitive Behavioral Therapy where the treatment modalities utilized during the session  Assessment and Plan: The Client presented a depressed anxious mood that was congruent in effect  She was alert, goal directed and participated with prompts during the session  The Client was oriented to person, place, time, situation, and reported no suicidal/homicidal ideation, plan, or intent  As team we explored the client's depression and her continued isolation due to her physical and mental health issues  " My physical health is causing my depression" During the session we explored her coping skills with the Client reporting feeling calmer  Next scheduled appointment set for 2 weeks  As her home commitment the Client will practice her coping skills when presented with anxiety and or depression  Pain:      PSYCH MENTAL STATUS PAIN :  5 as reported by the Client     PHYSICAL PAIN SCALE NUMBERS: 8 as reported by the Client     Current suicide risk : Low/Phone number for [facility] was given  to the Client [phone]  The Client was given phone number for the [facility] [phone]  Behavioral Health Treatment Plan [facility]: Diagnosis and Treatment Plan explained to Cristy Garcia, Cristy Garcia  relates understanding diagnosis and is agreeable to Treatment Plan  Yes      Virtual Regular Visit    Verification of patient location:    Patient is currently located in the state of [place]  Patient is currently located in a state in which I am licensed      Assessment/Plan:    Problem List Items Addressed This Visit        Other    Major depression, recurrent, chronic ([facility] [address] ) - Primary          Goals addressed in session: Goal 1          Reason for visit is   Chief Complaint   Patient presents with    Virtual Regular Visit        Encounter provider YONATAN Crowe    Provider located at [address] [place] [id number]      Recent Visits  Date Type Provider Dept   07/12/21 Office Visit [facility]   Showing recent visits within past 7 days and meeting all other requirements  Future Appointments  No visits were found meeting these conditions  Showing future appointments within next 150 days and meeting all other requirements       The patient was identified by name and date of birth  Kwaku Mireles was informed that this is a telemedicine visit and that the visit is being conducted through [employer] and patient was informed that this is a secure, HIPAA-compliant platform  She agrees to proceed     My office door was closed  No one else was in the room  She acknowledged consent and understanding of privacy and security of the video platform  The patient has agreed to participate and understands they can discontinue the visit at any time  Patient is aware this is a billable service  Subjective  Kwaku Mireles is a 50 y o  female , who was [facility] for [facility] mental [facility] therapy          HPI     Past Medical History:   Diagnosis Date    Allergic     Seasonal Allergies    Alopecia of scalp     Treated with Proscar    Asthma     Chronic pain disorder     back, bilat knees, bilat hips    CPAP (continuous positive airway pressure) dependence     Depression     Diabetes mellitus (HCC)     Fibromyalgia, primary     GERD (gastroesophageal reflux disease)     Hip fx, left, closed, with routine healing, subsequent encounter 05/2017    Hypertension     Insomnia     Irregular heartbeat     Obesity     VICTOR HUGO on CPAP     Shortness of breath     Sleep apnea        Past Surgical History:   Procedure Laterality Date    CARPAL TUNNEL RELEASE Left     LUMBAR EPIDURAL INJECTION      NERVE BLOCK Bilateral 12/29/2020    Procedure: L2 L3 L4 L5 MEDIAL BRANCH BLOCK #1;  Surgeon: Laurent Colin MD;  [facility];  Service: Pain Management     NERVE BLOCK Bilateral 1/19/2021    Procedure: L2 L3 L4 L5 MEDIAL BRANCH BLOCK #2;  Surgeon: Laurent Colin MD;  [facility];  Service: Pain Management     RADIOFREQUENCY ABLATION Right 2/25/2021    Procedure: L2 L3 L4 L5 RADIO FREQUENCY ABLATION right side;  Surgeon: Laurent Colin MD;  [facility];  Service: Pain Management     RADIOFREQUENCY ABLATION Left 3/16/2021    Procedure: L2 L3 L4 L5 RADIO FREQUENCY ABLATION;  Surgeon: Laurent Colin MD;  [facility];  Service: Pain Management     UPPER GASTROINTESTINAL ENDOSCOPY         Current Outpatient Medications   Medication Sig Dispense Refill    albuterol (Ventolin HFA) 90 mcg/act inhaler Inhale 2 puffs every 6 (six) hours as needed for wheezing 1 Inhaler 5    celecoxib (CeleBREX) 100 mg capsule Take 1 capsule (100 mg total) by mouth 2 (two) times a day 60 capsule 5    cyclobenzaprine (FLEXERIL) 10 mg tablet TAKE 1 TABLET EVERY 12 HOURS FOR 7 DAYS THEN AS NEEDED FOR SPASMS OR PAIN 60 tablet 0    ergocalciferol (VITAMIN D2) 50,000 units Take 1 capsule (50,000 Units total) by mouth once a week 4 capsule 5    fluticasone-salmeterol (ADVAIR, WIXELA) 250-50 mcg/dose inhaler Inhale 1 puff 2 (two) times a day Rinse mouth after use   60 each 5    glipiZIDE (GLUCOTROL) 5 mg tablet Take 1 tablet (5 mg total) by mouth daily with breakfast 30 tablet 5    glucose blood test strip Use as instructed 100 each 5    Lancets (ONETOUCH ULTRASOFT) lancets Use as instructed 100 each 5    lisinopril (ZESTRIL) 10 mg tablet Take 1 tablet (10 mg total) by mouth daily 90 tablet 1    metFORMIN (GLUCOPHAGE) 1000 MG tablet Take 1 tablet (1,000 mg total) by mouth 2 (two) times a day with meals 180 tablet 1    sertraline (ZOLOFT) 50 mg tablet Take 1 tablet (50 mg total) by mouth daily at bedtime 30 tablet 0     No current facility-administered medications for this visit  Allergies   Allergen Reactions    Tdap [Tetanus-Diphth-Acell Pertussis] Rash       Review of Systems    Video Exam    There were no vitals filed for this visit  Physical Exam     I spent 30 minutes directly with the patient during this visit    [address] verbally agrees to participate in [employer]  Pt is aware that [employer] could be limited without vital signs or the ability to perform a full hands-on physical exam  Cristy Garcia understands she or the provider may request at any time to terminate the video visit and request the patient to seek care or treatment in person

## 2021-07-22 ENCOUNTER — APPOINTMENT (OUTPATIENT)
Dept: LAB | Facility: HOSPITAL | Age: 46
End: 2021-07-22
Payer: COMMERCIAL

## 2021-07-22 DIAGNOSIS — Z13.0 SCREENING FOR DEFICIENCY ANEMIA: ICD-10-CM

## 2021-07-22 DIAGNOSIS — Z13.220 SCREENING FOR HYPERLIPIDEMIA: ICD-10-CM

## 2021-07-22 DIAGNOSIS — E11.65 TYPE 2 DIABETES MELLITUS WITH HYPERGLYCEMIA, WITHOUT LONG-TERM CURRENT USE OF INSULIN (HCC): ICD-10-CM

## 2021-07-22 DIAGNOSIS — Z11.59 NEED FOR HEPATITIS C SCREENING TEST: ICD-10-CM

## 2021-07-22 DIAGNOSIS — E55.9 VITAMIN D DEFICIENCY: ICD-10-CM

## 2021-07-22 DIAGNOSIS — Z13.29 SCREENING FOR THYROID DISORDER: ICD-10-CM

## 2021-07-22 DIAGNOSIS — Z11.4 SCREENING FOR HIV (HUMAN IMMUNODEFICIENCY VIRUS): ICD-10-CM

## 2021-07-22 DIAGNOSIS — E53.8 VITAMIN B12 DEFICIENCY: ICD-10-CM

## 2021-07-22 LAB
25(OH)D3 SERPL-MCNC: 56.4 NG/ML (ref 30–100)
ALBUMIN SERPL BCP-MCNC: 3.5 G/DL (ref 3.5–5)
ALP SERPL-CCNC: 44 U/L (ref 46–116)
ALT SERPL W P-5'-P-CCNC: 74 U/L (ref 12–78)
ANION GAP SERPL CALCULATED.3IONS-SCNC: 11 MMOL/L (ref 4–13)
AST SERPL W P-5'-P-CCNC: 31 U/L (ref 5–45)
BASOPHILS # BLD AUTO: 0.09 THOUSANDS/ΜL (ref 0–0.1)
BASOPHILS NFR BLD AUTO: 1 % (ref 0–1)
BILIRUB SERPL-MCNC: 0.48 MG/DL (ref 0.2–1)
BUN SERPL-MCNC: 17 MG/DL (ref 5–25)
CALCIUM SERPL-MCNC: 8.8 MG/DL (ref 8.3–10.1)
CHLORIDE SERPL-SCNC: 103 MMOL/L (ref 100–108)
CHOLEST SERPL-MCNC: 180 MG/DL (ref 50–200)
CO2 SERPL-SCNC: 23 MMOL/L (ref 21–32)
CREAT SERPL-MCNC: 0.77 MG/DL (ref 0.6–1.3)
EOSINOPHIL # BLD AUTO: 0.42 THOUSAND/ΜL (ref 0–0.61)
EOSINOPHIL NFR BLD AUTO: 4 % (ref 0–6)
ERYTHROCYTE [DISTWIDTH] IN BLOOD BY AUTOMATED COUNT: 12.4 % (ref 11.6–15.1)
EST. AVERAGE GLUCOSE BLD GHB EST-MCNC: 146 MG/DL
GFR SERPL CREATININE-BSD FRML MDRD: 92 ML/MIN/1.73SQ M
GLUCOSE P FAST SERPL-MCNC: 182 MG/DL (ref 65–99)
HBA1C MFR BLD: 6.7 %
HCT VFR BLD AUTO: 44.1 % (ref 34.8–46.1)
HCV AB SER QL: NORMAL
HDLC SERPL-MCNC: 45 MG/DL
HGB BLD-MCNC: 14.9 G/DL (ref 11.5–15.4)
IMM GRANULOCYTES # BLD AUTO: 0.09 THOUSAND/UL (ref 0–0.2)
IMM GRANULOCYTES NFR BLD AUTO: 1 % (ref 0–2)
INSULIN SERPL-ACNC: 81.5 MU/L (ref 3–25)
LDLC SERPL CALC-MCNC: 96 MG/DL (ref 0–100)
LYMPHOCYTES # BLD AUTO: 2.6 THOUSANDS/ΜL (ref 0.6–4.47)
LYMPHOCYTES NFR BLD AUTO: 26 % (ref 14–44)
MCH RBC QN AUTO: 30.2 PG (ref 26.8–34.3)
MCHC RBC AUTO-ENTMCNC: 33.8 G/DL (ref 31.4–37.4)
MCV RBC AUTO: 90 FL (ref 82–98)
MONOCYTES # BLD AUTO: 0.48 THOUSAND/ΜL (ref 0.17–1.22)
MONOCYTES NFR BLD AUTO: 5 % (ref 4–12)
NEUTROPHILS # BLD AUTO: 6.15 THOUSANDS/ΜL (ref 1.85–7.62)
NEUTS SEG NFR BLD AUTO: 63 % (ref 43–75)
NONHDLC SERPL-MCNC: 135 MG/DL
NRBC BLD AUTO-RTO: 0 /100 WBCS
PLATELET # BLD AUTO: 245 THOUSANDS/UL (ref 149–390)
PMV BLD AUTO: 9.1 FL (ref 8.9–12.7)
POTASSIUM SERPL-SCNC: 3.8 MMOL/L (ref 3.5–5.3)
PROT SERPL-MCNC: 7.2 G/DL (ref 6.4–8.2)
RBC # BLD AUTO: 4.93 MILLION/UL (ref 3.81–5.12)
SODIUM SERPL-SCNC: 137 MMOL/L (ref 136–145)
TRIGL SERPL-MCNC: 193 MG/DL
TSH SERPL DL<=0.05 MIU/L-ACNC: 0.86 UIU/ML (ref 0.36–3.74)
VIT B12 SERPL-MCNC: 956 PG/ML (ref 100–900)
WBC # BLD AUTO: 9.83 THOUSAND/UL (ref 4.31–10.16)

## 2021-07-22 PROCEDURE — 82607 VITAMIN B-12: CPT

## 2021-07-22 PROCEDURE — 80061 LIPID PANEL: CPT

## 2021-07-22 PROCEDURE — 83036 HEMOGLOBIN GLYCOSYLATED A1C: CPT

## 2021-07-22 PROCEDURE — 84443 ASSAY THYROID STIM HORMONE: CPT

## 2021-07-22 PROCEDURE — 82306 VITAMIN D 25 HYDROXY: CPT

## 2021-07-22 PROCEDURE — 87389 HIV-1 AG W/HIV-1&-2 AB AG IA: CPT

## 2021-07-22 PROCEDURE — 86803 HEPATITIS C AB TEST: CPT

## 2021-07-22 PROCEDURE — 3044F HG A1C LEVEL LT 7.0%: CPT | Performed by: NURSE PRACTITIONER

## 2021-07-22 PROCEDURE — 83525 ASSAY OF INSULIN: CPT

## 2021-07-22 PROCEDURE — 36415 COLL VENOUS BLD VENIPUNCTURE: CPT

## 2021-07-22 PROCEDURE — 85025 COMPLETE CBC W/AUTO DIFF WBC: CPT

## 2021-07-22 PROCEDURE — 80053 COMPREHEN METABOLIC PANEL: CPT

## 2021-07-23 LAB — HIV 1+2 AB+HIV1 P24 AG SERPL QL IA: NORMAL

## 2021-07-24 ENCOUNTER — TELEMEDICINE (OUTPATIENT)
Dept: BEHAVIORAL/MENTAL HEALTH CLINIC | Facility: CLINIC | Age: 46
End: 2021-07-24
Payer: COMMERCIAL

## 2021-07-24 DIAGNOSIS — F33.9 MAJOR DEPRESSION, RECURRENT, CHRONIC (HCC): Primary | ICD-10-CM

## 2021-07-24 DIAGNOSIS — F33.1 MAJOR DEPRESSIVE DISORDER, RECURRENT EPISODE, MODERATE (HCC): ICD-10-CM

## 2021-07-24 PROCEDURE — T1015 CLINIC SERVICE: HCPCS | Performed by: SOCIAL WORKER

## 2021-07-24 NOTE — PSYCH
Psychotherapy Provided: Individual Psychotherapy 30  minutes 2:03 Pm to 2:30 PM          Goals addressed in session:(Long Term Goal Number One) The Client reported continued physical pain which has continued to effect her emotions and feelings  " my anxiety and depression is bad" During the session we explored her anxiety and depression and the effects on her different relationships and environments  It is hoped that by addressing her weaknesses this will act as a preventive measure against the possible need for higher level of care and services  Interventions: Supportive Therapy, Strengths Therapy,  and Cognitive Behavioral Therapy where the treatment modalities utilized during the session  Assessment and Plan: The client presented a depressed anxious mood that was congruent in effect  She was alert, goal directed and participated with prompts during the session  The Client was oriented to person, place, time, situation, and reported no suicidal/homicidal ideation, plan, or intent  As team we explored and processed the effects of the client's anxiety and depression and the effects on her different relationships and environments  The client was able to verbalize that she has seen an increase in her anxiety and depression  Which has led to an increase in her anger  Coping skills were reviewed do address her anger  As her home commitment the Client will practice her coping skills when presented with anxiety and or depression  Next appointment was set for 2 weeks    Pain:      PSYCH MENTAL STATUS PAIN : 4 as reported by the Client     PHYSICAL PAIN SCALE NUMBERS: 8 as reported by the Client due to back pain    Current suicide risk : Low/Phone number for [facility] was given  to the Client [phone]  The Client was given phone number for the [facility] [phone]           Behavioral Health Treatment Plan [facility]: Diagnosis and Treatment Plan explained to [facility] Coreybensonakshat Delmy  relates understanding diagnosis and is agreeable to Treatment Plan  Yes  Virtual Regular Visit    Verification of patient location:    Patient is currently located in the state of [place]  Patient is currently located in a state in which I am licensed      Assessment/Plan:    Problem List Items Addressed This Visit        Other    Major depression, recurrent, chronic ([facility] [address] ) - Primary      Other Visit Diagnoses     Major depressive disorder, recurrent episode, moderate ([facility] [address] )              Goals addressed in session: Goal 1          Reason for visit is   Chief Complaint   Patient presents with    Virtual Regular Visit        Encounter provider YONATAN Muñoz    Provider located at [address] [place] [phone]      Recent Visits  No visits were found meeting these conditions  Showing recent visits within past 7 days and meeting all other requirements  Future Appointments  No visits were found meeting these conditions  Showing future appointments within next 150 days and meeting all other requirements       The patient was identified by name and date of birth  Sachi Andrews was informed that this is a telemedicine visit and that the visit is being conducted [facility] and patient was informed that this is a secure, HIPAA-compliant platform  She agrees to proceed     My office door was closed  No one else was in the room  She acknowledged consent and understanding of privacy and security of the video platform  The patient has agreed to participate and understands they can discontinue the visit at any time  Patient is aware this is a billable service  Subjective  Sachi Andrews is a 50 y o  female , who was seen for individual mental health therapy         HPI     Past Medical History:   Diagnosis Date    Allergic     Seasonal Allergies    Alopecia of scalp     Treated with Proscar    Asthma     Chronic pain disorder     back, bilat knees, bilat hips    CPAP (continuous positive airway pressure) dependence     Depression     Diabetes mellitus (HCC)     Fibromyalgia, primary     GERD (gastroesophageal reflux disease)     Hip fx, left, closed, with routine healing, subsequent encounter 05/2017    Hypertension     Insomnia     Irregular heartbeat     Obesity     VICTOR HUGO on CPAP     Shortness of breath     Sleep apnea        Past Surgical History:   Procedure Laterality Date    CARPAL TUNNEL RELEASE Left     LUMBAR EPIDURAL INJECTION      NERVE BLOCK Bilateral 12/29/2020    Procedure: L2 L3 L4 L5 MEDIAL BRANCH BLOCK #1;  Surgeon: Lima Hendrix MD;  [facility];  Service: Pain Management     NERVE BLOCK Bilateral 1/19/2021    Procedure: L2 L3 L4 L5 MEDIAL BRANCH BLOCK #2;  Surgeon: Lima Hendrix MD;  [facility];  Service: Pain Management     RADIOFREQUENCY ABLATION Right 2/25/2021    Procedure: L2 L3 L4 L5 RADIO FREQUENCY ABLATION right side;  Surgeon: Lima Hendrix MD;  [facility];  Service: Pain Management     RADIOFREQUENCY ABLATION Left 3/16/2021    Procedure: L2 L3 L4 L5 RADIO FREQUENCY ABLATION;  Surgeon: Lima Hendrix MD;  [facility];  Service: Pain Management     UPPER GASTROINTESTINAL ENDOSCOPY         Current Outpatient Medications   Medication Sig Dispense Refill    albuterol (Ventolin HFA) 90 mcg/act inhaler Inhale 2 puffs every 6 (six) hours as needed for wheezing 1 Inhaler 5    celecoxib (CeleBREX) 100 mg capsule Take 1 capsule (100 mg total) by mouth 2 (two) times a day 60 capsule 5    cyclobenzaprine (FLEXERIL) 10 mg tablet TAKE 1 TABLET EVERY 12 HOURS FOR 7 DAYS THEN AS NEEDED FOR SPASMS OR PAIN 60 tablet 0    ergocalciferol (VITAMIN D2) 50,000 units Take 1 capsule (50,000 Units total) by mouth once a week 4 capsule 5    fluticasone-salmeterol (ADVAIR, WIXELA) 250-50 mcg/dose inhaler Inhale 1 puff 2 (two) times a day Rinse mouth after use  60 each 5    glipiZIDE (GLUCOTROL) 5 mg tablet Take 1 tablet (5 mg total) by mouth daily with breakfast 30 tablet 5    glucose blood test strip Use as instructed 100 each 5    Lancets (ONETOUCH ULTRASOFT) lancets Use as instructed 100 each 5    lisinopril (ZESTRIL) 10 mg tablet Take 1 tablet (10 mg total) by mouth daily 90 tablet 1    metFORMIN (GLUCOPHAGE) 1000 MG tablet Take 1 tablet (1,000 mg total) by mouth 2 (two) times a day with meals 180 tablet 1    sertraline (ZOLOFT) 50 mg tablet Take 1 tablet (50 mg total) by mouth daily at bedtime 30 tablet 0     No current facility-administered medications for this visit  Allergies   Allergen Reactions    Tdap [Tetanus-Diphth-Acell Pertussis] Rash       Review of Systems    Video Exam    There were no vitals filed for this visit  Physical Exam     I spent 30 minutes directly with the patient during this visit    [address] [employer] verbally agrees to participate in [employer]  Pt is aware that [employer] could be limited without vital signs or the ability to perform a full hands-on physical exam  Cristy Garcia understands she or the provider may request at any time to terminate the video visit and request the patient to seek care or treatment in person

## 2021-07-25 DIAGNOSIS — M15.9 PRIMARY OSTEOARTHRITIS INVOLVING MULTIPLE JOINTS: ICD-10-CM

## 2021-07-25 DIAGNOSIS — J45.30 MILD PERSISTENT ASTHMA WITHOUT COMPLICATION: ICD-10-CM

## 2021-07-25 DIAGNOSIS — E11.65 TYPE 2 DIABETES MELLITUS WITH HYPERGLYCEMIA, WITHOUT LONG-TERM CURRENT USE OF INSULIN (HCC): ICD-10-CM

## 2021-07-25 DIAGNOSIS — M79.7 FIBROMYALGIA: ICD-10-CM

## 2021-07-26 RX ORDER — LANCETS
EACH MISCELLANEOUS
Qty: 100 EACH | Refills: 0 | Status: SHIPPED | OUTPATIENT
Start: 2021-07-26 | End: 2021-12-06 | Stop reason: SDUPTHER

## 2021-07-26 RX ORDER — CELECOXIB 100 MG/1
100 CAPSULE ORAL 2 TIMES DAILY
Qty: 60 CAPSULE | Refills: 0 | Status: SHIPPED | OUTPATIENT
Start: 2021-07-26 | End: 2021-08-23 | Stop reason: SDUPTHER

## 2021-07-26 RX ORDER — ALBUTEROL SULFATE 90 UG/1
2 AEROSOL, METERED RESPIRATORY (INHALATION) EVERY 6 HOURS PRN
Qty: 18 G | Refills: 5 | Status: SHIPPED | OUTPATIENT
Start: 2021-07-26

## 2021-08-02 ENCOUNTER — TELEMEDICINE (OUTPATIENT)
Dept: BEHAVIORAL/MENTAL HEALTH CLINIC | Facility: CLINIC | Age: 46
End: 2021-08-02
Payer: COMMERCIAL

## 2021-08-02 DIAGNOSIS — F33.9 MAJOR DEPRESSION, RECURRENT, CHRONIC (HCC): Primary | ICD-10-CM

## 2021-08-02 PROCEDURE — T1015 CLINIC SERVICE: HCPCS | Performed by: SOCIAL WORKER

## 2021-08-02 NOTE — PSYCH
Psychotherapy Provided: Individual Psychotherapy 60  minutes 3:30 Pm to 4:25 Pm          Goals addressed in session:(Long Term Goal Number One) The Client reported that she has obtained a free lynette to get local [employer] stations  She reported continued anxiety and depression which is effecting her different relationships and environments  During the session we explored her self-esteem and the effects on her different relationships and environment  Is it hoped that by addressing her weaknesses this will act as a preventive measure against the possible need for higher level of care and services  Interventions: Supportive Therapy, Strengths Therapy,  and Cognitive Behavioral Therapy where the treatment modalities utilized during the session  Assessment and Plan: The client presented a depressed anxious mood that was congruent in effect  She was alert, goal directed and participated with prompts during the session  The Client was oriented to person, place, time, situation, and reported no suicidal/homicidal ideation, plan, or intent  As team we explored the Client's self-esteem and the effects on her mental health  The Client was able to see the connection between her negative self-esteem and her depression  Coping skills were reviewed during the session  Next scheduled  appointment was set for 2 weeks  Pain:      PSYCH MENTAL STATUS PAIN : 6 as reported by the Client     PHYSICAL PAIN SCALE NUMBERS: 7 as reported by the Client     Current suicide risk : Low/Phone number for [facility] was given  to the Client [phone]  The Client was given phone number for the [facility] [phone]  Behavioral Health Treatment Plan [facility]: Diagnosis and Treatment Plan explained to Cristy Garcia, Cristy Garcia  relates understanding diagnosis and is agreeable to Treatment Plan  Yes      Virtual Regular Visit    Verification of patient location:    Patient is located in the following state in which I hold an active license [place]      Assessment/Plan:    Problem List Items Addressed This Visit        Other    Major depression, recurrent, chronic ([facility] [address] ) - Primary          Goals addressed in session: Goal 1          Reason for visit is   Chief Complaint   Patient presents with    Virtual Regular Visit        Encounter provider YONATAN Vargas    Provider located at [address] [address]      Recent Visits  No visits were found meeting these conditions  Showing recent visits within past 7 days and meeting all other requirements  Future Appointments  No visits were found meeting these conditions  Showing future appointments within next 150 days and meeting all other requirements       The patient was identified by name and date of birth  Dino Leong was informed that this is a telemedicine visit and that the visit is being conducted through[employer] and patient was informed that this is a secure, HIPAA-compliant platform  She agrees to proceed     My office door was closed  No one else was in the room  She acknowledged consent and understanding of privacy and security of the video platform  The patient has agreed to participate and understands they can discontinue the visit at any time  Patient is aware this is a billable service  Subjective  Dino Leong is a 50 y o  female , who was seen for individual mental health therapy          HPI     Past Medical History:   Diagnosis Date    Allergic     Seasonal Allergies    Alopecia of scalp     Treated with Proscar    Asthma     Chronic pain disorder     back, bilat knees, bilat hips    CPAP (continuous positive airway pressure) dependence     Depression     Diabetes mellitus (HCC)     Fibromyalgia, primary     GERD (gastroesophageal reflux disease)     Hip fx, left, closed, with routine healing, subsequent encounter 05/2017    Hypertension     Insomnia     Irregular heartbeat     Obesity     VICTOR HUGO on CPAP     Shortness of breath     Sleep apnea        Past Surgical History:   Procedure Laterality Date    CARPAL TUNNEL RELEASE Left     LUMBAR EPIDURAL INJECTION      NERVE BLOCK Bilateral 12/29/2020    Procedure: L2 L3 L4 L5 MEDIAL BRANCH BLOCK #1;  Surgeon: Devorah Zambrano MD;  [facility];  Service: Pain Management     NERVE BLOCK Bilateral 1/19/2021    Procedure: L2 L3 L4 L5 MEDIAL BRANCH BLOCK #2;  Surgeon: Devorah Zambrano MD;  [facility];  Service: Pain Management     RADIOFREQUENCY ABLATION Right 2/25/2021    Procedure: L2 L3 L4 L5 RADIO FREQUENCY ABLATION right side;  Surgeon: Devorah Zambrano MD;  [facility];  Service: Pain Management     RADIOFREQUENCY ABLATION Left 3/16/2021    Procedure: L2 L3 L4 L5 RADIO FREQUENCY ABLATION;  Surgeon: Devorah Zambrano MD;  [facility];  Service: Pain Management     UPPER GASTROINTESTINAL ENDOSCOPY         Current Outpatient Medications   Medication Sig Dispense Refill    albuterol (Ventolin HFA) 90 mcg/act inhaler Inhale 2 puffs every 6 (six) hours as needed for wheezing 18 g 5    celecoxib (CeleBREX) 100 mg capsule Take 1 capsule (100 mg total) by mouth 2 (two) times a day 60 capsule 0    cyclobenzaprine (FLEXERIL) 10 mg tablet TAKE 1 TABLET EVERY 12 HOURS FOR 7 DAYS THEN AS NEEDED FOR SPASMS OR PAIN 60 tablet 0    ergocalciferol (VITAMIN D2) 50,000 units Take 1 capsule (50,000 Units total) by mouth once a week 4 capsule 5    fluticasone-salmeterol (ADVAIR, WIXELA) 250-50 mcg/dose inhaler Inhale 1 puff 2 (two) times a day Rinse mouth after use   60 each 5    glipiZIDE (GLUCOTROL) 5 mg tablet Take 1 tablet (5 mg total) by mouth daily with breakfast 30 tablet 5    glucose blood test strip Use as instructed 100 each 0    Lancets (onetouch ultrasoft) lancets Use as instructed 100 each 0    lisinopril (ZESTRIL) 10 mg tablet Take 1 tablet (10 mg total) by mouth daily 90 tablet 1    metFORMIN (GLUCOPHAGE) 1000 MG tablet Take 1 tablet (1,000 mg total) by mouth 2 (two) times a day with meals 180 tablet 1    sertraline (ZOLOFT) 50 mg tablet Take 1 tablet (50 mg total) by mouth daily at bedtime 30 tablet 0     No current facility-administered medications for this visit  Allergies   Allergen Reactions    Tdap [Tetanus-Diphth-Acell Pertussis] Rash       Review of Systems    Video Exam    There were no vitals filed for this visit  Physical Exam     I spent 60 minutes directly with the patient during this visit    [facility] verbally agrees to participate in [employer]  Pt is aware that [employer] could be limited without vital signs or the ability to perform a full hands-on physical exam  Cristy Garcia understands she or the provider may request at any time to terminate the video visit and request the patient to seek care or treatment in person

## 2021-08-16 ENCOUNTER — TELEMEDICINE (OUTPATIENT)
Dept: BEHAVIORAL/MENTAL HEALTH CLINIC | Facility: CLINIC | Age: 46
End: 2021-08-16
Payer: COMMERCIAL

## 2021-08-16 DIAGNOSIS — F33.9 MAJOR DEPRESSION, RECURRENT, CHRONIC (HCC): Primary | ICD-10-CM

## 2021-08-16 PROCEDURE — T1015 CLINIC SERVICE: HCPCS | Performed by: SOCIAL WORKER

## 2021-08-16 NOTE — PSYCH
Psychotherapy Provided: Individual Psychotherapy 45  minutes It was my intent to perform this visit via video technology, but the patient was not able to do a video connection, so the visit was completed via audio telephone only  2:30 PM-3:16 Pm          Goals addressed in session: (Creation of Recovery treatment Plan) As team we created the Client's recovery treatment plan, and conducted the depression screening It is hoped that by addressing her weaknesses this act as a preventive measure against the possible need for higher level of care and services  Interventions: Supportive Therapy, Strengths Therapy,  and Cognitive Behavioral Therapy where the treatment modalities utilized during the session  Assessment and Plan: The Client presented an anxious mood that was congruent in effect  She was alert, goal directed and participated with prompts during the session  As team we created the Client's recovery treatment plan, which will cover the next 4 months or 12 visits  During the session we the depression screening PHQ-9 with the Client scoring in the moderate severe depression  As her home commitment the client will practice her coping skills when presented with anxiety and or depression  Next scheduled appointment was set for 2 weeks  Behavioral Health Treatment Plan [facility]: Diagnosis and Treatment Plan explained to Cristy Garcia, Cristy Garcia  relates understanding diagnosis and is agreeable to Treatment Plan  Yes    Virtual Brief Visit    Verification of patient location:    Patient is located in the following state in which I hold an active license [place]      Assessment/Plan:    Problem List Items Addressed This Visit        Other    Major depression, recurrent, chronic ([facility] [address] ) - Primary                Reason for visit is   Chief Complaint   Patient presents with    Virtual Brief Visit        Encounter provider YONATAN Lomeli    Provider located at [address] [place] [address] [facility] PSYCH  [address] [facility] [address]    Recent Visits  No visits were found meeting these conditions  Showing recent visits within past 7 days and meeting all other requirements  Today's Visits  Date Type Provider Dept   08/16/21 Telemedicine YONATAN [facility]   Showing today's visits and meeting all other requirements  Future Appointments  No visits were found meeting these conditions  Showing future appointments within next 150 days and meeting all other requirements       After connecting through telephone, the patient was identified by name and date of birth  Jose Angel Mandel was informed that this is a telemedicine visit and that the visit is being conducted through telephone  My office door was closed  No one else was in the room  She acknowledged consent and understanding of privacy and security of the platform  The patient has agreed to participate and understands she can discontinue the visit at any time  Patient is aware this is a billable service  Subjective    Jose Angel Mandel is a 50 y o  female , who was seen for individual mental health therapy     HPI     Past Medical History:   Diagnosis Date    Allergic     Seasonal Allergies    Alopecia of scalp     Treated with Proscar    Asthma     Chronic pain disorder     back, bilat knees, bilat hips    CPAP (continuous positive airway pressure) dependence     Depression     Diabetes mellitus (HCC)     Fibromyalgia, primary     GERD (gastroesophageal reflux disease)     Hip fx, left, closed, with routine healing, subsequent encounter 05/2017    Hypertension     Insomnia     Irregular heartbeat     Obesity     VICTOR HUGO on CPAP     Shortness of breath     Sleep apnea        Past Surgical History:   Procedure Laterality Date    CARPAL TUNNEL RELEASE Left     LUMBAR EPIDURAL INJECTION      NERVE BLOCK Bilateral 12/29/2020    Procedure: L2 L3 L4 L5 MEDIAL BRANCH BLOCK #1;  Surgeon: Chas Liu MD;  [facility];  Service: Pain Management     NERVE BLOCK Bilateral 1/19/2021    Procedure: L2 L3 L4 L5 MEDIAL BRANCH BLOCK #2;  Surgeon: Keya Cassidy MD;  [facility];  Service: Pain Management     RADIOFREQUENCY ABLATION Right 2/25/2021    Procedure: L2 L3 L4 L5 RADIO FREQUENCY ABLATION right side;  Surgeon: Keya Cassidy MD;  [facility];  Service: Pain Management     RADIOFREQUENCY ABLATION Left 3/16/2021    Procedure: L2 L3 L4 L5 RADIO FREQUENCY ABLATION;  Surgeon: Keya Cassidy MD;  [facility];  Service: Pain Management     UPPER GASTROINTESTINAL ENDOSCOPY         Current Outpatient Medications   Medication Sig Dispense Refill    albuterol (Ventolin HFA) 90 mcg/act inhaler Inhale 2 puffs every 6 (six) hours as needed for wheezing 18 g 5    celecoxib (CeleBREX) 100 mg capsule Take 1 capsule (100 mg total) by mouth 2 (two) times a day 60 capsule 0    cyclobenzaprine (FLEXERIL) 10 mg tablet TAKE 1 TABLET EVERY 12 HOURS FOR 7 DAYS THEN AS NEEDED FOR SPASMS OR PAIN 60 tablet 5    ergocalciferol (VITAMIN D2) 50,000 units Take 1 capsule (50,000 Units total) by mouth once a week 4 capsule 5    fluticasone-salmeterol (Advair) 250-50 mcg/dose inhaler Inhale 1 puff 2 (two) times a day Rinse mouth after use  60 blister 5    glipiZIDE (GLUCOTROL) 5 mg tablet Take 1 tablet (5 mg total) by mouth daily with breakfast 30 tablet 5    glucose blood test strip Use as instructed 100 each 0    Lancets (onetouch ultrasoft) lancets Use as instructed 100 each 0    lisinopril (ZESTRIL) 10 mg tablet Take 1 tablet (10 mg total) by mouth daily 90 tablet 1    metFORMIN (GLUCOPHAGE) 1000 MG tablet Take 1 tablet (1,000 mg total) by mouth 2 (two) times a day with meals 180 tablet 1    sertraline (ZOLOFT) 50 mg tablet Take 1 tablet (50 mg total) by mouth daily at bedtime 30 tablet 5     No current facility-administered medications for this visit          Allergies   Allergen Reactions    Tdap [Tetanus-Diphth-Acell Pertussis] Rash       Review of Systems    There were no vitals filed for this visit  I spent 45 minutes directly with the patient during this visit    [address] verbally agrees to participate in [employer]  Pt is aware that [employer] could be limited without vital signs or the ability to perform a full hands-on physical exam  Cristy Garcia understands she or the provider may request at any time to terminate the video visit and request the patient to seek care or treatment in person

## 2021-08-16 NOTE — PSYCH
Treatment Plan Tracking    4th Treatment Plan not completed within required time limits due to the client not being able to schedule appointment   Forrestkailee Briggss  1975         Date of Initial Treatment Plan:08/20/20  Date of Current Treatment Plan: 08/16/2021     Treatment Plan Number: 4th Treatment Plan     Strengths/Personal Resources for Self Care: The Client has supportive extended family of origin, and peer relationships  She is able to utilize community supports when needed  At the present time she is receiving her physical health care through [facility] in [place], and mental health therapy with this therapist Jessica Rai MSW LCSW     Diagnosis:   1  Major depressive disorder, recurrent episode, moderate (HCC)            Area of Needs: The Client has a long history of depression and anxiety, which is effecting her different relationships and environments  It is hoped that The Client will achieve or maintain maximum functional capacity in performing daily activizes, taking into account both the functional capacity of the individual and those functional capacities appropriate for the individuals of the same age  Reduce or ameliorate the physical mental, behavioral, or developmental effects of an illness, condition, injury or disability   Present treatment plan will cover 4 months or 12 visits which ever comes first                Long Term Goal 1: The Client's depression score on her PHQ-9 will decrease from 17 to 8 or less (score is down 2 points from the original screening)      Target Date: 08/20/2021  Completion Date:          Short Term Objectives for Goal 1: The Client will utilize her  coping skills 3 out of 5 when presented with depression       Long Term Goal 2: The Client will become more aware of her anxiety when presented 3 out of 5 times(emerging)     Target Date: 08/20/2021  Completion Date:      Short Term Objectives for Goal 2: The Client will utilize her coping skill 3 out of 5 times when presented with anxiety            Long Term Goal # 3:The Client will attend all of her medical appointments 100% of the time       Target Date: 08/20/21  Completion Date:            GOAL 1: Modality: The person(s) responsible for carrying out the plan is  The CLient and this therapist Jeremiah OSWALD LCS     GOAL 2: Modality: The person(s) responsible for carrying out the plan is  The CLient and this therapist Jeremiah OSWALD [facility]      GOAL 3: Modality: The person(s) responsible for carrying out the plan is  The [address] [facility]: Diagnosis and Treatment Plan explained to Cristy, Forrestpy relates understanding diagnosis and is agreeable to Treatment Plan          Client Comments : Please share your thoughts, feelings, need and/or experiences regarding your treatment plan:    The Client's short term treatment goals will be reviewed and or completed on or before 12/16/2021  __________________________________________________________________     __________________________________________________________________     __________________________________________________________________     __________________________________________________________________     _______________________________________                 Patient signature, Date Time: __________________________________________        Physician cosigner signature, Date, Time: ________________________________

## 2021-08-31 ENCOUNTER — TELEMEDICINE (OUTPATIENT)
Dept: BEHAVIORAL/MENTAL HEALTH CLINIC | Facility: CLINIC | Age: 46
End: 2021-08-31
Payer: COMMERCIAL

## 2021-08-31 DIAGNOSIS — F33.9 MAJOR DEPRESSION, RECURRENT, CHRONIC (HCC): Primary | ICD-10-CM

## 2021-08-31 PROCEDURE — T1015 CLINIC SERVICE: HCPCS | Performed by: SOCIAL WORKER

## 2021-08-31 NOTE — PSYCH
Psychotherapy Provided: Individual Psychotherapy 30  minutes 2:00 Pm to 2:30 Pm          Goals addressed in session: (Long term Goal Number 1) the client reported increased in her pain and "flare up of Fibromyalgia" During the session we explored the clients mental health and the effects on her different relationships and environments  It is hoped that by addressing her weaknesses this will act as a preventive measure against the possible need for higher level of care and services  Interventions: Supportive Therapy, Strengths Therapy,  and Cognitive Behavioral Therapy where the treatment modalities utilized during the session  Assessment and Plan: The Client presented a depressed anxious moo th  that was congruent in effect  She was alert, goal directed and participated with prompts during the session  The Client was oriented to person, place, time, situation, and reported no suicidal/homicidal ideation, plan, or intent  As team we explored the Client' s physical health and the effects on her mental health  Coping skills and healthy boundaries were reviewed during the session to address her relationship weaknesses which are a result of her physical health issues  As her home commitment the client will practice her coping skills when presented with anxiety and or depression  Next appointment was set for 2 weeks  Pain:      PSYCH MENTAL STATUS PAIN : 5 as reported by the Client     PHYSICAL PAIN SCALE NUMBERS:9 as reported by the client     Current suicide risk : Low/Phone number for [facility] was given  to the Client [phone]  The Client was given phone number for the [facility] [phone]  Behavioral Health Treatment Plan [facility]: Diagnosis and Treatment Plan explained to Cristy Garcia, Cristy Garcia  relates understanding diagnosis and is agreeable to Treatment Plan  Yes      Virtual Regular Visit    Verification of patient location:    Patient is located in the following state in which I hold an active license [place]      Assessment/Plan:    Problem List Items Addressed This Visit        Other    Major depression, recurrent, chronic ([facility] [address] ) - Primary          Goals addressed in session: Goal 1          Reason for visit is   Chief Complaint   Patient presents with    Virtual Regular Visit        Encounter provider YONATAN Crowe    Provider located at [address] [place] [address] [address]  [place] [address]      Recent Visits  No visits were found meeting these conditions  Showing recent visits within past 7 days and meeting all other requirements  Future Appointments  No visits were found meeting these conditions  Showing future appointments within next 150 days and meeting all other requirements       The patient was identified by name and date of birth  Kwaku Mireles was informed that this is a telemedicine visit and that the visit is being conducted through[employer] and patient was informed that this is a secure, HIPAA-compliant platform  She agrees to proceed     My office door was closed  No one else was in the room  She acknowledged consent and understanding of privacy and security of the video platform  The patient has agreed to participate and understands they can discontinue the visit at any time  Patient is aware this is a billable service  Subjective  Kwaku Mireles is a 50 y o  female , who was seen for individual mental health therapy         HPI     Past Medical History:   Diagnosis Date    Allergic     Seasonal Allergies    Alopecia of scalp     Treated with Proscar    Asthma     Chronic pain disorder     back, bilat knees, bilat hips    CPAP (continuous positive airway pressure) dependence     Depression     Diabetes mellitus (HCC)     Fibromyalgia, primary     GERD (gastroesophageal reflux disease)     Hip fx, left, closed, with routine healing, subsequent encounter 05/2017    Hypertension     Insomnia     Irregular heartbeat     Obesity     VICTOR HUGO on CPAP     Shortness of breath     Sleep apnea        Past Surgical History:   Procedure Laterality Date    CARPAL TUNNEL RELEASE Left     LUMBAR EPIDURAL INJECTION      NERVE BLOCK Bilateral 12/29/2020    Procedure: L2 L3 L4 L5 MEDIAL BRANCH BLOCK #1;  Surgeon: Eulalio Mata MD;  [facility];  Service: Pain Management     NERVE BLOCK Bilateral 1/19/2021    Procedure: L2 L3 L4 L5 MEDIAL BRANCH BLOCK #2;  Surgeon: Eulalio Mata MD;  [facility];  Service: Pain Management     RADIOFREQUENCY ABLATION Right 2/25/2021    Procedure: L2 L3 L4 L5 RADIO FREQUENCY ABLATION right side;  Surgeon: Eulalio Mata MD;  [facility];  Service: Pain Management     RADIOFREQUENCY ABLATION Left 3/16/2021    Procedure: L2 L3 L4 L5 RADIO FREQUENCY ABLATION;  Surgeon: Eulalio Mata MD;  [facility];  Service: Pain Management     UPPER GASTROINTESTINAL ENDOSCOPY         Current Outpatient Medications   Medication Sig Dispense Refill    albuterol (Ventolin HFA) 90 mcg/act inhaler Inhale 2 puffs every 6 (six) hours as needed for wheezing 18 g 5    celecoxib (CeleBREX) 100 mg capsule Take 1 capsule (100 mg total) by mouth 2 (two) times a day 60 capsule 0    cyclobenzaprine (FLEXERIL) 10 mg tablet TAKE 1 TABLET EVERY 12 HOURS FOR 7 DAYS THEN AS NEEDED FOR SPASMS OR PAIN 60 tablet 5    ergocalciferol (VITAMIN D2) 50,000 units Take 1 capsule (50,000 Units total) by mouth once a week 4 capsule 0    fluticasone-salmeterol (Advair) 250-50 mcg/dose inhaler Inhale 1 puff 2 (two) times a day Rinse mouth after use   60 blister 5    glipiZIDE (GLUCOTROL) 5 mg tablet Take 1 tablet (5 mg total) by mouth daily with breakfast 30 tablet 0    glucose blood test strip Use as instructed 100 each 0    Lancets (onetouch ultrasoft) lancets Use as instructed 100 each 0    lisinopril (ZESTRIL) 10 mg tablet Take 1 tablet (10 mg total) by mouth daily 90 tablet 1    metFORMIN (GLUCOPHAGE) 1000 MG tablet Take 1 tablet (1,000 mg total) by mouth 2 (two) times a day with meals 180 tablet 1    sertraline (ZOLOFT) 50 mg tablet Take 1 tablet (50 mg total) by mouth daily at bedtime 30 tablet 5     No current facility-administered medications for this visit  Allergies   Allergen Reactions    Tdap [Tetanus-Diphth-Acell Pertussis] Rash       Review of Systems    Video Exam    There were no vitals filed for this visit  Physical Exam     I spent 30 minutes directly with the patient during this visit    [facility] verbally agrees to participate in [employer]  Pt is aware that [employer] could be limited without vital signs or the ability to perform a full hands-on physical exam  Cristy Garcia understands she or the provider may request at any time to terminate the video visit and request the patient to seek care or treatment in person

## 2021-09-15 ENCOUNTER — TELEMEDICINE (OUTPATIENT)
Dept: BEHAVIORAL/MENTAL HEALTH CLINIC | Facility: CLINIC | Age: 46
End: 2021-09-15
Payer: COMMERCIAL

## 2021-09-15 DIAGNOSIS — F33.9 MAJOR DEPRESSION, RECURRENT, CHRONIC (HCC): Primary | ICD-10-CM

## 2021-09-15 PROCEDURE — T1015 CLINIC SERVICE: HCPCS | Performed by: SOCIAL WORKER

## 2021-09-15 NOTE — PSYCH
Psychotherapy Provided: Individual Psychotherapy 30  minutes 3:00 Pm to 3:30 Pm          Goals addressed in session:(Long Term Goal Number One) The Client reported continued pain in her back which has limited her entrance into the community  She reported that her anxiety has been up as well as her depression  During the session we explored the Client' s personal strengths and weaknesses in her different relationships and environment  Interventions: Supportive Therapy, Strengths Therapy,  and Cognitive Behavioral Therapy where the treatment modalities utilized during the session  Assessment and Plan: The client presented a depressed anxious mood that was congruent in effect  She was alert, goal directed and participated with prompts during the session  The Client was oriented to person, place, time, situation, and reported no suicidal/homicidal ideation, plan, or intent  As team we explored the Client's mental health symptoms, warning signs, coping skills and ways to reduce her stress  The client and her support team set as her home commitment for the Client to practice grounding when presented with anxiety and or depression  Next appointment was set for 2 weeks  Pain:      PSYCH MENTAL STATUS PAIN : 5 as reported by the Client     PHYSICAL PAIN SCALE NUMBERS: 7 as reported by the Client     Current suicide risk : Low/Phone number for [facility] was given  to the Client [phone]  The Client was given phone number for the [facility] [phone]  Behavioral Health Treatment Plan [facility]: Diagnosis and Treatment Plan explained to Cristy Garcia, Cristy Garcia  relates understanding diagnosis and is agreeable to Treatment Plan  Yes      Virtual Regular Visit    Verification of patient location:    Patient is located in the following state in which I hold an active license [place]      Assessment/Plan:    Problem List Items Addressed This Visit        Other Major depression, recurrent, chronic ([facility] [address] ) - Primary          Goals addressed in session: Goal 1          Reason for visit is   Chief Complaint   Patient presents with    Virtual Regular Visit        Encounter provider YONATAN Hightower    Provider located at [address] [place] [id number]      Recent Visits  No visits were found meeting these conditions  Showing recent visits within past 7 days and meeting all other requirements  Today's Visits  Date Type Provider Dept   09/15/21 Telemedicine YONATAN Hightower [facility]   Showing today's visits and meeting all other requirements  Future Appointments  No visits were found meeting these conditions  Showing future appointments within next 150 days and meeting all other requirements       The patient was identified by name and date of birth  Augusta Petit was informed that this is a telemedicine visit and that the visit is being conducted through[employer]ift[employer] and patient was informed that this is a secure, HIPAA-compliant platform  She agrees to proceed     My office door was closed  No one else was in the room  She acknowledged consent and understanding of privacy and security of the video platform  The patient has agreed to participate and understands they can discontinue the visit at any time  Patient is aware this is a billable service  Galilea Garcia is a 50 y o  female who was seen for individual mental health therapy         HPI     Past Medical History:   Diagnosis Date    Allergic     Seasonal Allergies    Alopecia of scalp     Treated with Proscar    Asthma     Chronic pain disorder     back, bilat knees, bilat hips    CPAP (continuous positive airway pressure) dependence     Depression     Diabetes mellitus (HCC)     Fibromyalgia, primary     GERD (gastroesophageal reflux disease)     Hip fx, left, closed, with routine healing, subsequent encounter 05/2017    Hypertension     Insomnia     Irregular heartbeat     Obesity     VICTOR HUGO on CPAP     Shortness of breath     Sleep apnea        Past Surgical History:   Procedure Laterality Date    CARPAL TUNNEL RELEASE Left     LUMBAR EPIDURAL INJECTION      NERVE BLOCK Bilateral 12/29/2020    Procedure: L2 L3 L4 L5 MEDIAL BRANCH BLOCK #1;  Surgeon: Melly Owen MD;  [facility];  Service: Pain Management     NERVE BLOCK Bilateral 1/19/2021    Procedure: L2 L3 L4 L5 MEDIAL BRANCH BLOCK #2;  Surgeon: Melly Owen MD;  [facility];  Service: Pain Management     RADIOFREQUENCY ABLATION Right 2/25/2021    Procedure: L2 L3 L4 L5 RADIO FREQUENCY ABLATION right side;  Surgeon: Melly Owen MD;  [facility];  Service: Pain Management     RADIOFREQUENCY ABLATION Left 3/16/2021    Procedure: L2 L3 L4 L5 RADIO FREQUENCY ABLATION;  Surgeon: Melly Owen MD;  [facility];  Service: Pain Management     UPPER GASTROINTESTINAL ENDOSCOPY         Current Outpatient Medications   Medication Sig Dispense Refill    albuterol (Ventolin HFA) 90 mcg/act inhaler Inhale 2 puffs every 6 (six) hours as needed for wheezing 18 g 5    celecoxib (CeleBREX) 100 mg capsule Take 1 capsule (100 mg total) by mouth 2 (two) times a day 60 capsule 0    cyclobenzaprine (FLEXERIL) 10 mg tablet TAKE 1 TABLET EVERY 12 HOURS FOR 7 DAYS THEN AS NEEDED FOR SPASMS OR PAIN 60 tablet 5    ergocalciferol (VITAMIN D2) 50,000 units Take 1 capsule (50,000 Units total) by mouth once a week 4 capsule 0    fluticasone-salmeterol (Advair) 250-50 mcg/dose inhaler Inhale 1 puff 2 (two) times a day Rinse mouth after use   60 blister 5    glipiZIDE (GLUCOTROL) 5 mg tablet Take 1 tablet (5 mg total) by mouth daily with breakfast 30 tablet 0    glucose blood test strip Use as instructed 100 each 0    Lancets (onetouch ultrasoft) lancets Use as instructed 100 each 0    lisinopril (ZESTRIL) 10 mg tablet Take 1 tablet (10 mg total) by mouth daily 90 tablet 0    metFORMIN (GLUCOPHAGE) 1000 MG tablet Take 1 tablet (1,000 mg total) by mouth 2 (two) times a day with meals 180 tablet 1    sertraline (ZOLOFT) 50 mg tablet Take 1 tablet (50 mg total) by mouth daily at bedtime 30 tablet 5     No current facility-administered medications for this visit  Allergies   Allergen Reactions    Tdap [Tetanus-Diphth-Acell Pertussis] Rash       Review of Systems    Video Exam    There were no vitals filed for this visit  Physical Exam     I spent 30 minutes directly with the patient during this visit    [facility] verbally agrees to participate in [employer]  Pt is aware that [employer] could be limited without vital signs or the ability to perform a full hands-on physical exam  Cristy Garcia understands she or the provider may request at any time to terminate the video visit and request the patient to seek care or treatment in person

## 2021-09-29 ENCOUNTER — TELEMEDICINE (OUTPATIENT)
Dept: BEHAVIORAL/MENTAL HEALTH CLINIC | Facility: CLINIC | Age: 46
End: 2021-09-29
Payer: COMMERCIAL

## 2021-09-29 DIAGNOSIS — F33.9 MAJOR DEPRESSION, RECURRENT, CHRONIC (HCC): Primary | ICD-10-CM

## 2021-09-29 PROCEDURE — T1015 CLINIC SERVICE: HCPCS | Performed by: SOCIAL WORKER

## 2021-09-29 NOTE — PSYCH
Psychotherapy Provided: Individual Psychotherapy 45  minutes 3:15 Pm to 4:00 Pm          Goals addressed in session:(Long Term Goal Number One) The client reported that she has been having continued physical pain, which has caused an increase in her depression  During the session we explored her relationship and environmental weaknesses  It is hoped that by addressing her weaknesses this will act as preventive measure against the possible need for higher level of care and services  Interventions: Supportive Therapy, Strengths Therapy,  and Cognitive Behavioral Therapy where the treatment modalities utilized during the session  Assessment and Plan: The Client presented a depressed anxious mood that was congruent in effect  She was alert, goal directed and participated with problems during the session  The client was oriented to person, place, time, situation, and reported no suicidal/homicidal ideation, plan, or intent  As team we explored and preocessed her anxiety and depression and the effects on her different relationships and environments  The client was able to verbalize that her physical health is her major cause for her anxiety and depression  During the session we explored her coping skills  It is hoped that by addressing her weaknesses this will act as a preventive measure agsint the possible need for higher level of care and services  Next appointment was set for 2 weeks  Pain:      PSYCH MENTAL STATUS PAIN : 5 as reported by the Client     PHYSICAL PAIN SCALE NUMBERS: 7  as reported by the Client     Current suicide risk : Low/Phone number for [facility] was given  to the Client [phone]  The Client was given phone number for the [facility] [phone]  Behavioral Health Treatment Plan [facility]: Diagnosis and Treatment Plan explained to Cristy Garcia, Cristy Garcia  relates understanding diagnosis and is agreeable to Treatment Plan  Yes     Virtual Regular Visit    Verification of patient location:    Patient is located in the following state in which I hold an active license [place]      Assessment/Plan:    Problem List Items Addressed This Visit        Other    Major depression, recurrent, chronic ([facility] [address] ) - Primary          Goals addressed in session: Goal 1          Reason for visit is   Chief Complaint   Patient presents with    Virtual Regular Visit        Encounter provider YONATAN Benavidez    Provider located at [address] [place] [phone]      Recent Visits  No visits were found meeting these conditions  Showing recent visits within past 7 days and meeting all other requirements  Future Appointments  No visits were found meeting these conditions  Showing future appointments within next 150 days and meeting all other requirements       The patient was identified by name and date of birth  Jose E Mccray was informed that this is a telemedicine visit and that the visit is being conducted through[employer]aPaint and patient was informed that this is a secure, HIPAA-compliant platform  She agrees to proceed     My office door was closed  No one else was in the room  She acknowledged consent and understanding of privacy and security of the video platform  The patient has agreed to participate and understands they can discontinue the visit at any time  Patient is aware this is a billable service  Subjective  Jose E Mccray is a 50 y o  female , who was seen for individual mental health therapy         HPI     Past Medical History:   Diagnosis Date    Allergic     Seasonal Allergies    Alopecia of scalp     Treated with Proscar    Asthma     Chronic pain disorder     back, bilat knees, bilat hips    CPAP (continuous positive airway pressure) dependence     Depression     Diabetes mellitus (HCC)     Fibromyalgia, primary     GERD (gastroesophageal reflux disease)     Hip fx, left, closed, with routine healing, subsequent encounter 05/2017    Hypertension     Insomnia     Irregular heartbeat     Obesity     VICTOR HUGO on CPAP     Shortness of breath     Sleep apnea        Past Surgical History:   Procedure Laterality Date    CARPAL TUNNEL RELEASE Left     LUMBAR EPIDURAL INJECTION      NERVE BLOCK Bilateral 12/29/2020    Procedure: L2 L3 L4 L5 MEDIAL BRANCH BLOCK #1;  Surgeon: Padmini Harper MD;  [facility];  Service: Pain Management     NERVE BLOCK Bilateral 1/19/2021    Procedure: L2 L3 L4 L5 MEDIAL BRANCH BLOCK #2;  Surgeon: Padmini Harpre MD;  [facility];  Service: Pain Management     RADIOFREQUENCY ABLATION Right 2/25/2021    Procedure: L2 L3 L4 L5 RADIO FREQUENCY ABLATION right side;  Surgeon: Padmini Harper MD;  [facility];  Service: Pain Management     RADIOFREQUENCY ABLATION Left 3/16/2021    Procedure: L2 L3 L4 L5 RADIO FREQUENCY ABLATION;  Surgeon: Padmini Harper MD;  [facility];  Service: Pain Management     UPPER GASTROINTESTINAL ENDOSCOPY         Current Outpatient Medications   Medication Sig Dispense Refill    albuterol (Ventolin HFA) 90 mcg/act inhaler Inhale 2 puffs every 6 (six) hours as needed for wheezing 18 g 5    celecoxib (CeleBREX) 100 mg capsule Take 1 capsule (100 mg total) by mouth 2 (two) times a day 60 capsule 1    cyclobenzaprine (FLEXERIL) 10 mg tablet TAKE 1 TABLET EVERY 12 HOURS FOR 7 DAYS THEN AS NEEDED FOR SPASMS OR PAIN 60 tablet 5    ergocalciferol (VITAMIN D2) 50,000 units Take 1 capsule (50,000 Units total) by mouth once a week 4 capsule 0    fluticasone-salmeterol (Advair) 250-50 mcg/dose inhaler Inhale 1 puff 2 (two) times a day Rinse mouth after use   60 blister 5    glipiZIDE (GLUCOTROL) 5 mg tablet Take 1 tablet (5 mg total) by mouth daily with breakfast 30 tablet 5    glucose blood test strip Use as instructed 100 each 0    Lancets (onetouch ultrasoft) lancets Use as instructed 100 each 0    lisinopril (ZESTRIL) 10 mg tablet Take 1 tablet (10 mg total) by mouth daily 90 tablet 0    metFORMIN (GLUCOPHAGE) 1000 MG tablet Take 1 tablet (1,000 mg total) by mouth 2 (two) times a day with meals 180 tablet 1    sertraline (ZOLOFT) 50 mg tablet Take 1 tablet (50 mg total) by mouth daily at bedtime 30 tablet 5     No current facility-administered medications for this visit  Allergies   Allergen Reactions    Tdap [Tetanus-Diphth-Acell Pertussis] Rash       Review of Systems    Video Exam    There were no vitals filed for this visit  Physical Exam     I spent 45 minutes directly with the patient during this visit    [facility] verbally agrees to participate in [employer]  Pt is aware that [employer] could be limited without vital signs or the ability to perform a full hands-on physical exam  Cristy Garcia understands she or the provider may request at any time to terminate the video visit and request the patient to seek care or treatment in person

## 2021-10-14 ENCOUNTER — TELEMEDICINE (OUTPATIENT)
Dept: BEHAVIORAL/MENTAL HEALTH CLINIC | Facility: CLINIC | Age: 46
End: 2021-10-14
Payer: COMMERCIAL

## 2021-10-14 DIAGNOSIS — F33.9 MAJOR DEPRESSION, RECURRENT, CHRONIC (HCC): Primary | ICD-10-CM

## 2021-10-14 PROCEDURE — T1015 CLINIC SERVICE: HCPCS | Performed by: SOCIAL WORKER

## 2021-11-09 ENCOUNTER — TELEMEDICINE (OUTPATIENT)
Dept: BEHAVIORAL/MENTAL HEALTH CLINIC | Facility: CLINIC | Age: 46
End: 2021-11-09
Payer: COMMERCIAL

## 2021-11-09 DIAGNOSIS — F33.9 MAJOR DEPRESSION, RECURRENT, CHRONIC (HCC): Primary | ICD-10-CM

## 2021-11-09 PROCEDURE — T1015 CLINIC SERVICE: HCPCS | Performed by: SOCIAL WORKER

## 2021-11-23 ENCOUNTER — TELEMEDICINE (OUTPATIENT)
Dept: BEHAVIORAL/MENTAL HEALTH CLINIC | Facility: CLINIC | Age: 46
End: 2021-11-23
Payer: COMMERCIAL

## 2021-11-23 DIAGNOSIS — F33.9 MAJOR DEPRESSION, RECURRENT, CHRONIC (HCC): Primary | ICD-10-CM

## 2021-11-23 PROCEDURE — T1015 CLINIC SERVICE: HCPCS | Performed by: SOCIAL WORKER

## 2021-12-06 DIAGNOSIS — M15.9 PRIMARY OSTEOARTHRITIS INVOLVING MULTIPLE JOINTS: ICD-10-CM

## 2021-12-06 DIAGNOSIS — E55.9 VITAMIN D DEFICIENCY: ICD-10-CM

## 2021-12-06 DIAGNOSIS — E11.65 TYPE 2 DIABETES MELLITUS WITH HYPERGLYCEMIA, WITHOUT LONG-TERM CURRENT USE OF INSULIN (HCC): ICD-10-CM

## 2021-12-06 DIAGNOSIS — M79.7 FIBROMYALGIA: ICD-10-CM

## 2021-12-06 DIAGNOSIS — I10 ESSENTIAL HYPERTENSION: ICD-10-CM

## 2021-12-06 RX ORDER — LANCETS
EACH MISCELLANEOUS
Qty: 100 EACH | Refills: 0 | Status: SHIPPED | OUTPATIENT
Start: 2021-12-06

## 2021-12-06 RX ORDER — ERGOCALCIFEROL 1.25 MG/1
50000 CAPSULE ORAL WEEKLY
Qty: 4 CAPSULE | Refills: 0 | Status: SHIPPED | OUTPATIENT
Start: 2021-12-06 | End: 2022-02-09 | Stop reason: SDUPTHER

## 2021-12-06 RX ORDER — LISINOPRIL 10 MG/1
10 TABLET ORAL DAILY
Qty: 90 TABLET | Refills: 0 | Status: SHIPPED | OUTPATIENT
Start: 2021-12-06 | End: 2022-04-20 | Stop reason: SDUPTHER

## 2021-12-06 RX ORDER — CELECOXIB 100 MG/1
100 CAPSULE ORAL 2 TIMES DAILY
Qty: 60 CAPSULE | Refills: 0 | Status: SHIPPED | OUTPATIENT
Start: 2021-12-06 | End: 2022-01-06 | Stop reason: SDUPTHER

## 2021-12-08 ENCOUNTER — TELEMEDICINE (OUTPATIENT)
Dept: BEHAVIORAL/MENTAL HEALTH CLINIC | Facility: CLINIC | Age: 46
End: 2021-12-08
Payer: COMMERCIAL

## 2021-12-08 DIAGNOSIS — F33.9 MAJOR DEPRESSION, RECURRENT, CHRONIC (HCC): Primary | ICD-10-CM

## 2021-12-08 PROCEDURE — T1015 CLINIC SERVICE: HCPCS | Performed by: SOCIAL WORKER

## 2021-12-29 ENCOUNTER — TELEMEDICINE (OUTPATIENT)
Dept: BEHAVIORAL/MENTAL HEALTH CLINIC | Facility: CLINIC | Age: 46
End: 2021-12-29
Payer: COMMERCIAL

## 2021-12-29 DIAGNOSIS — F33.9 MAJOR DEPRESSION, RECURRENT, CHRONIC (HCC): Primary | ICD-10-CM

## 2021-12-29 PROCEDURE — T1015 CLINIC SERVICE: HCPCS | Performed by: SOCIAL WORKER

## 2022-01-18 ENCOUNTER — TELEMEDICINE (OUTPATIENT)
Dept: BEHAVIORAL/MENTAL HEALTH CLINIC | Facility: CLINIC | Age: 47
End: 2022-01-18
Payer: COMMERCIAL

## 2022-01-18 ENCOUNTER — DOCUMENTATION (OUTPATIENT)
Dept: BEHAVIORAL/MENTAL HEALTH CLINIC | Facility: CLINIC | Age: 47
End: 2022-01-18

## 2022-01-18 DIAGNOSIS — F33.9 MAJOR DEPRESSION, RECURRENT, CHRONIC (HCC): Primary | ICD-10-CM

## 2022-01-18 PROCEDURE — T1015 CLINIC SERVICE: HCPCS | Performed by: SOCIAL WORKER

## 2022-01-18 NOTE — PSYCH
Psychotherapy Provided: Individual Psychotherapy 45  minutes 10:30 Am to 11:15 Am It was my intent to perform this visit via video technology, but the patient was not able to do a video connection, so the visit was completed via audio telephone only  Internet not working          Goals addressed in session:(Long Term Goal Number One) The Client reported that her building has been effected with bedbugs  She reported that this has caused increase in her anxiety and depression  During the session we explored her relationship weaknesses and strengths in addressing her weaknesses  It is hoped that by addressing her weaknesses this will act as a preventive measure against the possible need for higher level of care and services  Interventions: Supportive Therapy, Strengths Therapy,  and Cognitive Behavioral Therapy where the treatment modalities utilized during the session  Assessment and Plan: The Client presented an anxious depressed mood that was congruent in effect  She was alert, goal directed and participated with prompts during the session  The Client was oriented to person, place, time, situation, and reported no suicidal/homicidal ideation, plan, or intent  As team we explored and processed the Client's environmental issues and the effects on her mental health  She was able to verbalize that the increase stress in her environment causes increase in her anxiety and depression  Coping skills were reviewed during the session  Next appointment was set for 3 weeks  Pain:      PSYCH MENTAL STATUS PAIN :6 as reported by the Client     PHYSICAL PAIN SCALE NUMBERS:6 as reported by the Client     Current suicide risk : Low/Phone number for [facility] was given  to the Client [phone]  The Client was given phone number for the [facility] [phone]           Behavioral Health Treatment Plan [facility]: Diagnosis and Treatment Plan explained to [facility] Jose  relates understanding diagnosis and is agreeable to Treatment Plan  Yes  Virtual Brief Visit    Patient is located in the following state in which I hold an active license [place]      Assessment/Plan:    Problem List Items Addressed This Visit        Other    Major depression, recurrent, chronic ([facility] [address] ) - Primary          Recent Visits  No visits were found meeting these conditions  Showing recent visits within past 7 days and meeting all other requirements  Today's Visits  Date Type Provider Dept   01/18/22 Telemedicine YONATAN [facility]   Showing today's visits and meeting all other requirements  Future Appointments  No visits were found meeting these conditions    Showing future appointments within next 150 days and meeting all other requirements         I spent 45 minutes directly with the patient during this visit

## 2022-01-24 ENCOUNTER — OFFICE VISIT (OUTPATIENT)
Dept: FAMILY MEDICINE CLINIC | Facility: CLINIC | Age: 47
End: 2022-01-24
Payer: COMMERCIAL

## 2022-01-24 VITALS
SYSTOLIC BLOOD PRESSURE: 128 MMHG | TEMPERATURE: 97.7 F | RESPIRATION RATE: 18 BRPM | BODY MASS INDEX: 48.14 KG/M2 | HEART RATE: 100 BPM | HEIGHT: 62 IN | WEIGHT: 261.6 LBS | DIASTOLIC BLOOD PRESSURE: 82 MMHG | OXYGEN SATURATION: 97 %

## 2022-01-24 DIAGNOSIS — G89.29 CHRONIC BILATERAL LOW BACK PAIN WITHOUT SCIATICA: ICD-10-CM

## 2022-01-24 DIAGNOSIS — M79.7 FIBROMYALGIA: ICD-10-CM

## 2022-01-24 DIAGNOSIS — G47.33 OSA ON CPAP: ICD-10-CM

## 2022-01-24 DIAGNOSIS — M15.9 PRIMARY OSTEOARTHRITIS INVOLVING MULTIPLE JOINTS: ICD-10-CM

## 2022-01-24 DIAGNOSIS — I10 ESSENTIAL HYPERTENSION: ICD-10-CM

## 2022-01-24 DIAGNOSIS — E55.9 VITAMIN D DEFICIENCY: ICD-10-CM

## 2022-01-24 DIAGNOSIS — Z12.4 SCREENING FOR CERVICAL CANCER: ICD-10-CM

## 2022-01-24 DIAGNOSIS — M54.16 LUMBAR RADICULOPATHY: ICD-10-CM

## 2022-01-24 DIAGNOSIS — E53.8 VITAMIN B12 DEFICIENCY: ICD-10-CM

## 2022-01-24 DIAGNOSIS — M54.50 CHRONIC BILATERAL LOW BACK PAIN WITHOUT SCIATICA: ICD-10-CM

## 2022-01-24 DIAGNOSIS — Z23 NEED FOR INFLUENZA VACCINATION: ICD-10-CM

## 2022-01-24 DIAGNOSIS — J45.30 MILD PERSISTENT ASTHMA WITHOUT COMPLICATION: ICD-10-CM

## 2022-01-24 DIAGNOSIS — F33.9 MAJOR DEPRESSION, RECURRENT, CHRONIC (HCC): ICD-10-CM

## 2022-01-24 DIAGNOSIS — Z99.89 OSA ON CPAP: ICD-10-CM

## 2022-01-24 DIAGNOSIS — Z23 ENCOUNTER FOR IMMUNIZATION: ICD-10-CM

## 2022-01-24 DIAGNOSIS — Z13.29 SCREENING FOR THYROID DISORDER: ICD-10-CM

## 2022-01-24 DIAGNOSIS — E11.65 TYPE 2 DIABETES MELLITUS WITH HYPERGLYCEMIA, WITHOUT LONG-TERM CURRENT USE OF INSULIN (HCC): Primary | ICD-10-CM

## 2022-01-24 DIAGNOSIS — E66.01 CLASS 3 SEVERE OBESITY WITH SERIOUS COMORBIDITY AND BODY MASS INDEX (BMI) OF 45.0 TO 49.9 IN ADULT, UNSPECIFIED OBESITY TYPE (HCC): ICD-10-CM

## 2022-01-24 DIAGNOSIS — Z13.0 SCREENING FOR DEFICIENCY ANEMIA: ICD-10-CM

## 2022-01-24 DIAGNOSIS — Z13.220 SCREENING FOR HYPERLIPIDEMIA: ICD-10-CM

## 2022-01-24 PROCEDURE — 3074F SYST BP LT 130 MM HG: CPT | Performed by: NURSE PRACTITIONER

## 2022-01-24 PROCEDURE — 99214 OFFICE O/P EST MOD 30 MIN: CPT | Performed by: NURSE PRACTITIONER

## 2022-01-24 PROCEDURE — 90471 IMMUNIZATION ADMIN: CPT | Performed by: FAMILY MEDICINE

## 2022-01-24 PROCEDURE — 90682 RIV4 VACC RECOMBINANT DNA IM: CPT | Performed by: FAMILY MEDICINE

## 2022-01-24 PROCEDURE — 3079F DIAST BP 80-89 MM HG: CPT | Performed by: NURSE PRACTITIONER

## 2022-01-24 NOTE — PROGRESS NOTES
Assessment/Plan:      Diagnoses and all orders for this visit:    Type 2 diabetes mellitus with hyperglycemia, without long-term current use of insulin (HCC)  -     Comprehensive metabolic panel; Future  -     Hemoglobin A1C; Future  -     Insulin, fasting; Future    Class 3 severe obesity with serious comorbidity and body mass index (BMI) of 45 0 to 49 9 in adult, unspecified obesity type (HCC)    [facility], [facility]    Encounter for immunization    Screening for cervical cancer  -     Ambulatory referral to Obstetrics / Gynecology; Future    Chronic bilateral low back pain without sciatica    Primary osteoarthritis involving multiple joints    Lumbar radiculopathy    Mild persistent asthma without complication    Fibromyalgia    Vitamin B12 deficiency  -     Vitamin B12; Future    VICTOR HUGO on CPAP    Vitamin D deficiency  -     Vitamin D 25 hydroxy; Future    Essential hypertension  -     CBC and differential; Future  -     TSH, 3rd generation    Screening for deficiency anemia  -     CBC and differential; Future    Screening for hyperlipidemia  -     Lipid panel; Future    Screening for thyroid disorder  -     TSH, 3rd generation    Need for influenza vaccination  -     influenza vaccine, quadrivalent, recombinant, PF, 0 5 mL, for patients 18 yr+ (FLUBLOK)          Subjective:     Patient ID: Jaime Tavares is a 48 y o  female  Patient presents to office for follow up and recheck  Complete medical history and medications reviewed with patient and tolerating all medications well without any problems  Vital signs reviewed and stable  Denies any acute problems or concerns at the present time  Continues with Counseling with Margarito Dill Counselor for Hx Depression  Review of Systems    GENERAL:  Feels well, denies any significant changes in weight without trying  SKIN:  Denies rashes, lesions, opened areas, wounds, change in moles or any other skin changes    HEENT:  Denies any head injury or headaches  Negative blurred vision, floaters, spots before eyes, infections, or other vision problems  Negative significant changes in vision or hearing  Negative tinnitus, vertigo, or infections  Negative hay fever, sinus trouble, nasal discharge, bloody noses, or problems with smell  Negative sore throat, bleeding gums, ulcers, or sores  NECK:  Denies lumps, goiter, pain, swollen glands, or lymphadenopathy  BREASTS:  Denies lumps, pain, nipple discharge, swelling, redness, or any other changes  RESPIRATORY:  Denies cough, wheezing, shortness of breath, dyspnea, or orthopnea  CARDIOVASCULAR:  Denies chest pain or palpitations  GASTROINTESTINAL:  Appetite good, denies nausea, vomiting, or indigestion  Bowel movements normal occurring about once daily or every other day  URINARY:  Denies frequency, urgency, incontinence, dysuria, hematuria, nocturia, or recent flank pain  GENITAL:  Denies vaginal discharge, pelvic infection, lesions, ulcers, or pain  Negative dyspareunia or abnormal vaginal bleeding  PERIPHERAL VASCULAR:  Denies varicosities, swelling, skin changes, or pain  MUSCULOSKELETAL:  Denies back, joint, or muscle pain  Negative problems with mobility or movement  PSYCHIATRIC:  Denies problems with depression, anxiety, anger, or other psychiatric symptoms  NEUROLOGIC:  Denies fainting, dizziness, memory problems, seizures, tingling, motor or sensory loss  HEMATOLOGIC:  Denies easy bruising, bleeding, or anemia  ENDOCRINE:  Denies thyroid problems, temperature intolerance, excessive sweating, or other endocrine symptoms  Objective:     Physical Exam  Vitals and nursing note reviewed  GENERAL:  Appears well nourished, well groomed, in no acute distress  SKIN:  Palms warm, dry, color good  Nails without clubbing or cyanosis  No lesions, ulcerations, or wounds  HEAD:  Hair is average texture  Scalp without lesions, normocephalic, and atraumatic    EYES: Visual fields full by confrontation  Conjunctiva pink, sclera white, PERRLA  EARS:  B/L ear canals clear  B/L TMs clear with + light reflex  NOSE: Mucosa pink, moist, septum midline  Negative sinus tenderness  B/L turbinates pink, moist, non-edematous without exudate  MOUTH:  Oral mucosa pink  Pharynx pink, moist, without swelling, redness, or exudate  Dentition ok  Tongue midline  NECK:  Supple, trachea midline, Negative thyromegaly, lymphadenopathy, or swollen glands  LYMPH NODES:  Negative enlargement of neck, axillary, epitrochlear, or inguinal nodes  THORAX/LUNGS  Thorax symmetric with good excursion  Lungs resonant  Breath sounds vesicular with no added sounds  Diaphragm descends within normal limits  CARDIOVASCULAR:  Carotid upstrokes brisk and without bruits  Apical impulse discrete and tapping, barely palpable in the 5th ICS/MCL  Normal S1 and Normal S2, Negative S3 or S4  Negative murmurs, thrills, lifts, or heaves  ABDOMEN:  Protuberant, bowel sounds normal active x 4 quadrants  Negative tenderness  Negative masses  Negative hepatomegaly  Negative splenomegaly  Negative costovertebral tenderness  EXTREMITIES:  Warm, calves supple, non-tender, negative for edema  Negative stasis pigmentation or ulcers  +2 pulses throughout  MUSCULOSKELETAL:  Negative joint deformities  Good range of motion in hands, wrists, elbows, shoulders, spine, hips, knees, and ankles  NEUROLOGICAL:  Mental status:  Awake, alert, and oriented to person, place, time, and event  Normal thought processes  BMI Counseling: Body mass index is 47 85 kg/m²   The BMI is above normal  Nutrition recommendations include decreasing portion sizes, encouraging healthy choices of fruits and vegetables, decreasing fast food intake, consuming healthier snacks, limiting drinks that contain sugar, moderation in carbohydrate intake, increasing intake of lean protein, reducing intake of saturated and trans fat and reducing intake of cholesterol  Exercise recommendations include exercising 3-5 times per week  No pharmacotherapy was ordered  Rationale for BMI follow-up plan is due to patient being overweight or obese

## 2022-01-24 NOTE — PATIENT INSTRUCTIONS
Wellness Visit for Adults   WHAT YOU NEED TO KNOW:   What is a wellness visit? A wellness visit is when you see your healthcare provider to get screened for health problems  Your healthcare provider will also give you advice on how to stay healthy  Write down your questions so you remember to ask them  Ask your healthcare provider how often you should have a wellness visit  What happens at a wellness visit? Your healthcare provider will ask about your health, and your family history of health problems  This includes high blood pressure, heart disease, and cancer  He or she will ask if you have symptoms that concern you, if you smoke, and about your mood  You may also be asked about your intake of medicines, supplements, food, and alcohol  Any of the following may be done:  · Your weight  will be checked  Your height may also be checked so your body mass index (BMI) can be calculated  Your BMI shows if you are at a healthy weight  · Your blood pressure  and heart rate will be checked  Your temperature may also be checked  · Blood and urine tests  may be done  Blood tests may be done to check your cholesterol levels  Abnormal cholesterol levels increase your risk for heart disease and stroke  You may also need a blood or urine test to check for diabetes if you are at increased risk  Urine tests may be done to look for signs of an infection or kidney disease  · A physical exam  includes checking your heartbeat and lungs with a stethoscope  Your healthcare provider may also check your skin to look for sun damage  · Screening tests  may be recommended  A screening test is done to check for diseases that may not cause symptoms  The screening tests you may need depend on your age, gender, family history, and lifestyle habits  For example, colorectal screening may be recommended if you are 48years old or older  What screening tests do I need if I am a woman?    · A Pap smear  is used to screen for cervical cancer  Pap smears are usually done every 3 to 5 years depending on your age  You may need them more often if you have had abnormal Pap smear test results in the past  Ask your healthcare provider how often you should have a Pap smear  · A mammogram  is an x-ray of your breasts to screen for breast cancer  Experts recommend mammograms every 2 years starting at age 48 years  You may need a mammogram at age 52 years or younger if you have an increased risk for breast cancer  Talk to your healthcare provider about when you should start having mammograms and how often you need them  What vaccines might I need? · Get an influenza vaccine  every year  The influenza vaccine protects you from the flu  Several types of viruses cause the flu  The viruses change over time, so new vaccines are made each year  · Get a tetanus-diphtheria (Td) booster vaccine  every 10 years  This vaccine protects you against tetanus and diphtheria  Tetanus is a severe infection that may cause painful muscle spasms and lockjaw  Diphtheria is a severe bacterial infection that causes a thick covering in the back of your mouth and throat  · Get a human papillomavirus (HPV) vaccine  if you are female and aged 23 to 32 or male 23 to 24 and never received it  This vaccine protects you from HPV infection  HPV is the most common infection spread by sexual contact  HPV may also cause vaginal, penile, and anal cancers  · Get a pneumococcal vaccine  if you are aged 72 years or older  The pneumococcal vaccine is an injection given to protect you from pneumococcal disease  Pneumococcal disease is an infection caused by pneumococcal bacteria  The infection may cause pneumonia, meningitis, or an ear infection  · Get a shingles vaccine  if you are 60 or older, even if you have had shingles before  The shingles vaccine is an injection to protect you from the varicella-zoster virus  This is the same virus that causes chickenpox   Shingles is a painful rash that develops in people who had chickenpox or have been exposed to the virus  How can I eat healthy? My Plate is a model for planning healthy meals  It shows the types and amounts of foods that should go on your plate  Fruits and vegetables make up about half of your plate, and grains and protein make up the other half  A serving of dairy is included on the side of your plate  The amount of calories and serving sizes you need depends on your age, gender, weight, and height  Examples of healthy foods are listed below:  · Eat a variety of vegetables  such as dark green, red, and orange vegetables  You can also include canned vegetables low in sodium (salt) and frozen vegetables without added butter or sauces  · Eat a variety of fresh fruits , canned fruit in 100% juice, frozen fruit, and dried fruit  · Include whole grains  At least half of the grains you eat should be whole grains  Examples include whole-wheat bread, wheat pasta, brown rice, and whole-grain cereals such as oatmeal     · Eat a variety of protein foods such as seafood (fish and shellfish), lean meat, and poultry without skin (turkey and chicken)  Examples of lean meats include pork leg, shoulder, or tenderloin, and beef round, sirloin, tenderloin, and extra lean ground beef  Other protein foods include eggs and egg substitutes, beans, peas, soy products, nuts, and seeds  · Choose low-fat dairy products such as skim or 1% milk or low-fat yogurt, cheese, and cottage cheese  · Limit unhealthy fats  such as butter, hard margarine, and shortening  How much exercise do I need? Exercise at least 30 minutes per day on most days of the week  Some examples of exercise include walking, biking, dancing, and swimming  You can also fit in more physical activity by taking the stairs instead of the elevator or parking farther away from stores  Include muscle strengthening activities 2 days each week   Regular exercise provides many health benefits  It helps you manage your weight, and decreases your risk for type 2 diabetes, heart disease, stroke, and high blood pressure  Exercise can also help improve your mood  Ask your healthcare provider about the best exercise plan for you  What are some general health and safety guidelines I should follow? · Do not smoke  Nicotine and other chemicals in cigarettes and cigars can cause lung damage  Ask your healthcare provider for information if you currently smoke and need help to quit  E-cigarettes or smokeless tobacco still contain nicotine  Talk to your healthcare provider before you use these products  · Limit alcohol  A drink of alcohol is 12 ounces of beer, 5 ounces of wine, or 1½ ounces of liquor  · Lose weight, if needed  Being overweight increases your risk of certain health conditions  These include heart disease, high blood pressure, type 2 diabetes, and certain types of cancer  · Protect your skin  Do not sunbathe or use tanning beds  Use sunscreen with a SPF 15 or higher  Apply sunscreen at least 15 minutes before you go outside  Reapply sunscreen every 2 hours  Wear protective clothing, hats, and sunglasses when you are outside  · Drive safely  Always wear your seatbelt  Make sure everyone in your car wears a seatbelt  A seatbelt can save your life if you are in an accident  Do not use your cell phone when you are driving  This could distract you and cause an accident  Pull over if you need to make a call or send a text message  · Practice safe sex  Use latex condoms if are sexually active and have more than one partner  Your healthcare provider may recommend screening tests for sexually transmitted infections (STIs)  · Wear helmets, lifejackets, and protective gear  Always wear a helmet when you ride a bike or motorcycle, go skiing, or play sports that could cause a head injury  Wear protective equipment when you play sports   Wear a lifejacket when you are on a boat or doing water sports  CARE AGREEMENT:   You have the right to help plan your care  Learn about your health condition and how it may be treated  Discuss treatment options with your healthcare providers to decide what care you want to receive  You always have the right to refuse treatment  The above information is an  only  It is not intended as medical advice for individual conditions or treatments  Talk to your doctor, nurse or pharmacist before following any medical regimen to see if it is safe and effective for you  © Copyright [employer] 2021 Information is for End User's use only and may not be sold, redistributed or otherwise used for commercial purposes   All illustrations and images included in CareNotes® are the copyrighted property of A D A M , Inc  or [address] [place] [address]

## 2022-02-08 ENCOUNTER — TELEMEDICINE (OUTPATIENT)
Dept: BEHAVIORAL/MENTAL HEALTH CLINIC | Facility: CLINIC | Age: 47
End: 2022-02-08
Payer: COMMERCIAL

## 2022-02-08 DIAGNOSIS — F33.9 MAJOR DEPRESSION, RECURRENT, CHRONIC (HCC): Primary | ICD-10-CM

## 2022-02-08 DIAGNOSIS — F33.1 MAJOR DEPRESSIVE DISORDER, RECURRENT EPISODE, MODERATE (HCC): ICD-10-CM

## 2022-02-08 PROCEDURE — T1015 CLINIC SERVICE: HCPCS | Performed by: SOCIAL WORKER

## 2022-02-08 NOTE — PSYCH
Psychotherapy Provided: Individual Psychotherapy 30  minutes 1:00 PM to 1:30 Pm   Goals addressed in session:(Long Term Goal Number One) The Client reported that she is still having physical health issues, which has caused continued anxiety and depression  The Client reported that her physical pain is effecting her ability to clean her home  She was encouraged to discuss her needs within her supports coordinator  about applying for [employer] services to strengthen her environment  During the session we explored her anxiety and depression and the effects on her different relationships and environment  Interventions: Supportive Therapy, Strengths Therapy,  and Cognitive Behavioral Therapy where the treatment modalities utilized during the session  Assessment and Plan: The Client presented an anxious depressed mood that was congruent in effect  She was alert, goal directed and participated with prompts during the session  The Client was oriented to person, place, time, situation, and reported no suicidal/homicidal ideation, plan, or intent  As team we explored and processed the Client's anxiety and depression due to stressors within her home environment, due to individuals in her building arguing over COVID vaccine   Healthy boundaries were reviewed during the session as well as her Coping skills  Next appointment was set for 2 weeks  Pain:      PSYCH MENTAL STATUS PAIN :6 as reported by the Client     PHYSICAL PAIN SCALE NUMBERS:9 as reported by the Client     Current suicide risk : Low/Phone number for [facility] was given  to the Client [phone]  The Client was given phone number for the [facility] [phone]  Behavioral Health Treatment Plan [facility]: Diagnosis and Treatment Plan explained to Cristy Garcia, Cristy Garcia  relates understanding diagnosis and is agreeable to Treatment Plan  Yes      Virtual Regular Visit    Verification of patient location:    Patient is located in the following state in which I hold an active license [place]      Assessment/Plan:    Problem List Items Addressed This Visit        Other    Major depression, recurrent, chronic ([facility] [address] ) - Primary      Other Visit Diagnoses     Major depressive disorder, recurrent episode, moderate ([facility] [address] )              Goals addressed in session: Goal 1          Reason for visit is   Chief Complaint   Patient presents with    Virtual Regular Visit        Encounter provider YONATAN Lopez    Provider located at [address] [place] [id number]      Recent Visits  No visits were found meeting these conditions  Showing recent visits within past 7 days and meeting all other requirements  Today's Visits  Date Type Provider Dept   02/08/22 Telemedicine YONATAN Lopez [facility]   Showing today's visits and meeting all other requirements  Future Appointments  No visits were found meeting these conditions  Showing future appointments within next 150 days and meeting all other requirements       The patient was identified by name and date of birth  Wilbur Tena was informed that this is a telemedicine visit and that the [facility] being [facility] and patient was informed that this is a secure, HIPAA-compliant platform  She agrees to proceed     My office door was closed  No one else was in the room  She acknowledged consent and understanding of privacy and security of the video platform  The patient has agreed to participate and understands they can discontinue the visit at any time  Patient is aware this is a billable service  Subjective  Wilbur Tena is a 48 y o  female , who was seen for individual mental dilip therapy         HPI     Past Medical History:   Diagnosis Date    Allergic     Seasonal Allergies    Alopecia of scalp     Treated with Proscar    Asthma     Chronic pain disorder     back, bilat knees, bilat hips    CPAP (continuous positive airway pressure) dependence     Depression     Diabetes mellitus (HCC)     Fibromyalgia, primary     GERD (gastroesophageal reflux disease)     Hip fx, left, closed, with routine healing, subsequent encounter 05/2017    Hypertension     Insomnia     Irregular heartbeat     Obesity     VICTOR HUGO on CPAP     Shortness of breath     Sleep apnea        Past Surgical History:   Procedure Laterality Date    CARPAL TUNNEL RELEASE Left     LUMBAR EPIDURAL INJECTION      NERVE BLOCK Bilateral 12/29/2020    Procedure: L2 L3 L4 L5 MEDIAL BRANCH BLOCK #1;  Surgeon: Minoo Cameron MD;  [facility];  Service: Pain Management     NERVE BLOCK Bilateral 1/19/2021    Procedure: L2 L3 L4 L5 MEDIAL BRANCH BLOCK #2;  Surgeon: Minoo Cameron MD;  [facility];  Service: Pain Management     RADIOFREQUENCY ABLATION Right 2/25/2021    Procedure: L2 L3 L4 L5 RADIO FREQUENCY ABLATION right side;  Surgeon: Minoo Cameron MD;  [facility];  Service: Pain Management     RADIOFREQUENCY ABLATION Left 3/16/2021    Procedure: L2 L3 L4 L5 RADIO FREQUENCY ABLATION;  Surgeon: Minoo Cameron MD;  [facility];  Service: Pain Management     UPPER GASTROINTESTINAL ENDOSCOPY         Current Outpatient Medications   Medication Sig Dispense Refill    albuterol (Ventolin HFA) 90 mcg/act inhaler Inhale 2 puffs every 6 (six) hours as needed for wheezing 18 g 5    celecoxib (CeleBREX) 100 mg capsule Take 1 capsule (100 mg total) by mouth 2 (two) times a day 180 capsule 1    cyclobenzaprine (FLEXERIL) 10 mg tablet TAKE 1 TABLET EVERY 12 HOURS FOR 7 DAYS THEN AS NEEDED FOR SPASMS OR PAIN 60 tablet 5    ergocalciferol (VITAMIN D2) 50,000 units Take 1 capsule (50,000 Units total) by mouth once a week 4 capsule 0    fluticasone-salmeterol (Advair) 250-50 mcg/dose inhaler Inhale 1 puff 2 (two) times a day Rinse mouth after use   60 blister 5    glipiZIDE (GLUCOTROL) 5 mg tablet Take 1 tablet (5 mg total) by mouth daily with breakfast 90 tablet 1    glucose blood test strip Use as instructed 100 each 0    Lancets (onetouch ultrasoft) lancets Use as instructed 100 each 0    lisinopril (ZESTRIL) 10 mg tablet Take 1 tablet (10 mg total) by mouth daily 90 tablet 0    metFORMIN (GLUCOPHAGE) 1000 MG tablet Take 1 tablet (1,000 mg total) by mouth 2 (two) times a day with meals 180 tablet 1    sertraline (ZOLOFT) 50 mg tablet Take 1 tablet (50 mg total) by mouth daily at bedtime 30 tablet 5     No current facility-administered medications for this visit  Allergies   Allergen Reactions    Tdap [Tetanus-Diphth-Acell Pertussis] Rash       Review of Systems    Video Exam    There were no vitals filed for this visit  Physical Exam     I spent 30 minutes directly with the patient during this visit    [facility] verbally agrees to participate in [employer]  Pt is aware that [employer] could be limited without vital signs or the ability to perform a full hands-on physical exam  Cristy Garcia understands she or the provider may request at any time to terminate the video visit and request the patient to seek care or treatment in person

## 2022-02-09 DIAGNOSIS — E55.9 VITAMIN D DEFICIENCY: ICD-10-CM

## 2022-02-09 RX ORDER — ERGOCALCIFEROL 1.25 MG/1
50000 CAPSULE ORAL WEEKLY
Qty: 4 CAPSULE | Refills: 0 | Status: SHIPPED | OUTPATIENT
Start: 2022-02-09 | End: 2022-03-13 | Stop reason: SDUPTHER

## 2022-02-21 NOTE — PSYCH
Psychotherapy Provided: Individual Psychotherapy 60  minutes  2:20 Pm to 3:00 Pm          Goals addressed in session:(Long Term Goal Number One) The client reported increased physically health issues which has caused an increase in her anxiety and depression  " I hurt my back while cleaning and it is making my back hurt" During the session we explored the Client's continued issues with her physical health and the effects on her mental health  Interventions: Supportive Therapy, Strengths Therapy,  and Cognitive Behavioral Therapy where the treatment modalities utilized during the session  Assessment and Plan: The Client presented an anxious mood that was congruent in effect  She was alert, goal directed and participated with prompts during the session  The Client was oriented to person, place, time, situation, and reported no suicidal/homicidal ideation, plan, or intent  As team we explored and processed the client's anxiety and depression due to her increased physical health issues  Cooping skills were reviewed during the session  Next appointment was set for 2 weeks  Pain:      PSYCH MENTAL STATUS PAIN : 6 as reported by the client     PHYSICAL PAIN SCALE NUMBERS: 9 as reported by the client due to hurting of her back while cleaning      Current suicide risk : Low/Phone number for [facility] was given  to the Client [phone]  The Client was given phone number for the [facility] [phone]  Behavioral Health Treatment Plan [facility]: Diagnosis and Treatment Plan explained to Cristy Garcia, Cristy Garcia  relates understanding diagnosis and is agreeable to Treatment Plan  Yes      Virtual Regular Visit    Verification of patient location:    Patient is located in the following state in which I hold an active license [place]      Assessment/Plan:    Problem List Items Addressed This Visit        Other    Major depression, recurrent, chronic ([facility] ) - Primary Other Visit Diagnoses     Major depressive disorder, recurrent episode, moderate ([facility] )              Goals addressed in session: Goal 1          Reason for visit is   Chief Complaint   Patient presents with    Virtual Regular Visit        Encounter provider YONATAN Salcido    Provider located at [address] [place] [address] [address]  [place] [address]      Recent Visits  No visits were found meeting these conditions  Showing recent visits within past 7 days and meeting all other requirements  Future Appointments  No visits were found meeting these conditions  Showing future appointments within next 150 days and meeting all other requirements       The patient was identified by name and date of birth  Yonathan Lopez was informed that this is a telemedicine visit and that the visit is being conducted through[employer] and patient was informed that this is a secure, HIPAA-compliant platform  She agrees to proceed     My office door was closed  No one else was in the room  She acknowledged consent and understanding of privacy and security of the video platform  The patient has agreed to participate and understands they can discontinue the visit at any time  Patient is aware this is a billable service  Subjective  Yonathan Lopez is a 48 y o  female , who was seen for individual mental health therapy         HPI     Past Medical History:   Diagnosis Date    Allergic     Seasonal Allergies    Alopecia of scalp     Treated with Proscar    Asthma     Chronic pain disorder     back, bilat knees, bilat hips    CPAP (continuous positive airway pressure) dependence     Depression     Diabetes mellitus (HCC)     Fibromyalgia, primary     GERD (gastroesophageal reflux disease)     Hip fx, left, closed, with routine healing, subsequent encounter 05/2017    Hypertension     Insomnia     Irregular heartbeat     Obesity     VICTOR HUGO on CPAP     Shortness of breath     Sleep apnea        Past Surgical History:   Procedure Laterality Date    CARPAL TUNNEL RELEASE Left     LUMBAR EPIDURAL INJECTION      NERVE BLOCK Bilateral 12/29/2020    Procedure: L2 L3 L4 L5 MEDIAL BRANCH BLOCK #1;  Surgeon: Skye Mtz MD;  [facility];  Service: Pain Management     NERVE BLOCK Bilateral 1/19/2021    Procedure: L2 L3 L4 L5 MEDIAL BRANCH BLOCK #2;  Surgeon: Skye Mtz MD;  [facility];  Service: Pain Management     RADIOFREQUENCY ABLATION Right 2/25/2021    Procedure: L2 L3 L4 L5 RADIO FREQUENCY ABLATION right side;  Surgeon: Skye Mtz MD;  [facility];  Service: Pain Management     RADIOFREQUENCY ABLATION Left 3/16/2021    Procedure: L2 L3 L4 L5 RADIO FREQUENCY ABLATION;  Surgeon: Skye Mtz MD;  [facility];  Service: Pain Management     UPPER GASTROINTESTINAL ENDOSCOPY         Current Outpatient Medications   Medication Sig Dispense Refill    albuterol (Ventolin HFA) 90 mcg/act inhaler Inhale 2 puffs every 6 (six) hours as needed for wheezing 18 g 5    celecoxib (CeleBREX) 100 mg capsule Take 1 capsule (100 mg total) by mouth 2 (two) times a day 180 capsule 1    cyclobenzaprine (FLEXERIL) 10 mg tablet TAKE 1 TABLET EVERY 12 HOURS FOR 7 DAYS THEN AS NEEDED FOR SPASMS OR PAIN 60 tablet 5    ergocalciferol (VITAMIN D2) 50,000 units Take 1 capsule (50,000 Units total) by mouth once a week 4 capsule 0    fluticasone-salmeterol (Advair) 250-50 mcg/dose inhaler Inhale 1 puff 2 (two) times a day Rinse mouth after use   60 blister 5    glipiZIDE (GLUCOTROL) 5 mg tablet Take 1 tablet (5 mg total) by mouth daily with breakfast 90 tablet 1    glucose blood test strip Use as instructed 100 each 0    Lancets (onetouch ultrasoft) lancets Use as instructed 100 each 0    lisinopril (ZESTRIL) 10 mg tablet Take 1 tablet (10 mg total) by mouth daily 90 tablet 0    metFORMIN (GLUCOPHAGE) 1000 MG tablet Take 1 tablet (1,000 mg total) by mouth 2 (two) times a day with meals 180 tablet 1    sertraline (ZOLOFT) 50 mg tablet Take 1 tablet (50 mg total) by mouth daily at bedtime 30 tablet 5     No current facility-administered medications for this visit  Allergies   Allergen Reactions    Tdap [Tetanus-Diphth-Acell Pertussis] Rash       Review of Systems    Video Exam    There were no vitals filed for this visit  Physical Exam     I spent 45 minutes directly with the patient during this visit    [facility] verbally agrees to participate in [employer]  Pt is aware that [employer] could be limited without vital signs or the ability to perform a full hands-on physical exam  Cristy Garcia understands she or the provider may request at any time to terminate the video visit and request the patient to seek care or treatment in person

## 2022-02-22 ENCOUNTER — SOCIAL WORK (OUTPATIENT)
Dept: BEHAVIORAL/MENTAL HEALTH CLINIC | Facility: CLINIC | Age: 47
End: 2022-02-22
Payer: COMMERCIAL

## 2022-02-22 DIAGNOSIS — F33.9 MAJOR DEPRESSION, RECURRENT, CHRONIC (HCC): Primary | ICD-10-CM

## 2022-02-22 DIAGNOSIS — F33.1 MAJOR DEPRESSIVE DISORDER, RECURRENT EPISODE, MODERATE (HCC): ICD-10-CM

## 2022-02-22 PROCEDURE — T1015 CLINIC SERVICE: HCPCS | Performed by: SOCIAL WORKER

## 2022-03-08 ENCOUNTER — TELEMEDICINE (OUTPATIENT)
Dept: BEHAVIORAL/MENTAL HEALTH CLINIC | Facility: CLINIC | Age: 47
End: 2022-03-08
Payer: COMMERCIAL

## 2022-03-08 DIAGNOSIS — F33.1 MAJOR DEPRESSIVE DISORDER, RECURRENT EPISODE, MODERATE (HCC): ICD-10-CM

## 2022-03-08 DIAGNOSIS — F33.9 MAJOR DEPRESSION, RECURRENT, CHRONIC (HCC): Primary | ICD-10-CM

## 2022-03-08 PROCEDURE — T1015 CLINIC SERVICE: HCPCS | Performed by: SOCIAL WORKER

## 2022-03-08 NOTE — PSYCH
Psychotherapy Provided: Individual Psychotherapy 30  minutes 1:00 Pm to 1:30 Pm          Goals addressed in session: (Long Term Goal Number One) The client reported continued pain in her back and leg, which has led to anxiety and depression  She reported this has led to isolation  During the session we explored and processed the connection with her physical health and her mental health  Interventions: Supportive Therapy, Strengths Therapy,  and Cognitive Behavioral Therapy where the treatment modalities utilized during the session  Assessment and Plan: The client presented a depressed anxious mood that was alert, goal directed and participated with prompts during the session  The Client was oriented to person, place, time, situation, and reported no suicidal/homicidal ideation, plan, or intent  As team we explored and processed the Client's weaknesses with her physical health and her increased isolation  Different community supports  Were reviewed to help with her entrance into the community  Next appointment was set for 2 weeks  Pain:      PSYCH MENTAL STATUS PAIN : 5 as reported by the client     PHYSICAL PAIN SCALE NUMBERS:3 as reported by the Client     Current suicide risk : Low/Phone number for [facility] was given  to the Client [phone]  The Client was given phone number for the [facility] [phone]  Behavioral Health Treatment Plan [facility]: Diagnosis and Treatment Plan explained to Cristy Garcia, Cristy Garcia  relates understanding diagnosis and is agreeable to Treatment Plan  Yes      Virtual Regular Visit    Verification of patient location:    Patient is located in the following state in which I hold an active license [place]      Assessment/Plan:    Problem List Items Addressed This Visit        Other    Major depression, recurrent, chronic ([facility] [address] ) - Primary      Other Visit Diagnoses     Major depressive disorder, recurrent episode, moderate [facility])              Goals addressed in session: Goal 1          Reason for visit is   Chief Complaint   Patient presents with    Virtual Regular Visit        Encounter provider YONATAN Lennon    Provider located at [address] [place] [id number]      Recent Visits  No visits were found meeting these conditions  Showing recent visits within past 7 days and meeting all other requirements  Future Appointments  No visits were found meeting these conditions  Showing future appointments within next 150 days and meeting all other requirements       The patient was identified by name and date of birth  Wendy Medina was informed that this is a telemedicine visit and that the visit is being conducted through[employer] and patient was informed that this is a secure, HIPAA-compliant platform  She agrees to proceed     My office door was closed  No one else was in the room  She acknowledged consent and understanding of privacy and security of the video platform  The patient has agreed to participate and understands they can discontinue the visit at any time  Patient is aware this is a billable service  Subjective  Wendy Medina is a 48 y o  female , who was seen for individual mental health Therapy         HPI     Past Medical History:   Diagnosis Date    Allergic     Seasonal Allergies    Alopecia of scalp     Treated with Proscar    Asthma     Chronic pain disorder     back, bilat knees, bilat hips    CPAP (continuous positive airway pressure) dependence     Depression     Diabetes mellitus (HCC)     Fibromyalgia, primary     GERD (gastroesophageal reflux disease)     Hip fx, left, closed, with routine healing, subsequent encounter 05/2017    Hypertension     Insomnia     Irregular heartbeat     Obesity     VICTOR HUGO on CPAP     Shortness of breath     Sleep apnea        Past Surgical History:   Procedure Laterality Date    CARPAL TUNNEL RELEASE Left     LUMBAR EPIDURAL INJECTION      NERVE BLOCK Bilateral 12/29/2020    Procedure: L2 L3 L4 L5 MEDIAL BRANCH BLOCK #1;  Surgeon: Orville Espinal MD;  [facility];  Service: Pain Management     NERVE BLOCK Bilateral 1/19/2021    Procedure: L2 L3 L4 L5 MEDIAL BRANCH BLOCK #2;  Surgeon: Orville Espinal MD;  [facility];  Service: Pain Management     RADIOFREQUENCY ABLATION Right 2/25/2021    Procedure: L2 L3 L4 L5 RADIO FREQUENCY ABLATION right side;  Surgeon: Orville Espinal MD;  [facility];  Service: Pain Management     RADIOFREQUENCY ABLATION Left 3/16/2021    Procedure: L2 L3 L4 L5 RADIO FREQUENCY ABLATION;  Surgeon: Orville Espinal MD;  [facility];  Service: Pain Management     UPPER GASTROINTESTINAL ENDOSCOPY         Current Outpatient Medications   Medication Sig Dispense Refill    albuterol (Ventolin HFA) 90 mcg/act inhaler Inhale 2 puffs every 6 (six) hours as needed for wheezing 18 g 5    celecoxib (CeleBREX) 100 mg capsule Take 1 capsule (100 mg total) by mouth 2 (two) times a day 180 capsule 1    cyclobenzaprine (FLEXERIL) 10 mg tablet TAKE 1 TABLET EVERY 12 HOURS FOR 7 DAYS THEN AS NEEDED FOR SPASMS OR PAIN 60 tablet 5    ergocalciferol (VITAMIN D2) 50,000 units Take 1 capsule (50,000 Units total) by mouth once a week 4 capsule 0    fluticasone-salmeterol (Advair) 250-50 mcg/dose inhaler Inhale 1 puff 2 (two) times a day Rinse mouth after use   60 blister 5    glipiZIDE (GLUCOTROL) 5 mg tablet Take 1 tablet (5 mg total) by mouth daily with breakfast 90 tablet 1    glucose blood test strip Use as instructed 100 each 0    Lancets (onetouch ultrasoft) lancets Use as instructed 100 each 0    lisinopril (ZESTRIL) 10 mg tablet Take 1 tablet (10 mg total) by mouth daily 90 tablet 0    metFORMIN (GLUCOPHAGE) 1000 MG tablet Take 1 tablet (1,000 mg total) by mouth 2 (two) times a day with meals 180 tablet 1    sertraline (ZOLOFT) 50 mg tablet Take 1 tablet (50 mg total) by mouth daily at bedtime 30 tablet 5     No current facility-administered medications for this visit  Allergies   Allergen Reactions    Tdap [Tetanus-Diphth-Acell Pertussis] Rash       Review of Systems    Video Exam    There were no vitals filed for this visit  Physical Exam     I spent 30 minutes directly with the patient during this visit    [facility] verbally agrees to participate in [employer]  Pt is aware that [employer] could be limited without vital signs or the ability to perform a full hands-on physical exam  Cristy Garcia understands she or the provider may request at any time to terminate the video visit and request the patient to seek care or treatment in person

## 2022-03-23 ENCOUNTER — TELEMEDICINE (OUTPATIENT)
Dept: BEHAVIORAL/MENTAL HEALTH CLINIC | Facility: CLINIC | Age: 47
End: 2022-03-23
Payer: COMMERCIAL

## 2022-03-23 DIAGNOSIS — F33.9 MAJOR DEPRESSION, RECURRENT, CHRONIC (HCC): Primary | ICD-10-CM

## 2022-03-23 PROCEDURE — T1015 CLINIC SERVICE: HCPCS | Performed by: SOCIAL WORKER

## 2022-03-23 NOTE — PSYCH
Psychotherapy Provided: Individual Psychotherapy 30  minutes 1:00 Pm to 1:30 PM  It was my intent to perform this visit via video technology, but the patient was not able to do a video connection, so the visit was completed via audio telephone only  Goals addressed in session:(Long Term Goal Number One) The Client reported increased coping skills in addressing her weaknesses  She reported that she has been utilizing her coping skills in addressing her weaknesses  During the session we explored and processed weaknesses in her different environments  relationships and the effects on her mental health  Interventions: Supportive Therapy, Strengths Therapy,  and Cognitive Behavioral Therapy where the treatment modalities utilized during the session  Assessment and Plan: The client presented a depressed anxious mood that was congruent in effect  She was alert, goal directed and participated with prompts during the session  Thel Client was oriented to person, place, time, situation, and reported no suicidal/homicidal ideation, plan, or intent  As team we explored and processed the client's relationship and environmental issues, with the Client being able to verbalize increased negative residence in her building as the cause for her increased anxiety and depression  During the session we reviewed her coping skills  Next appointment was set for 1 month  Pain:      PSYCH MENTAL STATUS PAIN :3 as reported by the client     PHYSICAL PAIN SCALE NUMBERS:2 as reported by the client     Current suicide risk : Low/Phone number for [facility] was given  to the Client [phone]  The Client was given phone number for the [facility] [phone]  Behavioral Health Treatment Plan [facility]: Diagnosis and Treatment Plan explained to Cristy Garcia, Cristy Garcia  relates understanding diagnosis and is agreeable to Treatment Plan  Yes    Virtual Brief Visit    Patient is located in the following state in which I hold an active license [place]      Assessment/Plan:    Problem List Items Addressed This Visit        Other    Major depression, recurrent, chronic ([facility] [address] ) - Primary          Recent Visits  No visits were found meeting these conditions  Showing recent visits within past 7 days and meeting all other requirements  Today's Visits  Date Type Provider Dept   03/23/22 Telemedicine Nicholaus Rinne, SW [facility]  Cl[facility] Psych   Showing today's visits and meeting all other requirements  Future Appointments  No visits were found meeting these conditions    Showing future appointments within next 150 days and meeting all other requirements         I spent 30 minutes directly with the patient during this visit

## 2022-04-13 ENCOUNTER — TELEMEDICINE (OUTPATIENT)
Dept: BEHAVIORAL/MENTAL HEALTH CLINIC | Facility: CLINIC | Age: 47
End: 2022-04-13
Payer: COMMERCIAL

## 2022-04-13 DIAGNOSIS — F33.9 MAJOR DEPRESSION, RECURRENT, CHRONIC (HCC): Primary | ICD-10-CM

## 2022-04-13 PROCEDURE — T1015 CLINIC SERVICE: HCPCS | Performed by: SOCIAL WORKER

## 2022-04-13 NOTE — PSYCH
Psychotherapy Provided: Individual Psychotherapy 30  minutes 1:00 pm to 1:30 Pm          Goals addressed in session:(Creation of 6th Treatment Plan) The Client reported continued depression and anxiety due to her economic and physical health situation  " My physical and mental health is driving me crazy" During the session we created the Client's recovery treatment plan  It is hoped that by addressing her weaknesses this will act as a preventive measure against the possible need for higher level of care and services  Interventions: Supportive Therapy, Strengths Therapy,  and Cognitive Behavioral Therapy where the treatment modalities utilized during the session  Assessment and Plan: The Client presented a depressed anxious mood that was congruent in effect  She was alert, goal directed and participated with prompts during the session  The Client was oriented to person, place, time, situation, and reported no suicidal/homicidal ideation, plan, or intent  As team we created the Client's recovery treatment plan and conducted the depression screening PHQ-9 with the Client's score going down 4 points over original screening  Next appointment was set for 2 weeks  Pain:      PSYCH MENTAL STATUS PAIN :4 as reported by the Client     PHYSICAL PAIN SCALE NUMBERS:6 as reported by the client     Current suicide risk : Low Phone number for [facility] was given  to the Client [phone]  The Client was given phone number for the [facility] [phone]  Behavioral Health Treatment Plan [facility]: Diagnosis and Treatment Plan explained to Cristy Garcia, Cristy Garcia  relates understanding diagnosis and is agreeable to Treatment Plan  Yes      Virtual Regular Visit    Verification of patient location:    Patient is located in the following state in which I hold an active license [place]      Assessment/Plan:    Problem List Items Addressed This Visit        Other    Major depression, recurrent, chronic ([facility] [address] ) - Primary          Goals addressed in session: Creation of Recovery treatment plan/6th          Reason for visit is   Chief Complaint   Patient presents with    Virtual Regular Visit        Encounter provider YONATAN Vaughan    Provider located at [address] [place] [id number]      Recent Visits  No visits were found meeting these conditions  Showing recent visits within past 7 days and meeting all other requirements  Today's Visits  Date Type Provider Dept   04/13/22 Telemedicine YONATAN Vaughan [facility] Psych   Showing today's visits and meeting all other requirements  Future Appointments  No visits were found meeting these conditions  Showing future appointments within next 150 days and meeting all other requirements       The patient was identified by name and date of birth  Toro Fragoso was informed that this is a telemedicine visit and that the visit is being conducted through[employer] and patient was informed that this is a secure, HIPAA-compliant platform  She agrees to proceed     My office door was closed  No one else was in the room  She acknowledged consent and understanding of privacy and security of the video platform  The patient has agreed to participate and understands they can discontinue the visit at any time  Patient is aware this is a billable service  Subjective  Toro Fragoso is a 48 y o  female , who was seen for individual mental health therapy          HPI     Past Medical History:   Diagnosis Date    Allergic     Seasonal Allergies    Alopecia of scalp     Treated with Proscar    Asthma     Chronic pain disorder     back, bilat knees, bilat hips    CPAP (continuous positive airway pressure) dependence     Depression     Diabetes mellitus (HCC)     Fibromyalgia, primary     GERD (gastroesophageal reflux disease)     Hip fx, left, closed, with routine healing, subsequent encounter 05/2017    Hypertension     Insomnia     Irregular heartbeat     Obesity     VICTOR HUGO on CPAP     Shortness of breath     Sleep apnea        Past Surgical History:   Procedure Laterality Date    CARPAL TUNNEL RELEASE Left     LUMBAR EPIDURAL INJECTION      NERVE BLOCK Bilateral 12/29/2020    Procedure: L2 L3 L4 L5 MEDIAL BRANCH BLOCK #1;  Surgeon: Jeanne Choudhary MD;  [facility];  Service: Pain Management     NERVE BLOCK Bilateral 1/19/2021    Procedure: L2 L3 L4 L5 MEDIAL BRANCH BLOCK #2;  Surgeon: Jeanne Choudhary MD;  [facility];  Service: Pain Management     RADIOFREQUENCY ABLATION Right 2/25/2021    Procedure: L2 L3 L4 L5 RADIO FREQUENCY ABLATION right side;  Surgeon: Jeanne Choudhary MD;  [facility];  Service: Pain Management     RADIOFREQUENCY ABLATION Left 3/16/2021    Procedure: L2 L3 L4 L5 RADIO FREQUENCY ABLATION;  Surgeon: Jeanne Choudhary MD;  [facility];  Service: Pain Management     UPPER GASTROINTESTINAL ENDOSCOPY         Current Outpatient Medications   Medication Sig Dispense Refill    albuterol (Ventolin HFA) 90 mcg/act inhaler Inhale 2 puffs every 6 (six) hours as needed for wheezing 18 g 5    celecoxib (CeleBREX) 100 mg capsule Take 1 capsule (100 mg total) by mouth 2 (two) times a day 180 capsule 1    cyclobenzaprine (FLEXERIL) 10 mg tablet TAKE 1 TABLET EVERY 12 HOURS FOR 7 DAYS THEN AS NEEDED FOR SPASMS OR PAIN 60 tablet 5    ergocalciferol (VITAMIN D2) 50,000 units Take 1 capsule (50,000 Units total) by mouth once a week 12 capsule 1    fluticasone-salmeterol (Advair) 250-50 mcg/dose inhaler Inhale 1 puff 2 (two) times a day Rinse mouth after use   60 blister 5    glipiZIDE (GLUCOTROL) 5 mg tablet Take 1 tablet (5 mg total) by mouth daily with breakfast 90 tablet 1    glucose blood test strip Use as instructed 100 each 0    Lancets (onetouch ultrasoft) lancets Use as instructed 100 each 0    lisinopril (ZESTRIL) 10 mg tablet Take 1 tablet (10 mg total) by mouth daily 90 tablet 0    metFORMIN (GLUCOPHAGE) 1000 MG tablet Take 1 tablet (1,000 mg total) by mouth 2 (two) times a day with meals 180 tablet 1    sertraline (ZOLOFT) 50 mg tablet Take 1 tablet (50 mg total) by mouth daily at bedtime 90 tablet 1     No current facility-administered medications for this visit  Allergies   Allergen Reactions    Tdap [Tetanus-Diphth-Acell Pertussis] Rash       Review of Systems    Video Exam    There were no vitals filed for this visit  Physical Exam     I spent 30 minutes directly with the patient during this visit    [facility] verbally agrees to participate in [employer]  Pt is aware that [employer] could be limited without vital signs or the ability to perform a full hands-on physical exam  Cristy Garcia understands she or the provider may request at any time to terminate the video visit and request the patient to seek care or treatment in person

## 2022-04-13 NOTE — PSYCH
Treatment Plan Tracking    6th Treatment Plan not completed within required time limits due to: Appointment could not be scheduled due to doctor visits

## 2022-04-13 NOTE — PSYCH
Cristy Garcia  1975         Date of Initial Treatment Plan:08/20/20  Date of Current Treatment Plan: 04/13/21     Treatment Plan Number: 6th Treatment Plan     Strengths/Personal Resources for Self Care: The Client has supportive extended family of origin, and peer relationships  She is able to utilize community supports when needed  At the present time she is receiving her physical health care through [facility] practice in [place], and mental health therapy with this therapist Misa Zaman MSW LCSW     Diagnosis:   1  Major depressive disorder, recurrent episode, moderate (HCC)            Area of Needs: The Client has a long history of depression and anxiety, which is effecting her different relationships and environments  It is hoped that The Client will achieve or maintain maximum functional capacity in performing daily activizes, taking into account both the functional capacity of the individual and those functional capacities appropriate for the individuals of the same age  Reduce or ameliorate the physical mental, behavioral, or developmental effects of an illness, condition, injury or disability   Present treatment plan will cover 4 months or 12 visits which ever comes first                Long Term Goal 1: The Client's depression score on her PHQ-9 will decrease from 17 to 8 or less (The Client's score decreased by 4 points over her original screening 4/13/22)      Target Date: 08/20/2021  Completion Date:          Short Term Objectives for Goal 1: The Client will utilize her  coping skills 3 out of 5 when presented with depression  (emerging)      Long Term Goal 2: The Client will become more aware of her anxiety when presented 3 out of 5 times(emerging)     Target Date: 08/20/2021  Completion Date:      Short Term Objectives for Goal 2: The Client will utilize her coping skill 3 out of 5 times when presented with anxiety  (emerging)          Long Term Goal # 3:The Client will attend all of her medical appointments 100% of the time       Target Date: 08/20/21  Completion Date:            GOAL 1: Modality: The person(s) responsible for carrying out the plan is  The CLient and this therapist Edna OSWALD LCSTABITHA     GOAL 2: Modality: The person(s) responsible for carrying out the plan is  The CLient and this therapist Edna OSWALD LCSW      GOAL 3: Modality: The person(s) responsible for carrying out the plan is  The [facility]: Diagnosis and Treatment Plan explained to Cristy, [employer] relates understanding diagnosis and is agreeable to Treatment Plan          Client Comments : Please share your thoughts, feelings, need and/or experiences regarding your treatment plan:    The Client's short term treatment goals will be reviewed and or completed on or before 08/13/2022  __________________________________________________________________     __________________________________________________________________     __________________________________________________________________     __________________________________________________________________     _______________________________________                 Patient signature, Date Time: __________________________________________        Physician cosigner signature, Date, Time: ________________________________

## 2022-04-20 DIAGNOSIS — I10 ESSENTIAL HYPERTENSION: ICD-10-CM

## 2022-04-21 RX ORDER — LISINOPRIL 10 MG/1
10 TABLET ORAL DAILY
Qty: 90 TABLET | Refills: 0 | Status: SHIPPED | OUTPATIENT
Start: 2022-04-21 | End: 2022-08-01 | Stop reason: SDUPTHER

## 2022-04-27 ENCOUNTER — TELEMEDICINE (OUTPATIENT)
Dept: BEHAVIORAL/MENTAL HEALTH CLINIC | Facility: CLINIC | Age: 47
End: 2022-04-27
Payer: COMMERCIAL

## 2022-04-27 DIAGNOSIS — F33.9 MAJOR DEPRESSION, RECURRENT, CHRONIC (HCC): Primary | ICD-10-CM

## 2022-04-27 PROCEDURE — T1015 CLINIC SERVICE: HCPCS | Performed by: SOCIAL WORKER

## 2022-04-27 NOTE — PSYCH
Psychotherapy Provided: Individual Psychotherapy 30  minutes 1:00 Pm to 1:28 Pm It was my intent to perform this visit via video technology, but the patient was not able to do a video connection, so the visit was completed via audio telephone only  Goals addressed in session:(Long Term Goal Number One) The client reported that she is still having economic issues, which has caused increase in her anxiety and depression  During the session we explored and processed weaknesses in her different relationship and environments and the effects on her mental health  Interventions: Supportive Therapy, Strengths Therapy,  and Cognitive Behavioral Therapy where the treatment modalities utilized during the session  Assessment and Plan: The Client presented a depressed anxious mood that was congruent in effect  She was alert, goal directed and participated with prompts during the session  The Client was oriented to person, place, time, situation, and reported no suicidal/homicidal ideation, plan, or intent  As team we explored and processed relationship issues due to issues with her peers, as well as issues within her environment and lack of maintenance   by the building owner  During the session we explored her coping skills and the effects on her mental health  As her home commitment the client will practice her coping skills when presented with anxiety and or depression  Next appointment was set for 3 weeks  Pain:      PSYCH MENTAL STATUS PAIN :6  as reported by the Client     PHYSICAL PAIN SCALE NUMBERS:6  as reported by the client     Current suicide risk : Low/Phone number for [facility] was given  to the Client [phone]  The Client was given phone number for the [facility] [phone]           Behavioral Health Treatment Plan [facility]: Diagnosis and Treatment Plan explained to Cristy Garcia, Cristy Garcia  relates understanding diagnosis and is agreeable to Treatment Plan  Yes  Virtual Brief Visit    Patient is located in the following state in which I hold an active license [place]      Assessment/Plan:    Problem List Items Addressed This Visit        Other    Major depression, recurrent, chronic ([facility] [address] ) - Primary          Recent Visits  No visits were found meeting these conditions  Showing recent visits within past 7 days and meeting all other requirements  Future Appointments  No visits were found meeting these conditions    Showing future appointments within next 150 days and meeting all other requirements         I spent 30 minutes directly with the patient during this visit

## 2022-05-18 ENCOUNTER — TELEMEDICINE (OUTPATIENT)
Dept: BEHAVIORAL/MENTAL HEALTH CLINIC | Facility: CLINIC | Age: 47
End: 2022-05-18
Payer: COMMERCIAL

## 2022-05-18 DIAGNOSIS — F33.9 MAJOR DEPRESSION, RECURRENT, CHRONIC (HCC): Primary | ICD-10-CM

## 2022-05-18 PROCEDURE — T1015 CLINIC SERVICE: HCPCS | Performed by: SOCIAL WORKER

## 2022-05-18 NOTE — PSYCH
Psychotherapy Provided: Individual Psychotherapy 30  minutes 1:30 Pm to 2:00 Pm It was my intent to perform this visit via video technology, but the patient was not able to do a video connection, so the visit was completed via audio telephone only  Goals addressed in session: (Long Term Goal Number One) The client reported continued pain in her legs and arms due to her fibromyalgia  She reported continued isolation due to transportation issues  She reported that she still has not applied for Medical Transportation at the present time  During the session we explored her physical activity and the effects on her mental health  Interventions: Supportive Therapy, Strengths Therapy,  and Cognitive Behavioral Therapy where the treatment modalities utilized during the session  Assessment and Plan: The Client presented a depressed anxious mood that was congruent in effect  She was alert, goal directed and participated with prompts during the session  The Client was oriented to person, place, time, situation, and reported no suicidal/homicidal ideation, plan, or intent  As team we explored her physical health and the connection with her increased anxiety and depression  She was able to verbalize increased  Awareness of her physical pain and her increased anxiety and depression  " Fibromyalgia  is causing my arms to swell" As her home commitment the Client set for herself to contact her PCP  Next appointment was set for 2 weeks  Pain:      PSYCH MENTAL STATUS PAIN : 6 as reported by Client due to money worry    PHYSICAL PAIN SCALE NUMBERS:8 as reported by the client due to pain in her arms    Current suicide risk : Low/Phone number for [facility] was given  to the Client [phone]  The Client was given phone number for the [facility] [phone]           Behavioral Health Treatment Plan [facility]: Diagnosis and Treatment Plan explained to Kiara Adam Jose  relates understanding diagnosis and is agreeable to Treatment Plan  Yes  Virtual Brief Visit    Patient is located in the following state in which I hold an active license [place]      Assessment/Plan:    Problem List Items Addressed This Visit        Other    Major depression, recurrent, chronic ([facility] ) - Primary          Recent Visits  No visits were found meeting these conditions  Showing recent visits within past 7 days and meeting all other requirements  Future Appointments  No visits were found meeting these conditions    Showing future appointments within next 150 days and meeting all other requirements         I spent 30 minutes directly with the patient during this visit

## 2022-06-03 ENCOUNTER — TELEMEDICINE (OUTPATIENT)
Dept: BEHAVIORAL/MENTAL HEALTH CLINIC | Facility: CLINIC | Age: 47
End: 2022-06-03
Payer: COMMERCIAL

## 2022-06-03 DIAGNOSIS — F33.9 MAJOR DEPRESSION, RECURRENT, CHRONIC (HCC): Primary | ICD-10-CM

## 2022-06-03 PROCEDURE — T1015 CLINIC SERVICE: HCPCS | Performed by: SOCIAL WORKER

## 2022-06-03 NOTE — PSYCH
Psychotherapy Provided: Individual Psychotherapy 30  minutes 12:01 Pm to 1230 Pm It was my intent to perform this visit via video technology, but the patient was not able to do a video connection, so the visit was completed via audio telephone only  Goals addressed in session: (Long Term Goal Number One) The Client reported continued  Pain in her body  She reported that she is going to discuss with her PCP obtaining an Aide to help with her home care  " I need someone to help me" She turned down referral to the [facility] program at the present time  During the session we explored and processed her depression and anxiety  Interventions: Supportive Therapy, Strengths Therapy,  and Cognitive Behavioral Therapy where the treatment modalities utilized during the session  Assessment and Plan: The Client presented an anxious depressed mood that was congruent in effect  Shew as alert, goal directed and participated with prompts during the session  The Client was oriented to person, place, time, situation, and reported no suicidal/homicidal ideation, plan, or intent  As team we explored and processed the Client' depression and anxiety and the connection with her pain  During the session we explored coping skills Next appointment was set for 2 weeks  As her home commitment the Client will contact her PCP to discuss AIDE services  Pain:      PSYCH MENTAL STATUS PAIN :5 as reported by the Client     PHYSICAL PAIN SCALE NUMBERS:8 as reported by the Client     Current suicide risk : Low/Phone number for [facility] was given  to the Client [phone]  The Client was given phone number for the [facility] [phone]  Behavioral Health Treatment Plan [facility]: Diagnosis and Treatment Plan explained to Cristy Garcia, Cristy Garcia  relates understanding diagnosis and is agreeable to Treatment Plan  Yes    Virtual Brief Visit    Patient is located in the following state in which I hold an active license [place]      Assessment/Plan:    Problem List Items Addressed This Visit        Other    Major depression, recurrent, chronic ([facility] ) - Primary          Recent Visits  No visits were found meeting these conditions  Showing recent visits within past 7 days and meeting all other requirements  Today's Visits  Date Type Provider Dept   06/03/22 Telemedicine YONATAN Draper  Cln Psych   Showing today's visits and meeting all other requirements  Future Appointments  No visits were found meeting these conditions    Showing future appointments within next 150 days and meeting all other requirements         I spent 30 minutes directly with the patient during this visit

## 2022-06-09 ENCOUNTER — VBI (OUTPATIENT)
Dept: ADMINISTRATIVE | Facility: OTHER | Age: 47
End: 2022-06-09

## 2022-06-20 DIAGNOSIS — E55.9 VITAMIN D DEFICIENCY: ICD-10-CM

## 2022-06-20 RX ORDER — ERGOCALCIFEROL 1.25 MG/1
50000 CAPSULE ORAL WEEKLY
Qty: 12 CAPSULE | Refills: 0 | Status: SHIPPED | OUTPATIENT
Start: 2022-06-20

## 2022-08-05 ENCOUNTER — TELEMEDICINE (OUTPATIENT)
Dept: BEHAVIORAL/MENTAL HEALTH CLINIC | Facility: CLINIC | Age: 47
End: 2022-08-05
Payer: COMMERCIAL

## 2022-08-05 DIAGNOSIS — F33.9 MAJOR DEPRESSION, RECURRENT, CHRONIC (HCC): Primary | ICD-10-CM

## 2022-08-05 PROCEDURE — 90832 PSYTX W PT 30 MINUTES: CPT | Performed by: SOCIAL WORKER

## 2022-08-05 NOTE — PSYCH
Forrestkailee Garcia  1975         Date of Initial Treatment Plan:08/20/20  Date of Current Treatment Plan: 08/05/222     Treatment Plan Number: 7th Treatment Plan     Strengths/Personal Resources for Self Care: The Client has supportive extended family of origin, and peer relationships  She is able to utilize community supports when needed  At the present time she is receiving her physical health care through [facility] in [place], and mental health therapy with this therapist Eden Alanis MSW LCSW     Diagnosis:   1  Major depressive disorder, recurrent episode, moderate (HCC)            Area of Needs: The Client has a long history of depression and anxiety, which is effecting her different relationships and environments  It is hoped that The Client will achieve or maintain maximum functional capacity in performing daily activizes, taking into account both the functional capacity of the individual and those functional capacities appropriate for the individuals of the same age  Reduce or ameliorate the physical mental, behavioral, or developmental effects of an illness, condition, injury or disability   Present treatment plan will cover 4 months or 12 visits which ever comes first                Long Term Goal 1: The Client's depression score on her PHQ-9 will decrease from 17 to 8 or less (The Client's score decreased by 4 points over her original screening 4/13/22)      Target Date: 08/20/2021  Completion Date:          Short Term Objectives for Goal 1: The Client will utilize her  coping skills 3 out of 5 when presented with depression  (emerging)      Long Term Goal 2: The Client will become more aware of her anxiety when presented 3 out of 5 times(emerging)     Target Date: 08/20/2021  Completion Date:      Short Term Objectives for Goal 2: The Client will utilize her coping skill 3 out of 5 times when presented with anxiety  (emerging)          Long Term Goal # 3:The Client will attend all of her medical appointments 100% of the time       Target Date: 08/20/21  Completion Date:            GOAL 1: Modality: The person(s) responsible for carrying out the plan is  The CLient and this therapist Shavon Offer MSW LCSW     GOAL 2: Modality: The person(s) responsible for carrying out the plan is  The CLient and this therapist Shavon Offer MSW LCSW      GOAL 3: Modality: The person(s) responsible for carrying out the plan is  The [address] [facility]: Diagnosis and Treatment Plan explained to Cristy, Poppy relates understanding diagnosis and is agreeable to Treatment Plan          Client Comments : Please share your thoughts, feelings, need and/or experiences regarding your treatment plan: The Client's short term treatment goals will be reviewed and or completed on or before 12/05/22  __________________________________________________________________     __________________________________________________________________     __________________________________________________________________     __________________________________________________________________     _______________________________________                 Patient signature, Date Time: __________________________________________        Physician cristin signature, Date, Time: ________________________________         Virtual Brief Visit    Patient is located in the following state in which I hold an active license [place]      Assessment/Plan:    Problem List Items Addressed This Visit        Other    Major depression, recurrent, chronic ([facility] ) - Primary          Recent Visits  No visits were found meeting these conditions    Showing recent visits within past 7 days and meeting all other requirements  Today's Visits  Date Type Provider Dept   08/05/22 Telemedicine YONATAN [facility]  Cln Psych   Showing today's visits and meeting all other requirements  Future Appointments  No visits were found meeting these conditions    Showing future appointments within next 150 days and meeting all other requirements         I spent 30 minutes directly with the patient during this visit

## 2022-08-05 NOTE — PSYCH
Psychotherapy Provided: Individual Psychotherapy 60  minutes 1:00 Pm to 1:30 Pm It was my intent to perform this visit via video technology, but the patient was not able to do a video connection, so the visit was completed via audio telephone only  Goals addressed in session: (Creation of 7th Treatment Plan) The Client reported continued anxiety and depression due to peer relationships  During the session we created her recovery treatment plan  It is hoped that by addressing her weaknesses this will act as a preventive measure against the possible need for higher level of care and services  Interventions: Supportive Therapy, Strengths Therapy,  and Cognitive Behavioral Therapy where the treatment modalities utilized during the session  Assessment and Plan: The Client presented a depressed anxious mood that was congruent in effect  She was alert, goal directed and participated with prompts during the session  The client was oriented to person, place, time, situation, and reported no suicidal/homicidal ideation, plan, or intent  As team we created that will cover the next 12 visits or 4 months whichever comes first  Next appointment was set for 2 weeks  Pain:      PSYCH MENTAL STATUS PAIN :6 as reported by the client     PHYSICAL PAIN SCALE NUMBERS:5 as reported by the Client     Current suicide risk : Low/Phone number for [facility] was given  to the Client [phone]  The Client was given phone number for the [facility] [phone]  Phone number for [address] [place] [address] was given to the [facility]: Diagnosis and Treatment Plan explained to Xeround, Cristy Garcia  relates understanding diagnosis and is agreeable to Treatment Plan  Yes

## 2022-08-19 ENCOUNTER — TELEMEDICINE (OUTPATIENT)
Dept: BEHAVIORAL/MENTAL HEALTH CLINIC | Facility: CLINIC | Age: 47
End: 2022-08-19
Payer: COMMERCIAL

## 2022-08-19 DIAGNOSIS — F33.9 MAJOR DEPRESSION, RECURRENT, CHRONIC (HCC): Primary | ICD-10-CM

## 2022-08-19 DIAGNOSIS — F32.0 CURRENT MILD EPISODE OF MAJOR DEPRESSIVE DISORDER, UNSPECIFIED WHETHER RECURRENT (HCC): ICD-10-CM

## 2022-08-19 PROCEDURE — T1015 CLINIC SERVICE: HCPCS | Performed by: SOCIAL WORKER

## 2022-08-19 NOTE — PSYCH
Psychotherapy Provided: Individual Psychotherapy 45  minutes 1:00 Pm to 1:40 Pm          Goals addressed in session:(Long Term Goal Number One) The Client reported that she was turned down from [employer], and has started services with [facility]  During the session we explored and processed her weaknesses and strengths within her different relationships and environments  Interventions: Supportive Therapy, Strengths Therapy,  and Cognitive Behavioral Therapy where the treatment modalities utilized during the session  Assessment and Plan: The Client presented a depressed mood that was congruent in effect  She was alert, goal directed and participated with prompts during the session  The Client was oriented to person, place, time, situation, and reported no suicidal/homicidal ideation, plan, or intent  As team we explored the client's relationship and environment issues and the effects on her mental health  During the session we explored coping skills  Next appointment was set for 3 weeks  Pain:      PSYCH MENTAL STATUS PAIN :6 as reported by the Client     PHYSICAL PAIN SCALE NUMBERS:5 as reported by the Client     Current suicide risk : Low/Phone number for [facility] was given  to the Client [address]: Diagnosis and Treatment Plan explained to [address] [place] [address]  relates understanding diagnosis and is agreeable to Treatment Plan  Yes      Virtual Regular Visit    Verification of patient location:    Patient is located in the following state in which I hold an active license [place]      Assessment/Plan:    Problem List Items Addressed This Visit        Other    Major depression, recurrent, chronic ([facility] [address] ) - Primary          Goals addressed in session: Goal 1          Reason for visit is   Chief Complaint   Patient presents with    Virtual Regular Visit        Encounter provider YONATAN Lopez    Provider located at [address] [place] [address],[address]  [address]  [address] [address]      Recent Visits  No visits were found meeting these conditions  Showing recent visits within past 7 days and meeting all other requirements  Future Appointments  No visits were found meeting these conditions  Showing future appointments within next 150 days and meeting all other requirements       The patient was identified by name and date of birth  Jaime Tavares was informed that this is a telemedicine visit and that the visit is being conducted through[employer] and patient was informed that this is a secure, HIPAA-compliant platform  She agrees to proceed     My office door was closed  No one else was in the room  She acknowledged consent and understanding of privacy and security of the video platform  The patient has agreed to participate and understands they can discontinue the visit at any time  Patient is aware this is a billable service  Subjective  Cristy Garcia is a 48 y o  female who was seen for individual mental health therapy          HPI     Past Medical History:   Diagnosis Date    Allergic     Seasonal Allergies    Alopecia of scalp     Treated with Proscar    Asthma     Chronic pain disorder     back, bilat knees, bilat hips    CPAP (continuous positive airway pressure) dependence     Depression     Diabetes mellitus (HCC)     Fibromyalgia, primary     GERD (gastroesophageal reflux disease)     Hip fx, left, closed, with routine healing, subsequent encounter 05/2017    Hypertension     Insomnia     Irregular heartbeat     Obesity     VICTOR HUGO on CPAP     Shortness of breath     Sleep apnea        Past Surgical History:   Procedure Laterality Date    CARPAL TUNNEL RELEASE Left     LUMBAR EPIDURAL INJECTION      NERVE BLOCK Bilateral 12/29/2020    Procedure: L2 L3 L4 L5 MEDIAL BRANCH BLOCK #1;  Surgeon: Sadia Duvall MD;  [facility]: [facility];  Service: Pain Management     NERVE BLOCK Bilateral 1/19/2021    Procedure: L2 L3 L4 L5 MEDIAL BRANCH BLOCK #2;  Surgeon: aJcklyn Parrish MD;  [facility];  Service: Pain Management     RADIOFREQUENCY ABLATION Right 2/25/2021    Procedure: L2 L3 L4 L5 RADIO FREQUENCY ABLATION right side;  Surgeon: Jacklyn Parrish MD;  [facility];  Service: Pain Management     RADIOFREQUENCY ABLATION Left 3/16/2021    Procedure: L2 L3 L4 L5 RADIO FREQUENCY ABLATION;  Surgeon: Jacklyn Parrish MD;  [facility];  Service: Pain Management     UPPER GASTROINTESTINAL ENDOSCOPY         Current Outpatient Medications   Medication Sig Dispense Refill    celecoxib (CeleBREX) 100 mg capsule Take 1 capsule (100 mg total) by mouth 2 (two) times a day 180 capsule 1    lisinopril (ZESTRIL) 10 mg tablet Take 1 tablet (10 mg total) by mouth daily 90 tablet 0    albuterol (Ventolin HFA) 90 mcg/act inhaler Inhale 2 puffs every 6 (six) hours as needed for wheezing 18 g 5    cyclobenzaprine (FLEXERIL) 10 mg tablet TAKE 1 TABLET EVERY 12 HOURS FOR 7 DAYS THEN AS NEEDED FOR SPASMS OR PAIN 60 tablet 5    ergocalciferol (VITAMIN D2) 50,000 units Take 1 capsule (50,000 Units total) by mouth once a week 12 capsule 0    fluticasone-salmeterol (Advair) 250-50 mcg/dose inhaler Inhale 1 puff 2 (two) times a day Rinse mouth after use  60 blister 5    glipiZIDE (GLUCOTROL) 5 mg tablet Take 1 tablet (5 mg total) by mouth daily with breakfast 90 tablet 1    glucose blood test strip Use as instructed 100 each 0    Lancets (onetouch ultrasoft) lancets Use as instructed 100 each 0    metFORMIN (GLUCOPHAGE) 1000 MG tablet Take 1 tablet (1,000 mg total) by mouth 2 (two) times a day with meals 180 tablet 1    sertraline (ZOLOFT) 50 mg tablet Take 1 tablet (50 mg total) by mouth daily at bedtime 90 tablet 1     No current facility-administered medications for this visit          Allergies   Allergen Reactions    Tdap [Tetanus-Diphth-Acell Pertussis] Rash       Review of Systems    Video Exam    There were no vitals filed for this visit      Physical Exam     I spent 45 minutes directly with the patient during this visit

## 2022-08-22 DIAGNOSIS — E11.65 TYPE 2 DIABETES MELLITUS WITH HYPERGLYCEMIA, WITHOUT LONG-TERM CURRENT USE OF INSULIN (HCC): ICD-10-CM

## 2022-08-22 DIAGNOSIS — R73.03 PREDIABETES: ICD-10-CM

## 2022-08-22 RX ORDER — GLIPIZIDE 5 MG/1
5 TABLET ORAL
Qty: 90 TABLET | Refills: 0 | Status: SHIPPED | OUTPATIENT
Start: 2022-08-22 | End: 2022-11-04 | Stop reason: SDUPTHER

## 2022-09-03 DIAGNOSIS — E11.65 TYPE 2 DIABETES MELLITUS WITH HYPERGLYCEMIA, WITHOUT LONG-TERM CURRENT USE OF INSULIN (HCC): ICD-10-CM

## 2022-09-06 RX ORDER — LANCETS
EACH MISCELLANEOUS
Qty: 100 EACH | Refills: 0 | Status: SHIPPED | OUTPATIENT
Start: 2022-09-06

## 2022-09-13 ENCOUNTER — TELEMEDICINE (OUTPATIENT)
Dept: BEHAVIORAL/MENTAL HEALTH CLINIC | Facility: CLINIC | Age: 47
End: 2022-09-13
Payer: COMMERCIAL

## 2022-09-13 DIAGNOSIS — F33.9 MAJOR DEPRESSION, RECURRENT, CHRONIC (HCC): Primary | ICD-10-CM

## 2022-09-13 PROCEDURE — T1015 CLINIC SERVICE: HCPCS | Performed by: SOCIAL WORKER

## 2022-09-13 NOTE — PSYCH
Psychotherapy Provided: Individual Psychotherapy 30  minutes 1:00 Pm to 1:35 Pm It was my intent to perform this visit via video technology, but the patient was not able to do a video connection, so the visit was completed via audio telephone only  Goals addressed in session:(Long Term Goal Number One) The Client reported increased problems with her pain and increased issues with her ADL's During the session we explored her weaknesses and strengths and the effects on her mental health  Interventions: Supportive Therapy, Strengths Therapy,  and Cognitive Behavioral Therapy where the treatment modalities utilized during the session  Assessment and Plan: The client presented a depressed anxious mood that was congruent in effect  She was alert, goal directed and participated with prompts during the session  The Client was oriented to person, place, time, situation, and reported no suicidal/homicidal ideation, plan, or intent  As team we explored and processed the Client's depression and problems with her ADL's Coping skills were reviewed during the session  Next appointment was set for 1 week  As her home commitment the Client will contact [facility] to restart services  Pain:      PSYCH MENTAL STATUS PAIN :7 as reported by the client     PHYSICAL PAIN SCALE NUMBERS:10 as reported by the Client due to pain    Current suicide risk : Low/Phone number for [facility] was given  to the Client [phone]  The Client was given phone number for the [facility] [phone]  Behavioral Health Treatment Plan [facility]: Diagnosis and Treatment Plan explained to Cristy Garcia, Cristy Garcia  relates understanding diagnosis and is agreeable to Treatment Plan  Yes    Virtual Brief Visit    Patient is located in the following state in which I hold an active license [place]      Assessment/Plan:    Problem List Items Addressed This Visit        Other    Major depression, recurrent, chronic ([facility] [place] [address] ) - Primary          Recent Visits  No visits were found meeting these conditions  Showing recent visits within past 7 days and meeting all other requirements  Today's Visits  Date Type Provider Dept   09/13/22 Telemedicine YONATAN Gibson  Cln Psych   Showing today's visits and meeting all other requirements  Future Appointments  No visits were found meeting these conditions    Showing future appointments within next 150 days and meeting all other requirements         I spent 30 minutes directly with the patient during this visit

## 2022-09-16 ENCOUNTER — APPOINTMENT (OUTPATIENT)
Dept: LAB | Facility: HOSPITAL | Age: 47
End: 2022-09-16
Payer: COMMERCIAL

## 2022-09-16 DIAGNOSIS — E11.65 TYPE 2 DIABETES MELLITUS WITH HYPERGLYCEMIA, WITHOUT LONG-TERM CURRENT USE OF INSULIN (HCC): ICD-10-CM

## 2022-09-16 DIAGNOSIS — Z13.0 SCREENING FOR DEFICIENCY ANEMIA: ICD-10-CM

## 2022-09-16 DIAGNOSIS — I10 ESSENTIAL HYPERTENSION: ICD-10-CM

## 2022-09-16 DIAGNOSIS — E55.9 VITAMIN D DEFICIENCY: ICD-10-CM

## 2022-09-16 DIAGNOSIS — E53.8 VITAMIN B12 DEFICIENCY: ICD-10-CM

## 2022-09-16 DIAGNOSIS — Z13.220 SCREENING FOR HYPERLIPIDEMIA: ICD-10-CM

## 2022-09-16 LAB
25(OH)D3 SERPL-MCNC: 78 NG/ML (ref 30–100)
ALBUMIN SERPL BCP-MCNC: 3.9 G/DL (ref 3.5–5)
ALP SERPL-CCNC: 49 U/L (ref 46–116)
ALT SERPL W P-5'-P-CCNC: 91 U/L (ref 12–78)
ANION GAP SERPL CALCULATED.3IONS-SCNC: 12 MMOL/L (ref 4–13)
AST SERPL W P-5'-P-CCNC: 50 U/L (ref 5–45)
BASOPHILS # BLD AUTO: 0.11 THOUSANDS/ΜL (ref 0–0.1)
BASOPHILS NFR BLD AUTO: 1 % (ref 0–1)
BILIRUB SERPL-MCNC: 0.8 MG/DL (ref 0.2–1)
BUN SERPL-MCNC: 12 MG/DL (ref 5–25)
CALCIUM SERPL-MCNC: 8.7 MG/DL (ref 8.3–10.1)
CHLORIDE SERPL-SCNC: 100 MMOL/L (ref 96–108)
CHOLEST SERPL-MCNC: 200 MG/DL
CO2 SERPL-SCNC: 23 MMOL/L (ref 21–32)
CREAT SERPL-MCNC: 0.75 MG/DL (ref 0.6–1.3)
EOSINOPHIL # BLD AUTO: 0.43 THOUSAND/ΜL (ref 0–0.61)
EOSINOPHIL NFR BLD AUTO: 5 % (ref 0–6)
ERYTHROCYTE [DISTWIDTH] IN BLOOD BY AUTOMATED COUNT: 12.6 % (ref 11.6–15.1)
EST. AVERAGE GLUCOSE BLD GHB EST-MCNC: 174 MG/DL
GFR SERPL CREATININE-BSD FRML MDRD: 94 ML/MIN/1.73SQ M
GLUCOSE P FAST SERPL-MCNC: 212 MG/DL (ref 65–99)
HBA1C MFR BLD: 7.7 %
HCT VFR BLD AUTO: 47.1 % (ref 34.8–46.1)
HDLC SERPL-MCNC: 45 MG/DL
HGB BLD-MCNC: 16.4 G/DL (ref 11.5–15.4)
IMM GRANULOCYTES # BLD AUTO: 0.06 THOUSAND/UL (ref 0–0.2)
IMM GRANULOCYTES NFR BLD AUTO: 1 % (ref 0–2)
INSULIN SERPL-ACNC: 18.4 MU/L (ref 3–25)
LDLC SERPL CALC-MCNC: 126 MG/DL (ref 0–100)
LYMPHOCYTES # BLD AUTO: 2.48 THOUSANDS/ΜL (ref 0.6–4.47)
LYMPHOCYTES NFR BLD AUTO: 28 % (ref 14–44)
MCH RBC QN AUTO: 30.5 PG (ref 26.8–34.3)
MCHC RBC AUTO-ENTMCNC: 34.8 G/DL (ref 31.4–37.4)
MCV RBC AUTO: 88 FL (ref 82–98)
MONOCYTES # BLD AUTO: 0.51 THOUSAND/ΜL (ref 0.17–1.22)
MONOCYTES NFR BLD AUTO: 6 % (ref 4–12)
NEUTROPHILS # BLD AUTO: 5.43 THOUSANDS/ΜL (ref 1.85–7.62)
NEUTS SEG NFR BLD AUTO: 59 % (ref 43–75)
NONHDLC SERPL-MCNC: 155 MG/DL
NRBC BLD AUTO-RTO: 0 /100 WBCS
PLATELET # BLD AUTO: 287 THOUSANDS/UL (ref 149–390)
PMV BLD AUTO: 9.2 FL (ref 8.9–12.7)
POTASSIUM SERPL-SCNC: 3.9 MMOL/L (ref 3.5–5.3)
PROT SERPL-MCNC: 7.9 G/DL (ref 6.4–8.4)
RBC # BLD AUTO: 5.38 MILLION/UL (ref 3.81–5.12)
SODIUM SERPL-SCNC: 135 MMOL/L (ref 135–147)
TRIGL SERPL-MCNC: 147 MG/DL
TSH SERPL DL<=0.05 MIU/L-ACNC: 0.86 UIU/ML (ref 0.45–4.5)
VIT B12 SERPL-MCNC: 1571 PG/ML (ref 100–900)
WBC # BLD AUTO: 9.02 THOUSAND/UL (ref 4.31–10.16)

## 2022-09-16 PROCEDURE — 83036 HEMOGLOBIN GLYCOSYLATED A1C: CPT

## 2022-09-16 PROCEDURE — 82607 VITAMIN B-12: CPT

## 2022-09-16 PROCEDURE — 82306 VITAMIN D 25 HYDROXY: CPT

## 2022-09-16 PROCEDURE — 80061 LIPID PANEL: CPT

## 2022-09-16 PROCEDURE — 85025 COMPLETE CBC W/AUTO DIFF WBC: CPT

## 2022-09-16 PROCEDURE — 83525 ASSAY OF INSULIN: CPT

## 2022-09-16 PROCEDURE — 80053 COMPREHEN METABOLIC PANEL: CPT

## 2022-09-23 ENCOUNTER — TELEMEDICINE (OUTPATIENT)
Dept: BEHAVIORAL/MENTAL HEALTH CLINIC | Facility: CLINIC | Age: 47
End: 2022-09-23
Payer: COMMERCIAL

## 2022-09-23 DIAGNOSIS — F33.9 MAJOR DEPRESSION, RECURRENT, CHRONIC (HCC): Primary | ICD-10-CM

## 2022-09-23 DIAGNOSIS — F33.1 MAJOR DEPRESSIVE DISORDER, RECURRENT EPISODE, MODERATE (HCC): ICD-10-CM

## 2022-09-23 PROCEDURE — T1015 CLINIC SERVICE: HCPCS | Performed by: SOCIAL WORKER

## 2022-09-26 ENCOUNTER — VBI (OUTPATIENT)
Dept: ADMINISTRATIVE | Facility: OTHER | Age: 47
End: 2022-09-26

## 2022-09-30 ENCOUNTER — TELEMEDICINE (OUTPATIENT)
Dept: BEHAVIORAL/MENTAL HEALTH CLINIC | Facility: CLINIC | Age: 47
End: 2022-09-30
Payer: COMMERCIAL

## 2022-09-30 DIAGNOSIS — F33.9 MAJOR DEPRESSION, RECURRENT, CHRONIC (HCC): Primary | ICD-10-CM

## 2022-09-30 DIAGNOSIS — F33.1 MAJOR DEPRESSIVE DISORDER, RECURRENT EPISODE, MODERATE (HCC): ICD-10-CM

## 2022-09-30 PROCEDURE — T1015 CLINIC SERVICE: HCPCS | Performed by: SOCIAL WORKER
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
## 2022-10-10 DIAGNOSIS — E55.9 VITAMIN D DEFICIENCY: ICD-10-CM

## 2022-10-10 RX ORDER — ERGOCALCIFEROL 1.25 MG/1
50000 CAPSULE ORAL WEEKLY
Qty: 12 CAPSULE | Refills: 0 | Status: SHIPPED | OUTPATIENT
Start: 2022-10-10

## 2022-10-11 ENCOUNTER — TELEMEDICINE (OUTPATIENT)
Dept: BEHAVIORAL/MENTAL HEALTH CLINIC | Facility: CLINIC | Age: 47
End: 2022-10-11
Payer: COMMERCIAL

## 2022-10-11 DIAGNOSIS — F33.9 MAJOR DEPRESSION, RECURRENT, CHRONIC (HCC): Primary | ICD-10-CM

## 2022-10-11 PROCEDURE — T1015 CLINIC SERVICE: HCPCS | Performed by: SOCIAL WORKER

## 2022-10-11 NOTE — PSYCH
Psychotherapy Provided: Individual Psychotherapy 30  minutes 11:18 Am to 11:45 Am It was my intent to perform this visit via video technology, but the patient was not able to do a video connection, so the visit was completed via audio telephone only  Goals addressed in session:(Long Term Goal Number 1) The Client reported that she has obtained food through [employer] which has helped address her food insecurity issues  During the session we explored and processed her relationship weaknesses and the strengths in her different relationships and environments  Interventions: Supportive Therapy, Strengths Therapy,  and Cognitive Behavioral Therapy where the treatment modalities utilized during the session  Assessment and Plan: The Client presented a depressed anxious mood that was congruent in effect  She was alert, goal directed and participated with prompts during the session  The Client was oriented to person, place, time, situation, and reported no suicidal/homicidal ideation, plan, or intent  As team we explored and processed the Client's environmental weaknesses and her increased anxiety and depression  Coping skills were reviewed during the session  Next appointment was set for 2 weeks  Pain:      PSYCH MENTAL STATUS PAIN : 6 as reported by the Client     PHYSICAL PAIN SCALE NUMBERS:7 as reported by  The Client     Current suicide risk : Low/Phone number for [facility] was given  to the Client [phone]  The Client was given phone number for the [facility] [phone]  Behavioral Health Treatment Plan [facility]: Diagnosis and Treatment Plan explained to Cristy Garcia, Cristy Garcia  relates understanding diagnosis and is agreeable to Treatment Plan  Yes    Virtual Brief Visit    Patient is located in the following state in which I hold an active license [place]      Assessment/Plan:    Problem List Items Addressed This Visit        Other    Major depression, recurrent, chronic ([facility] [address] ) - Primary          Recent Visits  No visits were found meeting these conditions  Showing recent visits within past 7 days and meeting all other requirements  Today's Visits  Date Type Provider Dept   10/11/22 Telemedicine YONATAN George Psych   Showing today's visits and meeting all other requirements  Future Appointments  No visits were found meeting these conditions    Showing future appointments within next 150 days and meeting all other requirements         I spent 30 minutes directly with the patient during this visit

## 2022-10-12 DIAGNOSIS — F32.0 CURRENT MILD EPISODE OF MAJOR DEPRESSIVE DISORDER, UNSPECIFIED WHETHER RECURRENT (HCC): ICD-10-CM

## 2022-10-12 DIAGNOSIS — F33.9 MAJOR DEPRESSION, RECURRENT, CHRONIC (HCC): Primary | ICD-10-CM

## 2022-10-14 DIAGNOSIS — F33.9 MAJOR DEPRESSION, RECURRENT, CHRONIC (HCC): ICD-10-CM

## 2022-10-25 ENCOUNTER — TELEMEDICINE (OUTPATIENT)
Dept: BEHAVIORAL/MENTAL HEALTH CLINIC | Facility: CLINIC | Age: 47
End: 2022-10-25
Payer: COMMERCIAL

## 2022-10-25 DIAGNOSIS — F33.1 MAJOR DEPRESSIVE DISORDER, RECURRENT EPISODE, MODERATE (HCC): ICD-10-CM

## 2022-10-25 DIAGNOSIS — F33.9 MAJOR DEPRESSION, RECURRENT, CHRONIC (HCC): Primary | ICD-10-CM

## 2022-10-25 PROCEDURE — T1015 CLINIC SERVICE: HCPCS | Performed by: SOCIAL WORKER

## 2022-10-25 NOTE — PSYCH
Psychotherapy Provided: Individual Psychotherapy 30  minutes 1:30 PM to 1:57 Pm          Goals addressed in session:(Long term goal Number One) The Client reported that she has been assigned a new case manger through [facility]  She reported that [facility]  will not help her obtain SSI but has referred her to different legal supports  During the session we explored the connection with her environment and her mental health  Interventions: Supportive Therapy, Strengths Therapy,  and Cognitive Behavioral Therapy where the treatment modalities utilized during the session  Assessment and Plan:The Client presented a depressed anxious mood that was congruent in effect  She was alert goal directed and participated with prompts during the session  The Client was oriented to person, place, time, situation, and reported no suicidal/homicidal ideation, plan, or intent  As team we explored and processed the Client's environment and the connection with her mental health  During the session we reviewed her coping skills next appointment was set for 1 week  Pain:      PSYCH MENTAL STATUS PAIN :6 as reported by  The Client     PHYSICAL PAIN SCALE NUMBERS:5 as reported by the Client     Current suicide risk : Low/Phone number for National Suicide Prevention Life Line was given to the Client/ [address]: Diagnosis and Treatment Plan explained to [address] [place] [address]  relates understanding diagnosis and is agreeable to Treatment Plan  Yes      Virtual Regular Visit    Verification of patient location:    Patient is located in the following state in which I hold an active license [place]      Assessment/Plan:    Problem List Items Addressed This Visit        Other    Major depression, recurrent, chronic ([facility] [address] ) - Primary      Other Visit Diagnoses     Major depressive disorder, recurrent episode, moderate ([facility] [address] )              Goals addressed in session: Goal 1          Reason for visit is Chief Complaint   Patient presents with   • Virtual Regular Visit        Encounter provider YONATAN Ferreira    Provider located at [address] [place] [address] [address]  [place] [address]      Recent Visits  No visits were found meeting these conditions  Showing recent visits within past 7 days and meeting all other requirements  Future Appointments  No visits were found meeting these conditions  Showing future appointments within next 150 days and meeting all other requirements       The patient was identified by name and date of birth  Tavia Quan was informed that this is a telemedicine visit and that the visit is being conducted throughthe [email or web address] platform  She agrees to proceed     My office door was closed  No one else was in the room  She acknowledged consent and understanding of privacy and security of the video platform  The patient has agreed to participate and understands they can discontinue the visit at any time  Patient is aware this is a billable service  Galilea Garcia is a 48 y o  female was seen for individual mental health          HPI     Past Medical History:   Diagnosis Date   • Allergic     Seasonal Allergies   • Alopecia of scalp     Treated with Proscar   • Asthma    • Chronic pain disorder     back, bilat knees, bilat hips   • CPAP (continuous positive airway pressure) dependence    • Depression    • Diabetes mellitus (HCC)    • Fibromyalgia, primary    • GERD (gastroesophageal reflux disease)    • Hip fx, left, closed, with routine healing, subsequent encounter 05/2017   • Hypertension    • Insomnia    • Irregular heartbeat    • Obesity    • VICTOR HUGO on CPAP    • Shortness of breath    • Sleep apnea        Past Surgical History:   Procedure Laterality Date   • CARPAL TUNNEL RELEASE Left    • LUMBAR EPIDURAL INJECTION     • NERVE BLOCK Bilateral 12/29/2020    Procedure: L2 L3 L4 L5 MEDIAL BRANCH BLOCK #1;  Surgeon: Smith Butterfield MD;  [facility];  Service: Pain Management    • NERVE BLOCK Bilateral 1/19/2021    Procedure: L2 L3 L4 L5 MEDIAL BRANCH BLOCK #2;  Surgeon: Smith Butterfield MD;  [facility];  Service: Pain Management    • RADIOFREQUENCY ABLATION Right 2/25/2021    Procedure: L2 L3 L4 L5 RADIO FREQUENCY ABLATION right side;  Surgeon: Smith Butterfield MD;  [facility];  Service: Pain Management    • RADIOFREQUENCY ABLATION Left 3/16/2021    Procedure: L2 L3 L4 L5 RADIO FREQUENCY ABLATION;  Surgeon: Smith Butterfield MD;  [facility];  Service: Pain Management    • UPPER GASTROINTESTINAL ENDOSCOPY         Current Outpatient Medications   Medication Sig Dispense Refill   • celecoxib (CeleBREX) 100 mg capsule Take 1 capsule (100 mg total) by mouth 2 (two) times a day 180 capsule 1   • lisinopril (ZESTRIL) 10 mg tablet Take 1 tablet (10 mg total) by mouth daily 90 tablet 0   • albuterol (Ventolin HFA) 90 mcg/act inhaler Inhale 2 puffs every 6 (six) hours as needed for wheezing 18 g 5   • cyclobenzaprine (FLEXERIL) 10 mg tablet TAKE 1 TABLET EVERY 12 HOURS FOR 7 DAYS THEN AS NEEDED FOR SPASMS OR PAIN 60 tablet 5   • ergocalciferol (VITAMIN D2) 50,000 units Take 1 capsule (50,000 Units total) by mouth once a week 12 capsule 0   • fluticasone-salmeterol (Advair) 250-50 mcg/dose inhaler Inhale 1 puff 2 (two) times a day Rinse mouth after use  60 blister 5   • glipiZIDE (GLUCOTROL) 5 mg tablet Take 1 tablet (5 mg total) by mouth daily with breakfast 90 tablet 0   • glucose blood test strip Use as instructed 100 each 0   • Lancets (onetouch ultrasoft) lancets Use as instructed 100 each 0   • metFORMIN (GLUCOPHAGE) 1000 MG tablet Take 1 tablet (1,000 mg total) by mouth 2 (two) times a day with meals 180 tablet 0   • sertraline (ZOLOFT) 50 mg tablet Take 1 tablet (50 mg total) by mouth daily at bedtime 90 tablet 0     No current facility-administered medications for this visit          Allergies Allergen Reactions   • Tdap [Tetanus-Diphth-Acell Pertussis] Rash       Review of Systems    Video Exam    There were no vitals filed for this visit      Physical Exam     Visit Time    Visit Start Time: 1:30 Pm   Visit Stop Time: 1:57 Pm   Total Visit Duration: 27 minutes

## 2022-11-02 ENCOUNTER — TELEMEDICINE (OUTPATIENT)
Dept: BEHAVIORAL/MENTAL HEALTH CLINIC | Facility: CLINIC | Age: 47
End: 2022-11-02

## 2022-11-02 DIAGNOSIS — F33.1 MAJOR DEPRESSIVE DISORDER, RECURRENT EPISODE, MODERATE (HCC): ICD-10-CM

## 2022-11-02 DIAGNOSIS — F33.9 MAJOR DEPRESSION, RECURRENT, CHRONIC (HCC): Primary | ICD-10-CM

## 2022-11-02 NOTE — PSYCH
Psychotherapy Provided: Individual Psychotherapy 30  minutes 2:58 Pm to 3:33 PM          Goals addressed in session:(Long Term Goal number One) The Client reported continued anxiety and depression due to her physical pain  " I am in pain all the time and it makes me anxious and depressed" As team we explored the connection with her physical health and that of her mental health  It is hoped that by addressing her weaknesses this will act as a preventive measure agisnt the possible need for higher level of care and services  Interventions: Supportive Therapy, Strengths Therapy,  and Cognitive Behavioral Therapy where the treatment modalities utilized during the session  Assessment and Plan: The Client presented a depressed anxious mood that was congaruent in effect  She was alert, goal directed and particpated with promtps during the session  The lCient was oriented to person, place, time, situation, and reported no suicidal/homcidal ideation, plan, or intent  As team we explored and processed the Cleint's continued and anxiety and depression due to her physical health issues  During the session coping skills were reviewed  Next appointment was set for 1 week  Pain:      PSYCH MENTAL STATUS PAIN :8 as reported by  The Client     PHYSICAL PAIN SCALE NUMBERS:8 as reported by the [facility]     Current suicide risk : Low/Phone number for National Suicide Prevention Life Line was given to the Client/ [address]: Diagnosis and Treatment Plan explained to [address] [place] [address]  relates understanding diagnosis and is agreeable to Treatment Plan  Yes      Virtual Regular Visit    Verification of patient location:    Patient is located in the following state in which I hold an active license [place]      Assessment/Plan:    Problem List Items Addressed This Visit        Other    Major depression, recurrent, chronic ([facility] [address] ) - Primary      Other Visit Diagnoses     Major depressive disorder, recurrent episode, moderate ([facility] [address] )              Goals addressed in session: Goal 1          Reason for visit is   Chief Complaint   Patient presents with   • Virtual Regular Visit        Encounter provider YONATAN Pratt    Provider located at [address] [place] [id number]      Recent Visits  No visits were found meeting these conditions  Showing recent visits within past 7 days and meeting all other requirements  Today's Visits  Date Type Provider Dept   11/02/22 Telemedicine YONATAN [facility]att [facility]   Showing today's visits and meeting all other requirements  Future Appointments  No visits were found meeting these conditions  Showing future appointments within next 150 days and meeting all other requirements       The patient was identified by name and date of birth  Monika Obando was informed that this is a telemedicine visit and that the visit is being conducted throughthe [employer] platform  She agrees to proceed     My office door was closed  No one else was in the room  She acknowledged consent and understanding of privacy and security of the video platform  The patient has agreed to participate and understands they can discontinue the visit at any time  Patient is aware this is a billable service  Subjective  Monika Obando is a 48 y o  female , was seen for individual mental health therapy         HPI     Past Medical History:   Diagnosis Date   • Allergic     Seasonal Allergies   • Alopecia of scalp     Treated with Proscar   • Asthma    • Chronic pain disorder     back, bilat knees, bilat hips   • CPAP (continuous positive airway pressure) dependence    • Depression    • Diabetes mellitus (HCC)    • Fibromyalgia, primary    • GERD (gastroesophageal reflux disease)    • Hip fx, left, closed, with routine healing, subsequent encounter 05/2017   • Hypertension    • Insomnia    • Irregular heartbeat    • Obesity    • VICTOR HUGO on CPAP    • Shortness of breath    • Sleep apnea        Past Surgical History:   Procedure Laterality Date   • CARPAL TUNNEL RELEASE Left    • LUMBAR EPIDURAL INJECTION     • NERVE BLOCK Bilateral 12/29/2020    Procedure: L2 L3 L4 L5 MEDIAL BRANCH BLOCK #1;  Surgeon: Nataliia Maria MD;  [facility];  Service: Pain Management    • NERVE BLOCK Bilateral 1/19/2021    Procedure: L2 L3 L4 L5 MEDIAL BRANCH BLOCK #2;  Surgeon: Nataliia Maria MD;  [facility];  Service: Pain Management    • RADIOFREQUENCY ABLATION Right 2/25/2021    Procedure: L2 L3 L4 L5 RADIO FREQUENCY ABLATION right side;  Surgeon: Nataliia Maria MD;  [facility];  Service: Pain Management    • RADIOFREQUENCY ABLATION Left 3/16/2021    Procedure: L2 L3 L4 L5 RADIO FREQUENCY ABLATION;  Surgeon: Nataliia Maria MD;  [facility];  Service: Pain Management    • UPPER GASTROINTESTINAL ENDOSCOPY         Current Outpatient Medications   Medication Sig Dispense Refill   • celecoxib (CeleBREX) 100 mg capsule Take 1 capsule (100 mg total) by mouth 2 (two) times a day 180 capsule 1   • lisinopril (ZESTRIL) 10 mg tablet Take 1 tablet (10 mg total) by mouth daily 90 tablet 0   • albuterol (Ventolin HFA) 90 mcg/act inhaler Inhale 2 puffs every 6 (six) hours as needed for wheezing 18 g 5   • cyclobenzaprine (FLEXERIL) 10 mg tablet TAKE 1 TABLET EVERY 12 HOURS FOR 7 DAYS THEN AS NEEDED FOR SPASMS OR PAIN 60 tablet 5   • ergocalciferol (VITAMIN D2) 50,000 units Take 1 capsule (50,000 Units total) by mouth once a week 12 capsule 0   • fluticasone-salmeterol (Advair) 250-50 mcg/dose inhaler Inhale 1 puff 2 (two) times a day Rinse mouth after use   60 blister 5   • glipiZIDE (GLUCOTROL) 5 mg tablet Take 1 tablet (5 mg total) by mouth daily with breakfast 90 tablet 0   • glucose blood test strip Use as instructed 100 each 0   • Lancets (onetouch ultrasoft) lancets Use as instructed 100 each 0   • metFORMIN (GLUCOPHAGE) 1000 MG tablet Take 1 tablet (1,000 mg total) by mouth 2 (two) times a day with meals 180 tablet 0   • sertraline (ZOLOFT) 50 mg tablet Take 1 tablet (50 mg total) by mouth daily at bedtime 90 tablet 0     No current facility-administered medications for this visit  Allergies   Allergen Reactions   • Tdap [Tetanus-Diphth-Acell Pertussis] Rash       Review of Systems    Video Exam    There were no vitals filed for this visit      Physical Exam     Visit Time    Visit Start Time: 2:58 Pm    Visit Stop Time: to 3:33 PM  Total Visit Duration: 35 minutes

## 2022-11-04 DIAGNOSIS — I10 ESSENTIAL HYPERTENSION: ICD-10-CM

## 2022-11-04 DIAGNOSIS — E11.65 TYPE 2 DIABETES MELLITUS WITH HYPERGLYCEMIA, WITHOUT LONG-TERM CURRENT USE OF INSULIN (HCC): ICD-10-CM

## 2022-11-04 RX ORDER — GLIPIZIDE 5 MG/1
5 TABLET ORAL
Qty: 90 TABLET | Refills: 0 | Status: SHIPPED | OUTPATIENT
Start: 2022-11-04

## 2022-11-04 RX ORDER — LISINOPRIL 10 MG/1
10 TABLET ORAL DAILY
Qty: 90 TABLET | Refills: 0 | Status: SHIPPED | OUTPATIENT
Start: 2022-11-04

## 2022-11-04 NOTE — TELEPHONE ENCOUNTER
Please notify patient she will need to schedule an appointment with our office and be seen to continue getting her medications prescribed   Thank you

## 2022-11-09 ENCOUNTER — TELEMEDICINE (OUTPATIENT)
Dept: BEHAVIORAL/MENTAL HEALTH CLINIC | Facility: CLINIC | Age: 47
End: 2022-11-09

## 2022-11-09 DIAGNOSIS — F33.9 MAJOR DEPRESSION, RECURRENT, CHRONIC (HCC): Primary | ICD-10-CM

## 2022-11-09 NOTE — PSYCH
Psychotherapy Provided: Individual Psychotherapy 30  minutes 3:00 Pm to 3:25 Pm It was my intent to perform this visit via video technology, but the patient was not able to do a video connection, so the visit was completed via audio telephone only  Goals addressed in session:  (Long Term Goal Number One) The Client reported that her  discharged her from services  During the session we explored the effects of her economic issues has on her mental health  Interventions: Supportive Therapy, Strengths Therapy,  and Cognitive Behavioral Therapy where the treatment modalities utilized during the session  Assessment and Plan:The Client presented a depressed anxious mood that was congruent in effect  She was alert, goal directed and participated with prompts during the session  The client was oriented to person, place, time, situation, and reported no suicidal/homicidal ideation, plan, or intent  As team we explored the Client's anxiety and depression due to her economic issues  Coping skills and community supports were reviewed  Next appointment was set for 2 weeks  Pain:      PSYCH MENTAL STATUS PAIN :8 as reported by the Client     PHYSICAL PAIN SCALE NUMBERS:8 as reported by the client     Current suicide risk : Low/Phone number for National Suicide Prevention Life Line was given to the Client/ [address]: Diagnosis and Treatment Plan explained to [address] [place] [address]  relates understanding diagnosis and is agreeable to Treatment Plan  Yes    Virtual Brief Visit    Patient is located in the following state in which I hold an active license [place]      Assessment/Plan:    Problem List Items Addressed This Visit        Other    Major depression, recurrent, chronic ([facility] [address] ) - Primary          Recent Visits  Date Type Provider Dept   11/02/22 Telemedicine YONATAN Taylor  Cl[facility] Psych   Showing recent visits within past 7 days and meeting all other requirements  Today's Visits  Date Type Provider Dept   11/09/22 Telemedicine YONATAN Ng  Cl[facility] Psych   Showing today's visits and meeting all other requirements  Future Appointments  No visits were found meeting these conditions    Showing future appointments within next 150 days and meeting all other requirements         Visit Time    Visit Start Time: 3:00 Pm   Visit Stop Time: 3:25 Pm   Total Visit Duration: 25 minutes

## 2022-11-21 ENCOUNTER — TELEMEDICINE (OUTPATIENT)
Dept: BEHAVIORAL/MENTAL HEALTH CLINIC | Facility: CLINIC | Age: 47
End: 2022-11-21

## 2022-11-21 DIAGNOSIS — F33.9 MAJOR DEPRESSION, RECURRENT, CHRONIC (HCC): Primary | ICD-10-CM

## 2022-11-21 NOTE — PSYCH
Psychotherapy Provided: Individual Psychotherapy 30  minutes 3:00 Pm to 3:25 Pm It was my intent to perform this visit via video technology, but the patient was not able to do a video connection, so the visit was completed via audio telephone only  Goals addressed in session:(Long Term Goal Number one) The Client reproted continued anxiety and depression due to her physical health issues and economic situations  During the session we explored and processed the Client's environment issues  Interventions: Supportive Therapy, Strengths Therapy,  and Cognitive Behavioral Therapy where the treatment modalities utilized during the session  Assessment and Plan: The Client presented an anxious mood that was congruent in effect  She was alert, goal directed and particapted with prompts during the session  The Client was oriented to person, place, time, situation, and reported no suicidal/homcidal ideation, plan, or intent  As team we explored and processed environmental and relatiosnhip issues and the effects on her mental health  During the session we reviewed her coping skills  Next appointment was set for 2 weeks  Pain:      PSYCH MENTAL STATUS PAIN :5 as reported by the Client     PHYSICAL PAIN SCALE NUMBERS:4 as reported by the client   Current suicide risk : Low/Phone number for National Suicide Prevention Life Line was given to the Client/ [address]: Diagnosis and Treatment Plan explained to [address] [place] [address]  relates understanding diagnosis and is agreeable to Treatment Plan  Yes  Virtual Brief Visit    Patient is located in the following state in which I hold an active license [place]      Assessment/Plan:    Problem List Items Addressed This Visit        Other    Major depression, recurrent, chronic ([facility] [address] ) - Primary       Recent Visits  No visits were found meeting these conditions    Showing recent visits within past 7 days and meeting all other requirements  Today's Visits  Date Type Provider Dept   11/21/22 Telemedicine YONATAN Cai  Cln Psych   Showing today's visits and meeting all other requirements  Future Appointments  No visits were found meeting these conditions    Showing future appointments within next 150 days and meeting all other requirements         Visit Time    Visit Start Time: 03:00 PM   Visit Stop Time: 03:25 Pm   Total Visit Duration: 25 minutes

## 2022-11-23 ENCOUNTER — OFFICE VISIT (OUTPATIENT)
Dept: FAMILY MEDICINE CLINIC | Facility: CLINIC | Age: 47
End: 2022-11-23

## 2022-11-23 VITALS
BODY MASS INDEX: 47.96 KG/M2 | TEMPERATURE: 98.1 F | DIASTOLIC BLOOD PRESSURE: 82 MMHG | SYSTOLIC BLOOD PRESSURE: 137 MMHG | WEIGHT: 262.2 LBS | OXYGEN SATURATION: 96 % | HEART RATE: 106 BPM | RESPIRATION RATE: 20 BRPM

## 2022-11-23 DIAGNOSIS — Z12.31 ENCOUNTER FOR SCREENING MAMMOGRAM FOR BREAST CANCER: ICD-10-CM

## 2022-11-23 DIAGNOSIS — J45.30 MILD PERSISTENT ASTHMA WITHOUT COMPLICATION: ICD-10-CM

## 2022-11-23 DIAGNOSIS — M79.7 FIBROMYALGIA: ICD-10-CM

## 2022-11-23 DIAGNOSIS — Z12.4 SCREENING FOR CERVICAL CANCER: ICD-10-CM

## 2022-11-23 DIAGNOSIS — L65.8 FEMALE PATTERN BALDNESS: ICD-10-CM

## 2022-11-23 DIAGNOSIS — E78.2 MIXED HYPERLIPIDEMIA: ICD-10-CM

## 2022-11-23 DIAGNOSIS — Z23 NEEDS FLU SHOT: ICD-10-CM

## 2022-11-23 DIAGNOSIS — Z23 ENCOUNTER FOR IMMUNIZATION: ICD-10-CM

## 2022-11-23 DIAGNOSIS — D50.9 IRON DEFICIENCY ANEMIA, UNSPECIFIED IRON DEFICIENCY ANEMIA TYPE: ICD-10-CM

## 2022-11-23 DIAGNOSIS — M47.816 LUMBAR FACET JOINT SYNDROME: ICD-10-CM

## 2022-11-23 DIAGNOSIS — E11.65 TYPE 2 DIABETES MELLITUS WITH HYPERGLYCEMIA, WITHOUT LONG-TERM CURRENT USE OF INSULIN (HCC): Primary | ICD-10-CM

## 2022-11-23 DIAGNOSIS — G47.33 OSA (OBSTRUCTIVE SLEEP APNEA): ICD-10-CM

## 2022-11-23 DIAGNOSIS — R79.89 ELEVATED LFTS: ICD-10-CM

## 2022-11-23 DIAGNOSIS — M15.9 PRIMARY OSTEOARTHRITIS INVOLVING MULTIPLE JOINTS: ICD-10-CM

## 2022-11-23 DIAGNOSIS — Z12.11 SCREENING FOR COLON CANCER: ICD-10-CM

## 2022-11-23 DIAGNOSIS — I10 PRIMARY HYPERTENSION: ICD-10-CM

## 2022-11-23 RX ORDER — GLIPIZIDE 5 MG/1
5 TABLET ORAL
Qty: 180 TABLET | Refills: 0 | Status: SHIPPED | OUTPATIENT
Start: 2022-11-23

## 2022-11-23 RX ORDER — ATORVASTATIN CALCIUM 20 MG/1
20 TABLET, FILM COATED ORAL
Qty: 90 TABLET | Refills: 0 | Status: SHIPPED | OUTPATIENT
Start: 2022-11-23

## 2022-11-23 RX ORDER — FINASTERIDE 1 MG/1
1 TABLET, FILM COATED ORAL DAILY
Qty: 90 TABLET | Refills: 1 | Status: SHIPPED | OUTPATIENT
Start: 2022-11-23

## 2022-11-23 NOTE — PROGRESS NOTES
Name: Humza Jeronimo      : 1975      MRN: [id number]  Encounter Provider: Chantelle Rey DO  Encounter Date: 2022   Encounter department: [address] [place] [address]     1  Screening for cervical cancer  - requested a referral   [facility]  Is in need of screening     - Ambulatory Referral to Gynecology; Future    2  Encounter for screening mammogram for breast cancer  We helped her get this scheduled  3  Type 2 diabetes mellitus with hyperglycemia, without long-term current use of insulin ([address] )  - up one point  Will increase her glipizide to bid dosing  Cont metformin  On ACEi, will start statin, LDL not at goal   She is on board  Needs foot exam in f/u  Also needs ACE with next lab check  Encouraged diabetic diet and exercise, as tolerated - limited 2/2 pain  Lab Results   Component Value Date    HGBA1C 7 7 (H) 2022     - glipiZIDE (GLUCOTROL) 5 mg tablet; Take 1 tablet (5 mg total) by mouth 2 (two) times a day before meals  Dispense: 180 tablet; Refill: 0    4  VICTOR HUGO (obstructive sleep apnea)  - not compliant with CPAP as she should be, admits to this  There is mild elev in RBC and hgb, likely 2/2 such  We discussed this today  5  Lumbar facet joint syndrome  - referral to pain  She has seen them  She is requesting this  Uses a rollator to walk  Is on Celebrex and muscle relaxer  Could consider lyrica/meri in f/u, she may have already trialed these things  - Ambulatory Referral to Pain Management; Future    6  Fibromyalgia  - Ambulatory Referral to Pain Management; Future    7  Primary osteoarthritis involving multiple joints  - Ambulatory Referral to Pain Management; Future    8  Primary hypertension  - stable  Discussed the importance of maintaining a healthy lifestyle through heart healthy diet and exercise  9  Mixed hyperlipidemia  - initiated statin  Indicated due to DM and we reviewed lipids from September    Discussed the importance of maintaining a healthy lifestyle through heart healthy diet and exercise     - atorvastatin (LIPITOR) 20 mg tablet; Take 1 tablet (20 mg total) by mouth daily with dinner  Dispense: 90 tablet; Refill: 0    10  Elevated LFTs  - US  Likely fatty liver  We discussed this  She agrees to uS  - US right upper quadrant; Future    11  Iron deficiency anemia, unspecified iron deficiency anemia type  - she is not anemic but does have heavy menses  Not on iron  Can check this  - Iron Panel (Includes Ferritin, Iron Sat%, Iron, and TIBC); Future    12  Screening for colon cancer  Agrees to cologuard  - Cologuard    13  Needs flu shot  - influenza vaccine, quadrivalent, recombinant, PF, 0 5 mL, for patients 18 yr+ (FLUBLOK)    14  Encounter for immunization  - Pneumococcal Conjugate Vaccine 20-valent (Pcv20)    15  Female pattern baldness  - Rx for propecia given  Can do for 3 months and monitor therapeutic results  Did help her in the past     - finasteride (PROPECIA) 1 MG tablet; Take 1 tablet (1 mg total) by mouth daily  Dispense: 90 tablet; Refill: 1    16  Mild persistent asthma without complication  - stable  No abusing her rescue inhaler  RTC in 3 months for CDM  Patient/Caretaker verbalized understanding and were in agreement with today's assessment and plan  Time was taken to address any questions patient/caretaker had  Indication/Risks/Benefits of medication(s) as prescribed were discussed with the patient/caretaker  The patient verbalized understanding and agreement and elects to take medications as prescribed  Time was taken to answer any questions the patient/caretaker may have had  BMI Counseling: Body mass index is 47 96 kg/m²   The BMI is above normal  Nutrition recommendations include decreasing portion sizes, encouraging healthy choices of fruits and vegetables, decreasing fast food intake, consuming healthier snacks, limiting drinks that contain sugar, reducing intake of saturated and trans fat and reducing intake of cholesterol  Exercise recommendations include exercising 3-5 times per week and strength training exercises  Rationale for BMI follow-up plan is due to patient being overweight or obese  Chief Complaint   Patient presents with   • Follow-up       Subjective      DM - admits to not complying the best with diet  Willing to dial up glipizide  No cp, sob  Does have some neuropathy symptoms, needs a foot exam in f/u and to f/u with eye care  ACE is not up to date, should be done in f/u as well  Lab Results   Component Value Date    HGBA1C 7 7 (H) 09/16/2022     Elev LFTs  - willing to have US  No belly pain, changes in her stools  HTN - stable  No reported SEs to meds  No edema, cp, sob  HLD - not on statin but is willing  High B12 - she is taking MVI -- asked her to stop af the moment due to elev B12 levels  Can cont to monitor given Metformin usage and DM  Vit D deficiency - stable     Fibro/Chronic Back/Hip Pain -- uses a rollator  Would like to see Obdulioreynaldojames -- has seen him  Has had nerve ablations  She is feeling increased pain, radiating to her Extremities, hip pain, etc       Willing to have cologuard  Asking to have Finasteride Rx for her balding  This is familial   She has done well with his in the past       Lives at the housing authority in [place] [facility]  Uses STS  Review of Systems   All other systems reviewed and are negative        Current Outpatient Medications on File Prior to Visit   Medication Sig   • albuterol (Ventolin HFA) 90 mcg/act inhaler Inhale 2 puffs every 6 (six) hours as needed for wheezing   • celecoxib (CeleBREX) 100 mg capsule Take 1 capsule (100 mg total) by mouth 2 (two) times a day   • cyclobenzaprine (FLEXERIL) 10 mg tablet TAKE 1 TABLET EVERY 12 HOURS FOR 7 DAYS THEN AS NEEDED FOR SPASMS OR PAIN   • ergocalciferol (VITAMIN D2) 50,000 units Take 1 capsule (50,000 Units total) by mouth once a week   • fluticasone-salmeterol (Advair) 250-50 mcg/dose inhaler Inhale 1 puff 2 (two) times a day Rinse mouth after use  • glucose blood test strip Use as instructed   • Lancets (onetouch ultrasoft) lancets Use as instructed   • lisinopril (ZESTRIL) 10 mg tablet Take 1 tablet (10 mg total) by mouth daily   • metFORMIN (GLUCOPHAGE) 1000 MG tablet Take 1 tablet (1,000 mg total) by mouth 2 (two) times a day with meals   • sertraline (ZOLOFT) 50 mg tablet Take 1 tablet (50 mg total) by mouth daily at bedtime   • [DISCONTINUED] glipiZIDE (GLUCOTROL) 5 mg tablet Take 1 tablet (5 mg total) by mouth daily with breakfast       Objective     /82 (BP Location: Left arm, Patient Position: Sitting, Cuff Size: Large)   Pulse (!) 106   Temp 98 1 °F (36 7 °C) (Tympanic)   Resp 20   Wt 119 kg (262 lb 3 2 oz)   LMP 11/23/2022 (Exact Date)   SpO2 96%   BMI 47 96 kg/m²     Physical Exam  Vitals and nursing note reviewed  Constitutional:       General: She is not in acute distress  Appearance: Normal appearance  She is obese  She is not toxic-appearing  HENT:      Head: Normocephalic and atraumatic  Comments: Noted balding/thin hair   Eyes:      Conjunctiva/sclera: Conjunctivae normal       Pupils: Pupils are equal, round, and reactive to light  Cardiovascular:      Rate and Rhythm: Normal rate and regular rhythm  Heart sounds: Normal heart sounds  Pulmonary:      Effort: Pulmonary effort is normal       Breath sounds: Normal breath sounds  No wheezing, rhonchi or rales  Abdominal:      General: Bowel sounds are normal       Palpations: Abdomen is soft  Musculoskeletal:      Cervical back: Neck supple  Comments: Using rollator to walk  Slow, antalgic gait     Lymphadenopathy:      Cervical: No cervical adenopathy  Skin:     General: Skin is warm and dry  Neurological:      General: No focal deficit present        Mental Status: She is alert and oriented to person, place, and time  Psychiatric:         Mood and Affect: Mood normal          Behavior: Behavior normal          Thought Content: Thought content normal          Judgment: Judgment normal        Total time I spent caring for this patient on the day of the encounter - 40 minutes      10 minutes spent before the visit  25 minutes spent during the visit  5 minutes spent after the visit       Katie Hernandez DO

## 2022-12-02 ENCOUNTER — TELEMEDICINE (OUTPATIENT)
Dept: BEHAVIORAL/MENTAL HEALTH CLINIC | Facility: CLINIC | Age: 47
End: 2022-12-02

## 2022-12-02 DIAGNOSIS — F33.9 MAJOR DEPRESSION, RECURRENT, CHRONIC (HCC): Primary | ICD-10-CM

## 2022-12-02 NOTE — PSYCH
Cristy Garciamons  1975         Date of Initial Treatment Plan:08/20/20  Date of Current Treatment Plan: 12/02/22     Treatment Plan Number: 8th Treatment Plan     Strengths/Personal Resources for Self Care: The Client has supportive extended family of origin, and peer relationships  She is able to utilize community supports when needed  At the present time she is receiving her physical health care through [facility] in [place], and mental health therapy with this therapist Yuli OSWALD LCSW     Diagnosis:   1  Major depressive disorder, recurrent episode, moderate (HCC)            Area of Needs: The Client has a long history of depression and anxiety, which is effecting her different relationships and environments  It is hoped that The Client will achieve or maintain maximum functional capacity in performing daily activizes, taking into account both the functional capacity of the individual and those functional capacities appropriate for the individuals of the same age  Reduce or ameliorate the physical mental, behavioral, or developmental effects of an illness, condition, injury or disability   Present treatment plan will cover 4 months or 12 visits which ever comes first                Long Term Goal 1: The Client's depression score on her PHQ-9 will decrease from 17 to 8 or less (emerging)      Target Date: 08/20/2021  Completion Date:          Short Term Objectives for Goal 1: The Client will utilize her  coping skills 3 out of 5 when presented with depression  (emerging)      Long Term Goal 2: The Client will become more aware of her anxiety when presented 3 out of 5 times(emerging)     Target Date: 08/20/2021  Completion Date:      Short Term Objectives for Goal 2: The Client will utilize her coping skill 3 out of 5 times when presented with anxiety  (emerging)          Long Term Goal # 3:The Client will attend all of her medical appointments 100% of the time       Target Date: 08/20/21  Completion Date:            GOAL 1: Modality: The person(s) responsible for carrying out the plan is  The CLient and this therapist Marcella OSWALD LCSW     GOAL 2: Modality: The person(s) responsible for carrying out the plan is  The [facility] and this therapist Marcella OSWALD LCSW      GOAL 3: Modality: The person(s) responsible for carrying out the plan is  The [address] [facility]: Diagnosis and Treatment Plan explained to Poppy, Poppy relates understanding diagnosis and is agreeable to Treatment Plan          Client Comments : Please share your thoughts, feelings, need and/or experiences regarding your treatment plan:    The Client's short term treatment goals will be reviewed and or completed on or before 04/02/2023  The Client identified that she no longer services " I don't know"   __________________________________________________________________     __________________________________________________________________     __________________________________________________________________     __________________________________________________________________     _______________________________________                 Patient signature, Date Time: __________________________________________        Physician cosigner signature, Date, Time: ________________________________

## 2022-12-02 NOTE — PSYCH
Psychotherapy Provided: Individual Psychotherapy 30  minutes 1:00 Pm to 1:30 Pm It was my intent to perform this visit via video technology, but the patient was not able to do a video connection, so the visit was completed via audio telephone only  Goals addressed in session: (Creation of 8th treatment plan) The Client reported that her uncle  over the past week  " he  due to needing lung transplant  During the session we explored her grief and created her recovery treatment plan  Interventions: Supportive Therapy, Strengths Therapy,  and Cognitive Behavioral Therapy where the treatment modalities utilized during the session  Assessment and Plan: The client presented an anxious depressed mood that was congruent in effect  She was alert, goal directed and participated with prompts during the session  The Client was oriented to person, place, time, situation, and reported no suicidal/homicidal ideation, plan, or intent  As team we explored the client's grief and the effects on her different relationships and environments  During the session we created her recovery treatment plan  Next appointment was set for 1 week  Pain:      PSYCH MENTAL STATUS PAIN :7 as reported by the Client     PHYSICAL PAIN SCALE NUMBERS:8 as reported by  The Client     Current suicide risk : Low/It was my intent to perform this visit via video technology, but the patient was not able to do a video connection, so the visit was completed via audio telephone only  Behavioral Health Treatment Plan [facility]: Diagnosis and Treatment Plan explained to Cristy Garcia, Cristy Garcia  relates understanding diagnosis and is agreeable to Treatment Plan  Yes    Virtual Brief Visit    Patient is located in the following state in which I hold an active license [place]      Assessment/Plan:    Problem List Items Addressed This Visit        Other    Major depression, recurrent, chronic ([facility] [address] ) - Primary       Recent Visits  No visits were Recommend continued MONITORING for glaucoma. found meeting these conditions  Showing recent visits within past 7 days and meeting all other requirements  Today's Visits  Date Type Provider Dept   12/02/22 Telemedicine YONATAN Camara  Cln Psych   Showing today's visits and meeting all other requirements  Future Appointments  No visits were found meeting these conditions    Showing future appointments within next 150 days and meeting all other requirements         Visit Time    Visit Start Time: 1:00 Pm   Visit Stop Time: 1:30 Pm   Total Visit Duration: 30 minutes

## 2022-12-07 ENCOUNTER — HOSPITAL ENCOUNTER (OUTPATIENT)
Dept: RADIOLOGY | Facility: CLINIC | Age: 47
Discharge: HOME | End: 2022-12-07

## 2022-12-07 ENCOUNTER — APPOINTMENT (OUTPATIENT)
Dept: LAB | Facility: HOSPITAL | Age: 47
End: 2022-12-07

## 2022-12-07 ENCOUNTER — OFFICE VISIT (OUTPATIENT)
Dept: PAIN MEDICINE | Facility: CLINIC | Age: 47
End: 2022-12-07

## 2022-12-07 ENCOUNTER — HOSPITAL ENCOUNTER (OUTPATIENT)
Dept: ULTRASOUND IMAGING | Facility: HOSPITAL | Age: 47
Discharge: HOME | End: 2022-12-07
Attending: FAMILY MEDICINE

## 2022-12-07 VITALS
HEIGHT: 63 IN | HEART RATE: 97 BPM | TEMPERATURE: 96.4 F | DIASTOLIC BLOOD PRESSURE: 86 MMHG | BODY MASS INDEX: 46.67 KG/M2 | RESPIRATION RATE: 20 BRPM | WEIGHT: 263.4 LBS | SYSTOLIC BLOOD PRESSURE: 118 MMHG

## 2022-12-07 DIAGNOSIS — M79.7 FIBROMYALGIA: ICD-10-CM

## 2022-12-07 DIAGNOSIS — D50.9 IRON DEFICIENCY ANEMIA, UNSPECIFIED IRON DEFICIENCY ANEMIA TYPE: ICD-10-CM

## 2022-12-07 DIAGNOSIS — M15.9 PRIMARY OSTEOARTHRITIS INVOLVING MULTIPLE JOINTS: ICD-10-CM

## 2022-12-07 DIAGNOSIS — R79.89 ELEVATED LFTS: ICD-10-CM

## 2022-12-07 DIAGNOSIS — M16.0 PRIMARY OSTEOARTHRITIS OF HIPS, BILATERAL: ICD-10-CM

## 2022-12-07 DIAGNOSIS — M16.0 PRIMARY OSTEOARTHRITIS OF HIPS, BILATERAL: Primary | ICD-10-CM

## 2022-12-07 DIAGNOSIS — M16.0 PRIMARY OSTEOARTHRITIS OF BOTH HIPS: ICD-10-CM

## 2022-12-07 DIAGNOSIS — M47.816 LUMBAR FACET JOINT SYNDROME: ICD-10-CM

## 2022-12-07 LAB
FERRITIN SERPL-MCNC: 37 NG/ML (ref 8–388)
IRON SATN MFR SERPL: 21 % (ref 15–50)
IRON SERPL-MCNC: 90 UG/DL (ref 50–170)
TIBC SERPL-MCNC: 439 UG/DL (ref 250–450)

## 2022-12-07 RX ORDER — METHYLPREDNISOLONE ACETATE 80 MG/ML
80 INJECTION, SUSPENSION INTRA-ARTICULAR; INTRALESIONAL; INTRAMUSCULAR; PARENTERAL; SOFT TISSUE ONCE
OUTPATIENT
Start: 2022-12-07 | End: 2022-12-07

## 2022-12-07 RX ORDER — BUPIVACAINE HCL/PF 2.5 MG/ML
5 VIAL (ML) INJECTION ONCE
OUTPATIENT
Start: 2022-12-07 | End: 2022-12-07

## 2022-12-07 RX ORDER — LIDOCAINE HYDROCHLORIDE 10 MG/ML
5 INJECTION, SOLUTION EPIDURAL; INFILTRATION; INTRACAUDAL; PERINEURAL ONCE
OUTPATIENT
Start: 2022-12-07 | End: 2022-12-07

## 2022-12-07 RX ORDER — BUPIVACAINE HCL/PF 2.5 MG/ML
4 VIAL (ML) INJECTION ONCE
OUTPATIENT
Start: 2022-12-07 | End: 2022-12-07

## 2022-12-07 RX ORDER — LIDOCAINE HYDROCHLORIDE 10 MG/ML
10 INJECTION, SOLUTION EPIDURAL; INFILTRATION; INTRACAUDAL; PERINEURAL ONCE
OUTPATIENT
Start: 2022-12-07 | End: 2022-12-07

## 2022-12-07 NOTE — PROGRESS NOTES
Assessment  1  Primary osteoarthritis of hips, bilateral  -     XR hips bilateral 2 vw w pelvis if performed; Future; Expected date: 12/07/2022  -     Case request operating room: INJECTION JOINT HIP; Standing  -     Case request operating room: INJECTION JOINT HIP    2  Lumbar facet joint syndrome  -     Ambulatory Referral to Pain Management  -     Case request operating room: RHIZOTOMY LUMBAR L3, L4, L5 medial branch nerves; Standing  -     Case request operating room: RHIZOTOMY LUMBAR L3, L4, L5 medial branch nerves    3  Fibromyalgia  -     Ambulatory Referral to Pain Management    4  Primary osteoarthritis involving multiple joints  -     Ambulatory Referral to Pain Management    5  Primary osteoarthritis of both hips    Greater than 80% improvement with recent radiofrequency ablation of bilateral Medial branch nerves at L2-L5 done more than 1 5 years ago with mproved ability to participate with IADLs in significantly less pain; pain has subsequently returned  Additionally describes b/l hip pain with radiation to the groin; ttp over b/l gtb, pain with internal/external rotation of b/l hips; pain worse with standing/ambulation  Lifestyle modifications extensively discussed including diet, exercise and weight loss  Previously reported the following symptomatology:    Chronic axial low back pain described by primarily arthritic features  Positive facet loading maneuvers as noted below  Mild noncompressive lumbar degenerative change at the L3-4 and L4-5 levels  Previously had a left L3 transforaminal steroid injection with Dr Arina West which helped for about a week and a half  Arthritic symptomatology greater than radicular features at this time  Reasonable to continue multimodal pain therapy plan  Plan  -xray hips b/l; will f/u result with patient  -continue Celebrex 100 mg b i d  As needed for lumbar facet syndrome    Counseled regarding bleeding risk of taking this medication   -lifestyle modifications including diet, exercise and weight loss extensively discussed  -bilateral intra-articular hip joint injection; f/u 2 weeks post porcedure  -repeat bilateral L3, L4, L5 medial branch nerve RF lesioning; f/u 2 weeks post procedure  -hip exercises provided; continue physical therapy and core exercises for lumbar facet syndrome    There are risks associated with opioid medications, including dependence, addiction and tolerance  The patient understands and agrees to use these medications only as prescribed  Potential side effects of the medications include, but are not limited to, constipation, drowsiness, addiction, impaired judgment and risk of fatal overdose if not taken as prescribed  The patient was warned against driving while taking sedation medications  Sharing medications is a felony  At this point in time, the patient is showing no signs of addiction, abuse, diversion or suicidal ideation  [facility] Prescription Drug Monitoring Program report was reviewed and was appropriate     Complete risks and benefits including bleeding, infection, tissue reaction, nerve injury and allergic reaction were discussed  The approach was demonstrated using models and literature was provided  Verbal and written consent was obtained  My impressions and treatment recommendations were discussed in detail with the patient who verbalized understanding and had no further questions  Discharge instructions were provided  I personally saw and examined the patient and I agree with the above discussed plan of care  No orders of the defined types were placed in this encounter  History of Present Illness    Greater than 80% improvement with recent radiofrequency ablation of bilateral Medial branch nerves at L2-L5 done more than 1 5 years ago with mproved ability to participate with IADLs in significantly less pain; pain has subsequently returned   Additionally describes b/l hip pain with radiation to the groin; ttp over b/l gtb, pain with internal/external rotation of b/l hips; pain worse with standing/ambulation  Previously reported the following symptomatology:    Sophie Sommers is a 48 y o  female with past medical history of axial low back pain described primarily by arthritic features  She presents with a 2 year history of chronic low back pain described primarily as arthritic in nature  she describes 8/10 low back pain that is worse in the mornings and worse at the end of the day  The pain is characterized by achy, nagging, indolent, crampy, stabbing pain in his/her axial low back  The patient describes that the pain is worse with standing for long periods of time on hard surfaces as well as with walking  The patient is a very active individual and feels as though this pain compromises her participation with independent activities of daily living  The pain can be debilitating at times and contribute to significant disability, compromising overall activity and independent activities of daily living  She has tried physical therapy with limited relief of symptoms  Medications the patient has tried in the past include   Celebrex,  and Flexeril  She describes minor radicular symptoms in the L3 and L4 dermatomal distribution but otherwise has good strength  Previously she had left L3 transforaminal epidural steroid injection with relief for about 2 weeks  She denies any weakness numbness or paresthesias  The patient denies any bowel or bladder dysfunction as well  I have personally reviewed and/or updated the patient's past medical history, past surgical history, family history, social history, current medications, allergies, and vital signs today  Review of Systems   Constitutional: Positive for activity change  HENT: Negative  Eyes: Negative  Respiratory: Negative  Cardiovascular: Negative  Gastrointestinal: Negative  Endocrine: Negative  Genitourinary: Negative      Musculoskeletal: Positive for arthralgias, back pain, gait problem and myalgias  Skin: Negative  Allergic/Immunologic: Negative  Hematological: Negative  Psychiatric/Behavioral: Negative  All other systems reviewed and are negative        Patient Active Problem List   Diagnosis   • SOB (shortness of breath)   • Chronic bilateral low back pain without sciatica   • Chronic pain of left knee   • Chronic pain of right knee   • Fibromyalgia   • Primary osteoarthritis involving multiple joints   • Mild persistent asthma without complication   • Lumbar radiculopathy   • Primary osteoarthritis of both hips   • Left hip pain   • Major depression, recurrent, chronic (HCC)   • Lumbar facet joint syndrome       Past Medical History:   Diagnosis Date   • Allergic     Seasonal Allergies   • Alopecia of scalp     Treated with Proscar   • Asthma    • Chronic pain disorder     back, bilat knees, bilat hips   • CPAP (continuous positive airway pressure) dependence    • Depression    • Diabetes mellitus (HCC)    • Fibromyalgia, primary    • GERD (gastroesophageal reflux disease)    • Hip fx, left, closed, with routine healing, subsequent encounter 05/2017   • Hypertension    • Insomnia    • Irregular heartbeat    • Obesity    • VICTOR HUGO on CPAP    • Shortness of breath    • Sleep apnea        Past Surgical History:   Procedure Laterality Date   • CARPAL TUNNEL RELEASE Left    • LUMBAR EPIDURAL INJECTION     • NERVE BLOCK Bilateral 12/29/2020    Procedure: L2 L3 L4 L5 MEDIAL BRANCH BLOCK #1;  Surgeon: Jacob Chew MD;  Location: [facility];  Service: Pain Management    • NERVE BLOCK Bilateral 1/19/2021    Procedure: L2 L3 L4 L5 MEDIAL BRANCH BLOCK #2;  Surgeon: Jacob Chew MD;  Location: [facility];  Service: Pain Management    • RADIOFREQUENCY ABLATION Right 2/25/2021    Procedure: L2 L3 L4 L5 RADIO FREQUENCY ABLATION right side;  Surgeon: Jacob Chew MD;  Location: [facility];  Service: Pain Management    • RADIOFREQUENCY ABLATION Left 3/16/2021    Procedure: L2 L3 L4 L5 RADIO FREQUENCY ABLATION;  Surgeon: Skye Mtz MD;  Location: [facility];  Service: Pain Management    • UPPER GASTROINTESTINAL ENDOSCOPY         Family History   Problem Relation Age of Onset   • Cancer Father    • Hepatitis Father    • Asthma Cousin    • Hypertension Paternal Grandmother    • No Known Problems Mother    • No Known Problems Sister    • No Known Problems Maternal Grandmother    • No Known Problems Maternal Grandfather    • No Known Problems Paternal Grandfather    • No Known Problems Paternal Aunt    • BRCA2 Positive Neg Hx    • BRCA2 Negative Neg Hx    • BRCA1 Positive Neg Hx    • BRCA1 Negative Neg Hx    • BRCA 1/2 Neg Hx    • Ovarian cancer Neg Hx    • Endometrial cancer Neg Hx    • Colon cancer Neg Hx    • Breast cancer additional onset Neg Hx    • Breast cancer Neg Hx        Social History     Occupational History   • Not on file   Tobacco Use   • Smoking status: Former   • Smokeless tobacco: Never   Vaping Use   • Vaping Use: Never used   Substance and Sexual Activity   • Alcohol use: No   • Drug use: No   • Sexual activity: Not on file       Current Outpatient Medications on File Prior to Visit   Medication Sig   • albuterol (Ventolin HFA) 90 mcg/act inhaler Inhale 2 puffs every 6 (six) hours as needed for wheezing   • atorvastatin (LIPITOR) 20 mg tablet Take 1 tablet (20 mg total) by mouth daily with dinner   • celecoxib (CeleBREX) 100 mg capsule Take 1 capsule (100 mg total) by mouth 2 (two) times a day   • cyclobenzaprine (FLEXERIL) 10 mg tablet TAKE 1 TABLET EVERY 12 HOURS FOR 7 DAYS THEN AS NEEDED FOR SPASMS OR PAIN   • ergocalciferol (VITAMIN D2) 50,000 units Take 1 capsule (50,000 Units total) by mouth once a week   • finasteride (PROPECIA) 1 MG tablet Take 1 tablet (1 mg total) by mouth daily   • fluticasone-salmeterol (Advair) 250-50 mcg/dose inhaler Inhale 1 puff 2 (two) times a day Rinse mouth after use     • glipiZIDE (GLUCOTROL) 5 mg tablet Take 1 tablet (5 mg total) by mouth 2 (two) times a day before meals   • glucose blood test strip Use as instructed   • Lancets (onetouch ultrasoft) lancets Use as instructed   • lisinopril (ZESTRIL) 10 mg tablet Take 1 tablet (10 mg total) by mouth daily   • metFORMIN (GLUCOPHAGE) 1000 MG tablet Take 1 tablet (1,000 mg total) by mouth 2 (two) times a day with meals   • sertraline (ZOLOFT) 50 mg tablet Take 1 tablet (50 mg total) by mouth daily at bedtime     No current facility-administered medications on file prior to visit  Allergies   Allergen Reactions   • Tdap [Tetanus-Diphth-Acell Pertussis] Rash         Physical Exam    /86   Pulse 97   Temp (!) 96 4 °F (35 8 °C)   Resp 20   Ht 5' 2 5" (1 588 m)   Wt 119 kg (263 lb 6 4 oz)   LMP 11/23/2022 (Exact Date)   BMI 47 41 kg/m²     Constitutional: normal, well developed, well nourished, alert, in no distress and non-toxic and no overt pain behavior  and obese  Eyes: anicteric  HEENT: grossly intact  Neck: supple, symmetric, trachea midline and no masses   Pulmonary:even and unlabored  Cardiovascular:No edema or pitting edema present  Skin:Normal without rashes or lesions and well hydrated  Psychiatric:Mood and affect appropriate  Neurologic:Cranial Nerves II-XII grossly intact Sensation grossly intact; no clonus negative dupree's  Reflexes 2+ and brisk  SLR negative bilaterally  Musculoskeletal:  Steppage gait  Normal heel toe and tip toe walking  Significant pain with lumbar facet loading bilaterally and with lateral spine rotation left greater than right  TTP over lumbar paraspinal muscles  Negative wilda's test, negative gaenslen's negative SIJ loading bilaterally  ttp over b/l gtb, pain with internal/external rotation of b/l hips    Imaging    MRI LUMBAR SPINE WITHOUT CONTRAST     INDICATION: [id number]: Lumbago with sciatica, left side  G89 [id number]:  Other chronic pain      COMPARISON:  None      TECHNIQUE:  Sagittal T1, sagittal T2, sagittal inversion recovery, axial T1 and axial T2, coronal T2     IMAGE QUALITY:  Diagnostic     FINDINGS:     VERTEBRAL BODIES:  There are 5 lumbar type vertebral bodies  Normal alignment of the lumbar spine  No spondylolysis or spondylolisthesis  No scoliosis  No compression fracture  Normal marrow signal is identified within the visualized bony   structures  No discrete marrow lesion      SACRUM:  Normal signal within the sacrum  No evidence of insufficiency or stress fracture      DISTAL CORD AND CONUS:  Normal size and signal within the distal cord and conus      PARASPINAL SOFT TISSUES:  There is thickening of the endometrial cavity partially visualized on this examination towards the fundus  There is a dominant follicle identified within the left ovary      LOWER THORACIC DISC SPACES:  Normal disc height and signal   No disc herniation, canal stenosis or foraminal narrowing      LUMBAR DISC SPACES:     L1-L2:  Normal      L2-L3:  Normal      L3-L4:  Annular bulging with a small broad-based left foraminal and extraforaminal disc protrusion best seen on series 6 image 13  There is no canal stenosis  Only minimal left foraminal narrowing without nerve impingement      L4-L5:  Disc desiccation and loss of disc height with mild annular bulging  Small central disc herniation without canal stenosis or foraminal nerve impingement      L5-S1:  Normal disc height and signal without canal stenosis or foraminal narrowing      IMPRESSION:     Mild noncompressive lumbar degenerative change at the L3-4 and L4-5 levels      Fluid signal in the endometrial cavity partially visualized at the fundus    If patient is postmenopausal, consider follow-up ultrasound imaging      Signal

## 2022-12-07 NOTE — PATIENT INSTRUCTIONS
Hip Bursitis Exercises   AMBULATORY CARE:   Hip bursitis exercises  help strengthen the muscles in your hip and keep the joint flexible  Strong muscles can help reduce pain, prevent injury, and keep the joint stable  The exercises can also help increase the range of motion in your hip joint  What you need to know about exercise safety:   Move slowly and smoothly  Avoid fast or jerky motions  This will help prevent an injury  Breathe normally  Do not hold your breath  It is important to breathe in and out so you do not tense up during exercise  Tension could prevent you from moving your joint in a full range of motion  Do the exercises and stretches on both legs  Do this so both hips remain strong and flexible  Stop if you feel sharp pain or an increase in pain  Contact your healthcare provider or physical therapist  It is normal to feel some discomfort during exercise  Regular exercise will help decrease your discomfort over time  Warm up before you stretch and exercise  Walk or ride a stationary bike for 5 to 10 minutes  How to do hip stretches: Your healthcare provider or physical therapist will tell you how many times to do each stretch  Do the stretch on both sides before you move to the next stretch  Standing iliotibial band stretch:  Stand with the leg on your injured side behind your other leg  Bend sideways toward the side that is not injured  Stop when you feel a stretch in your outer hip  Hold for 5 to 10 seconds  Then return to the starting position  Lying iliotibial band stretch:  Lie on your back  Bend the knee on your injured side toward your chest  Place your hands on the outside of your knee and thigh  Slowly pull the knee across your body  Stop when you feel a stretch in your hip and outer thigh  Hold for 5 to 10 seconds  Return your leg to the starting position  Hip stretch:  Lie on your back with both legs straight and on the ground   Bend the knee on your injured side toward your chest until you can reach your lower leg  Place both hands on your shin and pull your knee toward your chest  Hold for 5 to 10 seconds  Return your leg to the starting position  Knee to chest:  Lie on your back with both knees bent and feet flat on the floor  Bend the knee on your injured side toward your chest until you can reach your lower leg  Place both hands on your shin and pull your knee toward your chest  Hold for 5 to 10 seconds  Return your leg to the starting position  Internal hip rotator stretch:  You will do this exercise on a table  Lie on your side with the injured hip on top  You may be told to keep a pillow between your thighs  Move the top leg so the foot hangs below the edge of the table  Rotate your hip to raise your foot in the opposite direction of the bottom shoulder  Raise your foot as high as you can so you feel a stretch in the back of your thigh  Hold for 5 seconds  Then slowly lower your foot to the starting position  External hip rotator stretch:  You will do this exercise on a table  Lie on your side with the injured hip on the bottom  You do not need a pillow between your thighs for this exercise  Move the bottom leg so the foot is off the edge of the table  Rotate your hip to lift the foot in the opposite direction of the bottom shoulder  Raise your foot as high as you can so you feel a stretch in your buttock  Hold for 5 seconds  Then slowly lower your foot to the starting position  Kneeling hip flexor stretch:  Kneel on your knee on the injured side  Place the foot of your other leg on the floor so the knee is bent  Put both hands on top of your thigh  Keep your back straight and abdominal muscles tight  Lean forward until you feel a stretch in your other thigh  Hold the stretch for 10 seconds  Return to the starting position  How to do hip strengthening exercises:   Your healthcare provider or physical therapist will tell you how many times to do each exercise  Do the exercise on both sides before you move to the next exercise  Straight leg lift to the side: This may also be called hip abduction  Lie on your side with straight legs, with the injured hip on top  Slowly raise your top leg toward the ceiling as high as you can  Keep your foot pointed  Hold for 5 seconds  Then slowly lower your leg to the starting position  Inner thigh lift: This may also be called hip adduction  Lie on your side with straight legs, with the injured hip on the bottom  Cross your top leg over your bottom leg  Put the foot of your top leg on the floor in front of you  Raise your bottom leg until it touches the top leg  Hold for 5 seconds  Then slowly lower the leg to the floor  Clam exercise:  Lie on your side so your injured side is on top  Bend your knees  Keep your heels together during this exercise  Slowly raise your top knee toward the ceiling  Then lower your leg so your knees are together  Call your doctor if:   You have sharp pain during exercise or at rest     You have questions or concerns about the stretches or exercises  © Copyright [employer] 2022 Information is for End User's use only and may not be sold, redistributed or otherwise used for commercial purposes  All illustrations and images included in CareNotes® are the copyrighted property of A D A M , Inc  or [facility] Amber Machado   The above information is an  only  It is not intended as medical advice for individual conditions or treatments  Talk to your doctor, nurse or pharmacist before following any medical regimen to see if it is safe and effective for you

## 2022-12-09 DIAGNOSIS — R16.0 HEPATOMEGALY: ICD-10-CM

## 2022-12-09 DIAGNOSIS — R79.89 ELEVATED LFTS: Primary | ICD-10-CM

## 2022-12-10 ENCOUNTER — TELEMEDICINE (OUTPATIENT)
Dept: BEHAVIORAL/MENTAL HEALTH CLINIC | Facility: CLINIC | Age: 47
End: 2022-12-10

## 2022-12-10 DIAGNOSIS — F33.9 MAJOR DEPRESSION, RECURRENT, CHRONIC (HCC): Primary | ICD-10-CM

## 2022-12-10 NOTE — PSYCH
Psychotherapy Provided: Individual Psychotherapy 30  minutes 12:55 Pm to 1:25 Pm It was my intent to perform this visit via video technology, but the patient was not able to do a video connection, so the visit was completed via audio telephone only  Goals addressed in session: (Long Term Goal Number One) The Client reported that she obtained a new  to obtain Social Security  She reported continued problems with her physical health  During the session we explored weaknesses in her environment and the effects on her mental health  Interventions: Supportive Therapy, Strengths Therapy,  and Cognitive Behavioral Therapy where the treatment modalities utilized during the session  Assessment and Plan: The Client presented an anxious mood that was congruent in effect  She was alert, goal directed and participated with prompts during the session  The Client was oriented to person, place, time, situation, and reported no suicidal/homicidal ideation, plan, or intent  As team we explored and processed the client's continued weaknesses and her increased anxiety and depression  During the session we reviewed her coping skills  Next appointment was set 2 weeks  Pain:      PSYCH MENTAL STATUS PAIN :6 as reported by the Client     PHYSICAL PAIN SCALE NUMBERS:8 as reported by the Client due to back pain  Current suicide risk : Low/Phone number for National Suicide Prevention Life Line was given to the Client/ [address]: Diagnosis and Treatment Plan explained to [address] [place] [address]  relates understanding diagnosis and is agreeable to Treatment Plan  Yes    Virtual Brief Visit    Patient is located in the following state in which I hold an active license [place]      Assessment/Plan:    Problem List Items Addressed This Visit        Other    Major depression, recurrent, chronic ([facility] [place] [address] ) - Primary       Recent Visits  No visits were found meeting these conditions  Showing recent visits within past 7 days and meeting all other requirements  Future Appointments  No visits were found meeting these conditions    Showing future appointments within next 150 days and meeting all other requirements         Visit Time    Visit Start Time: 12:55 Pm   Visit Stop Time: 1:25 Pm   Total Visit Duration: 30 minutes

## 2022-12-12 ENCOUNTER — TELEPHONE (OUTPATIENT)
Dept: RADIOLOGY | Facility: CLINIC | Age: 47
End: 2022-12-12

## 2022-12-12 NOTE — TELEPHONE ENCOUNTER
----- Message from Roxann Mckeon MD sent at 12/12/2022  7:45 AM EST -----  Please let her know of mild OA in b/l hips; will still plan for b/l hip intra-articular joint injections as scheduled  ----- Message -----  From: Interface, Radiology Results In  Sent: 12/11/2022   8:31 PM EST  To: Roxann Mckeon MD

## 2022-12-16 DIAGNOSIS — R73.03 PREDIABETES: ICD-10-CM

## 2022-12-19 NOTE — DISCHARGE INSTR - AVS FIRST PAGE
YOUR 2 WEEK FOLLOW UP HAS BEEN SCHEDULED; IF YOU WISH TO CHANGE THE FOLLOW UP, PLEASE CALL THE SPINE AND PAIN CENTER AT [facility]: [phone]    Steroid Joint Injection   WHAT YOU NEED TO KNOW:   A steroid joint injection is a procedure to inject steroid medicine into a joint  Steroid medicine decreases pain and inflammation  The injection may also contain an anesthetic (numbing medicine) to decrease pain  It may be done to treat conditions such as arthritis, gout, or carpal tunnel syndrome  The injections may be given in your knee, ankle, shoulder, elbow, or wrist  Injections may also be given in your hip, toe, thumb, or finger  DISCHARGE INSTRUCTIONS:   Contact your healthcare provider if:   You have fever or chills  You have redness or swelling at the injection site  You have more pain than usual in your joint for more than 72 hours  You have questions or concerns about your condition or care  Medicines:   Pain medicine  may be given  Ask how to take this medicine safely  Take your medicine as directed  Contact your healthcare provider if you think your medicine is not helping or if you have side effects  Tell him or her if you are allergic to any medicine  Keep a list of the medicines, vitamins, and herbs you take  Include the amounts, and when and why you take them  Bring the list or the pill bottles to follow-up visits  Carry your medicine list with you in case of an emergency  Self-care:   Leave the bandage on for 8 to 12 hours  Care for your wound as directed  Rest the area  as directed  You may need to decrease weight on certain joints, such as the knee, for a period of time  Ask when you can return to your daily activities  Elevate  your limb where the steroid injection was given  Elevate the limb above the level of your heart as often as you can  This will help decrease swelling and pain  Prop your limb on pillows or blankets to keep it elevated comfortably  Apply ice  on your joint for 15 to 20 minutes every hour or as directed  Use an ice pack, or put crushed ice in a plastic bag  Cover it with a towel  Ice helps prevent tissue damage and decreases swelling and pain  © Copyright [address] 2022 Information is for End User's use only and may not be sold, redistributed or otherwise used for commercial purposes  All illustrations and images included in CareNotes® are the copyrighted property of A D A M , Inc  or [facility] Amber Lopez  The above information is an  only  It is not intended as medical advice for individual conditions or treatments  Talk to your doctor, nurse or pharmacist before following any medical regimen to see if it is safe and effective for you

## 2022-12-20 ENCOUNTER — HOSPITAL ENCOUNTER (OUTPATIENT)
Facility: HOSPITAL | Age: 47
Setting detail: OUTPATIENT SURGERY
Discharge: HOME | End: 2022-12-20
Attending: ANESTHESIOLOGY | Admitting: ANESTHESIOLOGY

## 2022-12-20 ENCOUNTER — TELEPHONE (OUTPATIENT)
Dept: PAIN MEDICINE | Facility: CLINIC | Age: 47
End: 2022-12-20

## 2022-12-20 LAB — GLUCOSE SERPL-MCNC: 294 MG/DL (ref 65–140)

## 2022-12-20 NOTE — TELEPHONE ENCOUNTER
----- Message from Michael Emery MD sent at 12/20/2022 10:24 AM EST -----  Discussed hyperglycemia, possible development to DKA/HHNK if sugars go above 300  Will work with pcp to lower sugars through DM-2 medications and possibly insulin  Will come to ED if develops palpiataions, diaphoresis, lethargy, sxs of hyperglycemia  Will check sugars more regularly over next week  Understands risk of steroid injections in raising blood glucose  Decision made to cancel procedure and reschedule in 2 weeks to give patient a chance to achieve optimal blood sugar control  Today's   Declined ED referral, asymptomatic

## 2022-12-20 NOTE — QUICK NOTE
Discussed hyperglycemia, possible development to DKA/HHNK if sugars go above 300  Will work with pcp to lower sugars through DM-2 medications and possibly insulin  Will come to ED if develops palpiataions, diaphoresis, lethargy, sxs of hyperglycemia  Will check sugars more regularly over next week  Understands risk of steroid injections in raising blood glucose  Decision made to cancel procedure and reschedule in 2 weeks to give patient a chance to achieve optimal blood sugar control  Today's   Declined ED referral, asymptomatic

## 2022-12-22 ENCOUNTER — TELEMEDICINE (OUTPATIENT)
Dept: BEHAVIORAL/MENTAL HEALTH CLINIC | Facility: CLINIC | Age: 47
End: 2022-12-22

## 2022-12-22 DIAGNOSIS — F33.9 MAJOR DEPRESSION, RECURRENT, CHRONIC (HCC): Primary | ICD-10-CM

## 2022-12-22 LAB — COLOGUARD RESULT REPORTABLE: NEGATIVE

## 2022-12-22 NOTE — PSYCH
Psychotherapy Provided: Individual Psychotherapy 30  minutes 11:40 Am to 12:15 Pm          Goals addressed in session:(Long term Goal Number One) The Client reported that she obtained $123 from one main financial due to problems with her auto loan which will with auto insurance  As team we explored and processed her mental health and the holiday season  Interventions: Supportive Therapy, Strengths Therapy,  and Cognitive Behavioral Therapy where the treatment modalities utilized during the session  Assessment and Plan: The Client presented a depressed anxious mood that was congruent in effect  She was alert, goal directed and participated with prompts during the session  The client was oriented to person, place, time, situation, and reported no suicidal/homicidal ideation, plan, or intent  As team we explored and processed the Client's anxiety and depression due to the holiday season and the effects on her mental health  Coping skills were reviewed during the session  Next appointment was set for 1 week  Pain:      PSYCH MENTAL STATUS PAIN :7 as reported by the client     PHYSICAL PAIN SCALE NUMBERS:8 as reported by the client due to her " back pain"     Current suicide risk : Low/Phone number for National Suicide Prevention Life Line was given to the Client/ [address]: Diagnosis and Treatment Plan explained to [address] [place] [address]  relates understanding diagnosis and is agreeable to Treatment Plan  Yes      Virtual Regular Visit    Verification of patient location:    Patient is located in the following state in which I hold an active license [place]      Assessment/Plan:    Problem List Items Addressed This Visit        Other    Major depression, recurrent, chronic ([facility] [address] ) - Primary       Goals addressed in session: Goal 1          Reason for visit is   Chief Complaint   Patient presents with   • Virtual Regular Visit        Encounter provider Jesus Viera YONATAN Guerrero    Provider located at [address] [place] [id number]      Recent Visits  No visits were found meeting these conditions  Showing recent visits within past 7 days and meeting all other requirements  Today's Visits  Date Type Provider Dept   12/22/22 Telemedicine YONATAN Rosales [facility]   Showing today's visits and meeting all other requirements  Future Appointments  No visits were found meeting these conditions  Showing future appointments within next 150 days and meeting all other requirements       The patient was identified by name and date of birth  Moustapha Alfredo was informed that this is a telemedicine visit and that the visit is being conducted throughthe [employer] platform  She agrees to proceed     My office door was closed  No one else was in the room  She acknowledged consent and understanding of privacy and security of the video platform  The patient has agreed to participate and understands they can discontinue the visit at any time  Patient is aware this is a billable service  Subjective  Moustapha Alfredo is a 50 y o  female , who was seen for individual mental health          HPI     Past Medical History:   Diagnosis Date   • Allergic     Seasonal Allergies   • Alopecia of scalp     Treated with Proscar   • Asthma    • Chronic pain disorder     back, bilat knees, bilat hips   • CPAP (continuous positive airway pressure) dependence    • Depression    • Diabetes mellitus (HCC)    • Fibromyalgia, primary    • GERD (gastroesophageal reflux disease)    • Hip fx, left, closed, with routine healing, subsequent encounter 05/2017   • Hypertension    • Insomnia    • Irregular heartbeat    • Obesity    • VICTOR HUGO on CPAP    • Shortness of breath    • Sleep apnea        Past Surgical History:   Procedure Laterality Date   • CARPAL TUNNEL RELEASE Left    • LUMBAR EPIDURAL INJECTION     • NERVE BLOCK Bilateral 12/29/2020    Procedure: L2 L3 L4 L5 MEDIAL BRANCH BLOCK #1;  Surgeon: Luke Matthews MD;  Location: [facility];  Service: Pain Management    • NERVE BLOCK Bilateral 1/19/2021    Procedure: L2 L3 L4 L5 MEDIAL BRANCH BLOCK #2;  Surgeon: Luke Matthews MD;  Location: [facility];  Service: Pain Management    • RADIOFREQUENCY ABLATION Right 2/25/2021    Procedure: L2 L3 L4 L5 RADIO FREQUENCY ABLATION right side;  Surgeon: Luke Matthews MD;  Location: [facility];  Service: Pain Management    • RADIOFREQUENCY ABLATION Left 3/16/2021    Procedure: L2 L3 L4 L5 RADIO FREQUENCY ABLATION;  Surgeon: Luke Matthews MD;  Location: [facility];  Service: Pain Management    • UPPER GASTROINTESTINAL ENDOSCOPY         Current Outpatient Medications   Medication Sig Dispense Refill   • albuterol (Ventolin HFA) 90 mcg/act inhaler Inhale 2 puffs every 6 (six) hours as needed for wheezing 18 g 5   • atorvastatin (LIPITOR) 20 mg tablet Take 1 tablet (20 mg total) by mouth daily with dinner 90 tablet 0   • celecoxib (CeleBREX) 100 mg capsule Take 1 capsule (100 mg total) by mouth 2 (two) times a day 180 capsule 1   • cyclobenzaprine (FLEXERIL) 10 mg tablet TAKE 1 TABLET EVERY 12 HOURS FOR 7 DAYS THEN AS NEEDED FOR SPASMS OR PAIN 60 tablet 5   • ergocalciferol (VITAMIN D2) 50,000 units Take 1 capsule (50,000 Units total) by mouth once a week 12 capsule 0   • finasteride (PROPECIA) 1 MG tablet Take 1 tablet (1 mg total) by mouth daily 90 tablet 1   • fluticasone-salmeterol (Advair) 250-50 mcg/dose inhaler Inhale 1 puff 2 (two) times a day Rinse mouth after use   60 blister 5   • glipiZIDE (GLUCOTROL) 5 mg tablet Take 1 tablet (5 mg total) by mouth 2 (two) times a day before meals 180 tablet 0   • glucose blood test strip Use as instructed 100 each 0   • Lancets (onetouch ultrasoft) lancets Use as instructed 100 each 0   • lisinopril (ZESTRIL) 10 mg tablet Take 1 tablet (10 mg total) by mouth daily 90 tablet 0   • metFORMIN (GLUCOPHAGE) 1000 MG tablet TAKE (1) TABLET TWICE A DAY WITH MEALS  180 tablet 0   • sertraline (ZOLOFT) 50 mg tablet Take 1 tablet (50 mg total) by mouth daily at bedtime 90 tablet 0     No current facility-administered medications for this visit  Allergies   Allergen Reactions   • Tdap [Tetanus-Diphth-Acell Pertussis] Rash       Review of Systems    Video Exam    There were no vitals filed for this visit      Physical Exam     Visit Time    Visit Start Time: 11: 36 Am   Visit Stop Time: 12:15 PM   Total Visit Duration: 35 minutes

## 2022-12-27 ENCOUNTER — TELEMEDICINE (OUTPATIENT)
Dept: BEHAVIORAL/MENTAL HEALTH CLINIC | Facility: CLINIC | Age: 47
End: 2022-12-27

## 2022-12-27 ENCOUNTER — VBI (OUTPATIENT)
Dept: ADMINISTRATIVE | Facility: OTHER | Age: 47
End: 2022-12-27

## 2022-12-27 DIAGNOSIS — F33.9 MAJOR DEPRESSION, RECURRENT, CHRONIC (HCC): Primary | ICD-10-CM

## 2022-12-27 NOTE — PSYCH
Psychotherapy Provided: Individual Psychotherapy 30  minutes 3:28 Pm to 3:50 Pm          Goals addressed in session:(Long Term Goal Number One) The Client reported that she had a good holiday season with her Friends will be spending the new year  As team we explored her coping skills and her environment and the effects on her mental health  Interventions: Supportive Therapy, Strengths Therapy,  and Cognitive Behavioral Therapy where the treatment modalities utilized during the session  Assessment and Plan: The Client presented an euthymic mood that was congruent in effect  She was alert, goal directed and participated with prompts during the session  The Client was oriented to person, place, time, situation, and reported no suicidal/homicidal ideation, plan, or intent  As team we explored and processed her environmental weaknesses and the effects on her mental health  Coping skills were reviewed during the session as well as grounding  Next appointment was set for 2 weeks  Pain:      PSYCH MENTAL STATUS PAIN :1 as reported by the Client     PHYSICAL PAIN SCALE NUMBERS:5 as reported by the Client     Current suicide risk : Low/Phone number for National Suicide Prevention Life Line was given to the Client/ [address]: Diagnosis and Treatment Plan explained to [address] [place] [address]  relates understanding diagnosis and is agreeable to Treatment Plan  Yes    Virtual Brief Visit    Patient is located in the following state in which I hold an active license [place]      Assessment/Plan:    Problem List Items Addressed This Visit        Other    Major depression, recurrent, chronic ([facility] [address] ) - Primary       Recent Visits  Date Type Provider Dept   12/22/22 Telemedicine YONATAN Blake  Cln Psych   Showing recent visits within past 7 days and meeting all other requirements  Today's Visits  Date Type Provider Dept   12/27/22 Telemedicine Jamey Keita YONATAN Guerrero  Cln Psych   Showing today's visits and meeting all other requirements  Future Appointments  No visits were found meeting these conditions    Showing future appointments within next 150 days and meeting all other requirements         Visit Time    Visit Start Time: 3:28 Pm   Visit Stop Time: 3:50 Pm   Total Visit Duration: 22 minutes

## 2022-12-30 NOTE — DISCHARGE INSTRUCTIONS
YOUR 2 WEEK FOLLOW UP HAS BEEN SCHEDULED; IF YOU WISH TO CHANGE THE FOLLOW UP, PLEASE CALL THE SPINE AND PAIN CENTER AT [facility]: [phone]  Steroid Joint Injection   WHAT YOU NEED TO KNOW:   A steroid joint injection is a procedure to inject steroid medicine into a joint  Steroid medicine decreases pain and inflammation  The injection may also contain an anesthetic (numbing medicine) to decrease pain  It may be done to treat conditions such as arthritis, gout, or carpal tunnel syndrome  The injections may be given in your knee, ankle, shoulder, elbow, or wrist  Injections may also be given in your hip, toe, thumb, or finger  DISCHARGE INSTRUCTIONS:   Contact your healthcare provider if:   You have fever or chills  You have redness or swelling at the injection site  You have more pain than usual in your joint for more than 72 hours  You have questions or concerns about your condition or care  Medicines:   Pain medicine  may be given  Ask how to take this medicine safely  Take your medicine as directed  Contact your healthcare provider if you think your medicine is not helping or if you have side effects  Tell him or her if you are allergic to any medicine  Keep a list of the medicines, vitamins, and herbs you take  Include the amounts, and when and why you take them  Bring the list or the pill bottles to follow-up visits  Carry your medicine list with you in case of an emergency  Self-care:   Leave the bandage on for 8 to 12 hours  Care for your wound as directed  Rest the area  as directed  You may need to decrease weight on certain joints, such as the knee, for a period of time  Ask when you can return to your daily activities  Elevate  your limb where the steroid injection was given  Elevate the limb above the level of your heart as often as you can  This will help decrease swelling and pain  Prop your limb on pillows or blankets to keep it elevated comfortably  Apply ice  on your joint for 15 to 20 minutes every hour or as directed  Use an ice pack, or put crushed ice in a plastic bag  Cover it with a towel  Ice helps prevent tissue damage and decreases swelling and pain  © Copyright [employer] 2022 Information is for End User's use only and may not be sold, redistributed or otherwise used for commercial purposes  All illustrations and images included in CareNotes® are the copyrighted property of [employer] Inc  or Brien Lopez  The above information is an  only  It is not intended as medical advice for individual conditions or treatments  Talk to your doctor, nurse or pharmacist before following any medical regimen to see if it is safe and effective for you

## 2022-12-30 NOTE — DISCHARGE INSTR - AVS FIRST PAGE
YOUR 2 WEEK FOLLOW UP HAS BEEN SCHEDULED; IF YOU WISH TO CHANGE THE FOLLOW UP, PLEASE CALL THE SPINE AND PAIN CENTER AT [place]: [phone]  Steroid Joint Injection   WHAT YOU NEED TO KNOW:   A steroid joint injection is a procedure to inject steroid medicine into a joint  Steroid medicine decreases pain and inflammation  The injection may also contain an anesthetic (numbing medicine) to decrease pain  It may be done to treat conditions such as arthritis, gout, or carpal tunnel syndrome  The injections may be given in your knee, ankle, shoulder, elbow, or wrist  Injections may also be given in your hip, toe, thumb, or finger  DISCHARGE INSTRUCTIONS:   Contact your healthcare provider if:   You have fever or chills  You have redness or swelling at the injection site  You have more pain than usual in your joint for more than 72 hours  You have questions or concerns about your condition or care  Medicines:   Pain medicine  may be given  Ask how to take this medicine safely  Take your medicine as directed  Contact your healthcare provider if you think your medicine is not helping or if you have side effects  Tell him or her if you are allergic to any medicine  Keep a list of the medicines, vitamins, and herbs you take  Include the amounts, and when and why you take them  Bring the list or the pill bottles to follow-up visits  Carry your medicine list with you in case of an emergency  Self-care:   Leave the bandage on for 8 to 12 hours  Care for your wound as directed  Rest the area  as directed  You may need to decrease weight on certain joints, such as the knee, for a period of time  Ask when you can return to your daily activities  Elevate  your limb where the steroid injection was given  Elevate the limb above the level of your heart as often as you can  This will help decrease swelling and pain  Prop your limb on pillows or blankets to keep it elevated comfortably           Apply ice  on your joint for 15 to 20 minutes every hour or as directed  Use an ice pack, or put crushed ice in a plastic bag  Cover it with a towel  Ice helps prevent tissue damage and decreases swelling and pain  © Copyright [employer] 2022 Information is for End User's use only and may not be sold, redistributed or otherwise used for commercial purposes  All illustrations and images included in CareNotes® are the copyrighted property of [employer]  or Brien Lopez  The above information is an  only  It is not intended as medical advice for individual conditions or treatments  Talk to your doctor, nurse or pharmacist before following any medical regimen to see if it is safe and effective for you

## 2023-01-03 ENCOUNTER — HOSPITAL ENCOUNTER (OUTPATIENT)
Facility: HOSPITAL | Age: 48
Setting detail: OUTPATIENT SURGERY
Discharge: HOME | End: 2023-01-03
Attending: ANESTHESIOLOGY | Admitting: ANESTHESIOLOGY

## 2023-01-03 ENCOUNTER — APPOINTMENT (OUTPATIENT)
Dept: RADIOLOGY | Facility: HOSPITAL | Age: 48
End: 2023-01-03

## 2023-01-03 VITALS
HEIGHT: 63 IN | TEMPERATURE: 97.8 F | DIASTOLIC BLOOD PRESSURE: 70 MMHG | HEART RATE: 133 BPM | SYSTOLIC BLOOD PRESSURE: 151 MMHG | WEIGHT: 263 LBS | BODY MASS INDEX: 46.6 KG/M2 | RESPIRATION RATE: 20 BRPM | OXYGEN SATURATION: 96 %

## 2023-01-03 LAB — GLUCOSE SERPL-MCNC: 210 MG/DL (ref 65–140)

## 2023-01-03 NOTE — QUICK NOTE
Discussed hyperglycemia, possible development to DKA/HHNK if sugars go above 300  Will work with pcp to lower sugars through DM-2 medications and possibly insulin  Will come to ED if develops palpiataions, diaphoresis, lethargy, sxs of hyperglycemia  Will check sugars more regularly over next week  Understands risk of steroid joint injections in raising blood glucose   Will cancel patient for today and schedule office visit to consider alternate medications for pain control

## 2023-01-04 ENCOUNTER — HOSPITAL ENCOUNTER (OUTPATIENT)
Dept: RADIOLOGY | Facility: CLINIC | Age: 48
Discharge: HOME | End: 2023-01-04

## 2023-01-04 VITALS — BODY MASS INDEX: 48.4 KG/M2 | WEIGHT: 263 LBS | HEIGHT: 62 IN

## 2023-01-04 DIAGNOSIS — R92.8 ABNORMAL MAMMOGRAM: ICD-10-CM

## 2023-01-09 ENCOUNTER — OFFICE VISIT (OUTPATIENT)
Dept: PAIN MEDICINE | Facility: CLINIC | Age: 48
End: 2023-01-09

## 2023-01-09 VITALS
RESPIRATION RATE: 20 BRPM | WEIGHT: 269.6 LBS | BODY MASS INDEX: 47.77 KG/M2 | SYSTOLIC BLOOD PRESSURE: 116 MMHG | HEART RATE: 97 BPM | HEIGHT: 63 IN | DIASTOLIC BLOOD PRESSURE: 74 MMHG | TEMPERATURE: 98 F

## 2023-01-09 DIAGNOSIS — Z79.891 OPIOID USE AGREEMENT EXISTS: ICD-10-CM

## 2023-01-09 DIAGNOSIS — M47.816 LUMBAR FACET JOINT SYNDROME: ICD-10-CM

## 2023-01-09 DIAGNOSIS — G89.4 CHRONIC PAIN SYNDROME: ICD-10-CM

## 2023-01-09 DIAGNOSIS — F11.20 UNCOMPLICATED OPIOID DEPENDENCE (HCC): ICD-10-CM

## 2023-01-09 DIAGNOSIS — M16.0 PRIMARY OSTEOARTHRITIS OF BOTH HIPS: ICD-10-CM

## 2023-01-09 DIAGNOSIS — M54.16 LUMBAR RADICULOPATHY: ICD-10-CM

## 2023-01-09 RX ORDER — TRAMADOL HYDROCHLORIDE 50 MG/1
50 TABLET ORAL EVERY 8 HOURS PRN
Qty: 90 TABLET | Refills: 0 | Status: SHIPPED | OUTPATIENT
Start: 2023-01-09

## 2023-01-09 NOTE — PROGRESS NOTES
Assessment  1  Primary osteoarthritis of both hips  -     traMADol (Ultram) 50 mg tablet; Take 1 tablet (50 mg total) by mouth every 8 (eight) hours as needed for severe pain    2  Lumbar radiculopathy  -     traMADol (Ultram) 50 mg tablet; Take 1 tablet (50 mg total) by mouth every 8 (eight) hours as needed for severe pain    3  Lumbar facet joint syndrome  -     traMADol (Ultram) 50 mg tablet; Take 1 tablet (50 mg total) by mouth every 8 (eight) hours as needed for severe pain    4  Chronic pain syndrome  -     traMADol (Ultram) 50 mg tablet; Take 1 tablet (50 mg total) by mouth every 8 (eight) hours as needed for severe pain  -     Rapid drug screen, urine    5  Opioid use agreement exists  -     traMADol (Ultram) 50 mg tablet; Take 1 tablet (50 mg total) by mouth every 8 (eight) hours as needed for severe pain  -     Rapid drug screen, urine    6  Uncomplicated opioid dependence (HCC)  -     traMADol (Ultram) 50 mg tablet; Take 1 tablet (50 mg total) by mouth every 8 (eight) hours as needed for severe pain  -     Rapid drug screen, urine    Greater than 80% improvement with recent radiofrequency ablation of bilateral Medial branch nerves at L2-L5 done more than 1 5 years ago with mproved ability to participate with IADLs in significantly less pain; pain has subsequently returned  Additionally describes b/l hip pain with radiation to the groin; ttp over b/l gtb, pain with internal/external rotation of b/l hips; pain worse with standing/ambulation  Lifestyle modifications extensively discussed including diet, exercise and weight loss in conjunction with optimal DM-2 control  Had to cancel recent procedures given elevated blood glucose levels  Decision made to delay interventional procedures until patient can consistently maintain BS below 200  Voiced understanding  Risks regarding opioid therapy discussed as noted below   Previously reported the following symptomatology:    Chronic axial low back pain described by primarily arthritic features  Positive facet loading maneuvers as noted below  Mild noncompressive lumbar degenerative change at the L3-4 and L4-5 levels  Previously had a left L3 transforaminal steroid injection with Dr José Miguel Felix which helped for about a week and a half  Arthritic symptomatology greater than radicular features at this time  Reasonable to continue multimodal pain therapy plan  Plan  -tramadol 50mg tid prn severe pain rx; rapid urine drug screen obtained; opioid use contract reviewed and signed by both parties  -continue Celebrex 100 mg b i d  As needed for lumbar facet syndrome  Counseled regarding bleeding risk of taking this medication   -lifestyle modifications including diet, exercise and weight loss in addition to DM-2 control extensively discussed  -f/u 3 weeks  -bilateral intra-articular hip joint injection; f/u 2 weeks post porcedure  -repeat bilateral L3, L4, L5 medial branch nerve RF lesioning; f/u 2 weeks post procedure  -hip exercises provided; continue physical therapy and core exercises for lumbar facet syndrome    There are risks associated with opioid medications, including dependence, addiction and tolerance  The patient understands and agrees to use these medications only as prescribed  Potential side effects of the medications include, but are not limited to, constipation, drowsiness, addiction, impaired judgment and risk of fatal overdose if not taken as prescribed  The patient was warned against driving while taking sedation medications  Sharing medications is a felony  At this point in time, the patient is showing no signs of addiction, abuse, diversion or suicidal ideation  [place] Prescription Drug Monitoring Program report was reviewed and was appropriate     Complete risks and benefits including bleeding, infection, tissue reaction, nerve injury and allergic reaction were discussed   The approach was demonstrated using models and literature was provided  Verbal and written consent was obtained  My impressions and treatment recommendations were discussed in detail with the patient who verbalized understanding and had no further questions  Discharge instructions were provided  I personally saw and examined the patient and I agree with the above discussed plan of care  New Medications Ordered This Visit   Medications   • traMADol (Ultram) 50 mg tablet     Sig: Take 1 tablet (50 mg total) by mouth every 8 (eight) hours as needed for severe pain     Dispense:  90 tablet     Refill:  0       History of Present Illness    Greater than 80% improvement with recent radiofrequency ablation of bilateral Medial branch nerves at L2-L5 done more than 1 5 years ago with mproved ability to participate with IADLs in significantly less pain; pain has subsequently returned  Additionally describes b/l hip pain with radiation to the groin; ttp over b/l gtb, pain with internal/external rotation of b/l hips; pain worse with standing/ambulation  Had to cancel recent procedures given elevated blood glucose levels  Decision made to delay interventional procedures until patient can consistently maintain BS below 200  Voiced understanding  Previously reported the following symptomatology:    Lilly Berrios is a 50 y o  female with past medical history of axial low back pain described primarily by arthritic features  She presents with a 2 year history of chronic low back pain described primarily as arthritic in nature  she describes 8/10 low back pain that is worse in the mornings and worse at the end of the day  The pain is characterized by achy, nagging, indolent, crampy, stabbing pain in his/her axial low back  The patient describes that the pain is worse with standing for long periods of time on hard surfaces as well as with walking    The patient is a very active individual and feels as though this pain compromises her participation with independent activities of daily living  The pain can be debilitating at times and contribute to significant disability, compromising overall activity and independent activities of daily living  She has tried physical therapy with limited relief of symptoms  Medications the patient has tried in the past include   Celebrex,  and Flexeril  She describes minor radicular symptoms in the L3 and L4 dermatomal distribution but otherwise has good strength  Previously she had left L3 transforaminal epidural steroid injection with relief for about 2 weeks  She denies any weakness numbness or paresthesias  The patient denies any bowel or bladder dysfunction as well  I have personally reviewed and/or updated the patient's past medical history, past surgical history, family history, social history, current medications, allergies, and vital signs today  Review of Systems   Constitutional: Positive for activity change  HENT: Negative  Eyes: Negative  Respiratory: Negative  Cardiovascular: Negative  Gastrointestinal: Negative  Endocrine: Negative  Genitourinary: Negative  Musculoskeletal: Positive for arthralgias, back pain, gait problem and myalgias  Skin: Negative  Allergic/Immunologic: Negative  Hematological: Negative  Psychiatric/Behavioral: Negative  All other systems reviewed and are negative        Patient Active Problem List   Diagnosis   • SOB (shortness of breath)   • Chronic bilateral low back pain without sciatica   • Chronic pain of left knee   • Chronic pain of right knee   • Fibromyalgia   • Primary osteoarthritis involving multiple joints   • Mild persistent asthma without complication   • Lumbar radiculopathy   • Primary osteoarthritis of both hips   • Left hip pain   • Major depression, recurrent, chronic (HCC)   • Lumbar facet joint syndrome       Past Medical History:   Diagnosis Date   • Allergic     Seasonal Allergies   • Alopecia of scalp     Treated with Proscar   • Asthma    • Chronic pain disorder     back, bilat knees, bilat hips   • CPAP (continuous positive airway pressure) dependence    • Depression    • Diabetes mellitus (HCC)    • Fibromyalgia, primary    • GERD (gastroesophageal reflux disease)    • Hip fx, left, closed, with routine healing, subsequent encounter 05/2017   • Hypertension    • Insomnia    • Irregular heartbeat    • Obesity    • VICTOR HUGO on CPAP    • Shortness of breath    • Sleep apnea        Past Surgical History:   Procedure Laterality Date   • CARPAL TUNNEL RELEASE Left    • LUMBAR EPIDURAL INJECTION     • NERVE BLOCK Bilateral 12/29/2020    Procedure: L2 L3 L4 L5 MEDIAL BRANCH BLOCK #1;  Surgeon: Skye Mtz MD;  Location: [facility];  Service: Pain Management    • NERVE BLOCK Bilateral 1/19/2021    Procedure: L2 L3 L4 L5 MEDIAL BRANCH BLOCK #2;  Surgeon: Skye Mtz MD;  Location: [facility];  Service: Pain Management    • RADIOFREQUENCY ABLATION Right 2/25/2021    Procedure: L2 L3 L4 L5 RADIO FREQUENCY ABLATION right side;  Surgeon: Skye Mtz MD;  Location: [facility];  Service: Pain Management    • RADIOFREQUENCY ABLATION Left 3/16/2021    Procedure: L2 L3 L4 L5 RADIO FREQUENCY ABLATION;  Surgeon: Skye Mtz MD;  Location: [facility];  Service: Pain Management    • UPPER GASTROINTESTINAL ENDOSCOPY         Family History   Problem Relation Age of Onset   • Cancer Father    • Hepatitis Father    • Asthma Cousin    • Hypertension Paternal Grandmother    • No Known Problems Mother    • No Known Problems Sister    • No Known Problems Maternal Grandmother    • No Known Problems Maternal Grandfather    • No Known Problems Paternal Grandfather    • No Known Problems Paternal Aunt    • BRCA2 Positive Neg Hx    • BRCA2 Negative Neg Hx    • BRCA1 Positive Neg Hx    • BRCA1 Negative Neg Hx    • BRCA 1/2 Neg Hx    • Ovarian cancer Neg Hx    • Endometrial cancer Neg Hx    • Colon cancer Neg Hx    • Breast cancer additional onset Neg Hx    • Breast cancer Neg Hx Social History     Occupational History   • Not on file   Tobacco Use   • Smoking status: Former   • Smokeless tobacco: Never   Vaping Use   • Vaping Use: Never used   Substance and Sexual Activity   • Alcohol use: No   • Drug use: No   • Sexual activity: Not on file       Current Outpatient Medications on File Prior to Visit   Medication Sig   • albuterol (Ventolin HFA) 90 mcg/act inhaler Inhale 2 puffs every 6 (six) hours as needed for wheezing   • atorvastatin (LIPITOR) 20 mg tablet Take 1 tablet (20 mg total) by mouth daily with dinner   • celecoxib (CeleBREX) 100 mg capsule Take 1 capsule (100 mg total) by mouth 2 (two) times a day   • ergocalciferol (VITAMIN D2) 50,000 units Take 1 capsule (50,000 Units total) by mouth once a week   • Fluticasone-Salmeterol (Advair Diskus) 250-50 mcg/dose inhaler Inhale 1 puff 2 (two) times a day Rinse mouth after use  • glipiZIDE (GLUCOTROL) 10 mg tablet Take 1 tablet (10 mg total) by mouth 2 (two) times a day before meals   • glucose blood test strip Use as instructed   • Lancets (onetouch ultrasoft) lancets Use as instructed   • lisinopril (ZESTRIL) 10 mg tablet Take 1 tablet (10 mg total) by mouth daily   • metFORMIN (GLUCOPHAGE) 1000 MG tablet TAKE (1) TABLET TWICE A DAY WITH MEALS  • sertraline (ZOLOFT) 50 mg tablet Take 1 tablet (50 mg total) by mouth daily at bedtime   • [DISCONTINUED] cyclobenzaprine (FLEXERIL) 10 mg tablet TAKE 1 TABLET EVERY 12 HOURS FOR 7 DAYS THEN AS NEEDED FOR SPASMS OR PAIN   • finasteride (PROPECIA) 1 MG tablet Take 1 tablet (1 mg total) by mouth daily (Patient not taking: Reported on 1/9/2023)     No current facility-administered medications on file prior to visit         Allergies   Allergen Reactions   • Tdap [Tetanus-Diphth-Acell Pertussis] Rash         Physical Exam    /74   Pulse 97   Temp 98 °F (36 7 °C)   Resp 20   Ht 5' 2 5" (1 588 m)   Wt 122 kg (269 lb 9 6 oz)   BMI 48 52 kg/m²     Constitutional: normal, well developed, well nourished, alert, in no distress and non-toxic and no overt pain behavior  and obese  Eyes: anicteric  HEENT: grossly intact  Neck: supple, symmetric, trachea midline and no masses   Pulmonary:even and unlabored  Cardiovascular:No edema or pitting edema present  Skin:Normal without rashes or lesions and well hydrated  Psychiatric:Mood and affect appropriate  Neurologic:Cranial Nerves II-XII grossly intact Sensation grossly intact; no clonus negative dupree's  Reflexes 2+ and brisk  SLR negative bilaterally  Musculoskeletal:  Steppage gait  Normal heel toe and tip toe walking  Significant pain with lumbar facet loading bilaterally and with lateral spine rotation left greater than right  TTP over lumbar paraspinal muscles  Negative wilda's test, negative gaenslen's negative SIJ loading bilaterally  ttp over b/l gtb, pain with internal/external rotation of b/l hips    Imaging    MRI LUMBAR SPINE WITHOUT CONTRAST     INDICATION: [id number]: Lumbago with sciatica, left side  G89 [id number]: Other chronic pain      COMPARISON:  None      TECHNIQUE:  Sagittal T1, sagittal T2, sagittal inversion recovery, axial T1 and axial T2, coronal T2     IMAGE QUALITY:  Diagnostic     FINDINGS:     VERTEBRAL BODIES:  There are 5 lumbar type vertebral bodies  Normal alignment of the lumbar spine  No spondylolysis or spondylolisthesis  No scoliosis  No compression fracture  Normal marrow signal is identified within the visualized bony   structures  No discrete marrow lesion      SACRUM:  Normal signal within the sacrum  No evidence of insufficiency or stress fracture      DISTAL CORD AND CONUS:  Normal size and signal within the distal cord and conus      PARASPINAL SOFT TISSUES:  There is thickening of the endometrial cavity partially visualized on this examination towards the fundus    There is a dominant follicle identified within the left ovary      LOWER THORACIC DISC SPACES:  Normal disc height and signal   No disc herniation, canal stenosis or foraminal narrowing      LUMBAR DISC SPACES:     L1-L2:  Normal      L2-L3:  Normal      L3-L4:  Annular bulging with a small broad-based left foraminal and extraforaminal disc protrusion best seen on series 6 image 13  There is no canal stenosis  Only minimal left foraminal narrowing without nerve impingement      L4-L5:  Disc desiccation and loss of disc height with mild annular bulging  Small central disc herniation without canal stenosis or foraminal nerve impingement      L5-S1:  Normal disc height and signal without canal stenosis or foraminal narrowing      IMPRESSION:     Mild noncompressive lumbar degenerative change at the L3-4 and L4-5 levels      Fluid signal in the endometrial cavity partially visualized at the fundus    If patient is postmenopausal, consider follow-up ultrasound imaging      Signal

## 2023-01-09 NOTE — PATIENT INSTRUCTIONS
Core Strengthening Exercises   WHAT YOU NEED TO KNOW:   Your core includes the muscles of your lower back, hip, pelvis, and abdomen  Core strengthening exercises help heal and strengthen these muscles  This helps prevent another injury, and keeps your pelvis, spine, and hips in the correct position  DISCHARGE INSTRUCTIONS:   Contact your healthcare provider if:   You have sharp or worsening pain during exercise or at rest     You have questions or concerns about your shoulder exercises  Safety tips:  Talk to your healthcare provider before you start an exercise program  A physical therapist can teach you how to do core strengthening exercises safely  Do the exercises on a mat or firm surface  A firm surface will support your spine and prevent low back pain  Do not do these exercises on a bed  Move slowly and smoothly  Avoid fast or jerky motions  Stop if you feel pain  Core exercises should not be painful  Stop if you feel pain  Breathe normally during core exercises  Do not hold your breath  This may cause an increase in blood pressure and prevent muscle strengthening  Your healthcare provider will tell you when to inhale and exhale during the exercise  Begin all of your exercises with abdominal bracing  Abdominal bracing helps warm up your core muscles  You can also practice abdominal bracing throughout the day  Lie on your back with your knees bent and feet flat on the floor  Place your arms in a relaxed position beside your body  Tighten your abdominal muscles  Pull your belly button in and up toward your spine  Hold for 5 seconds  Relax your muscles  Repeat 10 times  Core strengthening exercises: Your healthcare provider will tell you how often to do these exercises  The provider will also tell you how many repetitions of each exercise you should do  Hold each exercise for 5 seconds or as directed  As you get stronger, increase your hold to 10 to 15 seconds   You can do some of these exercises on a stability ball, or with a weight  Ask your healthcare provider how to use a stability ball or weight for these exercises:  Bridging:  Lie on your back with your knees bent and feet flat on the floor  Rest your arms at your side  Tighten your buttocks, and then lift your hips 1 inch off the floor  Hold for 5 seconds  When you can do this exercise without pain for 10 seconds, increase the distance you lift your hips  A good goal is to be able to lift your hips so that your shoulders, hips, and knees are in a straight line  Dead bug:  Lie on your back with your knees bent and feet flat on the floor  Place your arms in a relaxed position beside your body  Begin with abdominal bracing  Next, raise one leg, keeping your knee bent  Hold for 5 seconds  Repeat with the other leg  When you can do this exercise without pain for 10 to 15 seconds, you may raise one straight leg and hold  Repeat with the other leg  Quadruped:  Place your hands and knees on the floor  Keep your wrists directly below your shoulders and your knees directly below your hips  Pull your belly button in toward your spine  Do not flatten or arch your back  Tighten your abdominal muscles below your belly button  Hold for 5 seconds  When you can do this exercise without pain for 10 to 15 seconds, you may extend one arm and hold  Repeat on the other side  Side bridge exercises:      Standing side bridge:  Stand next to a wall and extend one arm toward the wall  Place your palm flat on the wall with your fingers pointing upward  Begin with abdominal bracing  Next, without moving your feet, slowly bend your arm to 90 degrees  Hold for 5 seconds  Repeat on the other side  When you can do this exercise without pain for 10 to 15 seconds, you may do the bent leg side bridge on the floor  Bent leg side bridge:  Lie on one side with your legs, hips, and shoulders in a straight line   Prop yourself up onto your forearm so your elbow is directly below your shoulder  Bend your knees back to 90 degrees  Begin with abdominal bracing  Next, lift your hips and balance yourself on your forearm and knees  Hold for 5 seconds  Repeat on the other side  When you can do this exercise without pain for 10 to 15 seconds, you may do the straight leg side bridge on the floor  Straight leg side bridge:  Lie on one side with your legs, hips, and shoulders in a straight line  Prop yourself up onto your forearm so your elbow is directly below your shoulder  Begin with abdominal bracing  Lift your hips off the floor and balance yourself on your forearm and the outside of your flexed foot  Do not let your ankle bend sideways  Hold for 5 seconds  Repeat on the other side  When you can do this exercise without pain for 10 to 15 seconds, ask your healthcare provider for more advanced exercises  Superman:  Lie on your stomach  Extend your arms forward on the floor  Tighten your abdominal muscles and lift your right hand and left leg off the floor  Hold this position  Slowly return to the starting position  Tighten your abdominal muscles and lift your left hand and right leg off the floor  Hold this position  Slowly return to the starting position  Clam:  Lie on your side with your knees bent  Put your bottom arm under your head to keep your neck in line  Put your top hand on your hip to keep your pelvis from moving  Put your heels together, and keep them together during this exercise  Slowly raise your top knee toward the ceiling  Then lower your leg so your knees are together  Repeat this exercise 10 times  Then switch sides and do the exercise 10 times with the other leg  Curl up:  Lie on your back with your knees bent and feet flat on the floor  Place your hands, palms down, underneath your lower back  Next, with your elbows on the floor, lift your shoulders and chest 2 to 3 inches off the floor   Keep your head in line with your shoulders  Hold this position  Slowly return to the starting position  Straight leg raises:  Lie on your back with one leg straight  Bend the other knee and place your foot flat on the floor  Tighten your abdominal muscles  Keep your leg straight and slowly lift it straight up 6 to 12 inches off the floor  Hold this position  Lower your leg slowly  Do as many repetitions as directed on this side  Repeat with the other leg  Plank:  Lie on your stomach  Bend your elbows and place your forearms flat on the floor  Lift your chest, stomach, and knees off the floor  Make sure your elbows are below your shoulders  Your body should be in a straight line  Do not let your hips or lower back sink to the ground  Squeeze your abdominal muscles together and hold for 15 seconds  To make this exercise harder, hold for 30 seconds or lift 1 leg at a time  Bicycles:  Lie on your back  Bend both knees and bring them toward your chest  Your calves should be parallel to the floor  Place the palms of your hands on the back of your head  Straighten your right leg and keep it lifted 2 inches off the floor  Raise your head and shoulders off the floor and twist towards your left  Keep your head and shoulders lifted  Bend your right knee while you straighten your left leg  Keep your left leg 2 inches off the floor  Twist your head and chest towards the left leg  Continue to straighten 1 leg at a time and twist        Follow up with your doctor as directed:  Write down your questions so you remember to ask them during your visits  © Copyright [employer] 2022 Information is for End User's use only and may not be sold, redistributed or otherwise used for commercial purposes  All illustrations and images included in CareNotes® are the copyrighted property of "[employer]  or [facility] Amber Machado   The above information is an  only   It is not intended as medical advice for individual conditions or treatments  Talk to your doctor, nurse or pharmacist before following any medical regimen to see if it is safe and effective for you  Tramadol/Acetaminophen (By mouth)   Acetaminophen (m-dqry-k-MIN-oh-fen), Tramadol Hydrochloride (TRAM-a-dol pernell-droe-KLOR-evgeny)  Relieves pain  Brand Name(s): Ultracet   There may be other brand names for this medicine  When This Medicine Should Not Be Used: This medicine is not right for everyone  Do not use if you had an allergic reaction to acetaminophen, tramadol, or other narcotic pain medicines, or if you have stomach or bowel blockage (including paralytic ileus)  How to Use This Medicine:   Tablet  Your doctor will tell you how much medicine to use  Do not use more than directed  An overdose can be dangerous  Follow directions carefully so you do not get too much medicine at one time  Your doctor may also give naloxone and other medicines to treat an overdose  This medicine is not for long-term use  Swallow the tablet whole  Do not crush, break, or chew it  This medicine contains acetaminophen  Read the labels of all other medicines you are using to see if they also contain acetaminophen, or ask your doctor or pharmacist  Theron Hashimoto not use more than 4 grams (4,000 milligrams) total of acetaminophen in one day  This medicine should come with a Medication Guide  Ask your pharmacist for a copy if you do not have one  Store the medicine in a closed container at room temperature, away from heat, moisture, and direct light  Drop off any unused narcotic medicine at a drug take-back location right away  If you do not have a drug take-back location near you, flush any unused narcotic medicine down the toilet  Check your local drug store and clinics for take-back locations  You can also check the [employer] web site for locations   Here is the link to the FDA safe disposal of medicines website: www fda gov/drugs/resourcesforyou/consumers/buyingusingmedicinesafely/ensuringsafeuseofmedicine/safedisposalofmedicines/cvb151740 htm  Drugs and Foods to Avoid:   Ask your doctor or pharmacist before using any other medicine, including over-the-counter medicines, vitamins, and herbal products  Do not use this medicine if you are using or have used an MAO inhibitor within the past 14 days  This combination medicine contains acetaminophen  Carefully check the labels of all other medicines you are using because they may also contain acetaminophen  It is not safe to take more than 4 grams (4,000 milligrams) of acetaminophen in one day (24 hours)  Some medicines can affect how tramadol/acetaminophen works  Tell your doctor if you are using any of the following:  Amiodarone, carbamazepine, cyclobenzaprine, digoxin, erythromycin, ketoconazole, lithium, metaxalone, mirtazapine, rifampin, ritonavir, phenytoin, promethazine, Herminia's wort, trazodone  Benzodiazepine medicine  Blood thinner (including warfarin)  Diuretic (water pill)  Medicine to treat anxiety, depression, or mental health problems (including bupropion, fluoxetine, paroxetine, quinidine, SNRIs, SSRIs, TCAs)  Phenothiazine medicine  Triptan medicine for migraine headaches  Tell your doctor if you use anything else that makes you sleepy  Some examples are allergy medicine, narcotic pain medicine, and alcohol  Tell your doctor if you are using buprenorphine, butorphanol, nalbuphine, pentazocine, or a muscle relaxer  Do not drink alcohol while you are using this medicine  Acetaminophen can damage your liver, and alcohol can increase this risk    Warnings While Using This Medicine:   Tell your doctor if you are pregnant or breastfeeding, or if you have kidney disease, liver disease, adrenal problems, lung or breathing problems (including sleep apnea), diabetes, pancreas problems, depression, mental health problems, or a history of head injury or seizures, or you have been addicted to drugs or alcohol  This medicine may cause the following problems:  High risk of overdose, which can lead to death  Respiratory depression (serious breathing problem that can be life-threatening)  Sleep-related breathing problems (including sleep apnea, sleep-related hypoxemia)  Serotonin syndrome (when used with certain medicines)  Liver problem  Increased risk of seizures  Adrenal gland problem  Low blood pressure  Serious skin reactions (including Rodríguez-Campos syndrome, toxic epidermal necrolysis)  Hypoglycemia (low blood sugar level)  This medicine may make you dizzy or drowsy, or may cause trouble with thinking or controlling body movements, which may lead to falls, fractures or other injuries  Do not drive or do anything else that could be dangerous until you know how this medicine affects you  This medicine can be habit-forming  Do not use more than your prescribed dose  Call your doctor if you think your medicine is not working  This medicine may cause constipation, especially with long-term use  Ask your doctor if you should use a laxative to prevent and treat constipation  Do not stop using this medicine suddenly  Your doctor will need to slowly decrease your dose before you stop it completely  This medicine could cause infertility  Talk with your doctor before using this medicine if you plan to have children  Your doctor will do lab tests at regular visits to check on the effects of this medicine  Keep all appointments  Keep all medicine out of the reach of children  Never share your medicine with anyone  Possible Side Effects While Using This Medicine:   Call your doctor right away if you notice any of these side effects:   Allergic reaction: Itching or hives, swelling in your face or hands, swelling or tingling in your mouth or throat, chest tightness, trouble breathing  Anxiety, restlessness, fast heartbeat, fever, sweating, muscle spasms, nausea, vomiting, diarrhea, seeing or hearing things that are not there  Blistering, peeling, red skin rash  Blue lips, fingernails, or skin  Changes in skin color, dark freckles, cold feeling, tiredness, weight loss  Dark urine or pale stools, nausea, vomiting, loss of appetite, stomach pain, yellow skin or eyes  Extreme dizziness, drowsiness, or weakness, trouble breathing, slow heartbeat or breathing, seizures, and cold, clammy skin  Lightheadedness, dizziness, fainting  Seizures  Shaking, trembling, sweating, hunger, confusion  Unusual mood or behavior, thoughts of killing yourself or others  Weakness, muscle twitching  If you notice these less serious side effects, talk with your doctor:   Constipation  Dry mouth  If you notice other side effects that you think are caused by this medicine, tell your doctor  Call your doctor for medical advice about side effects  You may report side effects to FDA at 7-280-FDA-7930    © Copyright [employer] 2022 Information is for End User's use only and may not be sold, redistributed or otherwise used for commercial purposes  The above information is an  only  It is not intended as medical advice for individual conditions or treatments  Talk to your doctor, nurse or pharmacist before following any medical regimen to see if it is safe and effective for you

## 2023-01-10 ENCOUNTER — TELEMEDICINE (OUTPATIENT)
Dept: BEHAVIORAL/MENTAL HEALTH CLINIC | Facility: CLINIC | Age: 48
End: 2023-01-10

## 2023-01-10 DIAGNOSIS — F33.9 MAJOR DEPRESSION, RECURRENT, CHRONIC (HCC): Primary | ICD-10-CM
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
## 2023-01-11 LAB
6MAM UR QL CFM: NEGATIVE NG/ML
7AMINOCLONAZEPAM UR QL CFM: NEGATIVE NG/ML
A-OH ALPRAZ UR QL CFM: NEGATIVE NG/ML
ACCEPTABLE CREAT UR QL: NORMAL MG/DL
ACCEPTIBLE SP GR UR QL: NORMAL
AMPHET UR QL CFM: NEGATIVE NG/ML
AMPHET UR QL CFM: NEGATIVE NG/ML
BUPRENORPHINE UR QL CFM: NEGATIVE NG/ML
BZE UR QL CFM: NEGATIVE NG/ML
CARISOPRODOL UR QL CFM: NEGATIVE NG/ML
CODEINE UR QL CFM: NEGATIVE NG/ML
EDDP UR QL CFM: NEGATIVE NG/ML
ETHYL GLUCURONIDE UR QL SCN: NEGATIVE NG/ML
FENTANYL UR QL CFM: NEGATIVE NG/ML
GLIADIN IGG SER IA-ACNC: NEGATIVE NG/ML
HYDROCODONE UR QL CFM: NEGATIVE NG/ML
HYDROCODONE UR QL CFM: NEGATIVE NG/ML
HYDROMORPHONE UR QL CFM: NEGATIVE NG/ML
LORAZEPAM UR QL CFM: NEGATIVE NG/ML
MDMA UR QL CFM: NEGATIVE NG/ML
MEPROBAMATE UR QL CFM: NEGATIVE NG/ML
METHADONE UR QL CFM: NEGATIVE NG/ML
METHAMPHET UR QL CFM: NEGATIVE NG/ML
MORPHINE UR QL CFM: NEGATIVE NG/ML
MORPHINE UR QL CFM: NEGATIVE NG/ML
NITRITE UR QL: NORMAL UG/ML
NORBUPRENORPHINE UR QL CFM: NEGATIVE NG/ML
NORDIAZEPAM UR QL CFM: NEGATIVE NG/ML
NORFENTANYL UR QL CFM: NEGATIVE NG/ML
NORHYDROCODONE UR QL CFM: NEGATIVE NG/ML
NORHYDROCODONE UR QL CFM: NEGATIVE NG/ML
NOROXYCODONE UR QL CFM: NEGATIVE NG/ML
OXAZEPAM UR QL CFM: NEGATIVE NG/ML
OXYCODONE UR QL CFM: NEGATIVE NG/ML
OXYMORPHONE UR QL CFM: NEGATIVE NG/ML
OXYMORPHONE UR QL CFM: NEGATIVE NG/ML
PARA-FLUOROFENTANYL QUANTIFICATION: NORMAL NG/ML
RESULT ALL_PRESCRIBED MEDS AND SPECIAL INSTRUCTIONS: NORMAL
SL AMB 4-ANPP QUANTIFICATION: NORMAL NG/ML
SL AMB 7-OH-MITRAGYNINE (KRATOM ALKALOID) QUANTIFICATION: NEGATIVE NG/ML
SL AMB ACETYL FENTANYL QUANTIFICATION: NORMAL NG/ML
SL AMB ACETYL NORFENTANYL QUANTIFICATION: NORMAL NG/ML
SL AMB ACRYL FENTANYL QUANTIFICATION: NORMAL NG/ML
SL AMB CARFENTANIL QUANTIFICATION: NORMAL NG/ML
SL AMB CITALOPRAM/ESCITALOPRAM QUANTIFICATION: NEGATIVE NG/ML
SL AMB CITALOPRAM/ESCITALOPRAM QUANTIFICATION: NEGATIVE NG/ML
SL AMB CTHC (MARIJUANA METABOLITE) QUANTIFICATION: NEGATIVE NG/ML
SL AMB HYDROXYBUPROPION QUANTIFICATION: NEGATIVE NG/ML
SL AMB N-DESMETHYL-TRAMADOL QUANTIFICATION: ABNORMAL NG/ML
SL AMB PREGABALIN QUANTIFICATION: NEGATIVE
SPECIMEN PH ACCEPTABLE UR: NORMAL
TAPENTADOL UR QL CFM: NEGATIVE NG/ML
TEMAZEPAM UR QL CFM: NEGATIVE NG/ML
TEMAZEPAM UR QL CFM: NEGATIVE NG/ML
TRAMADOL UR QL CFM: ABNORMAL NG/ML
URATE/CREAT 24H UR: ABNORMAL NG/ML

## 2023-01-19 ENCOUNTER — CONSULT (OUTPATIENT)
Dept: GASTROENTEROLOGY | Facility: CLINIC | Age: 48
End: 2023-01-19

## 2023-01-19 VITALS
WEIGHT: 263.8 LBS | HEART RATE: 108 BPM | SYSTOLIC BLOOD PRESSURE: 148 MMHG | OXYGEN SATURATION: 97 % | RESPIRATION RATE: 16 BRPM | BODY MASS INDEX: 48.55 KG/M2 | TEMPERATURE: 98.3 F | HEIGHT: 62 IN | DIASTOLIC BLOOD PRESSURE: 88 MMHG

## 2023-01-19 DIAGNOSIS — R79.89 ELEVATED LFTS: Primary | ICD-10-CM

## 2023-01-19 DIAGNOSIS — K76.0 HEPATIC STEATOSIS: ICD-10-CM

## 2023-01-19 DIAGNOSIS — R13.10 DYSPHAGIA, UNSPECIFIED TYPE: ICD-10-CM

## 2023-01-19 DIAGNOSIS — Z12.11 SCREENING FOR COLON CANCER: ICD-10-CM

## 2023-01-19 DIAGNOSIS — R16.0 HEPATOMEGALY: ICD-10-CM

## 2023-01-19 DIAGNOSIS — R12 HEARTBURN: ICD-10-CM

## 2023-01-19 RX ORDER — OMEPRAZOLE 20 MG/1
20 CAPSULE, DELAYED RELEASE ORAL DAILY
Qty: 30 CAPSULE | Refills: 3 | Status: SHIPPED | OUTPATIENT
Start: 2023-01-19

## 2023-01-19 RX ORDER — OMEPRAZOLE 20 MG/1
20 CAPSULE, DELAYED RELEASE ORAL DAILY
COMMUNITY
End: 2023-01-19 | Stop reason: SDUPTHER

## 2023-01-19 NOTE — PATIENT INSTRUCTIONS
Please have blood work and special ultrasound of the liver completed  Discussed recommendations in regards to fatty liver including:     Strict control of contributing comorbidities (obesity, prediabetes/diabetes, hypertension, and hypertriglyceridemia)  Weight loss of approx 10-15% of patient's current body weight over a period of 6-12 months through low fat diet and cardiovascular exercise as tolerated) Limiting alcohol consumption, preferably complete abstinence  Monitor hepatic function every 6 months with routine labs  Continue on the omeprazole  Please have upper endoscopy completed  Scheduled date of EGD(as of today):Patient  is going to call me back to schedule a date  Physician performing EGD:  Location of EGD: [address] [place] [address]  Instructions reviewed with patient by: Janet Galindo   Clearances:  N/A  Follow up appointment after procedure made

## 2023-01-19 NOTE — PROGRESS NOTES
[facility] - Outpatient Consultation  Cristy Garcia 50 y o  female MRN: [id number]  Encounter: [id number]    ASSESSMENT AND PLAN:      1  Elevated LFTs  2  Hepatomegaly  3  Hepatic steatosis    Pt referred today for hepatic steatosis and elevated transaminases  Looking back in her history, it appears that her LFTs have only been elevated on 1 occasion in the past few years in 09/2022, and a hepatocellular pattern  We discussed that we should recheck her LFTs now  The elevation may have in fact be secondary to NAFLD, as given her ultrasound findings of fatty liver and lack of EtOH intake, as well as her comorbidities, she likely carries this diagnosis  However, we will recheck of viral hepatitis panel at this time  She had an iron panel checked in 12/2022 which was within normal limits  If LFTs remain elevated, would send for comprehensive serologic evaluation  Of note, her platelets and albumin appear normal, and this suggests her synthetic function is normal    Reviewed the diagnosis of fatty liver in detail, including possible sequelae of disease  Recommend control of comorbidities and weight reduction  We will also proceed with elastography at this time, as her NAFLD fibrosis score is 0 04, or indeterminant     - Ambulatory Referral to Gastroenterology  - CBC and differential; Future  - Comprehensive metabolic panel; Future  - Protime-INR; Future  - Hepatitis C antibody; Future  - Hepatitis A antibody, total; Future  - Hepatitis B core antibody, IgM; Future  - Hepatitis B core antibody, total; Future  - Hepatitis B surface antibody; Future  - Hepatitis B surface antigen; Future  - US elastography/UGAP; Future  - CBC and differential; Future  - Comprehensive metabolic panel; Future  - Protime-INR; Future    4  Dysphagia, unspecified type  5  Heartburn    Patient notes a long history of heartburn, for which she is dependent on daily PPI    Her symptoms are controlled at this time, though she is endorsing intermittent dysphagia to solids  Given this symptom, we will proceed with upper endoscopy to evaluate for esophagitis, web/ring/stricture, EOE, etc  patient should continue on PPI at this time  Pending results of endoscopy, may consider escalation of therapy  Continue with antireflux precautions  Risks associated with endoscopic evaluation were discussed with patient in detail today, including but not limited to bleeding, infection, perforation, all of which are low  Patient demonstrates understanding and is agreeable to the plan  - EGD; Future  - omeprazole (PriLOSEC) 20 mg delayed release capsule; Take 1 capsule (20 mg total) by mouth daily  Dispense: 30 capsule; Refill: 3    6  Screening for colon cancer     Patient had a negative Cologuard in 12/2022  She denies any family history of colon cancer or personal history of colon polyps, that she is average risk  She is up-to-date on colon cancer surveillance, will be due for repeat Cologuard in approximately 3 years  ______________________________________________________________________    HPI: Patient is a 50 y o  female who presents today for a consultation regarding elevated LFTS  Pmhx sig for asthma, fibromyalgia, chronic pain disorder, MDD  Pt is new to clinic  Shares she does have a history of heartburn  On PPI daily  Shares that despite PPI, she will still have some intermittent breakthrough indigestion  She also notes intermittent dysphagia to solids  Feels like they become stuck in area of throat  No issues with liquids  No odynophagia  Uncertain as to duration of symptoms  Denies nausea, emesis  No unintentional weight loss in past 6-12 months  Pertaining to bowels, having formed brown bowel movement about daily  No BRBPR or melena  No nocturnal BMs, no chronic diarrhea  No known hx of liver dz  Father had etoh cirrhosis  Pt denies etoh use, herbal supplements, or excess acetaminophen usage   She denies tattoos,  experience, blood transfusion, IVDU  Non-smoker  NSAIDs: celebrex daily     12/2022: negative cologuard, TSAT 21, TIBC 439, iron 90, ferritin 37  09/2022: Hb 16 4, Hct 47 1, Plt 287, MCV 88, BUN 12, Cr 0 75, AST 50, ALT 91, ALP 49, albumin 3 9, t bili 0 80, TSH 0 857  07/2021: AST 31, ALT 74, ALP 44, albumin 3 5, t bili 0 48, Hep C abs non-reactive     Endoscopic history:   EGD: prior to 2018   RUQ US: Hepatomegaly; Hepatic moderate steatosis    Review of Systems   Constitutional: Negative for fever  Gastrointestinal: Positive for nausea  Negative for constipation, diarrhea and vomiting  Genitourinary: Positive for frequency  Negative for dysuria and hematuria  Musculoskeletal: Positive for arthralgias and myalgias  Neurological: Positive for headaches     Otherwise Per HPI     Historical Information   Past Medical History:   Diagnosis Date   • Allergic     Seasonal Allergies   • Alopecia of scalp     Treated with Proscar   • Asthma    • Chronic pain disorder     back, bilat knees, bilat hips   • CPAP (continuous positive airway pressure) dependence    • Depression    • Diabetes mellitus (HCC)    • Fibromyalgia, primary    • GERD (gastroesophageal reflux disease)    • Hip fx, left, closed, with routine healing, subsequent encounter 05/2017   • Hypertension    • Insomnia    • Irregular heartbeat    • Obesity    • VICTOR HUGO on CPAP    • Shortness of breath    • Sleep apnea      Past Surgical History:   Procedure Laterality Date   • CARPAL TUNNEL RELEASE Left    • LUMBAR EPIDURAL INJECTION     • NERVE BLOCK Bilateral 12/29/2020    Procedure: L2 L3 L4 L5 MEDIAL BRANCH BLOCK #1;  Surgeon: Gina Henriquez MD;  Location: [facility];  Service: Pain Management    • NERVE BLOCK Bilateral 1/19/2021    Procedure: L2 L3 L4 L5 MEDIAL BRANCH BLOCK #2;  Surgeon: Gina Henriquez MD;  Location: [facility];  Service: Pain Management    • RADIOFREQUENCY ABLATION Right 2/25/2021    Procedure: L2 L3 L4 L5 RADIO FREQUENCY ABLATION right side;  Surgeon: Desean Pratt MD;  Location: [facility];  Service: Pain Management    • RADIOFREQUENCY ABLATION Left 3/16/2021    Procedure: L2 L3 L4 L5 RADIO FREQUENCY ABLATION;  Surgeon: Desean Pratt MD;  Location: [facility];  Service: Pain Management    • UPPER GASTROINTESTINAL ENDOSCOPY       Social History   Social History     Substance and Sexual Activity   Alcohol Use No     Social History     Substance and Sexual Activity   Drug Use No     Social History     Tobacco Use   Smoking Status Former   Smokeless Tobacco Never     Family History   Problem Relation Age of Onset   • Cancer Father    • Hepatitis Father    • Asthma Cousin    • Hypertension Paternal Grandmother    • No Known Problems Mother    • No Known Problems Sister    • No Known Problems Maternal Grandmother    • No Known Problems Maternal Grandfather    • No Known Problems Paternal Grandfather    • No Known Problems Paternal Aunt    • BRCA2 Positive Neg Hx    • BRCA2 Negative Neg Hx    • BRCA1 Positive Neg Hx    • BRCA1 Negative Neg Hx    • BRCA 1/2 Neg Hx    • Ovarian cancer Neg Hx    • Endometrial cancer Neg Hx    • Colon cancer Neg Hx    • Breast cancer additional onset Neg Hx    • Breast cancer Neg Hx        Meds/Allergies       Current Outpatient Medications:   •  albuterol (Ventolin HFA) 90 mcg/act inhaler  •  atorvastatin (LIPITOR) 20 mg tablet  •  celecoxib (CeleBREX) 100 mg capsule  •  ergocalciferol (VITAMIN D2) 50,000 units  •  Fluticasone-Salmeterol (Advair Diskus) 250-50 mcg/dose inhaler  •  glipiZIDE (GLUCOTROL) 10 mg tablet  •  glucose blood test strip  •  Lancets (onetouch ultrasoft) lancets  •  lisinopril (ZESTRIL) 10 mg tablet  •  metFORMIN (GLUCOPHAGE) 1000 MG tablet  •  sertraline (ZOLOFT) 50 mg tablet  •  traMADol (Ultram) 50 mg tablet  •  finasteride (PROPECIA) 1 MG tablet    Allergies   Allergen Reactions   • Tdap [Tetanus-Diphth-Acell Pertussis] Rash           Objective     Blood pressure 148/88, pulse (!) 108, temperature 98 3 °F (36 8 °C), temperature source Tympanic, resp  rate 16, height 5' 2" (1 575 m), weight 120 kg (263 lb 12 8 oz), SpO2 97 %  Body mass index is 48 25 kg/m²  Physical Exam  Vitals and nursing note reviewed  Constitutional:       General: She is not in acute distress  Appearance: She is not toxic-appearing  HENT:      Head: Normocephalic and atraumatic  Cardiovascular:      Rate and Rhythm: Normal rate  Pulmonary:      Effort: Pulmonary effort is normal       Breath sounds: Normal breath sounds  Abdominal:      General: Abdomen is protuberant  Bowel sounds are normal       Tenderness: There is no abdominal tenderness  There is no guarding or rebound  Musculoskeletal:      Right lower leg: Edema present  Left lower leg: Edema present  Skin:     General: Skin is warm and dry  Coloration: Skin is not jaundiced  Neurological:      Mental Status: She is alert  Psychiatric:         Mood and Affect: Mood normal          Behavior: Behavior normal         Lab Results:   No visits with results within 1 Day(s) from this visit     Latest known visit with results is:   Office Visit on 01/09/2023   Component Date Value   • RESULT ALL_PRESC MEDS SP* 51/96/5167 Not Applicable    • Alpha-Hydroxyalprazolam * 01/09/2023 negative    • Amphetamine Quantificati* 01/09/2023 negative    • Buprenorphine Quantifica* 01/09/2023 negative    • Norbuprenorphine Quantif* 01/09/2023 negative    • Hydroxybupropion Quantif* 01/09/2023 negative    • Carisoprodol Quantificat* 01/09/2023 negative    • Meprobamate Quantificati* 01/09/2023 negative    • Citalopram/Escitalopram * 01/09/2023 negative    • CITALOPRAM/ESCITALOPRAM * 01/09/2023 negative    • 7-Amino-Clonazepam Quant* 01/09/2023 negative    • Cocaine metabolite Quant* 01/09/2023 negative    • Codeine Quantification 01/09/2023 negative    • Morphine Quantification 01/09/2023 negative    • Hydrocodone Quantificati* 01/09/2023 negative    • Norhydrocodone Quantific* 01/09/2023 negative    • Hydromorphone Quantifica* 01/09/2023 negative    • Nordiazepam Quantificati* 01/09/2023 negative    • Temazepam Quantification 01/09/2023 negative    • Oxazepam Quantification 01/09/2023 negative    • ETHYL GLUCURONIDE 01/09/2023 negative    • Fentanyl Quantification 01/09/2023 negative    • Norfentanyl Quantificati* 01/09/2023 negative    • 4-ANPP Quantification 01/09/2023 Fen Neg    • Acetyl fentanyl Quantifi* 01/09/2023 Fen Neg    • Acetyl norfentanyl Quant* 01/09/2023 Fen Neg    • Acryl fentanyl Quantific* 01/09/2023 Fen Neg    • Carfentanil Quantificati* 01/09/2023 Fen Neg    • Para-fluorofentanyl Jake* 01/09/2023 Fen Neg    • 6-DELICIA (Heroin metabolite* 01/09/2023 negative    • Hydrocodone Quantificati* 01/09/2023 negative    • Norhydrocodone Quantific* 01/09/2023 negative    • Hydromorphone Quantifica* 01/09/2023 negative    • Hydromorphone Quantifica* 01/09/2023 negative    • Mitragynine (Kratom godwin* 01/09/2023 negative    • 1-PO-Zpjfbfdpqlv (Kratom* 01/09/2023 negative    • Lorazepam Quantification 01/09/2023 negative    • MDMA Quantification 01/09/2023 negative    • Methadone Quantification 01/09/2023 negative    • EDDP (Methadone metaboli* 01/09/2023 negative    • Amphetamine Quantificati* 01/09/2023 negative    • Methamphetamine Quantifi* 01/09/2023 negative    • Morphine Quantification 01/09/2023 negative    • Hydromorphone Quantifica* 01/09/2023 negative    • Oxazepam Quantification 01/09/2023 negative    • Oxycodone Quantification 01/09/2023 negative    • Noroxycodone Quantificat* 01/09/2023 negative    • Oxymorphone Quantificati* 01/09/2023 negative    • Oxymorphone Quantificati* 01/09/2023 negative    • PREGABALIN QUANTIFICATION 01/09/2023 negative    • Tapentadol Quantification 01/09/2023 negative    • Temazepam Quantification 01/09/2023 negative    • Oxazepam Quantification 01/09/2023 negative    • cTHC (Marijuana metaboli* 01/09/2023 negative    • Tramadol Quantification 01/09/2023 negative-I (A)    • O-desmethyl-tramadol Karel* 01/09/2023 negative-I (A)    • N-DESMETHYL-TRAMADOL KAREL* 01/09/2023 negative-I (A)    • CREATININE 01/09/2023 normal-67 5    • OXIDANT 01/09/2023 normal-0    • pH 01/09/2023 normal-5 5    • SPECIFIC GRAVITY URINE 01/09/2023 normal-1 009          Radiology Results:   Mammo diagnostic bilateral w 3d & cad, US breast right limited (diagnostic)    Result Date: 1/4/2023  Narrative: DIAGNOSIS: Abnormal mammogram TECHNIQUE: Digital diagnostic mammography was performed  Computer Aided Detection (CAD) analyzed all applicable images  Ultrasound of the right breast(s) was performed  COMPARISONS: Prior breast imaging dated: 04/28/2021, 08/26/2020, and 07/31/2020 RELEVANT HISTORY: Family Breast Cancer History: History of breast cancer in Neg Hx  Family Medical History: No known relevant family medical history  Personal History: Hormone history includes birth control  No known relevant surgical history  No known relevant medical history  RISK ASSESSMENT: 5 Year Tyrer-Cuzick: 0 86 % 10 Year Tyrer-Cuzick: 1 8 % Lifetime Tyrer-Cuzick: 8 2 % TISSUE DENSITY: The breasts are almost entirely fatty  INDICATION: Ladan Echevarria is a 50 y o  female presenting for abnormal mammogram / follow up  FINDINGS: RIGHT A) CYST: Previously questioned mass right breast 12:00 is no longer identified  Sonographic evaluation confirms interval resolution of the previous complicated cyst at 83:27 9 cm from the nipple  Oval circumscribed 5 mm nodule in the central right breast on mammography has no definite corresponding abnormality on ultrasound when scanning the retroareolar region  An incidental benign cyst 12:00 5 cm from nipple is seen on ultrasound  Overall constellation of waxing and waning circumscribed nodules bilaterally is compatible with a benign etiology   Elsewhere in both breast on mammography, there are no suspicious masses, grouped microcalcifications or areas of unexplained architectural distortion  The skin and nipple areolar complex are unremarkable  Impression: Benign findings  Patient should return to annual screening mammography  ASSESSMENT/BI-RADS CATEGORY: Left: 1 - Negative Right: 2 - Benign Overall: 2 - Benign RECOMMENDATION:      - Routine screening mammogram in 1 year for both breasts  Workstation ID: [id number]       Vasyl Salinas PA-C    **Please note:  Dictation voice to text software may have been used in the creation of this record  Occasional wrong word or “sound alike” substitutions may have occurred due to the inherent limitations of voice recognition software  Read the chart carefully and recognize, using context, where substitutions have occurred  **

## 2023-01-23 DIAGNOSIS — F33.9 MAJOR DEPRESSION, RECURRENT, CHRONIC (HCC): ICD-10-CM

## 2023-01-24 ENCOUNTER — APPOINTMENT (OUTPATIENT)
Dept: LAB | Facility: HOSPITAL | Age: 48
End: 2023-01-24

## 2023-01-24 ENCOUNTER — TELEMEDICINE (OUTPATIENT)
Dept: BEHAVIORAL/MENTAL HEALTH CLINIC | Facility: CLINIC | Age: 48
End: 2023-01-24

## 2023-01-24 DIAGNOSIS — K76.0 HEPATIC STEATOSIS: ICD-10-CM

## 2023-01-24 DIAGNOSIS — F33.9 MAJOR DEPRESSION, RECURRENT, CHRONIC (HCC): Primary | ICD-10-CM

## 2023-01-24 DIAGNOSIS — R79.89 ELEVATED LFTS: ICD-10-CM

## 2023-01-24 LAB
ALBUMIN SERPL BCP-MCNC: 4.5 G/DL (ref 3.5–5)
ALP SERPL-CCNC: 39 U/L (ref 34–104)
ALT SERPL W P-5'-P-CCNC: 33 U/L (ref 7–52)
ANION GAP SERPL CALCULATED.3IONS-SCNC: 11 MMOL/L (ref 4–13)
AST SERPL W P-5'-P-CCNC: 22 U/L (ref 13–39)
BASOPHILS # BLD AUTO: 0.07 THOUSANDS/ÂΜL (ref 0–0.1)
BASOPHILS NFR BLD AUTO: 1 % (ref 0–1)
BILIRUB SERPL-MCNC: 0.6 MG/DL (ref 0.2–1)
BUN SERPL-MCNC: 13 MG/DL (ref 5–25)
CALCIUM SERPL-MCNC: 9.2 MG/DL (ref 8.4–10.2)
CHLORIDE SERPL-SCNC: 105 MMOL/L (ref 96–108)
CO2 SERPL-SCNC: 20 MMOL/L (ref 21–32)
CREAT SERPL-MCNC: 0.62 MG/DL (ref 0.6–1.3)
EOSINOPHIL # BLD AUTO: 0.48 THOUSAND/ÂΜL (ref 0–0.61)
EOSINOPHIL NFR BLD AUTO: 6 % (ref 0–6)
ERYTHROCYTE [DISTWIDTH] IN BLOOD BY AUTOMATED COUNT: 11.9 % (ref 11.6–15.1)
GFR SERPL CREATININE-BSD FRML MDRD: 106 ML/MIN/1.73SQ M
GLUCOSE SERPL-MCNC: 166 MG/DL (ref 65–140)
HCT VFR BLD AUTO: 47.3 % (ref 34.8–46.1)
HGB BLD-MCNC: 15.9 G/DL (ref 11.5–15.4)
IMM GRANULOCYTES # BLD AUTO: 0.05 THOUSAND/UL (ref 0–0.2)
IMM GRANULOCYTES NFR BLD AUTO: 1 % (ref 0–2)
INR PPP: 0.92 (ref 0.84–1.19)
LYMPHOCYTES # BLD AUTO: 2.39 THOUSANDS/ÂΜL (ref 0.6–4.47)
LYMPHOCYTES NFR BLD AUTO: 27 % (ref 14–44)
MCH RBC QN AUTO: 30.1 PG (ref 26.8–34.3)
MCHC RBC AUTO-ENTMCNC: 33.6 G/DL (ref 31.4–37.4)
MCV RBC AUTO: 90 FL (ref 82–98)
MONOCYTES # BLD AUTO: 0.45 THOUSAND/ÂΜL (ref 0.17–1.22)
MONOCYTES NFR BLD AUTO: 5 % (ref 4–12)
NEUTROPHILS # BLD AUTO: 5.36 THOUSANDS/ÂΜL (ref 1.85–7.62)
NEUTS SEG NFR BLD AUTO: 60 % (ref 43–75)
NRBC BLD AUTO-RTO: 0 /100 WBCS
PLATELET # BLD AUTO: 247 THOUSANDS/UL (ref 149–390)
PMV BLD AUTO: 9.5 FL (ref 8.9–12.7)
POTASSIUM SERPL-SCNC: 4.1 MMOL/L (ref 3.5–5.3)
PROT SERPL-MCNC: 7.2 G/DL (ref 6.4–8.4)
PROTHROMBIN TIME: 12.5 SECONDS (ref 11.6–14.5)
RBC # BLD AUTO: 5.28 MILLION/UL (ref 3.81–5.12)
SODIUM SERPL-SCNC: 136 MMOL/L (ref 135–147)
WBC # BLD AUTO: 8.8 THOUSAND/UL (ref 4.31–10.16)

## 2023-01-24 NOTE — PSYCH
Psychotherapy Provided: Individual Psychotherapy 30  minutes 3:20 PM to 4:45 Pm  It was my intent to perform this visit via video technology, but the patient was not able to do a video connection, so the visit was completed via audio telephone only  Goals addressed in session:(Long Term Goal Number One) The client reported continued physical health issues which has effected her basic ADL's  " I am having problems with cooking and basic cooking" During the session we explored and processed the Client's weaknesses and the effects on her mental health  Interventions: Supportive Therapy, Strengths Therapy,  and Cognitive Behavioral Therapy where the treatment modalities utilized during the session  Assessment and Plan: The Client presented a depressed anxious mood that was congruent in effect  She was alert, goal directed and participated with prompts during the session  The Client was oriented to person, place, time, situation, and reported no suicidal/homicidal ideation, plan, or intent  As team we explored and processed the client's weaknesses with her basic ADL's and the effects on her mental health  She was able to verbalize increase pain has led to her isolation  During the session we reviewed coping skills and explored guided meditation  Next appointment was set for 2 weeks  Pain:      PSYCH MENTAL STATUS PAIN :5 as reported by the client     PHYSICAL PAIN SCALE NUMBERS:8 as reported by the client     Current suicide risk : Low /Phone number for [facility] [place] Suicide Prevention Life Line was given to the Client/ [phone]: Diagnosis and Treatment Plan explained to [address] [place] [address]  relates understanding diagnosis and is agreeable to Treatment Plan  Yes    Virtual Brief Visit    Patient is located in the following state in which I hold an active license [place]      Assessment/Plan:    Problem List Items Addressed This Visit        Other Major depression, recurrent, chronic ([facility] [address] ) - Primary       Recent Visits  No visits were found meeting these conditions  Showing recent visits within past 7 days and meeting all other requirements  Future Appointments  No visits were found meeting these conditions    Showing future appointments within next 150 days and meeting all other requirements         Visit Time    Visit Start Time: 3:20 PM   Visit Stop Time: 3:45 PM   Total Visit Duration: 25 minutes

## 2023-01-25 DIAGNOSIS — I10 ESSENTIAL HYPERTENSION: ICD-10-CM

## 2023-01-25 LAB
HAV AB SER QL IA: NORMAL
HBV CORE AB SER QL: NORMAL
HBV CORE IGM SER QL: NORMAL
HBV SURFACE AB SER-ACNC: <3.1 MIU/ML
HBV SURFACE AG SER QL: NORMAL
HCV AB SER QL: NORMAL

## 2023-01-25 RX ORDER — LISINOPRIL 10 MG/1
TABLET ORAL
Qty: 90 TABLET | Refills: 0 | Status: SHIPPED | OUTPATIENT
Start: 2023-01-25

## 2023-01-26 ENCOUNTER — HOSPITAL ENCOUNTER (OUTPATIENT)
Dept: ULTRASOUND IMAGING | Facility: HOSPITAL | Age: 48
End: 2023-01-26

## 2023-01-26 DIAGNOSIS — K76.0 HEPATIC STEATOSIS: ICD-10-CM

## 2023-02-01 ENCOUNTER — OFFICE VISIT (OUTPATIENT)
Dept: PAIN MEDICINE | Facility: CLINIC | Age: 48
End: 2023-02-01

## 2023-02-01 VITALS
TEMPERATURE: 96.7 F | BODY MASS INDEX: 48.53 KG/M2 | WEIGHT: 263.7 LBS | RESPIRATION RATE: 20 BRPM | HEIGHT: 62 IN | DIASTOLIC BLOOD PRESSURE: 80 MMHG | HEART RATE: 118 BPM | SYSTOLIC BLOOD PRESSURE: 128 MMHG

## 2023-02-01 DIAGNOSIS — M16.0 PRIMARY OSTEOARTHRITIS OF BOTH HIPS: ICD-10-CM

## 2023-02-01 DIAGNOSIS — M54.12 CERVICAL RADICULOPATHY: Primary | ICD-10-CM

## 2023-02-01 RX ORDER — BUPIVACAINE HCL/PF 2.5 MG/ML
4 VIAL (ML) INJECTION ONCE
OUTPATIENT
Start: 2023-02-01 | End: 2023-02-01

## 2023-02-01 RX ORDER — METHYLPREDNISOLONE ACETATE 80 MG/ML
80 INJECTION, SUSPENSION INTRA-ARTICULAR; INTRALESIONAL; INTRAMUSCULAR; PARENTERAL; SOFT TISSUE ONCE
OUTPATIENT
Start: 2023-02-01 | End: 2023-02-01

## 2023-02-01 RX ORDER — LIDOCAINE HYDROCHLORIDE 10 MG/ML
5 INJECTION, SOLUTION EPIDURAL; INFILTRATION; INTRACAUDAL; PERINEURAL ONCE
OUTPATIENT
Start: 2023-02-01 | End: 2023-02-01

## 2023-02-01 RX ORDER — HYDROCODONE BITARTRATE AND ACETAMINOPHEN 5; 325 MG/1; MG/1
1 TABLET ORAL EVERY 8 HOURS PRN
Qty: 90 TABLET | Refills: 0 | Status: ON HOLD | OUTPATIENT
Start: 2023-02-01 | End: 2023-02-07 | Stop reason: SDUPTHER

## 2023-02-01 NOTE — PROGRESS NOTES
Assessment  1  Cervical radiculopathy    2  Primary osteoarthritis of both hips  -     Case request operating room: INJECTION JOINT HIP; Standing  -     Case request operating room: INJECTION JOINT HIP  -     HYDROcodone-acetaminophen ([address]) 5-325 mg per tablet; Take 1 tablet by mouth every 8 (eight) hours as needed for pain Max Daily Amount: 3 tablets    Greater than 80% improvement with recent radiofrequency ablation of bilateral Medial branch nerves at L2-L5 done more than 1 5 years ago with mproved ability to participate with IADLs in significantly less pain; pain has subsequently returned  Additionally describes b/l hip pain with radiation to the groin; ttp over b/l gtb, pain with internal/external rotation of b/l hips; pain worse with standing/ambulation  Lifestyle modifications extensively discussed including diet, exercise and weight loss in conjunction with optimal DM-2 control  Had to cancel recent procedures given elevated blood glucose levels  Decision made to retry interventional procedures until patient can consistently maintain BS below 200  Voiced understanding  Risks regarding opioid therapy discussed as noted below  Previously reported the following symptomatology:    Chronic axial low back pain described by primarily arthritic features  Positive facet loading maneuvers as noted below  Mild noncompressive lumbar degenerative change at the L3-4 and L4-5 levels  Previously had a left L3 transforaminal steroid injection with Dr Valeria Jackson which helped for about a week and a half  Arthritic symptomatology greater than radicular features at this time  Reasonable to continue multimodal pain therapy plan  Plan  -norco 5-325mg tid prn severe pain rx; rapid urine drug screen obtained; opioid use contract reviewed and signed by both parties  -continue Celebrex 100 mg b i d  As needed for lumbar facet syndrome    Counseled regarding bleeding risk of taking this medication   -lifestyle modifications including diet, exercise and weight loss in addition to DM-2 control extensively discussed  -f/u 3 weeks  -bilateral intra-articular hip joint injection; f/u 2 weeks post porcedure  -repeat bilateral L3, L4, L5 medial branch nerve RF lesioning; f/u 2 weeks post procedure  -hip exercises provided; continue physical therapy and core exercises for lumbar facet syndrome    There are risks associated with opioid medications, including dependence, addiction and tolerance  The patient understands and agrees to use these medications only as prescribed  Potential side effects of the medications include, but are not limited to, constipation, drowsiness, addiction, impaired judgment and risk of fatal overdose if not taken as prescribed  The patient was warned against driving while taking sedation medications  Sharing medications is a felony  At this point in time, the patient is showing no signs of addiction, abuse, diversion or suicidal ideation  [place] Prescription Drug Monitoring Program report was reviewed and was appropriate     Complete risks and benefits including bleeding, infection, tissue reaction, nerve injury and allergic reaction were discussed  The approach was demonstrated using models and literature was provided  Verbal and written consent was obtained  My impressions and treatment recommendations were discussed in detail with the patient who verbalized understanding and had no further questions  Discharge instructions were provided  I personally saw and examined the patient and I agree with the above discussed plan of care      New Medications Ordered This Visit   Medications   • HYDROcodone-acetaminophen (NORCO) 5-325 mg per tablet     Sig: Take 1 tablet by mouth every 8 (eight) hours as needed for pain Max Daily Amount: 3 tablets     Dispense:  90 tablet     Refill:  0       History of Present Illness    Greater than 80% improvement with recent radiofrequency ablation of bilateral Medial branch nerves at L2-L5 done more than 1 5 years ago with mproved ability to participate with IADLs in significantly less pain; pain has subsequently returned  Additionally describes b/l hip pain with radiation to the groin; ttp over b/l gtb, pain with internal/external rotation of b/l hips; pain worse with standing/ambulation  Had to cancel recent procedures given elevated blood glucose levels  Decision made to retry interventional procedures until patient can consistently maintain BS below 200  Voiced understanding  Previously reported the following symptomatology:    Sunny Kaur is a 50 y o  female with past medical history of axial low back pain described primarily by arthritic features  She presents with a 2 year history of chronic low back pain described primarily as arthritic in nature  she describes 8/10 low back pain that is worse in the mornings and worse at the end of the day  The pain is characterized by achy, nagging, indolent, crampy, stabbing pain in his/her axial low back  The patient describes that the pain is worse with standing for long periods of time on hard surfaces as well as with walking  The patient is a very active individual and feels as though this pain compromises her participation with independent activities of daily living  The pain can be debilitating at times and contribute to significant disability, compromising overall activity and independent activities of daily living  She has tried physical therapy with limited relief of symptoms  Medications the patient has tried in the past include   Celebrex,  and Flexeril  She describes minor radicular symptoms in the L3 and L4 dermatomal distribution but otherwise has good strength  Previously she had left L3 transforaminal epidural steroid injection with relief for about 2 weeks  She denies any weakness numbness or paresthesias  The patient denies any bowel or bladder dysfunction as well          I have personally reviewed and/or updated the patient's past medical history, past surgical history, family history, social history, current medications, allergies, and vital signs today  Review of Systems   Constitutional: Positive for activity change  HENT: Negative  Eyes: Negative  Respiratory: Negative  Cardiovascular: Negative  Gastrointestinal: Negative  Endocrine: Negative  Genitourinary: Negative  Musculoskeletal: Positive for arthralgias, back pain, gait problem and myalgias  Skin: Negative  Allergic/Immunologic: Negative  Hematological: Negative  Psychiatric/Behavioral: Negative  All other systems reviewed and are negative        Patient Active Problem List   Diagnosis   • SOB (shortness of breath)   • Chronic bilateral low back pain without sciatica   • Chronic pain of left knee   • Chronic pain of right knee   • Fibromyalgia   • Primary osteoarthritis involving multiple joints   • Mild persistent asthma without complication   • Lumbar radiculopathy   • Primary osteoarthritis of both hips   • Left hip pain   • Major depression, recurrent, chronic (HCC)   • Lumbar facet joint syndrome   • Cervical radiculopathy       Past Medical History:   Diagnosis Date   • Allergic     Seasonal Allergies   • Alopecia of scalp     Treated with Proscar   • Asthma    • Chronic pain disorder     back, bilat knees, bilat hips   • CPAP (continuous positive airway pressure) dependence    • Depression    • Diabetes mellitus (HCC)    • Fibromyalgia, primary    • GERD (gastroesophageal reflux disease)    • Hip fx, left, closed, with routine healing, subsequent encounter 05/2017   • Hypertension    • Insomnia    • Irregular heartbeat    • Obesity    • VICTOR HUGO on CPAP    • Shortness of breath    • Sleep apnea        Past Surgical History:   Procedure Laterality Date   • CARPAL TUNNEL RELEASE Left    • LUMBAR EPIDURAL INJECTION     • NERVE BLOCK Bilateral 12/29/2020    Procedure: L2 L3 L4 L5 MEDIAL BRANCH BLOCK #1;  Surgeon: Leatha Faust Crystal Mayen MD;  Location: [facility];  Service: Pain Management    • NERVE BLOCK Bilateral 1/19/2021    Procedure: L2 L3 L4 L5 MEDIAL BRANCH BLOCK #2;  Surgeon: Shaji Rolle MD;  Location: [facility];  Service: Pain Management    • RADIOFREQUENCY ABLATION Right 2/25/2021    Procedure: L2 L3 L4 L5 RADIO FREQUENCY ABLATION right side;  Surgeon: Shaji Rolle MD;  Location: [facility];  Service: Pain Management    • RADIOFREQUENCY ABLATION Left 3/16/2021    Procedure: L2 L3 L4 L5 RADIO FREQUENCY ABLATION;  Surgeon: Shaji Rolle MD;  Location: [facility];  Service: Pain Management    • UPPER GASTROINTESTINAL ENDOSCOPY         Family History   Problem Relation Age of Onset   • Cancer Father    • Hepatitis Father    • Asthma Cousin    • Hypertension Paternal Grandmother    • No Known Problems Mother    • No Known Problems Sister    • No Known Problems Maternal Grandmother    • No Known Problems Maternal Grandfather    • No Known Problems Paternal Grandfather    • No Known Problems Paternal Aunt    • BRCA2 Positive Neg Hx    • BRCA2 Negative Neg Hx    • BRCA1 Positive Neg Hx    • BRCA1 Negative Neg Hx    • BRCA 1/2 Neg Hx    • Ovarian cancer Neg Hx    • Endometrial cancer Neg Hx    • Colon cancer Neg Hx    • Breast cancer additional onset Neg Hx    • Breast cancer Neg Hx        Social History     Occupational History   • Not on file   Tobacco Use   • Smoking status: Former   • Smokeless tobacco: Never   Vaping Use   • Vaping Use: Never used   Substance and Sexual Activity   • Alcohol use: No   • Drug use: No   • Sexual activity: Not on file       Current Outpatient Medications on File Prior to Visit   Medication Sig   • albuterol (Ventolin HFA) 90 mcg/act inhaler Inhale 2 puffs every 6 (six) hours as needed for wheezing   • atorvastatin (LIPITOR) 20 mg tablet Take 1 tablet (20 mg total) by mouth daily with dinner   • celecoxib (CeleBREX) 100 mg capsule Take 1 capsule (100 mg total) by mouth 2 (two) times a day   • ergocalciferol (VITAMIN D2) 50,000 units Take 1 capsule (50,000 Units total) by mouth once a week   • Fluticasone-Salmeterol (Advair Diskus) 250-50 mcg/dose inhaler Inhale 1 puff 2 (two) times a day Rinse mouth after use  • glipiZIDE (GLUCOTROL) 10 mg tablet Take 2 tablets (20 mg total) by mouth 2 (two) times a day before meals   • glucose blood test strip Use as instructed   • Lancets (onetouch ultrasoft) lancets Use as instructed   • lisinopril (ZESTRIL) 10 mg tablet TAKE (1) TABLET BY MOUTH DAILY  • metFORMIN (GLUCOPHAGE) 1000 MG tablet TAKE (1) TABLET TWICE A DAY WITH MEALS  • omeprazole (PriLOSEC) 20 mg delayed release capsule Take 1 capsule (20 mg total) by mouth daily   • sertraline (ZOLOFT) 50 mg tablet TAKE (1) TABLET BY MOUTH DAILY AT BEDTIME  • finasteride (PROPECIA) 1 MG tablet Take 1 tablet (1 mg total) by mouth daily (Patient not taking: Reported on 1/9/2023)   • traMADol (Ultram) 50 mg tablet Take 1 tablet (50 mg total) by mouth every 8 (eight) hours as needed for severe pain (Patient not taking: Reported on 2/1/2023)     No current facility-administered medications on file prior to visit  Allergies   Allergen Reactions   • Tdap [Tetanus-Diphth-Acell Pertussis] Rash         Physical Exam    /80   Pulse (!) 118   Temp (!) 96 7 °F (35 9 °C)   Resp 20   Ht 5' 2" (1 575 m)   Wt 120 kg (263 lb 11 2 oz)   BMI 48 23 kg/m²     Constitutional: normal, well developed, well nourished, alert, in no distress and non-toxic and no overt pain behavior  and obese  Eyes: anicteric  HEENT: grossly intact  Neck: supple, symmetric, trachea midline and no masses   Pulmonary:even and unlabored  Cardiovascular:No edema or pitting edema present  Skin:Normal without rashes or lesions and well hydrated  Psychiatric:Mood and affect appropriate  Neurologic:Cranial Nerves II-XII grossly intact Sensation grossly intact; no clonus negative dupree's  Reflexes 2+ and brisk   SLR negative bilaterally  Musculoskeletal:  Steppage gait  Normal heel toe and tip toe walking  Significant pain with lumbar facet loading bilaterally and with lateral spine rotation left greater than right  TTP over lumbar paraspinal muscles  Negative wilda's test, negative gaenslen's negative SIJ loading bilaterally  ttp over b/l gtb, pain with internal/external rotation of b/l hips    Imaging    MRI LUMBAR SPINE WITHOUT CONTRAST     INDICATION: [id number]: Lumbago with sciatica, left side  G89 29: Other chronic pain      COMPARISON:  None      TECHNIQUE:  Sagittal T1, sagittal T2, sagittal inversion recovery, axial T1 and axial T2, coronal T2     IMAGE QUALITY:  Diagnostic     FINDINGS:     VERTEBRAL BODIES:  There are 5 lumbar type vertebral bodies  Normal alignment of the lumbar spine  No spondylolysis or spondylolisthesis  No scoliosis  No compression fracture  Normal marrow signal is identified within the visualized bony   structures  No discrete marrow lesion      SACRUM:  Normal signal within the sacrum  No evidence of insufficiency or stress fracture      DISTAL CORD AND CONUS:  Normal size and signal within the distal cord and conus      PARASPINAL SOFT TISSUES:  There is thickening of the endometrial cavity partially visualized on this examination towards the fundus  There is a dominant follicle identified within the left ovary      LOWER THORACIC DISC SPACES:  Normal disc height and signal   No disc herniation, canal stenosis or foraminal narrowing      LUMBAR DISC SPACES:     L1-L2:  Normal      L2-L3:  Normal      L3-L4:  Annular bulging with a small broad-based left foraminal and extraforaminal disc protrusion best seen on series 6 image 13  There is no canal stenosis  Only minimal left foraminal narrowing without nerve impingement      L4-L5:  Disc desiccation and loss of disc height with mild annular bulging    Small central disc herniation without canal stenosis or foraminal nerve impingement      L5-S1:  Normal disc height and signal without canal stenosis or foraminal narrowing      IMPRESSION:     Mild noncompressive lumbar degenerative change at the L3-4 and L4-5 levels      Fluid signal in the endometrial cavity partially visualized at the fundus    If patient is postmenopausal, consider follow-up ultrasound imaging      Signal

## 2023-02-01 NOTE — H&P (VIEW-ONLY)
Assessment  1  Cervical radiculopathy    2  Primary osteoarthritis of both hips  -     Case request operating room: INJECTION JOINT HIP; Standing  -     Case request operating room: INJECTION JOINT HIP  -     HYDROcodone-acetaminophen ([address]) 5-325 mg per tablet; Take 1 tablet by mouth every 8 (eight) hours as needed for pain Max Daily Amount: 3 tablets    Greater than 80% improvement with recent radiofrequency ablation of bilateral Medial branch nerves at L2-L5 done more than 1 5 years ago with mproved ability to participate with IADLs in significantly less pain; pain has subsequently returned  Additionally describes b/l hip pain with radiation to the groin; ttp over b/l gtb, pain with internal/external rotation of b/l hips; pain worse with standing/ambulation  Lifestyle modifications extensively discussed including diet, exercise and weight loss in conjunction with optimal DM-2 control  Had to cancel recent procedures given elevated blood glucose levels  Decision made to retry interventional procedures until patient can consistently maintain BS below 200  Voiced understanding  Risks regarding opioid therapy discussed as noted below  Previously reported the following symptomatology:    Chronic axial low back pain described by primarily arthritic features  Positive facet loading maneuvers as noted below  Mild noncompressive lumbar degenerative change at the L3-4 and L4-5 levels  Previously had a left L3 transforaminal steroid injection with Dr Ilene Contreras which helped for about a week and a half  Arthritic symptomatology greater than radicular features at this time  Reasonable to continue multimodal pain therapy plan  Plan  -norco 5-325mg tid prn severe pain rx; rapid urine drug screen obtained; opioid use contract reviewed and signed by both parties  -continue Celebrex 100 mg b i d  As needed for lumbar facet syndrome    Counseled regarding bleeding risk of taking this medication   -lifestyle modifications including diet, exercise and weight loss in addition to DM-2 control extensively discussed  -f/u 3 weeks  -bilateral intra-articular hip joint injection; f/u 2 weeks post porcedure  -repeat bilateral L3, L4, L5 medial branch nerve RF lesioning; f/u 2 weeks post procedure  -hip exercises provided; continue physical therapy and core exercises for lumbar facet syndrome    There are risks associated with opioid medications, including dependence, addiction and tolerance  The patient understands and agrees to use these medications only as prescribed  Potential side effects of the medications include, but are not limited to, constipation, drowsiness, addiction, impaired judgment and risk of fatal overdose if not taken as prescribed  The patient was warned against driving while taking sedation medications  Sharing medications is a felony  At this point in time, the patient is showing no signs of addiction, abuse, diversion or suicidal ideation  [place] Prescription Drug Monitoring Program report was reviewed and was appropriate     Complete risks and benefits including bleeding, infection, tissue reaction, nerve injury and allergic reaction were discussed  The approach was demonstrated using models and literature was provided  Verbal and written consent was obtained  My impressions and treatment recommendations were discussed in detail with the patient who verbalized understanding and had no further questions  Discharge instructions were provided  I personally saw and examined the patient and I agree with the above discussed plan of care      New Medications Ordered This Visit   Medications   • HYDROcodone-acetaminophen (NORCO) 5-325 mg per tablet     Sig: Take 1 tablet by mouth every 8 (eight) hours as needed for pain Max Daily Amount: 3 tablets     Dispense:  90 tablet     Refill:  0       History of Present Illness    Greater than 80% improvement with recent radiofrequency ablation of bilateral Medial branch nerves at L2-L5 done more than 1 5 years ago with mproved ability to participate with IADLs in significantly less pain; pain has subsequently returned  Additionally describes b/l hip pain with radiation to the groin; ttp over b/l gtb, pain with internal/external rotation of b/l hips; pain worse with standing/ambulation  Had to cancel recent procedures given elevated blood glucose levels  Decision made to retry interventional procedures until patient can consistently maintain BS below 200  Voiced understanding  Previously reported the following symptomatology:    Betsy Enrique is a 50 y o  female with past medical history of axial low back pain described primarily by arthritic features  She presents with a 2 year history of chronic low back pain described primarily as arthritic in nature  she describes 8/10 low back pain that is worse in the mornings and worse at the end of the day  The pain is characterized by achy, nagging, indolent, crampy, stabbing pain in his/her axial low back  The patient describes that the pain is worse with standing for long periods of time on hard surfaces as well as with walking  The patient is a very active individual and feels as though this pain compromises her participation with independent activities of daily living  The pain can be debilitating at times and contribute to significant disability, compromising overall activity and independent activities of daily living  She has tried physical therapy with limited relief of symptoms  Medications the patient has tried in the past include   Celebrex,  and Flexeril  She describes minor radicular symptoms in the L3 and L4 dermatomal distribution but otherwise has good strength  Previously she had left L3 transforaminal epidural steroid injection with relief for about 2 weeks  She denies any weakness numbness or paresthesias  The patient denies any bowel or bladder dysfunction as well          I have personally reviewed and/or updated the patient's past medical history, past surgical history, family history, social history, current medications, allergies, and vital signs today  Review of Systems   Constitutional: Positive for activity change  HENT: Negative  Eyes: Negative  Respiratory: Negative  Cardiovascular: Negative  Gastrointestinal: Negative  Endocrine: Negative  Genitourinary: Negative  Musculoskeletal: Positive for arthralgias, back pain, gait problem and myalgias  Skin: Negative  Allergic/Immunologic: Negative  Hematological: Negative  Psychiatric/Behavioral: Negative  All other systems reviewed and are negative        Patient Active Problem List   Diagnosis   • SOB (shortness of breath)   • Chronic bilateral low back pain without sciatica   • Chronic pain of left knee   • Chronic pain of right knee   • Fibromyalgia   • Primary osteoarthritis involving multiple joints   • Mild persistent asthma without complication   • Lumbar radiculopathy   • Primary osteoarthritis of both hips   • Left hip pain   • Major depression, recurrent, chronic (HCC)   • Lumbar facet joint syndrome   • Cervical radiculopathy       Past Medical History:   Diagnosis Date   • Allergic     Seasonal Allergies   • Alopecia of scalp     Treated with Proscar   • Asthma    • Chronic pain disorder     back, bilat knees, bilat hips   • CPAP (continuous positive airway pressure) dependence    • Depression    • Diabetes mellitus (HCC)    • Fibromyalgia, primary    • GERD (gastroesophageal reflux disease)    • Hip fx, left, closed, with routine healing, subsequent encounter 05/2017   • Hypertension    • Insomnia    • Irregular heartbeat    • Obesity    • VICTOR HUGO on CPAP    • Shortness of breath    • Sleep apnea        Past Surgical History:   Procedure Laterality Date   • CARPAL TUNNEL RELEASE Left    • LUMBAR EPIDURAL INJECTION     • NERVE BLOCK Bilateral 12/29/2020    Procedure: L2 L3 L4 L5 MEDIAL BRANCH BLOCK #1;  Surgeon: Chloé Archer Guanako Rodriguez MD;  Location: [facility];  Service: Pain Management    • NERVE BLOCK Bilateral 1/19/2021    Procedure: L2 L3 L4 L5 MEDIAL BRANCH BLOCK #2;  Surgeon: Nadeem Alford MD;  Location: [facility];  Service: Pain Management    • RADIOFREQUENCY ABLATION Right 2/25/2021    Procedure: L2 L3 L4 L5 RADIO FREQUENCY ABLATION right side;  Surgeon: Nadeem Alford MD;  Location: [facility];  Service: Pain Management    • RADIOFREQUENCY ABLATION Left 3/16/2021    Procedure: L2 L3 L4 L5 RADIO FREQUENCY ABLATION;  Surgeon: Nadeem Alford MD;  Location: [facility];  Service: Pain Management    • UPPER GASTROINTESTINAL ENDOSCOPY         Family History   Problem Relation Age of Onset   • Cancer Father    • Hepatitis Father    • Asthma Cousin    • Hypertension Paternal Grandmother    • No Known Problems Mother    • No Known Problems Sister    • No Known Problems Maternal Grandmother    • No Known Problems Maternal Grandfather    • No Known Problems Paternal Grandfather    • No Known Problems Paternal Aunt    • BRCA2 Positive Neg Hx    • BRCA2 Negative Neg Hx    • BRCA1 Positive Neg Hx    • BRCA1 Negative Neg Hx    • BRCA 1/2 Neg Hx    • Ovarian cancer Neg Hx    • Endometrial cancer Neg Hx    • Colon cancer Neg Hx    • Breast cancer additional onset Neg Hx    • Breast cancer Neg Hx        Social History     Occupational History   • Not on file   Tobacco Use   • Smoking status: Former   • Smokeless tobacco: Never   Vaping Use   • Vaping Use: Never used   Substance and Sexual Activity   • Alcohol use: No   • Drug use: No   • Sexual activity: Not on file       Current Outpatient Medications on File Prior to Visit   Medication Sig   • albuterol (Ventolin HFA) 90 mcg/act inhaler Inhale 2 puffs every 6 (six) hours as needed for wheezing   • atorvastatin (LIPITOR) 20 mg tablet Take 1 tablet (20 mg total) by mouth daily with dinner   • celecoxib (CeleBREX) 100 mg capsule Take 1 capsule (100 mg total) by mouth 2 (two) times a day   • ergocalciferol (VITAMIN D2) 50,000 units Take 1 capsule (50,000 Units total) by mouth once a week   • Fluticasone-Salmeterol (Advair Diskus) 250-50 mcg/dose inhaler Inhale 1 puff 2 (two) times a day Rinse mouth after use  • glipiZIDE (GLUCOTROL) 10 mg tablet Take 2 tablets (20 mg total) by mouth 2 (two) times a day before meals   • glucose blood test strip Use as instructed   • Lancets (onetouch ultrasoft) lancets Use as instructed   • lisinopril (ZESTRIL) 10 mg tablet TAKE (1) TABLET BY MOUTH DAILY  • metFORMIN (GLUCOPHAGE) 1000 MG tablet TAKE (1) TABLET TWICE A DAY WITH MEALS  • omeprazole (PriLOSEC) 20 mg delayed release capsule Take 1 capsule (20 mg total) by mouth daily   • sertraline (ZOLOFT) 50 mg tablet TAKE (1) TABLET BY MOUTH DAILY AT BEDTIME  • finasteride (PROPECIA) 1 MG tablet Take 1 tablet (1 mg total) by mouth daily (Patient not taking: Reported on 1/9/2023)   • traMADol (Ultram) 50 mg tablet Take 1 tablet (50 mg total) by mouth every 8 (eight) hours as needed for severe pain (Patient not taking: Reported on 2/1/2023)     No current facility-administered medications on file prior to visit  Allergies   Allergen Reactions   • Tdap [Tetanus-Diphth-Acell Pertussis] Rash         Physical Exam    /80   Pulse (!) 118   Temp (!) 96 7 °F (35 9 °C)   Resp 20   Ht 5' 2" (1 575 m)   Wt 120 kg (263 lb 11 2 oz)   BMI 48 23 kg/m²     Constitutional: normal, well developed, well nourished, alert, in no distress and non-toxic and no overt pain behavior  and obese  Eyes: anicteric  HEENT: grossly intact  Neck: supple, symmetric, trachea midline and no masses   Pulmonary:even and unlabored  Cardiovascular:No edema or pitting edema present  Skin:Normal without rashes or lesions and well hydrated  Psychiatric:Mood and affect appropriate  Neurologic:Cranial Nerves II-XII grossly intact Sensation grossly intact; no clonus negative dupree's  Reflexes 2+ and brisk   SLR negative bilaterally  Musculoskeletal:  Steppage gait  Normal heel toe and tip toe walking  Significant pain with lumbar facet loading bilaterally and with lateral spine rotation left greater than right  TTP over lumbar paraspinal muscles  Negative wilda's test, negative gaenslen's negative SIJ loading bilaterally  ttp over b/l gtb, pain with internal/external rotation of b/l hips    Imaging    MRI LUMBAR SPINE WITHOUT CONTRAST     INDICATION: [id number]: Lumbago with sciatica, left side  G89 29: Other chronic pain      COMPARISON:  None      TECHNIQUE:  Sagittal T1, sagittal T2, sagittal inversion recovery, axial T1 and axial T2, coronal T2     IMAGE QUALITY:  Diagnostic     FINDINGS:     VERTEBRAL BODIES:  There are 5 lumbar type vertebral bodies  Normal alignment of the lumbar spine  No spondylolysis or spondylolisthesis  No scoliosis  No compression fracture  Normal marrow signal is identified within the visualized bony   structures  No discrete marrow lesion      SACRUM:  Normal signal within the sacrum  No evidence of insufficiency or stress fracture      DISTAL CORD AND CONUS:  Normal size and signal within the distal cord and conus      PARASPINAL SOFT TISSUES:  There is thickening of the endometrial cavity partially visualized on this examination towards the fundus  There is a dominant follicle identified within the left ovary      LOWER THORACIC DISC SPACES:  Normal disc height and signal   No disc herniation, canal stenosis or foraminal narrowing      LUMBAR DISC SPACES:     L1-L2:  Normal      L2-L3:  Normal      L3-L4:  Annular bulging with a small broad-based left foraminal and extraforaminal disc protrusion best seen on series 6 image 13  There is no canal stenosis  Only minimal left foraminal narrowing without nerve impingement      L4-L5:  Disc desiccation and loss of disc height with mild annular bulging    Small central disc herniation without canal stenosis or foraminal nerve impingement      L5-S1:  Normal disc height and signal without canal stenosis or foraminal narrowing      IMPRESSION:     Mild noncompressive lumbar degenerative change at the L3-4 and L4-5 levels      Fluid signal in the endometrial cavity partially visualized at the fundus    If patient is postmenopausal, consider follow-up ultrasound imaging      Signal

## 2023-02-01 NOTE — PATIENT INSTRUCTIONS
Hip Bursitis Exercises   AMBULATORY CARE:   Hip bursitis exercises  help strengthen the muscles in your hip and keep the joint flexible  Strong muscles can help reduce pain, prevent injury, and keep the joint stable  The exercises can also help increase the range of motion in your hip joint  What you need to know about exercise safety:   Move slowly and smoothly  Avoid fast or jerky motions  This will help prevent an injury  Breathe normally  Do not hold your breath  It is important to breathe in and out so you do not tense up during exercise  Tension could prevent you from moving your joint in a full range of motion  Do the exercises and stretches on both legs  Do this so both hips remain strong and flexible  Stop if you feel sharp pain or an increase in pain  Contact your healthcare provider or physical therapist  It is normal to feel some discomfort during exercise  Regular exercise will help decrease your discomfort over time  Warm up before you stretch and exercise  Walk or ride a stationary bike for 5 to 10 minutes  How to do hip stretches: Your healthcare provider or physical therapist will tell you how many times to do each stretch  Do the stretch on both sides before you move to the next stretch  Standing iliotibial band stretch:  Stand with the leg on your injured side behind your other leg  Bend sideways toward the side that is not injured  Stop when you feel a stretch in your outer hip  Hold for 5 to 10 seconds  Then return to the starting position  Lying iliotibial band stretch:  Lie on your back  Bend the knee on your injured side toward your chest  Place your hands on the outside of your knee and thigh  Slowly pull the knee across your body  Stop when you feel a stretch in your hip and outer thigh  Hold for 5 to 10 seconds  Return your leg to the starting position  Hip stretch:  Lie on your back with both legs straight and on the ground   Bend the knee on your injured side toward your chest until you can reach your lower leg  Place both hands on your shin and pull your knee toward your chest  Hold for 5 to 10 seconds  Return your leg to the starting position  Knee to chest:  Lie on your back with both knees bent and feet flat on the floor  Bend the knee on your injured side toward your chest until you can reach your lower leg  Place both hands on your shin and pull your knee toward your chest  Hold for 5 to 10 seconds  Return your leg to the starting position  Internal hip rotator stretch:  You will do this exercise on a table  Lie on your side with the injured hip on top  You may be told to keep a pillow between your thighs  Move the top leg so the foot hangs below the edge of the table  Rotate your hip to raise your foot in the opposite direction of the bottom shoulder  Raise your foot as high as you can so you feel a stretch in the back of your thigh  Hold for 5 seconds  Then slowly lower your foot to the starting position  External hip rotator stretch:  You will do this exercise on a table  Lie on your side with the injured hip on the bottom  You do not need a pillow between your thighs for this exercise  Move the bottom leg so the foot is off the edge of the table  Rotate your hip to lift the foot in the opposite direction of the bottom shoulder  Raise your foot as high as you can so you feel a stretch in your buttock  Hold for 5 seconds  Then slowly lower your foot to the starting position  Kneeling hip flexor stretch:  Kneel on your knee on the injured side  Place the foot of your other leg on the floor so the knee is bent  Put both hands on top of your thigh  Keep your back straight and abdominal muscles tight  Lean forward until you feel a stretch in your other thigh  Hold the stretch for 10 seconds  Return to the starting position  How to do hip strengthening exercises:   Your healthcare provider or physical therapist will tell you how many times to do each exercise  Do the exercise on both sides before you move to the next exercise  Straight leg lift to the side: This may also be called hip abduction  Lie on your side with straight legs, with the injured hip on top  Slowly raise your top leg toward the ceiling as high as you can  Keep your foot pointed  Hold for 5 seconds  Then slowly lower your leg to the starting position  Inner thigh lift: This may also be called hip adduction  Lie on your side with straight legs, with the injured hip on the bottom  Cross your top leg over your bottom leg  Put the foot of your top leg on the floor in front of you  Raise your bottom leg until it touches the top leg  Hold for 5 seconds  Then slowly lower the leg to the floor  Clam exercise:  Lie on your side so your injured side is on top  Bend your knees  Keep your heels together during this exercise  Slowly raise your top knee toward the ceiling  Then lower your leg so your knees are together  Call your doctor if:   You have sharp pain during exercise or at rest     You have questions or concerns about the stretches or exercises  © Copyright [employer] 2022 Information is for End User's use only and may not be sold, redistributed or otherwise used for commercial purposes  All illustrations and images included in CareNotes® are the copyrighted property of A D A M , Inc  or [address] [place] [address]  The above information is an  only  It is not intended as medical advice for individual conditions or treatments  Talk to your doctor, nurse or pharmacist before following any medical regimen to see if it is safe and effective for you  Core Strengthening Exercises   WHAT YOU NEED TO KNOW:   Your core includes the muscles of your lower back, hip, pelvis, and abdomen  Core strengthening exercises help heal and strengthen these muscles   This helps prevent another injury, and keeps your pelvis, spine, and hips in the correct position  DISCHARGE INSTRUCTIONS:   Contact your healthcare provider if:   You have sharp or worsening pain during exercise or at rest     You have questions or concerns about your shoulder exercises  Safety tips:  Talk to your healthcare provider before you start an exercise program  A physical therapist can teach you how to do core strengthening exercises safely  Do the exercises on a mat or firm surface  A firm surface will support your spine and prevent low back pain  Do not do these exercises on a bed  Move slowly and smoothly  Avoid fast or jerky motions  Stop if you feel pain  Core exercises should not be painful  Stop if you feel pain  Breathe normally during core exercises  Do not hold your breath  This may cause an increase in blood pressure and prevent muscle strengthening  Your healthcare provider will tell you when to inhale and exhale during the exercise  Begin all of your exercises with abdominal bracing  Abdominal bracing helps warm up your core muscles  You can also practice abdominal bracing throughout the day  Lie on your back with your knees bent and feet flat on the floor  Place your arms in a relaxed position beside your body  Tighten your abdominal muscles  Pull your belly button in and up toward your spine  Hold for 5 seconds  Relax your muscles  Repeat 10 times  Core strengthening exercises: Your healthcare provider will tell you how often to do these exercises  The provider will also tell you how many repetitions of each exercise you should do  Hold each exercise for 5 seconds or as directed  As you get stronger, increase your hold to 10 to 15 seconds  You can do some of these exercises on a stability ball, or with a weight  Ask your healthcare provider how to use a stability ball or weight for these exercises:  Bridging:  Lie on your back with your knees bent and feet flat on the floor  Rest your arms at your side   Tighten your buttocks, and then lift your hips 1 inch off the floor  Hold for 5 seconds  When you can do this exercise without pain for 10 seconds, increase the distance you lift your hips  A good goal is to be able to lift your hips so that your shoulders, hips, and knees are in a straight line  Dead bug:  Lie on your back with your knees bent and feet flat on the floor  Place your arms in a relaxed position beside your body  Begin with abdominal bracing  Next, raise one leg, keeping your knee bent  Hold for 5 seconds  Repeat with the other leg  When you can do this exercise without pain for 10 to 15 seconds, you may raise one straight leg and hold  Repeat with the other leg  Quadruped:  Place your hands and knees on the floor  Keep your wrists directly below your shoulders and your knees directly below your hips  Pull your belly button in toward your spine  Do not flatten or arch your back  Tighten your abdominal muscles below your belly button  Hold for 5 seconds  When you can do this exercise without pain for 10 to 15 seconds, you may extend one arm and hold  Repeat on the other side  Side bridge exercises:      Standing side bridge:  Stand next to a wall and extend one arm toward the wall  Place your palm flat on the wall with your fingers pointing upward  Begin with abdominal bracing  Next, without moving your feet, slowly bend your arm to 90 degrees  Hold for 5 seconds  Repeat on the other side  When you can do this exercise without pain for 10 to 15 seconds, you may do the bent leg side bridge on the floor  Bent leg side bridge:  Lie on one side with your legs, hips, and shoulders in a straight line  Prop yourself up onto your forearm so your elbow is directly below your shoulder  Bend your knees back to 90 degrees  Begin with abdominal bracing  Next, lift your hips and balance yourself on your forearm and knees  Hold for 5 seconds  Repeat on the other side   When you can do this exercise without pain for 10 to 15 seconds, you may do the straight leg side bridge on the floor  Straight leg side bridge:  Lie on one side with your legs, hips, and shoulders in a straight line  Prop yourself up onto your forearm so your elbow is directly below your shoulder  Begin with abdominal bracing  Lift your hips off the floor and balance yourself on your forearm and the outside of your flexed foot  Do not let your ankle bend sideways  Hold for 5 seconds  Repeat on the other side  When you can do this exercise without pain for 10 to 15 seconds, ask your healthcare provider for more advanced exercises  Superman:  Lie on your stomach  Extend your arms forward on the floor  Tighten your abdominal muscles and lift your right hand and left leg off the floor  Hold this position  Slowly return to the starting position  Tighten your abdominal muscles and lift your left hand and right leg off the floor  Hold this position  Slowly return to the starting position  Clam:  Lie on your side with your knees bent  Put your bottom arm under your head to keep your neck in line  Put your top hand on your hip to keep your pelvis from moving  Put your heels together, and keep them together during this exercise  Slowly raise your top knee toward the ceiling  Then lower your leg so your knees are together  Repeat this exercise 10 times  Then switch sides and do the exercise 10 times with the other leg  Curl up:  Lie on your back with your knees bent and feet flat on the floor  Place your hands, palms down, underneath your lower back  Next, with your elbows on the floor, lift your shoulders and chest 2 to 3 inches off the floor  Keep your head in line with your shoulders  Hold this position  Slowly return to the starting position  Straight leg raises:  Lie on your back with one leg straight  Bend the other knee and place your foot flat on the floor  Tighten your abdominal muscles   Keep your leg straight and slowly lift it straight up 6 to 12 inches off the floor  Hold this position  Lower your leg slowly  Do as many repetitions as directed on this side  Repeat with the other leg  Plank:  Lie on your stomach  Bend your elbows and place your forearms flat on the floor  Lift your chest, stomach, and knees off the floor  Make sure your elbows are below your shoulders  Your body should be in a straight line  Do not let your hips or lower back sink to the ground  Squeeze your abdominal muscles together and hold for 15 seconds  To make this exercise harder, hold for 30 seconds or lift 1 leg at a time  Bicycles:  Lie on your back  Bend both knees and bring them toward your chest  Your calves should be parallel to the floor  Place the palms of your hands on the back of your head  Straighten your right leg and keep it lifted 2 inches off the floor  Raise your head and shoulders off the floor and twist towards your left  Keep your head and shoulders lifted  Bend your right knee while you straighten your left leg  Keep your left leg 2 inches off the floor  Twist your head and chest towards the left leg  Continue to straighten 1 leg at a time and twist        Follow up with your doctor as directed:  Write down your questions so you remember to ask them during your visits  © Copyright [employer] 2022 Information is for End User's use only and may not be sold, redistributed or otherwise used for commercial purposes  All illustrations and images included in CareNotes® are the copyrighted property of [employer] M [employer] Inc  or Brien Machado   The above information is an  only  It is not intended as medical advice for individual conditions or treatments  Talk to your doctor, nurse or pharmacist before following any medical regimen to see if it is safe and effective for you

## 2023-02-03 ENCOUNTER — TELEPHONE (OUTPATIENT)
Dept: PAIN MEDICINE | Facility: CLINIC | Age: 48
End: 2023-02-03

## 2023-02-03 NOTE — TELEPHONE ENCOUNTER
Called patient and informed her of recommendations  For patient  [facility] wants patient to try physical therapy and medications without opioids example  Tylenol      Informed patient and she verbally understands instructions

## 2023-02-03 NOTE — TELEPHONE ENCOUNTER
Caller: Leena Scott    Doctor: Arianna    Reason for call: patient called stating that [facility] is asking for peer to peer for Hydrocodone-Acetaminophen oral table 5-325 mg     -pls call [phone]    Call back#: [phone]

## 2023-02-03 NOTE — TELEPHONE ENCOUNTER
Received letter from [facility] stating that Hydrocodone-Acetaminophen oral table 5-325 mg was denied     Can call [phone] for complaint/grievance

## 2023-02-06 NOTE — DISCHARGE INSTR - AVS FIRST PAGE
YOUR 2 WEEK FOLLOW UP HAS BEEN SCHEDULED; IF YOU WISH TO CHANGE THE FOLLOW UP, PLEASE CALL THE [facility] [place]: [phone]    Steroid Joint Injection   WHAT YOU NEED TO KNOW:   A steroid joint injection is a procedure to inject steroid medicine into a joint  Steroid medicine decreases pain and inflammation  The injection may also contain an anesthetic (numbing medicine) to decrease pain  It may be done to treat conditions such as arthritis, gout, or carpal tunnel syndrome  The injections may be given in your knee, ankle, shoulder, elbow, or wrist  Injections may also be given in your hip, toe, thumb, or finger  DISCHARGE INSTRUCTIONS:   Contact your healthcare provider if:   You have fever or chills  You have redness or swelling at the injection site  You have more pain than usual in your joint for more than 72 hours  You have questions or concerns about your condition or care  Medicines:   Pain medicine  may be given  Ask how to take this medicine safely  Take your medicine as directed  Contact your healthcare provider if you think your medicine is not helping or if you have side effects  Tell him or her if you are allergic to any medicine  Keep a list of the medicines, vitamins, and herbs you take  Include the amounts, and when and why you take them  Bring the list or the pill bottles to follow-up visits  Carry your medicine list with you in case of an emergency  Self-care:   Leave the bandage on for 8 to 12 hours  Care for your wound as directed  Rest the area  as directed  You may need to decrease weight on certain joints, such as the knee, for a period of time  Ask when you can return to your daily activities  Elevate  your limb where the steroid injection was given  Elevate the limb above the level of your heart as often as you can  This will help decrease swelling and pain  Prop your limb on pillows or blankets to keep it elevated comfortably  Apply ice  on your joint for 15 to 20 minutes every hour or as directed  Use an ice pack, or put crushed ice in a plastic bag  Cover it with a towel  Ice helps prevent tissue damage and decreases swelling and pain  © Copyright [employer] 2022 Information is for End User's use only and may not be sold, redistributed or otherwise used for commercial purposes  All illustrations and images included in CareNotes® are the copyrighted property of [employer]  or Brien Lopez  The above information is an  only  It is not intended as medical advice for individual conditions or treatments  Talk to your doctor, nurse or pharmacist before following any medical regimen to see if it is safe and effective for you

## 2023-02-06 NOTE — TELEPHONE ENCOUNTER
S/w pt who states that she s/w her insurance co and told them tylenol does not help her nerve pain and the hip injection and RFA will not give her long term relief  Pt was advised from insurance to request a P2P with VS and insurance to approve Hydrocodone  Pt provided # below to call    Please advise

## 2023-02-07 ENCOUNTER — APPOINTMENT (OUTPATIENT)
Dept: RADIOLOGY | Facility: HOSPITAL | Age: 48
End: 2023-02-07

## 2023-02-07 ENCOUNTER — HOSPITAL ENCOUNTER (OUTPATIENT)
Facility: HOSPITAL | Age: 48
Setting detail: OUTPATIENT SURGERY
Discharge: HOME | End: 2023-02-07
Attending: ANESTHESIOLOGY | Admitting: ANESTHESIOLOGY

## 2023-02-07 VITALS
HEART RATE: 78 BPM | RESPIRATION RATE: 18 BRPM | BODY MASS INDEX: 48.4 KG/M2 | OXYGEN SATURATION: 96 % | SYSTOLIC BLOOD PRESSURE: 118 MMHG | WEIGHT: 263 LBS | DIASTOLIC BLOOD PRESSURE: 59 MMHG | HEIGHT: 62 IN | TEMPERATURE: 98.3 F

## 2023-02-07 DIAGNOSIS — M16.0 PRIMARY OSTEOARTHRITIS OF BOTH HIPS: ICD-10-CM

## 2023-02-07 LAB
EXT PREGNANCY TEST URINE: NEGATIVE
EXT. CONTROL: NORMAL
GLUCOSE SERPL-MCNC: 103 MG/DL (ref 65–140)

## 2023-02-07 RX ORDER — LIDOCAINE HYDROCHLORIDE 10 MG/ML
INJECTION, SOLUTION EPIDURAL; INFILTRATION; INTRACAUDAL; PERINEURAL AS NEEDED
Status: DISCONTINUED | OUTPATIENT
Start: 2023-02-07 | End: 2023-02-07 | Stop reason: HOSPADM

## 2023-02-07 RX ORDER — HYDROCODONE BITARTRATE AND ACETAMINOPHEN 5; 325 MG/1; MG/1
1 TABLET ORAL EVERY 8 HOURS PRN
Qty: 90 TABLET | Refills: 0 | Status: SHIPPED | OUTPATIENT
Start: 2023-02-07

## 2023-02-07 RX ORDER — METHYLPREDNISOLONE ACETATE 80 MG/ML
INJECTION, SUSPENSION INTRA-ARTICULAR; INTRALESIONAL; INTRAMUSCULAR; SOFT TISSUE AS NEEDED
Status: DISCONTINUED | OUTPATIENT
Start: 2023-02-07 | End: 2023-02-07 | Stop reason: HOSPADM

## 2023-02-07 RX ORDER — BUPIVACAINE HYDROCHLORIDE 2.5 MG/ML
INJECTION, SOLUTION EPIDURAL; INFILTRATION; INTRACAUDAL AS NEEDED
Status: DISCONTINUED | OUTPATIENT
Start: 2023-02-07 | End: 2023-02-07 | Stop reason: HOSPADM

## 2023-02-07 NOTE — INTERVAL H&P NOTE
H&P reviewed  After examining the patient I find no changes in the patients condition since the H&P had been written      Vitals:    02/07/23 1041   BP: 131/76   Pulse: 99   Resp: 20   Temp: 97 5 °F (36 4 °C)   SpO2: 96%

## 2023-02-07 NOTE — OP NOTE
OPERATIVE REPORT  PATIENT NAME: Cristy Garcia    :  1975  MRN: [id number]  Pt Location:  GI ROOM 01    SURGERY DATE: 2023    Surgeon(s) and Role:      Marlon Contreras MD - Primary    Preop Diagnosis:  Primary osteoarthritis of both hips [M16 0]    Post-Op Diagnosis Codes:     * Primary osteoarthritis of both hips [M16 0]    Procedure(s):  Bilateral - INJECTION JOINT HIP    Specimen(s):  * No specimens in log *    Estimated Blood Loss:   Minimal    Drains:  * No LDAs found *    Anesthesia Type:   Local    Operative Indications:  Primary osteoarthritis of both hips [M16 0]    Operative Findings:  Contrast spread around bilateral hip joint capsules under fluoroscopic guidance    Complications:   None    Procedure and Technique:  Please see detailed procedure note    I was present for the entire procedure    Patient Disposition:  PACU     SIGNATURE: Stanislav Gustafson MD  DATE: 2023  TIME: 11:31 AM

## 2023-02-07 NOTE — TELEPHONE ENCOUNTER
This is the peer to peer number provided on the [employer] information page  [phone]     Please let me know if this does not work and I will work on contacting the insurance directly

## 2023-02-07 NOTE — PROCEDURES
Pre-procedure Diagnosis: Hip osteoarthritis/Hip pain  Post-procedure Diagnosis: Hip osteoarthritis/Hip pain  Operation Title(s):  1  [BILATERAL] hip intra-articular steroid injection      2  Intraoperative fluoroscopy  Attending Surgeon:   Fuentes Shetty MD  Anesthesia:   Local    Indications: The patient is 50y o  year-old female with a diagnosis of bilateral Hip osteoarthritis/Hip pain  The patient's history and physical exam were reviewed  The risks, benefits and alternatives to the procedure were discussed, and all questions were answered to the patient's satisfaction  The patient agreed to proceed, and written informed consent was obtained  Procedure in Detail: The patient was brought into the procedure room and placed in the supine position on the fluoroscopy table  The [RIGHT] hip(s) were prepped with chloraprep time two and draped in a sterile manner  AP fluoroscopy was used to identify and mandy the [RIGHT] femoral neck and head  The C-arm was obliqued 20° to displace the femoral nerve in vessels   The skin and subcutaneous tissues in the area was anesthetized with 1% lidocaine  A 22-gauge, 3½-inch spinal needle was advanced toward the identified point under fluoroscopic guidance until bone was contacted and the joint space was entered  Then, after negative aspiration, 1-ml Omnipaque 300 contrast solution was injected showing an appropriate arthrogram  Then, a solution consisting of [3 5-mL] 0 25% bupivacaine and [0 5-mL] Depo-Medrol (80mg/ml) was easily injected  The needle was removed with a 1% lidocaine flush  The patient's hip(s) was cleaned and a bandage was placed over the sites of needle insertion  [The same procedure was repeated for the LEFT side]    Disposition: The patient tolerated the procedure well and there were no apparent complications  The patient was taken to the recovery area where written discharge instructions for the procedure were given      Estimated Blood Loss: None  Specimens Obtained: N/A

## 2023-02-08 ENCOUNTER — TELEMEDICINE (OUTPATIENT)
Dept: BEHAVIORAL/MENTAL HEALTH CLINIC | Facility: CLINIC | Age: 48
End: 2023-02-08

## 2023-02-08 DIAGNOSIS — F33.9 MAJOR DEPRESSION, RECURRENT, CHRONIC (HCC): Primary | ICD-10-CM

## 2023-02-08 NOTE — PSYCH
Psychotherapy Provided: Individual Psychotherapy 30  minutes 2:00 Pm 2:30 PM          Goals addressed in session: (Long Term goal Number One) The Client reported that she was able to decrease her rent from 40 to 15 dollars, which will help with economic situation  During the session we explored her historical setting for her anxiety  It is hoped that by addressing her weaknesses this will act as a preventive measure against the possible need for higher level of care and services  Interventions: Supportive Therapy, Strengths Therapy,  and Cognitive Behavioral Therapy where the treatment modalities utilized during the session  Assessment and Plan: The client presented a depressed anxious mood that was congruent in effect  She was alert, goal directed and participated with prompts during the session  The client was oriented to person, place, time, situation, and reported no suicidal/homicidal ideation, plan, or intent  As team we explored and processed her family and extended history of anxiety  The Client was able to verbalize a family long history of anxiety and trauma  During the session we reviewed her coping skills  Next appointment was set for 1 week  Pain:      PSYCH MENTAL STATUS PAIN :6 as reported by the client     PHYSICAL PAIN SCALE NUMBERS:9 as reported by the client     Current suicide risk : Low/Phone number for National Suicide Prevention Life Line was given to the Client/ [address]: Diagnosis and Treatment Plan explained to [address] [place] [address]  relates understanding diagnosis and is agreeable to Treatment Plan  Yes      Virtual Regular Visit    Verification of patient location:    Patient is located in the following state in which I hold an active license [place]      Assessment/Plan:    Problem List Items Addressed This Visit        Other    Major depression, recurrent, chronic ([facility] [address] ) - Primary       Goals addressed in session: Goal 1 Reason for visit is   Chief Complaint   Patient presents with   • Virtual Regular Visit        Encounter provider Coolidge Gosselin, SW    Provider located at [address] [place] [id number]      Recent Visits  No visits were found meeting these conditions  Showing recent visits within past 7 days and meeting all other requirements  Today's Visits  Date Type Provider Dept   02/08/23 Telemedicine Coolidge Gosselin, SW [facility]   Showing today's visits and meeting all other requirements  Future Appointments  No visits were found meeting these conditions  Showing future appointments within next 150 days and meeting all other requirements       The patient was identified by name and date of birth  Lucas Nava was informed that this is a telemedicine visit and that the visit is being conducted throughthe [employer] platform  She agrees to proceed     My office door was closed  No one else was in the room  She acknowledged consent and understanding of privacy and security of the video platform  The patient has agreed to participate and understands they can discontinue the visit at any time  Patient is aware this is a billable service  Subjective  Lucas Nava is a 50 y o  female , who was seen for individual mental health therapy          HPI     Past Medical History:   Diagnosis Date   • Allergic     Seasonal Allergies   • Alopecia of scalp     Treated with Proscar   • Asthma    • Chronic pain disorder     back, bilat knees, bilat hips   • CPAP (continuous positive airway pressure) dependence    • Depression    • Diabetes mellitus (HCC)    • Fibromyalgia, primary    • GERD (gastroesophageal reflux disease)    • Hip fx, left, closed, with routine healing, subsequent encounter 05/2017   • Hypertension    • Insomnia    • Irregular heartbeat    • Obesity    • VICTOR HUGO on CPAP    • Shortness of breath    • Sleep apnea Past Surgical History:   Procedure Laterality Date   • CARPAL TUNNEL RELEASE Left    • LUMBAR EPIDURAL INJECTION     • NERVE BLOCK Bilateral 12/29/2020    Procedure: L2 L3 L4 L5 MEDIAL BRANCH BLOCK #1;  Surgeon: Luis Gtz MD;  Location: [facility];  Service: Pain Management    • NERVE BLOCK Bilateral 1/19/2021    Procedure: L2 L3 L4 L5 MEDIAL BRANCH BLOCK #2;  Surgeon: Luis Gtz MD;  Location: [facility];  Service: Pain Management    • NY ARTHROCENTESIS ASPIR&/INJ MAJOR JT/BURSA W/O US Bilateral 2/7/2023    Procedure: INJECTION JOINT HIP;  Surgeon: Luis Gtz MD;  Location: [facility];  Service: Pain Management    • RADIOFREQUENCY ABLATION Right 2/25/2021    Procedure: L2 L3 L4 L5 RADIO FREQUENCY ABLATION right side;  Surgeon: Luis Gtz MD;  Location: [facility];  Service: Pain Management    • RADIOFREQUENCY ABLATION Left 3/16/2021    Procedure: L2 L3 L4 L5 RADIO FREQUENCY ABLATION;  Surgeon: Luis Gtz MD;  Location: [facility];  Service: Pain Management    • UPPER GASTROINTESTINAL ENDOSCOPY         Current Outpatient Medications   Medication Sig Dispense Refill   • albuterol (Ventolin HFA) 90 mcg/act inhaler Inhale 2 puffs every 6 (six) hours as needed for wheezing 18 g 5   • atorvastatin (LIPITOR) 20 mg tablet Take 1 tablet (20 mg total) by mouth daily with dinner 90 tablet 0   • celecoxib (CeleBREX) 100 mg capsule Take 1 capsule (100 mg total) by mouth 2 (two) times a day 180 capsule 1   • ergocalciferol (VITAMIN D2) 50,000 units Take 1 capsule (50,000 Units total) by mouth once a week 12 capsule 0   • finasteride (PROPECIA) 1 MG tablet Take 1 tablet (1 mg total) by mouth daily (Patient not taking: Reported on 1/9/2023) 90 tablet 1   • Fluticasone-Salmeterol (Advair Diskus) 250-50 mcg/dose inhaler Inhale 1 puff 2 (two) times a day Rinse mouth after use   180 blister 3   • glipiZIDE (GLUCOTROL) 10 mg tablet Take 2 tablets (20 mg total) by mouth 2 (two) times a day before meals 360 tablet 1   • glucose blood test strip Use as instructed 100 each 5   • HYDROcodone-acetaminophen (NORCO) 5-325 mg per tablet Take 1 tablet by mouth every 8 (eight) hours as needed for pain Max Daily Amount: 3 tablets 90 tablet 0   • Lancets (onetouch ultrasoft) lancets Use as instructed 100 each 5   • lisinopril (ZESTRIL) 10 mg tablet TAKE (1) TABLET BY MOUTH DAILY  90 tablet 0   • metFORMIN (GLUCOPHAGE) 1000 MG tablet TAKE (1) TABLET TWICE A DAY WITH MEALS  180 tablet 0   • omeprazole (PriLOSEC) 20 mg delayed release capsule Take 1 capsule (20 mg total) by mouth daily 30 capsule 3   • sertraline (ZOLOFT) 50 mg tablet TAKE (1) TABLET BY MOUTH DAILY AT BEDTIME  90 tablet 0     No current facility-administered medications for this visit  Allergies   Allergen Reactions   • Tdap [Tetanus-Diphth-Acell Pertussis] Rash       Review of Systems    Video Exam    There were no vitals filed for this visit      Physical Exam     Visit Time    Visit Start Time: 2:00 Pm   Visit Stop Time: 2:30 Pm   Total Visit Duration: 30 minutes

## 2023-02-13 ENCOUNTER — TRANSCRIBE ORDERS (OUTPATIENT)
Dept: GASTROENTEROLOGY | Facility: CLINIC | Age: 48
End: 2023-02-13

## 2023-02-14 ENCOUNTER — TELEPHONE (OUTPATIENT)
Dept: PAIN MEDICINE | Facility: CLINIC | Age: 48
End: 2023-02-14

## 2023-02-14 ENCOUNTER — OFFICE VISIT (OUTPATIENT)
Dept: GYNECOLOGY | Facility: CLINIC | Age: 48
End: 2023-02-14

## 2023-02-14 VITALS
OXYGEN SATURATION: 98 % | SYSTOLIC BLOOD PRESSURE: 132 MMHG | BODY MASS INDEX: 46.56 KG/M2 | HEART RATE: 97 BPM | DIASTOLIC BLOOD PRESSURE: 80 MMHG | WEIGHT: 253 LBS | TEMPERATURE: 97.4 F | HEIGHT: 62 IN

## 2023-02-14 DIAGNOSIS — Z12.4 SCREENING FOR CERVICAL CANCER: ICD-10-CM

## 2023-02-14 DIAGNOSIS — N92.0 MENORRHAGIA WITH REGULAR CYCLE: ICD-10-CM

## 2023-02-14 DIAGNOSIS — Z01.419 ENCOUNTER FOR GYNECOLOGICAL EXAMINATION WITHOUT ABNORMAL FINDING: Primary | ICD-10-CM

## 2023-02-14 NOTE — PROGRESS NOTES
Assessment/Plan:         Diagnoses and all orders for this visit:    Encounter for gynecological examination without abnormal finding    Screening for cervical cancer  -    Menorrhagia with regular cycle return to the office for transvaginal sonography possible biopsy possible saline infusion hysterosonography;         Subjective:      Patient ID: Baeu Márquez is a 50 y o  female  HPI G0 new patient presents for GYN exam   Her last exam was many years ago  She states she has regular menses although over the past few years they have become heavier and more crampy  She denies any vaginal irritation burning or discharge  Denies any dysuria, hematuria urgency or urinary incontinence  No GI complaints  Mammogram January 2023  Normal     Colon cancer screening via Colog[facility] December 15, 2022  Negative    The following portions of the patient's history were reviewed and updated as appropriate:   She  has a past medical history of Allergic, Alopecia of scalp, Asthma, Chronic pain disorder, CPAP (continuous positive airway pressure) dependence, Depression, Diabetes mellitus ([facility] [address] ), Fibromyalgia, primary, GERD (gastroesophageal reflux disease), Hip fx, left, closed, with routine healing, subsequent encounter (05/2017), Hypertension, Insomnia, Irregular heartbeat, Obesity, VICTOR HUGO on CPAP, Shortness of breath, and Sleep apnea    She   Patient Active Problem List    Diagnosis Date Noted   • Cervical radiculopathy 02/01/2023   • Lumbar facet joint syndrome 12/23/2020   • Major depression, recurrent, chronic (HCC) 09/01/2020   • Lumbar radiculopathy 08/13/2020   • Primary osteoarthritis of both hips 08/13/2020   • Left hip pain 08/13/2020   • Fibromyalgia 06/11/2020   • Primary osteoarthritis involving multiple joints 06/11/2020   • Mild persistent asthma without complication 91/97/3395   • Chronic bilateral low back pain without sciatica 05/01/2019   • Chronic pain of left knee 05/01/2019   • Chronic pain of right knee 05/01/2019   • SOB (shortness of breath)      She  has a past surgical history that includes Carpal tunnel release (Left); Upper gastrointestinal endoscopy; Lumbar epidural injection; NERVE BLOCK (Bilateral, 12/29/2020); NERVE BLOCK (Bilateral, 1/19/2021); Radiofrequency ablation (Right, 2/25/2021); Radiofrequency ablation (Left, 3/16/2021); and pr arthrocentesis aspir&/inj major jt/bursa w/o us (Bilateral, 2/7/2023)  Her family history includes Asthma in her cousin; Cancer in her father; Hepatitis in her father; Hypertension in her paternal grandmother; No Known Problems in her maternal grandfather, maternal grandmother, mother, paternal aunt, paternal grandfather, and sister  She  reports that she has quit smoking  She has never used smokeless tobacco  She reports that she does not drink alcohol and does not use drugs  Current Outpatient Medications   Medication Sig Dispense Refill   • albuterol (Ventolin HFA) 90 mcg/act inhaler Inhale 2 puffs every 6 (six) hours as needed for wheezing 18 g 5   • atorvastatin (LIPITOR) 20 mg tablet Take 1 tablet (20 mg total) by mouth daily with dinner 90 tablet 0   • celecoxib (CeleBREX) 100 mg capsule Take 1 capsule (100 mg total) by mouth 2 (two) times a day 180 capsule 1   • ergocalciferol (VITAMIN D2) 50,000 units Take 1 capsule (50,000 Units total) by mouth once a week 12 capsule 0   • Fluticasone-Salmeterol (Advair Diskus) 250-50 mcg/dose inhaler Inhale 1 puff 2 (two) times a day Rinse mouth after use  180 blister 3   • glipiZIDE (GLUCOTROL) 10 mg tablet Take 2 tablets (20 mg total) by mouth 2 (two) times a day before meals 360 tablet 1   • glucose blood test strip Use as instructed 100 each 5   • Lancets (onetouch ultrasoft) lancets Use as instructed 100 each 5   • lisinopril (ZESTRIL) 10 mg tablet TAKE (1) TABLET BY MOUTH DAILY   90 tablet 0   • metFORMIN (GLUCOPHAGE) 1000 MG tablet TAKE (1) TABLET TWICE A DAY WITH MEALS  180 tablet 0   • omeprazole (PriLOSEC) 20 mg delayed release capsule Take 1 capsule (20 mg total) by mouth daily 30 capsule 3   • sertraline (ZOLOFT) 50 mg tablet TAKE (1) TABLET BY MOUTH DAILY AT BEDTIME  90 tablet 0   • finasteride (PROPECIA) 1 MG tablet Take 1 tablet (1 mg total) by mouth daily (Patient not taking: Reported on 1/9/2023) 90 tablet 1   • HYDROcodone-acetaminophen (NORCO) 5-325 mg per tablet Take 1 tablet by mouth every 8 (eight) hours as needed for pain Max Daily Amount: 3 tablets (Patient not taking: Reported on 2/14/2023) 90 tablet 0     No current facility-administered medications for this visit  Current Outpatient Medications on File Prior to Visit   Medication Sig   • albuterol (Ventolin HFA) 90 mcg/act inhaler Inhale 2 puffs every 6 (six) hours as needed for wheezing   • atorvastatin (LIPITOR) 20 mg tablet Take 1 tablet (20 mg total) by mouth daily with dinner   • celecoxib (CeleBREX) 100 mg capsule Take 1 capsule (100 mg total) by mouth 2 (two) times a day   • ergocalciferol (VITAMIN D2) 50,000 units Take 1 capsule (50,000 Units total) by mouth once a week   • Fluticasone-Salmeterol (Advair Diskus) 250-50 mcg/dose inhaler Inhale 1 puff 2 (two) times a day Rinse mouth after use  • glipiZIDE (GLUCOTROL) 10 mg tablet Take 2 tablets (20 mg total) by mouth 2 (two) times a day before meals   • glucose blood test strip Use as instructed   • Lancets (onetouch ultrasoft) lancets Use as instructed   • lisinopril (ZESTRIL) 10 mg tablet TAKE (1) TABLET BY MOUTH DAILY  • metFORMIN (GLUCOPHAGE) 1000 MG tablet TAKE (1) TABLET TWICE A DAY WITH MEALS  • omeprazole (PriLOSEC) 20 mg delayed release capsule Take 1 capsule (20 mg total) by mouth daily   • sertraline (ZOLOFT) 50 mg tablet TAKE (1) TABLET BY MOUTH DAILY AT BEDTIME     • finasteride (PROPECIA) 1 MG tablet Take 1 tablet (1 mg total) by mouth daily (Patient not taking: Reported on 1/9/2023)   • HYDROcodone-acetaminophen (NORCO) 5-325 mg per tablet Take 1 tablet by mouth every 8 (eight) hours as needed for pain Max Daily Amount: 3 tablets (Patient not taking: Reported on 2/14/2023)     No current facility-administered medications on file prior to visit  She is allergic to tdap [tetanus-diphth-acell pertussis]       Review of Systems   Constitutional: Negative  Gastrointestinal: Negative  Genitourinary: Positive for menstrual problem  Objective:      /80 (BP Location: Left arm, Patient Position: Sitting, Cuff Size: Standard)   Pulse 97   Temp (!) 97 4 °F (36 3 °C)   Ht 5' 2" (1 575 m)   Wt 115 kg (253 lb)   SpO2 98%   BMI 46 27 kg/m²          Physical Exam  Vitals reviewed  Cardiovascular:      Rate and Rhythm: Normal rate and regular rhythm  Pulses: Normal pulses  Heart sounds: Normal heart sounds  No murmur heard  Pulmonary:      Effort: Pulmonary effort is normal  No respiratory distress  Breath sounds: Normal breath sounds  Chest:   Breasts:     Right: No swelling, bleeding, inverted nipple, mass, nipple discharge, skin change or tenderness  Left: No swelling, bleeding, inverted nipple, mass, nipple discharge, skin change or tenderness  Abdominal:      General: There is no distension  Palpations: Abdomen is soft  There is no mass  Tenderness: There is no abdominal tenderness  There is no guarding or rebound  Hernia: No hernia is present  There is no hernia in the left inguinal area or right inguinal area  Genitourinary:     General: Normal vulva  Labia:         Right: No rash, tenderness or lesion  Left: No rash, tenderness or lesion  Vagina: Normal       Cervix: Normal       Uterus: Normal        Adnexa:         Right: No mass, tenderness or fullness  Left: No mass, tenderness or fullness  Lymphadenopathy:      Upper Body:      Right upper body: No supraclavicular, axillary or pectoral adenopathy  Left upper body: No supraclavicular, axillary or pectoral adenopathy        Lower Body: No right inguinal adenopathy  No left inguinal adenopathy  Neurological:      Mental Status: She is alert

## 2023-02-16 ENCOUNTER — TELEMEDICINE (OUTPATIENT)
Dept: BEHAVIORAL/MENTAL HEALTH CLINIC | Facility: CLINIC | Age: 48
End: 2023-02-16

## 2023-02-16 DIAGNOSIS — F33.9 MAJOR DEPRESSION, RECURRENT, CHRONIC (HCC): Primary | ICD-10-CM

## 2023-02-16 NOTE — PSYCH
Psychotherapy Provided: Individual Psychotherapy 30  minutes 12:27 Pm to 12:55 Pm It was my intent to perform this visit via video technology, but the patient was not able to do a video connection, so the visit was completed via audio telephone only  Goals addressed in session:(Long Term goal Number One) The client reported continued anxiety and depression due to environment and relationship issues  During the session we explored the connection with her physical health and the effects on her mental health  Interventions: Supportive Therapy, Strengths Therapy,  and Cognitive Behavioral Therapy where the treatment modalities utilized during the session  Assessment and Plan: The Client presented an anxious mood that was congruent in effect  She was alert, goal directed and participated with prompts during the session  The client was oriented to person, place, time, situation, and reported no suicidal/homicidal ideation, plan, or intent  As team we explored her personal relationship with individuals in her environment that have caused conflict  During the session we explored her coping skills  Next appointment was set for 1 week  Pain:      PSYCH MENTAL STATUS PAIN : 6 as reported by the Client     PHYSICAL PAIN SCALE NUMBERS:6 as reported by the Client   Current suicide risk : Low/Phone number for National Suicide Prevention Life Line was given to the Client/ [address]: Diagnosis and Treatment Plan explained to [address] [place] [address]  relates understanding diagnosis and is agreeable to Treatment Plan  Yes  Virtual Brief Visit    Patient is located in the following state in which I hold an active license [place]      Assessment/Plan:    Problem List Items Addressed This Visit        Other    Major depression, recurrent, chronic ([facility] [address] ) - Primary       Recent Visits  No visits were found meeting these conditions    Showing recent visits within past 7 days and meeting all other requirements  Future Appointments  No visits were found meeting these conditions    Showing future appointments within next 150 days and meeting all other requirements         Visit Time    Visit Start Time: 12:27 Pm   Visit Stop Time: 12:55 Pm   Total Visit Duration: 27 minutes

## 2023-02-21 ENCOUNTER — DOCUMENTATION (OUTPATIENT)
Dept: BEHAVIORAL/MENTAL HEALTH CLINIC | Facility: CLINIC | Age: 48
End: 2023-02-21

## 2023-02-21 ENCOUNTER — TELEMEDICINE (OUTPATIENT)
Dept: BEHAVIORAL/MENTAL HEALTH CLINIC | Facility: CLINIC | Age: 48
End: 2023-02-21

## 2023-02-21 DIAGNOSIS — F33.9 MAJOR DEPRESSION, RECURRENT, CHRONIC (HCC): Primary | ICD-10-CM

## 2023-02-21 LAB
LAB AP GYN PRIMARY INTERPRETATION: NORMAL
Lab: NORMAL

## 2023-02-21 NOTE — PSYCH
Psychotherapy Provided: Individual Psychotherapy 30  minutes 10:58 Am to 11:28 Am It was my intent to perform this visit via video technology, but the patient was not able to do a video connection, so the visit was completed via audio telephone only  Goals addressed in session:(Long Term goal Number One) The client report increased physical pain due to menstrual cycle  " My period I always get pain" She did report doing some personal cleaning breaking it down into smaller tasks  The Client did report that her [facility] supports coordinator is looking into the client obtaining a home health aide  During the session we explored and processed the her depression and the effects on her different environments and relationships    Interventions: Supportive Therapy, Strengths Therapy,  and Cognitive Behavioral Therapy where the treatment modalities utilized during the session  Assessment and Plan: the Client presented a depressed anxious mood that was congruent in effect  She was alert, goal directed and participated with prompts during the session  Th e client was oriented to person, place, time, situation, and reported no suicidal/homicidal ideation, plan, or intent  As team we explored and processed the Client's depression and her continued isolation and economic weaknesses  During the session we explored community activities for individuals over the age of 48 as well as explored community supports and services to address her economic weaknesses  The Client and her support team conducted the depression PHQ-9 with the client scoring in the moderate severe depression severity range  Coping skills were  Reviewed during the session  Next appointment was set for 2 weeks  The Client was referred to Roger Pinto to address her insomnia and sleep issues, that are effecting her different relationships and environments       Pain:      PSYCH MENTAL STATUS PAIN : 6 as reported by the client     PHYSICAL PAIN SCALE NUMBERS: 8 as reported by the client due to year menstrual cycle     Current suicide risk : Low/Phone number for [facility] was given to the Client/ [address]: Diagnosis and Treatment Plan explained to [place], [place]  relates understanding diagnosis and is agreeable to Treatment Plan  Yes  Virtual Brief Visit    Patient is located in the following state in which I hold an active license [place]      Assessment/Plan:    Problem List Items Addressed This Visit        Other    Major depression, recurrent, chronic ([facility] [address] ) - Primary       Recent Visits  Date Type Provider Dept   02/16/23 Telemedicine Meng Kimball, YONATAN [facility]   Showing recent visits within past 7 days and meeting all other requirements  Today's Visits  Date Type Provider Dept   02/21/23 Telemedicine Meng Kimball, YONATAN [facility]   Showing today's visits and meeting all other requirements  Future Appointments  No visits were found meeting these conditions    Showing future appointments within next 150 days and meeting all other requirements         Visit Time    Visit Start Time: 10:58 Am   Visit Stop Time: 11:28 Am   Total Visit Duration: 30 minutes

## 2023-02-21 NOTE — PROGRESS NOTES
The Client reported that she has limited money and has been saving   Information was given to the client  on community supports and services through [address] [place] [address] for rent and food assistance, as well as from [facility] for housing economical support

## 2023-02-21 NOTE — PROGRESS NOTES
Yearly paperwork was sent to the Client via [address] [facility] postal service

## 2023-02-22 ENCOUNTER — OFFICE VISIT (OUTPATIENT)
Dept: GYNECOLOGY | Facility: CLINIC | Age: 48
End: 2023-02-22

## 2023-02-22 ENCOUNTER — ULTRASOUND (OUTPATIENT)
Dept: GYNECOLOGY | Facility: CLINIC | Age: 48
End: 2023-02-22

## 2023-02-22 DIAGNOSIS — N92.0 MENORRHAGIA WITH REGULAR CYCLE: Primary | ICD-10-CM

## 2023-02-22 DIAGNOSIS — N84.0 ENDOMETRIAL POLYP: Primary | ICD-10-CM

## 2023-02-22 DIAGNOSIS — Z01.812 ENCOUNTER FOR PRE-OPERATIVE LABORATORY TESTING: Primary | ICD-10-CM

## 2023-02-22 DIAGNOSIS — N92.0 MENORRHAGIA WITH REGULAR CYCLE: ICD-10-CM

## 2023-02-22 NOTE — PROGRESS NOTES
Assessment/Plan:         Diagnoses and all orders for this visit:    Endometrial polyp; patient is admitted for hysteroscopy D&C polypectomy  Procedure and risks reviewed    Menorrhagia with regular cycle        Subjective:      Patient ID: West Mcgee is a 50 y o  female  HPI G0 new patient presented 2/14/23 for GYN exam   Her last exam was many years ago  She states she has regular menses although over the past few years they have become heavier and more crampy  She denies any vaginal irritation burning or discharge  Denies any dysuria, hematuria urgency or urinary incontinence  No GI complaints  Advised return to the office for transvaginal scan possible biopsy possible saline infusion hysterosonography  Saline infusion hysterosonography revealed an endometrial polyp measuring approximately 1 cm  The following portions of the patient's history were reviewed and updated as appropriate:   She  has a past medical history of Allergic, Alopecia of scalp, Asthma, Chronic pain disorder, CPAP (continuous positive airway pressure) dependence, Depression, Diabetes mellitus ([facility] [address] ), Fibromyalgia, primary, GERD (gastroesophageal reflux disease), Hip fx, left, closed, with routine healing, subsequent encounter (05/2017), Hypertension, Insomnia, Irregular heartbeat, Obesity, VICTOR HUGO on CPAP, Shortness of breath, and Sleep apnea    She   Patient Active Problem List    Diagnosis Date Noted   • Cervical radiculopathy 02/01/2023   • Lumbar facet joint syndrome 12/23/2020   • Major depression, recurrent, chronic (HCC) 09/01/2020   • Lumbar radiculopathy 08/13/2020   • Primary osteoarthritis of both hips 08/13/2020   • Left hip pain 08/13/2020   • Fibromyalgia 06/11/2020   • Primary osteoarthritis involving multiple joints 06/11/2020   • Mild persistent asthma without complication 12/93/6489   • Chronic bilateral low back pain without sciatica 05/01/2019   • Chronic pain of left knee 05/01/2019   • Chronic pain of right knee 05/01/2019   • SOB (shortness of breath)      She  has a past surgical history that includes Carpal tunnel release (Left); Upper gastrointestinal endoscopy; Lumbar epidural injection; NERVE BLOCK (Bilateral, 12/29/2020); NERVE BLOCK (Bilateral, 1/19/2021); Radiofrequency ablation (Right, 2/25/2021); Radiofrequency ablation (Left, 3/16/2021); and pr arthrocentesis aspir&/inj major jt/bursa w/o us (Bilateral, 2/7/2023)  Her family history includes Asthma in her cousin; Cancer in her father; Hepatitis in her father; Hypertension in her paternal grandmother; No Known Problems in her maternal grandfather, maternal grandmother, mother, paternal aunt, paternal grandfather, and sister  She  reports that she has quit smoking  She has never used smokeless tobacco  She reports that she does not drink alcohol and does not use drugs  Current Outpatient Medications   Medication Sig Dispense Refill   • albuterol (Ventolin HFA) 90 mcg/act inhaler Inhale 2 puffs every 6 (six) hours as needed for wheezing 18 g 5   • atorvastatin (LIPITOR) 20 mg tablet Take 1 tablet (20 mg total) by mouth daily with dinner 90 tablet 0   • celecoxib (CeleBREX) 100 mg capsule Take 1 capsule (100 mg total) by mouth 2 (two) times a day 180 capsule 1   • ergocalciferol (VITAMIN D2) 50,000 units Take 1 capsule (50,000 Units total) by mouth once a week 12 capsule 1   • finasteride (PROPECIA) 1 MG tablet Take 1 tablet (1 mg total) by mouth daily (Patient not taking: Reported on 1/9/2023) 90 tablet 1   • Fluticasone-Salmeterol (Advair Diskus) 250-50 mcg/dose inhaler Inhale 1 puff 2 (two) times a day Rinse mouth after use   180 blister 3   • glipiZIDE (GLUCOTROL) 10 mg tablet Take 2 tablets (20 mg total) by mouth 2 (two) times a day before meals 360 tablet 1   • glucose blood test strip Use as instructed 100 each 5   • HYDROcodone-acetaminophen (NORCO) 5-325 mg per tablet Take 1 tablet by mouth every 8 (eight) hours as needed for pain Max Daily Amount: 3 tablets (Patient not taking: Reported on 2/14/2023) 90 tablet 0   • Lancets (onetouch ultrasoft) lancets Use as instructed 100 each 5   • lisinopril (ZESTRIL) 10 mg tablet TAKE (1) TABLET BY MOUTH DAILY  90 tablet 0   • metFORMIN (GLUCOPHAGE) 1000 MG tablet TAKE (1) TABLET TWICE A DAY WITH MEALS  180 tablet 5   • omeprazole (PriLOSEC) 20 mg delayed release capsule Take 1 capsule (20 mg total) by mouth daily 30 capsule 3   • sertraline (ZOLOFT) 50 mg tablet TAKE (1) TABLET BY MOUTH DAILY AT BEDTIME  90 tablet 0     No current facility-administered medications for this visit  Current Outpatient Medications on File Prior to Visit   Medication Sig   • albuterol (Ventolin HFA) 90 mcg/act inhaler Inhale 2 puffs every 6 (six) hours as needed for wheezing   • atorvastatin (LIPITOR) 20 mg tablet Take 1 tablet (20 mg total) by mouth daily with dinner   • celecoxib (CeleBREX) 100 mg capsule Take 1 capsule (100 mg total) by mouth 2 (two) times a day   • ergocalciferol (VITAMIN D2) 50,000 units Take 1 capsule (50,000 Units total) by mouth once a week   • finasteride (PROPECIA) 1 MG tablet Take 1 tablet (1 mg total) by mouth daily (Patient not taking: Reported on 1/9/2023)   • Fluticasone-Salmeterol (Advair Diskus) 250-50 mcg/dose inhaler Inhale 1 puff 2 (two) times a day Rinse mouth after use  • glipiZIDE (GLUCOTROL) 10 mg tablet Take 2 tablets (20 mg total) by mouth 2 (two) times a day before meals   • glucose blood test strip Use as instructed   • HYDROcodone-acetaminophen (NORCO) 5-325 mg per tablet Take 1 tablet by mouth every 8 (eight) hours as needed for pain Max Daily Amount: 3 tablets (Patient not taking: Reported on 2/14/2023)   • Lancets (onetouch ultrasoft) lancets Use as instructed   • lisinopril (ZESTRIL) 10 mg tablet TAKE (1) TABLET BY MOUTH DAILY  • metFORMIN (GLUCOPHAGE) 1000 MG tablet TAKE (1) TABLET TWICE A DAY WITH MEALS     • omeprazole (PriLOSEC) 20 mg delayed release capsule Take 1 capsule (20 mg total) by mouth daily   • sertraline (ZOLOFT) 50 mg tablet TAKE (1) TABLET BY MOUTH DAILY AT BEDTIME  No current facility-administered medications on file prior to visit  She is allergic to tdap [tetanus-diphth-acell pertussis]       Review of Systems      Objective: There were no vitals taken for this visit  Physical Exam  Vitals reviewed  Cardiovascular:      Rate and Rhythm: Normal rate and regular rhythm  Pulses: Normal pulses  Heart sounds: Normal heart sounds  Pulmonary:      Effort: Pulmonary effort is normal       Breath sounds: Normal breath sounds  Abdominal:      General: There is no distension  Palpations: Abdomen is soft  There is no mass  Tenderness: There is no abdominal tenderness  There is no guarding or rebound  Hernia: No hernia is present  There is no hernia in the left inguinal area or right inguinal area  Genitourinary:     General: Normal vulva  Labia:         Right: No rash, tenderness or lesion  Left: No rash, tenderness or lesion  Vagina: Normal       Cervix: Normal       Uterus: Normal        Adnexa:         Right: No mass, tenderness or fullness  Left: No mass, tenderness or fullness  Lymphadenopathy:      Lower Body: No right inguinal adenopathy  No left inguinal adenopathy  Neurological:      Mental Status: She is alert         AMB US Pelvic Non OB     Date/Time: 2/22/2023 8:24 AM  Performed by: Debra Dodson  Authorized by: Attila Blackwell DO   Universal Protocol:  Patient identity confirmed: verbally with patient        Procedure details:     Technique:  Transvaginal US, Non-OB    Position: lithotomy exam    Uterine findings:     Length (cm): 8 94    Height (cm):  3 98    Width (cm):  4 32    Endometrial stripe: identified      Endometrium thickness (mm):  14 64  Left ovary findings:     Left ovary:  Visualized    Length (cm): 2 6    Height (cm): 1 37    Width (cm): 1 87  Right ovary findings: Right ovary:  Visualized    Length (cm): 2 61    Height (cm): 1 62    Width (cm): 1 74  Other findings:     Free pelvic fluid: not identified      Free peritoneal fluid: not identified    Post-Procedure Details:     Impression:  Anteflexed uterus and bilateral ovaries appear within normal limits where seen  The visualization is limited secondary to body habitus  No free fluid  Tolerance:   Tolerated well, no immediate complications    Complications: no complications    Additional Procedure Comments:      GE Voluson P8 transvaginal transducer RIC5-RA with Serial Number [id number] was used during procedure and subsequently cleaned with high level disinfection utilizing the [employer]       Ultrasound performed at:   [address]  [address] [employer]  [address] [facility], [address] [facility]  Phone: [phone]  Fax:  [phone]     Sonohysterogram     Date/Time: 2/22/2023 8:25 AM  Performed by: Fausto Luna DO  Authorized by: Fausto Luna DO   Universal Protocol:  Patient identity confirmed: verbally with patient        Pre-procedure:     Prepped with: Betadine    Procedure:     Cervix cleaned and prepped: yes      Uterus sounded: yes      Catheter inserted: yes      Uterine cavity distended with saline: yes    Post-procedure:     Patient observed: yes      Post procedure instructions given to patient: yes      Patient tolerated procedure well with no complications: yes    Comments:      Sonohysterogram demonstrates endometrial polyp 9mm  Endometrial biopsy     Date/Time: 2/22/2023 8:25 AM  Performed by: Fausto Luna DO  Authorized by: Fausto Luna DO   Universal Protocol:  Consent given by: patient  Patient understanding: patient states understanding of the procedure being performed  Patient identity confirmed: verbally with patient        Procedure:     Procedure: endometrial biopsy with Pipelle      A bivalve speculum was placed in the vagina: yes      Cervix cleaned and prepped: yes      Specimen collected: specimen collected and sent to pathology      Patient tolerated procedure well with no complications: yes    AMB US Pelvic Non OB     Date/Time: 2/22/2023 2:13 PM  Performed by: Evita Julien  Authorized by: Ilsa Saenz DO   Universal Protocol:  Patient identity confirmed: verbally with patient        Procedure details:     Technique:  US Pelvic, Non-OB with complete exam    Position: supine exam    Post-Procedure Details:     Impression:  Transabdominal ultrasound performed to assess overall uterine size  Please see TVS for measurements and findings

## 2023-02-22 NOTE — PROGRESS NOTES
AMB US Pelvic Non OB    Date/Time: 2/22/2023 8:24 AM  Performed by: Abdias Vega  Authorized by: Andre Ma DO   Universal Protocol:  Patient identity confirmed: verbally with patient      Procedure details:     Technique:  Transvaginal US, Non-OB    Position: lithotomy exam    Uterine findings:     Length (cm): 8 94    Height (cm):  3 98    Width (cm):  4 32    Endometrial stripe: identified      Endometrium thickness (mm):  14 64  Left ovary findings:     Left ovary:  Visualized    Length (cm): 2 6    Height (cm): 1 37    Width (cm): 1 87  Right ovary findings:     Right ovary:  Visualized    Length (cm): 2 61    Height (cm): 1 62    Width (cm): 1 74  Other findings:     Free pelvic fluid: not identified      Free peritoneal fluid: not identified    Post-Procedure Details:     Impression:  Anteflexed uterus and bilateral ovaries appear within normal limits where seen  The visualization is limited secondary to body habitus  No free fluid  Tolerance: Tolerated well, no immediate complications    Complications: no complications    Additional Procedure Comments:      GE Voluson P8 transvaginal transducer RIC5-RA with Serial Number [id number] was used during procedure and subsequently cleaned with high level disinfection utilizing the [employer]       Ultrasound performed at:     [facility] [address]  [address] [facility]  [address], [address] [facility] [place]  Phone: [phone]  Fax:  [phone]    Sonohysterogram    Date/Time: 2/22/2023 8:25 AM  Performed by: Andre Ma DO  Authorized by: Andre Ma DO   Universal Protocol:  Patient identity confirmed: verbally with patient      Pre-procedure:     Prepped with: Betadine    Procedure:     Cervix cleaned and prepped: yes      Uterus sounded: yes      Catheter inserted: yes      Uterine cavity distended with saline: yes    Post-procedure:     Patient observed: yes      Post procedure instructions given to patient: yes      Patient tolerated procedure well with no complications: yes    Comments:      Sonohysterogram demonstrates a polyp within the endometrium  Endometrial biopsy    Date/Time: 2/22/2023 8:25 AM  Performed by: Andre Ma DO  Authorized by: Andre Ma DO   Universal Protocol:  Consent given by: patient  Patient understanding: patient states understanding of the procedure being performed  Patient identity confirmed: verbally with patient      Procedure:     Procedure: endometrial biopsy with Pipelle      A bivalve speculum was placed in the vagina: yes      Cervix cleaned and prepped: yes      Specimen collected: specimen collected and sent to pathology      Patient tolerated procedure well with no complications: yes    AMB US Pelvic Non OB    Date/Time: 2/22/2023 2:13 PM  Performed by: Abdias Vega  Authorized by: Andre Ma DO   Universal Protocol:  Patient identity confirmed: verbally with patient      Procedure details:     Technique:  US Pelvic, Non-OB with complete exam    Position: supine exam    Post-Procedure Details:     Impression:  Transabdominal ultrasound performed to assess overall uterine size  Please see TVS for measurements and findings

## 2023-03-08 ENCOUNTER — TELEMEDICINE (OUTPATIENT)
Dept: BEHAVIORAL/MENTAL HEALTH CLINIC | Facility: CLINIC | Age: 48
End: 2023-03-08

## 2023-03-08 DIAGNOSIS — F33.9 MAJOR DEPRESSION, RECURRENT, CHRONIC (HCC): Primary | ICD-10-CM

## 2023-03-08 NOTE — PSYCH
Psychotherapy Provided: Individual Psychotherapy 45  minutes 12:58 Pm to 1:40 Pm   Goals addressed in session: (long term goal Number One) The client reported that she has an interview with the  admin for her case to obtain disability  She also reported that her Gynecologist has ordered a DNC due to increased bleeding  During the session we explored and processed her physical health and the effects on her mental health  Interventions: Supportive Therapy, Strengths Therapy,  and Cognitive Behavioral Therapy where the treatment modalities utilized during the session  Assessment and Plan: The client presented a depressed anxious mood that was congruent in effect  She was alert, goal directed and participated with prompts during the session  The Client was oriented to person, place, time, situation, and reported no suicidal/homicidal ideation, plan, or intent  As team we explored the Client's upcoming surgery due and the effects on her mental health  The client was able to verbalize increased anxiety and depression due to her health issues  Coping skills were reviewed during the session  Next appointment was set for 2 weeks  Pain:      PSYCH MENTAL STATUS PAIN : 5 as reported by the client     PHYSICAL PAIN SCALE NUMBERS:7 as reported by the client     Current suicide risk : Low/Phone number for National Suicide Prevention Life Line was given to the Client/ [address]: Diagnosis and Treatment Plan explained to [address] [place] [address]  relates understanding diagnosis and is agreeable to Treatment Plan  Yes      Virtual Regular Visit    Verification of patient location:    Patient is located in the following state in which I hold an active license [place]      Assessment/Plan:    Problem List Items Addressed This Visit        Other    Major depression, recurrent, chronic ([facility] [address] ) - Primary       Goals addressed in session: Goal 1          Reason for visit is   Chief Complaint   Patient presents with   • Virtual Regular Visit        Encounter provider YONATAN Handley    Provider located at [address] [place] [id number]      Recent Visits  No visits were found meeting these conditions  Showing recent visits within past 7 days and meeting all other requirements  Today's Visits  Date Type Provider Dept   03/08/23 Telemedicine YONATAN Handley [facility]   Showing today's visits and meeting all other requirements  Future Appointments  No visits were found meeting these conditions  Showing future appointments within next 150 days and meeting all other requirements       The patient was identified by name and date of birth  Brinda Tracey was informed that this is a telemedicine visit and that the visit is being conducted throughthe [employer] platform  She agrees to proceed     My office door was closed  No one else was in the room  She acknowledged consent and understanding of privacy and security of the video platform  The patient has agreed to participate and understands they can discontinue the visit at any time  Patient is aware this is a billable service       Galilea Garcia is a 50 y o  female who was seen for individual mental health therapy      HPI     Past Medical History:   Diagnosis Date   • Allergic     Seasonal Allergies   • Alopecia of scalp     Treated with Proscar   • Asthma    • Chronic pain disorder     back, bilat knees, bilat hips   • CPAP (continuous positive airway pressure) dependence    • Depression    • Diabetes mellitus (HCC)    • Fibromyalgia, primary    • GERD (gastroesophageal reflux disease)    • Hip fx, left, closed, with routine healing, subsequent encounter 05/2017   • Hypertension    • Insomnia    • Irregular heartbeat    • Obesity    • VICTOR HUGO on CPAP    • Shortness of breath    • Sleep apnea        Past Surgical History: Procedure Laterality Date   • CARPAL TUNNEL RELEASE Left    • LUMBAR EPIDURAL INJECTION     • NERVE BLOCK Bilateral 12/29/2020    Procedure: L2 L3 L4 L5 MEDIAL BRANCH BLOCK #1;  Surgeon: Stanislav Gustafson MD;  Location: [facility];  Service: Pain Management    • NERVE BLOCK Bilateral 1/19/2021    Procedure: L2 L3 L4 L5 MEDIAL BRANCH BLOCK #2;  Surgeon: Stanislav Gustafson MD;  Location: [facility];  Service: Pain Management    • RI ARTHROCENTESIS ASPIR&/INJ MAJOR JT/BURSA W/O US Bilateral 2/7/2023    Procedure: INJECTION JOINT HIP;  Surgeon: Stanislav Gustafson MD;  Location: [facility];  Service: Pain Management    • RADIOFREQUENCY ABLATION Right 2/25/2021    Procedure: L2 L3 L4 L5 RADIO FREQUENCY ABLATION right side;  Surgeon: Stanislav Gustafson MD;  Location: [facility];  Service: Pain Management    • RADIOFREQUENCY ABLATION Left 3/16/2021    Procedure: L2 L3 L4 L5 RADIO FREQUENCY ABLATION;  Surgeon: Stanislav Gustafson MD;  Location: [facility];  Service: Pain Management    • UPPER GASTROINTESTINAL ENDOSCOPY         Current Outpatient Medications   Medication Sig Dispense Refill   • albuterol (Ventolin HFA) 90 mcg/act inhaler Inhale 2 puffs every 6 (six) hours as needed for wheezing 18 g 5   • atorvastatin (LIPITOR) 20 mg tablet Take 1 tablet (20 mg total) by mouth daily with dinner 90 tablet 0   • celecoxib (CeleBREX) 100 mg capsule Take 1 capsule (100 mg total) by mouth 2 (two) times a day 180 capsule 1   • ergocalciferol (VITAMIN D2) 50,000 units Take 1 capsule (50,000 Units total) by mouth once a week 12 capsule 1   • finasteride (PROPECIA) 1 MG tablet Take 1 tablet (1 mg total) by mouth daily (Patient not taking: Reported on 1/9/2023) 90 tablet 1   • Fluticasone-Salmeterol (Advair Diskus) 250-50 mcg/dose inhaler Inhale 1 puff 2 (two) times a day Rinse mouth after use   180 blister 3   • glipiZIDE (GLUCOTROL) 10 mg tablet Take 2 tablets (20 mg total) by mouth 2 (two) times a day before meals 360 tablet 1   • glucose blood test strip Use as instructed 100 each 5   • HYDROcodone-acetaminophen (NORCO) 5-325 mg per tablet Take 1 tablet by mouth every 8 (eight) hours as needed for pain Max Daily Amount: 3 tablets (Patient not taking: Reported on 2/14/2023) 90 tablet 0   • Lancets (onetouch ultrasoft) lancets Use as instructed 100 each 5   • lisinopril (ZESTRIL) 10 mg tablet TAKE (1) TABLET BY MOUTH DAILY  90 tablet 0   • metFORMIN (GLUCOPHAGE) 1000 MG tablet TAKE (1) TABLET TWICE A DAY WITH MEALS  180 tablet 5   • omeprazole (PriLOSEC) 20 mg delayed release capsule Take 1 capsule (20 mg total) by mouth daily 30 capsule 3   • sertraline (ZOLOFT) 50 mg tablet TAKE (1) TABLET BY MOUTH DAILY AT BEDTIME  90 tablet 0     No current facility-administered medications for this visit  Allergies   Allergen Reactions   • Tdap [Tetanus-Diphth-Acell Pertussis] Rash       Review of Systems    Video Exam    There were no vitals filed for this visit  Physical Exam     Visit Time    Visit Start Time: 12:58 Pm       Visit Stop Time: 1:40 Pm   Total Visit Duration: 42 minutes

## 2023-03-10 ENCOUNTER — TRANSCRIBE ORDERS (OUTPATIENT)
Dept: GASTROENTEROLOGY | Facility: CLINIC | Age: 48
End: 2023-03-10

## 2023-03-13 ENCOUNTER — OFFICE VISIT (OUTPATIENT)
Dept: FAMILY MEDICINE CLINIC | Facility: CLINIC | Age: 48
End: 2023-03-13

## 2023-03-13 VITALS
DIASTOLIC BLOOD PRESSURE: 82 MMHG | BODY MASS INDEX: 47.26 KG/M2 | RESPIRATION RATE: 18 BRPM | OXYGEN SATURATION: 96 % | TEMPERATURE: 97.7 F | SYSTOLIC BLOOD PRESSURE: 122 MMHG | HEART RATE: 101 BPM | WEIGHT: 258.4 LBS

## 2023-03-13 DIAGNOSIS — Z13.29 SCREENING FOR THYROID DISORDER: ICD-10-CM

## 2023-03-13 DIAGNOSIS — Z13.0 SCREENING FOR DEFICIENCY ANEMIA: ICD-10-CM

## 2023-03-13 DIAGNOSIS — E78.49 OTHER HYPERLIPIDEMIA: ICD-10-CM

## 2023-03-13 DIAGNOSIS — E11.65 TYPE 2 DIABETES MELLITUS WITH HYPERGLYCEMIA, WITHOUT LONG-TERM CURRENT USE OF INSULIN (HCC): Primary | ICD-10-CM

## 2023-03-13 DIAGNOSIS — M79.7 FIBROMYALGIA: ICD-10-CM

## 2023-03-13 DIAGNOSIS — M25.40 SWELLING OF JOINT: ICD-10-CM

## 2023-03-13 DIAGNOSIS — G89.29 CHRONIC BILATERAL LOW BACK PAIN WITHOUT SCIATICA: ICD-10-CM

## 2023-03-13 DIAGNOSIS — M16.0 PRIMARY OSTEOARTHRITIS OF BOTH HIPS: ICD-10-CM

## 2023-03-13 DIAGNOSIS — J01.00 ACUTE NON-RECURRENT MAXILLARY SINUSITIS: ICD-10-CM

## 2023-03-13 DIAGNOSIS — Z99.89 OSA ON CPAP: ICD-10-CM

## 2023-03-13 DIAGNOSIS — I10 ESSENTIAL HYPERTENSION: ICD-10-CM

## 2023-03-13 DIAGNOSIS — M54.12 CERVICAL RADICULOPATHY: ICD-10-CM

## 2023-03-13 DIAGNOSIS — L30.8 OTHER ECZEMA: ICD-10-CM

## 2023-03-13 DIAGNOSIS — E55.9 VITAMIN D DEFICIENCY: ICD-10-CM

## 2023-03-13 DIAGNOSIS — J45.30 MILD PERSISTENT ASTHMA WITHOUT COMPLICATION: ICD-10-CM

## 2023-03-13 DIAGNOSIS — H65.03 NON-RECURRENT ACUTE SEROUS OTITIS MEDIA OF BOTH EARS: ICD-10-CM

## 2023-03-13 DIAGNOSIS — F33.9 MAJOR DEPRESSION, RECURRENT, CHRONIC (HCC): ICD-10-CM

## 2023-03-13 DIAGNOSIS — M54.16 LUMBAR RADICULOPATHY: ICD-10-CM

## 2023-03-13 DIAGNOSIS — M54.50 CHRONIC BILATERAL LOW BACK PAIN WITHOUT SCIATICA: ICD-10-CM

## 2023-03-13 DIAGNOSIS — G47.33 OSA ON CPAP: ICD-10-CM

## 2023-03-13 DIAGNOSIS — M25.50 POLYARTHRALGIA: ICD-10-CM

## 2023-03-13 DIAGNOSIS — E53.8 VITAMIN B12 DEFICIENCY: ICD-10-CM

## 2023-03-13 RX ORDER — AMOXICILLIN 500 MG/1
500 CAPSULE ORAL EVERY 8 HOURS SCHEDULED
Qty: 30 CAPSULE | Refills: 1 | Status: SHIPPED | OUTPATIENT
Start: 2023-03-13 | End: 2023-03-23

## 2023-03-13 RX ORDER — TRIAMCINOLONE ACETONIDE 0.25 MG/G
CREAM TOPICAL
Qty: 60 G | Refills: 3 | Status: SHIPPED | OUTPATIENT
Start: 2023-03-13

## 2023-03-13 RX ORDER — DULAGLUTIDE 0.75 MG/.5ML
INJECTION, SOLUTION SUBCUTANEOUS
Qty: 5 ML | Refills: 5 | Status: SHIPPED | OUTPATIENT
Start: 2023-03-13

## 2023-03-13 NOTE — H&P
Endometrial polyp; patient is admitted for hysteroscopy D&C polypectomy  Procedure and risks reviewed     Menorrhagia with regular cycle         Subjective:       Patient ID: Boby Soto is a 50 y o  female      HPI [facility] patient presented 2/14/23 for GYN exam  Susi Salcedo last exam was many years ago  Trini Jones states she has regular menses although over the past few years they have become heavier and more crampy   She denies any vaginal irritation burning or discharge   Denies any dysuria, hematuria urgency or urinary incontinence   No GI complaints      Advised return to the office for transvaginal scan possible biopsy possible saline infusion hysterosonography  Saline infusion hysterosonography revealed an endometrial polyp measuring approximately 1 cm      The following portions of the patient's history were reviewed and updated as appropriate:   She  has a past medical history of Allergic, Alopecia of scalp, Asthma, Chronic pain disorder, CPAP (continuous positive airway pressure) dependence, Depression, Diabetes mellitus ([facility] [address] ), Fibromyalgia, primary, GERD (gastroesophageal reflux disease), Hip fx, left, closed, with routine healing, subsequent encounter (05/2017), Hypertension, Insomnia, Irregular heartbeat, Obesity, VICTOR HUGO on CPAP, Shortness of breath, and Sleep apnea    She        Patient Active Problem List     Diagnosis Date Noted   • Cervical radiculopathy 02/01/2023   • Lumbar facet joint syndrome 12/23/2020   • Major depression, recurrent, chronic (HCC) 09/01/2020   • Lumbar radiculopathy 08/13/2020   • Primary osteoarthritis of both hips 08/13/2020   • Left hip pain 08/13/2020   • Fibromyalgia 06/11/2020   • Primary osteoarthritis involving multiple joints 06/11/2020   • Mild persistent asthma without complication 44/72/5582   • Chronic bilateral low back pain without sciatica 05/01/2019   • Chronic pain of left knee 05/01/2019   • Chronic pain of right knee 05/01/2019   • SOB (shortness of breath)        She has a past surgical history that includes Carpal tunnel release (Left); Upper gastrointestinal endoscopy; Lumbar epidural injection; NERVE BLOCK (Bilateral, 12/29/2020); NERVE BLOCK (Bilateral, 1/19/2021); Radiofrequency ablation (Right, 2/25/2021); Radiofrequency ablation (Left, 3/16/2021); and pr arthrocentesis aspir&/inj major jt/bursa w/o us (Bilateral, 2/7/2023)  Her family history includes Asthma in her cousin; Cancer in her father; Hepatitis in her father; Hypertension in her paternal grandmother; No Known Problems in her maternal grandfather, maternal grandmother, mother, paternal aunt, paternal grandfather, and sister  She  reports that she has quit smoking  She has never used smokeless tobacco  She reports that she does not drink alcohol and does not use drugs  Current Outpatient Medications   Medication Sig Dispense Refill   • albuterol (Ventolin HFA) 90 mcg/act inhaler Inhale 2 puffs every 6 (six) hours as needed for wheezing 18 g 5   • atorvastatin (LIPITOR) 20 mg tablet Take 1 tablet (20 mg total) by mouth daily with dinner 90 tablet 0   • celecoxib (CeleBREX) 100 mg capsule Take 1 capsule (100 mg total) by mouth 2 (two) times a day 180 capsule 1   • ergocalciferol (VITAMIN D2) 50,000 units Take 1 capsule (50,000 Units total) by mouth once a week 12 capsule 1   • finasteride (PROPECIA) 1 MG tablet Take 1 tablet (1 mg total) by mouth daily (Patient not taking: Reported on 1/9/2023) 90 tablet 1   • Fluticasone-Salmeterol (Advair Diskus) 250-50 mcg/dose inhaler Inhale 1 puff 2 (two) times a day Rinse mouth after use   180 blister 3   • glipiZIDE (GLUCOTROL) 10 mg tablet Take 2 tablets (20 mg total) by mouth 2 (two) times a day before meals 360 tablet 1   • glucose blood test strip Use as instructed 100 each 5   • HYDROcodone-acetaminophen (NORCO) 5-325 mg per tablet Take 1 tablet by mouth every 8 (eight) hours as needed for pain Max Daily Amount: 3 tablets (Patient not taking: Reported on 2/14/2023) 90 tablet 0   • Lancets (onetouch ultrasoft) lancets Use as instructed 100 each 5   • lisinopril (ZESTRIL) 10 mg tablet TAKE (1) TABLET BY MOUTH DAILY  90 tablet 0   • metFORMIN (GLUCOPHAGE) 1000 MG tablet TAKE (1) TABLET TWICE A DAY WITH MEALS  180 tablet 5   • omeprazole (PriLOSEC) 20 mg delayed release capsule Take 1 capsule (20 mg total) by mouth daily 30 capsule 3   • sertraline (ZOLOFT) 50 mg tablet TAKE (1) TABLET BY MOUTH DAILY AT BEDTIME  90 tablet 0      No current facility-administered medications for this visit            Current Outpatient Medications on File Prior to Visit   Medication Sig   • albuterol (Ventolin HFA) 90 mcg/act inhaler Inhale 2 puffs every 6 (six) hours as needed for wheezing   • atorvastatin (LIPITOR) 20 mg tablet Take 1 tablet (20 mg total) by mouth daily with dinner   • celecoxib (CeleBREX) 100 mg capsule Take 1 capsule (100 mg total) by mouth 2 (two) times a day   • ergocalciferol (VITAMIN D2) 50,000 units Take 1 capsule (50,000 Units total) by mouth once a week   • finasteride (PROPECIA) 1 MG tablet Take 1 tablet (1 mg total) by mouth daily (Patient not taking: Reported on 1/9/2023)   • Fluticasone-Salmeterol (Advair Diskus) 250-50 mcg/dose inhaler Inhale 1 puff 2 (two) times a day Rinse mouth after use  • glipiZIDE (GLUCOTROL) 10 mg tablet Take 2 tablets (20 mg total) by mouth 2 (two) times a day before meals   • glucose blood test strip Use as instructed   • HYDROcodone-acetaminophen (NORCO) 5-325 mg per tablet Take 1 tablet by mouth every 8 (eight) hours as needed for pain Max Daily Amount: 3 tablets (Patient not taking: Reported on 2/14/2023)   • Lancets (onetouch ultrasoft) lancets Use as instructed   • lisinopril (ZESTRIL) 10 mg tablet TAKE (1) TABLET BY MOUTH DAILY  • metFORMIN (GLUCOPHAGE) 1000 MG tablet TAKE (1) TABLET TWICE A DAY WITH MEALS     • omeprazole (PriLOSEC) 20 mg delayed release capsule Take 1 capsule (20 mg total) by mouth daily   • sertraline (ZOLOFT) 50 mg tablet TAKE (1) TABLET BY MOUTH DAILY AT BEDTIME       No current facility-administered medications on file prior to visit       She is allergic to tdap [tetanus-diphth-acell pertussis]        Review of Systems       Objective:        There were no vitals taken for this visit             Physical Exam  Vitals reviewed  Cardiovascular:      Rate and Rhythm: Normal rate and regular rhythm  Pulses: Normal pulses  Heart sounds: Normal heart sounds  Pulmonary:      Effort: Pulmonary effort is normal       Breath sounds: Normal breath sounds  Abdominal:      General: There is no distension  Palpations: Abdomen is soft  There is no mass  Tenderness: There is no abdominal tenderness  There is no guarding or rebound  Hernia: No hernia is present  There is no hernia in the left inguinal area or right inguinal area  Genitourinary:     General: Normal vulva  Labia:         Right: No rash, tenderness or lesion  Left: No rash, tenderness or lesion  Vagina: Normal       Cervix: Normal       Uterus: Normal        Adnexa:         Right: No mass, tenderness or fullness  Left: No mass, tenderness or fullness  Lymphadenopathy:      Lower Body: No right inguinal adenopathy  No left inguinal adenopathy  Neurological:      Mental Status: She is alert        AMB US Pelvic Non OB     Date/Time: 2/22/2023 8:24 AM  Performed by: Leidy Lua by: Donovan Ware DO   Universal Protocol:  Patient identity confirmed: verbally with patient        Procedure details:     Technique:  Transvaginal US, Non-OB    Position: lithotomy exam    Uterine findings:     Length (cm): 8 94    Height (cm):  3 98    Width (cm):  4 32    Endometrial stripe: identified      Endometrium thickness (mm):  14 64  Left ovary findings:     Left ovary:  Visualized    Length (cm): 2 6    Height (cm): 1 37    Width (cm): 1 87  Right ovary findings:     Right ovary:  Visualized    Length (cm): 2 61    Height (cm): 1 62    Width (cm): 1 74  Other findings:     Free pelvic fluid: not identified      Free peritoneal fluid: not identified    Post-Procedure Details:     Impression:  Anteflexed uterus and bilateral ovaries appear within normal limits where seen  The visualization is limited secondary to body habitus   No free fluid      Tolerance:  Tolerated well, no immediate complications    Complications: no complications    Additional Procedure Comments:      GE Voluson P8 transvaginal transducer RIC5-RA with Serial Number [id number] was used during procedure and subsequently cleaned with high level disinfection utilizing the MetaCDN       Ultrasound performed at:   [place] [address] [address] [facility]  Phone: [phone]  Fax:  [phone]     Sonohysterogram     Date/Time: 2/22/2023 8:25 AM  Performed by: Donovan Ware DO  Authorized by: Donovan Ware DO   Universal Protocol:  Patient identity confirmed: verbally with patient        Pre-procedure:     Prepped with: Betadine    Procedure:     Cervix cleaned and prepped: yes      Uterus sounded: yes      Catheter inserted: yes      Uterine cavity distended with saline: yes    Post-procedure:     Patient observed: yes      Post procedure instructions given to patient: yes      Patient tolerated procedure well with no complications: yes    Comments:      Sonohysterogram demonstrates endometrial polyp 9mm  Endometrial biopsy     Date/Time: 2/22/2023 8:25 AM  Performed by: Donovan Ware DO  Authorized by: Donovan Ware DO   Universal Protocol:  Consent given by: patient  Patient understanding: patient states understanding of the procedure being performed  Patient identity confirmed: verbally with patient        Procedure:     Procedure: endometrial biopsy with Pipelle      A bivalve speculum was placed in the vagina: yes      Cervix cleaned and prepped: yes      Specimen collected: specimen collected and sent to pathology      Patient tolerated procedure well with no complications: yes    AMB US Pelvic Non OB     Date/Time: 2/22/2023 2:13 PM  Performed by: Leidy Ann by: Donovan Ware DO   Universal Protocol:  Patient identity confirmed: verbally with patient        Procedure details:     Technique:  US Pelvic, Non-OB with complete exam    Position: supine exam    Post-Procedure Details:     Impression:  Transabdominal ultrasound performed to assess overall uterine size  Please see TVS for measurements and findings

## 2023-03-13 NOTE — PATIENT INSTRUCTIONS
Wellness Visit for Adults   WHAT YOU NEED TO KNOW:   What is a wellness visit? A wellness visit is when you see your healthcare provider to get screened for health problems  Your healthcare provider will also give you advice on how to stay healthy  Write down your questions so you remember to ask them  Ask your healthcare provider how often you should have a wellness visit  What happens at a wellness visit? Your healthcare provider will ask about your health, and your family history of health problems  This includes high blood pressure, heart disease, and cancer  He or she will ask if you have symptoms that concern you, if you smoke, and about your mood  You may also be asked about your intake of medicines, supplements, food, and alcohol  Any of the following may be done: Your weight  will be checked  Your height may also be checked so your body mass index (BMI) can be calculated  Your BMI shows if you are at a healthy weight  Your blood pressure  and heart rate will be checked  Your temperature may also be checked  Blood and urine tests  may be done  Blood tests may be done to check your cholesterol levels  Abnormal cholesterol levels increase your risk for heart disease and stroke  You may also need a blood or urine test to check for diabetes if you are at increased risk  Urine tests may be done to look for signs of an infection or kidney disease  A physical exam  includes checking your heartbeat and lungs with a stethoscope  Your healthcare provider may also check your skin to look for sun damage  Screening tests  may be recommended  A screening test is done to check for diseases that may not cause symptoms  The screening tests you may need depend on your age, gender, family history, and lifestyle habits  For example, colorectal screening may be recommended if you are 48years old or older  What screening tests do I need if I am a woman? A Pap smear  is used to screen for cervical cancer   Pap smears are usually done every 3 to 5 years depending on your age  You may need them more often if you have had abnormal Pap smear test results in the past  Ask your healthcare provider how often you should have a Pap smear  A mammogram  is an x-ray of your breasts to screen for breast cancer  Experts recommend mammograms every 2 years starting at age 48 years  You may need a mammogram at age 52 years or younger if you have an increased risk for breast cancer  Talk to your healthcare provider about when you should start having mammograms and how often you need them  What vaccines might I need? Get an influenza vaccine  every year  The influenza vaccine protects you from the flu  Several types of viruses cause the flu  The viruses change over time, so new vaccines are made each year  Get a tetanus-diphtheria (Td) booster vaccine  every 10 years  This vaccine protects you against tetanus and diphtheria  Tetanus is a severe infection that may cause painful muscle spasms and lockjaw  Diphtheria is a severe bacterial infection that causes a thick covering in the back of your mouth and throat  Get a human papillomavirus (HPV) vaccine  if you are female and aged 23 to 32 or male 23 to 24 and never received it  This vaccine protects you from HPV infection  HPV is the most common infection spread by sexual contact  HPV may also cause vaginal, penile, and anal cancers  Get a pneumococcal vaccine  if you are aged 72 years or older  The pneumococcal vaccine is an injection given to protect you from pneumococcal disease  Pneumococcal disease is an infection caused by pneumococcal bacteria  The infection may cause pneumonia, meningitis, or an ear infection  Get a shingles vaccine  if you are 60 or older, even if you have had shingles before  The shingles vaccine is an injection to protect you from the varicella-zoster virus  This is the same virus that causes chickenpox   Shingles is a painful rash that develops in people who had chickenpox or have been exposed to the virus  How can I eat healthy? My Plate is a model for planning healthy meals  It shows the types and amounts of foods that should go on your plate  Fruits and vegetables make up about half of your plate, and grains and protein make up the other half  A serving of dairy is included on the side of your plate  The amount of calories and serving sizes you need depends on your age, gender, weight, and height  Examples of healthy foods are listed below:  Eat a variety of vegetables  such as dark green, red, and orange vegetables  You can also include canned vegetables low in sodium (salt) and frozen vegetables without added butter or sauces  Eat a variety of fresh fruits , canned fruit in 100% juice, frozen fruit, and dried fruit  Include whole grains  At least half of the grains you eat should be whole grains  Examples include whole-wheat bread, wheat pasta, brown rice, and whole-grain cereals such as oatmeal     Eat a variety of protein foods such as seafood (fish and shellfish), lean meat, and poultry without skin (turkey and chicken)  Examples of lean meats include pork leg, shoulder, or tenderloin, and beef round, sirloin, tenderloin, and extra lean ground beef  Other protein foods include eggs and egg substitutes, beans, peas, soy products, nuts, and seeds  Choose low-fat dairy products such as skim or 1% milk or low-fat yogurt, cheese, and cottage cheese  Limit unhealthy fats  such as butter, hard margarine, and shortening  How much exercise do I need? Exercise at least 30 minutes per day on most days of the week  Some examples of exercise include walking, biking, dancing, and swimming  You can also fit in more physical activity by taking the stairs instead of the elevator or parking farther away from stores  Include muscle strengthening activities 2 days each week  Regular exercise provides many health benefits   It helps you manage your weight, and decreases your risk for type 2 diabetes, heart disease, stroke, and high blood pressure  Exercise can also help improve your mood  Ask your healthcare provider about the best exercise plan for you  What are some general health and safety guidelines I should follow? Do not smoke  Nicotine and other chemicals in cigarettes and cigars can cause lung damage  Ask your healthcare provider for information if you currently smoke and need help to quit  E-cigarettes or smokeless tobacco still contain nicotine  Talk to your healthcare provider before you use these products  Limit alcohol  A drink of alcohol is 12 ounces of beer, 5 ounces of wine, or 1½ ounces of liquor  Lose weight, if needed  Being overweight increases your risk of certain health conditions  These include heart disease, high blood pressure, type 2 diabetes, and certain types of cancer  Protect your skin  Do not sunbathe or use tanning beds  Use sunscreen with a SPF 15 or higher  Apply sunscreen at least 15 minutes before you go outside  Reapply sunscreen every 2 hours  Wear protective clothing, hats, and sunglasses when you are outside  Drive safely  Always wear your seatbelt  Make sure everyone in your car wears a seatbelt  A seatbelt can save your life if you are in an accident  Do not use your cell phone when you are driving  This could distract you and cause an accident  Pull over if you need to make a call or send a text message  Practice safe sex  Use latex condoms if are sexually active and have more than one partner  Your healthcare provider may recommend screening tests for sexually transmitted infections (STIs)  Wear helmets, lifejackets, and protective gear  Always wear a helmet when you ride a bike or motorcycle, go skiing, or play sports that could cause a head injury  Wear protective equipment when you play sports  Wear a lifejacket when you are on a boat or doing water sports      CARE AGREEMENT: You have the right to help plan your care  Learn about your health condition and how it may be treated  Discuss treatment options with your healthcare providers to decide what care you want to receive  You always have the right to refuse treatment  The above information is an  only  It is not intended as medical advice for individual conditions or treatments  Talk to your doctor, nurse or pharmacist before following any medical regimen to see if it is safe and effective for you  © Copyright [employer] 2022 Information is for End User's use only and may not be sold, redistributed or otherwise used for commercial purposes

## 2023-03-13 NOTE — PROGRESS NOTES
Name: Gamal Fulton      : 1975      MRN: [id number]  Encounter Provider: CHRIS Kaur  Encounter Date: 3/13/2023   Encounter department: [address] [place] [address]     1  Type 2 diabetes mellitus with hyperglycemia, without long-term current use of insulin ([facility])  -     Hemoglobin A1C; Future; Expected date: 2023  -     Comprehensive metabolic panel; Future; Expected date: 2023  -     Insulin, fasting; Future; Expected date: 2023  -     dulaglutide (Trulicity) 7  UK/8 5OJ injection; 0 5 mL SQ every 7 days x 2 weeks then increase to 1 mL SQ every 7 days    2  Major depression, recurrent, chronic ([facility] [address] )    3  Mild persistent asthma without complication    4  Cervical radiculopathy    5  Lumbar radiculopathy    6  Chronic bilateral low back pain without sciatica    7  Primary osteoarthritis of both hips    8  Fibromyalgia    9  Vitamin B12 deficiency  -     Vitamin B12; Future; Expected date: 2023    10  VICTOR HUGO on CPAP  -     Diagnostic Sleep Study; Future; Expected date: 2023    11  Vitamin D deficiency  -     Vitamin D 25 hydroxy; Future; Expected date: 2023    12  Essential hypertension  -     TSH, 3rd generation; Future; Expected date: 2023    13  Other hyperlipidemia  -     Lipid panel; Future; Expected date: 2023    14  Screening for deficiency anemia  -     CBC and differential; Future; Expected date: 2023    15  Screening for thyroid disorder  -     TSH, 3rd generation; Future; Expected date: 2023    16  Other eczema  -     triamcinolone (KENALOG) 0 025 % cream; Apply 2 x daily to rash until healed then prn rash    17  Non-recurrent acute serous otitis media of both ears  -     amoxicillin (AMOXIL) 500 mg capsule; Take 1 capsule (500 mg total) by mouth every 8 (eight) hours for 10 days    18  Acute non-recurrent maxillary sinusitis  -     amoxicillin (AMOXIL) 500 mg capsule;  Take 1 capsule (500 mg total) by mouth every 8 (eight) hours for 10 days    19  Polyarthralgia  -     RF Screen w/ Reflex to Titer; Future; Expected date: 03/14/2023  -     Lyme Total Antibody Profile with reflex to WB; Future; Expected date: 03/14/2023    20  Swelling of joint  -     RF Screen w/ Reflex to Titer; Future; Expected date: 03/14/2023  -     Lyme Total Antibody Profile with reflex to WB; Future; Expected date: 03/14/2023      BMI Counseling: Body mass index is 47 26 kg/m²  The BMI is above normal  Nutrition recommendations include decreasing portion sizes, encouraging healthy choices of fruits and vegetables, decreasing fast food intake, consuming healthier snacks, limiting drinks that contain sugar, moderation in carbohydrate intake, increasing intake of lean protein, reducing intake of saturated and trans fat and reducing intake of cholesterol  Exercise recommendations include exercising 3-5 times per week  No pharmacotherapy was ordered  Rationale for BMI follow-up plan is due to patient being overweight or obese  Depression Screening and Follow-up Plan: Clincally patient does not have depression  No treatment is required  Subjective     Patient presents to office for follow up and recheck  Complete medical history and medications reviewed with patient and tolerating all medications well without any problems  Vital signs reviewed and stable  Patient feels her depression is currently controlled with taking the Zoloft  Continues with Counseling with Moe Edward and feels she is doing well  Patient states her sugars are still ranging around 150s  Currently being followed by Dr Jose Angel Philippe which she is getting joint injections into B/L hips and epidural pain injections into lumbar spine  Continues to be followed by [facility] Specialist for Hx Elevated LFTs  Hepatomegaly, and Hepatic steatosis    Requesting a referral for a Sleep Study due to last Sleep Study was done several years ago and needs her C-Pap replaced  C/O B/L ear pain with pressure which she feels is causing her to be dizzy  Tried OTC instilling Peroxide and Vinegar with no relief of her symptoms  C/O sinus congestion with sore throat  Patient states the Finasteride was denied by her insurance and her Albertus Olszewski was denied by her insurance so has not been getting these medications  Patient states the Finasteride 5mg 1/2 tab daily was covered previously by her insurance  Patient is taking Celebrex but states it is not helping the pain in her joints and she has swelling of her fingers and elbows  Denies any other problems or concerns at the present time  Review of Systems   GENERAL:  Feels well, denies any significant changes in weight without trying  SKIN:  C/O red dry itchy rash around B/L ears  Baldness of anterior head  HEENT:  Denies any head injury or headaches  C/O B/L ear pain with pressure causing dizziness  C/O sinus congestion and sore throat  NECK:  Denies lumps, goiter, pain, swollen glands, or lymphadenopathy  BREASTS:  Denies lumps, pain, nipple discharge, swelling, redness, or any other changes  RESPIRATORY:  Denies cough, wheezing, shortness of breath, dyspnea, or orthopnea  Hx VICTOR HUGO on C-Pap but is in need of a new machine  CARDIOVASCULAR:  Denies chest pain or palpitations  GASTROINTESTINAL:  Appetite good, denies nausea, vomiting, or indigestion  Bowel movements normal occurring about once daily or every other day  URINARY:  Denies frequency, urgency, incontinence, dysuria, hematuria, nocturia, or recent flank pain  GENITAL:  Denies vaginal discharge, pelvic infection, lesions, ulcers, or pain  Negative dyspareunia or abnormal vaginal bleeding  PERIPHERAL VASCULAR:  Denies varicosities, swelling, skin changes, or pain  MUSCULOSKELETAL:  Continues with chronic neck and low back pain due to Hx DDD with Radiculopathy  Continues with chronic B/L hip pain due to Hx DJD   C/O pain and swelling in B/L fingers and elbows due to Osteoarthritis  PSYCHIATRIC:  Denies problems with depression, anxiety, anger, or other psychiatric symptoms  NEUROLOGIC:  Denies fainting, dizziness, memory problems, seizures, tingling, motor or sensory loss  HEMATOLOGIC:  Denies easy bruising, bleeding, or anemia  ENDOCRINE:  Denies thyroid problems, temperature intolerance, excessive sweating, or other endocrine symptoms         Past Medical History:   Diagnosis Date   • Allergic     Seasonal Allergies   • Alopecia of scalp     Treated with Proscar   • Asthma    • Chronic pain disorder     back, bilat knees, bilat hips   • CPAP (continuous positive airway pressure) dependence    • Depression    • Diabetes mellitus (HCC)    • Fibromyalgia, primary    • GERD (gastroesophageal reflux disease)    • Hip fx, left, closed, with routine healing, subsequent encounter 05/2017   • Hypertension    • Insomnia    • Irregular heartbeat    • Obesity    • VICTOR HUGO on CPAP    • Shortness of breath    • Sleep apnea      Past Surgical History:   Procedure Laterality Date   • CARPAL TUNNEL RELEASE Left    • LUMBAR EPIDURAL INJECTION     • NERVE BLOCK Bilateral 12/29/2020    Procedure: L2 L3 L4 L5 MEDIAL BRANCH BLOCK #1;  Surgeon: Gina Henriquez MD;  Location: [facility];  Service: Pain Management    • NERVE BLOCK Bilateral 1/19/2021    Procedure: L2 L3 L4 L5 MEDIAL BRANCH BLOCK #2;  Surgeon: Gina Henriquez MD;  Location: [facility];  Service: Pain Management    • NM ARTHROCENTESIS ASPIR&/INJ MAJOR JT/BURSA W/O US Bilateral 2/7/2023    Procedure: INJECTION JOINT HIP;  Surgeon: Gina Henriquez MD;  Location: [facility];  Service: Pain Management    • RADIOFREQUENCY ABLATION Right 2/25/2021    Procedure: L2 L3 L4 L5 RADIO FREQUENCY ABLATION right side;  Surgeon: Gina Henriquez MD;  Location: [facility];  Service: Pain Management    • RADIOFREQUENCY ABLATION Left 3/16/2021    Procedure: L2 L3 L4 L5 RADIO FREQUENCY ABLATION;  Surgeon: Gina Henriquez MD;  Location: [facility]; Service: Pain Management    • UPPER GASTROINTESTINAL ENDOSCOPY       Family History   Problem Relation Age of Onset   • Cancer Father    • Hepatitis Father    • Asthma Cousin    • Hypertension Paternal Grandmother    • No Known Problems Mother    • No Known Problems Sister    • No Known Problems Maternal Grandmother    • No Known Problems Maternal Grandfather    • No Known Problems Paternal Grandfather    • No Known Problems Paternal Aunt    • BRCA2 Positive Neg Hx    • BRCA2 Negative Neg Hx    • BRCA1 Positive Neg Hx    • BRCA1 Negative Neg Hx    • BRCA 1/2 Neg Hx    • Ovarian cancer Neg Hx    • Endometrial cancer Neg Hx    • Colon cancer Neg Hx    • Breast cancer additional onset Neg Hx    • Breast cancer Neg Hx      Social History     Socioeconomic History   • Marital status: Single     Spouse name: None   • Number of children: None   • Years of education: None   • Highest education level: None   Occupational History   • None   Tobacco Use   • Smoking status: Former   • Smokeless tobacco: Never   Vaping Use   • Vaping Use: Never used   Substance and Sexual Activity   • Alcohol use: No   • Drug use: No   • Sexual activity: Not Currently   Other Topics Concern   • None   Social History Narrative   • None     Social Determinants of Health     Financial Resource Strain: High Risk   • Difficulty of Paying Living Expenses: Hard   Food Insecurity: Not on file   Transportation Needs: Not on file   Physical Activity: Not on file   Stress: Not on file   Social Connections: Not on file   Intimate Partner Violence: Not on file   Housing Stability: Not on file     Current Outpatient Medications on File Prior to Visit   Medication Sig   • albuterol (Ventolin HFA) 90 mcg/act inhaler Inhale 2 puffs every 6 (six) hours as needed for wheezing   • atorvastatin (LIPITOR) 20 mg tablet Take 1 tablet (20 mg total) by mouth daily with dinner   • celecoxib (CeleBREX) 100 mg capsule Take 1 capsule (100 mg total) by mouth 2 (two) times a day   • ergocalciferol (VITAMIN D2) 50,000 units Take 1 capsule (50,000 Units total) by mouth once a week   • Fluticasone-Salmeterol (Advair Diskus) 250-50 mcg/dose inhaler Inhale 1 puff 2 (two) times a day Rinse mouth after use  • glipiZIDE (GLUCOTROL) 10 mg tablet Take 2 tablets (20 mg total) by mouth 2 (two) times a day before meals   • glucose blood test strip Use as instructed   • Lancets (onetouch ultrasoft) lancets Use as instructed   • lisinopril (ZESTRIL) 10 mg tablet TAKE (1) TABLET BY MOUTH DAILY  • metFORMIN (GLUCOPHAGE) 1000 MG tablet TAKE (1) TABLET TWICE A DAY WITH MEALS  • omeprazole (PriLOSEC) 20 mg delayed release capsule Take 1 capsule (20 mg total) by mouth daily   • sertraline (ZOLOFT) 50 mg tablet TAKE (1) TABLET BY MOUTH DAILY AT BEDTIME    • [DISCONTINUED] finasteride (PROPECIA) 1 MG tablet Take 1 tablet (1 mg total) by mouth daily (Patient not taking: Reported on 1/9/2023)   • [DISCONTINUED] HYDROcodone-acetaminophen (NORCO) 5-325 mg per tablet Take 1 tablet by mouth every 8 (eight) hours as needed for pain Max Daily Amount: 3 tablets (Patient not taking: Reported on 2/14/2023)     Allergies   Allergen Reactions   • Tdap [Tetanus-Diphth-Acell Pertussis] Rash     Immunization History   Administered Date(s) Administered   • COVID-19 MODERNA VACC 0 5 ML IM 12/16/2021   • COVID-19 PFIZER VACCINE 0 3 ML IM 04/29/2021, 05/20/2021   • Influenza, recombinant, quadrivalent,injectable, preservative free 12/09/2020, 01/24/2022, 11/23/2022   • Pneumococcal Conjugate Vaccine 20-valent (Pcv20), Polysace 11/23/2022   • Pneumococcal Polysaccharide PPV23 01/01/2020       Objective     /82 (BP Location: Left arm, Patient Position: Sitting, Cuff Size: Large)   Pulse 101   Temp 97 7 °F (36 5 °C) (Tympanic)   Resp 18   Wt 117 kg (258 lb 6 4 oz)   SpO2 96%   BMI 47 26 kg/m²     Physical Exam  Vitals and nursing note reviewed        GENERAL:  Appears well nourished, well groomed, in no acute distress  SKIN:  Red eczematous rash of posterior right ear and left anterior ear  HEAD:  Hair is average texture  Scalp without lesions, normocephalic, and atraumatic  EARS:  B/L ear canals clear  B/L TMs red with serous effusion and decreased light reflex  NOSE: Mucosa pink, moist, septum midline  +sinus tenderness  B/L turbinates red and edematous with yellow exudate  MOUTH:  Oral mucosa pink  Pharynx pink, moist, without swelling, redness, or exudate  Dentition ok  Tongue midline  NECK:  Supple, trachea midline, Negative thyromegaly, lymphadenopathy, or swollen glands  LYMPH NODES:  Negative enlargement of neck, axillary, epitrochlear, or inguinal nodes  THORAX/LUNGS  Thorax symmetric with good excursion  Lungs resonant  Breath sounds vesicular with no added sounds  Diaphragm descends within normal limits  CARDIOVASCULAR:  Carotid upstrokes brisk and without bruits  Apical impulse discrete and tapping, barely palpable in the 5th ICS/MCL  Normal S1 and Normal S2, Negative S3 or S4  Negative murmurs, thrills, lifts, or heaves  ABDOMEN:  Protuberant, bowel sounds normal active x 4 quadrants  Negative tenderness  Negative masses  Negative hepatomegaly  Negative splenomegaly  Negative costovertebral tenderness  EXTREMITIES:  Warm, calves supple, non-tender, negative for edema  Negative stasis pigmentation or ulcers  +2 pulses throughout  MUSCULOSKELETAL:  Negative joint deformities  Good range of motion in hands, wrists, elbows, shoulders, spine, hips, knees, and ankles  NEUROLOGICAL:  Mental status:  Awake, alert, and oriented to person, place, time, and event  Normal thought processes          CHRIS Peraza

## 2023-03-15 ENCOUNTER — OFFICE VISIT (OUTPATIENT)
Dept: PAIN MEDICINE | Facility: CLINIC | Age: 48
End: 2023-03-15

## 2023-03-15 VITALS
SYSTOLIC BLOOD PRESSURE: 126 MMHG | BODY MASS INDEX: 47.48 KG/M2 | HEIGHT: 62 IN | TEMPERATURE: 97 F | RESPIRATION RATE: 20 BRPM | HEART RATE: 90 BPM | WEIGHT: 258 LBS | DIASTOLIC BLOOD PRESSURE: 90 MMHG

## 2023-03-15 DIAGNOSIS — M16.0 PRIMARY OSTEOARTHRITIS OF BOTH HIPS: ICD-10-CM

## 2023-03-15 DIAGNOSIS — M47.816 LUMBAR SPONDYLOSIS: Primary | ICD-10-CM

## 2023-03-15 DIAGNOSIS — M15.9 PRIMARY OSTEOARTHRITIS INVOLVING MULTIPLE JOINTS: ICD-10-CM

## 2023-03-15 RX ORDER — LIDOCAINE HYDROCHLORIDE 10 MG/ML
10 INJECTION, SOLUTION EPIDURAL; INFILTRATION; INTRACAUDAL; PERINEURAL ONCE
OUTPATIENT
Start: 2023-03-15 | End: 2023-03-15

## 2023-03-15 RX ORDER — GABAPENTIN 300 MG/1
300 CAPSULE ORAL 3 TIMES DAILY
Qty: 90 CAPSULE | Refills: 0 | Status: SHIPPED | OUTPATIENT
Start: 2023-03-15

## 2023-03-15 RX ORDER — METHYLPREDNISOLONE ACETATE 80 MG/ML
80 INJECTION, SUSPENSION INTRA-ARTICULAR; INTRALESIONAL; INTRAMUSCULAR; PARENTERAL; SOFT TISSUE ONCE
OUTPATIENT
Start: 2023-03-15 | End: 2023-03-15

## 2023-03-15 RX ORDER — BUPIVACAINE HCL/PF 2.5 MG/ML
5 VIAL (ML) INJECTION ONCE
OUTPATIENT
Start: 2023-03-15 | End: 2023-03-15

## 2023-03-15 NOTE — PATIENT INSTRUCTIONS
Core Strengthening Exercises   WHAT YOU NEED TO KNOW:   Your core includes the muscles of your lower back, hip, pelvis, and abdomen  Core strengthening exercises help heal and strengthen these muscles  This helps prevent another injury, and keeps your pelvis, spine, and hips in the correct position  DISCHARGE INSTRUCTIONS:   Call your doctor or physical therapist if:   You have sharp or worsening pain during exercise or at rest     You have questions or concerns about your shoulder exercises  Safety tips:  Talk to your healthcare provider before you start an exercise program  A physical therapist can teach you how to do core strengthening exercises safely  Do the exercises on a mat or firm surface  A firm surface will support your spine and prevent low back pain  Do not do these exercises on a bed  Move slowly and smoothly  Avoid fast or jerky motions  Stop if you feel pain  You may feel some discomfort at first, but you should not feel pain  Tell your provider or physical therapist if you have pain while you exercise  Regular exercise will help decrease your discomfort over time  Breathe normally during core exercises  Do not hold your breath  This may cause an increase in blood pressure and prevent muscle strengthening  Your healthcare provider will tell you when to inhale and exhale during the exercise  Begin all of your exercises with abdominal bracing  Abdominal bracing helps warm up your core muscles  You can also practice abdominal bracing throughout the day  Lie on your back with your knees bent and feet flat on the floor  Place your arms in a relaxed position beside your body  Tighten your abdominal muscles  Pull your belly button in and up toward your spine  Hold for 5 seconds  Relax your muscles  Repeat 10 times  Core strengthening exercises: Your healthcare provider will tell you how often to do these exercises   The provider will also tell you how many repetitions of each exercise you should do  Hold each exercise for 5 seconds or as directed  As you get stronger, increase your hold to 10 to 15 seconds  You can do some of these exercises on a stability ball, or with a weight  Ask your healthcare provider how to use a stability ball or weight for these exercises:  Bridging:  Lie on your back with your knees bent and feet flat on the floor  Rest your arms at your side  Tighten your buttocks, and then lift your hips 1 inch off the floor  Hold for 5 seconds  When you can do this exercise without pain for 10 seconds, increase the distance you lift your hips  A good goal is to be able to lift your hips so that your shoulders, hips, and knees are in a straight line  Dead bug:  Lie on your back with your knees bent and feet flat on the floor  Place your arms in a relaxed position beside your body  Begin with abdominal bracing  Next, raise one leg, keeping your knee bent  Hold for 5 seconds  Repeat with the other leg  When you can do this exercise without pain for 10 to 15 seconds, you may raise one straight leg and hold  Repeat with the other leg  Quadruped:  Place your hands and knees on the floor  Keep your wrists directly below your shoulders and your knees directly below your hips  Pull your belly button in toward your spine  Do not flatten or arch your back  Tighten your abdominal muscles below your belly button  Hold for 5 seconds  When you can do this exercise without pain for 10 to 15 seconds, you may extend one arm and hold  Repeat on the other side  Side bridge exercises:      Standing side bridge:  Stand next to a wall and extend one arm toward the wall  Place your palm flat on the wall with your fingers pointing upward  Begin with abdominal bracing  Next, without moving your feet, slowly bend your arm to 90 degrees  Hold for 5 seconds  Repeat on the other side   When you can do this exercise without pain for 10 to 15 seconds, you may do the bent leg side bridge on the floor  Bent leg side bridge:  Lie on one side with your legs, hips, and shoulders in a straight line  Prop yourself up onto your forearm so your elbow is directly below your shoulder  Bend your knees back to 90 degrees  Begin with abdominal bracing  Next, lift your hips and balance yourself on your forearm and knees  Hold for 5 seconds  Repeat on the other side  When you can do this exercise without pain for 10 to 15 seconds, you may do the straight leg side bridge on the floor  Straight leg side bridge:  Lie on one side with your legs, hips, and shoulders in a straight line  Prop yourself up onto your forearm so your elbow is directly below your shoulder  Begin with abdominal bracing  Lift your hips off the floor and balance yourself on your forearm and the outside of your flexed foot  Do not let your ankle bend sideways  Hold for 5 seconds  Repeat on the other side  When you can do this exercise without pain for 10 to 15 seconds, ask your healthcare provider for more advanced exercises  Superman:  Lie on your stomach  Extend your arms forward on the floor  Tighten your abdominal muscles and lift your right hand and left leg off the floor  Hold this position  Slowly return to the starting position  Tighten your abdominal muscles and lift your left hand and right leg off the floor  Hold this position  Slowly return to the starting position  Clam:  Lie on your side with your knees bent  Put your bottom arm under your head to keep your neck in line  Put your top hand on your hip to keep your pelvis from moving  Put your heels together, and keep them together during this exercise  Slowly raise your top knee toward the ceiling  Then lower your leg so your knees are together  Repeat this exercise 10 times  Then switch sides and do the exercise 10 times with the other leg  Curl up:  Lie on your back with your knees bent and feet flat on the floor   Place your hands, palms down, underneath your lower back  Next, with your elbows on the floor, lift your shoulders and chest 2 to 3 inches off the floor  Keep your head in line with your shoulders  Hold this position  Slowly return to the starting position  Straight leg raises:  Lie on your back with one leg straight  Bend the other knee and place your foot flat on the floor  Tighten your abdominal muscles  Keep your leg straight and slowly lift it straight up 6 to 12 inches off the floor  Hold this position  Lower your leg slowly  Do as many repetitions as directed on this side  Repeat with the other leg  Plank:  Lie on your stomach  Bend your elbows and place your forearms flat on the floor  Lift your chest, stomach, and knees off the floor  Make sure your elbows are below your shoulders  Your body should be in a straight line  Do not let your hips or lower back sink to the ground  Squeeze your abdominal muscles together and hold for 15 seconds  To make this exercise harder, hold for 30 seconds or lift 1 leg at a time  Bicycles:  Lie on your back  Bend both knees and bring them toward your chest  Your calves should be parallel to the floor  Place the palms of your hands on the back of your head  Straighten your right leg and keep it lifted 2 inches off the floor  Raise your head and shoulders off the floor and twist towards your left  Keep your head and shoulders lifted  Bend your right knee while you straighten your left leg  Keep your left leg 2 inches off the floor  Twist your head and chest towards the left leg  Continue to straighten 1 leg at a time and twist        Follow up with your doctor or physical therapist as directed:  Write down your questions so you remember to ask them during your visits  © Copyright [employer] 2022 Information is for End User's use only and may not be sold, redistributed or otherwise used for commercial purposes  The above information is an  only   It is not intended as medical advice for individual conditions or treatments  Talk to your doctor, nurse or pharmacist before following any medical regimen to see if it is safe and effective for you

## 2023-03-15 NOTE — PROGRESS NOTES
Assessment  1  Lumbar spondylosis - Bilateral  -     Case request operating room: RHIZOTOMY LUMBAR L3, L4, L5 medial branch nerves; Standing  -     Case request operating room: RHIZOTOMY LUMBAR L3, L4, L5 medial branch nerves  -     gabapentin (NEURONTIN) 300 mg capsule; Take 1 capsule (300 mg total) by mouth 3 (three) times a day    2  Primary osteoarthritis involving multiple joints    3  Primary osteoarthritis of both hips    Greater than 80% improvement with radiofrequency ablation of bilateral Medial branch nerves at L2-L5 done more than 1 5 years ago with mproved ability to participate with IADLs in significantly less pain; pain has subsequently returned  Additionally describes b/l hip pain with radiation to the groin; ttp over b/l gtb, pain with internal/external rotation of b/l hips; pain worse with standing/ambulation  Greater than 60% relief of pain with improved ability to participate with IADLs after recent b/l hip intra-artcular joint injections  Lifestyle modifications extensively discussed including diet, exercise and weight loss in conjunction with optimal DM-2 control  Previously reported the following symptomatology:    Chronic axial low back pain described by primarily arthritic features  Positive facet loading maneuvers as noted below  Mild noncompressive lumbar degenerative change at the L3-4 and L4-5 levels  Previously had a left L3 transforaminal steroid injection with Dr Indu Tubbs which helped for about a week and a half  Arthritic symptomatology greater than radicular features at this time  Reasonable to continue multimodal pain therapy plan  Plan  -continue Celebrex 100 mg b i d  As needed for lumbar facet syndrome    Counseled regarding bleeding risk of taking this medication   -lifestyle modifications including diet, exercise and weight loss in addition to DM-2 control extensively discussed  -repeat bilateral L3, L4, L5 medial branch nerve RF lesioning; f/u 2 weeks post procedure  -hip exercises provided; continue physical therapy and core exercises for lumbar facet syndrome    There are risks associated with opioid medications, including dependence, addiction and tolerance  The patient understands and agrees to use these medications only as prescribed  Potential side effects of the medications include, but are not limited to, constipation, drowsiness, addiction, impaired judgment and risk of fatal overdose if not taken as prescribed  The patient was warned against driving while taking sedation medications  Sharing medications is a felony  At this point in time, the patient is showing no signs of addiction, abuse, diversion or suicidal ideation  [facility] Prescription Drug Monitoring Program report was reviewed and was appropriate     Complete risks and benefits including bleeding, infection, tissue reaction, nerve injury and allergic reaction were discussed  The approach was demonstrated using models and literature was provided  Verbal and written consent was obtained  My impressions and treatment recommendations were discussed in detail with the patient who verbalized understanding and had no further questions  Discharge instructions were provided  I personally saw and examined the patient and I agree with the above discussed plan of care  No orders of the defined types were placed in this encounter  History of Present Illness    Greater than 80% improvement with recent radiofrequency ablation of bilateral Medial branch nerves at L2-L5 done more than 1 5 years ago with mproved ability to participate with IADLs in significantly less pain; pain has subsequently returned  Additionally describes b/l hip pain with radiation to the groin; ttp over b/l gtb, pain with internal/external rotation of b/l hips; pain worse with standing/ambulation   Greater than 60% relief of pain with improved ability to participate with IADLs after recent b/l hip intra-artcular joint injections  Previously reported the following symptomatology:    Katie Parra is a 50 y o  female with past medical history of axial low back pain described primarily by arthritic features  She presents with a 2 year history of chronic low back pain described primarily as arthritic in nature  she describes 8/10 low back pain that is worse in the mornings and worse at the end of the day  The pain is characterized by achy, nagging, indolent, crampy, stabbing pain in his/her axial low back  The patient describes that the pain is worse with standing for long periods of time on hard surfaces as well as with walking  The patient is a very active individual and feels as though this pain compromises her participation with independent activities of daily living  The pain can be debilitating at times and contribute to significant disability, compromising overall activity and independent activities of daily living  She has tried physical therapy with limited relief of symptoms  Medications the patient has tried in the past include   Celebrex,  and Flexeril  She describes minor radicular symptoms in the L3 and L4 dermatomal distribution but otherwise has good strength  Previously she had left L3 transforaminal epidural steroid injection with relief for about 2 weeks  She denies any weakness numbness or paresthesias  The patient denies any bowel or bladder dysfunction as well  I have personally reviewed and/or updated the patient's past medical history, past surgical history, family history, social history, current medications, allergies, and vital signs today  Review of Systems   Constitutional: Positive for activity change  HENT: Negative  Eyes: Negative  Respiratory: Negative  Cardiovascular: Negative  Gastrointestinal: Negative  Endocrine: Negative  Genitourinary: Negative  Musculoskeletal: Positive for arthralgias, back pain, gait problem and myalgias  Skin: Negative  Allergic/Immunologic: Negative  Hematological: Negative  Psychiatric/Behavioral: Negative  All other systems reviewed and are negative        Patient Active Problem List   Diagnosis   • SOB (shortness of breath)   • Chronic bilateral low back pain without sciatica   • Chronic pain of left knee   • Chronic pain of right knee   • Fibromyalgia   • Primary osteoarthritis involving multiple joints   • Mild persistent asthma without complication   • Lumbar radiculopathy   • Primary osteoarthritis of both hips   • Left hip pain   • Major depression, recurrent, chronic (HCC)   • Lumbar facet joint syndrome   • Cervical radiculopathy       Past Medical History:   Diagnosis Date   • Allergic     Seasonal Allergies   • Alopecia of scalp     Treated with Proscar   • Asthma    • Chronic pain disorder     back, bilat knees, bilat hips   • CPAP (continuous positive airway pressure) dependence    • Depression    • Diabetes mellitus (HCC)    • Fibromyalgia, primary    • GERD (gastroesophageal reflux disease)    • Hip fx, left, closed, with routine healing, subsequent encounter 05/2017   • Hypertension    • Insomnia    • Irregular heartbeat    • Obesity    • VICTOR HUGO on CPAP    • Shortness of breath    • Sleep apnea        Past Surgical History:   Procedure Laterality Date   • CARPAL TUNNEL RELEASE Left    • LUMBAR EPIDURAL INJECTION     • NERVE BLOCK Bilateral 12/29/2020    Procedure: L2 L3 L4 L5 MEDIAL BRANCH BLOCK #1;  Surgeon: Cyndi Cleveland MD;  Location: [facility];  Service: Pain Management    • NERVE BLOCK Bilateral 1/19/2021    Procedure: L2 L3 L4 L5 MEDIAL BRANCH BLOCK #2;  Surgeon: Cyndi Cleveland MD;  Location: [facility];  Service: Pain Management    • OH ARTHROCENTESIS ASPIR&/INJ MAJOR JT/BURSA W/O US Bilateral 2/7/2023    Procedure: INJECTION JOINT HIP;  Surgeon: Cyndi Cleveland MD;  Location: [facility];  Service: Pain Management    • RADIOFREQUENCY ABLATION Right 2/25/2021    Procedure: L2 L3 L4 L5 RADIO FREQUENCY ABLATION right side;  Surgeon: Morgan Long MD;  Location: [facility];  Service: Pain Management    • RADIOFREQUENCY ABLATION Left 3/16/2021    Procedure: L2 L3 L4 L5 RADIO FREQUENCY ABLATION;  Surgeon: Morgan Long MD;  Location: [facility];  Service: Pain Management    • UPPER GASTROINTESTINAL ENDOSCOPY         Family History   Problem Relation Age of Onset   • Cancer Father    • Hepatitis Father    • Asthma Cousin    • Hypertension Paternal Grandmother    • No Known Problems Mother    • No Known Problems Sister    • No Known Problems Maternal Grandmother    • No Known Problems Maternal Grandfather    • No Known Problems Paternal Grandfather    • No Known Problems Paternal Aunt    • BRCA2 Positive Neg Hx    • BRCA2 Negative Neg Hx    • BRCA1 Positive Neg Hx    • BRCA1 Negative Neg Hx    • BRCA 1/2 Neg Hx    • Ovarian cancer Neg Hx    • Endometrial cancer Neg Hx    • Colon cancer Neg Hx    • Breast cancer additional onset Neg Hx    • Breast cancer Neg Hx        Social History     Occupational History   • Not on file   Tobacco Use   • Smoking status: Former   • Smokeless tobacco: Never   Vaping Use   • Vaping Use: Never used   Substance and Sexual Activity   • Alcohol use: No   • Drug use: No   • Sexual activity: Not Currently       Current Outpatient Medications on File Prior to Visit   Medication Sig   • albuterol (Ventolin HFA) 90 mcg/act inhaler Inhale 2 puffs every 6 (six) hours as needed for wheezing   • amoxicillin (AMOXIL) 500 mg capsule Take 1 capsule (500 mg total) by mouth every 8 (eight) hours for 10 days   • atorvastatin (LIPITOR) 20 mg tablet Take 1 tablet (20 mg total) by mouth daily with dinner   • celecoxib (CeleBREX) 100 mg capsule Take 1 capsule (100 mg total) by mouth 2 (two) times a day   • ergocalciferol (VITAMIN D2) 50,000 units Take 1 capsule (50,000 Units total) by mouth once a week   • Fluticasone-Salmeterol (Advair Diskus) 250-50 mcg/dose inhaler Inhale 1 puff 2 (two) times a day Rinse mouth after use  • glipiZIDE (GLUCOTROL) 10 mg tablet Take 2 tablets (20 mg total) by mouth 2 (two) times a day before meals   • glucose blood test strip Use as instructed   • Lancets (onetouch ultrasoft) lancets Use as instructed   • lisinopril (ZESTRIL) 10 mg tablet TAKE (1) TABLET BY MOUTH DAILY  • metFORMIN (GLUCOPHAGE) 1000 MG tablet TAKE (1) TABLET TWICE A DAY WITH MEALS  • omeprazole (PriLOSEC) 20 mg delayed release capsule Take 1 capsule (20 mg total) by mouth daily   • sertraline (ZOLOFT) 50 mg tablet TAKE (1) TABLET BY MOUTH DAILY AT BEDTIME  • dulaglutide (Trulicity) 2 23 GB/5 9QP injection 0 5 mL SQ every 7 days x 2 weeks then increase to 1 mL SQ every 7 days (Patient not taking: Reported on 3/15/2023)   • triamcinolone (KENALOG) 0 025 % cream Apply 2 x daily to rash until healed then prn rash (Patient not taking: Reported on 3/15/2023)     No current facility-administered medications on file prior to visit  Allergies   Allergen Reactions   • Tdap [Tetanus-Diphth-Acell Pertussis] Rash         Physical Exam    /90   Pulse 90   Temp (!) 97 °F (36 1 °C)   Resp 20   Ht 5' 2" (1 575 m)   Wt 117 kg (258 lb)   BMI 47 19 kg/m²     Constitutional: normal, well developed, well nourished, alert, in no distress and non-toxic and no overt pain behavior  and obese  Eyes: anicteric  HEENT: grossly intact  Neck: supple, symmetric, trachea midline and no masses   Pulmonary:even and unlabored  Cardiovascular:No edema or pitting edema present  Skin:Normal without rashes or lesions and well hydrated  Psychiatric:Mood and affect appropriate  Neurologic:Cranial Nerves II-XII grossly intact Sensation grossly intact; no clonus negative dupree's  Reflexes 2+ and brisk  SLR negative bilaterally  Musculoskeletal:  Steppage gait  Normal heel toe and tip toe walking  Significant pain with lumbar facet loading bilaterally and with lateral spine rotation left greater than right    TTP over lumbar paraspinal muscles  Negative wilda's test, negative gaenslen's negative SIJ loading bilaterally  ttp over b/l gtb, pain with internal/external rotation of b/l hips    Imaging    MRI LUMBAR SPINE WITHOUT CONTRAST     INDICATION: [id number]: Lumbago with sciatica, left side  G89 [id number]: Other chronic pain      COMPARISON:  None      TECHNIQUE:  Sagittal T1, sagittal T2, sagittal inversion recovery, axial T1 and axial T2, coronal T2     IMAGE QUALITY:  Diagnostic     FINDINGS:     VERTEBRAL BODIES:  There are 5 lumbar type vertebral bodies  Normal alignment of the lumbar spine  No spondylolysis or spondylolisthesis  No scoliosis  No compression fracture  Normal marrow signal is identified within the visualized bony   structures  No discrete marrow lesion      SACRUM:  Normal signal within the sacrum  No evidence of insufficiency or stress fracture      DISTAL CORD AND CONUS:  Normal size and signal within the distal cord and conus      PARASPINAL SOFT TISSUES:  There is thickening of the endometrial cavity partially visualized on this examination towards the fundus  There is a dominant follicle identified within the left ovary      LOWER THORACIC DISC SPACES:  Normal disc height and signal   No disc herniation, canal stenosis or foraminal narrowing      LUMBAR DISC SPACES:     L1-L2:  Normal      L2-L3:  Normal      L3-L4:  Annular bulging with a small broad-based left foraminal and extraforaminal disc protrusion best seen on series 6 image 13  There is no canal stenosis  Only minimal left foraminal narrowing without nerve impingement      L4-L5:  Disc desiccation and loss of disc height with mild annular bulging    Small central disc herniation without canal stenosis or foraminal nerve impingement      L5-S1:  Normal disc height and signal without canal stenosis or foraminal narrowing      IMPRESSION:     Mild noncompressive lumbar degenerative change at the L3-4 and L4-5 levels      Fluid signal in the endometrial cavity partially visualized at the fundus    If patient is postmenopausal, consider follow-up ultrasound imaging      Signal

## 2023-03-15 NOTE — H&P (VIEW-ONLY)
Assessment  1  Lumbar spondylosis - Bilateral  -     Case request operating room: RHIZOTOMY LUMBAR L3, L4, L5 medial branch nerves; Standing  -     Case request operating room: RHIZOTOMY LUMBAR L3, L4, L5 medial branch nerves  -     gabapentin (NEURONTIN) 300 mg capsule; Take 1 capsule (300 mg total) by mouth 3 (three) times a day    2  Primary osteoarthritis involving multiple joints    3  Primary osteoarthritis of both hips    Greater than 80% improvement with radiofrequency ablation of bilateral Medial branch nerves at L2-L5 done more than 1 5 years ago with mproved ability to participate with IADLs in significantly less pain; pain has subsequently returned  Additionally describes b/l hip pain with radiation to the groin; ttp over b/l gtb, pain with internal/external rotation of b/l hips; pain worse with standing/ambulation  Greater than 60% relief of pain with improved ability to participate with IADLs after recent b/l hip intra-artcular joint injections  Lifestyle modifications extensively discussed including diet, exercise and weight loss in conjunction with optimal DM-2 control  Previously reported the following symptomatology:    Chronic axial low back pain described by primarily arthritic features  Positive facet loading maneuvers as noted below  Mild noncompressive lumbar degenerative change at the L3-4 and L4-5 levels  Previously had a left L3 transforaminal steroid injection with Dr Eloy Cornell which helped for about a week and a half  Arthritic symptomatology greater than radicular features at this time  Reasonable to continue multimodal pain therapy plan  Plan  -continue Celebrex 100 mg b i d  As needed for lumbar facet syndrome    Counseled regarding bleeding risk of taking this medication   -lifestyle modifications including diet, exercise and weight loss in addition to DM-2 control extensively discussed  -repeat bilateral L3, L4, L5 medial branch nerve RF lesioning; f/u 2 weeks post procedure  -hip exercises provided; continue physical therapy and core exercises for lumbar facet syndrome    There are risks associated with opioid medications, including dependence, addiction and tolerance  The patient understands and agrees to use these medications only as prescribed  Potential side effects of the medications include, but are not limited to, constipation, drowsiness, addiction, impaired judgment and risk of fatal overdose if not taken as prescribed  The patient was warned against driving while taking sedation medications  Sharing medications is a felony  At this point in time, the patient is showing no signs of addiction, abuse, diversion or suicidal ideation  [place] Prescription Drug Monitoring Program report was reviewed and was appropriate     Complete risks and benefits including bleeding, infection, tissue reaction, nerve injury and allergic reaction were discussed  The approach was demonstrated using models and literature was provided  Verbal and written consent was obtained  My impressions and treatment recommendations were discussed in detail with the patient who verbalized understanding and had no further questions  Discharge instructions were provided  I personally saw and examined the patient and I agree with the above discussed plan of care  No orders of the defined types were placed in this encounter  History of Present Illness    Greater than 80% improvement with recent radiofrequency ablation of bilateral Medial branch nerves at L2-L5 done more than 1 5 years ago with mproved ability to participate with IADLs in significantly less pain; pain has subsequently returned  Additionally describes b/l hip pain with radiation to the groin; ttp over b/l gtb, pain with internal/external rotation of b/l hips; pain worse with standing/ambulation   Greater than 60% relief of pain with improved ability to participate with IADLs after recent b/l hip intra-artcular joint injections  Previously reported the following symptomatology:    Liset Easley is a 50 y o  female with past medical history of axial low back pain described primarily by arthritic features  She presents with a 2 year history of chronic low back pain described primarily as arthritic in nature  she describes 8/10 low back pain that is worse in the mornings and worse at the end of the day  The pain is characterized by achy, nagging, indolent, crampy, stabbing pain in his/her axial low back  The patient describes that the pain is worse with standing for long periods of time on hard surfaces as well as with walking  The patient is a very active individual and feels as though this pain compromises her participation with independent activities of daily living  The pain can be debilitating at times and contribute to significant disability, compromising overall activity and independent activities of daily living  She has tried physical therapy with limited relief of symptoms  Medications the patient has tried in the past include   Celebrex,  and Flexeril  She describes minor radicular symptoms in the L3 and L4 dermatomal distribution but otherwise has good strength  Previously she had left L3 transforaminal epidural steroid injection with relief for about 2 weeks  She denies any weakness numbness or paresthesias  The patient denies any bowel or bladder dysfunction as well  I have personally reviewed and/or updated the patient's past medical history, past surgical history, family history, social history, current medications, allergies, and vital signs today  Review of Systems   Constitutional: Positive for activity change  HENT: Negative  Eyes: Negative  Respiratory: Negative  Cardiovascular: Negative  Gastrointestinal: Negative  Endocrine: Negative  Genitourinary: Negative  Musculoskeletal: Positive for arthralgias, back pain, gait problem and myalgias  Skin: Negative  Allergic/Immunologic: Negative  Hematological: Negative  Psychiatric/Behavioral: Negative  All other systems reviewed and are negative        Patient Active Problem List   Diagnosis   • SOB (shortness of breath)   • Chronic bilateral low back pain without sciatica   • Chronic pain of left knee   • Chronic pain of right knee   • Fibromyalgia   • Primary osteoarthritis involving multiple joints   • Mild persistent asthma without complication   • Lumbar radiculopathy   • Primary osteoarthritis of both hips   • Left hip pain   • Major depression, recurrent, chronic (HCC)   • Lumbar facet joint syndrome   • Cervical radiculopathy       Past Medical History:   Diagnosis Date   • Allergic     Seasonal Allergies   • Alopecia of scalp     Treated with Proscar   • Asthma    • Chronic pain disorder     back, bilat knees, bilat hips   • CPAP (continuous positive airway pressure) dependence    • Depression    • Diabetes mellitus (HCC)    • Fibromyalgia, primary    • GERD (gastroesophageal reflux disease)    • Hip fx, left, closed, with routine healing, subsequent encounter 05/2017   • Hypertension    • Insomnia    • Irregular heartbeat    • Obesity    • VICTOR HUGO on CPAP    • Shortness of breath    • Sleep apnea        Past Surgical History:   Procedure Laterality Date   • CARPAL TUNNEL RELEASE Left    • LUMBAR EPIDURAL INJECTION     • NERVE BLOCK Bilateral 12/29/2020    Procedure: L2 L3 L4 L5 MEDIAL BRANCH BLOCK #1;  Surgeon: Reyna Martínez MD;  Location: [facility];  Service: Pain Management    • NERVE BLOCK Bilateral 1/19/2021    Procedure: L2 L3 L4 L5 MEDIAL BRANCH BLOCK #2;  Surgeon: Reyna Martínez MD;  Location: [facility];  Service: Pain Management    • NC ARTHROCENTESIS ASPIR&/INJ MAJOR JT/BURSA W/O US Bilateral 2/7/2023    Procedure: INJECTION JOINT HIP;  Surgeon: Reyna Martínez MD;  Location: [facility];  Service: Pain Management    • RADIOFREQUENCY ABLATION Right 2/25/2021    Procedure: L2 L3 L4 L5 RADIO FREQUENCY ABLATION right side;  Surgeon: Fili Reed MD;  Location: [facility];  Service: Pain Management    • RADIOFREQUENCY ABLATION Left 3/16/2021    Procedure: L2 L3 L4 L5 RADIO FREQUENCY ABLATION;  Surgeon: Fili Reed MD;  Location: [facility];  Service: Pain Management    • UPPER GASTROINTESTINAL ENDOSCOPY         Family History   Problem Relation Age of Onset   • Cancer Father    • Hepatitis Father    • Asthma Cousin    • Hypertension Paternal Grandmother    • No Known Problems Mother    • No Known Problems Sister    • No Known Problems Maternal Grandmother    • No Known Problems Maternal Grandfather    • No Known Problems Paternal Grandfather    • No Known Problems Paternal Aunt    • BRCA2 Positive Neg Hx    • BRCA2 Negative Neg Hx    • BRCA1 Positive Neg Hx    • BRCA1 Negative Neg Hx    • BRCA 1/2 Neg Hx    • Ovarian cancer Neg Hx    • Endometrial cancer Neg Hx    • Colon cancer Neg Hx    • Breast cancer additional onset Neg Hx    • Breast cancer Neg Hx        Social History     Occupational History   • Not on file   Tobacco Use   • Smoking status: Former   • Smokeless tobacco: Never   Vaping Use   • Vaping Use: Never used   Substance and Sexual Activity   • Alcohol use: No   • Drug use: No   • Sexual activity: Not Currently       Current Outpatient Medications on File Prior to Visit   Medication Sig   • albuterol (Ventolin HFA) 90 mcg/act inhaler Inhale 2 puffs every 6 (six) hours as needed for wheezing   • amoxicillin (AMOXIL) 500 mg capsule Take 1 capsule (500 mg total) by mouth every 8 (eight) hours for 10 days   • atorvastatin (LIPITOR) 20 mg tablet Take 1 tablet (20 mg total) by mouth daily with dinner   • celecoxib (CeleBREX) 100 mg capsule Take 1 capsule (100 mg total) by mouth 2 (two) times a day   • ergocalciferol (VITAMIN D2) 50,000 units Take 1 capsule (50,000 Units total) by mouth once a week   • Fluticasone-Salmeterol (Advair Diskus) 250-50 mcg/dose inhaler Inhale 1 puff 2 (two) times a day Rinse "mouth after use  • glipiZIDE (GLUCOTROL) 10 mg tablet Take 2 tablets (20 mg total) by mouth 2 (two) times a day before meals   • glucose blood test strip Use as instructed   • Lancets (onetouch ultrasoft) lancets Use as instructed   • lisinopril (ZESTRIL) 10 mg tablet TAKE (1) TABLET BY MOUTH DAILY  • metFORMIN (GLUCOPHAGE) 1000 MG tablet TAKE (1) TABLET TWICE A DAY WITH MEALS  • omeprazole (PriLOSEC) 20 mg delayed release capsule Take 1 capsule (20 mg total) by mouth daily   • sertraline (ZOLOFT) 50 mg tablet TAKE (1) TABLET BY MOUTH DAILY AT BEDTIME  • dulaglutide (Trulicity) 0 67 HX/1 0AY injection 0 5 mL SQ every 7 days x 2 weeks then increase to 1 mL SQ every 7 days (Patient not taking: Reported on 3/15/2023)   • triamcinolone (KENALOG) 0 025 % cream Apply 2 x daily to rash until healed then prn rash (Patient not taking: Reported on 3/15/2023)     No current facility-administered medications on file prior to visit  Allergies   Allergen Reactions   • Tdap [Tetanus-Diphth-Acell Pertussis] Rash         Physical Exam    /90   Pulse 90   Temp (!) 97 °F (36 1 °C)   Resp 20   Ht 5' 2\" (1 575 m)   Wt 117 kg (258 lb)   BMI 47 19 kg/m²     Constitutional: normal, well developed, well nourished, alert, in no distress and non-toxic and no overt pain behavior  and obese  Eyes: anicteric  HEENT: grossly intact  Neck: supple, symmetric, trachea midline and no masses   Pulmonary:even and unlabored  Cardiovascular:No edema or pitting edema present  Skin:Normal without rashes or lesions and well hydrated  Psychiatric:Mood and affect appropriate  Neurologic:Cranial Nerves II-XII grossly intact Sensation grossly intact; no clonus negative dupree's  Reflexes 2+ and brisk  SLR negative bilaterally  Musculoskeletal:  Steppage gait  Normal heel toe and tip toe walking  Significant pain with lumbar facet loading bilaterally and with lateral spine rotation left greater than right    TTP over lumbar paraspinal " muscles  Negative wilda's test, negative gaenslen's negative SIJ loading bilaterally  ttp over b/l gtb, pain with internal/external rotation of b/l hips    Imaging    MRI LUMBAR SPINE WITHOUT CONTRAST     INDICATION: M54 42: Lumbago with sciatica, left side  G89 29: Other chronic pain      COMPARISON:  None      TECHNIQUE:  Sagittal T1, sagittal T2, sagittal inversion recovery, axial T1 and axial T2, coronal T2     IMAGE QUALITY:  Diagnostic     FINDINGS:     VERTEBRAL BODIES:  There are 5 lumbar type vertebral bodies  Normal alignment of the lumbar spine  No spondylolysis or spondylolisthesis  No scoliosis  No compression fracture  Normal marrow signal is identified within the visualized bony   structures  No discrete marrow lesion      SACRUM:  Normal signal within the sacrum  No evidence of insufficiency or stress fracture      DISTAL CORD AND CONUS:  Normal size and signal within the distal cord and conus      PARASPINAL SOFT TISSUES:  There is thickening of the endometrial cavity partially visualized on this examination towards the fundus  There is a dominant follicle identified within the left ovary      LOWER THORACIC DISC SPACES:  Normal disc height and signal   No disc herniation, canal stenosis or foraminal narrowing      LUMBAR DISC SPACES:     L1-L2:  Normal      L2-L3:  Normal      L3-L4:  Annular bulging with a small broad-based left foraminal and extraforaminal disc protrusion best seen on series 6 image 13  There is no canal stenosis  Only minimal left foraminal narrowing without nerve impingement      L4-L5:  Disc desiccation and loss of disc height with mild annular bulging    Small central disc herniation without canal stenosis or foraminal nerve impingement      L5-S1:  Normal disc height and signal without canal stenosis or foraminal narrowing      IMPRESSION:     Mild noncompressive lumbar degenerative change at the L3-4 and L4-5 levels      Fluid signal in the endometrial cavity partially visualized at the fundus    If patient is postmenopausal, consider follow-up ultrasound imaging      Signal

## 2023-03-22 ENCOUNTER — TELEMEDICINE (OUTPATIENT)
Dept: BEHAVIORAL/MENTAL HEALTH CLINIC | Facility: CLINIC | Age: 48
End: 2023-03-22

## 2023-03-22 DIAGNOSIS — F33.9 MAJOR DEPRESSION, RECURRENT, CHRONIC (HCC): Primary | ICD-10-CM

## 2023-03-22 NOTE — PSYCH
Behavioral Health Psychotherapy Progress Note    The Client's behavioral treatment plan signature page was used for the client to sign her crisis plan due to the crisis plan signature page being developed for crisis plan     Psychotherapy Provided: Individual Psychotherapy     1  Major depression, recurrent, chronic (HCC)            Goals addressed in session: Goal 1     DATA: The client reported that her friend had  this weekend due to cancer  She reported increased in her anxiety and depression over the death  During the session we reviewed the stages of grief and the possible effects on her mental health  During the session we also created her recovery crisis plan  It is hoped that by addressing her weaknesses this will act as a preventive measure against the possible need for higher level of care and services  During this session, this clinician used the following therapeutic modalities: Cognitive Behavioral Therapy, Solution-Focused Therapy and Supportive Psychotherapy    Substance Abuse was not addressed during this session Stage of change for addressing substance use diagnoses: No substance use/Not applicable    ASSESSMENT:  Cristy Garcia presents with a Anxious and Depressed mood  her affect is Normal range and intensity, which is congruent, with her mood and the content of the session  The client has made progress on their goals  Wilbur Tena presents with a none risk of suicide, none risk of self-harm, and none risk of harm to others  For any risk assessment that surpasses a "low" rating, a safety plan must be developed  A safety plan was indicated: No  If yes, describe in detail N/A    PLAN: Between sessions, Wilbur Tena will work on using her coping skills when presented with anxiety and or depression   At the next session, the therapist will use Cognitive Behavioral Therapy, Mindfulness-based Strategies and Supportive Psychotherapy to address her anxiety and or depression    Behavioral Health Treatment Plan and Discharge Planning: Rose Deshawn is aware of and agrees to continue to work on their treatment plan  They have identified and are working toward their discharge goals   yes    Visit start and stop times: 12:00 Pm to 12:30 Pm     03/22/23 breath sounds equal/airway patent/clear to auscultation bilaterally

## 2023-03-22 NOTE — PSYCH
Client Name: Festus Helm       Client YOB: 1975  : 1975    Treatment Team (include name and contact information):     Psychotherapist:  Meng Kimball MSW LCSW at [address] [address]    Psychiatrist: N/A   Release of information completed: yes    " Rupa/LILLIANA/[id number]   Release of information completed: Yes    Other (Specify Role): Xiao PEREZ    Release of information completed: Client does not want info shared with the PCP     Other (Specify Role): N/A   Release of information completed: Yes    Healthcare Provider  Julia Minor, [address] [address]  [phone]    Type of Plan   * Child plans (children 15 yo and younger) must be completed and signed by the child's legal guardian   * Plans for all individuals 15 yo and above must be signed by the client  Plan Type: adolescent/adult (15 and over) Initial      My Personal Strengths are (in the client's own words):  " Survivor" ," resourceful", " Good Cat mom" "kind"      The stressors and triggers that may put me at risk are:  being physically tired, boredom, loneliness, feeling a lack of control, being treated unfairly, people (describe - names, etc) Mom  and other (describe) finacial and health issues    Coping skills I can use to keep myself calm and safe: Take a shower, Listen to music, Call a friend or family member, Increased contact with professional supports and Other (describe) call case manger    Coping skills/supports I can use to maintain abstinence from substance use:   N/A    The people that provide me with help and support: (Include name, contact, and how they can help)   Support person #1: Rene Singh    * Phone number: In phone    * How can they help me? "Talks to me when I am upset"    Support person #2: [address]    * Phone number:  In phone     * How can they help me? " Helps to talk and has helped financially"      Support person #3: Corrine    * Phone number: In phone    * How can they help me? "Talks with me and makes me Laugh"     In the past, the following has helped me in times of crisis:    Being in a quiet space, Taking medications, Calling a friend, Calling a family member, Breathing exercises (or other mindfulness-based activities) and Watching television or a movie      If it is an emergency and you need immediate help, call     If there is a possibility of danger to yourself or others, call the following crisis hotline resources:     Adult Crisis Numbers  Suicide Prevention Hotline - Dial   [facility]: [address]: [address]: [address]: [address] [place] [address] Avenue: [address] [place] [address] Street: [address] [place] [address] Avenue: [address] [place] [address] St: [phone] (daytime)  [phone] (after hours, weekends, holidays)     Child/Adolescent Crisis Numbers   [facility]: [address]ri [address] 10: [phone]   [facility]: [phone]   [facility]: [phone]    Please note: Some [facility] do not have a separate number for Child/Adolescent specific crisis  If your county is not listed under Child/Adolescent, please call the adult number for your county     National Talk to Text Line   All Ages - [phone]    In the event your feelings become unmanageable, and you cannot reach your support system, you will call 911 immediately or go to the nearest hospital emergency room

## 2023-04-03 NOTE — DISCHARGE INSTR - AVS FIRST PAGE
YOUR 2 WEEK FOLLOW UP HAS BEEN SCHEDULED; IF YOU WISH TO CHANGE THE FOLLOW UP, PLEASE CALL THE SPINE [facility] AT [facility]: [phone]    Lumbar Radiofrequency Ablation   WHAT YOU NEED TO KNOW:   Lumbar radiofrequency ablation (RFA) is a procedure used to treat facet joint pain in your lower back  Facet joints are found at the back of each vertebra  A needle electrode is used to send electrical currents to the nerves in your facet joint  The electrical currents create heat that damages the nerve so it cannot send pain signals  DISCHARGE INSTRUCTIONS:   Seek care immediately if:   You cannot move your leg  You cannot control your urine or bowel movements  You have severe pain in your lower back  Call your doctor if:   You have leg weakness  You develop new symptoms  You have questions or concerns about your condition or care  Medicines:   Pain medicine  may be given  Ask how to take this medicine safely  Take your medicine as directed  Contact your healthcare provider if you think your medicine is not helping or if you have side effects  Tell your provider if you are allergic to any medicine  Keep a list of the medicines, vitamins, and herbs you take  Include the amounts, and when and why you take them  Bring the list or the pill bottles to follow-up visits  Carry your medicine list with you in case of an emergency  Activity:  Do not drive a car or operate machinery within 24 hours after your procedure  Ask your healthcare provider about any other activities you should avoid  Follow up with your doctor as directed:  Write down your questions so you remember to ask them during your visits  © Copyright [employer] 2022 Information is for End User's use only and may not be sold, redistributed or otherwise used for commercial purposes  The above information is an  only  It is not intended as medical advice for individual conditions or treatments   Talk to your doctor, nurse or pharmacist before following any medical regimen to see if it is safe and effective for you

## 2023-04-04 ENCOUNTER — HOSPITAL ENCOUNTER (OUTPATIENT)
Facility: HOSPITAL | Age: 48
Setting detail: OUTPATIENT SURGERY
Discharge: HOME | End: 2023-04-04
Attending: ANESTHESIOLOGY | Admitting: ANESTHESIOLOGY

## 2023-04-04 ENCOUNTER — TELEPHONE (OUTPATIENT)
Dept: RADIOLOGY | Facility: CLINIC | Age: 48
End: 2023-04-04

## 2023-04-04 ENCOUNTER — APPOINTMENT (OUTPATIENT)
Dept: RADIOLOGY | Facility: HOSPITAL | Age: 48
End: 2023-04-04

## 2023-04-04 VITALS
OXYGEN SATURATION: 96 % | HEART RATE: 96 BPM | RESPIRATION RATE: 18 BRPM | DIASTOLIC BLOOD PRESSURE: 56 MMHG | SYSTOLIC BLOOD PRESSURE: 119 MMHG | BODY MASS INDEX: 47.48 KG/M2 | HEIGHT: 62 IN | TEMPERATURE: 98.9 F | WEIGHT: 258 LBS

## 2023-04-04 LAB
EXT PREGNANCY TEST URINE: NEGATIVE
EXT. CONTROL: NORMAL
GLUCOSE SERPL-MCNC: 143 MG/DL (ref 65–140)

## 2023-04-04 RX ORDER — METHYLPREDNISOLONE ACETATE 80 MG/ML
INJECTION, SUSPENSION INTRA-ARTICULAR; INTRALESIONAL; INTRAMUSCULAR; SOFT TISSUE AS NEEDED
Status: DISCONTINUED | OUTPATIENT
Start: 2023-04-04 | End: 2023-04-04 | Stop reason: HOSPADM

## 2023-04-04 RX ORDER — BUPIVACAINE HYDROCHLORIDE 2.5 MG/ML
INJECTION, SOLUTION EPIDURAL; INFILTRATION; INTRACAUDAL AS NEEDED
Status: DISCONTINUED | OUTPATIENT
Start: 2023-04-04 | End: 2023-04-04 | Stop reason: HOSPADM

## 2023-04-04 RX ORDER — LIDOCAINE HYDROCHLORIDE 20 MG/ML
INJECTION, SOLUTION EPIDURAL; INFILTRATION; INTRACAUDAL; PERINEURAL AS NEEDED
Status: DISCONTINUED | OUTPATIENT
Start: 2023-04-04 | End: 2023-04-04 | Stop reason: HOSPADM

## 2023-04-04 RX ORDER — LIDOCAINE HYDROCHLORIDE 10 MG/ML
INJECTION, SOLUTION EPIDURAL; INFILTRATION; INTRACAUDAL; PERINEURAL AS NEEDED
Status: DISCONTINUED | OUTPATIENT
Start: 2023-04-04 | End: 2023-04-04 | Stop reason: HOSPADM

## 2023-04-04 NOTE — PROCEDURES
Pre-procedure Diagnosis: Lumbar facet arthropathy  Post-procedure Diagnosis: Lumbar facet arthropathy  Operation Title(s):  1  Radiofrequency ablation of [BILATERAL] L3, L4, L5 medial branch nerves      2  Intraoperative fluoroscopy  Attending Surgeon:   Everett Lloyd MD  Anesthesia:   Local    Indications: The patient is a 50y o  year-old female with a diagnosis of lumbar facet arthropathy  The patient's history and physical exam were reviewed  The patient has previously undergone diagnostic and confirmatory lumbar medial branch blocks  Fluoroscopy is being used for the precise placement of the needles at the lumbar medial branch nerves  The risks, benefits and alternatives to the procedure were discussed, and all questions were answered to the patient's satisfaction  The patient agreed to proceed, and written informed consent was obtained  Procedure in Detail: The patient was brought into the procedure room and placed in the prone position on the fluoroscopy table  The low back and upper buttock were prepped with chloraprep times two and draped in a sterile manner  AP fluoroscopy was used to identify the lumbar levels on the [LEFT] side  The fluoroscope beam was then obliqued to better visualize the junction of the superior articular process and transverse process on the [LEFT] side and then tilted caudally about 25 degrees  An 18-gauge, 150mm length, 10mm curved active tip radiofrequency probe was advanced toward the targeted points until bone was contacted  Multiple fluoroscopic views were made to ensure placement of the needle tip at the appropriate location of the medial branch nerve  Motor stimulation was performed at 2 Hz and 1 2 volts generating a twitch in the paraspinal muscles with no motor activity in the lower extremities  Next, AP fluoroscopy was used to identify the L5-S1 level  The fluoroscope been was tilted cephalad to visualize the sacral ala   The fluoroscope beam was then tilted about 45 degrees from that point and the skin and subcutaneous tissues in these identified areas were anesthetized with 1% lidocaine  An 18-gauge, 150mm length, 10mm curved active tip radiofrequency probe was advanced toward the targeted points until bone was contacted  Motor stimulation was performed at 2 Hz and 1 2 volts generating a twitch in the paraspinal muscles with no motor activity in the lower extremities  0 5ml of 2% lidocaine was injected prior to lesioning, which was performed for 90 seconds at 80 degrees centigrade  The lesion was repeated once more after slight repositioning of the needles on an oblique view  Once the lesions were complete, 1ml of a solution consisting of 5mL 0 25% bupivacaine and 1 mL Depo-medrol (80mg/mL) was injected through each needle and then removed with a 1% lidocaine flush  The patient's back was cleaned, and bandages were placed at the needle insertion site  The same procedure was repeated at the lumbar levels on the [RIGHT] side    Disposition: The patient tolerated the procedure well and there were no apparent complications  Vital signs remained stable throughout the procedure  The patient was taken to the recovery area where written discharge instructions for the procedure were given      Estimated Blood Loss: None  Specimens Obtained: N/A

## 2023-04-04 NOTE — INTERVAL H&P NOTE
H&P reviewed  After examining the patient I find no changes in the patients condition since the H&P had been written      Vitals:    04/04/23 1002   BP: 154/98   Pulse: 101   Resp: 18   Temp: 98 6 °F (37 °C)   SpO2: 94%

## 2023-04-04 NOTE — OP NOTE
OPERATIVE REPORT  PATIENT NAME: Cristy Garcia    :  1975  MRN: [id number]  Pt Location:  GI ROOM 01    SURGERY DATE: 2023    Surgeon(s) and Role: Yusuf Patel MD - Primary    Preop Diagnosis:  Lumbar spondylosis [M47 816]    Post-Op Diagnosis Codes:     * Lumbar spondylosis [M47 816]    Procedure(s):  Bilateral - RHIZOTOMY LUMBAR L3  L4  L5 medial branch nerves    Specimen(s):  * No specimens in log *    Estimated Blood Loss:   Minimal    Drains:  * No LDAs found *    Anesthesia Type:   Local    Operative Indications:  Lumbar spondylosis [M47 816]    Operative Findings:  Bilateral L3, L4, L5 medial branch nerve regions identified under fluoroscopic guidance   Appropriate motor testing performed prior to radiofrequency lesioning of medial branch nerve regions    Complications:   None    Procedure and Technique:  Please see detailed procedure note    I was present for the entire procedure    Patient Disposition:  PACU     SIGNATURE: Solomon Prader, MD  DATE: 2023  TIME: 11:07 AM

## 2023-04-06 ENCOUNTER — TELEMEDICINE (OUTPATIENT)
Dept: BEHAVIORAL/MENTAL HEALTH CLINIC | Facility: CLINIC | Age: 48
End: 2023-04-06

## 2023-04-06 DIAGNOSIS — F33.9 MAJOR DEPRESSION, RECURRENT, CHRONIC (HCC): Primary | ICD-10-CM

## 2023-04-06 NOTE — PSYCH
"Outpatient Behavioral Health Psychotherapy Treatment Plan    Cristy Garcia  1975     Date of Initial Psychotherapy Assessment: 08/30/20  Date of Current Treatment Plan: 04/06/23  Treatment Plan Target Date: 04/06/24  Treatment Plan Expiration Date: 10/06/23    Diagnosis:   1  Major depression, recurrent, chronic (HCC)            Area(s) of Need: The Client has a long history of depression and anxiety, which is effecting her different relationships and environments  It is hoped that The Client will achieve or maintain maximum functional capacity in performing daily activizes, taking into account both the functional capacity of the individual and those functional capacities appropriate for the individuals of the same age  Reduce or ameliorate the physical mental, behavioral, or developmental effects of an illness, condition, injury or disability  Present treatment plan will cover the next 6 months or completion of her recovery crisis plan     Long Term Goal 1 (in the client's own words): \" to make me feel better\"     Stage of Change: Action    Target Date for completion: 03/6/24     Anticipated therapeutic modalities: Supportive Therapy, Strengths Therapy,  and Cognitive behavioral Therapy will be the treatment modalities utilized during the session  People identified to complete this goal: The Client her support team and this therapist      Objective 1: (identify the means of measuring success in meeting the objective): The Client's depression score on her PHQ-9 will decrease from 17 to 8 or less (emerging)          Objective 2: (identify the means of measuring success in meeting the objective): The Client will utilize her  coping skills 3 out of 5 when presented with depression  (emerging      Long Term Goal 2 (in the client's own words):  \"It is not good to be anxious all the time\"     Stage of Change: Action    Target Date for completion:  03/6/24  Supportive Therapy, Strengths Therapy,  and Cognitive " "behavioral Therapy will be the treatment modalities utilized during the session  Anticipated therapeutic modalities: The Client her support team and this therapist     People identified to complete this goal: the client her support team and this therapist      Objective 1: (identify the means of measuring success in meeting the objective): The Client will become more aware of her anxiety when presented 3 out of 5 times(emerging)         Objective 2: (identify the means of measuring success in meeting the objective): The Client will utilize her coping skill 3 out of 5 times when presented with anxiety  (emerging)     Long Term Goal 3 (in the client's own words): \" Attending all of my medical appointment makes me healthier\"     Stage of Change: Action    Target Date for completion:  03/6/24     Anticipated therapeutic modalities: Supportive Therapy, Strengths Therapy,  and Cognitive behavioral Therapy will be the treatment modalities utilized during the session  People identified to complete this goal: The Client her support team and this therapist      Objective 1: (identify the means of measuring success in meeting the objective): The Client will attend all of her medical appointments 100% of the time               I am currently under the care of a [facility] psychiatric provider: No Just PCP for Medication management     My [facility] psychiatric provider is: Rose Mary PEREZ/Medication management/ Lynette OSWALD LCSW at [place] [id number]    I am currently taking psychiatric medications: Yes, as prescribed    I feel that I will be ready for discharge from mental health care when I reach the following (measurable goal/objective): \" I don't know\"    For children and adults who have a legal guardian:   Has there been any change to custody orders and/or guardianship status? n/a  If yes, attach updated documentation      I have created my Crisis Plan and have been offered " a copy of this plan    [address]: Diagnosis and Treatment Plan explained to Tisha acknowledges an understanding of their diagnosis  Don Shanika agrees to this treatment plan      I have been offered a copy of this Treatment Plan  yes

## 2023-04-06 NOTE — PSYCH
Treatment Plan Tracking    9th Treatment Plan not completed within required time limits due to: appointment could not be scheduled during the needed time  Haven Rehman

## 2023-04-06 NOTE — PSYCH
"Behavioral Health Psychotherapy Progress Note    Psychotherapy Provided: Individual Psychotherapy     1  Major depression, recurrent, chronic (HCC)            Goals addressed in session: Goal 1     DATA: The client reported that she had medical treatment \" to burn the nerves on my back\" She reported continued issues within her home as team we explored and processed healthy boundaries as well as created her recovery treatment plan and crisis plan   During this session, this clinician used the following therapeutic modalities: Cognitive Behavioral Therapy, Motivational Interviewing and Supportive Psychotherapy    Substance Abuse was not addressed during this session  If the client is diagnosed with a co-occurring substance use disorder, please indicate any changes in the frequency or amount of use: N/A Stage of change for addressing substance use diagnoses: No substance use/Not applicable    ASSESSMENT:  Brisa Strong presents with a Anxious mood  her affect is Normal range and intensity, which is congruent, with her mood and the content of the session  The client has made progress on their goals  Brisa Strong presents with a none risk of suicide, none risk of self-harm, and none risk of harm to others  For any risk assessment that surpasses a \"low\" rating, a safety plan must be developed  A safety plan was indicated: No  If yes, describe in detail N/A    PLAN: Between sessions, Brisa Strong will utilize   At the next session, the therapist will use Cognitive Behavioral Therapy and Supportive Psychotherapy to address her anxiety and depression    Behavioral Health Treatment Plan and Discharge Planning: Brisa Strong is aware of and agrees to continue to work on their treatment plan  They have identified and are working toward their discharge goals   yes    Visit start and stop times: 2:10 Pm to 3:00 Pm     04/06/23     "

## 2023-04-12 ENCOUNTER — ANESTHESIA EVENT (OUTPATIENT)
Dept: PERIOP | Facility: HOSPITAL | Age: 48
End: 2023-04-12

## 2023-04-17 NOTE — H&P (VIEW-ONLY)
[facility] - Outpatient Follow-up Note  Cristy Garcia 50 y o  female MRN: [id number]  Encounter: [id number]    ASSESSMENT AND PLAN:      1  Heartburn  2  NSAID long-term use  3  Dysphagia, unspecified type    Patient notes breakthrough upper GI symptoms despite daily PPI therapy, as well as intermittent dysphagia, primarily to solids  She has required dilations in the past   At last office visit in 2023, an upper endoscopy was ordered though this was not completed due to transportation issues  Given symptoms have persisted, do recommend endoscopic evaluation to evaluate for mucosal pathology at this time  Recommend biopsies for EOE as well  May be a component of esophageal dysmotility  Continue with diet and lifestyle modifications for GERD, encourage limiting use of celecoxib  Consider escalation of PPI therapy versus addition of nightly H2B in future  Risks associated with endoscopic evaluation discussed with patient today, including but not limited to bleeding, infection, perforation, and organ injury, all of which are low  Pt demonstrates understanding and is agreeable to the plan  - omeprazole (PriLOSEC) 20 mg delayed release capsule; Take 1 capsule (20 mg total) by mouth daily  Dispense: 90 capsule; Refill: 1    4  Hepatic steatosis  5  Elevated LFTs    Patient has a transient bump in her LFTs, though this has resolved  She does have hepatic steatosis, and given metabolic comorbidities suspect MAFLD  Elastography demonstrated S3, F0 to F1  Testing for viral hepatitis was negative  Discussed recommendations in regards to fatty liver includin  Strict control of contributing comorbidities (obesity, prediabetes/diabetes, hypertension, and hypertriglyceridemia)    2  Weight loss of approx 10-15% of patient's current body weight over a period of 6-12 months through low fat diet and cardiovascular exercise as tolerated - pt is on trulicity for DM2 3  Limiting alcohol consumption, preferably complete abstinence  4  Monitor hepatic function every 6 months with routine labs  5  Vaccinations for Hepatitis A and B through primary care     6  Screening for colon cancer    Pt average risk, no family hx of CRC  Negative cologuard in 12/2022  Repeat stool testing due in 12/2025, vs consideration of colonoscopy at that time  We will follow up after endoscopic evaluation  ______________________________________________________________________    SUBJECTIVE: Patient is a 50 y o  female who presents today for follow-up regarding hepatic steatosis  Past medical history significant for DM2, GERD, hepatic steatosis, asthma, chronic pain disorder, fibromyalgia, MDD  Pt was initially evaluated in 01/2023 for elevated LFTs  She had a history of fatty liver disease  She was also complaining of breakthrough heartburn despite daily PPI therapy  She underwent an ultrasound elastography which demonstrated minimal fibrosis  She was scheduled for an upper endoscopy though this does not appear to have been completed  04/17/23:     Patient shares she was unable to schedule her upper endoscopy due to transportation issues  She continues to have intermittent dysphagia to solids at the level of her neck  She will start to cough postprandially  She is also having intermittent breakthrough reflux and regurgitation despite daily PPI  She endorses intermittent nausea though no emesis  She thinks her GI symptoms have worsened since starting Trulicity  She notes weight loss of approximately 13 pounds since starting Trulicity  She continues to take celebrex daily for MSK pain  Pertaining to bowels, she denies any significant abdominal pain or rectal pain related to defecation  No BRBPR or melenic stool      NSAIDs: celebrex daily   Acetaminophen: none   Etoh: none     12/2022: negative cologuard, TSAT 21, TIBC 439, iron 90, ferritin 37  09/2022: Hb 16 4, Hct 47 1, Plt 287, MCV 88, BUN 12, Cr 0 75, AST 50, ALT 91, ALP 49, albumin 3 9, t bili 0 80, TSH 0 857  07/2021: AST 31, ALT 74, ALP 44, albumin 3 5, t bili 0 48, Hep C abs non-reactive    01/2023: Hep B surface Ag non-reactive, Hep B surface Abs < 3 10, Hep B Core total Abs non-reactive, Hep A total AB non-reactive, Hep C Ab non-reactive, INR 0 92, AST 22, ALT 33, ALP 39, albumin 4 5, T  bili 0 6  01/2023: Elastography: F0-F1, S3    Review of Systems   Per HPI    Historical Information   Past Medical History:   Diagnosis Date   • Allergic     Seasonal Allergies   • Alopecia of scalp     Treated with Proscar   • Asthma    • Chronic pain disorder     back, bilat knees, bilat hips   • CPAP (continuous positive airway pressure) dependence    • Depression    • Diabetes mellitus (HCC)    • Fibromyalgia, primary    • GERD (gastroesophageal reflux disease)    • Hip fx, left, closed, with routine healing, subsequent encounter 05/2017   • Hypertension    • Insomnia    • Irregular heartbeat    • Obesity    • VICTOR HUGO on CPAP    • Shortness of breath    • Sleep apnea      Past Surgical History:   Procedure Laterality Date   • CARPAL TUNNEL RELEASE Left    • LUMBAR EPIDURAL INJECTION     • NERVE BLOCK Bilateral 12/29/2020    Procedure: L2 L3 L4 L5 MEDIAL BRANCH BLOCK #1;  Surgeon: Antonio Dukes MD;  Location:  [facility];  Service: Pain Management    • NERVE BLOCK Bilateral 1/19/2021    Procedure: L2 L3 L4 L5 MEDIAL BRANCH BLOCK #2;  Surgeon: Antonio Dukes MD;  [facility]: [facility];  Service: Pain Management    • DE ARTHROCENTESIS ASPIR&/INJ MAJOR JT/BURSA W/O US Bilateral 2/7/2023    Procedure: INJECTION JOINT HIP;  Surgeon: Antonio Dukes MD;  Location: [facility];  Service: Pain Management    • RADIOFREQUENCY ABLATION Right 2/25/2021    Procedure: L2 L3 L4 L5 RADIO FREQUENCY ABLATION right side;  Surgeon: Antonio Dukes MD;  Location:  [facility];  Service: Pain Management    • RADIOFREQUENCY ABLATION Left 3/16/2021    Procedure: L2 L3 L4 L5 RADIO FREQUENCY ABLATION;  Surgeon: Marybel Baron MD;  [facility]: [facility];  Service: Pain Management    • RHIZOTOMY Bilateral 4/4/2023    Procedure: RHIZOTOMY LUMBAR L3, L4, L5 medial branch nerves;  Surgeon: Marybel Baron MD;  Location: [facility];  Service: Pain Management    • UPPER GASTROINTESTINAL ENDOSCOPY       Social History   Social History     Substance and Sexual Activity   Alcohol Use No     Social History     Substance and Sexual Activity   Drug Use No     Social History     Tobacco Use   Smoking Status Former   Smokeless Tobacco Never     Family History   Problem Relation Age of Onset   • Cancer Father    • Hepatitis Father    • Asthma Cousin    • Hypertension Paternal Grandmother    • No Known Problems Mother    • No Known Problems Sister    • No Known Problems Maternal Grandmother    • No Known Problems Maternal Grandfather    • No Known Problems Paternal Grandfather    • No Known Problems Paternal Aunt    • BRCA2 Positive Neg Hx    • BRCA2 Negative Neg Hx    • BRCA1 Positive Neg Hx    • BRCA1 Negative Neg Hx    • BRCA 1/2 Neg Hx    • Ovarian cancer Neg Hx    • Endometrial cancer Neg Hx    • Colon cancer Neg Hx    • Breast cancer additional onset Neg Hx    • Breast cancer Neg Hx        Meds/Allergies       Current Outpatient Medications:   •  albuterol (Ventolin HFA) 90 mcg/act inhaler  •  atorvastatin (LIPITOR) 20 mg tablet  •  celecoxib (CeleBREX) 100 mg capsule  •  dulaglutide (Trulicity) 0 50 HI/7 2LG injection  •  ergocalciferol (VITAMIN D2) 50,000 units  •  Fluticasone-Salmeterol (Advair Diskus) 250-50 mcg/dose inhaler  •  gabapentin (NEURONTIN) 300 mg capsule  •  glipiZIDE (GLUCOTROL) 10 mg tablet  •  glucose blood test strip  •  Lancets (onetouch ultrasoft) lancets  •  lisinopril (ZESTRIL) 10 mg tablet  •  metFORMIN (GLUCOPHAGE) 1000 MG tablet  •  omeprazole (PriLOSEC) 20 mg delayed release capsule  •  sertraline (ZOLOFT) 50 mg tablet  •  triamcinolone (KENALOG) 0 025 % cream    Allergies   Allergen Reactions   • Tdap [Tetanus-Diphth-Acell Pertussis] Rash     Objective     There were no vitals taken for this visit  There is no height or weight on file to calculate BMI  Physical Exam  Vitals and nursing note reviewed  Constitutional:       Appearance: Normal appearance  She is obese  HENT:      Head: Normocephalic and atraumatic  Eyes:      General: No scleral icterus  Conjunctiva/sclera: Conjunctivae normal    Cardiovascular:      Pulses: Normal pulses  Heart sounds: No murmur heard  Pulmonary:      Effort: Pulmonary effort is normal  No respiratory distress  Abdominal:      General: Bowel sounds are normal  There is no distension  Palpations: Abdomen is soft  Tenderness: There is no abdominal tenderness  There is no guarding or rebound  Skin:     General: Skin is warm and dry  Coloration: Skin is not jaundiced  Neurological:      General: No focal deficit present  Mental Status: She is alert and oriented to person, place, and time  Psychiatric:         Mood and Affect: Mood normal          Behavior: Behavior normal        Lab Results:   No visits with results within 1 Day(s) from this visit  Latest known visit with results is:   Admission on 04/04/2023, Discharged on 04/04/2023   Component Date Value   • EXT Preg Test, Ur 04/04/2023 Negative    • Control 04/04/2023 Valid    • POC Glucose 04/04/2023 143 (H)      Radiology Results:   FL spine and pain procedure    Result Date: 4/4/2023  Narrative: A spine and pain study was performed by the Department of Spine and Pain  Please refer to the report for the official interpretation  The images are stored for archival purposes only  Study images were not formally reviewed by the Radiology Department  Samaria Sen PA-C    **Please note:  Dictation voice to text software may have been used in the creation of this record    Occasional wrong word or “sound alike” substitutions may have occurred due to the inherent limitations of voice recognition software  Read the chart carefully and recognize, using context, where substitutions have occurred  **

## 2023-04-17 NOTE — H&P (VIEW-ONLY)
[facility] - Outpatient Follow-up Note  Cristy Garcia 50 y o  female MRN: [id number]  Encounter: [id number]    ASSESSMENT AND PLAN:      1  Heartburn  2  NSAID long-term use  3  Dysphagia, unspecified type    Patient notes breakthrough upper GI symptoms despite daily PPI therapy, as well as intermittent dysphagia, primarily to solids  She has required dilations in the past   At last office visit in 2023, an upper endoscopy was ordered though this was not completed due to transportation issues  Given symptoms have persisted, do recommend endoscopic evaluation to evaluate for mucosal pathology at this time  Recommend biopsies for EOE as well  May be a component of esophageal dysmotility  Continue with diet and lifestyle modifications for GERD, encourage limiting use of celecoxib  Consider escalation of PPI therapy versus addition of nightly H2B in future  Risks associated with endoscopic evaluation discussed with patient today, including but not limited to bleeding, infection, perforation, and organ injury, all of which are low  Pt demonstrates understanding and is agreeable to the plan  - omeprazole (PriLOSEC) 20 mg delayed release capsule; Take 1 capsule (20 mg total) by mouth daily  Dispense: 90 capsule; Refill: 1    4  Hepatic steatosis  5  Elevated LFTs    Patient has a transient bump in her LFTs, though this has resolved  She does have hepatic steatosis, and given metabolic comorbidities suspect MAFLD  Elastography demonstrated S3, F0 to F1  Testing for viral hepatitis was negative  Discussed recommendations in regards to fatty liver includin  Strict control of contributing comorbidities (obesity, prediabetes/diabetes, hypertension, and hypertriglyceridemia)    2  Weight loss of approx 10-15% of patient's current body weight over a period of 6-12 months through low fat diet and cardiovascular exercise as tolerated - pt is on trulicity for DM2 3  Limiting alcohol consumption, preferably complete abstinence  4  Monitor hepatic function every 6 months with routine labs  5  Vaccinations for Hepatitis A and B through primary care     6  Screening for colon cancer    Pt average risk, no family hx of CRC  Negative cologuard in 12/2022  Repeat stool testing due in 12/2025, vs consideration of colonoscopy at that time  We will follow up after endoscopic evaluation  ______________________________________________________________________    SUBJECTIVE: Patient is a 50 y o  female who presents today for follow-up regarding hepatic steatosis  Past medical history significant for DM2, GERD, hepatic steatosis, asthma, chronic pain disorder, fibromyalgia, MDD  Pt was initially evaluated in 01/2023 for elevated LFTs  She had a history of fatty liver disease  She was also complaining of breakthrough heartburn despite daily PPI therapy  She underwent an ultrasound elastography which demonstrated minimal fibrosis  She was scheduled for an upper endoscopy though this does not appear to have been completed  04/17/23:     Patient shares she was unable to schedule her upper endoscopy due to transportation issues  She continues to have intermittent dysphagia to solids at the level of her neck  She will start to cough postprandially  She is also having intermittent breakthrough reflux and regurgitation despite daily PPI  She endorses intermittent nausea though no emesis  She thinks her GI symptoms have worsened since starting Trulicity  She notes weight loss of approximately 13 pounds since starting Trulicity  She continues to take celebrex daily for MSK pain  Pertaining to bowels, she denies any significant abdominal pain or rectal pain related to defecation  No BRBPR or melenic stool      NSAIDs: celebrex daily   Acetaminophen: none   Etoh: none     12/2022: negative cologuard, TSAT 21, TIBC 439, iron 90, ferritin 37  09/2022: Hb 16 4, Hct 47 1, Plt 287, MCV 88, BUN 12, Cr 0 75, AST 50, ALT 91, ALP 49, albumin 3 9, t bili 0 80, TSH 0 857  07/2021: AST 31, ALT 74, ALP 44, albumin 3 5, t bili 0 48, Hep C abs non-reactive    01/2023: Hep B surface Ag non-reactive, Hep B surface Abs < 3 10, Hep B Core total Abs non-reactive, Hep A total AB non-reactive, Hep C Ab non-reactive, INR 0 92, AST 22, ALT 33, ALP 39, albumin 4 5, T  bili 0 6  01/2023: Elastography: F0-F1, S3    Review of Systems   Per HPI    Historical Information   Past Medical History:   Diagnosis Date   • Allergic     Seasonal Allergies   • Alopecia of scalp     Treated with Proscar   • Asthma    • Chronic pain disorder     back, bilat knees, bilat hips   • CPAP (continuous positive airway pressure) dependence    • Depression    • Diabetes mellitus (HCC)    • Fibromyalgia, primary    • GERD (gastroesophageal reflux disease)    • Hip fx, left, closed, with routine healing, subsequent encounter 05/2017   • Hypertension    • Insomnia    • Irregular heartbeat    • Obesity    • VICTOR HUGO on CPAP    • Shortness of breath    • Sleep apnea      Past Surgical History:   Procedure Laterality Date   • CARPAL TUNNEL RELEASE Left    • LUMBAR EPIDURAL INJECTION     • NERVE BLOCK Bilateral 12/29/2020    Procedure: L2 L3 L4 L5 MEDIAL BRANCH BLOCK #1;  Surgeon: Ralph Negrete MD;  Location:  [facility];  Service: Pain Management    • NERVE BLOCK Bilateral 1/19/2021    Procedure: L2 L3 L4 L5 MEDIAL BRANCH BLOCK #2;  Surgeon: Ralph Negrete MD;  [facility];  Service: Pain Management    • MI ARTHROCENTESIS ASPIR&/INJ MAJOR JT/BURSA W/O US Bilateral 2/7/2023    Procedure: INJECTION JOINT HIP;  Surgeon: Ralph Negrete MD;  [facility];  Service: Pain Management    • RADIOFREQUENCY ABLATION Right 2/25/2021    Procedure: L2 L3 L4 L5 RADIO FREQUENCY ABLATION right side;  Surgeon: Ralph Negrete MD;  Location:  [facility];  Service: Pain Management    • RADIOFREQUENCY ABLATION Left 3/16/2021    Procedure: L2 L3 L4 L5 RADIO FREQUENCY ABLATION;  Surgeon: Landry Meyers MD;  [facility];  Service: Pain Management    • RHIZOTOMY Bilateral 4/4/2023    Procedure: RHIZOTOMY LUMBAR L3, L4, L5 medial branch nerves;  Surgeon: Landry Meyers MD;  [facility];  Service: Pain Management    • UPPER GASTROINTESTINAL ENDOSCOPY       Social History   Social History     Substance and Sexual Activity   Alcohol Use No     Social History     Substance and Sexual Activity   Drug Use No     Social History     Tobacco Use   Smoking Status Former   Smokeless Tobacco Never     Family History   Problem Relation Age of Onset   • Cancer Father    • Hepatitis Father    • Asthma Cousin    • Hypertension Paternal Grandmother    • No Known Problems Mother    • No Known Problems Sister    • No Known Problems Maternal Grandmother    • No Known Problems Maternal Grandfather    • No Known Problems Paternal Grandfather    • No Known Problems Paternal Aunt    • BRCA2 Positive Neg Hx    • BRCA2 Negative Neg Hx    • BRCA1 Positive Neg Hx    • BRCA1 Negative Neg Hx    • BRCA 1/2 Neg Hx    • Ovarian cancer Neg Hx    • Endometrial cancer Neg Hx    • Colon cancer Neg Hx    • Breast cancer additional onset Neg Hx    • Breast cancer Neg Hx        Meds/Allergies       Current Outpatient Medications:   •  albuterol (Ventolin HFA) 90 mcg/act inhaler  •  atorvastatin (LIPITOR) 20 mg tablet  •  celecoxib (CeleBREX) 100 mg capsule  •  dulaglutide (Trulicity) 1 91 UU/8 6RW injection  •  ergocalciferol (VITAMIN D2) 50,000 units  •  Fluticasone-Salmeterol (Advair Diskus) 250-50 mcg/dose inhaler  •  gabapentin (NEURONTIN) 300 mg capsule  •  glipiZIDE (GLUCOTROL) 10 mg tablet  •  glucose blood test strip  •  Lancets (onetouch ultrasoft) lancets  •  lisinopril (ZESTRIL) 10 mg tablet  •  metFORMIN (GLUCOPHAGE) 1000 MG tablet  •  omeprazole (PriLOSEC) 20 mg delayed release capsule  •  sertraline (ZOLOFT) 50 mg tablet  •  triamcinolone (KENALOG) 0 025 % cream    Allergies   Allergen Reactions   • Tdap [Tetanus-Diphth-Acell Pertussis] Rash     Objective     There were no vitals taken for this visit  There is no height or weight on file to calculate BMI  Physical Exam  Vitals and nursing note reviewed  Constitutional:       Appearance: Normal appearance  She is obese  HENT:      Head: Normocephalic and atraumatic  Eyes:      General: No scleral icterus  Conjunctiva/sclera: Conjunctivae normal    Cardiovascular:      Pulses: Normal pulses  Heart sounds: No murmur heard  Pulmonary:      Effort: Pulmonary effort is normal  No respiratory distress  Abdominal:      General: Bowel sounds are normal  There is no distension  Palpations: Abdomen is soft  Tenderness: There is no abdominal tenderness  There is no guarding or rebound  Skin:     General: Skin is warm and dry  Coloration: Skin is not jaundiced  Neurological:      General: No focal deficit present  Mental Status: She is alert and oriented to person, place, and time  Psychiatric:         Mood and Affect: Mood normal          Behavior: Behavior normal        Lab Results:   No visits with results within 1 Day(s) from this visit  Latest known visit with results is:   Admission on 04/04/2023, Discharged on 04/04/2023   Component Date Value   • EXT Preg Test, Ur 04/04/2023 Negative    • Control 04/04/2023 Valid    • POC Glucose 04/04/2023 143 (H)      Radiology Results:   FL spine and pain procedure    Result Date: 4/4/2023  Narrative: A spine and pain study was performed by the Department of Spine and Pain  Please refer to the report for the official interpretation  The images are stored for archival purposes only  Study images were not formally reviewed by the Radiology Department  Shiva Cannon PA-C    **Please note:  Dictation voice to text software may have been used in the creation of this record    Occasional wrong word or “sound alike” substitutions may have occurred due to the inherent limitations of voice recognition software  Read the chart carefully and recognize, using context, where substitutions have occurred  **

## 2023-04-18 NOTE — PRE-PROCEDURE INSTRUCTIONS
Pre-Surgery Instructions:   Medication Instructions   • albuterol (Ventolin HFA) 90 mcg/act inhaler Uses PRN- OK to take day of surgery   • atorvastatin (LIPITOR) 20 mg tablet Take night before surgery   • celecoxib (CeleBREX) 100 mg capsule Stop taking 3 days prior to surgery  • dulaglutide (Trulicity) 4 86 EC/9 1WA injection Takes Saturdays-will not take DOS   • ergocalciferol (VITAMIN D2) 50,000 units Takes Saturdays-will not take DOS   • Fluticasone-Salmeterol (Advair Diskus) 250-50 mcg/dose inhaler Take day of surgery  • gabapentin (NEURONTIN) 300 mg capsule Take day of surgery  • lisinopril (ZESTRIL) 10 mg tablet Hold day of surgery  • metFORMIN (GLUCOPHAGE) 1000 MG tablet Hold day of surgery  • omeprazole (PriLOSEC) 20 mg delayed release capsule Hold day of surgery  • sertraline (ZOLOFT) 50 mg tablet Take night before surgery   • triamcinolone (KENALOG) 0 025 % cream Hold day of surgery  Medication instructions for day surgery reviewed  Please use only a sip of water to take your instructed medications  Avoid all over the counter vitamins, supplements and NSAIDS for one week prior to surgery per anesthesia guidelines  Tylenol is ok to take as needed  You will receive a call one business day prior to surgery with an arrival time and hospital directions  If your surgery is scheduled on a Monday, the hospital will be calling you on the Friday prior to your surgery  If you have not heard from anyone by 8pm, please call the hospital supervisor through the hospital  at [phone]  Donald Salazar [phone])  Do not eat or drink anything after midnight the night before your surgery, including candy, mints, lifesavers, or chewing gum  Do not drink alcohol 24hrs before your surgery  Try not to smoke at least 24hrs before your surgery  Follow the pre surgery showering instructions as listed in the [facility] Surgical Experience Booklet” or otherwise provided by your surgeon's office   Do not shave the surgical area 24 hours before surgery  Do not apply any lotions, creams, including makeup, cologne, deodorant, or perfumes after showering on the day of your surgery  No contact lenses, eye make-up, or artificial eyelashes  Remove nail polish, including gel polish, and any artificial, gel, or acrylic nails if possible  Remove all jewelry including rings and body piercing jewelry  Wear causal clothing that is easy to take on and off  Consider your type of surgery  Keep any valuables, jewelry, piercings at home  Please bring any specially ordered equipment (sling, braces) if indicated  Arrange for a responsible person to drive you to and from the hospital on the day of your surgery  Visitor Guidelines discussed  Call the surgeon's office with any new illnesses, exposures, or additional questions prior to surgery  Please reference your [facility] Surgical Experience Booklet” for additional information to prepare for your upcoming surgery

## 2023-04-20 ENCOUNTER — APPOINTMENT (OUTPATIENT)
Dept: LAB | Facility: HOSPITAL | Age: 48
End: 2023-04-20

## 2023-04-20 DIAGNOSIS — E11.65 TYPE 2 DIABETES MELLITUS WITH HYPERGLYCEMIA, WITHOUT LONG-TERM CURRENT USE OF INSULIN (HCC): ICD-10-CM

## 2023-04-20 DIAGNOSIS — Z13.0 SCREENING FOR DEFICIENCY ANEMIA: ICD-10-CM

## 2023-04-20 DIAGNOSIS — E78.49 OTHER HYPERLIPIDEMIA: ICD-10-CM

## 2023-04-20 DIAGNOSIS — M25.40 SWELLING OF JOINT: ICD-10-CM

## 2023-04-20 DIAGNOSIS — E55.9 VITAMIN D DEFICIENCY: ICD-10-CM

## 2023-04-20 DIAGNOSIS — Z01.812 ENCOUNTER FOR PRE-OPERATIVE LABORATORY TESTING: ICD-10-CM

## 2023-04-20 DIAGNOSIS — Z13.29 SCREENING FOR THYROID DISORDER: ICD-10-CM

## 2023-04-20 DIAGNOSIS — I10 ESSENTIAL HYPERTENSION: ICD-10-CM

## 2023-04-20 DIAGNOSIS — M25.50 POLYARTHRALGIA: ICD-10-CM

## 2023-04-20 DIAGNOSIS — E53.8 VITAMIN B12 DEFICIENCY: ICD-10-CM

## 2023-04-20 LAB
25(OH)D3 SERPL-MCNC: 66.6 NG/ML (ref 30–100)
ALBUMIN SERPL BCP-MCNC: 4 G/DL (ref 3.5–5)
ALP SERPL-CCNC: 39 U/L (ref 34–104)
ALT SERPL W P-5'-P-CCNC: 38 U/L (ref 7–52)
ANION GAP SERPL CALCULATED.3IONS-SCNC: 7 MMOL/L (ref 4–13)
AST SERPL W P-5'-P-CCNC: 20 U/L (ref 13–39)
B BURGDOR IGG+IGM SER-ACNC: <0.2 AI
BASOPHILS # BLD AUTO: 0.08 THOUSANDS/ΜL (ref 0–0.1)
BASOPHILS NFR BLD AUTO: 1 % (ref 0–1)
BILIRUB SERPL-MCNC: 0.74 MG/DL (ref 0.2–1)
BUN SERPL-MCNC: 9 MG/DL (ref 5–25)
CALCIUM SERPL-MCNC: 8.9 MG/DL (ref 8.4–10.2)
CHLORIDE SERPL-SCNC: 105 MMOL/L (ref 96–108)
CHOLEST SERPL-MCNC: 183 MG/DL
CO2 SERPL-SCNC: 24 MMOL/L (ref 21–32)
CREAT SERPL-MCNC: 0.61 MG/DL (ref 0.6–1.3)
EOSINOPHIL # BLD AUTO: 0.19 THOUSAND/ΜL (ref 0–0.61)
EOSINOPHIL NFR BLD AUTO: 2 % (ref 0–6)
ERYTHROCYTE [DISTWIDTH] IN BLOOD BY AUTOMATED COUNT: 12.6 % (ref 11.6–15.1)
GFR SERPL CREATININE-BSD FRML MDRD: 106 ML/MIN/1.73SQ M
GLUCOSE P FAST SERPL-MCNC: 138 MG/DL (ref 65–99)
HCT VFR BLD AUTO: 46.1 % (ref 34.8–46.1)
HDLC SERPL-MCNC: 46 MG/DL
HGB BLD-MCNC: 15.2 G/DL (ref 11.5–15.4)
IMM GRANULOCYTES # BLD AUTO: 0.07 THOUSAND/UL (ref 0–0.2)
IMM GRANULOCYTES NFR BLD AUTO: 1 % (ref 0–2)
INSULIN SERPL-ACNC: 18.5 MU/L (ref 3–25)
LDLC SERPL CALC-MCNC: 106 MG/DL (ref 0–100)
LYMPHOCYTES # BLD AUTO: 2.63 THOUSANDS/ΜL (ref 0.6–4.47)
LYMPHOCYTES NFR BLD AUTO: 34 % (ref 14–44)
MCH RBC QN AUTO: 29.8 PG (ref 26.8–34.3)
MCHC RBC AUTO-ENTMCNC: 33 G/DL (ref 31.4–37.4)
MCV RBC AUTO: 90 FL (ref 82–98)
MONOCYTES # BLD AUTO: 0.46 THOUSAND/ΜL (ref 0.17–1.22)
MONOCYTES NFR BLD AUTO: 6 % (ref 4–12)
NEUTROPHILS # BLD AUTO: 4.4 THOUSANDS/ΜL (ref 1.85–7.62)
NEUTS SEG NFR BLD AUTO: 56 % (ref 43–75)
NONHDLC SERPL-MCNC: 137 MG/DL
NRBC BLD AUTO-RTO: 0 /100 WBCS
PLATELET # BLD AUTO: 280 THOUSANDS/UL (ref 149–390)
PMV BLD AUTO: 9.1 FL (ref 8.9–12.7)
POTASSIUM SERPL-SCNC: 3.8 MMOL/L (ref 3.5–5.3)
PROT SERPL-MCNC: 7 G/DL (ref 6.4–8.4)
RBC # BLD AUTO: 5.1 MILLION/UL (ref 3.81–5.12)
RHEUMATOID FACT SER QL LA: NEGATIVE
SODIUM SERPL-SCNC: 136 MMOL/L (ref 135–147)
TRIGL SERPL-MCNC: 153 MG/DL
TSH SERPL DL<=0.05 MIU/L-ACNC: 0.17 UIU/ML (ref 0.45–4.5)
VIT B12 SERPL-MCNC: 692 PG/ML (ref 100–900)
WBC # BLD AUTO: 7.83 THOUSAND/UL (ref 4.31–10.16)

## 2023-04-21 LAB
EST. AVERAGE GLUCOSE BLD GHB EST-MCNC: 148 MG/DL
HBA1C MFR BLD: 6.8 %

## 2023-04-25 ENCOUNTER — ANESTHESIA (OUTPATIENT)
Dept: PERIOP | Facility: HOSPITAL | Age: 48
End: 2023-04-25

## 2023-04-25 ENCOUNTER — HOSPITAL ENCOUNTER (OUTPATIENT)
Facility: HOSPITAL | Age: 48
Setting detail: OUTPATIENT SURGERY
Discharge: HOME | End: 2023-04-25
Attending: OBSTETRICS & GYNECOLOGY | Admitting: OBSTETRICS & GYNECOLOGY

## 2023-04-25 VITALS
HEART RATE: 74 BPM | TEMPERATURE: 97.4 F | WEIGHT: 247.14 LBS | HEIGHT: 62 IN | DIASTOLIC BLOOD PRESSURE: 82 MMHG | BODY MASS INDEX: 45.48 KG/M2 | RESPIRATION RATE: 16 BRPM | OXYGEN SATURATION: 99 % | SYSTOLIC BLOOD PRESSURE: 128 MMHG

## 2023-04-25 DIAGNOSIS — N84.0 ENDOMETRIAL POLYP: ICD-10-CM

## 2023-04-25 PROBLEM — E66.01 MORBID OBESITY WITH BODY MASS INDEX (BMI) OF 45.0 TO 49.9 IN ADULT (HCC): Status: ACTIVE | Noted: 2023-04-25

## 2023-04-25 LAB
EXT PREGNANCY TEST URINE: NEGATIVE
EXT. CONTROL: NORMAL
GLUCOSE SERPL-MCNC: 108 MG/DL (ref 65–140)
GLUCOSE SERPL-MCNC: 61 MG/DL (ref 65–140)
GLUCOSE SERPL-MCNC: 71 MG/DL (ref 65–140)

## 2023-04-25 RX ORDER — ONDANSETRON 2 MG/ML
4 INJECTION INTRAMUSCULAR; INTRAVENOUS EVERY 6 HOURS PRN
Status: DISCONTINUED | OUTPATIENT
Start: 2023-04-25 | End: 2023-04-25 | Stop reason: HOSPADM

## 2023-04-25 RX ORDER — ROCURONIUM BROMIDE 10 MG/ML
INJECTION, SOLUTION INTRAVENOUS AS NEEDED
Status: DISCONTINUED | OUTPATIENT
Start: 2023-04-25 | End: 2023-04-25

## 2023-04-25 RX ORDER — ONDANSETRON 2 MG/ML
4 INJECTION INTRAMUSCULAR; INTRAVENOUS ONCE AS NEEDED
Status: DISCONTINUED | OUTPATIENT
Start: 2023-04-25 | End: 2023-04-25 | Stop reason: HOSPADM

## 2023-04-25 RX ORDER — FENTANYL CITRATE/PF 50 MCG/ML
25 SYRINGE (ML) INJECTION
Status: DISCONTINUED | OUTPATIENT
Start: 2023-04-25 | End: 2023-04-25 | Stop reason: HOSPADM

## 2023-04-25 RX ORDER — LIDOCAINE HYDROCHLORIDE 20 MG/ML
INJECTION, SOLUTION EPIDURAL; INFILTRATION; INTRACAUDAL; PERINEURAL AS NEEDED
Status: DISCONTINUED | OUTPATIENT
Start: 2023-04-25 | End: 2023-04-25

## 2023-04-25 RX ORDER — MAGNESIUM HYDROXIDE 1200 MG/15ML
LIQUID ORAL AS NEEDED
Status: DISCONTINUED | OUTPATIENT
Start: 2023-04-25 | End: 2023-04-25 | Stop reason: HOSPADM

## 2023-04-25 RX ORDER — MIDAZOLAM HYDROCHLORIDE 2 MG/2ML
INJECTION, SOLUTION INTRAMUSCULAR; INTRAVENOUS AS NEEDED
Status: DISCONTINUED | OUTPATIENT
Start: 2023-04-25 | End: 2023-04-25

## 2023-04-25 RX ORDER — ACETAMINOPHEN 500 MG
500 TABLET ORAL EVERY 6 HOURS PRN
COMMUNITY

## 2023-04-25 RX ORDER — SODIUM CHLORIDE 9 MG/ML
125 INJECTION, SOLUTION INTRAVENOUS CONTINUOUS
Status: DISCONTINUED | OUTPATIENT
Start: 2023-04-25 | End: 2023-04-25 | Stop reason: HOSPADM

## 2023-04-25 RX ORDER — ONDANSETRON 2 MG/ML
INJECTION INTRAMUSCULAR; INTRAVENOUS AS NEEDED
Status: DISCONTINUED | OUTPATIENT
Start: 2023-04-25 | End: 2023-04-25

## 2023-04-25 RX ORDER — ACETAMINOPHEN 325 MG/1
975 TABLET ORAL EVERY 6 HOURS PRN
Status: DISCONTINUED | OUTPATIENT
Start: 2023-04-25 | End: 2023-04-25 | Stop reason: HOSPADM

## 2023-04-25 RX ORDER — FENTANYL CITRATE 50 UG/ML
INJECTION, SOLUTION INTRAMUSCULAR; INTRAVENOUS AS NEEDED
Status: DISCONTINUED | OUTPATIENT
Start: 2023-04-25 | End: 2023-04-25

## 2023-04-25 RX ORDER — PROPOFOL 10 MG/ML
INJECTION, EMULSION INTRAVENOUS AS NEEDED
Status: DISCONTINUED | OUTPATIENT
Start: 2023-04-25 | End: 2023-04-25

## 2023-04-25 RX ORDER — KETOROLAC TROMETHAMINE 30 MG/ML
INJECTION, SOLUTION INTRAMUSCULAR; INTRAVENOUS AS NEEDED
Status: DISCONTINUED | OUTPATIENT
Start: 2023-04-25 | End: 2023-04-25

## 2023-04-25 RX ORDER — IBUPROFEN 600 MG/1
600 TABLET ORAL EVERY 6 HOURS PRN
Status: DISCONTINUED | OUTPATIENT
Start: 2023-04-25 | End: 2023-04-25 | Stop reason: HOSPADM

## 2023-04-25 RX ADMIN — KETOROLAC TROMETHAMINE 30 MG: 30 INJECTION, SOLUTION INTRAMUSCULAR at 14:02

## 2023-04-25 RX ADMIN — SUGAMMADEX 200 MG: 100 INJECTION, SOLUTION INTRAVENOUS at 14:03

## 2023-04-25 RX ADMIN — FENTANYL CITRATE 100 MCG: 50 INJECTION INTRAMUSCULAR; INTRAVENOUS at 13:42

## 2023-04-25 RX ADMIN — PROPOFOL 200 MG: 10 INJECTION, EMULSION INTRAVENOUS at 13:42

## 2023-04-25 RX ADMIN — SODIUM CHLORIDE 125 ML/HR: 0.9 INJECTION, SOLUTION INTRAVENOUS at 14:48

## 2023-04-25 RX ADMIN — LIDOCAINE HYDROCHLORIDE 100 MG: 20 INJECTION, SOLUTION EPIDURAL; INFILTRATION; INTRACAUDAL; PERINEURAL at 13:42

## 2023-04-25 RX ADMIN — ROCURONIUM BROMIDE 40 MG: 10 INJECTION, SOLUTION INTRAVENOUS at 13:42

## 2023-04-25 RX ADMIN — SODIUM CHLORIDE 125 ML/HR: 0.9 INJECTION, SOLUTION INTRAVENOUS at 12:29

## 2023-04-25 RX ADMIN — ONDANSETRON 4 MG: 2 INJECTION INTRAMUSCULAR; INTRAVENOUS at 13:42

## 2023-04-25 RX ADMIN — MIDAZOLAM 2 MG: 1 INJECTION INTRAMUSCULAR; INTRAVENOUS at 13:38

## 2023-04-25 NOTE — ANESTHESIA PREPROCEDURE EVALUATION
Procedure:  HYSTEROSCOPY W/  RESECTION UTERINE TUMOR/FIBROID (POLYPECTOMY ) (Uterus)  (D&C) (Uterus)    Relevant Problems   CARDIO   (+) Hyperlipidemia   (+) Hypertension      GI/HEPATIC   (+) GERD (gastroesophageal reflux disease)      MUSCULOSKELETAL   (+) Chronic bilateral low back pain without sciatica   (+) Fibromyalgia, primary   (+) Lumbar spondylosis   (+) Primary osteoarthritis involving multiple joints   (+) Primary osteoarthritis of both hips      NEURO/PSYCH   (+) Anxiety associated with depression   (+) Chronic bilateral low back pain without sciatica   (+) Chronic pain disorder   (+) Fibromyalgia, primary   (+) Major depression, recurrent, chronic (HCC)      PULMONARY   (+) Mild persistent asthma without complication   (+) VICTOR HUGO on CPAP   (+) SOB (shortness of breath)      Endocrine   (+) Diabetes mellitus (HCC)      Nervous and Auditory   (+) Cervical radiculopathy      Other   (+) Irregular heartbeat   (+) Morbid obesity with body mass index (BMI) of 45 0 to 49 9 in adult [facility])        Physical Exam    Airway    Mallampati score: III  TM Distance: <3 FB  Neck ROM: limited     Dental   No notable dental hx     Cardiovascular  Rhythm: regular, Rate: normal, Cardiovascular exam normal    Pulmonary  Pulmonary exam normal Breath sounds clear to auscultation,     Other Findings        Anesthesia Plan  ASA Score- 3     Anesthesia Type- general with ASA Monitors  Additional Monitors:   Airway Plan: ETT  Comment: 108 POCT glucose in SDS  Took metformin @ 0800 today  Repeat POCT glucose in holding area pre-procedure ordered          Plan Factors-    Chart reviewed  Existing labs reviewed  Patient summary reviewed  Patient is not a current smoker  Patient not instructed to abstain from smoking on day of procedure  Patient did not smoke on day of surgery  Induction- intravenous      Postoperative Plan-   Planned trial extubation    Informed Consent- Anesthetic plan and risks discussed with patient

## 2023-04-25 NOTE — INTERVAL H&P NOTE
H&P reviewed  After examining the patient I find no changes in the patients condition since the H&P had been written      Vitals:    04/25/23 1205   BP: 114/73   Pulse: 97   Resp: 18   Temp: 98 °F (36 7 °C)   SpO2: 96%

## 2023-04-25 NOTE — ANESTHESIA POSTPROCEDURE EVALUATION
"Post-Op Assessment Note    CV Status:  Stable  Pain Score: 2    Pain management: adequate     Mental Status:  Alert and awake   Hydration Status:  Euvolemic and stable   PONV Controlled:  Controlled   Airway Patency:  Patent      Post Op Vitals Reviewed: Yes      Staff: Anesthesiologist         No notable events documented      BP      Temp      Pulse     Resp      SpO2      /73   Pulse 94   Temp 97 5 °F (36 4 °C)   Resp 12   Ht 5' 2\" (1 575 m)   Wt 112 kg (247 lb 2 2 oz)   LMP 04/11/2023 (Exact Date)   SpO2 96%   BMI 45 20 kg/m²     "

## 2023-04-26 ENCOUNTER — TELEMEDICINE (OUTPATIENT)
Dept: BEHAVIORAL/MENTAL HEALTH CLINIC | Facility: CLINIC | Age: 48
End: 2023-04-26

## 2023-04-26 DIAGNOSIS — F33.9 MAJOR DEPRESSION, RECURRENT, CHRONIC (HCC): Primary | ICD-10-CM

## 2023-04-26 NOTE — PSYCH
"Behavioral Health Psychotherapy Progress Note    Psychotherapy Provided: Individual Psychotherapy     1  Major depression, recurrent, chronic (HCC)            Goals addressed in session: Goal 1     DATA: the client reported that she had minor surgery yesterday and is still feeling \" not well\" She reported that her physical health has been causing an increase in her anxiety and depression, with minimal results from use of her coping skills  The client did report that her care manager has applied her for personal aide services  During the session we explored the connection with her mental health and that of her physical health, and healthy ways to address her weaknesses  During this session, this clinician used the following therapeutic modalities: Cognitive Behavioral Therapy, Mindfulness-based Strategies, Solution-Focused Therapy and Supportive Psychotherapy    Substance Abuse was not addressed during this session  If the client is diagnosed with a co-occurring substance use disorder, please indicate any changes in the frequency or amount of use: N/A  Stage of change for addressing substance use diagnoses: No substance use/Not applicable    ASSESSMENT:  Elisabeth Peter presents with a Anxious mood  her affect is Normal range and intensity, which is congruent, with her mood and the content of the session  The client has made progress on their goals  The client  Elisabeth Peter presents with a none risk of suicide, none risk of self-harm, and none risk of harm to others  For any risk assessment that surpasses a \"low\" rating, a safety plan must be developed      A safety plan was indicated: no  If yes, describe in detail N/A    PLAN: Between sessions, Elisabeth Peter will to practice her coping skills when presented with anxiety and or depression At the next session, the therapist will use Cognitive Processing Therapy, Solution-Focused Therapy and Supportive Psychotherapy to address her mental health issues that " are effecting her different relationships and enviroments    [address] and Discharge Planning: Yi Sweet is aware of and agrees to continue to work on their treatment plan  They have identified and are working toward their discharge goals  yes    Visit start and stop times: 10:00 Am to 10:23 Am     04/26/23       Virtual Regular Visit    Verification of patient location:    Patient is located at Home in the following state in which I hold an active license [place]      Assessment/Plan:    Problem List Items Addressed This Visit        Other    Major depression, recurrent, chronic ([facility] [address] ) - Primary       Goals addressed in session: Goal 1          Reason for visit is   Chief Complaint   Patient presents with   • Virtual Regular Visit        Encounter provider YONATAN Putnam    Provider located at [address] [place] [id number]      Recent Visits  Date Type Provider Dept   04/24/23 Telephone Summer Alonsomia, [address] recent visits within past 7 days and meeting all other requirements  Future Appointments  No visits were found meeting these conditions  Showing future appointments within next 150 days and meeting all other requirements       The patient was identified by name and date of birth  Yi Sweet was informed that this is a telemedicine visit and that the visit is being conducted throughthe [employer] platform  She agrees to proceed     My office door was closed  No one else was in the room  She acknowledged consent and understanding of privacy and security of the video platform  The patient has agreed to participate and understands they can discontinue the visit at any time  Patient is aware this is a billable service  Subjective  Yi Sweet is a 50 y o  female , who was seen for individual mental health         HPI     Past Medical History:   Diagnosis Date   • Allergic Seasonal Allergies   • Alopecia of scalp     Treated with Proscar   • Chronic pain disorder     back, bilat knees, bilat hips   • Diabetes mellitus (HCC)    • Fibromyalgia, primary    • GERD (gastroesophageal reflux disease)    • Hip fx, left, closed, with routine healing, subsequent encounter 05/2017   • Hyperlipidemia    • Hypertension    • Insomnia    • Irregular heartbeat    • VICTOR HUGO on CPAP        Past Surgical History:   Procedure Laterality Date   • CARPAL TUNNEL RELEASE Left    • LUMBAR EPIDURAL INJECTION     • NERVE BLOCK Bilateral 12/29/2020    Procedure: L2 L3 L4 L5 MEDIAL BRANCH BLOCK #1;  Surgeon: Marybel Baron MD;  [facility];  Service: Pain Management    • NERVE BLOCK Bilateral 1/19/2021    Procedure: L2 L3 L4 L5 MEDIAL BRANCH BLOCK #2;  Surgeon: Marybel Baron MD;  [facility];  Service: Pain Management    • OR ARTHROCENTESIS ASPIR&/INJ MAJOR JT/BURSA W/O US Bilateral 2/7/2023    Procedure: INJECTION JOINT HIP;  Surgeon: Marybel Baron MD;  [facility];  Service: Pain Management    • OR HYSTEROSCOPY BX ENDOMETRIUM&/POLYPC W/WO D&C N/A 4/25/2023    Procedure: HYSTEROSCOPY W/  RESECTION UTERINE TUMOR/FIBROID (POLYPECTOMY );  Surgeon: Savana Dominguez DO;  [facility];  Service: Gynecology   • RADIOFREQUENCY ABLATION Right 2/25/2021    Procedure: L2 L3 L4 L5 RADIO FREQUENCY ABLATION right side;  Surgeon: Marybel Baron MD;  [facility];  Service: Pain Management    • RADIOFREQUENCY ABLATION Left 3/16/2021    Procedure: L2 L3 L4 L5 RADIO FREQUENCY ABLATION;  Surgeon: Marybel Baron MD;  [facility];  Service: Pain Management    • RHIZOTOMY Bilateral 4/4/2023    Procedure: RHIZOTOMY LUMBAR L3, L4, L5 medial branch nerves;  Surgeon: Marybel Baron MD;  [facility];  Service: Pain Management    • UPPER GASTROINTESTINAL ENDOSCOPY         Current Outpatient Medications   Medication Sig Dispense Refill   • acetaminophen (TYLENOL) 500 mg tablet Take 500 mg by mouth every 6 (six) hours as needed for mild pain     • albuterol (Ventolin HFA) 90 mcg/act inhaler Inhale 2 puffs every 6 (six) hours as needed for wheezing 18 g 5   • atorvastatin (LIPITOR) 20 mg tablet Take 1 tablet (20 mg total) by mouth daily with dinner 90 tablet 1   • celecoxib (CeleBREX) 100 mg capsule Take 1 capsule (100 mg total) by mouth 2 (two) times a day 180 capsule 1   • dulaglutide (Trulicity) 3 99 CK/4 3UA injection 0 5 mL SQ every 7 days x 2 weeks then increase to 1 mL SQ every 7 days 5 mL 5   • ergocalciferol (VITAMIN D2) 50,000 units Take 1 capsule (50,000 Units total) by mouth once a week 12 capsule 1   • Fluticasone-Salmeterol (Advair Diskus) 250-50 mcg/dose inhaler Inhale 1 puff 2 (two) times a day Rinse mouth after use  180 blister 3   • gabapentin (NEURONTIN) 300 mg capsule Take 1 capsule (300 mg total) by mouth 3 (three) times a day 90 capsule 0   • glipiZIDE (GLUCOTROL) 10 mg tablet Take 2 tablets (20 mg total) by mouth 2 (two) times a day before meals (Patient not taking: Reported on 4/25/2023) 360 tablet 1   • glucose blood test strip Use as instructed 100 each 5   • Lancets (onetouch ultrasoft) lancets Use as instructed 100 each 5   • lisinopril (ZESTRIL) 10 mg tablet TAKE (1) TABLET BY MOUTH DAILY  90 tablet 0   • metFORMIN (GLUCOPHAGE) 1000 MG tablet TAKE (1) TABLET TWICE A DAY WITH MEALS  180 tablet 5   • omeprazole (PriLOSEC) 20 mg delayed release capsule Take 1 capsule (20 mg total) by mouth daily 90 capsule 1   • sertraline (ZOLOFT) 50 mg tablet TAKE (1) TABLET BY MOUTH DAILY AT BEDTIME  90 tablet 1   • triamcinolone (KENALOG) 0 025 % cream Apply 2 x daily to rash until healed then prn rash 60 g 3     No current facility-administered medications for this visit  Allergies   Allergen Reactions   • Tdap [Tetanus-Diphth-Acell Pertussis] Rash       Review of Systems    Video Exam    There were no vitals filed for this visit      Physical Exam     Visit Time    Visit Start Time: 10:00 am  Visit Stop Time: 10:23 Am   Total Visit Duration: 23 minutes

## 2023-04-27 DIAGNOSIS — M47.816 LUMBAR SPONDYLOSIS: ICD-10-CM

## 2023-04-28 ENCOUNTER — OFFICE VISIT (OUTPATIENT)
Dept: PAIN MEDICINE | Facility: CLINIC | Age: 48
End: 2023-04-28

## 2023-04-28 VITALS
HEIGHT: 62 IN | BODY MASS INDEX: 45.97 KG/M2 | WEIGHT: 249.8 LBS | TEMPERATURE: 97.3 F | DIASTOLIC BLOOD PRESSURE: 88 MMHG | SYSTOLIC BLOOD PRESSURE: 138 MMHG | RESPIRATION RATE: 20 BRPM

## 2023-04-28 DIAGNOSIS — M47.816 LUMBAR SPONDYLOSIS: ICD-10-CM

## 2023-04-28 DIAGNOSIS — M16.0 PRIMARY OSTEOARTHRITIS OF BOTH HIPS: Primary | ICD-10-CM

## 2023-04-28 RX ORDER — CELECOXIB 200 MG/1
200 CAPSULE ORAL 2 TIMES DAILY
Qty: 60 CAPSULE | Refills: 3 | Status: SHIPPED | OUTPATIENT
Start: 2023-04-28

## 2023-04-28 RX ORDER — GABAPENTIN 300 MG/1
CAPSULE ORAL
Qty: 90 CAPSULE | Refills: 0 | Status: SHIPPED | OUTPATIENT
Start: 2023-04-28

## 2023-04-28 NOTE — PROGRESS NOTES
Assessment  1  Primary osteoarthritis of both hips  -     celecoxib (CeleBREX) 200 mg capsule; Take 1 capsule (200 mg total) by mouth 2 (two) times a day  -     Ambulatory referral to Physical Therapy; Future    2  Lumbar spondylosis    Greater than 80% improvement with radiofrequency ablation of bilateral Medial branch nerves at L3-L5 performed 4/4/23  Prior performed more than 1 5 years ago with mproved ability to participate with IADLs in significantly less pain  Additionally describes b/l hip pain with radiation to the groin; ttp over b/l gtb, pain with internal/external rotation of b/l hips; pain worse with standing/ambulation  Greater than 60% relief of pain with improved ability to participate with IADLs after recent b/l hip intra-artcular joint injections with residual left sided pain  Lifestyle modifications extensively discussed including diet, exercise and weight loss in conjunction with optimal DM-2 control  Previously reported the following symptomatology:    Chronic axial low back pain described by primarily arthritic features  Positive facet loading maneuvers as noted below  Mild noncompressive lumbar degenerative change at the L3-4 and L4-5 levels  Previously had a left L3 transforaminal steroid injection with Dr Isaias Cardozo which helped for about a week and a half  Arthritic symptomatology greater than radicular features at this time  Reasonable to continue multimodal pain therapy plan  Plan  -Celebrex increased to 200 mg b i d  As needed for lumbar facet syndrome  Counseled regarding bleeding risk, GI risk, Renal risk of taking this medication   -lifestyle modifications including diet, exercise and weight loss in addition to DM-2 control extensively discussed  -f/u 3 months  -hip exercises provided; continue physical therapy and core exercises for lumbar facet syndrome, hip osteoarthritis    There are risks associated with opioid medications, including dependence, addiction and tolerance  The patient understands and agrees to use these medications only as prescribed  Potential side effects of the medications include, but are not limited to, constipation, drowsiness, addiction, impaired judgment and risk of fatal overdose if not taken as prescribed  The patient was warned against driving while taking sedation medications  Sharing medications is a felony  At this point in time, the patient is showing no signs of addiction, abuse, diversion or suicidal ideation  [place] Prescription Drug Monitoring Program report was reviewed and was appropriate     Complete risks and benefits including bleeding, infection, tissue reaction, nerve injury and allergic reaction were discussed  The approach was demonstrated using models and literature was provided  Verbal and written consent was obtained  My impressions and treatment recommendations were discussed in detail with the patient who verbalized understanding and had no further questions  Discharge instructions were provided  I personally saw and examined the patient and I agree with the above discussed plan of care  New Medications Ordered This Visit   Medications   • celecoxib (CeleBREX) 200 mg capsule     Sig: Take 1 capsule (200 mg total) by mouth 2 (two) times a day     Dispense:  60 capsule     Refill:  3       History of Present Illness    Greater than 80% improvement with radiofrequency ablation of bilateral Medial branch nerves at L3-L5 performed 4/4/23  Prior performed more than 1 5 years ago with mproved ability to participate with IADLs in significantly less pain  Additionally describes b/l hip pain with radiation to the groin  Greater than 60% relief of pain with improved ability to participate with IADLs after recent b/l hip intra-artcular joint injections with residual left sided pain   Previously reported the following symptomatology:    Elma Boogie is a 50 y o  female with past medical history of axial low back pain described primarily by arthritic features  She presents with a 2 year history of chronic low back pain described primarily as arthritic in nature  she describes 8/10 low back pain that is worse in the mornings and worse at the end of the day  The pain is characterized by achy, nagging, indolent, crampy, stabbing pain in his/her axial low back  The patient describes that the pain is worse with standing for long periods of time on hard surfaces as well as with walking  The patient is a very active individual and feels as though this pain compromises her participation with independent activities of daily living  The pain can be debilitating at times and contribute to significant disability, compromising overall activity and independent activities of daily living  She has tried physical therapy with limited relief of symptoms  Medications the patient has tried in the past include   Celebrex,  and Flexeril  She describes minor radicular symptoms in the L3 and L4 dermatomal distribution but otherwise has good strength  Previously she had left L3 transforaminal epidural steroid injection with relief for about 2 weeks  She denies any weakness numbness or paresthesias  The patient denies any bowel or bladder dysfunction as well  I have personally reviewed and/or updated the patient's past medical history, past surgical history, family history, social history, current medications, allergies, and vital signs today  Review of Systems   Constitutional: Positive for activity change  HENT: Negative  Eyes: Negative  Respiratory: Negative  Cardiovascular: Negative  Gastrointestinal: Negative  Endocrine: Negative  Genitourinary: Negative  Musculoskeletal: Positive for arthralgias, back pain, gait problem and myalgias  Skin: Negative  Allergic/Immunologic: Negative  Hematological: Negative  Psychiatric/Behavioral: Negative  All other systems reviewed and are negative        Patient Active Problem List   Diagnosis   • SOB (shortness of breath)   • Chronic bilateral low back pain without sciatica   • Chronic pain of left knee   • Chronic pain of right knee   • Fibromyalgia, primary   • Primary osteoarthritis involving multiple joints   • Mild persistent asthma without complication   • Lumbar radiculopathy   • Primary osteoarthritis of both hips   • Left hip pain   • Major depression, recurrent, chronic (HCC)   • Lumbar spondylosis   • Cervical radiculopathy   • Morbid obesity with body mass index (BMI) of 45 0 to 49 9 in adult ([place] [address] )   • VICOTR HUGO on CPAP   • Anxiety associated with depression   • Diabetes mellitus ([place] [address] )   • Hyperlipidemia   • Hypertension   • Irregular heartbeat   • Chronic pain disorder   • GERD (gastroesophageal reflux disease)       Past Medical History:   Diagnosis Date   • Allergic     Seasonal Allergies   • Alopecia of scalp     Treated with Proscar   • Chronic pain disorder     back, bilat knees, bilat hips   • Diabetes mellitus ([place] [address] )    • Fibromyalgia, primary    • GERD (gastroesophageal reflux disease)    • Hip fx, left, closed, with routine healing, subsequent encounter 05/2017   • Hyperlipidemia    • Hypertension    • Insomnia    • Irregular heartbeat    • VICTOR HUGO on CPAP        Past Surgical History:   Procedure Laterality Date   • CARPAL TUNNEL RELEASE Left    • LUMBAR EPIDURAL INJECTION     • NERVE BLOCK Bilateral 12/29/2020    Procedure: L2 L3 L4 L5 MEDIAL BRANCH BLOCK #1;  Surgeon: Miguelito Caldwell MD;  Location: [facility];  Service: Pain Management    • NERVE BLOCK Bilateral 1/19/2021    Procedure: L2 L3 L4 L5 MEDIAL BRANCH BLOCK #2;  Surgeon: Miguelito Caldwell MD;  Location: [facility];  Service: Pain Management    • NE ARTHROCENTESIS ASPIR&/INJ MAJOR JT/BURSA W/O US Bilateral 2/7/2023    Procedure: INJECTION JOINT HIP;  Surgeon: Miguelito Caldwell MD;  Location: [facility];  Service: Pain Management    • NE HYSTEROSCOPY BX ENDOMETRIUM&/POLYPC W/WO D&C N/A 4/25/2023 Procedure: HYSTEROSCOPY W/  RESECTION UTERINE TUMOR/FIBROID (POLYPECTOMY );  Surgeon: Leandra Bautista DO;  Location: [facility];  Service: Gynecology   • RADIOFREQUENCY ABLATION Right 2/25/2021    Procedure: L2 L3 L4 L5 RADIO FREQUENCY ABLATION right side;  Surgeon: Rafaela Grant MD;  [facility]: [facility];  Service: Pain Management    • RADIOFREQUENCY ABLATION Left 3/16/2021    Procedure: L2 L3 L4 L5 RADIO FREQUENCY ABLATION;  Surgeon: Rafaela Grant MD;  [facility];  Service: Pain Management    • RHIZOTOMY Bilateral 4/4/2023    Procedure: RHIZOTOMY LUMBAR L3, L4, L5 medial branch nerves;  Surgeon: Rafaela rGant MD;  Location: [facility];  Service: Pain Management    • UPPER GASTROINTESTINAL ENDOSCOPY         Family History   Problem Relation Age of Onset   • Cancer Father    • Hepatitis Father    • Asthma Cousin    • Hypertension Paternal Grandmother    • No Known Problems Mother    • No Known Problems Sister    • No Known Problems Maternal Grandmother    • No Known Problems Maternal Grandfather    • No Known Problems Paternal Grandfather    • No Known Problems Paternal Aunt    • BRCA2 Positive Neg Hx    • BRCA2 Negative Neg Hx    • BRCA1 Positive Neg Hx    • BRCA1 Negative Neg Hx    • BRCA 1/2 Neg Hx    • Ovarian cancer Neg Hx    • Endometrial cancer Neg Hx    • Colon cancer Neg Hx    • Breast cancer additional onset Neg Hx    • Breast cancer Neg Hx        Social History     Occupational History   • Not on file   Tobacco Use   • Smoking status: Former   • Smokeless tobacco: Never   Vaping Use   • Vaping Use: Never used   Substance and Sexual Activity   • Alcohol use: No   • Drug use: No   • Sexual activity: Not Currently       Current Outpatient Medications on File Prior to Visit   Medication Sig   • acetaminophen (TYLENOL) 500 mg tablet Take 500 mg by mouth every 6 (six) hours as needed for mild pain   • albuterol (Ventolin HFA) 90 mcg/act inhaler Inhale 2 puffs every 6 (six) hours as needed for "wheezing   • atorvastatin (LIPITOR) 20 mg tablet Take 1 tablet (20 mg total) by mouth daily with dinner   • dulaglutide (Trulicity) 4 76 TT/3 0WE injection 0 5 mL SQ every 7 days x 2 weeks then increase to 1 mL SQ every 7 days   • ergocalciferol (VITAMIN D2) 50,000 units Take 1 capsule (50,000 Units total) by mouth once a week   • Fluticasone-Salmeterol (Advair Diskus) 250-50 mcg/dose inhaler Inhale 1 puff 2 (two) times a day Rinse mouth after use  • gabapentin (NEURONTIN) 300 mg capsule TAKE (1) CAPSULE THREE TIMES DAILY  • glucose blood test strip Use as instructed   • Lancets (onetouch ultrasoft) lancets Use as instructed   • lisinopril (ZESTRIL) 10 mg tablet TAKE (1) TABLET BY MOUTH DAILY  • metFORMIN (GLUCOPHAGE) 1000 MG tablet TAKE (1) TABLET TWICE A DAY WITH MEALS  • omeprazole (PriLOSEC) 20 mg delayed release capsule Take 1 capsule (20 mg total) by mouth daily   • sertraline (ZOLOFT) 50 mg tablet TAKE (1) TABLET BY MOUTH DAILY AT BEDTIME  • triamcinolone (KENALOG) 0 025 % cream Apply 2 x daily to rash until healed then prn rash   • [DISCONTINUED] celecoxib (CeleBREX) 100 mg capsule Take 1 capsule (100 mg total) by mouth 2 (two) times a day   • glipiZIDE (GLUCOTROL) 10 mg tablet Take 2 tablets (20 mg total) by mouth 2 (two) times a day before meals (Patient not taking: Reported on 4/25/2023)   • [DISCONTINUED] gabapentin (NEURONTIN) 300 mg capsule Take 1 capsule (300 mg total) by mouth 3 (three) times a day     No current facility-administered medications on file prior to visit  Allergies   Allergen Reactions   • Tdap [Tetanus-Diphth-Acell Pertussis] Rash         Physical Exam    /88   Temp (!) 97 3 °F (36 3 °C)   Resp 20   Ht 5' 2\" (1 575 m)   Wt 113 kg (249 lb 12 8 oz)   LMP 04/11/2023 (Exact Date)   BMI 45 69 kg/m²     Constitutional: normal, well developed, well nourished, alert, in no distress and non-toxic and no overt pain behavior   and obese  Eyes: anicteric  HEENT: grossly " intact  Neck: supple, symmetric, trachea midline and no masses   Pulmonary:even and unlabored  Cardiovascular:No edema or pitting edema present  Skin:Normal without rashes or lesions and well hydrated  Psychiatric:Mood and affect appropriate  Neurologic:Cranial Nerves II-XII grossly intact Sensation grossly intact; no clonus negative dupree's  Reflexes 2+ and brisk  SLR negative bilaterally  Musculoskeletal:  Steppage gait  Normal heel toe and tip toe walking  Significant pain with lumbar facet loading bilaterally and with lateral spine rotation left greater than right  TTP over lumbar paraspinal muscles  Negative wilda's test, negative gaenslen's negative SIJ loading bilaterally  ttp over b/l gtb, pain with internal/external rotation of b/l hips    Imaging    MRI LUMBAR SPINE WITHOUT CONTRAST     INDICATION: M54 [id number]: Lumbago with sciatica, left side  G89 29: Other chronic pain      COMPARISON:  None      TECHNIQUE:  Sagittal T1, sagittal T2, sagittal inversion recovery, axial T1 and axial T2, coronal T2     IMAGE QUALITY:  Diagnostic     FINDINGS:     VERTEBRAL BODIES:  There are 5 lumbar type vertebral bodies  Normal alignment of the lumbar spine  No spondylolysis or spondylolisthesis  No scoliosis  No compression fracture  Normal marrow signal is identified within the visualized bony   structures  No discrete marrow lesion      SACRUM:  Normal signal within the sacrum  No evidence of insufficiency or stress fracture      DISTAL CORD AND CONUS:  Normal size and signal within the distal cord and conus      PARASPINAL SOFT TISSUES:  There is thickening of the endometrial cavity partially visualized on this examination towards the fundus    There is a dominant follicle identified within the left ovary      LOWER THORACIC DISC SPACES:  Normal disc height and signal   No disc herniation, canal stenosis or foraminal narrowing      LUMBAR DISC SPACES:     L1-L2:  Normal      L2-L3:  Normal      L3-L4:  Annular bulging with a small broad-based left foraminal and extraforaminal disc protrusion best seen on series 6 image 13  There is no canal stenosis  Only minimal left foraminal narrowing without nerve impingement      L4-L5:  Disc desiccation and loss of disc height with mild annular bulging  Small central disc herniation without canal stenosis or foraminal nerve impingement      L5-S1:  Normal disc height and signal without canal stenosis or foraminal narrowing      IMPRESSION:     Mild noncompressive lumbar degenerative change at the L3-4 and L4-5 levels      Fluid signal in the endometrial cavity partially visualized at the fundus    If patient is postmenopausal, consider follow-up ultrasound imaging      Signal

## 2023-04-28 NOTE — PATIENT INSTRUCTIONS
Hip Bursitis Exercises   AMBULATORY CARE:   Hip bursitis exercises  help strengthen the muscles in your hip and keep the joint flexible  Strong muscles can help reduce pain, prevent injury, and keep the joint stable  The exercises can also help increase the range of motion in your hip joint  Call your doctor or physical therapist if:   You have sharp pain during exercise or at rest     You have questions or concerns about the stretches or exercises  What you need to know about exercise safety:   Move slowly and smoothly  Avoid fast or jerky motions  This will help prevent an injury  Breathe normally  Do not hold your breath  It is important to breathe in and out so you do not tense up during exercise  Tension could prevent you from moving your joint in a full range of motion  Do the exercises and stretches on both legs  Do this so both hips remain strong and flexible  Stop if you feel sharp pain or an increase in pain  Contact your healthcare provider or physical therapist  It is normal to feel some discomfort during exercise  Regular exercise will help decrease your discomfort over time  Warm up and cool down when you exercise  Walk or ride a stationary bike for 5 to 10 minutes before you start  This warms and relaxes your muscles to help prevent an injury  When you have finished the exercises, cool down by walking in place for a few minutes  You may also need to stretch as part of your warm up or cool down routine  Your provider or physical therapist will tell you which stretches to do  How to do hip stretches: Your healthcare provider or physical therapist will tell you how many times to do each stretch  Do the stretch on both sides before you move to the next stretch  Standing iliotibial band stretch:  Stand with the leg on your injured side behind your other leg  Bend sideways toward the side that is not injured  Stop when you feel a stretch in your outer hip   Hold for 5 to 10 seconds  Then return to the starting position  Lying iliotibial band stretch:  Lie on your back  Bend the knee on your injured side toward your chest  Place your hands on the outside of your knee and thigh  Slowly pull the knee across your body  Stop when you feel a stretch in your hip and outer thigh  Hold for 5 to 10 seconds  Return your leg to the starting position  Hip stretch:  Lie on your back with both legs straight and on the ground  Bend the knee on your injured side toward your chest until you can reach your lower leg  Place both hands on your shin and pull your knee toward your chest  Hold for 5 to 10 seconds  Return your leg to the starting position  Knee to chest:  Lie on your back with both knees bent and feet flat on the floor  Bend the knee on your injured side toward your chest until you can reach your lower leg  Place both hands on your shin and pull your knee toward your chest  Hold for 5 to 10 seconds  Return your leg to the starting position  Internal hip rotator stretch:  You will do this exercise on a table  Lie on your side with the injured hip on top  You may be told to keep a pillow between your thighs  Move the top leg so the foot hangs below the edge of the table  Rotate your hip to raise your foot in the opposite direction of the bottom shoulder  Raise your foot as high as you can so you feel a stretch in the back of your thigh  Hold for 5 seconds  Then slowly lower your foot to the starting position  External hip rotator stretch:  You will do this exercise on a table  Lie on your side with the injured hip on the bottom  You do not need a pillow between your thighs for this exercise  Move the bottom leg so the foot is off the edge of the table  Rotate your hip to lift the foot in the opposite direction of the bottom shoulder  Raise your foot as high as you can so you feel a stretch in your buttock  Hold for 5 seconds   Then slowly lower your foot to the starting position  Kneeling hip flexor stretch:  Kneel on your knee on the injured side  Place the foot of your other leg on the floor so the knee is bent  Put both hands on top of your thigh  Keep your back straight and abdominal muscles tight  Lean forward until you feel a stretch in your other thigh  Hold the stretch for 10 seconds  Return to the starting position  How to do hip strengthening exercises: Your healthcare provider or physical therapist will tell you how many times to do each exercise  Do the exercise on both sides before you move to the next exercise  Straight leg lift to the side: This may also be called hip abduction  Lie on your side with straight legs, with the injured hip on top  Slowly raise your top leg toward the ceiling as high as you can  Keep your foot pointed  Hold for 5 seconds  Then slowly lower your leg to the starting position  Inner thigh lift: This may also be called hip adduction  Lie on your side with straight legs, with the injured hip on the bottom  Cross your top leg over your bottom leg  Put the foot of your top leg on the floor in front of you  Raise your bottom leg until it touches the top leg  Hold for 5 seconds  Then slowly lower the leg to the floor  Clam exercise:  Lie on your side so your injured side is on top  Bend your knees  Keep your heels together during this exercise  Slowly raise your top knee toward the ceiling  Then lower your leg so your knees are together  Follow up with your doctor or physical therapist as directed:  Write down your questions so you remember to ask them during your visits  © Copyright [employer] 2022 Information is for End User's use only and may not be sold, redistributed or otherwise used for commercial purposes  The above information is an  only  It is not intended as medical advice for individual conditions or treatments   Talk to your doctor, nurse or pharmacist before following any medical regimen to see if it is safe and effective for you

## 2023-05-02 ENCOUNTER — HOSPITAL ENCOUNTER (OUTPATIENT)
Dept: ULTRASOUND IMAGING | Facility: HOSPITAL | Age: 48
Discharge: HOME | End: 2023-05-02

## 2023-05-02 DIAGNOSIS — E05.90 HYPERTHYROIDISM: ICD-10-CM

## 2023-05-03 ENCOUNTER — TELEPHONE (OUTPATIENT)
Dept: FAMILY MEDICINE CLINIC | Facility: CLINIC | Age: 48
End: 2023-05-03

## 2023-05-03 DIAGNOSIS — E05.90 HYPERTHYROIDISM: Primary | ICD-10-CM

## 2023-05-03 DIAGNOSIS — R93.89 ABNORMAL THYROID ULTRASOUND: ICD-10-CM

## 2023-05-03 DIAGNOSIS — E04.2 MULTIPLE THYROID NODULES: ICD-10-CM

## 2023-05-03 NOTE — TELEPHONE ENCOUNTER
Please notify patient I am referring her to ENT for a biopsy of her thyroid nodules  Radiologist is recommending a biopsy based on her recent Thyroid US

## 2023-05-10 ENCOUNTER — TELEMEDICINE (OUTPATIENT)
Dept: BEHAVIORAL/MENTAL HEALTH CLINIC | Facility: CLINIC | Age: 48
End: 2023-05-10

## 2023-05-10 DIAGNOSIS — F33.9 MAJOR DEPRESSION, RECURRENT, CHRONIC (HCC): Primary | ICD-10-CM

## 2023-05-10 NOTE — PSYCH
"Behavioral Health Psychotherapy Progress Note    Psychotherapy Provided: Individual Psychotherapy     1  Major depression, recurrent, chronic ([address] )            Goals addressed in session: Goal 1     DATA: The client reported continued anxiety and depression due to her living and economic situation  She did report obtaining an AIDE to help with her physical environment upkeep  During the session we explored the changes in her life and the effects on her mental health  She was able to verbalize at the increased supports has helped decrease her worry about her \"home care\" As team we explored ways the client could continue to self-advocate for herself and the effects on her mental health  During this session, this clinician used the following therapeutic modalities: Cognitive Behavioral Therapy, Mindfulness-based Strategies, Solution-Focused Therapy and Supportive Psychotherapy    Substance Abuse was not addressed during this session  If the client is diagnosed with a co-occurring substance use disorder, please indicate any changes in the frequency or amount of use: n/a Stage of change for addressing substance use diagnoses: No substance use/Not applicable    ASSESSMENT:  Cristy Garcia presents with a Anxious and Depressed mood  her affect is Normal range and intensity, which is congruent, with her mood and the content of the session  The client has made progress on their goals  The client  Bob Bertrand presents with a none risk of suicide, none risk of self-harm, and none risk of harm to others  For any risk assessment that surpasses a \"low\" rating, a safety plan must be developed  A safety plan was indicated: No  If yes, describe in detail N/a    PLAN: Between sessions, Bob Bertrand will to work on her silver cloud computer [address] activities online   At the next session, the therapist will use Cognitive Behavioral Therapy, Mindfulness-based Strategies, Solution-Focused Therapy and Supportive Psychotherapy to " address her enviromental and relationship weaknesses and the effects on her mental health  Behavioral Health Treatment Plan and Discharge Planning: Get Dai is aware of and agrees to continue to work on their treatment plan  They have identified and are working toward their discharge goals   yes    Visit start and stop times: [address] as reported by the client ; PHYSICAL PAIN SCALE NUMBERS: 6 as reported by the Client     05/10/23  Start Time: 9530  Stop Time: 0740  Total Visit Time: 28 minutes

## 2023-05-11 ENCOUNTER — ANESTHESIA EVENT (OUTPATIENT)
Dept: ANESTHESIOLOGY | Facility: HOSPITAL | Age: 48
End: 2023-05-11

## 2023-05-11 ENCOUNTER — ANESTHESIA (OUTPATIENT)
Dept: ANESTHESIOLOGY | Facility: HOSPITAL | Age: 48
End: 2023-05-11

## 2023-05-11 NOTE — ANESTHESIA PREPROCEDURE EVALUATION
Procedure:  PRE-OP ONLY    Relevant Problems   CARDIO   (+) Hyperlipidemia   (+) Hypertension      GI/HEPATIC   (+) GERD (gastroesophageal reflux disease)      MUSCULOSKELETAL   (+) Chronic bilateral low back pain without sciatica   (+) Fibromyalgia, primary   (+) Lumbar spondylosis   (+) Primary osteoarthritis involving multiple joints   (+) Primary osteoarthritis of both hips      NEURO/PSYCH   (+) Anxiety associated with depression   (+) Chronic bilateral low back pain without sciatica   (+) Chronic pain disorder   (+) Fibromyalgia, primary   (+) Major depression, recurrent, chronic (HCC)      PULMONARY   (+) Mild persistent asthma without complication   (+) VICTOR HUGO on CPAP   (+) SOB (shortness of breath)        Physical Exam    Airway    Mallampati score: II  TM Distance: >3 FB  Neck ROM: full     Dental   No notable dental hx     Cardiovascular  Cardiovascular exam normal    Pulmonary  Pulmonary exam normal     Other Findings        Anesthesia Plan  ASA Score- 3     Anesthesia Type- IV sedation with anesthesia with ASA Monitors  Additional Monitors:   Airway Plan:           Plan Factors-Exercise tolerance (METS): >4 METS  Chart reviewed  EKG reviewed  Imaging results reviewed  Existing labs reviewed  Patient summary reviewed  Patient is not a current smoker  Patient not instructed to abstain from smoking on day of procedure  Patient did not smoke on day of surgery  Obstructive sleep apnea risk education given perioperatively  Induction-     Postoperative Plan-     Informed Consent- Anesthetic plan and risks discussed with patient  I personally reviewed this patient with the CRNA  Discussed and agreed on the Anesthesia Plan with the CRNA  Rupinder Meeks

## 2023-05-12 ENCOUNTER — HOSPITAL ENCOUNTER (OUTPATIENT)
Dept: PERIOP | Facility: HOSPITAL | Age: 48
Setting detail: OUTPATIENT SURGERY
Discharge: HOME | End: 2023-05-12
Attending: INTERNAL MEDICINE

## 2023-05-12 ENCOUNTER — ANESTHESIA (OUTPATIENT)
Dept: PERIOP | Facility: HOSPITAL | Age: 48
End: 2023-05-12

## 2023-05-12 ENCOUNTER — ANESTHESIA EVENT (OUTPATIENT)
Dept: PERIOP | Facility: HOSPITAL | Age: 48
End: 2023-05-12

## 2023-05-12 VITALS
HEIGHT: 62 IN | HEART RATE: 81 BPM | OXYGEN SATURATION: 97 % | TEMPERATURE: 97.3 F | RESPIRATION RATE: 18 BRPM | DIASTOLIC BLOOD PRESSURE: 60 MMHG | BODY MASS INDEX: 45.82 KG/M2 | SYSTOLIC BLOOD PRESSURE: 111 MMHG | WEIGHT: 249 LBS

## 2023-05-12 DIAGNOSIS — K31.89 RETAINED FOOD IN STOMACH: ICD-10-CM

## 2023-05-12 DIAGNOSIS — K21.9 GASTROESOPHAGEAL REFLUX DISEASE WITHOUT ESOPHAGITIS: Primary | ICD-10-CM

## 2023-05-12 DIAGNOSIS — Z11.59 SCREENING FOR VIRAL DISEASE: ICD-10-CM

## 2023-05-12 LAB
GLUCOSE SERPL-MCNC: 101 MG/DL (ref 65–140)
GLUCOSE SERPL-MCNC: 141 MG/DL (ref 65–140)

## 2023-05-12 RX ORDER — METOCLOPRAMIDE 10 MG/1
10 TABLET ORAL 2 TIMES DAILY
Qty: 2 TABLET | Refills: 0 | Status: SHIPPED | OUTPATIENT
Start: 2023-05-12

## 2023-05-12 RX ORDER — SODIUM CHLORIDE, SODIUM LACTATE, POTASSIUM CHLORIDE, CALCIUM CHLORIDE 600; 310; 30; 20 MG/100ML; MG/100ML; MG/100ML; MG/100ML
125 INJECTION, SOLUTION INTRAVENOUS CONTINUOUS
Status: DISCONTINUED | OUTPATIENT
Start: 2023-05-12 | End: 2023-05-16 | Stop reason: HOSPADM

## 2023-05-12 RX ORDER — ALBUTEROL SULFATE 2.5 MG/3ML
2.5 SOLUTION RESPIRATORY (INHALATION) ONCE AS NEEDED
Status: DISCONTINUED | OUTPATIENT
Start: 2023-05-12 | End: 2023-05-16 | Stop reason: HOSPADM

## 2023-05-12 RX ORDER — FENTANYL CITRATE 50 UG/ML
INJECTION, SOLUTION INTRAMUSCULAR; INTRAVENOUS AS NEEDED
Status: DISCONTINUED | OUTPATIENT
Start: 2023-05-12 | End: 2023-05-12

## 2023-05-12 RX ORDER — ONDANSETRON 2 MG/ML
INJECTION INTRAMUSCULAR; INTRAVENOUS AS NEEDED
Status: DISCONTINUED | OUTPATIENT
Start: 2023-05-12 | End: 2023-05-12

## 2023-05-12 RX ORDER — SODIUM CHLORIDE, SODIUM LACTATE, POTASSIUM CHLORIDE, CALCIUM CHLORIDE 600; 310; 30; 20 MG/100ML; MG/100ML; MG/100ML; MG/100ML
INJECTION, SOLUTION INTRAVENOUS CONTINUOUS PRN
Status: DISCONTINUED | OUTPATIENT
Start: 2023-05-12 | End: 2023-05-12

## 2023-05-12 RX ORDER — PROPOFOL 10 MG/ML
INJECTION, EMULSION INTRAVENOUS AS NEEDED
Status: DISCONTINUED | OUTPATIENT
Start: 2023-05-12 | End: 2023-05-12

## 2023-05-12 RX ORDER — ONDANSETRON 2 MG/ML
4 INJECTION INTRAMUSCULAR; INTRAVENOUS ONCE AS NEEDED
Status: DISCONTINUED | OUTPATIENT
Start: 2023-05-12 | End: 2023-05-16 | Stop reason: HOSPADM

## 2023-05-12 RX ORDER — LIDOCAINE HYDROCHLORIDE 20 MG/ML
INJECTION, SOLUTION EPIDURAL; INFILTRATION; INTRACAUDAL; PERINEURAL AS NEEDED
Status: DISCONTINUED | OUTPATIENT
Start: 2023-05-12 | End: 2023-05-12

## 2023-05-12 RX ADMIN — TOPICAL ANESTHETIC 1 SPRAY: 200 SPRAY DENTAL; PERIODONTAL at 11:20

## 2023-05-12 RX ADMIN — PROPOFOL 120 MG: 10 INJECTION, EMULSION INTRAVENOUS at 11:29

## 2023-05-12 RX ADMIN — SODIUM CHLORIDE, SODIUM LACTATE, POTASSIUM CHLORIDE, AND CALCIUM CHLORIDE: .6; .31; .03; .02 INJECTION, SOLUTION INTRAVENOUS at 10:50

## 2023-05-12 RX ADMIN — FENTANYL CITRATE 50 MCG: 50 INJECTION, SOLUTION INTRAMUSCULAR; INTRAVENOUS at 11:26

## 2023-05-12 RX ADMIN — LIDOCAINE HYDROCHLORIDE 100 MG: 20 INJECTION, SOLUTION EPIDURAL; INFILTRATION; INTRACAUDAL; PERINEURAL at 11:29

## 2023-05-12 RX ADMIN — ONDANSETRON 4 MG: 2 INJECTION INTRAMUSCULAR; INTRAVENOUS at 11:25

## 2023-05-12 NOTE — ANESTHESIA PREPROCEDURE EVALUATION
Procedure:  EGD    Relevant Problems   CARDIO   (+) Hyperlipidemia   (+) Hypertension      GI/HEPATIC   (+) GERD (gastroesophageal reflux disease)      MUSCULOSKELETAL   (+) Chronic bilateral low back pain without sciatica   (+) Fibromyalgia, primary   (+) Lumbar spondylosis   (+) Primary osteoarthritis involving multiple joints   (+) Primary osteoarthritis of both hips      NEURO/PSYCH   (+) Anxiety associated with depression   (+) Chronic bilateral low back pain without sciatica   (+) Chronic pain disorder   (+) Fibromyalgia, primary   (+) Major depression, recurrent, chronic (HCC)      PULMONARY   (+) Mild persistent asthma without complication   (+) VICTOR HUGO on CPAP   (+) SOB (shortness of breath)        Physical Exam    Airway    Mallampati score: II  TM Distance: >3 FB  Neck ROM: full     Dental   No notable dental hx     Cardiovascular  Cardiovascular exam normal    Pulmonary  Pulmonary exam normal     Other Findings        Anesthesia Plan  ASA Score- 3     Anesthesia Type- IV sedation with anesthesia with ASA Monitors  Additional Monitors:   Airway Plan:           Plan Factors-Exercise tolerance (METS): >4 METS  Chart reviewed  EKG reviewed  Imaging results reviewed  Existing labs reviewed  Patient summary reviewed  Patient is not a current smoker  Patient not instructed to abstain from smoking on day of procedure  Patient did not smoke on day of surgery  Obstructive sleep apnea risk education given perioperatively  Induction-     Postoperative Plan-     Informed Consent- Anesthetic plan and risks discussed with patient  I personally reviewed this patient with the CRNA  Discussed and agreed on the Anesthesia Plan with the CRNA  Zeyad Moser

## 2023-05-12 NOTE — INTERVAL H&P NOTE
H&P reviewed  After examining the patient I find no changes in the patients condition since the H&P had been written      Vitals:    05/12/23 1045   BP: 136/83   Pulse: 98   Resp: 18   Temp: 98 2 °F (36 8 °C)   SpO2: 97%

## 2023-05-12 NOTE — ANESTHESIA POSTPROCEDURE EVALUATION
Post-Op Assessment Note    CV Status:  Stable    Pain management: adequate     Mental Status:  Alert and awake   Hydration Status:  Euvolemic   PONV Controlled:  Controlled   Airway Patency:  Patent      Post Op Vitals Reviewed: Yes      Staff: CRNA         No notable events documented      BP 96/55 (05/12/23 1145)    Temp      Pulse 90 (05/12/23 1145)   Resp 18 (05/12/23 1145)    SpO2 94 % (05/12/23 1145)

## 2023-05-23 ENCOUNTER — TELEMEDICINE (OUTPATIENT)
Dept: BEHAVIORAL/MENTAL HEALTH CLINIC | Facility: CLINIC | Age: 48
End: 2023-05-23

## 2023-05-23 DIAGNOSIS — F33.9 MAJOR DEPRESSION, RECURRENT, CHRONIC (HCC): Primary | ICD-10-CM

## 2023-05-23 NOTE — PSYCH
"Behavioral Health Psychotherapy Progress Note    Psychotherapy Provided: Individual Psychotherapy     1  Major depression, recurrent, chronic ([address] )            Goals addressed in session: Goal 1     DATA: The client reported that her environment has improved with the addition of her Aide services through her long term wavier  She reported continued anxiety that are effecting her different relationships and environments  During the session we explored her triggers to her anxiety with the client identifiy her economic situation, pain, isolation, and neighbor situation  Coping skills were reviewed during the session  During this session, this clinician used the following therapeutic modalities: Cognitive Behavioral Therapy, Mindfulness-based Strategies, Solution-Focused Therapy and Supportive Psychotherapy    Substance Abuse was not addressed during this session  If the client is diagnosed with a co-occurring substance use disorder, please indicate any changes in the frequency or amount of use: n/a  Stage of change for addressing substance use diagnoses: No substance use/Not applicable    ASSESSMENT:  Cristy Garcia presents with a Anxious and Depressed mood  her affect is Normal range and intensity, which is congruent, with her mood and the content of the session  The client has made progress on their goals  The client  Misa Phillips presents with a none risk of suicide, none risk of self-harm, and none risk of harm to others  For any risk assessment that surpasses a \"low\" rating, a safety plan must be developed  A safety plan was indicated: no  If yes, describe in detail n/a    PLAN: Between sessions, Misa Phillips will work on her [employer] silver cloud packet online   At the next session, the therapist will use Cognitive Behavioral Therapy, Mindfulness-based Strategies, Solution-Focused Therapy and Supportive Psychotherapy to address the client's MH issues that are effecting her different relationships and " environments  Behavioral Health Treatment Plan and Discharge Planning: Steve Holman is aware of and agrees to continue to work on their treatment plan  They have identified and are working toward their discharge goals  yes    Visit start and stop times:    05/23/23  Start Time: 1300  Stop Time: 1330  Total Visit Time: 30 minutes    Virtual Regular Visit    Verification of patient location:    Patient is located at Home in the following state in which I hold an active license [place]      Assessment/Plan:    Problem List Items Addressed This Visit        Other    Major depression, recurrent, chronic ([facility] [address] ) - Primary       Goals addressed in session: Goal 1          Reason for visit is   Chief Complaint   Patient presents with   • Virtual Regular Visit        Encounter provider YONATAN Ramos    Provider located at [address] [place] [address] [address] [facility] [id number]      Recent Visits  No visits were found meeting these conditions  Showing recent visits within past 7 days and meeting all other requirements  Today's Visits  Date Type Provider Dept   05/23/23 Telemedicine YONATAN Ramos [facility]   Showing today's visits and meeting all other requirements  Future Appointments  No visits were found meeting these conditions  Showing future appointments within next 150 days and meeting all other requirements       The patient was identified by name and date of birth  Steve Holman was informed that this is a telemedicine visit and that the visit is being conducted throughthe [employer] platform  She agrees to proceed     My office door was closed  No one else was in the room  She acknowledged consent and understanding of privacy and security of the video platform  The patient has agreed to participate and understands they can discontinue the visit at any time  Patient is aware this is a billable service       Galilea Garcia is a 50 y o  female , who was seen for individual mental health therapy         HPI     Past Medical History:   Diagnosis Date   • Allergic     Seasonal Allergies   • Alopecia of scalp     Treated with Proscar   • Chronic pain disorder     back, bilat knees, bilat hips   • Diabetes mellitus ([address] )    • Fibromyalgia, primary    • GERD (gastroesophageal reflux disease)    • Hip fx, left, closed, with routine healing, subsequent encounter 05/2017   • Hyperlipidemia    • Hypertension    • Insomnia    • Irregular heartbeat    • VICTOR HUGO on CPAP        Past Surgical History:   Procedure Laterality Date   • CARPAL TUNNEL RELEASE Left    • LUMBAR EPIDURAL INJECTION     • NERVE BLOCK Bilateral 12/29/2020    Procedure: L2 L3 L4 L5 MEDIAL BRANCH BLOCK #1;  Surgeon: Ewa Ponce MD;  [facility];  Service: Pain Management    • NERVE BLOCK Bilateral 1/19/2021    Procedure: L2 L3 L4 L5 MEDIAL BRANCH BLOCK #2;  Surgeon: Ewa Ponce MD;  [facility];  Service: Pain Management    • IL ARTHROCENTESIS ASPIR&/INJ MAJOR JT/BURSA W/O US Bilateral 2/7/2023    Procedure: INJECTION JOINT HIP;  Surgeon: Ewa Ponce MD;  [facility];  Service: Pain Management    • IL HYSTEROSCOPY BX ENDOMETRIUM&/POLYPC W/WO D&C N/A 4/25/2023    Procedure: HYSTEROSCOPY W/  RESECTION UTERINE TUMOR/FIBROID (POLYPECTOMY );  Surgeon: Marnie Rutledge DO;  [facility];  Service: Gynecology   • RADIOFREQUENCY ABLATION Right 2/25/2021    Procedure: L2 L3 L4 L5 RADIO FREQUENCY ABLATION right side;  Surgeon: Ewa Ponce MD;  [facility];  Service: Pain Management    • RADIOFREQUENCY ABLATION Left 3/16/2021    Procedure: L2 L3 L4 L5 RADIO FREQUENCY ABLATION;  Surgeon: Ewa Ponce MD;  [facility];  Service: Pain Management    • RHIZOTOMY Bilateral 4/4/2023    Procedure: RHIZOTOMY LUMBAR L3, L4, L5 medial branch nerves;  Surgeon: Ewa Ponce MD;  [facility];  Service: Pain Management    • UPPER GASTROINTESTINAL ENDOSCOPY         Current Outpatient Medications   Medication Sig Dispense Refill   • acetaminophen (TYLENOL) 500 mg tablet Take 500 mg by mouth every 6 (six) hours as needed for mild pain     • albuterol (Ventolin HFA) 90 mcg/act inhaler Inhale 2 puffs every 6 (six) hours as needed for wheezing 18 g 5   • atorvastatin (LIPITOR) 20 mg tablet Take 1 tablet (20 mg total) by mouth daily with dinner 90 tablet 1   • celecoxib (CeleBREX) 200 mg capsule Take 1 capsule (200 mg total) by mouth 2 (two) times a day 60 capsule 3   • Empagliflozin 25 MG TABS Take 1 tablet (25 mg total) by mouth daily 90 tablet 1   • ergocalciferol (VITAMIN D2) 50,000 units Take 1 capsule (50,000 Units total) by mouth once a week 12 capsule 1   • Fluticasone-Salmeterol (Advair Diskus) 250-50 mcg/dose inhaler Inhale 1 puff 2 (two) times a day Rinse mouth after use  180 blister 3   • gabapentin (NEURONTIN) 300 mg capsule TAKE (1) CAPSULE THREE TIMES DAILY  90 capsule 0   • glipiZIDE (GLUCOTROL) 10 mg tablet Take 2 tablets (20 mg total) by mouth 2 (two) times a day before meals 360 tablet 1   • glucose blood test strip Use as instructed 100 each 5   • Lancets (onetouch ultrasoft) lancets Use as instructed 100 each 5   • lisinopril (ZESTRIL) 10 mg tablet TAKE (1) TABLET BY MOUTH DAILY  90 tablet 0   • metFORMIN (GLUCOPHAGE) 1000 MG tablet Take 1 tablet (1,000 mg total) by mouth 2 (two) times a day with meals 180 tablet 5   • metoclopramide (Reglan) 10 mg tablet Take 1 tablet (10 mg total) by mouth 2 (two) times a day 2 tablet 0   • omeprazole (PriLOSEC) 20 mg delayed release capsule Take 1 capsule (20 mg total) by mouth daily 90 capsule 1   • sertraline (ZOLOFT) 50 mg tablet TAKE (1) TABLET BY MOUTH DAILY AT BEDTIME  90 tablet 1   • triamcinolone (KENALOG) 0 025 % cream Apply 2 x daily to rash until healed then prn rash 60 g 3     No current facility-administered medications for this visit          Allergies   Allergen Reactions   • Trulicity [Dulaglutide] Swelling   • Tdap [Tetanus-Diphth-Acell Pertussis] Rash       Review of Systems    Video Exam    There were no vitals filed for this visit      Physical Exam     Visit Time    Visit Start Time: 1737  Visit Stop Time: 1330  Total Visit Duration: 30 minutes

## 2023-05-31 DIAGNOSIS — M47.816 LUMBAR SPONDYLOSIS: ICD-10-CM

## 2023-05-31 RX ORDER — GABAPENTIN 300 MG/1
CAPSULE ORAL
Qty: 90 CAPSULE | Refills: 0 | Status: SHIPPED | OUTPATIENT
Start: 2023-05-31

## 2023-06-05 ENCOUNTER — TELEMEDICINE (OUTPATIENT)
Dept: BEHAVIORAL/MENTAL HEALTH CLINIC | Facility: CLINIC | Age: 48
End: 2023-06-05
Payer: COMMERCIAL

## 2023-06-05 DIAGNOSIS — F33.9 MAJOR DEPRESSION, RECURRENT, CHRONIC (HCC): Primary | ICD-10-CM

## 2023-06-05 PROCEDURE — T1015 CLINIC SERVICE: HCPCS | Performed by: SOCIAL WORKER

## 2023-06-05 NOTE — PSYCH
"Behavioral Health Psychotherapy Progress Note    Psychotherapy Provided: Individual Psychotherapy     1  Major depression, recurrent, chronic (HCC)            Goals addressed in session: Goal 1     DATA: the client reported increase in her economic issues which has increased her anxiety and depression  \" I am running out of money\" During the session we explored and processed the connection with her mental health and economic issues  She reported that her governmental supports have helped her becoming homeless  The client reported that she will be having a biopsy on her thyroid, due possible cancer Dx  As team we explored  how her thyroid could effect her MH, she was asked to log of moods are and the effects on her different relationships and environments  During this session, this clinician used the following therapeutic modalities: Cognitive Behavioral Therapy, Mindfulness-based Strategies, Solution-Focused Therapy and Supportive Psychotherapy    Substance Abuse was addressed during this session  If the client is diagnosed with a co-occurring substance use disorder, please indicate any changes in the frequency or amount of use: n/a  Stage of change for addressing substance use diagnoses: No substance use/Not applicable    ASSESSMENT:  Cristy Garcia presents with a Anxious mood  her affect is Normal range and intensity, which is congruent, with her mood and the content of the session  The client has made progress on their goals  The Client  Baudilio Walker presents with a none risk of suicide, none risk of self-harm, and none risk of harm to others  For any risk assessment that surpasses a \"low\" rating, a safety plan must be developed  A safety plan was indicated: no  If yes, describe in detail n/a    PLAN: Between sessions, Baudilio Walker will utilize silver cloud to address her MH symptoms   At the next session, the therapist will use Cognitive Behavioral Therapy, Mindfulness-based Strategies, " Solution-Focused Therapy and Supportive Psychotherapy to address her MH issues that are effecting her different relationships and enviorments    Behavioral Health Treatment Plan and Discharge Planning: Demetris Hein is aware of and agrees to continue to work on their treatment plan  They have identified and are working toward their discharge goals  yes    Visit start and stop times:    06/05/23  Start Time: 1517  Stop Time: 1540  Total Visit Time: 23 minutes    Virtual Regular Visit    Verification of patient location:    Patient is located at Home in the following state in which I hold an active license [place]      Assessment/Plan:    Problem List Items Addressed This Visit        Other    Major depression, recurrent, chronic ([address] ) - Primary       Goals addressed in session: Goal 1          Reason for visit is   Chief Complaint   Patient presents with   • Virtual Regular Visit        Encounter provider YONATAN Murphy    Provider located at [address] [place] [id number]      Recent Visits  No visits were found meeting these conditions  Showing recent visits within past 7 days and meeting all other requirements  Today's Visits  Date Type Provider Dept   06/05/23 Telemedicine YONATAN Murphy [facility]   Showing today's visits and meeting all other requirements  Future Appointments  No visits were found meeting these conditions  Showing future appointments within next 150 days and meeting all other requirements       The patient was identified by name and date of birth  Demetris Hein was informed that this is a telemedicine visit and that the visit is being conducted throughthe [employer] platform  She agrees to proceed     My office door was closed  No one else was in the room  She acknowledged consent and understanding of privacy and security of the video platform   The patient has agreed to participate and understands they can discontinue the visit at any time  Patient is aware this is a billable service  Subjective  Tessie Gallardo is a 50 y o  female , who was seen for individual mental health symtoms         HPI     Past Medical History:   Diagnosis Date   • Allergic     Seasonal Allergies   • Alopecia of scalp     Treated with Proscar   • Chronic pain disorder     back, bilat knees, bilat hips   • Diabetes mellitus ([facility] [address] )    • Fibromyalgia, primary    • GERD (gastroesophageal reflux disease)    • Hip fx, left, closed, with routine healing, subsequent encounter 05/2017   • Hyperlipidemia    • Hypertension    • Insomnia    • Irregular heartbeat    • VICTOR HUGO on CPAP        Past Surgical History:   Procedure Laterality Date   • CARPAL TUNNEL RELEASE Left    • LUMBAR EPIDURAL INJECTION     • NERVE BLOCK Bilateral 12/29/2020    Procedure: L2 L3 L4 L5 MEDIAL BRANCH BLOCK #1;  Surgeon: Laine Daniel MD;  [facility];  Service: Pain Management    • NERVE BLOCK Bilateral 1/19/2021    Procedure: L2 L3 L4 L5 MEDIAL BRANCH BLOCK #2;  Surgeon: Laine Daniel MD;  [facility];  Service: Pain Management    • ME ARTHROCENTESIS ASPIR&/INJ MAJOR JT/BURSA W/O US Bilateral 2/7/2023    Procedure: INJECTION JOINT HIP;  Surgeon: Laine Daniel MD;  [facility];  Service: Pain Management    • ME HYSTEROSCOPY BX ENDOMETRIUM&/POLYPC W/WO D&C N/A 4/25/2023    Procedure: HYSTEROSCOPY W/  RESECTION UTERINE TUMOR/FIBROID (POLYPECTOMY );  Surgeon: Cedric Peter DO;  [facility];  Service: Gynecology   • RADIOFREQUENCY ABLATION Right 2/25/2021    Procedure: L2 L3 L4 L5 RADIO FREQUENCY ABLATION right side;  Surgeon: Laine Daniel MD;  [facility];  Service: Pain Management    • RADIOFREQUENCY ABLATION Left 3/16/2021    Procedure: L2 L3 L4 L5 RADIO FREQUENCY ABLATION;  Surgeon: Laine Daniel MD;  [facility];  Service: Pain Management    • RHIZOTOMY Bilateral 4/4/2023    Procedure: RHIZOTOMY LUMBAR L3, L4, L5 medial branch nerves;  Surgeon: Champ Abrams MD;  [facility];  Service: Pain Management    • UPPER GASTROINTESTINAL ENDOSCOPY         Current Outpatient Medications   Medication Sig Dispense Refill   • acetaminophen (TYLENOL) 500 mg tablet Take 500 mg by mouth every 6 (six) hours as needed for mild pain     • albuterol (Ventolin HFA) 90 mcg/act inhaler Inhale 2 puffs every 6 (six) hours as needed for wheezing 18 g 5   • atorvastatin (LIPITOR) 20 mg tablet Take 1 tablet (20 mg total) by mouth daily with dinner 90 tablet 1   • celecoxib (CeleBREX) 200 mg capsule Take 1 capsule (200 mg total) by mouth 2 (two) times a day 60 capsule 3   • Empagliflozin 25 MG TABS Take 1 tablet (25 mg total) by mouth daily 90 tablet 1   • ergocalciferol (VITAMIN D2) 50,000 units Take 1 capsule (50,000 Units total) by mouth once a week 12 capsule 1   • Fluticasone-Salmeterol (Advair Diskus) 250-50 mcg/dose inhaler Inhale 1 puff 2 (two) times a day Rinse mouth after use  180 blister 3   • gabapentin (NEURONTIN) 300 mg capsule TAKE (1) CAPSULE THREE TIMES DAILY  90 capsule 0   • glipiZIDE (GLUCOTROL) 10 mg tablet Take 2 tablets (20 mg total) by mouth 2 (two) times a day before meals 360 tablet 1   • glucose blood test strip Use as instructed 100 each 5   • Lancets (onetouch ultrasoft) lancets Use as instructed 100 each 5   • lisinopril (ZESTRIL) 10 mg tablet Take 1 tablet (10 mg total) by mouth daily 90 tablet 1   • metFORMIN (GLUCOPHAGE) 1000 MG tablet Take 1 tablet (1,000 mg total) by mouth 2 (two) times a day with meals 180 tablet 5   • metoclopramide (Reglan) 10 mg tablet Take 1 tablet (10 mg total) by mouth 2 (two) times a day 2 tablet 0   • omeprazole (PriLOSEC) 20 mg delayed release capsule Take 1 capsule (20 mg total) by mouth daily 90 capsule 1   • sertraline (ZOLOFT) 50 mg tablet TAKE (1) TABLET BY MOUTH DAILY AT BEDTIME   90 tablet 1   • triamcinolone (KENALOG) 0 025 % cream Apply 2 x daily to rash until healed then prn rash 60 g 3     No current facility-administered medications for this visit  Allergies   Allergen Reactions   • Trulicity [Dulaglutide] Swelling   • Tdap [Tetanus-Diphth-Acell Pertussis] Rash       Review of Systems    Video Exam    There were no vitals filed for this visit      Physical Exam     Visit Time    Visit Start Time: 1471  Visit Stop Time: 0374  Total Visit Duration: 23 minutes

## 2023-06-19 ENCOUNTER — TELEMEDICINE (OUTPATIENT)
Dept: BEHAVIORAL/MENTAL HEALTH CLINIC | Facility: CLINIC | Age: 48
End: 2023-06-19
Payer: COMMERCIAL

## 2023-06-19 DIAGNOSIS — F33.9 MAJOR DEPRESSION, RECURRENT, CHRONIC (HCC): Primary | ICD-10-CM

## 2023-06-19 PROCEDURE — T1015 CLINIC SERVICE: HCPCS | Performed by: SOCIAL WORKER

## 2023-06-19 NOTE — PSYCH
"Behavioral Health Psychotherapy Progress Note    Psychotherapy Provided: Individual Psychotherapy     1  Major depression, recurrent, chronic ([address] )            Goals addressed in session: Goal 1     DATA: The client reported that her personal care aide, was in an accident and took a week off due to her injuries  \" I really worried about her due to her accident which has caused my anxiety and depression to go up\" She also reported an increase in her  physical pain and breathing due to the weather  Duirng the session we explored and processed the client's connection with her physical health and mental health, as  well as explored and processed  the concept of chunking in addressing her mental health  Coping skills were reviewed during the session to prevent the possible need for higher level of care and services  During this session, this clinician used the following therapeutic modalities: Cognitive Behavioral Therapy, Mindfulness-based Strategies, Solution-Focused Therapy and Supportive Psychotherapy    Substance Abuse was not addressed during this session  If the client is diagnosed with a co-occurring substance use disorder, please indicate any changes in the frequency or amount of use: n/a  Stage of change for addressing substance use diagnoses: No substance use/Not applicable    ASSESSMENT:  Cristy Garcia presents with a Anxious and Depressed mood  her affect is Normal range and intensity, which is congruent, with her mood and the content of the session  The client has made progress on their goals  The Client  Joey Martinez presents with a none risk of suicide, none risk of self-harm, and none risk of harm to others  For any risk assessment that surpasses a \"low\" rating, a safety plan must be developed  A safety plan was indicated: no  If yes, describe in detail n/a    PLAN: Between sessions, Joey Martinez will work on the [facility] program [address]   At the next session, the therapist will " use Cognitive Behavioral Therapy, Mindfulness-based Strategies, Solution-Focused Therapy and Supportive Psychotherapy to address the client's MH weaknesses and the effects on her different relationships and environments  Behavioral Health Treatment Plan and Discharge Planning: Patti Godinez is aware of and agrees to continue to work on their treatment plan  They have identified and are working toward their discharge goals  yes    Visit start and stop times:    06/19/23  Start Time: 1033  Stop Time: 1057  Total Visit Time: 24 minutes    Virtual Regular Visit    Verification of patient location:    Patient is located at Home in the following state in which I hold an active license [place]      Assessment/Plan:    Problem List Items Addressed This Visit        Other    Major depression, recurrent, chronic ([facility] [address] ) - Primary       Goals addressed in session: Goal 1          Reason for visit is   Chief Complaint   Patient presents with   • Virtual Regular Visit        Encounter provider YONATAN Davidson    Provider located at [address] [place] [id number]      Recent Visits  No visits were found meeting these conditions  Showing recent visits within past 7 days and meeting all other requirements  Today's Visits  Date Type Provider Dept   06/19/23 Telemedicine YONATAN Davidson [facility]   Showing today's visits and meeting all other requirements  Future Appointments  No visits were found meeting these conditions  Showing future appointments within next 150 days and meeting all other requirements       The patient was identified by name and date of birth  Patti Godinez was informed that this is a telemedicine visit and that the visit is being conducted throughthe [employer] platform  She agrees to proceed     My office door was closed  No one else was in the room    She acknowledged consent and understanding of privacy and security of the video platform  The patient has agreed to participate and understands they can discontinue the visit at any time  Patient is aware this is a billable service  Subjective  Mike Jc is a 50 y o  female , who was seen for individual MH therapy          HPI     Past Medical History:   Diagnosis Date   • Allergic     Seasonal Allergies   • Alopecia of scalp     Treated with Proscar   • Chronic pain disorder     back, bilat knees, bilat hips   • Diabetes mellitus ([address] )    • Fibromyalgia, primary    • GERD (gastroesophageal reflux disease)    • Hip fx, left, closed, with routine healing, subsequent encounter 05/2017   • Hyperlipidemia    • Hypertension    • Insomnia    • Irregular heartbeat    • VICTOR HUGO on CPAP        Past Surgical History:   Procedure Laterality Date   • CARPAL TUNNEL RELEASE Left    • LUMBAR EPIDURAL INJECTION     • NERVE BLOCK Bilateral 12/29/2020    Procedure: L2 L3 L4 L5 MEDIAL BRANCH BLOCK #1;  Surgeon: Gordo Zimmer MD;  [facility]: [facility];  Service: Pain Management    • NERVE BLOCK Bilateral 1/19/2021    Procedure: L2 L3 L4 L5 MEDIAL BRANCH BLOCK #2;  Surgeon: Gordo Zimmer MD;  [facility];  Service: Pain Management    • NM ARTHROCENTESIS ASPIR&/INJ MAJOR JT/BURSA W/O US Bilateral 2/7/2023    Procedure: INJECTION JOINT HIP;  Surgeon: Gordo Zimmer MD;  [facility]: [facility];  Service: Pain Management    • NM HYSTEROSCOPY BX ENDOMETRIUM&/POLYPC W/WO D&C N/A 4/25/2023    Procedure: HYSTEROSCOPY W/  RESECTION UTERINE TUMOR/FIBROID (POLYPECTOMY );  Surgeon: Jose Chery DO;  Location: [facility];  Service: Gynecology   • RADIOFREQUENCY ABLATION Right 2/25/2021    Procedure: L2 L3 L4 L5 RADIO FREQUENCY ABLATION right side;  Surgeon: Gordo Zimmer MD;  [facility];  Service: Pain Management    • RADIOFREQUENCY ABLATION Left 3/16/2021    Procedure: L2 L3 L4 L5 RADIO FREQUENCY ABLATION;  Surgeon: Gordo Zimmer MD;  [facility];  Service: Pain Management    • RHIZOTOMY Bilateral 4/4/2023    Procedure: RHIZOTOMY LUMBAR L3, L4, L5 medial branch nerves;  Surgeon: Pradeep Morrison MD;  Location: [facility];  Service: Pain Management    • UPPER GASTROINTESTINAL ENDOSCOPY         Current Outpatient Medications   Medication Sig Dispense Refill   • albuterol (Ventolin HFA) 90 mcg/act inhaler Inhale 2 puffs every 6 (six) hours as needed for wheezing or shortness of breath 18 g 5   • predniSONE 20 mg tablet 1 tab po 3 x daily x 3 days then 1 tab po 2 x daily x 3 days then 1 tab po daily x 3 days then 1 tab po every other day x 3 days with food 30 tablet 2   • acetaminophen (TYLENOL) 500 mg tablet Take 500 mg by mouth every 6 (six) hours as needed for mild pain     • atorvastatin (LIPITOR) 20 mg tablet Take 1 tablet (20 mg total) by mouth daily with dinner 90 tablet 1   • celecoxib (CeleBREX) 200 mg capsule Take 1 capsule (200 mg total) by mouth 2 (two) times a day 60 capsule 3   • Empagliflozin 25 MG TABS Take 1 tablet (25 mg total) by mouth daily 90 tablet 1   • ergocalciferol (VITAMIN D2) 50,000 units Take 1 capsule (50,000 Units total) by mouth once a week 12 capsule 1   • Fluticasone-Salmeterol (Advair Diskus) 250-50 mcg/dose inhaler Inhale 1 puff 2 (two) times a day Rinse mouth after use  180 blister 3   • gabapentin (NEURONTIN) 300 mg capsule TAKE (1) CAPSULE THREE TIMES DAILY   90 capsule 0   • glipiZIDE (GLUCOTROL) 10 mg tablet Take 2 tablets (20 mg total) by mouth 2 (two) times a day before meals 360 tablet 1   • glucose blood test strip Use as instructed 100 each 5   • Lancets (onetouch ultrasoft) lancets Use as instructed 100 each 5   • lisinopril (ZESTRIL) 10 mg tablet Take 1 tablet (10 mg total) by mouth daily 90 tablet 1   • metFORMIN (GLUCOPHAGE) 1000 MG tablet Take 1 tablet (1,000 mg total) by mouth 2 (two) times a day with meals 180 tablet 5   • metoclopramide (Reglan) 10 mg tablet Take 1 tablet (10 mg total) by mouth 2 (two) times a day 2 tablet 0 • omeprazole (PriLOSEC) 20 mg delayed release capsule Take 1 capsule (20 mg total) by mouth daily 90 capsule 1   • sertraline (ZOLOFT) 50 mg tablet TAKE (1) TABLET BY MOUTH DAILY AT BEDTIME  90 tablet 1   • triamcinolone (KENALOG) 0 025 % cream Apply 2 x daily to rash until healed then prn rash 60 g 3     No current facility-administered medications for this visit  Allergies   Allergen Reactions   • Trulicity [Dulaglutide] Swelling   • Tdap [Tetanus-Diphth-Acell Pertussis] Rash       Review of Systems    Video Exam    There were no vitals filed for this visit      Physical Exam     Visit Time    Visit Start Time: 3243  Visit Stop Time: 3198  Total Visit Duration: 24 minutes

## 2023-06-20 ENCOUNTER — HOSPITAL ENCOUNTER (OUTPATIENT)
Dept: SLEEP CENTER | Facility: HOSPITAL | Age: 48
Discharge: HOME | End: 2023-06-20
Payer: COMMERCIAL

## 2023-06-20 DIAGNOSIS — G47.33 OSA ON CPAP: ICD-10-CM

## 2023-06-20 DIAGNOSIS — Z99.89 OSA ON CPAP: ICD-10-CM

## 2023-06-20 PROCEDURE — 95810 POLYSOM 6/> YRS 4/> PARAM: CPT

## 2023-06-21 NOTE — PROGRESS NOTES
Sleep Study Documentation    Pre-Sleep Study       Sleep testing procedure explained to patient:YES    Patient napped prior to study:NO    [address] [place] worker after 12PM   Caffeine use:NO    Alcohol:Dayshift workers after 5PM: Alcohol use:NO    Typical day for patient:YES       Study Documentation    Sleep Study Indications: The patient is here for snoring, excessive daytime sleepiness, BMI>30, un-refreshing sleep, witnessed apneas and nocturnal choking  Sleep Study: Diagnostic   Snore:Mild to occasionally moderate  Supplemental O2: no    O2 flow rate (L/min) range N/A  O2 flow rate (L/min) final N/A  Minimum SaO2 87  Baseline SaO2 92        Mode of Therapy:N/A     EKG abnormalities: no     EEG abnormalities: no    Sleep Study Recorded < 2 hours: N/A    Sleep Study Recorded > 2 hours but incomplete study: N/A    Sleep Study Recorded 6 hours but no sleep obtained: NO    Patient classification: unemployed       Post-Sleep Study    Medication used at bedtime or during sleep study:YES other prescription medications    Patient reports time it took to fall asleep:greater than 60 minutes    Patient reports waking up during study:3 or more times  Patient reports returning to sleep in greater than 30 minutes  Patient reports sleeping 4 to 6 hours without dreaming  Patient reports sleep during study:typical    Patient rated sleepiness: Very sleepy or tired    PAP treatment:no

## 2023-06-27 ENCOUNTER — TELEMEDICINE (OUTPATIENT)
Dept: BEHAVIORAL/MENTAL HEALTH CLINIC | Facility: CLINIC | Age: 48
End: 2023-06-27
Payer: COMMERCIAL

## 2023-06-27 DIAGNOSIS — F33.9 MAJOR DEPRESSION, RECURRENT, CHRONIC (HCC): Primary | ICD-10-CM

## 2023-06-27 DIAGNOSIS — M47.816 LUMBAR SPONDYLOSIS: ICD-10-CM

## 2023-06-27 PROCEDURE — T1015 CLINIC SERVICE: HCPCS | Performed by: SOCIAL WORKER

## 2023-06-27 RX ORDER — GABAPENTIN 300 MG/1
CAPSULE ORAL
Qty: 90 CAPSULE | Refills: 0 | Status: SHIPPED | OUTPATIENT
Start: 2023-06-27

## 2023-06-27 NOTE — PSYCH
"Behavioral Health Psychotherapy Progress Note    Psychotherapy Provided: Individual Psychotherapy     1  Major depression, recurrent, chronic ([address] )            Goals addressed in session: Goal 1     DATA: The Client reported continued problems with her physical health and economic situations  As team we reviewed community programs and services to address these issues  ([address], and [employer]) During the session we reviewed her coping skills and triggers to her anxiety and depression  The client was able to verbalize her upcoming biopsy on her thyroid  Coping skills were reviewed during the session  During this session, this clinician used the following therapeutic modalities: Cognitive Behavioral Therapy, Mindfulness-based Strategies, Solution-Focused Therapy and Supportive Psychotherapy    Substance Abuse was not addressed during this session  If the client is diagnosed with a co-occurring substance use disorder, please indicate any changes in the frequency or amount of use: N/A Stage of change for addressing substance use diagnoses: No substance use/Not applicable    ASSESSMENT:  Joey Martinez presents with a Anxious mood  her affect is Normal range and intensity, which is congruent, with her mood and the content of the session  The client has made progress on their goals  The client  Joey Martinez presents with a none risk of suicide, none risk of self-harm, and none risk of harm to others  For any risk assessment that surpasses a \"low\" rating, a safety plan must be developed  A safety plan was indicated: no  If yes, describe in detail n/a    PLAN: Between sessions, Joey Martinez will practice her coping skills when presented with anxiety and or derpession   At the next session, the therapist will use Cognitive Behavioral Therapy, Mindfulness-based Strategies, Solution-Focused Therapy and Supportive Psychotherapy to address the MH issues and the effects on her " different relationships and enviroments/    Behavioral Health Treatment Plan and Discharge Planning: Shoaib Van is aware of and agrees to continue to work on their treatment plan  They have identified and are working toward their discharge goals  yes    Visit start and stop times:    06/27/23  Start Time: 1040  Stop Time: 1109  Total Visit Time: 29 minutes  Virtual Brief Visit    This Visit is being completed by telephone  The Patient is located at Home and in the following state in which I hold an active license [place]    The patient was identified by name and date of birth  Shoaib Van was informed that this is a telemedicine visit and that the visit is being conducted through Telephone  My office door was closed  No one else was in the room  She acknowledged consent and understanding of privacy and security of the video platform  The patient has agreed to participate and understands they can discontinue the visit at any time  Patient is aware this is a billable service  Assessment/Plan:    Problem List Items Addressed This Visit        Other    Major depression, recurrent, chronic ([facility] [address] ) - Primary       Recent Visits  Date Type Provider Dept   06/26/23 Telephone CHRIS Mckeon Pg    Showing recent visits within past 7 days and meeting all other requirements  Today's Visits  Date Type Provider Dept   06/27/23 Telemedicine YONATAN [facility]   Showing today's visits and meeting all other requirements  Future Appointments  No visits were found meeting these conditions    Showing future appointments within next 150 days and meeting all other requirements         Visit Time  Total Visit Duration: 29 min

## 2023-07-09 DIAGNOSIS — M47.816 LUMBAR SPONDYLOSIS: ICD-10-CM

## 2023-07-10 ENCOUNTER — TELEPHONE (OUTPATIENT)
Dept: RADIOLOGY | Facility: HOSPITAL | Age: 48
End: 2023-07-10

## 2023-07-10 RX ORDER — GABAPENTIN 300 MG/1
300 CAPSULE ORAL 3 TIMES DAILY
Qty: 270 CAPSULE | Refills: 3 | Status: SHIPPED | OUTPATIENT
Start: 2023-07-10

## 2023-07-10 NOTE — NURSING NOTE
Call placed to pt to discuss upcoming appointment at [facility] Radiology Department and consultation completed. Pt is having a Thyroid Biopsy with Molecular Testing completed on 7/14/2023. Allergies reviewed and verified pt does not currently take any anticoagulant medications. Pre procedure instructions including diet and taking own medications discussed with pt. Pt instructed that she may eat normally and take medications as usual before the procedure. Pt verbalized understanding of instructions given. Reminded pt of the location, date and time of procedure. My number was given to call if any questions or concerns arise pre or post procedure.

## 2023-07-14 ENCOUNTER — HOSPITAL ENCOUNTER (OUTPATIENT)
Dept: ULTRASOUND IMAGING | Facility: HOSPITAL | Age: 48
Discharge: HOME | End: 2023-07-14
Payer: COMMERCIAL

## 2023-07-14 DIAGNOSIS — D44.0 NEOPLASM OF UNCERTAIN BEHAVIOR OF THYROID GLAND: ICD-10-CM

## 2023-07-14 PROCEDURE — 88173 CYTOPATH EVAL FNA REPORT: CPT | Performed by: STUDENT IN AN ORGANIZED HEALTH CARE EDUCATION/TRAINING PROGRAM

## 2023-07-14 PROCEDURE — 10005 FNA BX W/US GDN 1ST LES: CPT

## 2023-07-14 RX ORDER — LIDOCAINE HYDROCHLORIDE 10 MG/ML
5 INJECTION, SOLUTION EPIDURAL; INFILTRATION; INTRACAUDAL; PERINEURAL ONCE
Status: COMPLETED | OUTPATIENT
Start: 2023-07-14 | End: 2023-07-14

## 2023-07-14 RX ADMIN — LIDOCAINE HYDROCHLORIDE 5 ML: 10 INJECTION, SOLUTION EPIDURAL; INFILTRATION; INTRACAUDAL; PERINEURAL at 10:28

## 2023-07-18 ENCOUNTER — TELEMEDICINE (OUTPATIENT)
Dept: BEHAVIORAL/MENTAL HEALTH CLINIC | Facility: CLINIC | Age: 48
End: 2023-07-18
Payer: COMMERCIAL

## 2023-07-18 DIAGNOSIS — F33.9 MAJOR DEPRESSION, RECURRENT, CHRONIC (HCC): Primary | ICD-10-CM

## 2023-07-18 PROCEDURE — T1015 CLINIC SERVICE: HCPCS | Performed by: SOCIAL WORKER

## 2023-07-18 NOTE — PSYCH
Behavioral Health Psychotherapy Progress Note    Psychotherapy Provided: Individual Psychotherapy     1. Major depression, recurrent, chronic ([address])            Goals addressed in session: Goal 1     DATA: The client reported that she was served with an eviction notice due to not paying her rent. She reported that [facility] will be helping pay for the rent over the next few months. During the session the client and this  explored her environmental weaknesses and the effects on her mental health. The Client was able to verbalize that her anxiety has increased which has led to isolation. Coping skills were reviewed during the session. During this session, this clinician used the following therapeutic modalities: Client-centered Therapy, Cognitive Behavioral Therapy and Mindfulness-based Strategies    Substance Abuse was not addressed during this session. If the client is diagnosed with a co-occurring substance use disorder, please indicate any changes in the frequency or amount of use: n/a. Stage of change for addressing substance use diagnoses: No substance use/Not applicable    ASSESSMENT:  Cristy Garcia presents with a Anxious and Depressed mood. her affect is Normal range and intensity, which is congruent, with her mood and the content of the session. The client has made progress on their goals. The client  Martell Cottrell presents with a none risk of suicide, none risk of self-harm, and none risk of harm to others. For any risk assessment that surpasses a "low" rating, a safety plan must be developed. A safety plan was indicated: no  If yes, describe in detail n/a    PLAN: Between sessions, Martell Cottrell will practice coping skills to address her MH issues. At the next session, the therapist will use Cognitive Behavioral Therapy, Mindfulness-based Strategies, Solution-Focused Therapy and Supportive Psychotherapy to address the Client's weaknesses .     Behavioral Health Treatment Plan and Discharge Planning: Ginger Mackey is aware of and agrees to continue to work on their treatment plan. They have identified and are working toward their discharge goals. yes    Visit start and stop times:    07/18/23  Start Time: 1500  Stop Time: 1530  Total Visit Time: 30 minutes  Virtual Brief Visit    This Visit is being completed by telephone. The Patient is located at Home and in the following state in which I hold an active license [place]    The patient was identified by name and date of birth. Ginger Mackey was informed that this is a telemedicine visit and that the visit is being conducted through Telephone. My office door was closed. No one else was in the room. She acknowledged consent and understanding of privacy and security of the video platform. The patient has agreed to participate and understands they can discontinue the visit at any time. Patient is aware this is a billable service. Assessment/Plan:    Problem List Items Addressed This Visit        Other    Major depression, recurrent, chronic ([address]) - Primary       Recent Visits  No visits were found meeting these conditions. Showing recent visits within past 7 days and meeting all other requirements  Today's Visits  Date Type Provider Dept   07/18/23 Telemedicine YONATAN Mckeon  Cln Psych   Showing today's visits and meeting all other requirements  Future Appointments  No visits were found meeting these conditions.   Showing future appointments within next 150 days and meeting all other requirements         Visit Time  Total Visit Duration: 30

## 2023-07-19 PROCEDURE — 88173 CYTOPATH EVAL FNA REPORT: CPT | Performed by: STUDENT IN AN ORGANIZED HEALTH CARE EDUCATION/TRAINING PROGRAM

## 2023-07-21 ENCOUNTER — APPOINTMENT (OUTPATIENT)
Dept: LAB | Facility: HOSPITAL | Age: 48
End: 2023-07-21
Payer: COMMERCIAL

## 2023-07-21 DIAGNOSIS — E05.90 HYPERTHYROIDISM: ICD-10-CM

## 2023-07-21 LAB — TSH SERPL DL<=0.05 MIU/L-ACNC: 0.35 UIU/ML (ref 0.45–4.5)

## 2023-07-21 PROCEDURE — 84443 ASSAY THYROID STIM HORMONE: CPT

## 2023-07-21 PROCEDURE — 36415 COLL VENOUS BLD VENIPUNCTURE: CPT

## 2023-07-24 ENCOUNTER — TELEPHONE (OUTPATIENT)
Dept: BEHAVIORAL/MENTAL HEALTH CLINIC | Facility: CLINIC | Age: 48
End: 2023-07-24

## 2023-07-24 ENCOUNTER — TELEMEDICINE (OUTPATIENT)
Dept: BEHAVIORAL/MENTAL HEALTH CLINIC | Facility: CLINIC | Age: 48
End: 2023-07-24
Payer: COMMERCIAL

## 2023-07-24 DIAGNOSIS — F33.9 MAJOR DEPRESSION, RECURRENT, CHRONIC (HCC): Primary | ICD-10-CM

## 2023-07-24 PROCEDURE — T1015 CLINIC SERVICE: HCPCS | Performed by: SOCIAL WORKER

## 2023-07-24 NOTE — PSYCH
Behavioral Health Psychotherapy Progress Note    Psychotherapy Provided: Individual Psychotherapy     1. Major depression, recurrent, chronic ([facility])            Goals addressed in session: Goal 1     DATA: The Client reported that she has been sick with an upper respitory illness, which has caused her to stay in her apartment. " I am staying in due to my health I don't want anyone to get sick" During the session we explored the Client's depression and the connection with her environment. The Client reported that she has a history of isolation, which  Is a tell tell sign of her depression. " when I am alone I know I am down" She reported that her Supports coordinator from [facility] has been helping with her financial issues. She turned down referral for the [facility], but will discuss the program with her Supports Coordinator. Coping skills were reviewed during the session. During this session, this clinician used the following therapeutic modalities: Cognitive Behavioral Therapy, Mindfulness-based Strategies, Solution-Focused Therapy and Supportive Psychotherapy    Substance Abuse was not addressed during this session. If the client is diagnosed with a co-occurring substance use disorder, please indicate any changes in the frequency or amount of use: n/a. Stage of change for addressing substance use diagnoses: No substance use/Not applicable    ASSESSMENT:  Cristy Garcia presents with a Depressed mood. her affect is Normal range and intensity, which is congruent, with her mood and the content of the session. The client has made progress on their goals. The Client  Bertha Johnson presents with a none risk of suicide, none risk of self-harm, and none risk of harm to others. For any risk assessment that surpasses a "low" rating, a safety plan must be developed.     A safety plan was indicated: no  If yes, describe in detail n/a    PLAN: Between sessions, Bertha Johnson will work on the [address] [address]  next session, the therapist will use Cognitive Behavioral Therapy, Mindfulness-based Strategies, Solution-Focused Therapy and Supportive Psychotherapy to address the Client's MH issues that are effecting her different relationships and enviroments    [address] [place] [address] and Discharge Planning: Camila Lawler is aware of and agrees to continue to work on their treatment plan. They have identified and are working toward their discharge goals. yes    Visit start and stop times:    07/24/23  Start Time: 1435  Stop Time: 1500  Total Visit Time: 25 minutes  Virtual Brief Visit    This Visit is being completed by telephone. The Patient is located at Home and in the following state in which I hold an active license [place]    The patient was identified by name and date of birth. Camila Lawler was informed that this is a telemedicine visit and that the visit is being conducted through Telephone. My office door was closed. No one else was in the room. She acknowledged consent and understanding of privacy and security of the video platform. The patient has agreed to participate and understands they can discontinue the visit at any time. Patient is aware this is a billable service. Assessment/Plan:    Problem List Items Addressed This Visit        Other    Major depression, recurrent, chronic ([address]) - Primary       Recent Visits  Date Type Provider Dept   07/21/23 Telephone CHRIS Chiu Pgland Jayv   07/18/23 Telemedicine YONATAN Luu [facility]  Cln Psych   Showing recent visits within past 7 days and meeting all other requirements  Today's Visits  Date Type Provider Dept   07/24/23 Telephone YONATAN Franklin [facility]  Cln Psych   07/24/23 Telemedicine YONATAN Franklin [facility]   Showing today's visits and meeting all other requirements  Future Appointments  No visits were found meeting these conditions.   Showing future appointments within next 150 days and meeting all other requirements         Visit Time  Total Visit Duration: 25 min

## 2023-08-07 ENCOUNTER — APPOINTMENT (OUTPATIENT)
Dept: LAB | Facility: HOSPITAL | Age: 48
End: 2023-08-07
Payer: COMMERCIAL

## 2023-08-07 ENCOUNTER — TELEMEDICINE (OUTPATIENT)
Dept: BEHAVIORAL/MENTAL HEALTH CLINIC | Facility: CLINIC | Age: 48
End: 2023-08-07
Payer: COMMERCIAL

## 2023-08-07 DIAGNOSIS — F33.9 MAJOR DEPRESSION, RECURRENT, CHRONIC (HCC): ICD-10-CM

## 2023-08-07 DIAGNOSIS — E04.2 MULTINODULAR GOITER: ICD-10-CM

## 2023-08-07 DIAGNOSIS — F33.9 MAJOR DEPRESSION, RECURRENT, CHRONIC (HCC): Primary | ICD-10-CM

## 2023-08-07 DIAGNOSIS — E11.65 TYPE 2 DIABETES MELLITUS WITH HYPERGLYCEMIA, WITHOUT LONG-TERM CURRENT USE OF INSULIN (HCC): ICD-10-CM

## 2023-08-07 PROCEDURE — 86376 MICROSOMAL ANTIBODY EACH: CPT

## 2023-08-07 PROCEDURE — 36415 COLL VENOUS BLD VENIPUNCTURE: CPT

## 2023-08-07 PROCEDURE — T1015 CLINIC SERVICE: HCPCS | Performed by: SOCIAL WORKER

## 2023-08-07 RX ORDER — GLIPIZIDE 10 MG/1
TABLET ORAL
Qty: 360 TABLET | Refills: 1 | Status: SHIPPED | OUTPATIENT
Start: 2023-08-07

## 2023-08-07 NOTE — PSYCH
Behavioral Health Psychotherapy Progress Note    Psychotherapy Provided: Individual Psychotherapy     1. Major depression, recurrent, chronic (HCC)            Goals addressed in session: Goal 1     DATA: The client reported continued problems with her physical health which has caused an increase in her anxiety and depression. " My health is getting bad" During the session we explored the client's continued physical health issues and the effects on her [address] [place] [address]. The Client reports she is also having issues with economic issues and [facility] has put her in program to   During this session, this clinician used the following therapeutic modalities: Cognitive Behavioral Therapy, Mindfulness-based Strategies, Solution-Focused Therapy and Supportive Psychotherapy    Substance Abuse was not addressed during this session. If the client is diagnosed with a co-occurring substance use disorder, please indicate any changes in the frequency or amount of use: n/a. Stage of change for addressing substance use diagnoses: No substance use/Not applicable    ASSESSMENT:  Cristy Garcia presents with a Anxious and Depressed mood. her affect is Normal range and intensity, which is congruent, with her mood and the content of the session. The client has made progress on their goals. Jerone Duverney presents with a none risk of suicide, none risk of self-harm, and none risk of harm to others. For any risk assessment that surpasses a "low" rating, a safety plan must be developed. A safety plan was indicated: no  If yes, describe in detail n/a    PLAN: Between sessions, Jerone Duverney will utilize her coping skills when presented with anxiety and or depression. At the next session, the therapist will use Cognitive Behavioral Therapy, Mindfulness-based Strategies, Solution-Focused Therapy and Supportive Psychotherapy to address the client's MH weaknesses and the effects on her different relationships and enviorments.     Behavioral Health Treatment Plan and Discharge Planning: Bertha Johnson is aware of and agrees to continue to work on their treatment plan. They have identified and are working toward their discharge goals. yes    Visit start and stop times:    08/07/23  Start Time: 1330  Stop Time: 1400  Total Visit Time: 30 minutes  Virtual Brief Visit    This Visit is being completed by telephone. The Patient is located at Home and in the following state in which I hold an active license [place]    The patient was identified by name and date of birth. Bertha Johnson was informed that this is a telemedicine visit and that the visit is being conducted through Telephone. My office door was closed. No one else was in the room. She acknowledged consent and understanding of privacy and security of the video platform. The patient has agreed to participate and understands they can discontinue the visit at any time. Patient is aware this is a billable service. Assessment/Plan:    Problem List Items Addressed This Visit        Other    Major depression, recurrent, chronic ([address]) - Primary       Recent Visits  No visits were found meeting these conditions. Showing recent visits within past 7 days and meeting all other requirements  Today's Visits  Date Type Provider Dept   08/07/23 Telemedicine YONATAN White  Cln Psych   Showing today's visits and meeting all other requirements  Future Appointments  No visits were found meeting these conditions.   Showing future appointments within next 150 days and meeting all other requirements         Visit Time  Total Visit Duration: 30

## 2023-08-08 LAB — THYROPEROXIDASE AB SERPL-ACNC: <9 IU/ML (ref 0–34)

## 2023-08-21 ENCOUNTER — SOCIAL WORK (OUTPATIENT)
Dept: BEHAVIORAL/MENTAL HEALTH CLINIC | Facility: CLINIC | Age: 48
End: 2023-08-21
Payer: COMMERCIAL

## 2023-08-21 DIAGNOSIS — F33.9 MAJOR DEPRESSION, RECURRENT, CHRONIC (HCC): Primary | ICD-10-CM

## 2023-08-21 PROCEDURE — T1015 CLINIC SERVICE: HCPCS | Performed by: SOCIAL WORKER

## 2023-08-21 NOTE — PSYCH
Behavioral Health Psychotherapy Progress Note    Psychotherapy Provided: Individual Psychotherapy     1. Major depression, recurrent, chronic ([address])            Goals addressed in session: Goal 1     DATA: The client reported that she obtained 3 months of rent for housing. She reported continued issues with her depression due to economic issues, and the client was given information on the housing program [employer]. As team we explored the client's depression and the effects on her different relationships and environments. Coping skills were reviewed during the session to address the Client's depression. During this session, this clinician used the following therapeutic modalities: Cognitive Behavioral Therapy, Mindfulness-based Strategies, Solution-Focused Therapy and Supportive Psychotherapy    Substance Abuse was not addressed during this session. If the client is diagnosed with a co-occurring substance use disorder, please indicate any changes in the frequency or amount of use: n/a. Stage of change for addressing substance use diagnoses: No substance use/Not applicable    ASSESSMENT:  Cristy Garica presents with a Depressed mood. [facility] MENTAL STATUS PAIN 6 as reported by the Client due to money issues     her affect is Normal range and intensity, which is congruent, with her mood and the content of the session. The client has made progress on their goals. The Client, Sun Perales presents with a none risk of suicide, none risk of self-harm, and none risk of harm to others. For any risk assessment that surpasses a "low" rating, a safety plan must be developed.     A safety plan was indicated: no  If yes, describe in detail n/a    PLAN: Between sessions, Sun Perales will contact [employer] to learn about housing programs At the next session, the therapist will use Cognitive Behavioral Therapy, Mindfulness-based Strategies, Solution-Focused Therapy and Supportive Psychotherapy to address the Client MH issues effects on her different relationships and environments . Behavioral Health Treatment Plan and Discharge Planning: Harsha Rodarte is aware of and agrees to continue to work on their treatment plan. They have identified and are working toward their discharge goals. yes    Visit start and stop times:    08/21/23  Start Time: 1430  Stop Time: 1500  Total Visit Time: 30 minutes  Virtual Brief Visit    This Visit is being completed by telephone. The Patient is located at Home and in the following state in which I hold an active license [place]    The patient was identified by name and date of birth. Harsha Rodarte was informed that this is a telemedicine visit and that the visit is being conducted through Telephone. My office door was closed. No one else was in the room. She acknowledged consent and understanding of privacy and security of the video platform. The patient has agreed to participate and understands they can discontinue the visit at any time. Patient is aware this is a billable service. Assessment/Plan:    Problem List Items Addressed This Visit        Other    Major depression, recurrent, chronic ([address]) - Primary       Recent Visits  No visits were found meeting these conditions. Showing recent visits within past 7 days and meeting all other requirements  Future Appointments  No visits were found meeting these conditions.   Showing future appointments within next 150 days and meeting all other requirements         Visit Time  Total Visit Duration: 30 min

## 2023-08-23 ENCOUNTER — TELEPHONE (OUTPATIENT)
Dept: GASTROENTEROLOGY | Facility: CLINIC | Age: 48
End: 2023-08-23

## 2023-08-23 DIAGNOSIS — E55.9 VITAMIN D DEFICIENCY: ICD-10-CM

## 2023-08-23 RX ORDER — ERGOCALCIFEROL 1.25 MG/1
50000 CAPSULE ORAL WEEKLY
Qty: 12 CAPSULE | Refills: 1 | Status: SHIPPED | OUTPATIENT
Start: 2023-08-23

## 2023-08-23 NOTE — TELEPHONE ENCOUNTER
Per last egd pt needs     Elective upper endoscopy to evaluate the rest of the stomach  Clear liquids after lunch and n.p.o. at 6 PM, and Reglan 10 mg by mouth at 2 PM and 8 PM on the day prior to the repeat procedure

## 2023-08-25 NOTE — TELEPHONE ENCOUNTER
I called and scheduled patient for her  Elective Upper Endoscopy to Evaluate the rest of the stomach. I went over her instructions with her Clear liquids after lunch and npo @ 6:00 pm and Reglan 10 mg by mouth at 2:00 pm and 8 pm on the day prior to the repeat procedure. She expressed understanding.

## 2023-08-28 DIAGNOSIS — M16.0 PRIMARY OSTEOARTHRITIS OF BOTH HIPS: ICD-10-CM

## 2023-08-28 RX ORDER — CELECOXIB 200 MG/1
200 CAPSULE ORAL 2 TIMES DAILY
Qty: 60 CAPSULE | Refills: 0 | Status: SHIPPED | OUTPATIENT
Start: 2023-08-28 | End: 2023-08-30

## 2023-08-30 DIAGNOSIS — M16.0 PRIMARY OSTEOARTHRITIS OF BOTH HIPS: ICD-10-CM

## 2023-08-30 RX ORDER — CELECOXIB 200 MG/1
CAPSULE ORAL
Qty: 60 CAPSULE | Refills: 0 | Status: SHIPPED | OUTPATIENT
Start: 2023-08-30

## 2023-08-30 NOTE — TELEPHONE ENCOUNTER
Please refuse medication request with note "Patient needs to contact office for refill", request came from pharmacy

## 2023-09-06 ENCOUNTER — HOSPITAL ENCOUNTER (OUTPATIENT)
Dept: PERIOP | Facility: HOSPITAL | Age: 48
Setting detail: OUTPATIENT SURGERY
Discharge: HOME | End: 2023-09-06
Payer: COMMERCIAL

## 2023-09-06 ENCOUNTER — ANESTHESIA (OUTPATIENT)
Dept: PERIOP | Facility: HOSPITAL | Age: 48
End: 2023-09-06

## 2023-09-06 ENCOUNTER — ANESTHESIA EVENT (OUTPATIENT)
Dept: PERIOP | Facility: HOSPITAL | Age: 48
End: 2023-09-06

## 2023-09-06 VITALS
WEIGHT: 249 LBS | SYSTOLIC BLOOD PRESSURE: 114 MMHG | HEART RATE: 77 BPM | HEIGHT: 62 IN | OXYGEN SATURATION: 95 % | DIASTOLIC BLOOD PRESSURE: 75 MMHG | TEMPERATURE: 98 F | RESPIRATION RATE: 20 BRPM | BODY MASS INDEX: 45.82 KG/M2

## 2023-09-06 DIAGNOSIS — R12 HEARTBURN: ICD-10-CM

## 2023-09-06 DIAGNOSIS — R13.10 DYSPHAGIA, UNSPECIFIED TYPE: ICD-10-CM

## 2023-09-06 DIAGNOSIS — K20.90 ESOPHAGITIS DETERMINED BY ENDOSCOPY: Primary | ICD-10-CM

## 2023-09-06 LAB
EXT PREGNANCY TEST URINE: NEGATIVE
EXT. CONTROL: NORMAL
GLUCOSE SERPL-MCNC: 100 MG/DL (ref 65–140)

## 2023-09-06 PROCEDURE — 88305 TISSUE EXAM BY PATHOLOGIST: CPT | Performed by: SPECIALIST

## 2023-09-06 PROCEDURE — 88112 CYTOPATH CELL ENHANCE TECH: CPT | Performed by: SPECIALIST

## 2023-09-06 PROCEDURE — 81025 URINE PREGNANCY TEST: CPT | Performed by: ANESTHESIOLOGY

## 2023-09-06 PROCEDURE — 43239 EGD BIOPSY SINGLE/MULTIPLE: CPT | Performed by: INTERNAL MEDICINE

## 2023-09-06 PROCEDURE — 82948 REAGENT STRIP/BLOOD GLUCOSE: CPT

## 2023-09-06 RX ORDER — PROPOFOL 10 MG/ML
INJECTION, EMULSION INTRAVENOUS AS NEEDED
Status: DISCONTINUED | OUTPATIENT
Start: 2023-09-06 | End: 2023-09-06

## 2023-09-06 RX ORDER — SODIUM CHLORIDE, SODIUM LACTATE, POTASSIUM CHLORIDE, CALCIUM CHLORIDE 600; 310; 30; 20 MG/100ML; MG/100ML; MG/100ML; MG/100ML
INJECTION, SOLUTION INTRAVENOUS CONTINUOUS PRN
Status: DISCONTINUED | OUTPATIENT
Start: 2023-09-06 | End: 2023-09-06

## 2023-09-06 RX ORDER — FAMOTIDINE 40 MG/1
40 TABLET, FILM COATED ORAL
Qty: 30 TABLET | Refills: 3 | Status: SHIPPED | OUTPATIENT
Start: 2023-09-06

## 2023-09-06 RX ORDER — METOCLOPRAMIDE HYDROCHLORIDE 5 MG/ML
10 INJECTION INTRAMUSCULAR; INTRAVENOUS ONCE AS NEEDED
Status: DISCONTINUED | OUTPATIENT
Start: 2023-09-06 | End: 2023-09-10 | Stop reason: HOSPADM

## 2023-09-06 RX ORDER — ONDANSETRON 2 MG/ML
4 INJECTION INTRAMUSCULAR; INTRAVENOUS ONCE AS NEEDED
Status: DISCONTINUED | OUTPATIENT
Start: 2023-09-06 | End: 2023-09-10 | Stop reason: HOSPADM

## 2023-09-06 RX ORDER — LIDOCAINE HYDROCHLORIDE 20 MG/ML
INJECTION, SOLUTION EPIDURAL; INFILTRATION; INTRACAUDAL; PERINEURAL AS NEEDED
Status: DISCONTINUED | OUTPATIENT
Start: 2023-09-06 | End: 2023-09-06

## 2023-09-06 RX ORDER — OMEPRAZOLE 40 MG/1
40 CAPSULE, DELAYED RELEASE ORAL
Qty: 30 CAPSULE | Refills: 3 | Status: SHIPPED | OUTPATIENT
Start: 2023-09-06 | End: 2023-09-08

## 2023-09-06 RX ADMIN — PROPOFOL 50 MG: 10 INJECTION, EMULSION INTRAVENOUS at 13:13

## 2023-09-06 RX ADMIN — LIDOCAINE HYDROCHLORIDE 100 MG: 20 INJECTION, SOLUTION EPIDURAL; INFILTRATION; INTRACAUDAL at 13:10

## 2023-09-06 RX ADMIN — PROPOFOL 150 MG: 10 INJECTION, EMULSION INTRAVENOUS at 13:10

## 2023-09-06 RX ADMIN — PROPOFOL 50 MG: 10 INJECTION, EMULSION INTRAVENOUS at 13:14

## 2023-09-06 RX ADMIN — SODIUM CHLORIDE, SODIUM LACTATE, POTASSIUM CHLORIDE, AND CALCIUM CHLORIDE: .6; .31; .03; .02 INJECTION, SOLUTION INTRAVENOUS at 12:24

## 2023-09-06 RX ADMIN — PROPOFOL 50 MG: 10 INJECTION, EMULSION INTRAVENOUS at 13:11

## 2023-09-06 RX ADMIN — PROPOFOL 50 MG: 10 INJECTION, EMULSION INTRAVENOUS at 13:16

## 2023-09-06 NOTE — H&P
H&P EXAM - Outpatient Endoscopy   Cristy Garcia 50 y.o. female MRN: [id number]    [address] [facility]   Encounter: [id number]      History and Physical - [facility]  Cristy Garcia 50 y.o. female MRN: [id number]                  HPI: Jannette Hotra is a 50y.o. year old female who presents for GERD, heartburn, dysphagia      REVIEW OF SYSTEMS: Per the HPI, and otherwise unremarkable.     Historical Information   Past Medical History:   Diagnosis Date   • Allergic     Seasonal Allergies   • Alopecia of scalp     Treated with Proscar   • Chronic pain disorder     back, bilat knees, bilat hips   • Diabetes mellitus ([address])    • Fibromyalgia, primary    • GERD (gastroesophageal reflux disease)    • Hip fx, left, closed, with routine healing, subsequent encounter 05/2017   • Hyperlipidemia    • Hypertension    • Insomnia    • Irregular heartbeat    • VICTOR HUGO on CPAP      Past Surgical History:   Procedure Laterality Date   • CARPAL TUNNEL RELEASE Left    • LUMBAR EPIDURAL INJECTION     • NERVE BLOCK Bilateral 12/29/2020    Procedure: L2 L3 L4 L5 MEDIAL BRANCH BLOCK #1;  Surgeon: Kirstie Anderson MD;  Location: [facility];  Service: Pain Management    • NERVE BLOCK Bilateral 1/19/2021    Procedure: L2 L3 L4 L5 MEDIAL BRANCH BLOCK #2;  Surgeon: Kirstie Anderson MD;  [facility]: [facility];  Service: Pain Management    • WI ARTHROCENTESIS ASPIR&/INJ MAJOR JT/BURSA W/O  Bilateral 2/7/2023    Procedure: INJECTION JOINT HIP;  Surgeon: Kirstie Anderson MD;  Location: [facility];  Service: Pain Management    • WI HYSTEROSCOPY BX ENDOMETRIUM&/POLYPC W/WO D&C N/A 4/25/2023    Procedure: HYSTEROSCOPY W/  RESECTION UTERINE TUMOR/FIBROID (POLYPECTOMY );  Surgeon: Usama Matthews DO;  [facility];  Service: Gynecology   • RADIOFREQUENCY ABLATION Right 2/25/2021    Procedure: L2 L3 L4 L5 RADIO FREQUENCY ABLATION right side;  Surgeon: Kirstie Anderson MD;  [facility];  Service: Pain Management    • RADIOFREQUENCY ABLATION Left 3/16/2021    Procedure: L2 L3 L4 L5 RADIO FREQUENCY ABLATION;  Surgeon: Leah De La Cruz MD;  [facility]: [facility];  Service: Pain Management    • RHIZOTOMY Bilateral 4/4/2023    Procedure: RHIZOTOMY LUMBAR L3, L4, L5 medial branch nerves;  Surgeon: Leah De La Cruz MD;  [facility];  Service: Pain Management    • UPPER GASTROINTESTINAL ENDOSCOPY     • US GUIDED THYROID BIOPSY  7/14/2023     Social History   Social History     Substance and Sexual Activity   Alcohol Use No     Social History     Substance and Sexual Activity   Drug Use No     Social History     Tobacco Use   Smoking Status Former   Smokeless Tobacco Never     Family History   Problem Relation Age of Onset   • Cancer Father    • Hepatitis Father    • Asthma Cousin    • Hypertension Paternal Grandmother    • No Known Problems Mother    • No Known Problems Sister    • No Known Problems Maternal Grandmother    • No Known Problems Maternal Grandfather    • No Known Problems Paternal Grandfather    • No Known Problems Paternal Aunt    • BRCA2 Positive Neg Hx    • BRCA2 Negative Neg Hx    • BRCA1 Positive Neg Hx    • BRCA1 Negative Neg Hx    • BRCA 1/2 Neg Hx    • Ovarian cancer Neg Hx    • Endometrial cancer Neg Hx    • Colon cancer Neg Hx    • Breast cancer additional onset Neg Hx    • Breast cancer Neg Hx        Meds/Allergies     (Not in a hospital admission)      Allergies   Allergen Reactions   • Trulicity [Dulaglutide] Swelling   • Tdap [Tetanus-Diphth-Acell Pertussis] Rash       Objective     /64   Pulse 79   Temp 98 °F (36.7 °C) (Tympanic)   Resp 18   Ht 5' 2" (1.575 m)   Wt 113 kg (249 lb)   SpO2 96%   BMI 45.54 kg/m²       PHYSICAL EXAM    Gen: NAD  CV: RRR  CHEST: Clear  ABD: soft, NT/ND  EXT: no edema      ASSESSMENT/PLAN:  This is a 50y.o. year old female here for egd, and she is stable and optimized for her procedure.

## 2023-09-06 NOTE — ANESTHESIA POSTPROCEDURE EVALUATION
Post-Op Assessment Note    CV Status:  Stable  Pain Score: 0    Pain management: adequate     Mental Status:  Alert and awake   Hydration Status:  Euvolemic   PONV Controlled:  Controlled   Airway Patency:  Patent      Post Op Vitals Reviewed: Yes      Staff: Anesthesiologist, CRNA         No notable events documented.     BP 97/53 (09/06/23 1328)    Temp 97.6 °F (36.4 °C) (09/06/23 1328)    Pulse (!) 110 (09/06/23 1328)   Resp 16 (09/06/23 1328)    SpO2 91 % (09/06/23 1328)

## 2023-09-08 ENCOUNTER — TELEPHONE (OUTPATIENT)
Dept: GASTROENTEROLOGY | Facility: MEDICAL CENTER | Age: 48
End: 2023-09-08

## 2023-09-08 DIAGNOSIS — K20.90 ESOPHAGITIS DETERMINED BY ENDOSCOPY: ICD-10-CM

## 2023-09-08 DIAGNOSIS — B37.81 CANDIDA ESOPHAGITIS (HCC): Primary | ICD-10-CM

## 2023-09-08 PROCEDURE — 88112 CYTOPATH CELL ENHANCE TECH: CPT | Performed by: SPECIALIST

## 2023-09-08 PROCEDURE — 88305 TISSUE EXAM BY PATHOLOGIST: CPT | Performed by: SPECIALIST

## 2023-09-08 RX ORDER — OMEPRAZOLE 40 MG/1
40 CAPSULE, DELAYED RELEASE ORAL
Qty: 60 CAPSULE | Refills: 3 | Status: SHIPPED | OUTPATIENT
Start: 2023-09-08

## 2023-09-08 RX ORDER — FLUCONAZOLE 200 MG/1
TABLET ORAL
Qty: 15 TABLET | Refills: 0 | Status: SHIPPED | OUTPATIENT
Start: 2023-09-08 | End: 2023-09-22

## 2023-09-08 NOTE — RESULT ENCOUNTER NOTE
Please call the patient with the results    Please let her know that her esophagus sample shows a fungal infection called Candida. I have sent an antifungal to her pharmacy and she should complete the full course to treat the infection.

## 2023-09-08 NOTE — TELEPHONE ENCOUNTER
I called the patient to review her biopsy results. Call went to voicemail she was instructed to return call to the office. Clinical team: Please call the patient to let her know that her esophagus biopsy showed elevated eosinophils which is an inflammation cell. This can sometimes be related to acid reflux but can also be related to eosinophilic esophagitis    I recommend that she take her omeprazole 40 mg twice daily. We will repeat her endoscopy in 3 months and if she still has elevated inflammation level she may require a different treatment. She is also scheduled for office follow-up next month at [facility].

## 2023-09-11 DIAGNOSIS — R05.1 ACUTE COUGH: ICD-10-CM

## 2023-09-11 DIAGNOSIS — U07.1 COVID: Primary | ICD-10-CM

## 2023-09-11 RX ORDER — AZITHROMYCIN 250 MG/1
TABLET, FILM COATED ORAL
Qty: 6 TABLET | Refills: 1 | Status: SHIPPED | OUTPATIENT
Start: 2023-09-11 | End: 2023-09-16

## 2023-09-12 ENCOUNTER — PREP FOR PROCEDURE (OUTPATIENT)
Dept: GASTROENTEROLOGY | Facility: MEDICAL CENTER | Age: 48
End: 2023-09-12

## 2023-09-12 ENCOUNTER — TELEMEDICINE (OUTPATIENT)
Dept: BEHAVIORAL/MENTAL HEALTH CLINIC | Facility: CLINIC | Age: 48
End: 2023-09-12
Payer: COMMERCIAL

## 2023-09-12 DIAGNOSIS — K20.90 ESOPHAGITIS DETERMINED BY ENDOSCOPY: Primary | ICD-10-CM

## 2023-09-12 DIAGNOSIS — F33.9 MAJOR DEPRESSION, RECURRENT, CHRONIC (HCC): Primary | ICD-10-CM

## 2023-09-12 PROCEDURE — T1015 CLINIC SERVICE: HCPCS | Performed by: SOCIAL WORKER

## 2023-09-12 NOTE — PSYCH
Behavioral Health Psychotherapy Progress Note    Psychotherapy Provided: Individual Psychotherapy     1. Major depression, recurrent, chronic (HCC)            Goals addressed in session: Goal 1     DATA: The Client reported that she was Dx with COVID, which has increased her depression due to lack contact with peers and "feeling like crap" As team we explored and processed her depression and ways to increase her self care through her illness. Coping skills were reivwed during the session as well. .   During this session, this clinician used the following therapeutic modalities: Cognitive Behavioral Therapy, Mindfulness-based Strategies, Solution-Focused Therapy and Supportive Psychotherapy    Substance Abuse was not addressed during this session. If the client is diagnosed with a co-occurring substance use disorder, please indicate any changes in the frequency or amount of use: n/a. Stage of change for addressing substance use diagnoses: No substance use/Not applicable    ASSESSMENT:  Cristy Garcia presents with a Anxious and Depressed mood. her affect is Normal range and intensity, which is congruent, with her mood and the content of the session. The client has made progress on their goals. The Client  Kate Alanis presents with a none risk of suicide, none risk of self-harm, and none risk of harm to others. For any risk assessment that surpasses a "low" rating, a safety plan must be developed.     A safety plan was indicated: no  If yes, describe in detail n/a    PLAN: Between sessions, Kate Alanis will utilize coping skills when presented with anxiety and or depress At the next session, the therapist will use Cognitive Behavioral Therapy, Mindfulness-based Strategies, Solution-Focused Therapy and Supportive Psychotherapy to address the Client's MH issues that are effecting the client's different relationships and environments     [address] [place] [address] and Discharge Planning: Kate Alanis is aware of and agrees to continue to work on their treatment plan. They have identified and are working toward their discharge goals. yes    Visit start and stop times:    09/12/23  Start Time: 1000  Stop Time: 1017  Total Visit Time: 17 minutes  Virtual Brief Visit    This Visit is being completed by telephone. The Patient is located at Home and in the following state in which I hold an active license [place]    The patient was identified by name and date of birth. Liz Kimble was informed that this is a telemedicine visit and that the visit is being conducted through Telephone. My office door was closed. No one else was in the room. She acknowledged consent and understanding of privacy and security of the video platform. The patient has agreed to participate and understands they can discontinue the visit at any time. Patient is aware this is a billable service. Assessment/Plan:    Problem List Items Addressed This Visit        Other    Major depression, recurrent, chronic ([address]) - Primary       Recent Visits  No visits were found meeting these conditions. Showing recent visits within past 7 days and meeting all other requirements  Today's Visits  Date Type Provider Dept   09/12/23 Telemedicine YONATAN Longo Rh Cln Psych   Showing today's visits and meeting all other requirements  Future Appointments  No visits were found meeting these conditions.   Showing future appointments within next 150 days and meeting all other requirements         Visit Time  Total Visit Duration: 17 min

## 2023-09-12 NOTE — TELEPHONE ENCOUNTER
I called the patient to review the results and recommendations. We discussed that the elevated eosinophil levels can indicate eosinophilic esophagitis or can sometimes be from reflux esophagitis. She will continue to take omeprazole twice daily. Repeat EGD will be scheduled in 3 months.   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

## 2023-09-13 ENCOUNTER — TELEPHONE (OUTPATIENT)
Dept: GASTROENTEROLOGY | Facility: MEDICAL CENTER | Age: 48
End: 2023-09-13

## 2023-09-13 NOTE — TELEPHONE ENCOUNTER
Spoke to patient and scheduled EGD as requested by Dr. Romelia Maxwell. Pt is aware of NPO after midnight.

## 2023-09-13 NOTE — TELEPHONE ENCOUNTER
----- Message from Yon Whitmore MD sent at 9/12/2023  3:31 PM EDT -----  Please schedule endoscopy at [facility] with myself in 3 months

## 2023-09-15 ENCOUNTER — TELEPHONE (OUTPATIENT)
Dept: FAMILY MEDICINE CLINIC | Facility: CLINIC | Age: 48
End: 2023-09-15

## 2023-09-15 DIAGNOSIS — E11.65 TYPE 2 DIABETES MELLITUS WITH HYPERGLYCEMIA, UNSPECIFIED WHETHER LONG TERM INSULIN USE (HCC): ICD-10-CM

## 2023-09-15 DIAGNOSIS — M79.7 FIBROMYALGIA: ICD-10-CM

## 2023-09-15 DIAGNOSIS — J45.32 MILD PERSISTENT ASTHMA WITH STATUS ASTHMATICUS: ICD-10-CM

## 2023-09-15 DIAGNOSIS — F33.9 RECURRENT MAJOR DEPRESSIVE DISORDER, REMISSION STATUS UNSPECIFIED (HCC): ICD-10-CM

## 2023-09-15 DIAGNOSIS — Z59.9 FINANCIAL DIFFICULTIES: Primary | ICD-10-CM

## 2023-09-15 DIAGNOSIS — M47.816 LUMBAR SPONDYLOSIS: ICD-10-CM

## 2023-09-15 SDOH — ECONOMIC STABILITY - INCOME SECURITY: PROBLEM RELATED TO HOUSING AND ECONOMIC CIRCUMSTANCES, UNSPECIFIED: Z59.9

## 2023-09-15 NOTE — TELEPHONE ENCOUNTER
----- Message from YONATAN Rodríguez sent at 9/12/2023 10:10 AM EDT -----  Ulysses Ally you think Cristy would be a good candidate for Case management with alonso due to her financial and physical health issues   Thanks you  Rigo Johnson

## 2023-09-19 ENCOUNTER — PATIENT OUTREACH (OUTPATIENT)
Dept: FAMILY MEDICINE CLINIC | Facility: CLINIC | Age: 48
End: 2023-09-19

## 2023-09-19 NOTE — PROGRESS NOTES
YONATAN ERNANDEZ received a referral from patient's PCP regarding patient's need for social work follow up due to financial difficulties. YONATAN ERNANDEZ contacted patient to assess. Patient states she is currently in the process of trying to appeal disability denial. Until that is addressed, patient does not have any income. Patient states she has subsidized housing through [employer] authority and her rent is currently $16/month. Patient denies being behind on rent but states she is not sure if she will be able to get the $16 needed for October. She reports she went to [employer] however, they were unable to assist. YONATAN ERNANDEZ will see if there are any other community agencies that may have funding available to assist..Info for housing assistance program sent via [employer]. YONATAN ERNANDEZ inquired if there are any medications or copays/other healthcare needs patient is unable to afford. Patient states insurance coverage is adequate and covers most costs. Copays are $1. Patient informed there would not be any additional assistance for this. Patient does receive SNAP benefits. Denies having any additional questions or concerns. Patient is aware she may contact YONATAN ERNANDEZ in the future if needed.

## 2023-09-27 ENCOUNTER — TELEMEDICINE (OUTPATIENT)
Dept: BEHAVIORAL/MENTAL HEALTH CLINIC | Facility: CLINIC | Age: 48
End: 2023-09-27
Payer: COMMERCIAL

## 2023-09-27 ENCOUNTER — TELEPHONE (OUTPATIENT)
Dept: FAMILY MEDICINE CLINIC | Facility: CLINIC | Age: 48
End: 2023-09-27

## 2023-09-27 DIAGNOSIS — F33.9 MAJOR DEPRESSION, RECURRENT, CHRONIC (HCC): Primary | ICD-10-CM

## 2023-09-27 PROCEDURE — T1015 CLINIC SERVICE: HCPCS | Performed by: SOCIAL WORKER

## 2023-09-27 NOTE — TELEPHONE ENCOUNTER
Regarding: Advair inhaler and support pet  Contact: [phone]  ----- Message from Janet Jay sent at 9/27/2023 10:27 AM EDT -----       ----- Message from [address] [place] [address] [place] to [address]  sent at 9/27/2023  9:59 AM -----   I have been having issues with yeast infection. There endoscopic doctor prescribed a antifungal medication and mentioned taking a pill instead of the advair inhaler. Can we do that? Also my aide wants to gift me a kitten to help with  my depression and anxiety. My housing Authority office is ok with this. They just want a doctor's note saying the new larry is an emotional support pet. How do I get that? The larry really would help allot with my depression and anxiety. Please let me know.

## 2023-09-27 NOTE — PSYCH
Behavioral Health Psychotherapy Progress Note    Psychotherapy Provided: Individual Psychotherapy     1. Major depression, recurrent, chronic (HCC)            Goals addressed in session: Goal 1     DATA: The client reported that she is feeling better physically since she had COVID. " I am breathing better" She reported continued anxiety and depression due to her physical health issues and economic limitations. As team we explored and processed her weaknesses in her environment and the effects and her mental health. Coping skills were reviewed during the session  During this session, this clinician used the following therapeutic modalities: Cognitive Behavioral Therapy, Mindfulness-based Strategies, Solution-Focused Therapy and Supportive Psychotherapy    Substance Abuse was not addressed during this session. If the client is diagnosed with a co-occurring substance use disorder, please indicate any changes in the frequency or amount of use: n/a Stage of change for addressing substance use diagnoses: No substance use/Not applicable    ASSESSMENT:  Cristy Garcia presents with a Anxious and Depressed mood. her affect is Normal range and intensity, which is congruent, with her mood and the content of the session. The client has made progress on their goals. The Client,  Tony Snow presents with a none risk of suicide, none risk of self-harm, and none risk of harm to others. For any risk assessment that surpasses a "low" rating, a safety plan must be developed. A safety plan was indicated: no  If yes, describe in detail N/A    PLAN: Between sessions, Tony Snow will practice her coping skills when presented with anxiety and or depression. At the next session, the therapist will use Cognitive Behavioral Therapy, Mindfulness-based Strategies, Solution-Focused Therapy and Supportive Psychotherapy to address the Client's MH issues that is affecting her different relationships and environments.     Behavioral Health Treatment Plan and Discharge Planning: Elroy Henriquez is aware of and agrees to continue to work on their treatment plan. They have identified and are working toward their discharge goals. yes    Visit start and stop times:    09/27/23  Start Time: 1150  Stop Time: 1221  Total Visit Time: 31 minutes  Virtual Brief Visit    This Visit is being completed by telephone. The Patient is located at Home and in the following state in which I hold an active license [place]    The patient was identified by name and date of birth. Elroy Henriquez was informed that this is a telemedicine visit and that the visit is being conducted through Telephone. My office door was closed. No one else was in the room. She acknowledged consent and understanding of privacy and security of the video platform. The patient has agreed to participate and understands they can discontinue the visit at any time. Patient is aware this is a billable service. Assessment/Plan:    Problem List Items Addressed This Visit        Other    Major depression, recurrent, chronic ([address]) - Primary       Recent Visits  No visits were found meeting these conditions. Showing recent visits within past 7 days and meeting all other requirements  Today's Visits  Date Type Provider Dept   09/27/23 Telephone Usman Lombardi, [address] [facility]   09/27/23 Telemedicine Kristina Guerrero, YONATAN [facility]  Cln Psych   Showing today's visits and meeting all other requirements  Future Appointments  No visits were found meeting these conditions.   Showing future appointments within next 150 days and meeting all other requirements         Visit Time  Total Visit Duration: 31 min

## 2023-10-10 ENCOUNTER — TELEMEDICINE (OUTPATIENT)
Dept: BEHAVIORAL/MENTAL HEALTH CLINIC | Facility: CLINIC | Age: 48
End: 2023-10-10
Payer: COMMERCIAL

## 2023-10-10 DIAGNOSIS — F33.9 MAJOR DEPRESSION, RECURRENT, CHRONIC (HCC): Primary | ICD-10-CM

## 2023-10-10 PROCEDURE — T1015 CLINIC SERVICE: HCPCS | Performed by: SOCIAL WORKER

## 2023-10-10 NOTE — PSYCH
Behavioral Health Psychotherapy Progress Note    Psychotherapy Provided: Individual Psychotherapy     1. Major depression, recurrent, chronic ([address])            Goals addressed in session: Goal 1     DATA: The client reported that she will be obtain a new aide due to her previous aide obtaining a new job. She reported continued issues with economic which has caused continued anxiety and depression. During the session we reviewed her coping skill and processed her continued weaknesses in her different relationships and environments. During this session, this clinician used the following therapeutic modalities: Cognitive Behavioral Therapy, Mindfulness-based Strategies, Solution-Focused Therapy and Supportive Psychotherapy    Substance Abuse was not addressed during this session. If the client is diagnosed with a co-occurring substance use disorder, please indicate any changes in the frequency or amount of use: n/a. Stage of change for addressing substance use diagnoses: No substance use/Not applicable    ASSESSMENT:  Cristy Garcia presents with a Anxious and Depressed mood. [facility] MENTAL STATUS PAIN 7 as reported by the client  PHYSICAL PAIN SCALE NUMBERS:8 as reported by the client      her affect is Normal range and intensity, which is congruent, with her mood and the content of the session. The client has made progress on their goals. The Client  Scarlett Mock presents with a none risk of suicide, none risk of self-harm, and none risk of harm to others. For any risk assessment that surpasses a "low" rating, a safety plan must be developed. A safety plan was indicated: no  If yes, describe in detail n/a    PLAN: Between sessions, Scarlett Mock will practice her coping skills when presented with anxiety and or depression.  At the next session, the therapist will use Cognitive Behavioral Therapy, Mindfulness-based Strategies, Solution-Focused Therapy and Supportive Psychotherapy to address the client' [facility] issues that are effecting her different relationships and environments     Behavioral Health Treatment Plan and Discharge Planning: Rafiq Morris is aware of and agrees to continue to work on their treatment plan. They have identified and are working toward their discharge goals. yes    Visit start and stop times:    10/10/23  Start Time: 1050  Stop Time: 1112  Total Visit Time: 22 minutes  Virtual Brief Visit    This Visit is being completed by telephone. The Patient is located at Home and in the following state in which I hold an active license [place]    The patient was identified by name and date of birth. Rafiq Morris was informed that this is a telemedicine visit and that the visit is being conducted through Telephone. My office door was closed. No one else was in the room. She acknowledged consent and understanding of privacy and security of the video platform. The patient has agreed to participate and understands they can discontinue the visit at any time. Patient is aware this is a billable service. Assessment/Plan:    Problem List Items Addressed This Visit        Other    Major depression, recurrent, chronic ([address]) - Primary       Recent Visits  No visits were found meeting these conditions. Showing recent visits within past 7 days and meeting all other requirements  Today's Visits  Date Type Provider Dept   10/10/23 Telemedicine Latisha Crigler, [facility]  Cln Psych   Showing today's visits and meeting all other requirements  Future Appointments  No visits were found meeting these conditions.   Showing future appointments within next 150 days and meeting all other requirements         Visit Time  Total Visit Duration: 22 min

## 2023-10-16 ENCOUNTER — TELEPHONE (OUTPATIENT)
Dept: FAMILY MEDICINE CLINIC | Facility: CLINIC | Age: 48
End: 2023-10-16

## 2023-10-16 NOTE — TELEPHONE ENCOUNTER
Regarding: Emotional support pet  Contact: [phone]  ----- Message from Josefina Mayo sent at 10/16/2023 10:10 AM EDT -----       ----- Message from Odell ARANGO REGAN President Berhane Burr to Salome Barker, [address] [facility] sent at 10/16/2023 10:09 AM -----   The housing Authority has now said they want both Tony the kitten and Juice Rodriguez my 5year old larry listed as support animals. Could you write a letter stating they are both support pets? They did not tell me this previous to me getting Tony and I'm not going to give up my 5year old Irene Richards that I love so much because they changed the rules. I love her dearly and do need to have them both listed.  :(     Thank you    Poppy

## 2023-10-16 NOTE — LETTER
October 16, 2023     Patient: Dimas Chen  YOB: 1975  Date of Visit: 10/16/2023      To Whom it May Concern:    Dimas Chen is currently under my professional care. It is medically necessary for patient to have her kitten "Tony" and her adult cat "Rupinder" living with her for emotional support due to patient's history of anxiety and depression. These pets have been helping her tremendously with her psychological state. If you have any questions or concerns, please don't hesitate to call.          Sincerely,          CHRIS Wilson        CC: No Recipients

## 2023-10-17 ENCOUNTER — TELEMEDICINE (OUTPATIENT)
Dept: BEHAVIORAL/MENTAL HEALTH CLINIC | Facility: CLINIC | Age: 48
End: 2023-10-17
Payer: COMMERCIAL

## 2023-10-17 DIAGNOSIS — F33.9 MAJOR DEPRESSION, RECURRENT, CHRONIC (HCC): Primary | ICD-10-CM

## 2023-10-17 PROCEDURE — T1015 CLINIC SERVICE: HCPCS | Performed by: SOCIAL WORKER

## 2023-10-17 NOTE — PSYCH
Treatment Plan Tracking    # 10 Treatment Plan not completed within required time limits due to:  Client had environmental issues which needed to be addressed  .

## 2023-10-17 NOTE — PSYCH
Behavioral Health Psychotherapy Progress Note    Psychotherapy Provided: Individual Psychotherapy     1. Major depression, recurrent, chronic (HCC)            Goals addressed in session: Crisis plan/treatment plan    DATA: The Client reported continued physical pain which has caused issues with the Client's depression and anxiety. During the session we created the Client's recovery treatment and crisis plan, and condcuted the depression screenings PHQ-9, with the client score decreasing by 3 points over her original screening. Coping skills were reviewed during the session. During this session, this clinician used the following therapeutic modalities: Cognitive Behavioral Therapy, Mindfulness-based Strategies, Prolonged Exposure, and Solution-Focused Therapy    Substance Abuse was not addressed during this session. If the client is diagnosed with a co-occurring substance use disorder, please indicate any changes in the frequency or amount of use: n/a. Stage of change for addressing substance use diagnoses: No substance use/Not applicable    ASSESSMENT:  Cristy Garcia presents with a Anxious and Depressed mood. PSYCH MENTAL STATUS PAIN 6 as reported due to economic and environmental issues  PHYSICAL PAIN SCALE NUMBERS: 9 as reported by the Client due to " all over physical pain"      her affect is Normal range and intensity, which is congruent, with her mood and the content of the session. The client has made progress on their goals. The Client  Arabella Castrejon presents with a none risk of suicide, none risk of self-harm, and none risk of harm to others. For any risk assessment that surpasses a "low" rating, a safety plan must be developed.     A safety plan was indicated: no  If yes, describe in detail n/a    PLAN: Between sessions, Arabella Castrejon will utilize her coping skills when presented with anxiety and or depression At the next session, the therapist will use Cognitive Behavioral Therapy, Mindfulness-based Strategies, Solution-Focused Therapy, and Supportive Psychotherapy to address the Client's MH issues and the effects on her different relationships and environments. Behavioral Health Treatment Plan and Discharge Planning: Patience Moy is aware of and agrees to continue to work on their treatment plan. They have identified and are working toward their discharge goals. Yes  Telemedicine consent    Patient: Patience Moy  Provider: YONATAN Gallego  Provider located at [address] [facility]  [address] [address]    The patient was identified by name and date of birth. Patience Moy was informed that this is a telemedicine visit and that the visit is being conducted through Telephone. My office door was closed. No one else was in the room. She acknowledged consent and understanding of privacy and security of the video platform. The patient has agreed to participate and understands they can discontinue the visit at any time. Patient is aware this is a billable service. I spent 26 minutes with the patient during this visit.     Visit start and stop times:    10/17/23  Start Time: 6649  Stop Time: 0895  Total Visit Time: 26 minutes

## 2023-10-17 NOTE — BH TREATMENT PLAN
Outpatient Behavioral Health Psychotherapy Treatment Plan     Cristy Garcia  1975      Date of Initial Psychotherapy Assessment: 08/30/20  Date of Current Treatment Plan: 04/06/23  Treatment Plan Target Date: 04/06/24  Treatment Plan Expiration Date: 04/17/24     Diagnosis:   1. Major depression, recurrent, chronic (HCC)                Area(s) of Need: The Client has a long history of depression and anxiety, which is effecting her different relationships and environments. It is hoped that The Client will achieve or maintain maximum functional capacity in performing daily activizes, taking into account both the functional capacity of the individual and those functional capacities appropriate for the individuals of the same age. Reduce or ameliorate the physical mental, behavioral, or developmental effects of an illness, condition, injury or disability. Present treatment plan will cover the next 6 months or completion of her recovery crisis plan      Long Term Goal 1 (in the client's own words): " to make me feel better"      Stage of Change: Action     Target Date for completion: 03/6/24             Anticipated therapeutic modalities: Supportive Therapy, Strengths Therapy,  and Cognitive behavioral Therapy will be the treatment modalities utilized during the session. People identified to complete this goal: The Client her support team and this therapist                    Objective 1: (identify the means of measuring success in meeting the objective): The Client's depression score on her PHQ-9 will decrease from 17 to 8 or less (emerging score decreased by 3 points 10/17/23)                        Objective 2: (identify the means of measuring success in meeting the objective):  The Client will utilize her  coping skills 3 out of 5 when presented with depression. (emerging 10/17/23)      Long Term Goal 2 (in the client's own words): "It is not good to be anxious all the time"      Stage of Change: Action     Target Date for completion:  03/6/24  Supportive Therapy, Strengths Therapy,  and Cognitive behavioral Therapy will be the treatment modalities utilized during the session. Anticipated therapeutic modalities: The Client her support team and this therapist             People identified to complete this goal: the client her support team and this therapist                    Objective 1: (identify the means of measuring success in meeting the objective): The Client will become more aware of her anxiety when presented 3 out of 5 times(emerging-10/17/23)                       Objective 2: (identify the means of measuring success in meeting the objective): The Client will utilize her coping skill 3 out of 5 times when presented with anxiety. (vgzhuqih97/17/23)     Long Term Goal 3 (in the client's own words): " Attending all of my medical appointment makes me healthier"      Stage of Change: Action     Target Date for completion:  03/6/24             Anticipated therapeutic modalities: Supportive Therapy, Strengths Therapy,  and Cognitive behavioral Therapy will be the treatment modalities utilized during the session. People identified to complete this goal: The Client her support team and this therapist                        Objective 1: (identify the means of measuring success in meeting the objective):  The Client will attend all of her medical appointments 100% of the time. (emerging 10/17/23)                     I am currently under the care of a [facility] psychiatric provider: No Just PCP for Medication management      My [facility] psychiatric provider is: Usman PEREZ/Medication management/ Salty GIANGW at [address] [place] [address]     I am currently taking psychiatric medications: Yes, as prescribed     I feel that I will be ready for discharge from mental health care when I reach the following (measurable goal/objective): " I don't know maybe when my financials get better"      For children and adults who have a legal guardian:          Has there been any change to custody orders and/or guardianship status? n/a. If yes, attach updated documentation. I have created my Crisis Plan and have been offered a copy of this plan     [address]: Diagnosis and Treatment Plan explained to Tisha acknowledges an understanding of their diagnosis. Leslie Fuentes agrees to this treatment plan.      I have been offered a copy of this Treatment Plan. yes

## 2023-10-17 NOTE — BH TREATMENT PLAN
Client Name: Dimas Chen       Client YOB: 1975  : 1975     Treatment Team (include name and contact information):      Psychotherapist:  Tiffany Enamorado MSW LCSW at [address] HTDRMK/[phone]     Psychiatrist: N/A              Release of information completed: N/A          " Mary Grace Cole /[id number]              Release of information completed: Yes     Other (Specify Role): CHRIS Wilson-PCP-[phone]                          Release of information completed: yes     Other (Specify Role): N/A              Release of information completed: N/A     Healthcare Provider  Summer Arias, [address] [place], [place] [address] [address] [facility]  [phone]     Type of Plan              * Child plans (children 15 yo and younger) must be completed and signed by the child's legal guardian              * Plans for all individuals 15 yo and above must be signed by the client. Plan Type: adolescent/adult (15 and over) Initial        My Personal Strengths are (in the client's own words):  " good friend, loyal, good sister, good larry mom of 2"      The stressors and triggers that may put me at risk are:  being physically tired, being hungry, loneliness, feeling a lack of control, being treated unfairly and events (include important dates that might be a trigger) any huricanes     Coping skills I can use to keep myself calm and safe: Take a shower, Call a friend or family member and Other (describe) play with Bucio, play computer games     Coping skills/supports I can use to maintain abstinence from substance use:   N/A     The people that provide me with help and support: (Include name, contact, and how they can help)              Support person #1:Sister                          * Phone number:  In phone                           * How can they help me? " talks with me through different things"               Support person #2:Sri * Phone number: In phone                          * How can they help me? " Helping me with things"                 Support person #3: Waleska                          * Phone number: In phone                          * How can they help me? "talks with me and helps me"      In the past, the following has helped me in times of crisis:               Being in a quiet space, Being with other people, Talking to a professional on the telephone, Calling a friend, Calling a family member, Breathing exercises (or other mindfulness-based activities), Praying or meditating, Listening to music, Watching television or a movie and Other: playing with Audrey Montiel        If it is an emergency and you need immediate help, call      If there is a possibility of danger to yourself or others, call the following crisis hotline resources:      Adult Crisis Numbers  Suicide Prevention Hotline - Dial   [facility]: [address]: [address] [facility] [address]: [address]: [address]: [address]  [facility] [address]: [address]  [place]: [address]: [phone] (daytime). [phone] (after hours, weekends, holidays)                 Child/Adolescent Crisis Numbers              [facility]: [address] [place] [address] Ne: [phone]              [facility] Jennings: [phone]              [facility]: [phone]     Please note: Some [facility] do not have a separate number for Child/Adolescent specific crisis.  If your county is not listed under Child/Adolescent, please call the adult number for your county                 National Talk to Text Line              All Ages - [phone]     In the event your feelings become unmanageable, and you cannot reach your support system, you will call 911 immediately or go to the nearest hospital emergency room.

## 2023-10-18 ENCOUNTER — OFFICE VISIT (OUTPATIENT)
Dept: GASTROENTEROLOGY | Facility: CLINIC | Age: 48
End: 2023-10-18

## 2023-10-18 VITALS
TEMPERATURE: 97.8 F | DIASTOLIC BLOOD PRESSURE: 78 MMHG | HEART RATE: 94 BPM | OXYGEN SATURATION: 97 % | HEIGHT: 62 IN | RESPIRATION RATE: 16 BRPM | BODY MASS INDEX: 45.6 KG/M2 | SYSTOLIC BLOOD PRESSURE: 116 MMHG | WEIGHT: 247.8 LBS

## 2023-10-18 DIAGNOSIS — Z12.11 SCREENING FOR COLON CANCER: ICD-10-CM

## 2023-10-18 DIAGNOSIS — K76.0 FATTY LIVER: ICD-10-CM

## 2023-10-18 DIAGNOSIS — R79.89 ELEVATED LFTS: ICD-10-CM

## 2023-10-18 DIAGNOSIS — K21.00 GASTROESOPHAGEAL REFLUX DISEASE WITH ESOPHAGITIS WITHOUT HEMORRHAGE: ICD-10-CM

## 2023-10-18 DIAGNOSIS — K20.90 ESOPHAGITIS DETERMINED BY ENDOSCOPY: Primary | ICD-10-CM

## 2023-10-18 RX ORDER — OMEPRAZOLE 40 MG/1
40 CAPSULE, DELAYED RELEASE ORAL
Qty: 60 CAPSULE | Refills: 3 | Status: SHIPPED | OUTPATIENT
Start: 2023-10-18

## 2023-10-18 RX ORDER — FAMOTIDINE 40 MG/1
TABLET, FILM COATED ORAL
Qty: 30 TABLET | Refills: 3 | Status: SHIPPED | OUTPATIENT
Start: 2023-10-18

## 2023-10-18 NOTE — PROGRESS NOTES
[facility] - Outpatient Follow-up Note  Cristy Garcia 50 y.o. female MRN: [id number]  Encounter: [id number]          ASSESSMENT AND PLAN:    The patient presents today for follow-up office visit regarding her chronic GERD with esophagitis, fatty liver, and elevated liver enzymes. Exam: Abdomen is soft, round, non-distended, non-tender with hypoactive BS x 4. No edema noted of the b/l lower extremities upon exam today. Skin is non-icteric. 1. Esophagitis determined by endoscopy  2. Gastroesophageal reflux disease with esophagitis without hemorrhage  While the patient was in the office today, I discussed with the patient that at this point time she should continue the famotidine and omeprazole as prescribed and continue to avoid alcohol and any other trigger foods until her next office visit. The patient was agreeable and verbalized an understanding. The patient should proceed with her repeat EGD as scheduled on December 27, 2023 with Dr. Wicho Hart. - famotidine (PEPCID) 40 MG tablet; Take 1 PO QD. Dispense: 30 tablet; Refill: 3  - omeprazole (PriLOSEC) 40 MG capsule; Take 1 capsule (40 mg total) by mouth 2 (two) times a day before meals  Dispense: 60 capsule; Refill: 3    3. Fatty liver  4. Elevated LFTs  I discussed with the patient that at this point time with regards to the fatty liver and previously elevated LFTs since there has not been any updated blood work in over 6 months, we will proceed with blood work now to continue to monitor her liver function testing. We will call the patient once we have the results of the blood work. The patient was agreeable and verbalized an understanding. Discussed recommendations in regards to fatty liver including:   Strict control of contributing comorbidities (obesity, prediabetes/diabetes, hypertension, and hypertriglyceridemia).   Weight loss of approx 10-15% of patient's current body weight over a period of 6-12 months through low fat diet and cardiovascular exercise as tolerated - Reffered patient to weight management for assistance with weight loss. Limiting alcohol consumption, preferably complete abstinence. Monitor hepatic function every 6 months with routine labs. - Comprehensive metabolic panel; Future  - Bilirubin, direct; Future  - CBC and differential; Future    5. Screening for colon cancer  The patient has never had a colonoscopy but did have a negative Cologuard screening in December 2022 and is due for repeat screening in December 2025 and at that point time we will reevaluate her symptoms and discuss whether or not to proceed with a repeat Cologuard or colonoscopy. The patient was agreeable and verbalized an understanding. The patient will schedule a follow up office visit in in 3-4 months, but understands to call or contact our office if there are any issues or concerns in the mean time. ______________________________________________________________________    SUBJECTIVE: Patient is a 50 y.o. female who was previously seen in our office on 4/17/23 by PHYLLIS Jamison PA-C and presents today for follow-up office visit regarding her chronic GERD with esophagitis, fatty liver, and elevated liver enzymes. The patient presents today for a follow-up office visit status post her most recent EGD which was on September the 60,023 with Dr. Dustin Steel and did show a 3 cm hiatal hernia with grade D esophagitis. At that point time she was advised to increase her omeprazole to 40 mg twice daily and start taking famotidine 40 mg daily and then proceed with a repeat EGD in 3 to 4 months to evaluate for healing with regards to the esophagitis. The patient reports that overall she currently is not experiencing any reflux or esophagitis symptoms.     The patient reports that they have a BM daily and reports that it is relieving, without any bloating, consistent diarrhea, nocturnal BMs, constipation, straining, melena or bloody stools. Independence: 3-4. Last BM: This morning. Flatus: Minimal, normal for her. Meds: Omeprazole 40 mg twice daily and famotidine 40 mg daily. NSAID Use: Celebrex daily and occasionally Ibuprofen. Tobacco: Denied  ETOH: Denied  Marijuana: Denied  Illicit Drug Use: Denied    Imaging: (None):     Endoscopy History: EGD: (9/6/23): 3 cm hiatal hernia with polyps, grade D esophagitis and possible EOE. Path: Elevated eosinophils. COLONOSCOPY: (None): Negative cologuard in 12/2022, repeat testing due in 12/2025 vs consideration for a colonoscopy at that time. REVIEW OF SYSTEMS IS OTHERWISE NEGATIVE.       Historical Information   Past Medical History:   Diagnosis Date    Allergic     Seasonal Allergies    Alopecia of scalp     Treated with Proscar    Chronic pain disorder     back, bilat knees, bilat hips    Diabetes mellitus (HCC)     Fibromyalgia, primary     GERD (gastroesophageal reflux disease)     Hip fx, left, closed, with routine healing, subsequent encounter 05/2017    Hyperlipidemia     Hypertension     Insomnia     Irregular heartbeat     VICTOR HUGO on CPAP      Past Surgical History:   Procedure Laterality Date    CARPAL TUNNEL RELEASE Left     LUMBAR EPIDURAL INJECTION      NERVE BLOCK Bilateral 12/29/2020    Procedure: L2 L3 L4 L5 MEDIAL BRANCH BLOCK #1;  Surgeon: Michelle Bonilla MD;  Location: [facility];  Service: Pain Management     NERVE BLOCK Bilateral 1/19/2021    Procedure: L2 L3 L4 L5 MEDIAL BRANCH BLOCK #2;  Surgeon: Michelle Bonilla MD;  [facility]: [facility];  Service: Pain Management     HI ARTHROCENTESIS ASPIR&/INJ MAJOR JT/BURSA W/O US Bilateral 2/7/2023    Procedure: INJECTION JOINT HIP;  Surgeon: Michelle Bonilla MD;  Location: [facility];  Service: Pain Management     HI HYSTEROSCOPY BX ENDOMETRIUM&/POLYPC W/WO D&C N/A 4/25/2023    Procedure: HYSTEROSCOPY W/  RESECTION UTERINE TUMOR/FIBROID (POLYPECTOMY );  Surgeon: Emi Rousseau DO;  [facility];  Service: Gynecology    RADIOFREQUENCY ABLATION Right 2/25/2021    Procedure: L2 L3 L4 L5 RADIO FREQUENCY ABLATION right side;  Surgeon: Melissa Olivera MD;  [facility]: [facility];  Service: Pain Management     RADIOFREQUENCY ABLATION Left 3/16/2021    Procedure: L2 L3 L4 L5 RADIO FREQUENCY ABLATION;  Surgeon: Melissa Olivera MD;  [facility];  Service: Pain Management     RHIZOTOMY Bilateral 4/4/2023    Procedure: RHIZOTOMY LUMBAR L3, L4, L5 medial branch nerves;  Surgeon: Melissa Olivera MD;  [facility]: [facility];  Service: Pain Management     UPPER GASTROINTESTINAL ENDOSCOPY      US GUIDED THYROID BIOPSY  7/14/2023     Social History   Social History     Substance and Sexual Activity   Alcohol Use No     Social History     Substance and Sexual Activity   Drug Use No     Social History     Tobacco Use   Smoking Status Former   Smokeless Tobacco Never     Family History   Problem Relation Age of Onset    Cancer Father     Hepatitis Father     Asthma Cousin     Hypertension Paternal Grandmother     No Known Problems Mother     No Known Problems Sister     No Known Problems Maternal Grandmother     No Known Problems Maternal Grandfather     No Known Problems Paternal Grandfather     No Known Problems Paternal Aunt     BRCA2 Positive Neg Hx     BRCA2 Negative Neg Hx     BRCA1 Positive Neg Hx     BRCA1 Negative Neg Hx     BRCA 1/2 Neg Hx     Ovarian cancer Neg Hx     Endometrial cancer Neg Hx     Colon cancer Neg Hx     Breast cancer additional onset Neg Hx     Breast cancer Neg Hx        Meds/Allergies       Current Outpatient Medications:     acetaminophen (TYLENOL) 500 mg tablet    albuterol (Ventolin HFA) 90 mcg/act inhaler    atorvastatin (LIPITOR) 20 mg tablet    celecoxib (CeleBREX) 200 mg capsule    cetirizine (ZyrTEC) 10 mg tablet    cromolyn (OPTICROM) 4 % ophthalmic solution    Empagliflozin 25 MG TABS    ergocalciferol (VITAMIN D2) 50,000 units    famotidine (PEPCID) 40 MG tablet    Fluticasone-Salmeterol (Advair Diskus) 250-50 mcg/dose inhaler    gabapentin (NEURONTIN) 300 mg capsule    glipiZIDE (GLUCOTROL) 10 mg tablet    glucose blood test strip    Lancets (onetouch ultrasoft) lancets    lisinopril (ZESTRIL) 10 mg tablet    metFORMIN (GLUCOPHAGE) 1000 MG tablet    omeprazole (PriLOSEC) 40 MG capsule    sertraline (ZOLOFT) 50 mg tablet    triamcinolone (KENALOG) 0.025 % cream    Allergies   Allergen Reactions    Trulicity [Dulaglutide] Swelling    Tdap [Tetanus-Diphth-Acell Pertussis] Rash           Objective     Blood pressure 116/78, pulse 94, temperature 97.8 °F (36.6 °C), temperature source Tympanic Core, resp. rate 16, height 5' 2" (1.575 m), weight 112 kg (247 lb 12.8 oz), SpO2 97 %. Body mass index is 45.32 kg/m². PHYSICAL EXAM:      General Appearance:   Alert, cooperative, no distress   HEENT:   Normocephalic, atraumatic, anicteric. Neck:  Supple, symmetrical, trachea midline   Lungs:   Clear to auscultation bilaterally; no rales, rhonchi or wheezing; respirations unlabored    Heart[de-identified]   Regular rate and rhythm; no murmur, rub, or gallop. Abdomen:   Soft, non-tender, non-distended; normal bowel sounds; no masses, no organomegaly    Genitalia:   Deferred    Rectal:   Deferred    Extremities:  No cyanosis, clubbing or edema    Pulses:  2+ and symmetric    Skin:  No jaundice, rashes, or lesions    Lymph nodes:  No palpable cervical lymphadenopathy        Lab Results:   No visits with results within 1 Day(s) from this visit.    Latest known visit with results is:   Hospital Outpatient Visit on 09/06/2023   Component Date Value    POC Glucose 09/06/2023 100     EXT Preg Test, Ur 09/06/2023 Negative     Control 09/06/2023 Valid     Case Report 09/06/2023                      Value:Surgical Pathology Report                         Case: [id number]                                   Authorizing Provider:  Elsa Colon MD       Collected:           09/06/2023 1310              Ordering Location: [address] Received:            09/06/2023 [address] [facility]                                     Operating Room                                                               Pathologist:           Lyn Bertrand MD                                                     Specimens:   A) - Stomach, cold fcp bx r/o h. pylori                                                             B) - Esophagus, cold fcp bx distal r/o EOE                                                          C) - Stomach, cold fcp bx gastric polyp                                                             D) - Esophagus, cold fcp bx proximal r/o EOE                                               Final Diagnosis 09/06/2023                      Value: This result contains rich text formatting which cannot be displayed here. Additional Information 09/06/2023                      Value: This result contains rich text formatting which cannot be displayed here. Gross Description 09/06/2023                      Value: This result contains rich text formatting which cannot be displayed here. Case Report 09/06/2023                      Value:Non-gynecologic Cytology                          Case: [id number]                                  Authorizing Provider:  Gregory Nieto MD       Collected:           09/06/2023 1320              Ordering Location:     [facility] [address] Received:            09/06/2023 [address] [facility]                                     Operating Room                                                               Pathologist:           Lyn Bertrand MD                                                     Specimen:    Brushing, esophageal                                                                       Final Diagnosis 09/06/2023                      Value: This result contains rich text formatting which cannot be displayed here. Gross Description 09/06/2023                      Value: This result contains rich text formatting which cannot be displayed here. Additional Information 09/06/2023                      Value: This result contains rich text formatting which cannot be displayed here. Radiology Results:   No results found.

## 2023-10-18 NOTE — PATIENT INSTRUCTIONS
Continue Omeprazole and Famotidine. Discussed recommendations in regards to fatty liver including:   Strict control of contributing comorbidities (obesity, prediabetes/diabetes, hypertension, and hypertriglyceridemia). Weight loss of approx 10-15% of patient's current body weight over a period of 6-12 months through low fat diet and cardiovascular exercise as tolerated  Limiting alcohol consumption, preferably complete abstinence. Monitor hepatic function every 6 months with routine labs. Schedule a f/u OV in 3-4 months to discuss repeat EGD. While the patient was in the office today we did review GI red flag symptoms, including, but not limited to: chronic nausea, vomiting, diarrhea, chills, fever, and unintentional weight loss and should call or contact our office with any changes or concerns. I reviewed with the patient that if they notice any blood while vomiting or in their stool they should contact or office or go to the nearest emergency room for immediate evaluation. The patient was agreeable and verbalized an understanding.

## 2023-10-30 DIAGNOSIS — M16.0 PRIMARY OSTEOARTHRITIS OF BOTH HIPS: ICD-10-CM

## 2023-10-30 RX ORDER — CELECOXIB 200 MG/1
CAPSULE ORAL
Qty: 60 CAPSULE | Refills: 0 | Status: SHIPPED | OUTPATIENT
Start: 2023-10-30

## 2023-11-01 ENCOUNTER — TELEMEDICINE (OUTPATIENT)
Dept: BEHAVIORAL/MENTAL HEALTH CLINIC | Facility: CLINIC | Age: 48
End: 2023-11-01
Payer: COMMERCIAL

## 2023-11-01 DIAGNOSIS — F33.9 MAJOR DEPRESSION, RECURRENT, CHRONIC (HCC): Primary | ICD-10-CM

## 2023-11-01 PROCEDURE — T1015 CLINIC SERVICE: HCPCS | Performed by: SOCIAL WORKER
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
## 2023-11-07 DIAGNOSIS — E11.65 TYPE 2 DIABETES MELLITUS WITH HYPERGLYCEMIA, WITHOUT LONG-TERM CURRENT USE OF INSULIN (HCC): ICD-10-CM

## 2023-11-16 ENCOUNTER — TELEPHONE (OUTPATIENT)
Dept: FAMILY MEDICINE CLINIC | Facility: CLINIC | Age: 48
End: 2023-11-16

## 2023-11-16 DIAGNOSIS — B37.9 CANDIDIASIS: ICD-10-CM

## 2023-11-16 DIAGNOSIS — L65.9 ALOPECIA: Primary | ICD-10-CM

## 2023-11-16 RX ORDER — CLOTRIMAZOLE AND BETAMETHASONE DIPROPIONATE 10; .64 MG/G; MG/G
CREAM TOPICAL
Qty: 60 G | Refills: 3 | Status: SHIPPED | OUTPATIENT
Start: 2023-11-16

## 2023-11-16 RX ORDER — FLUCONAZOLE 150 MG/1
TABLET ORAL
Qty: 2 TABLET | Refills: 1 | Status: SHIPPED | OUTPATIENT
Start: 2023-11-16 | End: 2023-11-22

## 2023-11-16 NOTE — TELEPHONE ENCOUNTER
Regarding: Yeast infection and hair loss   Contact: [phone]  ----- Message from Roby Webber sent at 11/16/2023 10:41 AM EST -----       ----- Message from [address] [place] [address] to Gurpreet Varghese, [address] [facility] sent at 11/16/2023 10:39 AM -----   I am having issues with a treat infection that will not go away. Not sure if it's the jardiance or advair but it will not go away. It is very persistent. My hair loss had gotten really severe. It's thin,  falling out very easily,  and I have a bald spot forming on there back of my head. If I have to pay for the finasteride out of pocket I will find a way to do so but this is awful. :(    I could really use some help with this.      Thank you   Cristy

## 2023-11-16 NOTE — TELEPHONE ENCOUNTER
Regarding: Yeast infection and hair loss   Contact: [phone]  ----- Message from Sigifredo Ann sent at 11/16/2023 10:41 AM EST -----       ----- Message from [address] [place] [address] [place] to Miryam Hong, [address] [place] [address] sent at 11/16/2023 10:39 AM -----   I am having issues with a treat infection that will not go away. Not sure if it's the jardiance or advair but it will not go away. It is very persistent. My hair loss had gotten really severe. It's thin,  falling out very easily,  and I have a bald spot forming on there back of my head. If I have to pay for the finasteride out of pocket I will find a way to do so but this is awful. :(    I could really use some help with this.      Thank you   Cristy

## 2023-11-28 ENCOUNTER — PATIENT MESSAGE (OUTPATIENT)
Dept: FAMILY MEDICINE CLINIC | Facility: CLINIC | Age: 48
End: 2023-11-28

## 2023-11-28 DIAGNOSIS — M16.0 PRIMARY OSTEOARTHRITIS OF BOTH HIPS: ICD-10-CM

## 2023-11-29 RX ORDER — CELECOXIB 200 MG/1
200 CAPSULE ORAL 2 TIMES DAILY
Qty: 60 CAPSULE | Refills: 5 | Status: SHIPPED | OUTPATIENT
Start: 2023-11-29

## 2023-11-30 ENCOUNTER — TELEMEDICINE (OUTPATIENT)
Dept: BEHAVIORAL/MENTAL HEALTH CLINIC | Facility: CLINIC | Age: 48
End: 2023-11-30
Payer: COMMERCIAL

## 2023-11-30 DIAGNOSIS — F33.9 MAJOR DEPRESSION, RECURRENT, CHRONIC (HCC): Primary | ICD-10-CM

## 2023-11-30 PROCEDURE — T1015 CLINIC SERVICE: HCPCS | Performed by: SOCIAL WORKER

## 2023-11-30 NOTE — PSYCH
Behavioral Health Psychotherapy Progress Note    Psychotherapy Provided: Individual Psychotherapy     1. Major depression, recurrent, chronic (HCC)            Goals addressed in session: Goal 1     DATA: The client reported that she obtained SSI which has helped with her financial issues,and decreased her depression. During the session we explored the connection with her anxiety due to her physical health. The Client was able to verbalize her weaknesses being her constant pain. Coping skills were reviewed during the session to address her weaknesses   During this session, this clinician used the following therapeutic modalities: Cognitive Behavioral Therapy, Mindfulness-based Strategies, Solution-Focused Therapy, and Supportive Psychotherapy    Substance Abuse was not addressed during this session. If the client is diagnosed with a co-occurring substance use disorder, please indicate any changes in the frequency or amount of use: n/a. Stage of change for addressing substance use diagnoses: No substance use/Not applicable    ASSESSMENT:  Cristy Garcia presents with a Anxious mood. her affect is Normal range and intensity, which is congruent, with her mood and the content of the session. The client has made progress on their goals. The Client Kristina Hashimoto presents with a none risk of suicide, none risk of self-harm, and none risk of harm to others. For any risk assessment that surpasses a "low" rating, a safety plan must be developed. A safety plan was indicated: no  If yes, describe in detail n/a    PLAN: Between sessions, Kristina Hashimoto will utilize her coping skills when presented with anxiety and depression. . At the next session, the therapist will use Cognitive Behavioral Therapy, Mindfulness-based Strategies, Solution-Focused Therapy, and Supportive Psychotherapy to address to address the Client's MH issues and the effects on her different relationships and environments.     Behavioral Health Treatment Plan and Discharge Planning: Peggy Simon is aware of and agrees to continue to work on their treatment plan. They have identified and are working toward their discharge goals. yes    Visit start and stop times:    11/30/23  Start Time: 1200  Stop Time: 1220  Total Visit Time: 20 minutes  Telemedicine consent    Patient: Peggy Simon  Provider: YONATAN Dean  Provider located at [address] [facility]  [address] [facility] [address] [address]    The patient was identified by name and date of birth. Peggy Simon was informed that this is a telemedicine visit and that the visit is being conducted through Telephone. My office door was closed. No one else was in the room. She acknowledged consent and understanding of privacy and security of the video platform. The patient has agreed to participate and understands they can discontinue the visit at any time. Patient is aware this is a billable service. I spent 20 minutes with the patient during this visit.

## 2023-12-04 ENCOUNTER — TELEPHONE (OUTPATIENT)
Dept: FAMILY MEDICINE CLINIC | Facility: CLINIC | Age: 48
End: 2023-12-04

## 2023-12-04 NOTE — LETTER
December 4, 2023     Patient: Valerie Shepard  YOB: 1975  Date of Visit: 12/4/2023      To Whom it May Concern:    Valerie Shepard is under my professional care. Cristy was seen in my office on 12/4/2023. Cristy {Return to school/sport/work:[id number]}. If you have any questions or concerns, please don't hesitate to call.          Sincerely,          CHRIS Calero        CC:   No Recipients

## 2023-12-04 NOTE — TELEPHONE ENCOUNTER
[place] -  [facility] - [address] duty   Contact: [phone]  ----- Message from Bhavani Torres sent at 2023 10:45 AM EST -----       ----- Message from [address] [place] [address] to Hernando Lomeli, [address] [place] [address] sent at 2023 10:41 AM -----   With my back and hips hurting the way they do I would not be able to do jury duty and sit all day. Can you fax them a note stating I would physically be unable to do jury duty  due to physical limitations and pain? Fax number is [phone].  Thank you     Cristy

## 2023-12-04 NOTE — LETTER
Date: 12/4/2023    To whom it may concern: This is to certify that [employer] has been under my care for chronic low back pain with sciatica related to Lumbar Spondylosis and Degenerative Disc Disease in addition to chronic bilateral hip, pelvic, and knee pain related to Degenerative Joint Disease. These conditions result in Poppy having impaired mobility and difficulty sitting for long periods of time. I feel that [employer] is unable to serve on [address] at this time for the above mentioned medical reasons.         Sincerely,        CHRIS Crum

## 2023-12-17 PROBLEM — Z12.11 SCREENING FOR COLON CANCER: Status: RESOLVED | Noted: 2023-10-18 | Resolved: 2023-12-17

## 2023-12-19 ENCOUNTER — TELEMEDICINE (OUTPATIENT)
Dept: BEHAVIORAL/MENTAL HEALTH CLINIC | Facility: CLINIC | Age: 48
End: 2023-12-19
Payer: COMMERCIAL

## 2023-12-19 DIAGNOSIS — F33.9 MAJOR DEPRESSION, RECURRENT, CHRONIC (HCC): Primary | ICD-10-CM

## 2023-12-19 PROCEDURE — T1015 CLINIC SERVICE: HCPCS | Performed by: SOCIAL WORKER

## 2023-12-19 NOTE — PSYCH
"Behavioral Health Psychotherapy Progress Note    Psychotherapy Provided: Individual Psychotherapy     1. Major depression, recurrent, chronic (HCC)            Goals addressed in session: Goal 1     DATA: The client reported a decrease in her depression and anxiety due to increase in her financial resources. As team we explored and processed her increased strengths and goals for the next year to improve her different relationships and environments.   During this session, this clinician used the following therapeutic modalities: Cognitive Behavioral Therapy    Substance Abuse was not addressed during this session. If the client is diagnosed with a co-occurring substance use disorder, please indicate any changes in the frequency or amount of use: n/a. Stage of change for addressing substance use diagnoses: No substance use/Not applicable    ASSESSMENT:  Cristy Garcia presents with a Anxious and Depressed mood.     her affect is Normal range and intensity, which is congruent, with her mood and the content of the session. The client has made progress on their goals.    The Client  Cristy Garcia presents with a none risk of suicide, none risk of self-harm, and none risk of harm to others.    For any risk assessment that surpasses a \"low\" rating, a safety plan must be developed.    A safety plan was indicated: no  If yes, describe in detail n/a  PSYCH MENTAL STATUS PAIN 5 as reported by the Client   PHYSICAL PAIN SCALE NUMBERS: 7 as reported by the client     PLAN: Between sessions, Cristy Garcia will utilize his coping skills when presented with anxiety and depression . At the next session, the therapist will use Cognitive Behavioral Therapy, Mindfulness-based Strategies, Solution-Focused Therapy, and Supportive Psychotherapy to address the client's MH issues affecting her different relationships and enviorments.    Behavioral Health Treatment Plan and Discharge Planning: Cristy Garcia is aware of and agrees to continue " to work on their treatment plan. They have identified and are working toward their discharge goals. yes    Visit start and stop times:    12/19/23  Telemedicine consent    Patient: Cristy Garcia  Provider: YONATAN Luu  Provider located at [place] [address] [address] [facility] [id number]    The patient was identified by name and date of birth. Cristy Garcia was informed that this is a telemedicine visit and that the visit is being conducted through Telephone.  My office door was closed. No one else was in the room.  She acknowledged consent and understanding of privacy and security of the video platform. The patient has agreed to participate and understands they can discontinue the visit at any time.    Patient is aware this is a billable service.     I spent 20 minutes with the patient during this visit.

## 2023-12-20 ENCOUNTER — DOCUMENTATION (OUTPATIENT)
Dept: BEHAVIORAL/MENTAL HEALTH CLINIC | Facility: CLINIC | Age: 48
End: 2023-12-20

## 2024-01-01 DIAGNOSIS — E55.9 VITAMIN D DEFICIENCY: ICD-10-CM

## 2024-01-01 DIAGNOSIS — E78.2 MIXED HYPERLIPIDEMIA: ICD-10-CM

## 2024-01-01 DIAGNOSIS — I10 ESSENTIAL HYPERTENSION: ICD-10-CM

## 2024-01-01 DIAGNOSIS — J30.89 SEASONAL ALLERGIC RHINITIS DUE TO OTHER ALLERGIC TRIGGER: ICD-10-CM

## 2024-01-02 ENCOUNTER — APPOINTMENT (OUTPATIENT)
Dept: LAB | Facility: HOSPITAL | Age: 49
End: 2024-01-02
Payer: COMMERCIAL

## 2024-01-02 DIAGNOSIS — E04.9 THYROID GOITER: ICD-10-CM

## 2024-01-02 LAB — TSH SERPL DL<=0.05 MIU/L-ACNC: 0.96 UIU/ML (ref 0.45–4.5)

## 2024-01-02 PROCEDURE — 36415 COLL VENOUS BLD VENIPUNCTURE: CPT

## 2024-01-02 PROCEDURE — 84443 ASSAY THYROID STIM HORMONE: CPT

## 2024-01-02 RX ORDER — ATORVASTATIN CALCIUM 20 MG/1
20 TABLET, FILM COATED ORAL
Qty: 90 TABLET | Refills: 0 | Status: SHIPPED | OUTPATIENT
Start: 2024-01-02

## 2024-01-02 RX ORDER — CETIRIZINE HYDROCHLORIDE 10 MG/1
TABLET ORAL
Qty: 30 TABLET | Refills: 0 | Status: SHIPPED | OUTPATIENT
Start: 2024-01-02

## 2024-01-02 RX ORDER — LISINOPRIL 10 MG/1
10 TABLET ORAL DAILY
Qty: 90 TABLET | Refills: 0 | Status: SHIPPED | OUTPATIENT
Start: 2024-01-02

## 2024-01-02 RX ORDER — ERGOCALCIFEROL 1.25 MG/1
50000 CAPSULE ORAL WEEKLY
Qty: 12 CAPSULE | Refills: 0 | Status: SHIPPED | OUTPATIENT
Start: 2024-01-02

## 2024-01-03 ENCOUNTER — TELEMEDICINE (OUTPATIENT)
Dept: BEHAVIORAL/MENTAL HEALTH CLINIC | Facility: CLINIC | Age: 49
End: 2024-01-03
Payer: COMMERCIAL

## 2024-01-03 DIAGNOSIS — F33.9 MAJOR DEPRESSION, RECURRENT, CHRONIC (HCC): Primary | ICD-10-CM

## 2024-01-03 PROCEDURE — T1015 CLINIC SERVICE: HCPCS | Performed by: SOCIAL WORKER

## 2024-01-03 NOTE — PSYCH
"Behavioral Health Psychotherapy Progress Note    Psychotherapy Provided: Individual Psychotherapy     1. Major depression, recurrent, chronic (HCC)            Goals addressed in session: Goal 1     DATA: The Client reported that she has been having continued problems with her physical health. She reported that the cause is due to the change of Bariatric pressure. She has contacted her physician. During the session we explored and processed the client's the limitations with her physical health using the BC model we process her weaknesses. Coping skills were reviewed during the session  During this session, this clinician used the following therapeutic modalities: Cognitive Behavioral Therapy, Mindfulness-based Strategies, Solution-Focused Therapy, and Supportive Psychotherapy    Substance Abuse was not addressed during this session. If the client is diagnosed with a co-occurring substance use disorder, please indicate any changes in the frequency or amount of use: n/a. Stage of change for addressing substance use diagnoses: No substance use/Not applicable    ASSESSMENT:  Cristy Garcia presents with a Anxious mood.     her affect is Normal range and intensity, which is congruent, with her mood and the content of the session. The client has made progress on their goals.    The Client  Cristy Garcia presents with a none risk of suicide, none risk of self-harm, and none risk of harm to others.    For any risk assessment that surpasses a \"low\" rating, a safety plan must be developed.    A safety plan was indicated: no  If yes, describe in detail n/a    PLAN: Between sessions, Cristy Garcia will practice coping skills when presented with anxiety and or depression. At the next session, the therapist will use Cognitive Behavioral Therapy, Mindfulness-based Strategies, Solution-Focused Therapy, and Supportive Psychotherapy to address the client's MH issues that are affecting the Client's different relationships and " environments.    Behavioral Health Treatment Plan and Discharge Planning: Cristy Garcia is aware of and agrees to continue to work on their treatment plan. They have identified and are working toward their discharge goals. yes    Visit start and stop times:    01/03/24  Telemedicine consent    Patient: Cristy Garcia  Provider: YONATAN Luu  [facility] [place] [address] [facility] [id number]    The patient was identified by name and date of birth. Cristy Garcia was informed that this is a telemedicine visit and that the visit is being conducted through Telephone.  My office door was closed. No one else was in the room.  She acknowledged consent and understanding of privacy and security of the video platform. The patient has agreed to participate and understands they can discontinue the visit at any time.    Patient is aware this is a billable service.     I spent 30 minutes with the patient during this visit.  Start Time: 1205  Stop Time: 1235  Total Visit Time: 30 minutes

## 2024-01-10 ENCOUNTER — OFFICE VISIT (OUTPATIENT)
Dept: FAMILY MEDICINE CLINIC | Facility: CLINIC | Age: 49
End: 2024-01-10
Payer: COMMERCIAL

## 2024-01-10 VITALS
BODY MASS INDEX: 47.73 KG/M2 | HEART RATE: 76 BPM | WEIGHT: 259.4 LBS | SYSTOLIC BLOOD PRESSURE: 140 MMHG | DIASTOLIC BLOOD PRESSURE: 88 MMHG | OXYGEN SATURATION: 97 % | HEIGHT: 62 IN | TEMPERATURE: 97.6 F | RESPIRATION RATE: 16 BRPM

## 2024-01-10 DIAGNOSIS — M16.0 PRIMARY OSTEOARTHRITIS OF BOTH HIPS: ICD-10-CM

## 2024-01-10 DIAGNOSIS — Z12.31 ENCOUNTER FOR SCREENING MAMMOGRAM FOR BREAST CANCER: ICD-10-CM

## 2024-01-10 DIAGNOSIS — G47.33 OSA ON CPAP: ICD-10-CM

## 2024-01-10 DIAGNOSIS — J45.30 MILD PERSISTENT ASTHMA WITHOUT COMPLICATION: ICD-10-CM

## 2024-01-10 DIAGNOSIS — E53.8 VITAMIN B12 DEFICIENCY: ICD-10-CM

## 2024-01-10 DIAGNOSIS — R09.82 PND (POST-NASAL DRIP): ICD-10-CM

## 2024-01-10 DIAGNOSIS — I10 PRIMARY HYPERTENSION: ICD-10-CM

## 2024-01-10 DIAGNOSIS — J01.00 ACUTE MAXILLARY SINUSITIS, RECURRENCE NOT SPECIFIED: ICD-10-CM

## 2024-01-10 DIAGNOSIS — Z13.0 SCREENING FOR DEFICIENCY ANEMIA: ICD-10-CM

## 2024-01-10 DIAGNOSIS — E78.49 OTHER HYPERLIPIDEMIA: ICD-10-CM

## 2024-01-10 DIAGNOSIS — E07.9 THYROID DISORDER: ICD-10-CM

## 2024-01-10 DIAGNOSIS — R05.1 ACUTE COUGH: ICD-10-CM

## 2024-01-10 DIAGNOSIS — F33.9 MAJOR DEPRESSION, RECURRENT, CHRONIC (HCC): ICD-10-CM

## 2024-01-10 DIAGNOSIS — E55.9 VITAMIN D DEFICIENCY: ICD-10-CM

## 2024-01-10 DIAGNOSIS — J06.9 ACUTE URI: ICD-10-CM

## 2024-01-10 DIAGNOSIS — E04.1 THYROID NODULE: ICD-10-CM

## 2024-01-10 DIAGNOSIS — H65.00 ACUTE SEROUS OTITIS MEDIA, RECURRENCE NOT SPECIFIED, UNSPECIFIED LATERALITY: ICD-10-CM

## 2024-01-10 DIAGNOSIS — E11.65 TYPE 2 DIABETES MELLITUS WITH HYPERGLYCEMIA, WITHOUT LONG-TERM CURRENT USE OF INSULIN (HCC): Primary | ICD-10-CM

## 2024-01-10 LAB — SL AMB POCT HEMOGLOBIN AIC: 6.8 (ref ?–6.5)

## 2024-01-10 PROCEDURE — 83036 HEMOGLOBIN GLYCOSYLATED A1C: CPT | Performed by: NURSE PRACTITIONER

## 2024-01-10 PROCEDURE — 99214 OFFICE O/P EST MOD 30 MIN: CPT | Performed by: NURSE PRACTITIONER

## 2024-01-10 RX ORDER — FLUTICASONE PROPIONATE 50 MCG
SPRAY, SUSPENSION (ML) NASAL
Qty: 18.2 ML | Refills: 3 | Status: SHIPPED | OUTPATIENT
Start: 2024-01-10

## 2024-01-10 RX ORDER — AMOXICILLIN AND CLAVULANATE POTASSIUM 875; 125 MG/1; MG/1
1 TABLET, FILM COATED ORAL 2 TIMES DAILY
Qty: 20 TABLET | Refills: 1 | Status: SHIPPED | OUTPATIENT
Start: 2024-01-10 | End: 2024-01-20

## 2024-01-10 RX ORDER — FLUTICASONE PROPIONATE AND SALMETEROL 250; 50 UG/1; UG/1
1 POWDER RESPIRATORY (INHALATION) 2 TIMES DAILY
Qty: 180 BLISTER | Refills: 3 | Status: SHIPPED | OUTPATIENT
Start: 2024-01-10 | End: 2025-01-04

## 2024-01-10 NOTE — PATIENT INSTRUCTIONS
Wellness Visit for Adults   WHAT YOU NEED TO KNOW:   What is a wellness visit?  A wellness visit is when you see your healthcare provider to get screened for health problems. Your healthcare provider will also give you advice on how to stay healthy. Write down your questions so you remember to ask them. Ask your healthcare provider how often you should have a wellness visit.  What happens at a wellness visit?  Your healthcare provider will ask about your health, and your family history of health problems. This includes high blood pressure, heart disease, and cancer. He or she will ask if you have symptoms that concern you, if you smoke, and about your mood. You may also be asked about your intake of medicines, supplements, food, and alcohol. Any of the following may be done:  Your weight  will be checked. Your height may also be checked so your body mass index (BMI) can be calculated. Your BMI shows if you are at a healthy weight.    Your blood pressure  and heart rate will be checked. Your temperature may also be checked.    Blood and urine tests  may be done. Blood tests may be done to check your cholesterol levels. Abnormal cholesterol levels increase your risk for heart disease and stroke. You may also need a blood or urine test to check for diabetes if you are at increased risk. Urine tests may be done to look for signs of an infection or kidney disease.    A physical exam  includes checking your heartbeat and lungs with a stethoscope. Your healthcare provider may also check your skin to look for sun damage.    Screening tests  may be recommended. A screening test is done to check for diseases that may not cause symptoms. The screening tests you may need depend on your age, gender, family history, and lifestyle habits. For example, colorectal screening may be recommended if you are 50 years old or older.    What screening tests do I need if I am a woman?   A Pap smear  is used to screen for cervical cancer. Pap  smears are usually done every 3 to 5 years depending on your age. You may need them more often if you have had abnormal Pap smear test results in the past. Ask your healthcare provider how often you should have a Pap smear.    A mammogram  is an x-ray of your breasts to screen for breast cancer. Experts recommend mammograms every 2 years starting at age 50 years. You may need a mammogram at age 49 years or younger if you have an increased risk for breast cancer. Talk to your healthcare provider about when you should start having mammograms and how often you need them.    What vaccines might I need?   Get an influenza vaccine  every year. The influenza vaccine protects you from the flu. Several types of viruses cause the flu. The viruses change over time, so new vaccines are made each year.    Get a tetanus-diphtheria (Td) booster vaccine  every 10 years. This vaccine protects you against tetanus and diphtheria. Tetanus is a severe infection that may cause painful muscle spasms and lockjaw. Diphtheria is a severe bacterial infection that causes a thick covering in the back of your mouth and throat.    Get a human papillomavirus (HPV) vaccine  if you are female and aged 19 to 26 or male 19 to 21 and never received it. This vaccine protects you from HPV infection. HPV is the most common infection spread by sexual contact. HPV may also cause vaginal, penile, and anal cancers.    Get a pneumococcal vaccine  if you are aged 65 years or older. The pneumococcal vaccine is an injection given to protect you from pneumococcal disease. Pneumococcal disease is an infection caused by pneumococcal bacteria. The infection may cause pneumonia, meningitis, or an ear infection.    Get a shingles vaccine  if you are 60 or older, even if you have had shingles before. The shingles vaccine is an injection to protect you from the varicella-zoster virus. This is the same virus that causes chickenpox. Shingles is a painful rash that develops  in people who had chickenpox or have been exposed to the virus.    How can I eat healthy?  My Plate is a model for planning healthy meals. It shows the types and amounts of foods that should go on your plate. Fruits and vegetables make up about half of your plate, and grains and protein make up the other half. A serving of dairy is included on the side of your plate. The amount of calories and serving sizes you need depends on your age, gender, weight, and height. Examples of healthy foods are listed below:  Eat a variety of vegetables  such as dark green, red, and orange vegetables. You can also include canned vegetables low in sodium (salt) and frozen vegetables without added butter or sauces.    Eat a variety of fresh fruits , canned fruit in 100% juice, frozen fruit, and dried fruit.    Include whole grains.  At least half of the grains you eat should be whole grains. Examples include whole-wheat bread, wheat pasta, brown rice, and whole-grain cereals such as oatmeal.    Eat a variety of protein foods such as seafood (fish and shellfish), lean meat, and poultry without skin (turkey and chicken). Examples of lean meats include pork leg, shoulder, or tenderloin, and beef round, sirloin, tenderloin, and extra lean ground beef. Other protein foods include eggs and egg substitutes, beans, peas, soy products, nuts, and seeds.    Choose low-fat dairy products such as skim or 1% milk or low-fat yogurt, cheese, and cottage cheese.    Limit unhealthy fats  such as butter, hard margarine, and shortening.       How much exercise do I need?  Exercise at least 30 minutes per day on most days of the week. Some examples of exercise include walking, biking, dancing, and swimming. You can also fit in more physical activity by taking the stairs instead of the elevator or parking farther away from stores. Include muscle strengthening activities 2 days each week. Regular exercise provides many health benefits. It helps you manage  your weight, and decreases your risk for type 2 diabetes, heart disease, stroke, and high blood pressure. Exercise can also help improve your mood. Ask your healthcare provider about the best exercise plan for you.       What are some general health and safety guidelines I should follow?   Do not smoke.  Nicotine and other chemicals in cigarettes and cigars can cause lung damage. Ask your healthcare provider for information if you currently smoke and need help to quit. E-cigarettes or smokeless tobacco still contain nicotine. Talk to your healthcare provider before you use these products.    Limit alcohol.  A drink of alcohol is 12 ounces of beer, 5 ounces of wine, or 1½ ounces of liquor.    Lose weight, if needed.  Being overweight increases your risk of certain health conditions. These include heart disease, high blood pressure, type 2 diabetes, and certain types of cancer.    Protect your skin.  Do not sunbathe or use tanning beds. Use sunscreen with a SPF 15 or higher. Apply sunscreen at least 15 minutes before you go outside. Reapply sunscreen every 2 hours. Wear protective clothing, hats, and sunglasses when you are outside.    Drive safely.  Always wear your seatbelt. Make sure everyone in your car wears a seatbelt. A seatbelt can save your life if you are in an accident. Do not use your cell phone when you are driving. This could distract you and cause an accident. Pull over if you need to make a call or send a text message.    Practice safe sex.  Use latex condoms if are sexually active and have more than one partner. Your healthcare provider may recommend screening tests for sexually transmitted infections (STIs).    Wear helmets, lifejackets, and protective gear.  Always wear a helmet when you ride a bike or motorcycle, go skiing, or play sports that could cause a head injury. Wear protective equipment when you play sports. Wear a lifejacket when you are on a boat or doing water sports.    CARE AGREEMENT:    You have the right to help plan your care. Learn about your health condition and how it may be treated. Discuss treatment options with your healthcare providers to decide what care you want to receive. You always have the right to refuse treatment. The above information is an  only. It is not intended as medical advice for individual conditions or treatments. Talk to your doctor, nurse or pharmacist before following any medical regimen to see if it is safe and effective for you.  © Copyright [employer] 2023 Information is for End User's use only and may not be sold, redistributed or otherwise used for commercial purposes.

## 2024-01-10 NOTE — PROGRESS NOTES
Name: Cristy Garcia      : 1975      MRN: [id number]  Encounter Provider: CHRIS Phan  Encounter Date: 1/10/2024   Encounter department: [facility]    Assessment & Plan     1. Type 2 diabetes mellitus with hyperglycemia, without long-term current use of insulin (HCC)  -     Albumin / creatinine urine ratio; Future; Expected date: 2024  -     IRIS Diabetic eye exam; Future; Expected date: 2024  -     POCT hemoglobin A1c; Future; Expected date: 01/10/2024  -     Comprehensive metabolic panel; Future; Expected date: 2024  -     Comprehensive metabolic panel; Future; Expected date: 07/10/2024  -     Hemoglobin A1C; Future; Expected date: 07/10/2024    2. Major depression, recurrent, chronic (HCC)    3. Other hyperlipidemia  -     Lipid panel; Future; Expected date: 2024  -     Lipid panel; Future; Expected date: 07/10/2024    4. VICTOR HUGO on CPAP    5. Primary osteoarthritis of both hips    6. Primary hypertension    7. Screening for deficiency anemia  -     CBC and differential; Future; Expected date: 2024  -     CBC and differential; Future; Expected date: 07/10/2024    8. Vitamin B12 deficiency  -     Vitamin B12; Future; Expected date: 2024  -     Vitamin B12; Future; Expected date: 07/10/2024    9. Vitamin D deficiency  -     Vitamin D 25 hydroxy; Future; Expected date: 2024  -     Vitamin D 25 hydroxy; Future; Expected date: 07/10/2024    10. Encounter for screening mammogram for breast cancer  -     Mammo screening bilateral w 3d & cad; Future; Expected date: 2024    11. Thyroid nodule  -     TSH, 3rd generation; Future; Expected date: 07/10/2024    12. Thyroid disorder  -     TSH, 3rd generation; Future; Expected date: 07/10/2024    13. Acute serous otitis media, recurrence not specified, unspecified laterality  -     amoxicillin-clavulanate (AUGMENTIN) 875-125 mg per tablet; Take 1 tablet by mouth 2 (two) times a day for 10  days  -     fluticasone (FLONASE) 50 mcg/act nasal spray; 1 spray each nostril 2 x daily x 1 month then prn congestion or allergies    14. Acute URI  -     amoxicillin-clavulanate (AUGMENTIN) 875-125 mg per tablet; Take 1 tablet by mouth 2 (two) times a day for 10 days  -     fluticasone (FLONASE) 50 mcg/act nasal spray; 1 spray each nostril 2 x daily x 1 month then prn congestion or allergies    15. Acute cough  -     amoxicillin-clavulanate (AUGMENTIN) 875-125 mg per tablet; Take 1 tablet by mouth 2 (two) times a day for 10 days  -     fluticasone (FLONASE) 50 mcg/act nasal spray; 1 spray each nostril 2 x daily x 1 month then prn congestion or allergies    16. PND (post-nasal drip)  -     amoxicillin-clavulanate (AUGMENTIN) 875-125 mg per tablet; Take 1 tablet by mouth 2 (two) times a day for 10 days  -     fluticasone (FLONASE) 50 mcg/act nasal spray; 1 spray each nostril 2 x daily x 1 month then prn congestion or allergies    17. Mild persistent asthma without complication  -     Fluticasone-Salmeterol (Advair Diskus) 250-50 mcg/dose inhaler; Inhale 1 puff 2 (two) times a day Rinse mouth after use.    18. Acute maxillary sinusitis, recurrence not specified  -     amoxicillin-clavulanate (AUGMENTIN) 875-125 mg per tablet; Take 1 tablet by mouth 2 (two) times a day for 10 days  -     fluticasone (FLONASE) 50 mcg/act nasal spray; 1 spray each nostril 2 x daily x 1 month then prn congestion or allergies           Subjective     Patient presents to office for follow up and recheck. Complete medical history and medications reviewed with patient and tolerating all medications well without any problems. Vital signs reviewed and BP recheck. C/O difficulty hearing from left ear ongoing for the past week.  C/O moist productive cough for yellow mucous due to PND ongoing for the past week along with nasal congestion for yellow mucous and laryngitis ongoing for the past week. Denies fever or chills.  Denies body aches.   Patient states she is allergic to the Tdap vaccine.  Patient states she started the Hepatitis A and B vaccines and is scheduled for the 3rd vaccine the end of January.  Patient had the 5th COVID Booster and Influenza Vaccine completed in Oct. 2023. Denies any other problems or concerns at the present time. Continues to be followed by Dr. Perales Pain Mgt for Chronic Pain Mgt which she receives pain injections in lumbar vertebrae and B/L hips.  Continues to be followed by Dr. Ryan ENT for Hx Thyroid Nodules and Thyroid D/O.          Cough      Review of Systems:  GENERAL:  Feels well, denies any significant changes in weight without trying.  SKIN:  Denies rashes, lesions, opened areas, wounds, change in moles or any other skin changes.  HEENT:  Denies any head injury or headaches.  Negative blurred vision, floaters, spots before eyes, infections, or other vision problems.  Negative significant changes in vision. C/O difficulty hearing in left ear.  Negative tinnitus, vertigo, or infections.  C/O sinus congestion for yellow mucous.  C/O laryngitis occurring.  C/O PND. Negative sore throat, bleeding gums, ulcers, or sores.   NECK:  Denies lumps, goiter, pain, swollen glands, or lymphadenopathy.  BREASTS:  Denies lumps, pain, nipple discharge, swelling, redness, or any other changes.  RESPIRATORY:  C/O moist productive cough for yellow mucous, Denies wheezing, shortness of breath, dyspnea, or orthopnea.  CARDIOVASCULAR:  Denies chest pain or palpitations.   GASTROINTESTINAL:  Appetite good, denies nausea, vomiting, or indigestion.  Bowel movements normal occurring about once daily or every other day.  URINARY:  Denies frequency, urgency, incontinence, dysuria, hematuria, nocturia, or recent flank pain.   GENITAL:  Denies vaginal discharge, pelvic infection, lesions, ulcers, or pain.   Negative dyspareunia or abnormal vaginal bleeding.  PERIPHERAL VASCULAR:  Denies varicosities, swelling, skin changes, or  pain.  MUSCULOSKELETAL:  Denies back, joint, or muscle pain.    Negative problems with mobility or movement.   PSYCHIATRIC:  Denies problems with depression, anxiety, anger, or other psychiatric symptoms.  NEUROLOGIC:  Denies fainting, dizziness, memory problems, seizures, tingling, motor or sensory loss.   HEMATOLOGIC:  Denies easy bruising, bleeding, or anemia.  ENDOCRINE:  Denies thyroid problems, temperature intolerance, excessive sweating, or other endocrine symptoms.      Past Medical History:   Diagnosis Date    Allergic     Seasonal Allergies    Alopecia of scalp     Treated with Proscar    Chronic pain disorder     back, bilat knees, bilat hips    Diabetes mellitus (HCC)     Fibromyalgia, primary     GERD (gastroesophageal reflux disease)     Hip fx, left, closed, with routine healing, subsequent encounter 05/2017    Hyperlipidemia     Hypertension     Insomnia     Irregular heartbeat     VICTOR HUGO on CPAP      Past Surgical History:   Procedure Laterality Date    CARPAL TUNNEL RELEASE Left     LUMBAR EPIDURAL INJECTION      NERVE BLOCK Bilateral 12/29/2020    Procedure: L2 L3 L4 L5 MEDIAL BRANCH BLOCK #1;  Surgeon: Westley Keller MD;  Location: [facility];  Service: Pain Management     NERVE BLOCK Bilateral 1/19/2021    Procedure: L2 L3 L4 L5 MEDIAL BRANCH BLOCK #2;  Surgeon: Westley Keller MD;  Location: [facility];  Service: Pain Management     MI ARTHROCENTESIS ASPIR&/INJ MAJOR JT/BURSA W/O US Bilateral 2/7/2023    Procedure: INJECTION JOINT HIP;  Surgeon: Westley Keller MD;  Location: [facility];  Service: Pain Management     MI HYSTEROSCOPY BX ENDOMETRIUM&/POLYPC W/WO D&C N/A 4/25/2023    Procedure: HYSTEROSCOPY W/  RESECTION UTERINE TUMOR/FIBROID (POLYPECTOMY );  Surgeon: Donovan Ware DO;  Location: [facility];  Service: Gynecology    RADIOFREQUENCY ABLATION Right 2/25/2021    Procedure: L2 L3 L4 L5 RADIO FREQUENCY ABLATION right side;  Surgeon: Westley Keller MD;  Location: [facility];  Service:  Pain Management     RADIOFREQUENCY ABLATION Left 3/16/2021    Procedure: L2 L3 L4 L5 RADIO FREQUENCY ABLATION;  Surgeon: Westley Keller MD;  Location: [facility];  Service: Pain Management     RHIZOTOMY Bilateral 4/4/2023    Procedure: RHIZOTOMY LUMBAR L3, L4, L5 medial branch nerves;  Surgeon: Westley Keller MD;  Location: [facility];  Service: Pain Management     UPPER GASTROINTESTINAL ENDOSCOPY      US GUIDED THYROID BIOPSY  7/14/2023     Family History   Problem Relation Age of Onset    Cancer Father     Hepatitis Father     Asthma Cousin     Hypertension Paternal Grandmother     No Known Problems Mother     No Known Problems Sister     No Known Problems Maternal Grandmother     No Known Problems Maternal Grandfather     No Known Problems Paternal Grandfather     No Known Problems Paternal Aunt     BRCA2 Positive Neg Hx     BRCA2 Negative Neg Hx     BRCA1 Positive Neg Hx     BRCA1 Negative Neg Hx     BRCA 1/2 Neg Hx     Ovarian cancer Neg Hx     Endometrial cancer Neg Hx     Colon cancer Neg Hx     Breast cancer additional onset Neg Hx     Breast cancer Neg Hx      Social History     Socioeconomic History    Marital status: Single     Spouse name: None    Number of children: None    Years of education: None    Highest education level: None   Occupational History    None   Tobacco Use    Smoking status: Former    Smokeless tobacco: Never   Vaping Use    Vaping status: Never Used   Substance and Sexual Activity    Alcohol use: No    Drug use: No    Sexual activity: Not Currently   Other Topics Concern    None   Social History Narrative    None     Social Determinants of Health     Financial Resource Strain: High Risk (2/21/2023)    Overall Financial Resource Strain (CARDIA)     Difficulty of Paying Living Expenses: Hard   Food Insecurity: No Food Insecurity (4/8/2021)    Hunger Vital Sign     Worried About Running Out of Food in the Last Year: Never true     Ran Out of Food in the Last Year: Never true    Transportation Needs: No Transportation Needs (4/8/2021)    PRAPARE - Transportation     Lack of Transportation (Medical): No     Lack of Transportation (Non-Medical): No   Physical Activity: Inactive (8/3/2020)    Exercise Vital Sign     Days of Exercise per Week: 0 days     Minutes of Exercise per Session: 0 min   Stress: Stress Concern Present (8/3/2020)    [employer] [place] [employer] - Occupational Stress Questionnaire     Feeling of Stress : Very much   Social Connections: Moderately Isolated (8/3/2020)    Social Connection and Isolation Panel [NHANES]     Frequency of Communication with Friends and Family: More than three times a week     Frequency of Social Gatherings with Friends and Family: Three times a week     Attends [employer] Services: Never     Active Member of Clubs or Organizations: Yes     Attends Club or Organization Meetings: Not on file     Marital Status: Never    Intimate Partner Violence: Not At Risk (8/3/2020)    Humiliation, Afraid, Rape, and Kick questionnaire     Fear of Current or Ex-Partner: No     Emotionally Abused: No     Physically Abused: No     Sexually Abused: No   Housing Stability: Not on file     Current Outpatient Medications on File Prior to Visit   Medication Sig    acetaminophen (TYLENOL) 500 mg tablet Take 500 mg by mouth every 6 (six) hours as needed for mild pain    albuterol (Ventolin HFA) 90 mcg/act inhaler Inhale 2 puffs every 6 (six) hours as needed for wheezing or shortness of breath    atorvastatin (LIPITOR) 20 mg tablet Take 1 tablet (20 mg total) by mouth daily with dinner    celecoxib (CeleBREX) 200 mg capsule Take 1 capsule (200 mg total) by mouth 2 (two) times a day    cetirizine (ZyrTEC) 10 mg tablet 1 tab po daily x 1 month then prn allergies    clotrimazole-betamethasone (LOTRISONE) 1-0.05 % cream Apply 2 x daily to vaginal area until healed then prn irritation    Empagliflozin (Jardiance) 25 MG TABS Take 1 tablet (25 mg total) by  "mouth daily    ergocalciferol (VITAMIN D2) 50,000 units Take 1 capsule (50,000 Units total) by mouth once a week    famotidine (PEPCID) 40 MG tablet Take 1 PO QD.    gabapentin (NEURONTIN) 300 mg capsule Take 1 capsule (300 mg total) by mouth 3 (three) times a day    glipiZIDE (GLUCOTROL) 10 mg tablet TAKE (2) TABLETS TWICE DAILY BEFORE MEALS.    glucose blood test strip Use as instructed    Lancets (onetouch ultrasoft) lancets Use as instructed    lisinopril (ZESTRIL) 10 mg tablet Take 1 tablet (10 mg total) by mouth daily    metFORMIN (GLUCOPHAGE) 1000 MG tablet Take 1 tablet (1,000 mg total) by mouth 2 (two) times a day with meals    omeprazole (PriLOSEC) 40 MG capsule Take 1 capsule (40 mg total) by mouth 2 (two) times a day before meals    sertraline (ZOLOFT) 50 mg tablet TAKE (1) TABLET BY MOUTH DAILY AT BEDTIME.    triamcinolone (KENALOG) 0.025 % cream Apply 2 x daily to rash until healed then prn rash    [DISCONTINUED] cromolyn (OPTICROM) 4 % ophthalmic solution 1 drop every 6 hours x 10 days then prn allergies (Patient not taking: Reported on 1/10/2024)    [DISCONTINUED] Fluticasone-Salmeterol (Advair Diskus) 250-50 mcg/dose inhaler Inhale 1 puff 2 (two) times a day Rinse mouth after use.     Allergies   Allergen Reactions    Trulicity [Dulaglutide] Swelling    Tdap [Tetanus-Diphth-Acell Pertussis] Rash     Immunization History   Administered Date(s) Administered    COVID-19 MODERNA VACC 0.5 ML IM 12/16/2021    COVID-19 PFIZER VACCINE 0.3 ML IM 04/29/2021, 05/20/2021    Hep A / Hep B 06/23/2023, 07/26/2023    Influenza, recombinant, quadrivalent,injectable, preservative free 12/09/2020, 01/24/2022, 11/23/2022    Pneumococcal Conjugate Vaccine 20-valent (Pcv20), Polysace 11/23/2022    Pneumococcal Polysaccharide PPV23 01/01/2020       Objective     /88 (BP Location: Left arm, Patient Position: Sitting)   Pulse 76   Temp 97.6 °F (36.4 °C) (Tympanic)   Resp 16   Ht 5' 2\" (1.575 m)   Wt 118 kg (259 " lb 6.4 oz)   SpO2 97%   BMI 47.44 kg/m²     Physical Exam  Vitals and nursing note reviewed.     GENERAL:  Appears well nourished, well groomed, in no acute distress.  SKIN:  Palms warm, dry, color good.  Nails without clubbing or cyanosis.    No lesions, ulcerations, or wounds.  HEAD:  Hair is average texture.  Scalp without lesions, normocephalic, and atraumatic.  EYES:  Visual fields full by confrontation.  Conjunctiva pink, sclera white, PERRLA.  Extraocular movements intact.    EARS:  B/L ear canals clear.  B/L TMs red with serous effusion and decreased light reflex.    NOSE: Mucosa pink, moist, septum midline. +sinus tenderness.   B/L turbinates red and edematous with yellow exudate.   MOUTH:  Oral mucosa pink.  Pharynx red with yellow PND. Dentition ok.  Tongue midline.   NECK:  Supple, trachea midline, Negative thyromegaly, lymphadenopathy, or swollen glands.  LYMPH NODES:  Negative enlargement of neck, axillary, epitrochlear, or inguinal nodes.  THORAX/LUNGS  Thorax symmetric with good excursion.   Lungs resonant.  Breath sounds vesicular with no added sounds.    Diaphragm descends within normal limits.   CARDIOVASCULAR:  Carotid upstrokes brisk and without bruits.   Apical impulse discrete and tapping, barely palpable in the 5th ICS/MCL.    Normal S1 and Normal S2, Negative S3 or S4.    Negative murmurs, thrills, lifts, or heaves.  ABDOMEN:  Protuberant, bowel sounds normal active x 4 quadrants.    Negative tenderness.  Negative masses.  Negative hepatomegaly.  Negative splenomegaly.  Negative costovertebral tenderness.  EXTREMITIES:  Warm, calves supple, non-tender, negative for edema.    Negative stasis pigmentation or ulcers.  +2 pulses throughout.  MUSCULOSKELETAL:  Negative joint deformities.    Good range of motion in hands, wrists, elbows, shoulders, spine, hips, knees, and ankles.  NEUROLOGICAL:  Mental status:  Awake, alert, and oriented to person, place, time, and event. Normal thought  processes.          CHRIS Phan

## 2024-01-16 ENCOUNTER — TELEMEDICINE (OUTPATIENT)
Dept: BEHAVIORAL/MENTAL HEALTH CLINIC | Facility: CLINIC | Age: 49
End: 2024-01-16
Payer: COMMERCIAL

## 2024-01-16 DIAGNOSIS — F33.9 MAJOR DEPRESSION, RECURRENT, CHRONIC (HCC): Primary | ICD-10-CM

## 2024-01-16 PROCEDURE — T1015 CLINIC SERVICE: HCPCS | Performed by: SOCIAL WORKER

## 2024-01-16 NOTE — PSYCH
"Behavioral Health Psychotherapy Progress Note    Psychotherapy Provided: Individual Psychotherapy     1. Major depression, recurrent, chronic (HCC)            Goals addressed in session: Goal 1 /Sridhar Alegre Safety Template    DATA: The client reported continued issues with finances, but has been able to self-advocate for herself with DPW due to paperwork issues. During the session we created her recovery safety plan Michael alegre. Coping skills were reviewed during the session.   During this session, this clinician used the following therapeutic modalities: Cognitive Behavioral Therapy, Mindfulness-based Strategies, Motivational Interviewing, Solution-Focused Therapy, and Supportive Psychotherapy    Substance Abuse was not addressed during this session. If the client is diagnosed with a co-occurring substance use disorder, please indicate any changes in the frequency or amount of use: n/a. Stage of change for addressing substance use diagnoses: No substance use/Not applicable    ASSESSMENT:  Cristy Garcia presents with a Anxious and Depressed mood.     her affect is Normal range and intensity, which is congruent, with her mood and the content of the session. The client has made progress on their goals.    The client  Cristy Garcia presents with a none risk of suicide, none risk of self-harm, and none risk of harm to others.    For any risk assessment that surpasses a \"low\" rating, a safety plan must be developed.    A safety plan was indicated: no  If yes, describe in detail n/a    PLAN: Between sessions, Cristy Garcia will utilize her coping skills when presented with anxiety and or depression . At the next session, the therapist will use Cognitive Behavioral Therapy, Mindfulness-based Strategies, Solution-Focused Therapy, and Supportive Psychotherapy to address the client's MH issues affecting her different relationships and environments.    Behavioral Health Treatment Plan and Discharge Planning: Cristy Garcia" is aware of and agrees to continue to work on their treatment plan. They have identified and are working toward their discharge goals. yes    Visit start and stop times:    01/16/24  Start Time: 1100  Stop Time: 1139  Total Visit Time: 39 minutes    Telemedicine consent    Patient: Forrestkailee GarciaBrookline  Provider: YONATAN Luu  [facility] [place] [address] [facility] [address]    The patient was identified by name and date of birth. Cristy Garcia was informed that this is a telemedicine visit and that the visit is being conducted through Telephone.  My office door was closed. No one else was in the room.  She acknowledged consent and understanding of privacy and security of the video platform. The patient has agreed to participate and understands they can discontinue the visit at any time.    Patient is aware this is a billable service.     I spent 39 minutes with the patient during this visit.

## 2024-01-18 DIAGNOSIS — J30.89 SEASONAL ALLERGIC RHINITIS DUE TO OTHER ALLERGIC TRIGGER: ICD-10-CM

## 2024-01-18 DIAGNOSIS — E78.2 MIXED HYPERLIPIDEMIA: ICD-10-CM

## 2024-01-18 DIAGNOSIS — E55.9 VITAMIN D DEFICIENCY: ICD-10-CM

## 2024-01-18 DIAGNOSIS — I10 ESSENTIAL HYPERTENSION: ICD-10-CM

## 2024-01-18 RX ORDER — CETIRIZINE HYDROCHLORIDE 10 MG/1
TABLET ORAL
Qty: 30 TABLET | Refills: 5 | Status: SHIPPED | OUTPATIENT
Start: 2024-01-18

## 2024-01-18 RX ORDER — ATORVASTATIN CALCIUM 20 MG/1
20 TABLET, FILM COATED ORAL
Qty: 90 TABLET | Refills: 1 | Status: SHIPPED | OUTPATIENT
Start: 2024-01-18

## 2024-01-18 RX ORDER — LISINOPRIL 10 MG/1
10 TABLET ORAL DAILY
Qty: 90 TABLET | Refills: 1 | Status: SHIPPED | OUTPATIENT
Start: 2024-01-18

## 2024-01-19 RX ORDER — ERGOCALCIFEROL 1.25 MG/1
50000 CAPSULE ORAL WEEKLY
Qty: 12 CAPSULE | Refills: 1 | Status: SHIPPED | OUTPATIENT
Start: 2024-01-19

## 2024-01-29 ENCOUNTER — TELEMEDICINE (OUTPATIENT)
Dept: BEHAVIORAL/MENTAL HEALTH CLINIC | Facility: CLINIC | Age: 49
End: 2024-01-29
Payer: COMMERCIAL

## 2024-01-29 DIAGNOSIS — F33.9 MAJOR DEPRESSION, RECURRENT, CHRONIC (HCC): Primary | ICD-10-CM

## 2024-01-29 PROCEDURE — T1015 CLINIC SERVICE: HCPCS | Performed by: SOCIAL WORKER

## 2024-01-29 NOTE — PSYCH
"Behavioral Health Psychotherapy Progress Note    Psychotherapy Provided: Individual Psychotherapy     1. Major depression, recurrent, chronic (HCC)            Goals addressed in session: Goal 1     DATA: The client reported continued pain which has limited her entrance into the community. 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 During the session we explored ways to address her mental health with increase medication, and changes in her environments. Coping skills were reviewed during the session.   During this session, this clinician used the following therapeutic modalities: Client-centered Therapy, Cognitive Behavioral Therapy, Motivational Interviewing, Solution-Focused Therapy, and Supportive Psychotherapy    Substance Abuse was not addressed during this session. If the client is diagnosed with a co-occurring substance use disorder, please indicate any changes in the frequency or amount of use: n/a. Stage of change for addressing substance use diagnoses: No substance use/Not applicable    ASSESSMENT:  Cristy Garcia presents with a Anxious and Depressed mood.     her affect is Normal range and intensity, which is congruent, with her mood and the content of the session. The client has made progress on their goals.    The client  Cristy Garcia presents with a none risk of suicide, none risk of self-harm, and none risk of harm to others.    For any risk assessment that surpasses a \"low\" rating, a safety plan must be developed.    A safety plan was indicated: no  If yes, describe in detail n/a  PSYCH MENTAL STATUS PAIN :6 as reported by the client   PHYSICAL  STATUS PAIN 8 as reported by the client   PLAN: Between sessions, Cristy Garcia will practice her coping skills when presented with anxiety and or depression. . At the next session, the therapist will use Client-centered Therapy, Cognitive Behavioral Therapy, Solution-Focused " Therapy, and Supportive Psychotherapy to address the client's MH issues affecting her different relationships and environments.    Behavioral Health Treatment Plan and Discharge Planning: Cristy Garcia is aware of and agrees to continue to work on their treatment plan. They have identified and are working toward their discharge goals. yes    Visit start and stop times:    01/29/24  Start Time: 1531  Stop Time: 1551  Total Visit Time: 20 minutes  Telemedicine consent    Patient: Cristy Garcia  Provider: YONATAN Luu  Provider located at [address] [address]    The patient was identified by name and date of birth. Cristy Garcia was informed that this is a telemedicine visit and that the visit is being conducted through Telephone.  My office door was closed. No one else was in the room.  She acknowledged consent and understanding of privacy and security of the video platform. The patient has agreed to participate and understands they can discontinue the visit at any time.    Patient is aware this is a billable service.     I spent 20 minutes with the patient during this visit.

## 2024-01-30 DIAGNOSIS — E55.9 VITAMIN D DEFICIENCY: ICD-10-CM

## 2024-01-31 RX ORDER — ERGOCALCIFEROL 1.25 MG/1
50000 CAPSULE ORAL WEEKLY
Qty: 12 CAPSULE | Refills: 1 | Status: SHIPPED | OUTPATIENT
Start: 2024-01-31 | End: 2024-02-05 | Stop reason: SDUPTHER

## 2024-02-05 DIAGNOSIS — E55.9 VITAMIN D DEFICIENCY: ICD-10-CM

## 2024-02-05 RX ORDER — ERGOCALCIFEROL 1.25 MG/1
50000 CAPSULE ORAL WEEKLY
Qty: 12 CAPSULE | Refills: 1 | Status: SHIPPED | OUTPATIENT
Start: 2024-02-05

## 2024-02-08 ENCOUNTER — TELEPHONE (OUTPATIENT)
Age: 49
End: 2024-02-08

## 2024-02-08 NOTE — TELEPHONE ENCOUNTER
Patient calling to reschedule her EGD. EGD has been rescheduled for 3/20/24 with Dr.Jaiyeola at [facility]. Patient has prep instructions

## 2024-02-09 ENCOUNTER — NURSE TRIAGE (OUTPATIENT)
Age: 49
End: 2024-02-09

## 2024-02-09 NOTE — TELEPHONE ENCOUNTER
"Patient calling in states she has been having severe coughing fits for about a week, currently taking inhaler and dayquil with no relief. Has HTN on Lisinopril, denies any other symptoms besides fatigue. Scheduled office visit on Tuesday 2/13/24        Reason for Disposition   Taking an ACE Inhibitor medication (e.g., benazepril/LOTENSIN, captopril/CAPOTEN, enalapril/VASOTEC, lisinopril/ZESTRIL)    Answer Assessment - Initial Assessment Questions  1. ONSET: \"When did the cough begin?\"       X 1 week ago  2. SEVERITY: \"How bad is the cough today?\"       Bad coughing fits every day  3. SPUTUM: \"Describe the color of your sputum\" (none, dry cough; clear, white, yellow, green)      Non productive  4. HEMOPTYSIS: \"Are you coughing up any blood?\" If so ask: \"How much?\" (flecks, streaks, tablespoons, etc.)      denies  5. DIFFICULTY BREATHING: \"Are you having difficulty breathing?\" If Yes, ask: \"How bad is it?\" (e.g., mild, moderate, severe)     - MILD: No SOB at rest, mild SOB with walking, speaks normally in sentences, can lay down, no retractions, pulse < 100.     - MODERATE: SOB at rest, SOB with minimal exertion and prefers to sit, cannot lie down flat, speaks in phrases, mild retractions, audible wheezing, pulse 100-120.     - SEVERE: Very SOB at rest, speaks in single words, struggling to breathe, sitting hunched forward, retractions, pulse > 120       denies  6. FEVER: \"Do you have a fever?\" If Yes, ask: \"What is your temperature, how was it measured, and when did it start?\"       denies  7. CARDIAC HISTORY: \"Do you have any history of heart disease?\" (e.g., heart attack, congestive heart failure)       HTN  8. LUNG HISTORY: \"Do you have any history of lung disease?\"  (e.g., pulmonary embolus, asthma, emphysema)      Yes asthma    10. OTHER SYMPTOMS: \"Do you have any other symptoms?\" (e.g., runny nose, wheezing, chest pain)        fatigue    Protocols used: Cough-ADULT-[place]    "

## 2024-02-12 ENCOUNTER — APPOINTMENT (EMERGENCY)
Dept: CT IMAGING | Facility: HOSPITAL | Age: 49
End: 2024-02-12
Payer: COMMERCIAL

## 2024-02-12 ENCOUNTER — HOSPITAL ENCOUNTER (EMERGENCY)
Facility: HOSPITAL | Age: 49
Discharge: HOME | End: 2024-02-12
Attending: EMERGENCY MEDICINE
Payer: COMMERCIAL

## 2024-02-12 ENCOUNTER — OFFICE VISIT (OUTPATIENT)
Dept: URGENT CARE | Facility: CLINIC | Age: 49
End: 2024-02-12
Payer: COMMERCIAL

## 2024-02-12 VITALS
HEART RATE: 81 BPM | RESPIRATION RATE: 18 BRPM | SYSTOLIC BLOOD PRESSURE: 145 MMHG | HEIGHT: 62 IN | BODY MASS INDEX: 46.17 KG/M2 | DIASTOLIC BLOOD PRESSURE: 70 MMHG | OXYGEN SATURATION: 96 % | WEIGHT: 250.88 LBS

## 2024-02-12 VITALS
TEMPERATURE: 97.1 F | HEIGHT: 62 IN | RESPIRATION RATE: 18 BRPM | HEART RATE: 83 BPM | BODY MASS INDEX: 45.08 KG/M2 | SYSTOLIC BLOOD PRESSURE: 128 MMHG | OXYGEN SATURATION: 98 % | WEIGHT: 245 LBS | DIASTOLIC BLOOD PRESSURE: 80 MMHG

## 2024-02-12 DIAGNOSIS — M54.6 ACUTE RIGHT-SIDED THORACIC BACK PAIN: ICD-10-CM

## 2024-02-12 DIAGNOSIS — R10.9 ACUTE RIGHT FLANK PAIN: Primary | ICD-10-CM

## 2024-02-12 DIAGNOSIS — N83.201 CYST OF RIGHT OVARY: Primary | ICD-10-CM

## 2024-02-12 LAB
ALBUMIN SERPL BCP-MCNC: 4.3 G/DL (ref 3.5–5)
ALP SERPL-CCNC: 39 U/L (ref 34–104)
ALT SERPL W P-5'-P-CCNC: 20 U/L (ref 7–52)
ANION GAP SERPL CALCULATED.3IONS-SCNC: 7 MMOL/L
AST SERPL W P-5'-P-CCNC: 22 U/L (ref 13–39)
BACTERIA UR QL AUTO: ABNORMAL /HPF
BASOPHILS # BLD AUTO: 0.1 THOUSANDS/ÂΜL (ref 0–0.1)
BASOPHILS NFR BLD AUTO: 1 % (ref 0–1)
BILIRUB SERPL-MCNC: 0.52 MG/DL (ref 0.2–1)
BILIRUB UR QL STRIP: NEGATIVE
BUN SERPL-MCNC: 18 MG/DL (ref 5–25)
CALCIUM SERPL-MCNC: 9.2 MG/DL (ref 8.4–10.2)
CHLORIDE SERPL-SCNC: 104 MMOL/L (ref 96–108)
CLARITY UR: CLEAR
CO2 SERPL-SCNC: 25 MMOL/L (ref 21–32)
COLOR UR: YELLOW
CREAT SERPL-MCNC: 0.54 MG/DL (ref 0.6–1.3)
EOSINOPHIL # BLD AUTO: 0.25 THOUSAND/ÂΜL (ref 0–0.61)
EOSINOPHIL NFR BLD AUTO: 2 % (ref 0–6)
ERYTHROCYTE [DISTWIDTH] IN BLOOD BY AUTOMATED COUNT: 15.8 % (ref 11.6–15.1)
GFR SERPL CREATININE-BSD FRML MDRD: 110 ML/MIN/1.73SQ M
GLUCOSE SERPL-MCNC: 111 MG/DL (ref 65–140)
GLUCOSE UR STRIP-MCNC: NEGATIVE MG/DL
HCT VFR BLD AUTO: 42.5 % (ref 34.8–46.1)
HGB BLD-MCNC: 13.4 G/DL (ref 11.5–15.4)
HGB UR QL STRIP.AUTO: ABNORMAL
IMM GRANULOCYTES # BLD AUTO: 0.12 THOUSAND/UL (ref 0–0.2)
IMM GRANULOCYTES NFR BLD AUTO: 1 % (ref 0–2)
KETONES UR STRIP-MCNC: NEGATIVE MG/DL
LEUKOCYTE ESTERASE UR QL STRIP: NEGATIVE
LYMPHOCYTES # BLD AUTO: 3.1 THOUSANDS/ÂΜL (ref 0.6–4.47)
LYMPHOCYTES NFR BLD AUTO: 30 % (ref 14–44)
MCH RBC QN AUTO: 24.3 PG (ref 26.8–34.3)
MCHC RBC AUTO-ENTMCNC: 31.5 G/DL (ref 31.4–37.4)
MCV RBC AUTO: 77 FL (ref 82–98)
MONOCYTES # BLD AUTO: 0.66 THOUSAND/ÂΜL (ref 0.17–1.22)
MONOCYTES NFR BLD AUTO: 6 % (ref 4–12)
NEUTROPHILS # BLD AUTO: 6.01 THOUSANDS/ÂΜL (ref 1.85–7.62)
NEUTS SEG NFR BLD AUTO: 60 % (ref 43–75)
NITRITE UR QL STRIP: NEGATIVE
NON-SQ EPI CELLS URNS QL MICRO: ABNORMAL /HPF
NRBC BLD AUTO-RTO: 0 /100 WBCS
PH UR STRIP.AUTO: 6 [PH]
PLATELET # BLD AUTO: 263 THOUSANDS/UL (ref 149–390)
PMV BLD AUTO: 9.2 FL (ref 8.9–12.7)
POTASSIUM SERPL-SCNC: 4.3 MMOL/L (ref 3.5–5.3)
PROT SERPL-MCNC: 7.4 G/DL (ref 6.4–8.4)
PROT UR STRIP-MCNC: NEGATIVE MG/DL
RBC # BLD AUTO: 5.51 MILLION/UL (ref 3.81–5.12)
RBC #/AREA URNS AUTO: ABNORMAL /HPF
SL AMB  POCT GLUCOSE, UA: 2.2
SL AMB LEUKOCYTE ESTERASE,UA: NEGATIVE
SL AMB POCT BILIRUBIN,UA: NEGATIVE
SL AMB POCT BLOOD,UA: ABNORMAL
SL AMB POCT CLARITY,UA: CLEAR
SL AMB POCT COLOR,UA: YELLOW
SL AMB POCT KETONES,UA: ABNORMAL
SL AMB POCT NITRITE,UA: NEGATIVE
SL AMB POCT PH,UA: 5
SL AMB POCT SPECIFIC GRAVITY,UA: 1.03
SL AMB POCT URINE PROTEIN: ABNORMAL
SL AMB POCT UROBILINOGEN: NEGATIVE
SODIUM SERPL-SCNC: 136 MMOL/L (ref 135–147)
SP GR UR STRIP.AUTO: 1.02 (ref 1–1.03)
UROBILINOGEN UR QL STRIP.AUTO: 0.2 E.U./DL
WBC # BLD AUTO: 10.24 THOUSAND/UL (ref 4.31–10.16)
WBC #/AREA URNS AUTO: ABNORMAL /HPF

## 2024-02-12 PROCEDURE — 80053 COMPREHEN METABOLIC PANEL: CPT | Performed by: EMERGENCY MEDICINE

## 2024-02-12 PROCEDURE — 85025 COMPLETE CBC W/AUTO DIFF WBC: CPT | Performed by: EMERGENCY MEDICINE

## 2024-02-12 PROCEDURE — 99284 EMERGENCY DEPT VISIT MOD MDM: CPT | Performed by: EMERGENCY MEDICINE

## 2024-02-12 PROCEDURE — 36415 COLL VENOUS BLD VENIPUNCTURE: CPT | Performed by: EMERGENCY MEDICINE

## 2024-02-12 PROCEDURE — 74176 CT ABD & PELVIS W/O CONTRAST: CPT

## 2024-02-12 PROCEDURE — 96374 THER/PROPH/DIAG INJ IV PUSH: CPT

## 2024-02-12 PROCEDURE — 81001 URINALYSIS AUTO W/SCOPE: CPT | Performed by: EMERGENCY MEDICINE

## 2024-02-12 PROCEDURE — 81002 URINALYSIS NONAUTO W/O SCOPE: CPT

## 2024-02-12 PROCEDURE — 99284 EMERGENCY DEPT VISIT MOD MDM: CPT

## 2024-02-12 PROCEDURE — G1004 CDSM NDSC: HCPCS

## 2024-02-12 PROCEDURE — 99203 OFFICE O/P NEW LOW 30 MIN: CPT

## 2024-02-12 RX ORDER — KETOROLAC TROMETHAMINE 30 MG/ML
30 INJECTION, SOLUTION INTRAMUSCULAR; INTRAVENOUS ONCE
Status: COMPLETED | OUTPATIENT
Start: 2024-02-12 | End: 2024-02-12

## 2024-02-12 RX ADMIN — KETOROLAC TROMETHAMINE 30 MG: 30 INJECTION, SOLUTION INTRAMUSCULAR at 16:25

## 2024-02-12 NOTE — DISCHARGE INSTRUCTIONS
This CT scan performed today revealed a 4.6 cm ovarian cyst.  Please follow-up with your OB/GYN in the near future for further evaluation.

## 2024-02-12 NOTE — ED PROVIDER NOTES
"History  Chief Complaint   Patient presents with    Abdominal Pain     + bilateral flank pain. + abdominal pain. Symptoms started few days ago. Denies burning, frequency or urgency with urination. Denies nausea, vomiting or diarrhea. Was seen at urgent care today and recommended be evaluated by ED for \"possible kidney stones.\"     3-4 days of right flank pain.  Less so left flank pain.  Patient denies nausea or vomiting or diarrhea.  Denies dysuria.  Denies fevers or chills.  Seen today or to urgent care and sent to the emergency room for further evaluation      History provided by:  Patient   used: No    Abdominal Pain  Pain location:  R flank  Pain quality: aching    Pain radiates to:  Does not radiate  Pain severity:  Moderate  Onset quality:  Gradual  Duration:  4 days  Timing:  Constant  Progression:  Unchanged  Chronicity:  New  Relieved by:  Nothing  Worsened by:  Nothing  Ineffective treatments:  None tried  Associated symptoms: no chest pain, no chills, no constipation, no cough, no diarrhea, no dysuria, no fever, no hematemesis, no hematochezia, no hematuria, no nausea, no shortness of breath, no sore throat and no vomiting        Prior to Admission Medications   Prescriptions Last Dose Informant Patient Reported? Taking?   Empagliflozin (Jardiance) 25 MG TABS   No No   Sig: Take 1 tablet (25 mg total) by mouth daily   Fluticasone-Salmeterol (Advair Diskus) 250-50 mcg/dose inhaler   No No   Sig: Inhale 1 puff 2 (two) times a day Rinse mouth after use.   Lancets (onetouch ultrasoft) lancets   No No   Sig: Use as instructed   acetaminophen (TYLENOL) 500 mg tablet   Yes No   Sig: Take 500 mg by mouth every 6 (six) hours as needed for mild pain   albuterol (Ventolin HFA) 90 mcg/act inhaler   No No   Sig: Inhale 2 puffs every 6 (six) hours as needed for wheezing or shortness of breath   atorvastatin (LIPITOR) 20 mg tablet   No No   Sig: Take 1 tablet (20 mg total) by mouth daily with dinner "   celecoxib (CeleBREX) 200 mg capsule   No No   Sig: Take 1 capsule (200 mg total) by mouth 2 (two) times a day   cetirizine (ZyrTEC) 10 mg tablet   No No   Si tab po daily x 1 month then prn allergies   clotrimazole-betamethasone (LOTRISONE) 1-0.05 % cream   No No   Sig: Apply 2 x daily to vaginal area until healed then prn irritation   ergocalciferol (VITAMIN D2) 50,000 units   No No   Sig: Take 1 capsule (50,000 Units total) by mouth once a week   famotidine (PEPCID) 40 MG tablet   No No   Sig: Take 1 PO QD.   fluticasone (FLONASE) 50 mcg/act nasal spray   No No   Si spray each nostril 2 x daily x 1 month then prn congestion or allergies   gabapentin (NEURONTIN) 300 mg capsule   No No   Sig: Take 1 capsule (300 mg total) by mouth 3 (three) times a day   glipiZIDE (GLUCOTROL) 10 mg tablet   No No   Sig: TAKE (2) TABLETS TWICE DAILY BEFORE MEALS.   glucose blood test strip   No No   Sig: Use as instructed   lisinopril (ZESTRIL) 10 mg tablet   No No   Sig: Take 1 tablet (10 mg total) by mouth daily   metFORMIN (GLUCOPHAGE) 1000 MG tablet   No No   Sig: Take 1 tablet (1,000 mg total) by mouth 2 (two) times a day with meals   omeprazole (PriLOSEC) 40 MG capsule   No No   Sig: Take 1 capsule (40 mg total) by mouth 2 (two) times a day before meals   sertraline (ZOLOFT) 50 mg tablet   No No   Sig: TAKE (1) TABLET BY MOUTH DAILY AT BEDTIME.   triamcinolone (KENALOG) 0.025 % cream   No No   Sig: Apply 2 x daily to rash until healed then prn rash      Facility-Administered Medications: None       Past Medical History:   Diagnosis Date    Allergic     Seasonal Allergies    Alopecia of scalp     Treated with Proscar    Chronic pain disorder     back, bilat knees, bilat hips    Diabetes mellitus (HCC)     Fibromyalgia, primary     GERD (gastroesophageal reflux disease)     Hip fx, left, closed, with routine healing, subsequent encounter 2017    Hyperlipidemia     Hypertension     Insomnia     Irregular heartbeat      VICTOR HUGO on CPAP        Past Surgical History:   Procedure Laterality Date    CARPAL TUNNEL RELEASE Left     LUMBAR EPIDURAL INJECTION      NERVE BLOCK Bilateral 12/29/2020    Procedure: L2 L3 L4 L5 MEDIAL BRANCH BLOCK #1;  Surgeon: Westley Keller MD;  [facility];  Service: Pain Management     NERVE BLOCK Bilateral 1/19/2021    Procedure: L2 L3 L4 L5 MEDIAL BRANCH BLOCK #2;  Surgeon: Westley Keller MD;  [facility];  Service: Pain Management     NE ARTHROCENTESIS ASPIR&/INJ MAJOR JT/BURSA W/O US Bilateral 2/7/2023    Procedure: INJECTION JOINT HIP;  Surgeon: Westley Keller MD;  [facility];  Service: Pain Management     NE HYSTEROSCOPY BX ENDOMETRIUM&/POLYPC W/WO D&C N/A 4/25/2023    Procedure: HYSTEROSCOPY W/  RESECTION UTERINE TUMOR/FIBROID (POLYPECTOMY );  Surgeon: Donovan Ware DO;  [facility];  Service: Gynecology    RADIOFREQUENCY ABLATION Right 2/25/2021    Procedure: L2 L3 L4 L5 RADIO FREQUENCY ABLATION right side;  Surgeon: Westley Keller MD;  [facility];  Service: Pain Management     RADIOFREQUENCY ABLATION Left 3/16/2021    Procedure: L2 L3 L4 L5 RADIO FREQUENCY ABLATION;  Surgeon: Westley Keller MD;  [facility];  Service: Pain Management     RHIZOTOMY Bilateral 4/4/2023    Procedure: RHIZOTOMY LUMBAR L3, L4, L5 medial branch nerves;  Surgeon: Westley Keller MD;  [facility];  Service: Pain Management     UPPER GASTROINTESTINAL ENDOSCOPY      US GUIDED THYROID BIOPSY  7/14/2023       Family History   Problem Relation Age of Onset    Cancer Father     Hepatitis Father     Asthma Cousin     Hypertension Paternal Grandmother     No Known Problems Mother     No Known Problems Sister     No Known Problems Maternal Grandmother     No Known Problems Maternal Grandfather     No Known Problems Paternal Grandfather     No Known Problems Paternal Aunt     BRCA2 Positive Neg Hx     BRCA2 Negative Neg Hx     BRCA1 Positive Neg Hx     BRCA1 Negative Neg  Hx     BRCA 1/2 Neg Hx     Ovarian cancer Neg Hx     Endometrial cancer Neg Hx     Colon cancer Neg Hx     Breast cancer additional onset Neg Hx     Breast cancer Neg Hx      I have reviewed and agree with the history as documented.    E-Cigarette/Vaping    E-Cigarette Use Never User      E-Cigarette/Vaping Substances    Nicotine No     THC No     CBD No     Flavoring No     Other No     Unknown No      Social History     Tobacco Use    Smoking status: Former    Smokeless tobacco: Never   Vaping Use    Vaping status: Never Used   Substance Use Topics    Alcohol use: No    Drug use: No       Review of Systems   Constitutional:  Negative for chills and fever.   HENT:  Negative for ear pain, hearing loss, sore throat, trouble swallowing and voice change.    Eyes:  Negative for pain and discharge.   Respiratory:  Negative for cough, shortness of breath and wheezing.    Cardiovascular:  Negative for chest pain and palpitations.   Gastrointestinal:  Positive for abdominal pain. Negative for blood in stool, constipation, diarrhea, hematemesis, hematochezia, nausea and vomiting.   Genitourinary:  Negative for dysuria, flank pain, frequency and hematuria.   Musculoskeletal:  Negative for joint swelling, neck pain and neck stiffness.   Skin:  Negative for rash and wound.   Neurological:  Negative for dizziness, seizures, syncope, facial asymmetry and headaches.   Psychiatric/Behavioral:  Negative for hallucinations, self-injury and suicidal ideas.    All other systems reviewed and are negative.      Physical Exam  Physical Exam  Vitals and nursing note reviewed.   Constitutional:       General: She is not in acute distress.     Appearance: She is well-developed.   HENT:      Head: Normocephalic and atraumatic.      Right Ear: External ear normal.      Left Ear: External ear normal.   Eyes:      General: No scleral icterus.        Right eye: No discharge.         Left eye: No discharge.      Extraocular Movements: Extraocular  movements intact.      Conjunctiva/sclera: Conjunctivae normal.   Cardiovascular:      Rate and Rhythm: Normal rate and regular rhythm.      Heart sounds: Normal heart sounds. No murmur heard.  Pulmonary:      Effort: Pulmonary effort is normal.      Breath sounds: Normal breath sounds. No wheezing or rales.   Abdominal:      General: Bowel sounds are normal. There is no distension.      Palpations: Abdomen is soft.      Tenderness: There is no abdominal tenderness. There is no right CVA tenderness, left CVA tenderness, guarding or rebound. Negative signs include Montes's sign and McBurney's sign.   Musculoskeletal:         General: No deformity. Normal range of motion.      Cervical back: Normal range of motion and neck supple.   Skin:     General: Skin is warm and dry.      Findings: No rash.   Neurological:      General: No focal deficit present.      Mental Status: She is alert and oriented to person, place, and time.      Cranial Nerves: No cranial nerve deficit.   Psychiatric:         Mood and Affect: Mood normal.         Behavior: Behavior normal.         Thought Content: Thought content normal.         Judgment: Judgment normal.         Vital Signs  ED Triage Vitals   Temp Pulse Respirations Blood Pressure SpO2   -- 02/12/24 1544 02/12/24 1544 02/12/24 1544 02/12/24 1544    88 16 143/91 96 %      Temp src Heart Rate Source Patient Position - Orthostatic VS BP Location FiO2 (%)   -- 02/12/24 1630 02/12/24 1544 02/12/24 1544 --    Monitor Lying Left arm       Pain Score       02/12/24 1544       7           Vitals:    02/12/24 1544 02/12/24 1630 02/12/24 1715   BP: 143/91 150/81 131/70   Pulse: 88 82 87   Patient Position - Orthostatic VS: Lying Sitting Sitting         Visual Acuity      ED Medications  Medications   ketorolac (TORADOL) injection 30 mg (30 mg Intravenous Given 2/12/24 1625)       Diagnostic Studies  Results Reviewed       Procedure Component Value Units Date/Time    Urine Microscopic  [[id number]]  (Abnormal) Collected: 02/12/24 1620    Lab Status: Final result Specimen: Urine, Clean Catch Updated: 02/12/24 1704     RBC, UA 10-20 /hpf      WBC, UA None Seen /hpf      Epithelial Cells Occasional /hpf      Bacteria, UA None Seen /hpf     Comprehensive metabolic panel [[id number]]  (Abnormal) Collected: 02/12/24 1626    Lab Status: Final result Specimen: Blood from Arm, Right Updated: 02/12/24 1657     Sodium 136 mmol/L      Potassium 4.3 mmol/L      Chloride 104 mmol/L      CO2 25 mmol/L      ANION GAP 7 mmol/L      BUN 18 mg/dL      Creatinine 0.54 mg/dL      Glucose 111 mg/dL      Calcium 9.2 mg/dL      AST 22 U/L      ALT 20 U/L      Alkaline Phosphatase 39 U/L      Total Protein 7.4 g/dL      Albumin 4.3 g/dL      Total Bilirubin 0.52 mg/dL      eGFR 110 ml/min/1.73sq m     Narrative:      National Kid[employer] Foundation guidelines for Chronic Kidney Disease (CKD):     Stage 1 with normal or high GFR (GFR > 90 mL/min/1.73 square meters)    Stage 2 Mild CKD (GFR = 60-89 mL/min/1.73 square meters)    Stage 3A Moderate CKD (GFR = 45-59 mL/min/1.73 square meters)    Stage 3B Moderate CKD (GFR = 30-44 mL/min/1.73 square meters)    Stage 4 Severe CKD (GFR = 15-29 mL/min/1.73 square meters)    Stage 5 End Stage CKD (GFR <15 mL/min/1.73 square meters)  Note: GFR calculation is accurate only with a steady state creatinine    UA w Reflex to Microscopic w Reflex to Culture [[id number]]  (Abnormal) Collected: 02/12/24 1620    Lab Status: Final result Specimen: Urine, Clean Catch Updated: 02/12/24 1638     Color, UA Yellow     Clarity, UA Clear     Specific Gravity, UA 1.020     pH, UA 6.0     Leukocytes, UA Negative     Nitrite, UA Negative     Protein, UA Negative mg/dl      Glucose, UA Negative mg/dl      Ketones, UA Negative mg/dl      Urobilinogen, UA 0.2 E.U./dl      Bilirubin, UA Negative     Occult Blood, UA Large    CBC and differential [[id number]]  (Abnormal) Collected: 02/12/24 1626    Lab  Status: Final result Specimen: Blood from Arm, Right Updated: 02/12/24 1636     WBC 10.24 Thousand/uL      RBC 5.51 Million/uL      Hemoglobin 13.4 g/dL      Hematocrit 42.5 %      MCV 77 fL      MCH 24.3 pg      MCHC 31.5 g/dL      RDW 15.8 %      MPV 9.2 fL      Platelets 263 Thousands/uL      nRBC 0 /100 WBCs      Neutrophils Relative 60 %      Immat GRANS % 1 %      Lymphocytes Relative 30 %      Monocytes Relative 6 %      Eosinophils Relative 2 %      Basophils Relative 1 %      Neutrophils Absolute 6.01 Thousands/µL      Immature Grans Absolute 0.12 Thousand/uL      Lymphocytes Absolute 3.10 Thousands/µL      Monocytes Absolute 0.66 Thousand/µL      Eosinophils Absolute 0.25 Thousand/µL      Basophils Absolute 0.10 Thousands/µL                    CT renal stone study abdomen pelvis wo contrast   Final Result by Connor Zimmerman MD (02/12 1734)      Nonobstructing 2 mm left-sided intrarenal calculus without hydronephrosis.      Colonic diverticulosis without diverticulitis.      4.6 cm simple right ovarian cyst.      Hepatomegaly.               Workstation performed: [id number]                    Procedures  Procedures         ED Course                               SBIRT 20yo+      Flowsheet Row Most Recent Value   Initial Alcohol Screen: US AUDIT-C     1. How often do you have a drink containing alcohol? 0 Filed at: 02/12/2024 1546   2. How many drinks containing alcohol do you have on a typical day you are drinking?  0 Filed at: 02/12/2024 1546   3b. FEMALE Any Age, or MALE 65+: How often do you have 4 or more drinks on one occassion? 0 Filed at: 02/12/2024 1546   Audit-C Score 0 Filed at: 02/12/2024 1546   CHEIKH: How many times in the past year have you...    Used an illegal drug or used a prescription medication for non-medical reasons? Never Filed at: 02/12/2024 1546                      Medical Decision Making  Based on the history and medical screening exam performed the diagnostic considerations include  but are not limited to UTI, pyelonephritis, ureterolithiasis, diverticulitis, muscular back pain, ovarian cyst.    Based on the work-up performed in the emergency room which includes physical examination, and which may include laboratory studies and imaging as warranted including advanced imaging such as CT scan or ultrasound, the diagnostic considerations are narrowed to exclude limb or life-threatening process.    The patient is stable for discharge.  Lab work and urinalysis negative.  CT abdomen pelvis reveals right-sided simple ovarian cyst.  No evidence of ureteral stones.  Stable for discharge.  Patient advised to follow-up with her OB/GYN    Amount and/or Complexity of Data Reviewed  Labs: ordered. Decision-making details documented in ED Course.     Details: Normal  Radiology: ordered and independent interpretation performed. Decision-making details documented in ED Course.     Details: CT abdomen pelvis with right ovarian cyst    Risk  Prescription drug management.             Disposition  Final diagnoses:   Cyst of right ovary     Time reflects when diagnosis was documented in both MDM as applicable and the Disposition within this note       Time User Action Codes Description Comment    2/12/2024  5:43 PM Efrain Haskins Add [N83.201] Cyst of right ovary           ED Disposition       ED Disposition   Discharge    Condition   Stable    Date/Time   Mon Feb 12, 2024 1743    Comment   Poppy Tiona discharge to home/self care.                   Follow-up Information       Follow up With Specialties Details Why Contact Info    [facility], Nurse Practitioner   [address] [place] [id number] [phone]              Patient's Medications   Discharge Prescriptions    No medications on file       No discharge procedures on file.    PDMP Review       None            ED Provider  Electronically Signed by             Efrain Haskins MD  02/12/24 8633

## 2024-02-12 NOTE — PROGRESS NOTES
[facility]        NAME: Cristy Garcia is a 48 y.o. female  : 1975    MRN: [id number]  DATE: 2024  TIME: 2:55 PM    Assessment and Plan   Acute right flank pain [R10.9]  1. Acute right flank pain  Transfer to other facility      2. Acute right-sided thoracic back pain  POCT urine dip        Based on patient's worsening symptoms, large amount of glucose and blood in urine, and patient having a history of elevated liver enzymes advised to go to ER for further evaluation and imaging.  Patient states she is on menstrual cycle but unable to rule out kidney etiology due to CVA tenderness on exam.     Patient Instructions     Proceed to ER    Chief Complaint     Chief Complaint   Patient presents with    Right side pain     C/o right side pain, Pt states she was having coughing fits last week and doesn't know if the pain is from coughing. Pt states she is concerned due to having high liver enzymes in the past. Onset x3 days ago.          History of Present Illness       Patient is presenting with right sided flank pain that has been becoming more persistent the past 3 days.  She stated last week she was coughing a lot.  She denies any urinary symptoms, radiation of pain, injury, abdominal pain, or fevers.  She states that the pain does not feel muscular.  She stated that she recently has been feeling unsteady since this began.  She stated she is on her menstrual cycle.  She stated normally her diabetes are well-controlled.           Review of Systems   Review of Systems   Constitutional: Negative.    Respiratory: Negative.     Cardiovascular: Negative.    Genitourinary:  Positive for flank pain (R side) and hematuria (on menstrual cycle). Negative for difficulty urinating, dysuria, frequency and urgency.         Current Medications       Current Outpatient Medications:     albuterol (Ventolin HFA) 90 mcg/act inhaler, Inhale 2 puffs every 6 (six) hours as needed for wheezing or shortness of  breath, Disp: 18 g, Rfl: 5    atorvastatin (LIPITOR) 20 mg tablet, Take 1 tablet (20 mg total) by mouth daily with dinner, Disp: 90 tablet, Rfl: 1    celecoxib (CeleBREX) 200 mg capsule, Take 1 capsule (200 mg total) by mouth 2 (two) times a day, Disp: 60 capsule, Rfl: 5    cetirizine (ZyrTEC) 10 mg tablet, 1 tab po daily x 1 month then prn allergies, Disp: 30 tablet, Rfl: 5    clotrimazole-betamethasone (LOTRISONE) 1-0.05 % cream, Apply 2 x daily to vaginal area until healed then prn irritation, Disp: 60 g, Rfl: 3    Empagliflozin (Jardiance) 25 MG TABS, Take 1 tablet (25 mg total) by mouth daily, Disp: 30 tablet, Rfl: 5    ergocalciferol (VITAMIN D2) 50,000 units, Take 1 capsule (50,000 Units total) by mouth once a week, Disp: 12 capsule, Rfl: 1    famotidine (PEPCID) 40 MG tablet, Take 1 PO QD., Disp: 30 tablet, Rfl: 3    fluticasone (FLONASE) 50 mcg/act nasal spray, 1 spray each nostril 2 x daily x 1 month then prn congestion or allergies, Disp: 18.2 mL, Rfl: 3    Fluticasone-Salmeterol (Advair Diskus) 250-50 mcg/dose inhaler, Inhale 1 puff 2 (two) times a day Rinse mouth after use., Disp: 180 blister, Rfl: 3    gabapentin (NEURONTIN) 300 mg capsule, Take 1 capsule (300 mg total) by mouth 3 (three) times a day, Disp: 270 capsule, Rfl: 3    glipiZIDE (GLUCOTROL) 10 mg tablet, TAKE (2) TABLETS TWICE DAILY BEFORE MEALS., Disp: 360 tablet, Rfl: 1    glucose blood test strip, Use as instructed, Disp: 100 each, Rfl: 5    Lancets (onetouch ultrasoft) lancets, Use as instructed, Disp: 100 each, Rfl: 5    lisinopril (ZESTRIL) 10 mg tablet, Take 1 tablet (10 mg total) by mouth daily, Disp: 90 tablet, Rfl: 1    metFORMIN (GLUCOPHAGE) 1000 MG tablet, Take 1 tablet (1,000 mg total) by mouth 2 (two) times a day with meals, Disp: 180 tablet, Rfl: 5    omeprazole (PriLOSEC) 40 MG capsule, Take 1 capsule (40 mg total) by mouth 2 (two) times a day before meals, Disp: 60 capsule, Rfl: 3    sertraline (ZOLOFT) 50 mg tablet, TAKE  (1) TABLET BY MOUTH DAILY AT BEDTIME., Disp: 90 tablet, Rfl: 1    triamcinolone (KENALOG) 0.025 % cream, Apply 2 x daily to rash until healed then prn rash, Disp: 60 g, Rfl: 3    acetaminophen (TYLENOL) 500 mg tablet, Take 500 mg by mouth every 6 (six) hours as needed for mild pain, Disp: , Rfl:     Current Allergies     Allergies as of 02/12/2024 - Reviewed 02/12/2024   Allergen Reaction Noted    Trulicity [dulaglutide] Swelling 05/08/2023    Tdap [tetanus-diphth-acell pertussis] Rash 01/24/2019            The following portions of the patient's history were reviewed and updated as appropriate: allergies, current medications, past family history, past medical history, past social history, past surgical history and problem list.     Past Medical History:   Diagnosis Date    Allergic     Seasonal Allergies    Alopecia of scalp     Treated with Proscar    Chronic pain disorder     back, bilat knees, bilat hips    Diabetes mellitus (HCC)     Fibromyalgia, primary     GERD (gastroesophageal reflux disease)     Hip fx, left, closed, with routine healing, subsequent encounter 05/2017    Hyperlipidemia     Hypertension     Insomnia     Irregular heartbeat     VICTOR HUGO on CPAP        Past Surgical History:   Procedure Laterality Date    CARPAL TUNNEL RELEASE Left     LUMBAR EPIDURAL INJECTION      NERVE BLOCK Bilateral 12/29/2020    Procedure: L2 L3 L4 L5 MEDIAL BRANCH BLOCK #1;  Surgeon: Westley Keller MD;  [facility];  Service: Pain Management     NERVE BLOCK Bilateral 1/19/2021    Procedure: L2 L3 L4 L5 MEDIAL BRANCH BLOCK #2;  Surgeon: Westley Keller MD;  [facility];  Service: Pain Management     CO ARTHROCENTESIS ASPIR&/INJ MAJOR JT/BURSA W/O  Bilateral 2/7/2023    Procedure: INJECTION JOINT HIP;  Surgeon: Westley Keller MD;  [facility];  Service: Pain Management     CO HYSTEROSCOPY BX ENDOMETRIUM&/POLYPC W/WO D&C N/A 4/25/2023    Procedure: HYSTEROSCOPY W/  RESECTION UTERINE TUMOR/FIBROID  "(POLYPECTOMY );  Surgeon: Donovan Ware DO;  [facility];  Service: Gynecology    RADIOFREQUENCY ABLATION Right 2/25/2021    Procedure: L2 L3 L4 L5 RADIO FREQUENCY ABLATION right side;  Surgeon: Westley Keller MD;  [facility];  Service: Pain Management     RADIOFREQUENCY ABLATION Left 3/16/2021    Procedure: L2 L3 L4 L5 RADIO FREQUENCY ABLATION;  Surgeon: Westley Keller MD;  [facility];  Service: Pain Management     RHIZOTOMY Bilateral 4/4/2023    Procedure: RHIZOTOMY LUMBAR L3, L4, L5 medial branch nerves;  Surgeon: Westley Keller MD;  [facility];  Service: Pain Management     UPPER GASTROINTESTINAL ENDOSCOPY      US GUIDED THYROID BIOPSY  7/14/2023       Family History   Problem Relation Age of Onset    Cancer Father     Hepatitis Father     Asthma Cousin     Hypertension Paternal Grandmother     No Known Problems Mother     No Known Problems Sister     No Known Problems Maternal Grandmother     No Known Problems Maternal Grandfather     No Known Problems Paternal Grandfather     No Known Problems Paternal Aunt     BRCA2 Positive Neg Hx     BRCA2 Negative Neg Hx     BRCA1 Positive Neg Hx     BRCA1 Negative Neg Hx     BRCA 1/2 Neg Hx     Ovarian cancer Neg Hx     Endometrial cancer Neg Hx     Colon cancer Neg Hx     Breast cancer additional onset Neg Hx     Breast cancer Neg Hx          Medications have been verified.        Objective   /80   Pulse 83   Temp (!) 97.1 °F (36.2 °C)   Resp 18   Ht 5' 2\" (1.575 m)   Wt 111 kg (245 lb)   SpO2 98%   BMI 44.81 kg/m²        Physical Exam     Physical Exam  Constitutional:       Appearance: Normal appearance.   HENT:      Head: Normocephalic.   Eyes:      Extraocular Movements: Extraocular movements intact.      Pupils: Pupils are equal, round, and reactive to light.   Cardiovascular:      Rate and Rhythm: Normal rate and regular rhythm.      Pulses: Normal pulses.      Heart sounds: Normal heart sounds.   Pulmonary: "      Effort: Pulmonary effort is normal.      Breath sounds: Normal breath sounds.   Abdominal:      General: Abdomen is flat. Bowel sounds are normal. There is no distension.      Tenderness: There is no abdominal tenderness. There is right CVA tenderness (slight).   Neurological:      General: No focal deficit present.      Mental Status: She is alert and oriented to person, place, and time. Mental status is at baseline.

## 2024-02-15 ENCOUNTER — OFFICE VISIT (OUTPATIENT)
Dept: GYNECOLOGY | Facility: CLINIC | Age: 49
End: 2024-02-15
Payer: COMMERCIAL

## 2024-02-15 ENCOUNTER — TELEMEDICINE (OUTPATIENT)
Dept: BEHAVIORAL/MENTAL HEALTH CLINIC | Facility: CLINIC | Age: 49
End: 2024-02-15
Payer: COMMERCIAL

## 2024-02-15 VITALS — BODY MASS INDEX: 46.01 KG/M2 | WEIGHT: 250 LBS | HEIGHT: 62 IN

## 2024-02-15 DIAGNOSIS — F33.9 MAJOR DEPRESSION, RECURRENT, CHRONIC (HCC): Primary | ICD-10-CM

## 2024-02-15 DIAGNOSIS — R10.31 RIGHT LOWER QUADRANT ABDOMINAL PAIN: ICD-10-CM

## 2024-02-15 DIAGNOSIS — N93.9 ABNORMAL UTERINE BLEEDING (AUB): ICD-10-CM

## 2024-02-15 DIAGNOSIS — N83.201 CYST OF RIGHT OVARY: Primary | ICD-10-CM

## 2024-02-15 PROCEDURE — T1015 CLINIC SERVICE: HCPCS | Performed by: SOCIAL WORKER

## 2024-02-15 PROCEDURE — 99214 OFFICE O/P EST MOD 30 MIN: CPT | Performed by: OBSTETRICS & GYNECOLOGY

## 2024-02-15 RX ORDER — ONDANSETRON 4 MG/1
4 TABLET, FILM COATED ORAL EVERY 8 HOURS PRN
Qty: 20 TABLET | Refills: 0 | Status: SHIPPED | OUTPATIENT
Start: 2024-02-15

## 2024-02-15 RX ORDER — NAPROXEN SODIUM 550 MG/1
550 TABLET ORAL 2 TIMES DAILY WITH MEALS
Qty: 30 TABLET | Refills: 0 | Status: SHIPPED | OUTPATIENT
Start: 2024-02-15 | End: 2024-03-01

## 2024-02-15 RX ORDER — MEDROXYPROGESTERONE ACETATE 10 MG/1
10 TABLET ORAL DAILY
Qty: 10 TABLET | Refills: 0 | Status: SHIPPED | OUTPATIENT
Start: 2024-02-15 | End: 2024-02-25

## 2024-02-15 NOTE — PROGRESS NOTES
Assessment/Plan:         Diagnoses and all orders for this visit:    Cyst of right ovary; this appears to be a simple cyst.  She will return to the office following a course of Provera for repeat transvaginal scan.  Patient has been advised if the pain becomes worse she needs to proceed to the emergency room    Right lower quadrant abdominal pain; naproxen double strength every 10-12 hours as needed    Abnormal uterine bleeding (AUB)  ; patient will return to the office for TVS possible biopsy possible saline infusion.  She will start on Provera 10 mg for 10 days.    Subjective:      Patient ID: Cristy Garcia is a 48 y.o. female.    HP[facility]0 patient presented to the emergency department on February 12 complaining of bilateral flank pain and abdominal pain.  She stated the symptoms started a few days prior to her visit.  She denied any burning, frequency or urgency with urination.  She denied any diarrhea or constipation.  She has had some nausea with intermittent vomiting.  Denied any fever.  Patient states that her menses have generally been regular up until this past month which she has been experiencing some intermittent spotting urinalysis in the emergency room revealed 10-20 RBCs , large blood .  CBC revealed a slightly elevated WBC at 10.24.  CT scan revealed a nonobstructing 2 mm left-sided intrarenal calculus without hydronephrosis.  Diverticulosis noted.  4.6 cm simple right ovarian cyst noted.  Hepatomegaly.    Patient has chronic lower back issues.  She has had prior lumbar rhizotomy    In April 2023 she had a hysteroscopy with polypectomy    The following portions of the patient's history were reviewed and updated as appropriate: She  has a past medical history of Allergic, Alopecia of scalp, Chronic pain disorder, Diabetes mellitus (HCC), Fibromyalgia, primary, GERD (gastroesophageal reflux disease), Hip fx, left, closed, with routine healing, subsequent encounter (05/2017), Hyperlipidemia, Hypertension,  Insomnia, Irregular heartbeat, and VICTOR HUGO on CPAP.  She   Patient Active Problem List    Diagnosis Date Noted    Fatty liver 10/18/2023    Elevated LFTs 10/18/2023    Esophagitis determined by endoscopy 10/18/2023    Morbid obesity with body mass index (BMI) of 45.0 to 49.9 in adult (HCC) 04/25/2023    VICTOR HUGO on CPAP     Anxiety associated with depression     Diabetes mellitus (HCC)     Hyperlipidemia     Hypertension     Irregular heartbeat     Chronic pain disorder     GERD (gastroesophageal reflux disease)     Cervical radiculopathy 02/01/2023    Lumbar spondylosis 12/23/2020    Major depression, recurrent, chronic (HCC) 09/01/2020    Lumbar radiculopathy 08/13/2020    Primary osteoarthritis of both hips 08/13/2020    Left hip pain 08/13/2020    Fibromyalgia, primary 06/11/2020    Primary osteoarthritis involving multiple joints 06/11/2020    Mild persistent asthma without complication 06/11/2020    Chronic bilateral low back pain without sciatica 05/01/2019    Chronic pain of left knee 05/01/2019    Chronic pain of right knee 05/01/2019    SOB (shortness of breath)      She  has a past surgical history that includes Carpal tunnel release (Left); Upper gastrointestinal endoscopy; Lumbar epidural injection; NERVE BLOCK (Bilateral, 12/29/2020); NERVE BLOCK (Bilateral, 1/19/2021); Radiofrequency ablation (Right, 2/25/2021); Radiofrequency ablation (Left, 3/16/2021); pr arthrocentesis aspir&/inj major jt/bursa w/o us (Bilateral, 2/7/2023); Rhizotomy (Bilateral, 4/4/2023); pr hysteroscopy bx endometrium&/polypc w/wo d&c (N/A, 4/25/2023); and US guided thyroid biopsy (7/14/2023).  Her family history includes Asthma in her cousin; Cancer in her father; Hepatitis in her father; Hypertension in her paternal grandmother; No Known Problems in her maternal grandfather, maternal grandmother, mother, paternal aunt, paternal grandfather, and sister.  She  reports that she has quit smoking. She has never used smokeless tobacco. She  reports that she does not drink alcohol and does not use drugs.  Current Outpatient Medications   Medication Sig Dispense Refill    acetaminophen (TYLENOL) 500 mg tablet Take 500 mg by mouth every 6 (six) hours as needed for mild pain      albuterol (Ventolin HFA) 90 mcg/act inhaler Inhale 2 puffs every 6 (six) hours as needed for wheezing or shortness of breath 18 g 5    atorvastatin (LIPITOR) 20 mg tablet Take 1 tablet (20 mg total) by mouth daily with dinner 90 tablet 1    celecoxib (CeleBREX) 200 mg capsule Take 1 capsule (200 mg total) by mouth 2 (two) times a day 60 capsule 5    cetirizine (ZyrTEC) 10 mg tablet 1 tab po daily x 1 month then prn allergies 30 tablet 5    clotrimazole-betamethasone (LOTRISONE) 1-0.05 % cream Apply 2 x daily to vaginal area until healed then prn irritation 60 g 3    Empagliflozin (Jardiance) 25 MG TABS Take 1 tablet (25 mg total) by mouth daily 30 tablet 5    ergocalciferol (VITAMIN D2) 50,000 units Take 1 capsule (50,000 Units total) by mouth once a week 12 capsule 1    famotidine (PEPCID) 40 MG tablet Take 1 PO QD. 30 tablet 3    fluticasone (FLONASE) 50 mcg/act nasal spray 1 spray each nostril 2 x daily x 1 month then prn congestion or allergies 18.2 mL 3    Fluticasone-Salmeterol (Advair Diskus) 250-50 mcg/dose inhaler Inhale 1 puff 2 (two) times a day Rinse mouth after use. 180 blister 3    gabapentin (NEURONTIN) 300 mg capsule Take 1 capsule (300 mg total) by mouth 3 (three) times a day 270 capsule 3    glipiZIDE (GLUCOTROL) 10 mg tablet TAKE (2) TABLETS TWICE DAILY BEFORE MEALS. 360 tablet 1    glucose blood test strip Use as instructed 100 each 5    Lancets (onetouch ultrasoft) lancets Use as instructed 100 each 5    lisinopril (ZESTRIL) 10 mg tablet Take 1 tablet (10 mg total) by mouth daily 90 tablet 1    metFORMIN (GLUCOPHAGE) 1000 MG tablet Take 1 tablet (1,000 mg total) by mouth 2 (two) times a day with meals 180 tablet 5    omeprazole (PriLOSEC) 40 MG capsule Take 1  capsule (40 mg total) by mouth 2 (two) times a day before meals 60 capsule 3    sertraline (ZOLOFT) 50 mg tablet TAKE (1) TABLET BY MOUTH DAILY AT BEDTIME. 90 tablet 1    triamcinolone (KENALOG) 0.025 % cream Apply 2 x daily to rash until healed then prn rash 60 g 3     No current facility-administered medications for this visit.     Current Outpatient Medications on File Prior to Visit   Medication Sig    acetaminophen (TYLENOL) 500 mg tablet Take 500 mg by mouth every 6 (six) hours as needed for mild pain    albuterol (Ventolin HFA) 90 mcg/act inhaler Inhale 2 puffs every 6 (six) hours as needed for wheezing or shortness of breath    atorvastatin (LIPITOR) 20 mg tablet Take 1 tablet (20 mg total) by mouth daily with dinner    celecoxib (CeleBREX) 200 mg capsule Take 1 capsule (200 mg total) by mouth 2 (two) times a day    cetirizine (ZyrTEC) 10 mg tablet 1 tab po daily x 1 month then prn allergies    clotrimazole-betamethasone (LOTRISONE) 1-0.05 % cream Apply 2 x daily to vaginal area until healed then prn irritation    Empagliflozin (Jardiance) 25 MG TABS Take 1 tablet (25 mg total) by mouth daily    ergocalciferol (VITAMIN D2) 50,000 units Take 1 capsule (50,000 Units total) by mouth once a week    famotidine (PEPCID) 40 MG tablet Take 1 PO QD.    fluticasone (FLONASE) 50 mcg/act nasal spray 1 spray each nostril 2 x daily x 1 month then prn congestion or allergies    Fluticasone-Salmeterol (Advair Diskus) 250-50 mcg/dose inhaler Inhale 1 puff 2 (two) times a day Rinse mouth after use.    gabapentin (NEURONTIN) 300 mg capsule Take 1 capsule (300 mg total) by mouth 3 (three) times a day    glipiZIDE (GLUCOTROL) 10 mg tablet TAKE (2) TABLETS TWICE DAILY BEFORE MEALS.    glucose blood test strip Use as instructed    Lancets (onetouch ultrasoft) lancets Use as instructed    lisinopril (ZESTRIL) 10 mg tablet Take 1 tablet (10 mg total) by mouth daily    metFORMIN (GLUCOPHAGE) 1000 MG tablet Take 1 tablet (1,000 mg  "total) by mouth 2 (two) times a day with meals    omeprazole (PriLOSEC) 40 MG capsule Take 1 capsule (40 mg total) by mouth 2 (two) times a day before meals    sertraline (ZOLOFT) 50 mg tablet TAKE (1) TABLET BY MOUTH DAILY AT BEDTIME.    triamcinolone (KENALOG) 0.025 % cream Apply 2 x daily to rash until healed then prn rash     No current facility-administered medications on file prior to visit.     She is allergic to trulicity [dulaglutide] and tdap [tetanus-diphth-acell pertussis]..    Review of Systems      Objective:      Ht 5' 2\" (1.575 m)   Wt 113 kg (250 lb)   BMI 45.73 kg/m²          Physical Exam      "

## 2024-02-15 NOTE — PSYCH
"Behavioral Health Psychotherapy Progress Note    Psychotherapy Provided: Individual Psychotherapy     1. Major depression, recurrent, chronic (HCC)            Goals addressed in session: Goal 1 and Goal 2     DATA: The client reported continued pain due to her recent Dx of cyst on her ovaries. \" I am in so much pain\" During the session we explored and processed her weaknesses and her increased aide serves to help with her weaknesses. Coping skills were reviewed during the session.   During this session, this clinician used the following therapeutic modalities: Cognitive Behavioral Therapy, Mindfulness-based Strategies, Solution-Focused Therapy, and Supportive Psychotherapy    Substance Abuse was not addressed during this session. If the client is diagnosed with a co-occurring substance use disorder, please indicate any changes in the frequency or amount of use: n/a. Stage of change for addressing substance use diagnoses: No substance use/Not applicable    ASSESSMENT:  Cristy Garcia presents with a Anxious mood.     her affect is Normal range and intensity, which is congruent, with her mood and the content of the session. The client has made progress on their goals.    The Client  Cristy Garcia presents with a none risk of suicide, none risk of self-harm, and none risk of harm to others.    For any risk assessment that surpasses a \"low\" rating, a safety plan must be developed.    A safety plan was indicated: no  If yes, describe in detail n/a    PLAN: Between sessions, Cristy Garcia will practice her coping skills when presented with anxiety and or depression. At the next session, the therapist will use Client-centered Therapy, Cognitive Behavioral Therapy, Mindfulness-based Strategies, Solution-Focused Therapy, and Supportive Psychotherapy to address the issues facing the client in her different relationships and environments.    Behavioral Health Treatment Plan and Discharge Planning: Cristy Garcia is aware of and " agrees to continue to work on their treatment plan. They have identified and are working toward their discharge goals. yes    Visit start and stop times:    02/15/24  Start Time: 0930  Stop Time: 0953  Total Visit Time: 23 minutesTelemedicine consent    Patient: Cristy Garciamons  Provider: YONATAN Luu  [facility] [place] [address] [facility] [address]    The patient was identified by name and date of birth. Cristy Garcia was informed that this is a telemedicine visit and that the visit is being conducted through Telephone.  My office door was closed. No one else was in the room.  She acknowledged consent and understanding of privacy and security of the video platform. The patient has agreed to participate and understands they can discontinue the visit at any time.    Patient is aware this is a billable service.     I spent 23 minutes with the patient during this visit.

## 2024-02-21 ENCOUNTER — TELEPHONE (OUTPATIENT)
Dept: FAMILY MEDICINE CLINIC | Facility: CLINIC | Age: 49
End: 2024-02-21

## 2024-02-21 DIAGNOSIS — G47.33 OSA ON CPAP: ICD-10-CM

## 2024-02-21 DIAGNOSIS — N60.01 BREAST CYST, RIGHT: ICD-10-CM

## 2024-02-21 DIAGNOSIS — R92.8 ABNORMAL SCREENING MAMMOGRAM: ICD-10-CM

## 2024-02-21 DIAGNOSIS — N63.0 BREAST NODULE: Primary | ICD-10-CM

## 2024-02-22 ENCOUNTER — APPOINTMENT (OUTPATIENT)
Dept: LAB | Facility: HOSPITAL | Age: 49
End: 2024-02-22
Payer: COMMERCIAL

## 2024-02-22 DIAGNOSIS — R79.89 ELEVATED LFTS: ICD-10-CM

## 2024-02-22 DIAGNOSIS — E53.8 VITAMIN B12 DEFICIENCY: ICD-10-CM

## 2024-02-22 DIAGNOSIS — Z13.0 SCREENING FOR DEFICIENCY ANEMIA: ICD-10-CM

## 2024-02-22 DIAGNOSIS — E78.49 OTHER HYPERLIPIDEMIA: ICD-10-CM

## 2024-02-22 DIAGNOSIS — K76.0 FATTY LIVER: ICD-10-CM

## 2024-02-22 DIAGNOSIS — E55.9 VITAMIN D DEFICIENCY: ICD-10-CM

## 2024-02-22 DIAGNOSIS — E11.65 TYPE 2 DIABETES MELLITUS WITH HYPERGLYCEMIA, WITHOUT LONG-TERM CURRENT USE OF INSULIN (HCC): ICD-10-CM

## 2024-02-22 LAB
25(OH)D3 SERPL-MCNC: 55.5 NG/ML (ref 30–100)
ALBUMIN SERPL BCP-MCNC: 4.3 G/DL (ref 3.5–5)
ALP SERPL-CCNC: 40 U/L (ref 34–104)
ALT SERPL W P-5'-P-CCNC: 20 U/L (ref 7–52)
ANION GAP SERPL CALCULATED.3IONS-SCNC: 9 MMOL/L
AST SERPL W P-5'-P-CCNC: 15 U/L (ref 13–39)
BASOPHILS # BLD AUTO: 0.08 THOUSANDS/ÂΜL (ref 0–0.1)
BASOPHILS NFR BLD AUTO: 1 % (ref 0–1)
BILIRUB DIRECT SERPL-MCNC: 0.2 MG/DL (ref 0–0.2)
BILIRUB SERPL-MCNC: 0.81 MG/DL (ref 0.2–1)
BUN SERPL-MCNC: 12 MG/DL (ref 5–25)
CALCIUM SERPL-MCNC: 8.8 MG/DL (ref 8.4–10.2)
CHLORIDE SERPL-SCNC: 105 MMOL/L (ref 96–108)
CHOLEST SERPL-MCNC: 115 MG/DL
CO2 SERPL-SCNC: 23 MMOL/L (ref 21–32)
CREAT SERPL-MCNC: 0.55 MG/DL (ref 0.6–1.3)
CREAT UR-MCNC: 68.4 MG/DL
EOSINOPHIL # BLD AUTO: 0.2 THOUSAND/ÂΜL (ref 0–0.61)
EOSINOPHIL NFR BLD AUTO: 2 % (ref 0–6)
ERYTHROCYTE [DISTWIDTH] IN BLOOD BY AUTOMATED COUNT: 15.6 % (ref 11.6–15.1)
GFR SERPL CREATININE-BSD FRML MDRD: 109 ML/MIN/1.73SQ M
GLUCOSE P FAST SERPL-MCNC: 133 MG/DL (ref 65–99)
HCT VFR BLD AUTO: 42 % (ref 34.8–46.1)
HDLC SERPL-MCNC: 46 MG/DL
HGB BLD-MCNC: 13.1 G/DL (ref 11.5–15.4)
IMM GRANULOCYTES # BLD AUTO: 0.08 THOUSAND/UL (ref 0–0.2)
IMM GRANULOCYTES NFR BLD AUTO: 1 % (ref 0–2)
LDLC SERPL CALC-MCNC: 46 MG/DL (ref 0–100)
LYMPHOCYTES # BLD AUTO: 2.38 THOUSANDS/ÂΜL (ref 0.6–4.47)
LYMPHOCYTES NFR BLD AUTO: 27 % (ref 14–44)
MCH RBC QN AUTO: 23.9 PG (ref 26.8–34.3)
MCHC RBC AUTO-ENTMCNC: 31.2 G/DL (ref 31.4–37.4)
MCV RBC AUTO: 77 FL (ref 82–98)
MICROALBUMIN UR-MCNC: 8.7 MG/L
MICROALBUMIN/CREAT 24H UR: 13 MG/G CREATININE (ref 0–30)
MONOCYTES # BLD AUTO: 0.57 THOUSAND/ÂΜL (ref 0.17–1.22)
MONOCYTES NFR BLD AUTO: 6 % (ref 4–12)
NEUTROPHILS # BLD AUTO: 5.58 THOUSANDS/ÂΜL (ref 1.85–7.62)
NEUTS SEG NFR BLD AUTO: 63 % (ref 43–75)
NONHDLC SERPL-MCNC: 69 MG/DL
NRBC BLD AUTO-RTO: 0 /100 WBCS
PLATELET # BLD AUTO: 309 THOUSANDS/UL (ref 149–390)
PMV BLD AUTO: 9.1 FL (ref 8.9–12.7)
POTASSIUM SERPL-SCNC: 3.8 MMOL/L (ref 3.5–5.3)
PROT SERPL-MCNC: 7.4 G/DL (ref 6.4–8.4)
RBC # BLD AUTO: 5.47 MILLION/UL (ref 3.81–5.12)
SODIUM SERPL-SCNC: 137 MMOL/L (ref 135–147)
TRIGL SERPL-MCNC: 113 MG/DL
VIT B12 SERPL-MCNC: 451 PG/ML (ref 180–914)
WBC # BLD AUTO: 8.89 THOUSAND/UL (ref 4.31–10.16)

## 2024-02-22 PROCEDURE — 82306 VITAMIN D 25 HYDROXY: CPT

## 2024-02-22 PROCEDURE — 82570 ASSAY OF URINE CREATININE: CPT

## 2024-02-22 PROCEDURE — 80061 LIPID PANEL: CPT

## 2024-02-22 PROCEDURE — 82607 VITAMIN B-12: CPT

## 2024-02-22 PROCEDURE — 82248 BILIRUBIN DIRECT: CPT

## 2024-02-22 PROCEDURE — 80053 COMPREHEN METABOLIC PANEL: CPT

## 2024-02-22 PROCEDURE — 82043 UR ALBUMIN QUANTITATIVE: CPT

## 2024-02-22 PROCEDURE — 36415 COLL VENOUS BLD VENIPUNCTURE: CPT

## 2024-02-22 PROCEDURE — 85025 COMPLETE CBC W/AUTO DIFF WBC: CPT

## 2024-02-22 NOTE — TELEPHONE ENCOUNTER
Regarding: Referral   Contact: [phone]  ----- Message from Kati Carl sent at 2/20/2024  1:08 PM EST -----       ----- Message from Cristy Garcia to CHRIS Phan sent at 2/20/2024 12:53 PM -----   I need a referral for a mammogram and ultrasound of my breaststroke. I got a notice it was time again.   Also need a prescription for a new cpap mask that covers nose and mouth due to congestion. Insurance says they will give me a new mask with a prescription.

## 2024-02-23 ENCOUNTER — TELEPHONE (OUTPATIENT)
Dept: FAMILY MEDICINE CLINIC | Facility: CLINIC | Age: 49
End: 2024-02-23

## 2024-02-23 NOTE — TELEPHONE ENCOUNTER
----- Message from CHRIS Phan sent at 2/23/2024 12:10 PM EST -----  Please notify patient her labs are all stable.

## 2024-02-26 ENCOUNTER — OFFICE VISIT (OUTPATIENT)
Dept: PAIN MEDICINE | Facility: CLINIC | Age: 49
End: 2024-02-26
Payer: COMMERCIAL

## 2024-02-26 VITALS
WEIGHT: 255 LBS | TEMPERATURE: 97.6 F | HEART RATE: 109 BPM | OXYGEN SATURATION: 97 % | HEIGHT: 62 IN | SYSTOLIC BLOOD PRESSURE: 114 MMHG | DIASTOLIC BLOOD PRESSURE: 80 MMHG | BODY MASS INDEX: 46.93 KG/M2

## 2024-02-26 DIAGNOSIS — K21.00 GASTROESOPHAGEAL REFLUX DISEASE WITH ESOPHAGITIS WITHOUT HEMORRHAGE: ICD-10-CM

## 2024-02-26 DIAGNOSIS — K20.90 ESOPHAGITIS DETERMINED BY ENDOSCOPY: ICD-10-CM

## 2024-02-26 DIAGNOSIS — M47.816 LUMBAR SPONDYLOSIS: Primary | ICD-10-CM

## 2024-02-26 PROCEDURE — 99214 OFFICE O/P EST MOD 30 MIN: CPT | Performed by: ANESTHESIOLOGY

## 2024-02-26 RX ORDER — AMOXICILLIN AND CLAVULANATE POTASSIUM 875; 125 MG/1; MG/1
TABLET, FILM COATED ORAL
COMMUNITY
Start: 2024-02-05 | End: 2024-02-26

## 2024-02-26 RX ORDER — AZITHROMYCIN 250 MG/1
TABLET, FILM COATED ORAL
COMMUNITY
Start: 2024-01-08 | End: 2024-02-26

## 2024-02-26 RX ORDER — OMEPRAZOLE 40 MG/1
CAPSULE, DELAYED RELEASE ORAL
Qty: 60 CAPSULE | Refills: 5 | Status: SHIPPED | OUTPATIENT
Start: 2024-02-26

## 2024-02-26 RX ORDER — FLUCONAZOLE 150 MG/1
TABLET ORAL
COMMUNITY
Start: 2023-12-01

## 2024-02-26 NOTE — PATIENT INSTRUCTIONS
Hip Bursitis Exercises   AMBULATORY CARE:   Hip bursitis exercises  help strengthen the muscles in your hip and keep the joint flexible. Strong muscles can help reduce pain, prevent injury, and keep the joint stable. The exercises can also help increase the range of motion in your hip joint.       Call your doctor or physical therapist if:   You have sharp pain during exercise or at rest.    You have questions or concerns about the stretches or exercises.    What you need to know about exercise safety:   Move slowly and smoothly.  Avoid fast or jerky motions. This will help prevent an injury.    Breathe normally.  Do not hold your breath. It is important to breathe in and out so you do not tense up during exercise. Tension could prevent you from moving your joint in a full range of motion.    Do the exercises and stretches on both legs.  Do this so both hips remain strong and flexible.    Stop if you feel sharp pain or an increase in pain.  Contact your healthcare provider or physical therapist. It is normal to feel some discomfort during exercise. Regular exercise will help decrease your discomfort over time.    Warm up and cool down when you exercise.  Walk or ride a stationary bike for 5 to 10 minutes before you start. This warms and relaxes your muscles to help prevent an injury. When you have finished the exercises, cool down by walking in place for a few minutes. You may also need to stretch as part of your warm up or cool down routine. Your provider or physical therapist will tell you which stretches to do.       How to do hip stretches:  Your healthcare provider or physical therapist will tell you how many times to do each stretch. Do the stretch on both sides before you move to the next stretch.  Standing iliotibial band stretch:  Stand with the leg on your injured side behind your other leg. Bend sideways toward the side that is not injured. Stop when you feel a stretch in your outer hip. Hold for 5 to 10  seconds. Then return to the starting position.         Lying iliotibial band stretch:  Lie on your back. Bend the knee on your injured side toward your chest. Place your hands on the outside of your knee and thigh. Slowly pull the knee across your body. Stop when you feel a stretch in your hip and outer thigh. Hold for 5 to 10 seconds. Return your leg to the starting position.         Hip stretch:  Lie on your back with both legs straight and on the ground. Bend the knee on your injured side toward your chest until you can reach your lower leg. Place both hands on your shin and pull your knee toward your chest. Hold for 5 to 10 seconds. Return your leg to the starting position.         Knee to chest:  Lie on your back with both knees bent and feet flat on the floor. Bend the knee on your injured side toward your chest until you can reach your lower leg. Place both hands on your shin and pull your knee toward your chest. Hold for 5 to 10 seconds. Return your leg to the starting position.         Internal hip rotator stretch:  You will do this exercise on a table. Lie on your side with the injured hip on top. You may be told to keep a pillow between your thighs. Move the top leg so the foot hangs below the edge of the table. Rotate your hip to raise your foot in the opposite direction of the bottom shoulder. Raise your foot as high as you can so you feel a stretch in the back of your thigh. Hold for 5 seconds. Then slowly lower your foot to the starting position.    External hip rotator stretch:  You will do this exercise on a table. Lie on your side with the injured hip on the bottom. You do not need a pillow between your thighs for this exercise. Move the bottom leg so the foot is off the edge of the table. Rotate your hip to lift the foot in the opposite direction of the bottom shoulder. Raise your foot as high as you can so you feel a stretch in your buttock. Hold for 5 seconds. Then slowly lower your foot to the  starting position.    Kneeling hip flexor stretch:  Kneel on your knee on the injured side. Place the foot of your other leg on the floor so the knee is bent. Put both hands on top of your thigh. Keep your back straight and abdominal muscles tight. Lean forward until you feel a stretch in your other thigh. Hold the stretch for 10 seconds. Return to the starting position.       How to do hip strengthening exercises:  Your healthcare provider or physical therapist will tell you how many times to do each exercise. Do the exercise on both sides before you move to the next exercise.  Straight leg lift to the side:  This may also be called hip abduction. Lie on your side with straight legs, with the injured hip on top. Slowly raise your top leg toward the ceiling as high as you can. Keep your foot pointed. Hold for 5 seconds. Then slowly lower your leg to the starting position.         Inner thigh lift:  This may also be called hip adduction. Lie on your side with straight legs, with the injured hip on the bottom. Cross your top leg over your bottom leg. Put the foot of your top leg on the floor in front of you. Raise your bottom leg until it touches the top leg. Hold for 5 seconds. Then slowly lower the leg to the floor.         Clam exercise:  Lie on your side so your injured side is on top. Bend your knees. Keep your heels together during this exercise. Slowly raise your top knee toward the ceiling. Then lower your leg so your knees are together.       Follow up with your doctor or physical therapist as directed:  Write down your questions so you remember to ask them during your visits.  © Copyright [employer] 2023 Information is for End User's use only and may not be sold, redistributed or otherwise used for commercial purposes.  The above information is an  only. It is not intended as medical advice for individual conditions or treatments. Talk to your doctor, nurse or pharmacist before following any  medical regimen to see if it is safe and effective for you.  Core Strengthening Exercises   WHAT YOU NEED TO KNOW:   Your core includes the muscles of your lower back, hip, pelvis, and abdomen. Core strengthening exercises help heal and strengthen these muscles. This helps prevent another injury, and keeps your pelvis, spine, and hips in the correct position.  DISCHARGE INSTRUCTIONS:   Call your doctor or physical therapist if:   You have sharp or worsening pain during exercise or at rest.    You have questions or concerns about your shoulder exercises.    Safety tips:  Talk to your healthcare provider before you start an exercise program. A physical therapist can teach you how to do core strengthening exercises safely.  Do the exercises on a mat or firm surface.  A firm surface will support your spine and prevent low back pain. Do not do these exercises on a bed.    Move slowly and smoothly.  Avoid fast or jerky motions.    Stop if you feel pain.  You may feel some discomfort at first, but you should not feel pain. Tell your provider or physical therapist if you have pain while you exercise. Regular exercise will help decrease your discomfort over time.    Breathe normally during core exercises.  Do not hold your breath. This may cause an increase in blood pressure and prevent muscle strengthening. Your healthcare provider will tell you when to inhale and exhale during the exercise.    Begin all of your exercises with abdominal bracing.  Abdominal bracing helps warm up your core muscles. You can also practice abdominal bracing throughout the day. Lie on your back with your knees bent and feet flat on the floor. Place your arms in a relaxed position beside your body. Tighten your abdominal muscles. Pull your belly button in and up toward your spine. Hold for 5 seconds. Relax your muscles. Repeat 10 times.       Core strengthening exercises:  Your healthcare provider will tell you how often to do these exercises. The  provider will also tell you how many repetitions of each exercise you should do. Hold each exercise for 5 seconds or as directed. As you get stronger, increase your hold to 10 to 15 seconds. You can do some of these exercises on a stability ball, or with a weight. Ask your healthcare provider how to use a stability ball or weight for these exercises:  Bridging:  Lie on your back with your knees bent and feet flat on the floor. Rest your arms at your side. Tighten your buttocks, and then lift your hips 1 inch off the floor. Hold for 5 seconds. When you can do this exercise without pain for 10 seconds, increase the distance you lift your hips. A good goal is to be able to lift your hips so that your shoulders, hips, and knees are in a straight line.         Dead bug:  Lie on your back with your knees bent and feet flat on the floor. Place your arms in a relaxed position beside your body. Begin with abdominal bracing. Next, raise one leg, keeping your knee bent. Hold for 5 seconds. Repeat with the other leg. When you can do this exercise without pain for 10 to 15 seconds, you may raise one straight leg and hold. Repeat with the other leg.         Quadruped:  Place your hands and knees on the floor. Keep your wrists directly below your shoulders and your knees directly below your hips. Pull your belly button in toward your spine. Do not flatten or arch your back. Tighten your abdominal muscles below your belly button. Hold for 5 seconds. When you can do this exercise without pain for 10 to 15 seconds, you may extend one arm and hold. Repeat on the other side.         Side bridge exercises:      Standing side bridge:  Stand next to a wall and extend one arm toward the wall. Place your palm flat on the wall with your fingers pointing upward. Begin with abdominal bracing. Next, without moving your feet, slowly bend your arm to 90 degrees. Hold for 5 seconds. Repeat on the other side. When you can do this exercise without  pain for 10 to 15 seconds, you may do the bent leg side bridge on the floor.         Bent leg side bridge:  Lie on one side with your legs, hips, and shoulders in a straight line. Prop yourself up onto your forearm so your elbow is directly below your shoulder. Bend your knees back to 90 degrees. Begin with abdominal bracing. Next, lift your hips and balance yourself on your forearm and knees. Hold for 5 seconds. Repeat on the other side. When you can do this exercise without pain for 10 to 15 seconds, you may do the straight leg side bridge on the floor.         Straight leg side bridge:  Lie on one side with your legs, hips, and shoulders in a straight line. Prop yourself up onto your forearm so your elbow is directly below your shoulder. Begin with abdominal bracing. Lift your hips off the floor and balance yourself on your forearm and the outside of your flexed foot. Do not let your ankle bend sideways. Hold for 5 seconds. Repeat on the other side. When you can do this exercise without pain for 10 to 15 seconds, ask your healthcare provider for more advanced exercises.       Superman:  Lie on your stomach. Extend your arms forward on the floor. Tighten your abdominal muscles and lift your right hand and left leg off the floor. Hold this position. Slowly return to the starting position. Tighten your abdominal muscles and lift your left hand and right leg off the floor. Hold this position. Slowly return to the starting position.         Clam:  Lie on your side with your knees bent. Put your bottom arm under your head to keep your neck in line. Put your top hand on your hip to keep your pelvis from moving. Put your heels together, and keep them together during this exercise. Slowly raise your top knee toward the ceiling. Then lower your leg so your knees are together. Repeat this exercise 10 times. Then switch sides and do the exercise 10 times with the other leg.         Curl up:  Lie on your back with your knees  bent and feet flat on the floor. Place your hands, palms down, underneath your lower back. Next, with your elbows on the floor, lift your shoulders and chest 2 to 3 inches off the floor. Keep your head in line with your shoulders. Hold this position. Slowly return to the starting position.         Straight leg raises:  Lie on your back with one leg straight. Bend the other knee and place your foot flat on the floor. Tighten your abdominal muscles. Keep your leg straight and slowly lift it straight up 6 to 12 inches off the floor. Hold this position. Lower your leg slowly. Do as many repetitions as directed on this side. Repeat with the other leg.         Plank:  Lie on your stomach. Bend your elbows and place your forearms flat on the floor. Lift your chest, stomach, and knees off the floor. Make sure your elbows are below your shoulders. Your body should be in a straight line. Do not let your hips or lower back sink to the ground. Squeeze your abdominal muscles together and hold for 15 seconds. To make this exercise harder, hold for 30 seconds or lift 1 leg at a time.         Bicycles:  Lie on your back. Bend both knees and bring them toward your chest. Your calves should be parallel to the floor. Place the palms of your hands on the back of your head. Straighten your right leg and keep it lifted 2 inches off the floor. Raise your head and shoulders off the floor and twist towards your left. Keep your head and shoulders lifted. Bend your right knee while you straighten your left leg. Keep your left leg 2 inches off the floor. Twist your head and chest towards the left leg. Continue to straighten 1 leg at a time and twist.       Follow up with your doctor or physical therapist as directed:  Write down your questions so you remember to ask them during your visits.  © Copyright [employer] 2023 Information is for End User's use only and may not be sold, redistributed or otherwise used for commercial purposes.  The above  information is an  only. It is not intended as medical advice for individual conditions or treatments. Talk to your doctor, nurse or pharmacist before following any medical regimen to see if it is safe and effective for you.

## 2024-02-26 NOTE — H&P (VIEW-ONLY)
Assessment  1. Lumbar spondylosis    Greater than 80% improvement with radiofrequency ablation of bilateral Medial branch nerves at L3-L5 performed 4/4/23. Prior performed more than 1.5 years ago with mproved ability to participate with IADLs in significantly less pain.  Pain has subsequently returned. Additionally describes b/l hip pain with radiation to the groin; ttp over b/l gtb, pain with internal/external rotation of b/l hips; pain worse with standing/ambulation. Greater than 60% relief of pain with improved ability to participate with IADLs after recent b/l hip intra-artcular joint injections in the past. Lifestyle modifications extensively discussed including diet, exercise and weight loss in conjunction with optimal DM-2 control. Previously reported the following symptomatology:    Chronic axial low back pain described by primarily arthritic features.  Positive facet loading maneuvers as noted below.Mild noncompressive lumbar degenerative change at the L3-4 and L4-5 levels.  Previously had a left L3 transforaminal steroid injection with Dr. Franco which helped for about a week and a half.  Arthritic symptomatology greater than radicular features at this time. Reasonable to continue multimodal pain therapy plan.    Plan  -Celebrex increased to 200 mg b.i.d. As needed for lumbar facet syndrome.  Counseled regarding bleeding risk, GI risk, Renal risk of taking this medication.  -lifestyle modifications including diet, exercise and weight loss in addition to DM-2 control extensively discussed  -repeat bilateral L3, L4, L5 medial branch nerve radiofrequency ablation; f/u 2 weeks post procedure  -hip exercises provided; continue physical therapy and core exercises for lumbar facet syndrome, hip osteoarthritis    There are risks associated with opioid medications, including dependence, addiction and tolerance. The patient understands and agrees to use these medications only as prescribed. Potential side  effects of the medications include, but are not limited to, constipation, drowsiness, addiction, impaired judgment and risk of fatal overdose if not taken as prescribed. The patient was warned against driving while taking sedation medications.  Sharing medications is a felony. At this point in time, the patient is showing no signs of addiction, abuse, diversion or suicidal ideation.    [place] Prescription Drug Monitoring Program report was reviewed and was appropriate     Complete risks and benefits including bleeding, infection, tissue reaction, nerve injury and allergic reaction were discussed. The approach was demonstrated using models and literature was provided. Verbal and written consent was obtained.    My impressions and treatment recommendations were discussed in detail with the patient who verbalized understanding and had no further questions.  Discharge instructions were provided. I personally saw and examined the patient and I agree with the above discussed plan of care.    New Medications Ordered This Visit   Medications    fluconazole (DIFLUCAN) 150 mg tablet    azithromycin (ZITHROMAX) 250 mg tablet     Sig: ON FIRST DAY, TAKE 2 TABLETS TOGETHER AS THE 1ST DOSE, THEN 1 TABLET EACH DAY THE NEXT 4 DAYS.    amoxicillin-clavulanate (AUGMENTIN) 875-125 mg per tablet     Sig: TAKE ONE TABLET BY MOUTH TWICE DAILY FOR 10 DAYS    glucose blood test strip       History of Present Illness    Greater than 80% improvement with radiofrequency ablation of bilateral Medial branch nerves at L3-L5 performed 4/4/23. Prior performed more than 1.5 years ago with mproved ability to participate with IADLs in significantly less pain.  Pain has subsequently returned. Additionally describes b/l hip pain with radiation to the groin; ttp over b/l gtb, pain with internal/external rotation of b/l hips; pain worse with standing/ambulation. Greater than 60% relief of pain with improved ability to participate with IADLs after  recent b/l hip intra-artcular joint injections in the past. Previously reported the following symptomatology:    Cristy Garcia is a 48 y.o. female with past medical history of axial low back pain described primarily by arthritic features.  She presents with a 2 year history of chronic low back pain described primarily as arthritic in nature.   she describes 8/10 low back pain that is worse in the mornings and worse at the end of the day.  The pain is characterized by achy, nagging, indolent, crampy, stabbing pain in his/her axial low back.  The patient describes that the pain is worse with standing for long periods of time on hard surfaces as well as with walking.  The patient is a very active individual and feels as though this pain compromises her participation with independent activities of daily living. The pain can be debilitating at times and contribute to significant disability, compromising overall activity and independent activities of daily living.   She has tried physical therapy with limited relief of symptoms.  Medications the patient has tried in the past include   Celebrex,  and Flexeril. She describes minor radicular symptoms in the L3 and L4 dermatomal distribution but otherwise has good strength.  Previously she had left L3 transforaminal epidural steroid injection with relief for about 2 weeks. She denies any weakness numbness or paresthesias.  The patient denies any bowel or bladder dysfunction as well.        I have personally reviewed and/or updated the patient's past medical history, past surgical history, family history, social history, current medications, allergies, and vital signs today.     Review of Systems   Constitutional:  Positive for activity change.   HENT: Negative.     Eyes: Negative.    Respiratory: Negative.     Cardiovascular: Negative.    Gastrointestinal: Negative.    Endocrine: Negative.    Genitourinary: Negative.    Musculoskeletal:  Positive for arthralgias, back pain,  gait problem and myalgias.   Skin: Negative.    Allergic/Immunologic: Negative.    Hematological: Negative.    Psychiatric/Behavioral: Negative.     All other systems reviewed and are negative.      Patient Active Problem List   Diagnosis    SOB (shortness of breath)    Chronic bilateral low back pain without sciatica    Chronic pain of left knee    Chronic pain of right knee    Fibromyalgia, primary    Primary osteoarthritis involving multiple joints    Mild persistent asthma without complication    Lumbar radiculopathy    Primary osteoarthritis of both hips    Left hip pain    Major depression, recurrent, chronic (HCC)    Lumbar spondylosis    Cervical radiculopathy    Morbid obesity with body mass index (BMI) of 45.0 to 49.9 in adult (HCC)    VICTOR HUGO on CPAP    Anxiety associated with depression    Diabetes mellitus (HCC)    Hyperlipidemia    Hypertension    Irregular heartbeat    Chronic pain disorder    GERD (gastroesophageal reflux disease)    Fatty liver    Elevated LFTs    Esophagitis determined by endoscopy       Past Medical History:   Diagnosis Date    Allergic     Seasonal Allergies    Alopecia of scalp     Treated with Proscar    Chronic pain disorder     back, bilat knees, bilat hips    Diabetes mellitus (HCC)     Fibromyalgia, primary     GERD (gastroesophageal reflux disease)     Hip fx, left, closed, with routine healing, subsequent encounter 05/2017    Hyperlipidemia     Hypertension     Insomnia     Irregular heartbeat     VICTOR HUGO on CPAP        Past Surgical History:   Procedure Laterality Date    CARPAL TUNNEL RELEASE Left     LUMBAR EPIDURAL INJECTION      NERVE BLOCK Bilateral 12/29/2020    Procedure: L2 L3 L4 L5 MEDIAL BRANCH BLOCK #1;  Surgeon: Westley Keller MD;  Location: [facility];  Service: Pain Management     NERVE BLOCK Bilateral 1/19/2021    Procedure: L2 L3 L4 L5 MEDIAL BRANCH BLOCK #2;  Surgeon: Westley Keller MD;  Location: [facility];  Service: Pain Management     MI ARTHROCENTESIS  ASPIR&/INJ MAJOR JT/BURSA W/O US Bilateral 2/7/2023    Procedure: INJECTION JOINT HIP;  Surgeon: Westley Keller MD;  Location: [facility];  Service: Pain Management     KY HYSTEROSCOPY BX ENDOMETRIUM&/POLYPC W/WO D&C N/A 4/25/2023    Procedure: HYSTEROSCOPY W/  RESECTION UTERINE TUMOR/FIBROID (POLYPECTOMY );  Surgeon: Donovan Ware DO;  Location: [facility];  Service: Gynecology    RADIOFREQUENCY ABLATION Right 2/25/2021    Procedure: L2 L3 L4 L5 RADIO FREQUENCY ABLATION right side;  Surgeon: Westley Keller MD;  Location: [facility];  Service: Pain Management     RADIOFREQUENCY ABLATION Left 3/16/2021    Procedure: L2 L3 L4 L5 RADIO FREQUENCY ABLATION;  Surgeon: Westley Keller MD;  Location: [facility];  Service: Pain Management     RHIZOTOMY Bilateral 4/4/2023    Procedure: RHIZOTOMY LUMBAR L3, L4, L5 medial branch nerves;  Surgeon: Westley Keller MD;  Location: [facility];  Service: Pain Management     UPPER GASTROINTESTINAL ENDOSCOPY      US GUIDED THYROID BIOPSY  7/14/2023       Family History   Problem Relation Age of Onset    Cancer Father     Hepatitis Father     Asthma Cousin     Hypertension Paternal Grandmother     No Known Problems Mother     No Known Problems Sister     No Known Problems Maternal Grandmother     No Known Problems Maternal Grandfather     No Known Problems Paternal Grandfather     No Known Problems Paternal Aunt     BRCA2 Positive Neg Hx     BRCA2 Negative Neg Hx     BRCA1 Positive Neg Hx     BRCA1 Negative Neg Hx     BRCA 1/2 Neg Hx     Ovarian cancer Neg Hx     Endometrial cancer Neg Hx     Colon cancer Neg Hx     Breast cancer additional onset Neg Hx     Breast cancer Neg Hx        Social History     Occupational History    Not on file   Tobacco Use    Smoking status: Former    Smokeless tobacco: Never   Vaping Use    Vaping status: Never Used   Substance and Sexual Activity    Alcohol use: No    Drug use: No    Sexual activity: Not on file       Current Outpatient Medications on  File Prior to Visit   Medication Sig    acetaminophen (TYLENOL) 500 mg tablet Take 500 mg by mouth every 6 (six) hours as needed for mild pain    albuterol (Ventolin HFA) 90 mcg/act inhaler Inhale 2 puffs every 6 (six) hours as needed for wheezing or shortness of breath    atorvastatin (LIPITOR) 20 mg tablet Take 1 tablet (20 mg total) by mouth daily with dinner    celecoxib (CeleBREX) 200 mg capsule Take 1 capsule (200 mg total) by mouth 2 (two) times a day    cetirizine (ZyrTEC) 10 mg tablet 1 tab po daily x 1 month then prn allergies    clotrimazole-betamethasone (LOTRISONE) 1-0.05 % cream Apply 2 x daily to vaginal area until healed then prn irritation    Empagliflozin (Jardiance) 25 MG TABS Take 1 tablet (25 mg total) by mouth daily    ergocalciferol (VITAMIN D2) 50,000 units Take 1 capsule (50,000 Units total) by mouth once a week    famotidine (PEPCID) 40 MG tablet Take 1 PO QD.    fluconazole (DIFLUCAN) 150 mg tablet     fluticasone (FLONASE) 50 mcg/act nasal spray 1 spray each nostril 2 x daily x 1 month then prn congestion or allergies    Fluticasone-Salmeterol (Advair Diskus) 250-50 mcg/dose inhaler Inhale 1 puff 2 (two) times a day Rinse mouth after use.    gabapentin (NEURONTIN) 300 mg capsule Take 1 capsule (300 mg total) by mouth 3 (three) times a day    glipiZIDE (GLUCOTROL) 10 mg tablet TAKE (2) TABLETS TWICE DAILY BEFORE MEALS.    glucose blood test strip Use as instructed    Lancets (onetouch ultrasoft) lancets Use as instructed    metFORMIN (GLUCOPHAGE) 1000 MG tablet Take 1 tablet (1,000 mg total) by mouth 2 (two) times a day with meals    ondansetron (ZOFRAN) 4 mg tablet Take 1 tablet (4 mg total) by mouth every 8 (eight) hours as needed for nausea or vomiting    sertraline (ZOLOFT) 50 mg tablet TAKE (1) TABLET BY MOUTH DAILY AT BEDTIME.    triamcinolone (KENALOG) 0.025 % cream Apply 2 x daily to rash until healed then prn rash    amoxicillin-clavulanate (AUGMENTIN) 875-125 mg per tablet TAKE  "ONE TABLET BY MOUTH TWICE DAILY FOR 10 DAYS (Patient not taking: Reported on 2/26/2024)    azithromycin (ZITHROMAX) 250 mg tablet ON FIRST DAY, TAKE 2 TABLETS TOGETHER AS THE 1ST DOSE, THEN 1 TABLET EACH DAY THE NEXT 4 DAYS. (Patient not taking: Reported on 2/26/2024)    glucose blood test strip  (Patient not taking: Reported on 2/26/2024)    lisinopril (ZESTRIL) 10 mg tablet Take 1 tablet (10 mg total) by mouth daily    medroxyPROGESTERone (PROVERA) 10 mg tablet Take 1 tablet (10 mg total) by mouth daily for 10 days    naproxen sodium (ANAPROX) 550 mg tablet Take 1 tablet (550 mg total) by mouth 2 (two) times a day with meals for 30 doses (Patient not taking: Reported on 2/26/2024)    [DISCONTINUED] omeprazole (PriLOSEC) 40 MG capsule Take 1 capsule (40 mg total) by mouth 2 (two) times a day before meals     No current facility-administered medications on file prior to visit.       Allergies   Allergen Reactions    Trulicity [Dulaglutide] Swelling    Tdap [Tetanus-Diphth-Acell Pertussis] Rash         Physical Exam    /80 (BP Location: Right arm, Patient Position: Sitting, Cuff Size: Large)   Pulse (!) 109   Temp 97.6 °F (36.4 °C)   Ht 5' 2\" (1.575 m)   Wt 116 kg (255 lb)   SpO2 97%   BMI 46.64 kg/m²     Constitutional: normal, well developed, well nourished, alert, in no distress and non-toxic and no overt pain behavior. and obese  Eyes: anicteric  HEENT: grossly intact  Neck: supple, symmetric, trachea midline and no masses   Pulmonary:even and unlabored  Cardiovascular:No edema or pitting edema present  Skin:Normal without rashes or lesions and well hydrated  Psychiatric:Mood and affect appropriate  Neurologic:Cranial Nerves II-XII grossly intact Sensation grossly intact; no clonus negative dupree's. Reflexes 2+ and brisk. SLR negative bilaterally.  Musculoskeletal:  Steppage gait. Normal heel toe and tip toe walking.  Significant pain with lumbar facet loading bilaterally and with lateral spine " rotation left greater than right.  TTP over lumbar paraspinal muscles. Negative wilda's test, negative gaenslen's negative SIJ loading bilaterally.  ttp over b/l gtb, pain with internal/external rotation of b/l hips    Imaging    MRI LUMBAR SPINE WITHOUT CONTRAST     INDICATION: M54.42: Lumbago with sciatica, left side  G89.29: Other chronic pain.     COMPARISON:  None.     TECHNIQUE:  Sagittal T1, sagittal T2, sagittal inversion recovery, axial T1 and axial T2, coronal T2.    IMAGE QUALITY:  Diagnostic     FINDINGS:     VERTEBRAL BODIES:  There are 5 lumbar type vertebral bodies.  Normal alignment of the lumbar spine.  No spondylolysis or spondylolisthesis. No scoliosis.  No compression fracture.    Normal marrow signal is identified within the visualized bony   structures.  No discrete marrow lesion.     SACRUM:  Normal signal within the sacrum. No evidence of insufficiency or stress fracture.     DISTAL CORD AND CONUS:  Normal size and signal within the distal cord and conus.     PARASPINAL SOFT TISSUES:  There is thickening of the endometrial cavity partially visualized on this examination towards the fundus.  There is a dominant follicle identified within the left ovary.     LOWER THORACIC DISC SPACES:  Normal disc height and signal.  No disc herniation, canal stenosis or foraminal narrowing.     LUMBAR DISC SPACES:     L1-L2:  Normal.     L2-L3:  Normal.     L3-L4:  Annular bulging with a small broad-based left foraminal and extraforaminal disc protrusion best seen on series 6 image 13.  There is no canal stenosis.  Only minimal left foraminal narrowing without nerve impingement.     L4-L5:  Disc desiccation and loss of disc height with mild annular bulging.  Small central disc herniation without canal stenosis or foraminal nerve impingement.     L5-S1:  Normal disc height and signal without canal stenosis or foraminal narrowing.     IMPRESSION:     Mild noncompressive lumbar degenerative change at the L3-4  and L4-5 levels.     Fluid signal in the endometrial cavity partially visualized at the fundus.  If patient is postmenopausal, consider follow-up ultrasound imaging.     Signal

## 2024-02-26 NOTE — PROGRESS NOTES
Assessment  1. Lumbar spondylosis    Greater than 80% improvement with radiofrequency ablation of bilateral Medial branch nerves at L3-L5 performed 4/4/23. Prior performed more than 1.5 years ago with mproved ability to participate with IADLs in significantly less pain.  Pain has subsequently returned. Additionally describes b/l hip pain with radiation to the groin; ttp over b/l gtb, pain with internal/external rotation of b/l hips; pain worse with standing/ambulation. Greater than 60% relief of pain with improved ability to participate with IADLs after recent b/l hip intra-artcular joint injections in the past. Lifestyle modifications extensively discussed including diet, exercise and weight loss in conjunction with optimal DM-2 control. Previously reported the following symptomatology:    Chronic axial low back pain described by primarily arthritic features.  Positive facet loading maneuvers as noted below.Mild noncompressive lumbar degenerative change at the L3-4 and L4-5 levels.  Previously had a left L3 transforaminal steroid injection with Dr. Franco which helped for about a week and a half.  Arthritic symptomatology greater than radicular features at this time. Reasonable to continue multimodal pain therapy plan.    Plan  -Celebrex increased to 200 mg b.i.d. As needed for lumbar facet syndrome.  Counseled regarding bleeding risk, GI risk, Renal risk of taking this medication.  -lifestyle modifications including diet, exercise and weight loss in addition to DM-2 control extensively discussed  -repeat bilateral L3, L4, L5 medial branch nerve radiofrequency ablation; f/u 2 weeks post procedure  -hip exercises provided; continue physical therapy and core exercises for lumbar facet syndrome, hip osteoarthritis    There are risks associated with opioid medications, including dependence, addiction and tolerance. The patient understands and agrees to use these medications only as prescribed. Potential side  effects of the medications include, but are not limited to, constipation, drowsiness, addiction, impaired judgment and risk of fatal overdose if not taken as prescribed. The patient was warned against driving while taking sedation medications.  Sharing medications is a felony. At this point in time, the patient is showing no signs of addiction, abuse, diversion or suicidal ideation.    [place] Prescription Drug Monitoring Program report was reviewed and was appropriate     Complete risks and benefits including bleeding, infection, tissue reaction, nerve injury and allergic reaction were discussed. The approach was demonstrated using models and literature was provided. Verbal and written consent was obtained.    My impressions and treatment recommendations were discussed in detail with the patient who verbalized understanding and had no further questions.  Discharge instructions were provided. I personally saw and examined the patient and I agree with the above discussed plan of care.    New Medications Ordered This Visit   Medications    fluconazole (DIFLUCAN) 150 mg tablet    azithromycin (ZITHROMAX) 250 mg tablet     Sig: ON FIRST DAY, TAKE 2 TABLETS TOGETHER AS THE 1ST DOSE, THEN 1 TABLET EACH DAY THE NEXT 4 DAYS.    amoxicillin-clavulanate (AUGMENTIN) 875-125 mg per tablet     Sig: TAKE ONE TABLET BY MOUTH TWICE DAILY FOR 10 DAYS    glucose blood test strip       History of Present Illness    Greater than 80% improvement with radiofrequency ablation of bilateral Medial branch nerves at L3-L5 performed 4/4/23. Prior performed more than 1.5 years ago with mproved ability to participate with IADLs in significantly less pain.  Pain has subsequently returned. Additionally describes b/l hip pain with radiation to the groin; ttp over b/l gtb, pain with internal/external rotation of b/l hips; pain worse with standing/ambulation. Greater than 60% relief of pain with improved ability to participate with IADLs after  recent b/l hip intra-artcular joint injections in the past. Previously reported the following symptomatology:    Cristy Garcia is a 48 y.o. female with past medical history of axial low back pain described primarily by arthritic features.  She presents with a 2 year history of chronic low back pain described primarily as arthritic in nature.   she describes 8/10 low back pain that is worse in the mornings and worse at the end of the day.  The pain is characterized by achy, nagging, indolent, crampy, stabbing pain in his/her axial low back.  The patient describes that the pain is worse with standing for long periods of time on hard surfaces as well as with walking.  The patient is a very active individual and feels as though this pain compromises her participation with independent activities of daily living. The pain can be debilitating at times and contribute to significant disability, compromising overall activity and independent activities of daily living.   She has tried physical therapy with limited relief of symptoms.  Medications the patient has tried in the past include   Celebrex,  and Flexeril. She describes minor radicular symptoms in the L3 and L4 dermatomal distribution but otherwise has good strength.  Previously she had left L3 transforaminal epidural steroid injection with relief for about 2 weeks. She denies any weakness numbness or paresthesias.  The patient denies any bowel or bladder dysfunction as well.        I have personally reviewed and/or updated the patient's past medical history, past surgical history, family history, social history, current medications, allergies, and vital signs today.     Review of Systems   Constitutional:  Positive for activity change.   HENT: Negative.     Eyes: Negative.    Respiratory: Negative.     Cardiovascular: Negative.    Gastrointestinal: Negative.    Endocrine: Negative.    Genitourinary: Negative.    Musculoskeletal:  Positive for arthralgias, back pain,  gait problem and myalgias.   Skin: Negative.    Allergic/Immunologic: Negative.    Hematological: Negative.    Psychiatric/Behavioral: Negative.     All other systems reviewed and are negative.      Patient Active Problem List   Diagnosis    SOB (shortness of breath)    Chronic bilateral low back pain without sciatica    Chronic pain of left knee    Chronic pain of right knee    Fibromyalgia, primary    Primary osteoarthritis involving multiple joints    Mild persistent asthma without complication    Lumbar radiculopathy    Primary osteoarthritis of both hips    Left hip pain    Major depression, recurrent, chronic (HCC)    Lumbar spondylosis    Cervical radiculopathy    Morbid obesity with body mass index (BMI) of 45.0 to 49.9 in adult (HCC)    VICTOR HUGO on CPAP    Anxiety associated with depression    Diabetes mellitus (HCC)    Hyperlipidemia    Hypertension    Irregular heartbeat    Chronic pain disorder    GERD (gastroesophageal reflux disease)    Fatty liver    Elevated LFTs    Esophagitis determined by endoscopy       Past Medical History:   Diagnosis Date    Allergic     Seasonal Allergies    Alopecia of scalp     Treated with Proscar    Chronic pain disorder     back, bilat knees, bilat hips    Diabetes mellitus (HCC)     Fibromyalgia, primary     GERD (gastroesophageal reflux disease)     Hip fx, left, closed, with routine healing, subsequent encounter 05/2017    Hyperlipidemia     Hypertension     Insomnia     Irregular heartbeat     VICTOR HUGO on CPAP        Past Surgical History:   Procedure Laterality Date    CARPAL TUNNEL RELEASE Left     LUMBAR EPIDURAL INJECTION      NERVE BLOCK Bilateral 12/29/2020    Procedure: L2 L3 L4 L5 MEDIAL BRANCH BLOCK #1;  Surgeon: Westley Keller MD;  Location: [facility];  Service: Pain Management     NERVE BLOCK Bilateral 1/19/2021    Procedure: L2 L3 L4 L5 MEDIAL BRANCH BLOCK #2;  Surgeon: Westley Keller MD;  Location: [facility];  Service: Pain Management     WY ARTHROCENTESIS  ASPIR&/INJ MAJOR JT/BURSA W/O US Bilateral 2/7/2023    Procedure: INJECTION JOINT HIP;  Surgeon: Westley Keller MD;  Location: [facility];  Service: Pain Management     MT HYSTEROSCOPY BX ENDOMETRIUM&/POLYPC W/WO D&C N/A 4/25/2023    Procedure: HYSTEROSCOPY W/  RESECTION UTERINE TUMOR/FIBROID (POLYPECTOMY );  Surgeon: Donovan Ware DO;  Location: [facility];  Service: Gynecology    RADIOFREQUENCY ABLATION Right 2/25/2021    Procedure: L2 L3 L4 L5 RADIO FREQUENCY ABLATION right side;  Surgeon: Westley Keller MD;  Location: [facility];  Service: Pain Management     RADIOFREQUENCY ABLATION Left 3/16/2021    Procedure: L2 L3 L4 L5 RADIO FREQUENCY ABLATION;  Surgeon: Westley Keller MD;  Location: [facility];  Service: Pain Management     RHIZOTOMY Bilateral 4/4/2023    Procedure: RHIZOTOMY LUMBAR L3, L4, L5 medial branch nerves;  Surgeon: Westley Keller MD;  Location: [facility];  Service: Pain Management     UPPER GASTROINTESTINAL ENDOSCOPY      US GUIDED THYROID BIOPSY  7/14/2023       Family History   Problem Relation Age of Onset    Cancer Father     Hepatitis Father     Asthma Cousin     Hypertension Paternal Grandmother     No Known Problems Mother     No Known Problems Sister     No Known Problems Maternal Grandmother     No Known Problems Maternal Grandfather     No Known Problems Paternal Grandfather     No Known Problems Paternal Aunt     BRCA2 Positive Neg Hx     BRCA2 Negative Neg Hx     BRCA1 Positive Neg Hx     BRCA1 Negative Neg Hx     BRCA 1/2 Neg Hx     Ovarian cancer Neg Hx     Endometrial cancer Neg Hx     Colon cancer Neg Hx     Breast cancer additional onset Neg Hx     Breast cancer Neg Hx        Social History     Occupational History    Not on file   Tobacco Use    Smoking status: Former    Smokeless tobacco: Never   Vaping Use    Vaping status: Never Used   Substance and Sexual Activity    Alcohol use: No    Drug use: No    Sexual activity: Not on file       Current Outpatient Medications on  File Prior to Visit   Medication Sig    acetaminophen (TYLENOL) 500 mg tablet Take 500 mg by mouth every 6 (six) hours as needed for mild pain    albuterol (Ventolin HFA) 90 mcg/act inhaler Inhale 2 puffs every 6 (six) hours as needed for wheezing or shortness of breath    atorvastatin (LIPITOR) 20 mg tablet Take 1 tablet (20 mg total) by mouth daily with dinner    celecoxib (CeleBREX) 200 mg capsule Take 1 capsule (200 mg total) by mouth 2 (two) times a day    cetirizine (ZyrTEC) 10 mg tablet 1 tab po daily x 1 month then prn allergies    clotrimazole-betamethasone (LOTRISONE) 1-0.05 % cream Apply 2 x daily to vaginal area until healed then prn irritation    Empagliflozin (Jardiance) 25 MG TABS Take 1 tablet (25 mg total) by mouth daily    ergocalciferol (VITAMIN D2) 50,000 units Take 1 capsule (50,000 Units total) by mouth once a week    famotidine (PEPCID) 40 MG tablet Take 1 PO QD.    fluconazole (DIFLUCAN) 150 mg tablet     fluticasone (FLONASE) 50 mcg/act nasal spray 1 spray each nostril 2 x daily x 1 month then prn congestion or allergies    Fluticasone-Salmeterol (Advair Diskus) 250-50 mcg/dose inhaler Inhale 1 puff 2 (two) times a day Rinse mouth after use.    gabapentin (NEURONTIN) 300 mg capsule Take 1 capsule (300 mg total) by mouth 3 (three) times a day    glipiZIDE (GLUCOTROL) 10 mg tablet TAKE (2) TABLETS TWICE DAILY BEFORE MEALS.    glucose blood test strip Use as instructed    Lancets (onetouch ultrasoft) lancets Use as instructed    metFORMIN (GLUCOPHAGE) 1000 MG tablet Take 1 tablet (1,000 mg total) by mouth 2 (two) times a day with meals    ondansetron (ZOFRAN) 4 mg tablet Take 1 tablet (4 mg total) by mouth every 8 (eight) hours as needed for nausea or vomiting    sertraline (ZOLOFT) 50 mg tablet TAKE (1) TABLET BY MOUTH DAILY AT BEDTIME.    triamcinolone (KENALOG) 0.025 % cream Apply 2 x daily to rash until healed then prn rash    amoxicillin-clavulanate (AUGMENTIN) 875-125 mg per tablet TAKE  "ONE TABLET BY MOUTH TWICE DAILY FOR 10 DAYS (Patient not taking: Reported on 2/26/2024)    azithromycin (ZITHROMAX) 250 mg tablet ON FIRST DAY, TAKE 2 TABLETS TOGETHER AS THE 1ST DOSE, THEN 1 TABLET EACH DAY THE NEXT 4 DAYS. (Patient not taking: Reported on 2/26/2024)    glucose blood test strip  (Patient not taking: Reported on 2/26/2024)    lisinopril (ZESTRIL) 10 mg tablet Take 1 tablet (10 mg total) by mouth daily    medroxyPROGESTERone (PROVERA) 10 mg tablet Take 1 tablet (10 mg total) by mouth daily for 10 days    naproxen sodium (ANAPROX) 550 mg tablet Take 1 tablet (550 mg total) by mouth 2 (two) times a day with meals for 30 doses (Patient not taking: Reported on 2/26/2024)    [DISCONTINUED] omeprazole (PriLOSEC) 40 MG capsule Take 1 capsule (40 mg total) by mouth 2 (two) times a day before meals     No current facility-administered medications on file prior to visit.       Allergies   Allergen Reactions    Trulicity [Dulaglutide] Swelling    Tdap [Tetanus-Diphth-Acell Pertussis] Rash         Physical Exam    /80 (BP Location: Right arm, Patient Position: Sitting, Cuff Size: Large)   Pulse (!) 109   Temp 97.6 °F (36.4 °C)   Ht 5' 2\" (1.575 m)   Wt 116 kg (255 lb)   SpO2 97%   BMI 46.64 kg/m²     Constitutional: normal, well developed, well nourished, alert, in no distress and non-toxic and no overt pain behavior. and obese  Eyes: anicteric  HEENT: grossly intact  Neck: supple, symmetric, trachea midline and no masses   Pulmonary:even and unlabored  Cardiovascular:No edema or pitting edema present  Skin:Normal without rashes or lesions and well hydrated  Psychiatric:Mood and affect appropriate  Neurologic:Cranial Nerves II-XII grossly intact Sensation grossly intact; no clonus negative dupree's. Reflexes 2+ and brisk. SLR negative bilaterally.  Musculoskeletal:  Steppage gait. Normal heel toe and tip toe walking.  Significant pain with lumbar facet loading bilaterally and with lateral spine " rotation left greater than right.  TTP over lumbar paraspinal muscles. Negative wilda's test, negative gaenslen's negative SIJ loading bilaterally.  ttp over b/l gtb, pain with internal/external rotation of b/l hips    Imaging    MRI LUMBAR SPINE WITHOUT CONTRAST     INDICATION: M54.42: Lumbago with sciatica, left side  G89.29: Other chronic pain.     COMPARISON:  None.     TECHNIQUE:  Sagittal T1, sagittal T2, sagittal inversion recovery, axial T1 and axial T2, coronal T2.    IMAGE QUALITY:  Diagnostic     FINDINGS:     VERTEBRAL BODIES:  There are 5 lumbar type vertebral bodies.  Normal alignment of the lumbar spine.  No spondylolysis or spondylolisthesis. No scoliosis.  No compression fracture.    Normal marrow signal is identified within the visualized bony   structures.  No discrete marrow lesion.     SACRUM:  Normal signal within the sacrum. No evidence of insufficiency or stress fracture.     DISTAL CORD AND CONUS:  Normal size and signal within the distal cord and conus.     PARASPINAL SOFT TISSUES:  There is thickening of the endometrial cavity partially visualized on this examination towards the fundus.  There is a dominant follicle identified within the left ovary.     LOWER THORACIC DISC SPACES:  Normal disc height and signal.  No disc herniation, canal stenosis or foraminal narrowing.     LUMBAR DISC SPACES:     L1-L2:  Normal.     L2-L3:  Normal.     L3-L4:  Annular bulging with a small broad-based left foraminal and extraforaminal disc protrusion best seen on series 6 image 13.  There is no canal stenosis.  Only minimal left foraminal narrowing without nerve impingement.     L4-L5:  Disc desiccation and loss of disc height with mild annular bulging.  Small central disc herniation without canal stenosis or foraminal nerve impingement.     L5-S1:  Normal disc height and signal without canal stenosis or foraminal narrowing.     IMPRESSION:     Mild noncompressive lumbar degenerative change at the L3-4  and L4-5 levels.     Fluid signal in the endometrial cavity partially visualized at the fundus.  If patient is postmenopausal, consider follow-up ultrasound imaging.     Signal     2 seconds or less

## 2024-02-28 ENCOUNTER — PREP FOR PROCEDURE (OUTPATIENT)
Dept: PAIN MEDICINE | Facility: CLINIC | Age: 49
End: 2024-02-28

## 2024-02-28 DIAGNOSIS — M47.816 LUMBAR SPONDYLOSIS: Primary | ICD-10-CM

## 2024-03-03 DIAGNOSIS — I10 ESSENTIAL HYPERTENSION: ICD-10-CM

## 2024-03-04 DIAGNOSIS — K20.90 ESOPHAGITIS DETERMINED BY ENDOSCOPY: ICD-10-CM

## 2024-03-04 DIAGNOSIS — K21.00 GASTROESOPHAGEAL REFLUX DISEASE WITH ESOPHAGITIS WITHOUT HEMORRHAGE: ICD-10-CM

## 2024-03-04 RX ORDER — LISINOPRIL 10 MG/1
10 TABLET ORAL DAILY
Qty: 90 TABLET | Refills: 0 | Status: SHIPPED | OUTPATIENT
Start: 2024-03-04

## 2024-03-04 RX ORDER — FAMOTIDINE 40 MG/1
TABLET, FILM COATED ORAL
Qty: 30 TABLET | Refills: 5 | Status: SHIPPED | OUTPATIENT
Start: 2024-03-04

## 2024-03-04 NOTE — PROGRESS NOTES
AMB US Pelvic Non OB    Date/Time: 3/6/2024 4:00 PM    Performed by: Leidy Mitchell  Authorized by: Donovan Ware DO  Universal Protocol:  Consent: Verbal consent obtained.  Consent given by: patient  Patient understanding: patient states understanding of the procedure being performed  Patient identity confirmed: verbally with patient    Procedure details:     SIS Procedure: No    Indications: ovarian cysts      Technique:  US Pelvic, Non-OB with complete exam    Position: supine exam    Uterine findings:     Length (cm): 9.97    Height (cm):  4.43    Width (cm):  5.61    Endometrial stripe: identified      Endometrium thickness (mm):  6.01  Left ovary findings:     Left ovary:  Visualized    Length (cm): 4.79    Height (cm): 3.02    Width (cm): 3.62  Right ovary findings:     Right ovary:  Visualized    Length (cm): 5.41    Height (cm): 4.76    Width (cm): 3.58  Other findings:     Free pelvic fluid: not identified      Free peritoneal fluid: not identified    Post-Procedure Details:     Impression:  Anteflexed uterus appears within normal limits. The bilateral ovaries each contain cyst. RT 4.8cm with septation and LT 2.4cm. No free fluid.    Tolerance:  Tolerated well, no immediate complications    Complications: no complications    Additional Procedure Comments:      GE Voluson P8 transvaginal transducer RIC5-RA with Serial Number [id number] was used during procedure and subsequently cleaned with high level disinfection utilizing the kidthing EPR Probe .     Ultrasound performed at:     [facility]  [address] [place] [address]  Phone: [phone]  Fax:  [phone]

## 2024-03-06 ENCOUNTER — OFFICE VISIT (OUTPATIENT)
Dept: GYNECOLOGY | Facility: CLINIC | Age: 49
End: 2024-03-06
Payer: COMMERCIAL

## 2024-03-06 ENCOUNTER — ULTRASOUND (OUTPATIENT)
Dept: GYNECOLOGY | Facility: CLINIC | Age: 49
End: 2024-03-06
Payer: COMMERCIAL

## 2024-03-06 DIAGNOSIS — N83.201 CYST OF RIGHT OVARY: Primary | ICD-10-CM

## 2024-03-06 DIAGNOSIS — N83.201 RIGHT OVARIAN CYST: Primary | ICD-10-CM

## 2024-03-06 PROCEDURE — 99213 OFFICE O/P EST LOW 20 MIN: CPT | Performed by: OBSTETRICS & GYNECOLOGY

## 2024-03-06 PROCEDURE — 76856 US EXAM PELVIC COMPLETE: CPT | Performed by: OBSTETRICS & GYNECOLOGY

## 2024-03-06 NOTE — PROGRESS NOTES
Patient is G0.  She presented to the emergency department on February 12 complaining of bilateral flank pain and abdominal pain.  She stated that the symptoms started a few days prior to her visit.  She had denied any burning, frequency or urgency with urination.  She denied any diarrhea or constipation.  She had some nausea with intermittent vomiting.  She denied any fever.  She stated that her menses have generally been regular up until this past month in which she had been experiencing some intermittent spotting.  CBC was slightly elevated at 10.24.  CT scan revealed a nonobstructing 2 mm left-sided intrarenal calculus without hydronephrosis.  Diverticulosis was noted.  There was a 4.6 cm simple cyst on the right ovary.  Of note is that patient also has chronic lower back issues.  She was placed on Provera 10 mg for 10 days and advised to return to the office for repeat vaginal ultrasound.  The pain has nearly completely subsided.  Transvaginal scan    AMB US Pelvic Non OB     Date/Time: 3/6/2024 4:00 PM     Performed by: Leidy Mitchell  Authorized by: Donovan Ware DO  Universal Protocol:  Consent: Verbal consent obtained.  Consent given by: patient  Patient understanding: patient states understanding of the procedure being performed  Patient identity confirmed: verbally with patient     Procedure details:     SIS Procedure: No    Indications: ovarian cysts      Technique:  US Pelvic, Non-OB with complete exam    Position: supine exam    Uterine findings:     Length (cm): 9.97    Height (cm):  4.43    Width (cm):  5.61    Endometrial stripe: identified      Endometrium thickness (mm):  6.01  Left ovary findings:     Left ovary:  Visualized    Length (cm): 4.79    Height (cm): 3.02    Width (cm): 3.62  Right ovary findings:     Right ovary:  Visualized    Length (cm): 5.41    Height (cm): 4.76    Width (cm): 3.58  Other findings:     Free pelvic fluid: not identified      Free peritoneal fluid: not  identified    Post-Procedure Details:     Impression:  Anteflexed uterus appears within normal limits. The bilateral ovaries each contain cyst. RT 4.8cm with septation and LT 2.4cm. No free fluid.    Tolerance:  Tolerated well, no immediate complications    Complications: no complications        Impression: Right ovarian cyst.    Plan: Will repeat ultrasound in 2 to 3 months.  Will check Ca 125

## 2024-03-07 ENCOUNTER — TELEMEDICINE (OUTPATIENT)
Dept: BEHAVIORAL/MENTAL HEALTH CLINIC | Facility: CLINIC | Age: 49
End: 2024-03-07
Payer: COMMERCIAL

## 2024-03-07 DIAGNOSIS — F33.9 MAJOR DEPRESSION, RECURRENT, CHRONIC (HCC): Primary | ICD-10-CM

## 2024-03-07 PROCEDURE — T1015 CLINIC SERVICE: HCPCS | Performed by: SOCIAL WORKER

## 2024-03-07 NOTE — PSYCH
"Behavioral Health Psychotherapy Progress Note    Psychotherapy Provided: Individual Psychotherapy     1. Major depression, recurrent, chronic (HCC)            Goals addressed in session: Goal 1 and Goal 2     DATA: The client reported continued issues with her physical health issues which has caused continued depression and anxiety. \" My ovaries are having cyst and I am getting my back nerve burned.. I am so depressed\" As team we explored and processed her continued physical health issues and the effects on her mental health. The client did report going out more with the help of her aides but is still experiencing anxiety. During the session we explored and processed the triggers to her anxiety and depression and reviewed her coping skills   During this session, this clinician used the following therapeutic modalities: Client-centered Therapy, Cognitive Behavioral Therapy, Mindfulness-based Strategies, Solution-Focused Therapy, and Supportive Psychotherapy    Substance Abuse was not addressed during this session. If the client is diagnosed with a co-occurring substance use disorder, please indicate any changes in the frequency or amount of use: n/a. Stage of change for addressing substance use diagnoses: No substance use/Not applicable    ASSESSMENT:  Cristy Garcia presents with a Anxious and Depressed mood.     her affect is Normal range and intensity, which is congruent, with her mood and the content of the session. The client has made progress on their goals.    The client  Cristy Garcia presents with a none risk of suicide, none risk of self-harm, and none risk of harm to others.    For any risk assessment that surpasses a \"low\" rating, a safety plan must be developed.    A safety plan was indicated: no  If yes, describe in detail n/a  The Client's MH therapy session was conducted virtually. The Client is aware that her encounter could be signed on the [facility]. The client was asked to sign these plans " before the next session.    PLAN: Between sessions, Cristy Garcia will practice her coping skills when presented with anxiety and or depression. At the next session, the therapist will use Client-centered Therapy, Cognitive Behavioral Therapy, Mindfulness-based Strategies, Solution-Focused Therapy, and Supportive Psychotherapy to address the client's MH issues affecting her different relationships and environments.    Behavioral Health Treatment Plan and Discharge Planning: Cristy Garcia is aware of and agrees to continue to work on their treatment plan. They have identified and are working toward their discharge goals. yes    Visit start and stop times:    03/07/24  Telemedicine consent    Patient: Cristy Garciamons  Provider: YONATAN Luu  Provider located at [address] [address]    The patient was identified by name and date of birth. Cristy Garcia was informed that this is a telemedicine visit and that the visit is being conducted through Telephone.  My office door was closed. No one else was in the room.  She acknowledged consent and understanding of privacy and security of the video platform. The patient has agreed to participate and understands they can discontinue the visit at any time.    Patient is aware this is a billable service.     I spent 20 minutes with the patient during this visit.      Start Time: 1005  Stop Time: 1025  Total Visit Time: 20 minutes

## 2024-03-12 ENCOUNTER — TELEPHONE (OUTPATIENT)
Dept: PAIN MEDICINE | Facility: CLINIC | Age: 49
End: 2024-03-12

## 2024-03-12 ENCOUNTER — HOSPITAL ENCOUNTER (OUTPATIENT)
Dept: INTERVENTIONAL RADIOLOGY/VASCULAR | Facility: HOSPITAL | Age: 49
Discharge: HOME | End: 2024-03-12
Attending: ANESTHESIOLOGY | Admitting: ANESTHESIOLOGY
Payer: COMMERCIAL

## 2024-03-12 VITALS
BODY MASS INDEX: 46.93 KG/M2 | HEIGHT: 62 IN | WEIGHT: 255 LBS | DIASTOLIC BLOOD PRESSURE: 84 MMHG | RESPIRATION RATE: 20 BRPM | SYSTOLIC BLOOD PRESSURE: 143 MMHG | OXYGEN SATURATION: 96 % | TEMPERATURE: 97.5 F | HEART RATE: 76 BPM

## 2024-03-12 DIAGNOSIS — M47.816 LUMBAR SPONDYLOSIS: ICD-10-CM

## 2024-03-12 LAB
EXT PREGNANCY TEST URINE: NEGATIVE
EXT. CONTROL: NORMAL
GLUCOSE SERPL-MCNC: 105 MG/DL (ref 65–140)

## 2024-03-12 PROCEDURE — 81025 URINE PREGNANCY TEST: CPT | Performed by: ANESTHESIOLOGY

## 2024-03-12 PROCEDURE — 64635 DESTROY LUMB/SAC FACET JNT: CPT | Performed by: ANESTHESIOLOGY

## 2024-03-12 PROCEDURE — 82948 REAGENT STRIP/BLOOD GLUCOSE: CPT

## 2024-03-12 PROCEDURE — 64636 DESTROY L/S FACET JNT ADDL: CPT | Performed by: ANESTHESIOLOGY

## 2024-03-12 RX ORDER — METHYLPREDNISOLONE ACETATE 80 MG/ML
INJECTION, SUSPENSION INTRA-ARTICULAR; INTRALESIONAL; INTRAMUSCULAR; PARENTERAL; SOFT TISSUE AS NEEDED
Status: COMPLETED | OUTPATIENT
Start: 2024-03-12 | End: 2024-03-12

## 2024-03-12 RX ORDER — LIDOCAINE HYDROCHLORIDE 20 MG/ML
INJECTION, SOLUTION EPIDURAL; INFILTRATION; INTRACAUDAL; PERINEURAL AS NEEDED
Status: COMPLETED | OUTPATIENT
Start: 2024-03-12 | End: 2024-03-12

## 2024-03-12 RX ORDER — BUPIVACAINE HCL/PF 2.5 MG/ML
VIAL (ML) INJECTION AS NEEDED
Status: COMPLETED | OUTPATIENT
Start: 2024-03-12 | End: 2024-03-12

## 2024-03-12 RX ORDER — LIDOCAINE HYDROCHLORIDE 10 MG/ML
INJECTION, SOLUTION EPIDURAL; INFILTRATION; INTRACAUDAL; PERINEURAL AS NEEDED
Status: COMPLETED | OUTPATIENT
Start: 2024-03-12 | End: 2024-03-12

## 2024-03-12 RX ADMIN — BUPIVACAINE HYDROCHLORIDE 5 ML: 2.5 INJECTION, SOLUTION EPIDURAL; INFILTRATION; INTRACAUDAL; PERINEURAL at 07:51

## 2024-03-12 RX ADMIN — METHYLPREDNISOLONE ACETATE 80 MG: 80 INJECTION, SUSPENSION INTRA-ARTICULAR; INTRALESIONAL; INTRAMUSCULAR; SOFT TISSUE at 07:52

## 2024-03-12 RX ADMIN — LIDOCAINE HYDROCHLORIDE 10 ML: 10 INJECTION, SOLUTION EPIDURAL; INFILTRATION; INTRACAUDAL; PERINEURAL at 07:51

## 2024-03-12 RX ADMIN — LIDOCAINE HYDROCHLORIDE 3 ML: 20 INJECTION, SOLUTION EPIDURAL; INFILTRATION; INTRACAUDAL; PERINEURAL at 07:51

## 2024-03-12 NOTE — INTERVAL H&P NOTE
H&P reviewed. After examining the patient I find no changes in the patients condition since the H&P had been written.    Vitals:    03/12/24 0718   BP: 134/90   Pulse: 86   Resp: 20   Temp: 97.5 °F (36.4 °C)   SpO2: 98%

## 2024-03-12 NOTE — DISCHARGE INSTR - AVS FIRST PAGE
YOUR 2 WEEK FOLLOW UP HAS BEEN SCHEDULED; IF YOU WISH TO CHANGE THE FOLLOW UP, PLEASE CALL THE [facility] AT [place]: [phone]    Lumbar Radiofrequency Ablation   WHAT YOU NEED TO KNOW:   Lumbar radiofrequency ablation (RFA) is a procedure used to treat facet joint pain in your lower back. Facet joints are found at the back of each vertebra. A needle electrode is used to send electrical currents to the nerves in your facet joint. The electrical currents create heat that damages the nerve so it cannot send pain signals.   DISCHARGE INSTRUCTIONS:   Seek care immediately if:   You cannot move your leg.    You cannot control your urine or bowel movements.    You have severe pain in your lower back.    Call your doctor if:   You have leg weakness.     You develop new symptoms.     You have questions or concerns about your condition or care.    Medicines:   Pain medicine  may be given. Ask how to take this medicine safely.    Take your medicine as directed.  Contact your healthcare provider if you think your medicine is not helping or if you have side effects. Tell your provider if you are allergic to any medicine. Keep a list of the medicines, vitamins, and herbs you take. Include the amounts, and when and why you take them. Bring the list or the pill bottles to follow-up visits. Carry your medicine list with you in case of an emergency.    Activity:  Do not drive a car or operate machinery within 24 hours after your procedure. Ask your healthcare provider about any other activities you should avoid.  Follow up with your doctor as directed:  Write down your questions so you remember to ask them during your visits.    [facility] NEUROTOMY DISCHARGE INSTRUCTIONS      ACTIVITY  Do not drive or operate machinery today.  No strenuous activity today - bending, lifting, etc.  You may show today, but do not sit in a tub of water.  Resume normal activities tomorrow as tolerated.    CARE OF  THE INJECTION SITE  If you have soreness or pain, apply ice to the area today (20 minute on/20 minutes off).  Starting tomorrow, you may use warm, moist heat or ice if needed.  Notify the [facility] if you have any of the following: redness, drainage, swelling or fever above 100°F.    SPECIAL INSTRUCTIONS  Our office will call you tomorrow for a progress report and make an appointment for a follow-up visit in 4 weeks.  If you feel a sunburn-like sensation in the area of your procedure, call our office.    MEDICATIONS  Continue to take all routine medications.  Our office may have instructed you to hold some medications.  You may resume _______________________________________________.    If you have any problems specifically related to your procedure, please call our office at [phone]. Problems not related to your procedure should be directed at your primary care physician.

## 2024-03-14 ENCOUNTER — APPOINTMENT (OUTPATIENT)
Dept: LAB | Facility: HOSPITAL | Age: 49
End: 2024-03-14
Payer: COMMERCIAL

## 2024-03-14 ENCOUNTER — TELEMEDICINE (OUTPATIENT)
Dept: BEHAVIORAL/MENTAL HEALTH CLINIC | Facility: CLINIC | Age: 49
End: 2024-03-14
Payer: COMMERCIAL

## 2024-03-14 DIAGNOSIS — F33.9 MAJOR DEPRESSION, RECURRENT, CHRONIC (HCC): Primary | ICD-10-CM

## 2024-03-14 DIAGNOSIS — N83.201 CYST OF RIGHT OVARY: ICD-10-CM

## 2024-03-14 LAB — CANCER AG125 SERPL-ACNC: 7.1 U/ML (ref 0–35)

## 2024-03-14 PROCEDURE — 86304 IMMUNOASSAY TUMOR CA 125: CPT

## 2024-03-14 PROCEDURE — T1015 CLINIC SERVICE: HCPCS | Performed by: SOCIAL WORKER

## 2024-03-14 PROCEDURE — 36415 COLL VENOUS BLD VENIPUNCTURE: CPT

## 2024-03-14 NOTE — PSYCH
"Behavioral Health Psychotherapy Progress Note    Psychotherapy Provided: Individual Psychotherapy     1. Major depression, recurrent, chronic (HCC)            Goals addressed in session: Goal 1 and Goal 2     DATA: The client reported` continued anxiety and depression due to her physical health. She reported that her pain has been causing problems with her mobility. During the session we explored and processed her cognitive distortions and the effects on her different relationships and enviroments. Coping skills were reviewed.   During this session, this clinician used the following therapeutic modalities: Client-centered Therapy, Cognitive Behavioral Therapy, Mindfulness-based Strategies, Solution-Focused Therapy, and Supportive Psychotherapy    Substance Abuse was not addressed during this session. If the client is diagnosed with a co-occurring substance use disorder, please indicate any changes in the frequency or amount of use: n/a. Stage of change for addressing substance use diagnoses: No substance use/Not applicable    ASSESSMENT:  Cristy Garcia presents with a Anxious and Depressed mood.     her affect is Normal range and intensity, which is congruent, with her mood and the content of the session. The client has made progress on their goals.    The client  Cristy Garcia presents with a none risk of suicide, none risk of self-harm, and none risk of harm to others.    For any risk assessment that surpasses a \"low\" rating, a safety plan must be developed.    A safety plan was indicated: no  If yes, describe in detail n/a    PLAN: Between sessions, Cristy Garcia will utilize her coping skills when presented with anxiety and or depression. At the next session, the therapist will use Cognitive Behavioral Therapy, Mindfulness-based Strategies, Solution-Focused Therapy, and Supportive Psychotherapy to address the client's weaknesses when presented with anxiety and or depression.    Behavioral Health Treatment Plan " and Discharge Planning: Forrestkailee Garcia is aware of and agrees to continue to work on their treatment plan. They have identified and are working toward their discharge goals. yes    Visit start and stop times:    03/14/24  Start Time: 1310  Stop Time: 1330  Total Visit Time: 20 minutes    Telemedicine consent    Patient: Cirsty Garciamons  Provider: YONATAN Luu  Provider located at [address] [address]    The patient was identified by name and date of birth. Cristy Garcia was informed that this is a telemedicine visit and that the visit is being conducted through Telephone.  My office door was closed. No one else was in the room.  She acknowledged consent and understanding of privacy and security of the video platform. The patient has agreed to participate and understands they can discontinue the visit at any time.    Patient is aware this is a billable service.     I spent 20 minutes with the patient during this visit.

## 2024-03-15 DIAGNOSIS — Z00.6 ENCOUNTER FOR EXAMINATION FOR NORMAL COMPARISON OR CONTROL IN CLINICAL RESEARCH PROGRAM: ICD-10-CM

## 2024-03-19 ENCOUNTER — ANESTHESIA EVENT (OUTPATIENT)
Dept: ANESTHESIOLOGY | Facility: HOSPITAL | Age: 49
End: 2024-03-19

## 2024-03-19 ENCOUNTER — ANESTHESIA (OUTPATIENT)
Dept: ANESTHESIOLOGY | Facility: HOSPITAL | Age: 49
End: 2024-03-19

## 2024-03-19 NOTE — ANESTHESIA PREPROCEDURE EVALUATION
Procedure:  PRE-OP ONLY    Relevant Problems   CARDIO   (+) Hyperlipidemia   (+) Hypertension      GI/HEPATIC   (+) Fatty liver   (+) GERD (gastroesophageal reflux disease)      MUSCULOSKELETAL   (+) Chronic bilateral low back pain without sciatica   (+) Fibromyalgia, primary   (+) Lumbar spondylosis   (+) Primary osteoarthritis involving multiple joints   (+) Primary osteoarthritis of both hips      NEURO/PSYCH   (+) Anxiety associated with depression   (+) Chronic bilateral low back pain without sciatica   (+) Chronic pain disorder   (+) Fibromyalgia, primary   (+) Major depression, recurrent, chronic (HCC)      PULMONARY   (+) Mild persistent asthma without complication   (+) VICTOR HUGO on CPAP   (+) SOB (shortness of breath)      Other   (+) Morbid obesity with body mass index (BMI) of 45.0 to 49.9 in adult (HCC)        Physical Exam    Airway    Mallampati score: II  TM Distance: >3 FB  Neck ROM: full     Dental       Cardiovascular      Pulmonary      Other Findings  post-pubertal.      Anesthesia Plan  ASA Score- 3     Anesthesia Type- IV sedation with anesthesia with ASA Monitors.         Additional Monitors:     Airway Plan:            Plan Factors-    Chart reviewed.                      Induction- intravenous.    Postoperative Plan-     Informed Consent- Anesthetic plan and risks discussed with patient.  I personally reviewed this patient with the CRNA. Discussed and agreed on the Anesthesia Plan with the CRNA..

## 2024-03-20 ENCOUNTER — ANESTHESIA (OUTPATIENT)
Dept: PERIOP | Facility: HOSPITAL | Age: 49
End: 2024-03-20

## 2024-03-20 ENCOUNTER — HOSPITAL ENCOUNTER (OUTPATIENT)
Dept: PERIOP | Facility: HOSPITAL | Age: 49
Setting detail: OUTPATIENT SURGERY
Discharge: HOME | End: 2024-03-20
Attending: INTERNAL MEDICINE
Payer: COMMERCIAL

## 2024-03-20 ENCOUNTER — ANESTHESIA EVENT (OUTPATIENT)
Dept: PERIOP | Facility: HOSPITAL | Age: 49
End: 2024-03-20

## 2024-03-20 VITALS
RESPIRATION RATE: 20 BRPM | DIASTOLIC BLOOD PRESSURE: 64 MMHG | TEMPERATURE: 97 F | BODY MASS INDEX: 46.93 KG/M2 | WEIGHT: 255 LBS | OXYGEN SATURATION: 96 % | SYSTOLIC BLOOD PRESSURE: 117 MMHG | HEIGHT: 62 IN | HEART RATE: 68 BPM

## 2024-03-20 DIAGNOSIS — K20.90 ESOPHAGITIS DETERMINED BY ENDOSCOPY: ICD-10-CM

## 2024-03-20 LAB
EXT PREGNANCY TEST URINE: NEGATIVE
EXT. CONTROL: NORMAL
GLUCOSE SERPL-MCNC: 129 MG/DL (ref 65–140)

## 2024-03-20 PROCEDURE — 82948 REAGENT STRIP/BLOOD GLUCOSE: CPT

## 2024-03-20 PROCEDURE — 88305 TISSUE EXAM BY PATHOLOGIST: CPT | Performed by: PATHOLOGY

## 2024-03-20 PROCEDURE — 81025 URINE PREGNANCY TEST: CPT | Performed by: STUDENT IN AN ORGANIZED HEALTH CARE EDUCATION/TRAINING PROGRAM

## 2024-03-20 RX ORDER — SODIUM CHLORIDE, SODIUM LACTATE, POTASSIUM CHLORIDE, CALCIUM CHLORIDE 600; 310; 30; 20 MG/100ML; MG/100ML; MG/100ML; MG/100ML
INJECTION, SOLUTION INTRAVENOUS CONTINUOUS PRN
Status: DISCONTINUED | OUTPATIENT
Start: 2024-03-20 | End: 2024-03-20

## 2024-03-20 RX ORDER — PROPOFOL 10 MG/ML
INJECTION, EMULSION INTRAVENOUS AS NEEDED
Status: DISCONTINUED | OUTPATIENT
Start: 2024-03-20 | End: 2024-03-20

## 2024-03-20 RX ORDER — LIDOCAINE HYDROCHLORIDE 20 MG/ML
INJECTION, SOLUTION EPIDURAL; INFILTRATION; INTRACAUDAL; PERINEURAL AS NEEDED
Status: DISCONTINUED | OUTPATIENT
Start: 2024-03-20 | End: 2024-03-20

## 2024-03-20 RX ORDER — ONDANSETRON 2 MG/ML
4 INJECTION INTRAMUSCULAR; INTRAVENOUS ONCE AS NEEDED
Status: CANCELLED | OUTPATIENT
Start: 2024-03-20

## 2024-03-20 RX ADMIN — PROPOFOL 130 MG: 10 INJECTION, EMULSION INTRAVENOUS at 07:29

## 2024-03-20 RX ADMIN — PROPOFOL 50 MG: 10 INJECTION, EMULSION INTRAVENOUS at 07:33

## 2024-03-20 RX ADMIN — LIDOCAINE HYDROCHLORIDE 100 MG: 20 INJECTION, SOLUTION EPIDURAL; INFILTRATION; INTRACAUDAL at 07:29

## 2024-03-20 RX ADMIN — PROPOFOL 50 MG: 10 INJECTION, EMULSION INTRAVENOUS at 07:31

## 2024-03-20 RX ADMIN — SODIUM CHLORIDE, SODIUM LACTATE, POTASSIUM CHLORIDE, AND CALCIUM CHLORIDE: .6; .31; .03; .02 INJECTION, SOLUTION INTRAVENOUS at 07:29

## 2024-03-20 NOTE — ANESTHESIA PREPROCEDURE EVALUATION
Procedure:  EGD    Relevant Problems   CARDIO   (+) Hyperlipidemia   (+) Hypertension      GI/HEPATIC   (+) Fatty liver   (+) GERD (gastroesophageal reflux disease)      MUSCULOSKELETAL   (+) Chronic bilateral low back pain without sciatica   (+) Fibromyalgia, primary   (+) Lumbar spondylosis   (+) Primary osteoarthritis involving multiple joints   (+) Primary osteoarthritis of both hips      NEURO/PSYCH   (+) Anxiety associated with depression   (+) Chronic bilateral low back pain without sciatica   (+) Chronic pain disorder   (+) Fibromyalgia, primary   (+) Major depression, recurrent, chronic (HCC)      PULMONARY   (+) Mild persistent asthma without complication   (+) VICTOR HUGO on CPAP   (+) SOB (shortness of breath)        Physical Exam    Airway    Mallampati score: II  TM Distance: >3 FB  Neck ROM: full     Dental       Cardiovascular      Pulmonary      Other Findings  post-pubertal.      Anesthesia Plan  ASA Score- 3     Anesthesia Type- IV sedation with anesthesia with ASA Monitors.         Additional Monitors:     Airway Plan:            Plan Factors-    Chart reviewed.                      Induction- intravenous.    Postoperative Plan-     Informed Consent- Anesthetic plan and risks discussed with patient.  I personally reviewed this patient with the CRNA. Discussed and agreed on the Anesthesia Plan with the CRNA..

## 2024-03-20 NOTE — ANESTHESIA POSTPROCEDURE EVALUATION
Post-Op Assessment Note    CV Status:  Stable  Pain Score: 0    Pain management: adequate       Hydration Status:  Euvolemic   PONV Controlled:  Controlled   Airway Patency:  Patent     Post Op Vitals Reviewed: Yes    No anethesia notable event occurred.    Staff: CRNA               BP   111/61   Temp   97.2   Pulse  74   Resp   14   SpO2   99

## 2024-03-21 ENCOUNTER — TELEMEDICINE (OUTPATIENT)
Dept: BEHAVIORAL/MENTAL HEALTH CLINIC | Facility: CLINIC | Age: 49
End: 2024-03-21
Payer: COMMERCIAL

## 2024-03-21 DIAGNOSIS — F33.9 MAJOR DEPRESSION, RECURRENT, CHRONIC (HCC): Primary | ICD-10-CM

## 2024-03-21 PROCEDURE — T1015 CLINIC SERVICE: HCPCS | Performed by: SOCIAL WORKER

## 2024-03-22 DIAGNOSIS — K21.00 GASTROESOPHAGEAL REFLUX DISEASE WITH ESOPHAGITIS WITHOUT HEMORRHAGE: ICD-10-CM

## 2024-03-22 DIAGNOSIS — K20.90 ESOPHAGITIS DETERMINED BY ENDOSCOPY: ICD-10-CM

## 2024-03-22 PROCEDURE — 88305 TISSUE EXAM BY PATHOLOGIST: CPT | Performed by: PATHOLOGY

## 2024-03-22 RX ORDER — OMEPRAZOLE 40 MG/1
40 CAPSULE, DELAYED RELEASE ORAL
Qty: 90 CAPSULE | Refills: 3 | Status: SHIPPED | OUTPATIENT
Start: 2024-03-22

## 2024-03-22 NOTE — RESULT ENCOUNTER NOTE
Please call the patient with the results    Please let her know that the eosinophil levels are improved.  She can decrease omeprazole to 40 mg once daily.  The polyps removed from the stomach are called hyperplastic polyps.  These can sometimes be precancerous polyps of the stomach.  There was no evidence of cancerous changes.  I recommend repeating her EGD in 1 year to remove additional polyps

## 2024-03-25 ENCOUNTER — OFFICE VISIT (OUTPATIENT)
Dept: GASTROENTEROLOGY | Facility: CLINIC | Age: 49
End: 2024-03-25
Payer: COMMERCIAL

## 2024-03-25 VITALS
WEIGHT: 253 LBS | OXYGEN SATURATION: 97 % | HEART RATE: 79 BPM | BODY MASS INDEX: 46.56 KG/M2 | DIASTOLIC BLOOD PRESSURE: 78 MMHG | HEIGHT: 62 IN | TEMPERATURE: 97.9 F | SYSTOLIC BLOOD PRESSURE: 122 MMHG

## 2024-03-25 DIAGNOSIS — R71.8 MICROCYTOSIS: ICD-10-CM

## 2024-03-25 DIAGNOSIS — K76.0 HEPATIC STEATOSIS: ICD-10-CM

## 2024-03-25 DIAGNOSIS — K31.7 GASTRIC POLYPS: ICD-10-CM

## 2024-03-25 DIAGNOSIS — R11.0 NAUSEA: ICD-10-CM

## 2024-03-25 DIAGNOSIS — K21.00 GASTROESOPHAGEAL REFLUX DISEASE WITH ESOPHAGITIS WITHOUT HEMORRHAGE: Primary | ICD-10-CM

## 2024-03-25 DIAGNOSIS — Z12.11 SCREENING FOR COLON CANCER: ICD-10-CM

## 2024-03-25 DIAGNOSIS — R13.10 DYSPHAGIA, UNSPECIFIED TYPE: ICD-10-CM

## 2024-03-25 PROCEDURE — 99214 OFFICE O/P EST MOD 30 MIN: CPT | Performed by: PHYSICIAN ASSISTANT

## 2024-03-25 RX ORDER — ONDANSETRON 4 MG/1
4 TABLET, FILM COATED ORAL EVERY 8 HOURS PRN
Qty: 30 TABLET | Refills: 5 | Status: SHIPPED | OUTPATIENT
Start: 2024-03-25

## 2024-03-25 NOTE — PROGRESS NOTES
[facility] - Outpatient Follow-up Note  Cristy Garcia 48 y.o. female MRN: [id number]  Encounter: [id number]    ASSESSMENT AND PLAN:      1. Gastroesophageal reflux disease with esophagitis without hemorrhage  2. Dysphagia, unspecified type    Patient with a history of intermittent solid food dysphagia for which she underwent endoscopic evaluation.  Her initial complete EGD in 09/2023 demonstrated LA grade D esophagitis.  PPI therapy was escalated and a repeat EGD in 03/2024 demonstrated healing of esophagitis, 3 cm hiatal hernia, and a nonobstructing Schatzki ring.    Patient feels well on once daily PPI at this time.  She will also continue her nightly H2 RA which has been working well.  Continue with diet and lifestyle modifications for GERD.  Continue with small frequent meals.    No additional investigation into dysphagia at present as her symptoms have abated.    3. Gastric polyps    Noted, small subcentimeter polyps in the fundus of the stomach were noted to be hyperplastic polyps.  Recommend repeat EGD in 1 year for surveillance purposes.    4. Nausea    Patient notes intermittent a.m. nausea, not necessarily triggered by anything in particular.  She does have underlying DM, she may have a dysmotility component to her symptoms.  She more so attributes the onset of nausea to dealing with lower abdominal pain secondary to a ovarian cyst.  Recommend PRN usage of anti-emetics at this time, as this has been working well for pt.   We did review additional investigation with [facility], pt politely declines at this time as she would like to conitnue  to monitor her symptoms for now, keep an eye on any exacerbating/alleviating factors, etc.     - ondansetron (ZOFRAN) 4 mg tablet; Take 1 tablet (4 mg total) by mouth every 8 (eight) hours as needed for nausea or vomiting  Dispense: 30 tablet; Refill: 5    5. Hepatic steatosis    Suspect NAFLD/metabolic etiol. Negative infectious hepatitis panel.    Elastography with F0-F1, S0 in 02/2024.   LFTs wnl in 07/2023.     Discussed recommendations in regards to fatty liver including:   Strict control of contributing comorbidities (obesity, prediabetes/diabetes, hypertension, and hypertriglyceridemia).  Weight loss of approx 10-15% of patient's current body weight over a period of 6-12 months through low fat diet and cardiovascular exercise as tolerated.   Limiting alcohol consumption, preferably complete abstinence.  Monitor hepatic function every 6 months with routine labs.     6. Screening for colon cancer    Average risk, no family history of CRC.  Negative Cologuard in 12/2022.  Repeat stool testing for colon cancer screening will be due in 12/2025, versus consideration of a colonoscopy at that time.    7. Microcytosis     Microcytosis without anemia.  We will check iron panel now.  No overt GI bleeding.    We will follow up in 6 months to reassess symptoms.   ______________________________________________________________________    SUBJECTIVE: Patient is a 48 y.o. female who presents today for follow-up regarding esophagitis. Pmhx sig for sig for DM2, GERD, hepatic steatosis, asthma, chronic pain disorder, fibromyalgia, MDD. BMI 46.     Pt was last evaluated in clinic in 10/2023. She had EGD attempted for dysphagia, and required repeat attempt initially given retained food in stomach. Second scope from 09/2023 with LA Grade D esophagitis, and had PPI increased to BID dosing. Repeat EGD in 03/2024 showed healing of esophagitis, gastric polyps which were hyperplastic.     03/25/24:     Pt is here today with caregiver Kristina.     Feels well overall. She is currently on daily PPI. No heartburn, indigestion, emesis, dysphagia or odynophagia. No early satiety. Is having some AM nausea, a few days out of the week. Notes upon awakening, can eat a small meal such as oatmeal and her nausea resolves. No recurrence throughout the day. No unintentional weight loss over past 6  months.     Pertaining to bowels, patient is having formed brown stool just about daily.  No BRBPR or melena.  No abdominal pain or rectal pain related to defecation.  No nocturnal BMs. No family hx of CRC.     NSAIDs: celebrex daily, no additional NSAIDs.   Acetaminophen: none   Etoh: none     02/2024: BUN 12, Cr 0.55, AST 15, ALT 20, ALP 40, albumin 4.3, t bili 0.81   01/2023: Hep B surface Ag non-reactive, Hep B surface Abs < 3.10, Hep B Core total Abs non-reactive, Hep A total AB non-reactive, Hep C Ab non-reactive, INR 0.92, AST 22, ALT 33, ALP 39, albumin 4.5, T. bili 0.6  01/2023: Elastography: F0-F1, S3     12/2022: negative cologuard, TSAT 21, TIBC 439, iron 90, ferritin 37  09/2022: Hb 16.4, Hct 47.1, Plt 287, MCV 88, BUN 12, Cr 0.75, AST 50, ALT 91, ALP 49, albumin 3.9, t bili 0.80, TSH 0.857  07/2021: AST 31, ALT 74, ALP 44, albumin 3.5, t bili 0.48, Hep C abs non-reactive    Endoscopic history:   EGD: 05/2023: exam limited by large amt of food in stomach  EGD: 09/2023: 3 cm hiatal hernia - GE junction 35 cm from the incisors, diaphragmatic impression 38 cm from the incisors; Moderate, generalized abnormal mucosa with plaque in the upper third of the esophagus, middle third of the esophagus and lower third of the esophagus; Grade D esophagitis with mucosal breaks measuring 5 mm or more, Multiple polyps measuring 5-9 mm in the stomach   A. Stomach, cold fcp biopsy:  Chronic inactive oxyntic and antral gastritis.  Negative for Helicobacter pylori, by H&E stain.  Negative for intestinal metaplasia, dysplasia or carcinoma.  B. Esophagus, cold fcp bx distal:  Esophageal squamous mucosa with intraepithelial eosinophils (up to 90 per high powered field) with significant intercellular edema and eosinophilic microabscesses.  C. Stomach, cold fcp bx gastric polyp:  Hyperplastic polyp. Negative for dysplasia.   D. Esophagus, cold fcp bx proximal: Esophageal squamous mucosa with intraepithelial eosinophils (up to 40  5 per high powered field) with significant intercellular edema and eosinophilic microabscesses  EGD: 03/2024: 3 cm hiatal hernia; Schatzki ring in GEJ; Multiple subcentimeter polyps in the fundus of the stomach   A. Esophagus, Distal, Biopsy: Squamous mucosa with no specific pathologic change.  B. Esophagus, Proximal, Biopsy: Squamous epithelium with no specific pathologic change.  C. Stomach (Polyp x 4), Biopsy: Hyperplastic polyps  Colon: none    Review of Systems   Otherwise Per HPI    Historical Information   Past Medical History:   Diagnosis Date    Allergic     Seasonal Allergies    Alopecia of scalp     Treated with Proscar    Asthma     Chronic pain disorder     back, bilat knees, bilat hips    CPAP (continuous positive airway pressure) dependence     Diabetes mellitus (HCC)     Fibromyalgia, primary     GERD (gastroesophageal reflux disease)     Hip fx, left, closed, with routine healing, subsequent encounter 05/2017    Hyperlipidemia     Hypertension     Insomnia     Irregular heartbeat     VICTOR HUGO on CPAP      Past Surgical History:   Procedure Laterality Date    CARPAL TUNNEL RELEASE Left     IR SPINE AND PAIN PROCEDURE  3/12/2024    LUMBAR EPIDURAL INJECTION      NERVE BLOCK Bilateral 12/29/2020    Procedure: L2 L3 L4 L5 MEDIAL BRANCH BLOCK #1;  Surgeon: Westley Keller MD;  Location: [facility];  Service: Pain Management     NERVE BLOCK Bilateral 1/19/2021    Procedure: L2 L3 L4 L5 MEDIAL BRANCH BLOCK #2;  Surgeon: Westley Keller MD;  Location: [facility];  Service: Pain Management     SD ARTHROCENTESIS ASPIR&/INJ MAJOR JT/BURSA W/O US Bilateral 2/7/2023    Procedure: INJECTION JOINT HIP;  Surgeon: Westley Keller MD;  Location: [facility];  Service: Pain Management     SD HYSTEROSCOPY BX ENDOMETRIUM&/POLYPC W/WO D&C N/A 4/25/2023    Procedure: HYSTEROSCOPY W/  RESECTION UTERINE TUMOR/FIBROID (POLYPECTOMY );  Surgeon: Donovan Ware DO;  Location: [facility];  Service: Gynecology    RADIOFREQUENCY ABLATION  Right 2/25/2021    Procedure: L2 L3 L4 L5 RADIO FREQUENCY ABLATION right side;  Surgeon: Westley Keller MD;  [facility]: [facility];  Service: Pain Management     RADIOFREQUENCY ABLATION Left 3/16/2021    Procedure: L2 L3 L4 L5 RADIO FREQUENCY ABLATION;  Surgeon: Westley Keller MD;  [facility]: [facility];  Service: Pain Management     RHIZOTOMY Bilateral 4/4/2023    Procedure: RHIZOTOMY LUMBAR L3, L4, L5 medial branch nerves;  Surgeon: Westley Keller MD;  Location: [facility];  Service: Pain Management     UPPER GASTROINTESTINAL ENDOSCOPY      US GUIDED THYROID BIOPSY  7/14/2023     Social History   Social History     Substance and Sexual Activity   Alcohol Use No     Social History     Substance and Sexual Activity   Drug Use No     Social History     Tobacco Use   Smoking Status Former   Smokeless Tobacco Never     Family History   Problem Relation Age of Onset    Cancer Father     Hepatitis Father     Asthma Cousin     Hypertension Paternal Grandmother     No Known Problems Mother     No Known Problems Sister     No Known Problems Maternal Grandmother     No Known Problems Maternal Grandfather     No Known Problems Paternal Grandfather     No Known Problems Paternal Aunt     BRCA2 Positive Neg Hx     BRCA2 Negative Neg Hx     BRCA1 Positive Neg Hx     BRCA1 Negative Neg Hx     BRCA 1/2 Neg Hx     Ovarian cancer Neg Hx     Endometrial cancer Neg Hx     Colon cancer Neg Hx     Breast cancer additional onset Neg Hx     Breast cancer Neg Hx      Meds/Allergies       Current Outpatient Medications:     acetaminophen (TYLENOL) 500 mg tablet    albuterol (Ventolin HFA) 90 mcg/act inhaler    atorvastatin (LIPITOR) 20 mg tablet    celecoxib (CeleBREX) 200 mg capsule    cetirizine (ZyrTEC) 10 mg tablet    clotrimazole-betamethasone (LOTRISONE) 1-0.05 % cream    Empagliflozin (Jardiance) 25 MG TABS    ergocalciferol (VITAMIN D2) 50,000 units    famotidine (PEPCID) 40 MG tablet    fluconazole (DIFLUCAN) 150 mg tablet     "fluticasone (FLONASE) 50 mcg/act nasal spray    Fluticasone-Salmeterol (Advair Diskus) 250-50 mcg/dose inhaler    gabapentin (NEURONTIN) 300 mg capsule    glipiZIDE (GLUCOTROL) 10 mg tablet    glucose blood test strip    Lancets (onetouch ultrasoft) lancets    lisinopril (ZESTRIL) 10 mg tablet    metFORMIN (GLUCOPHAGE) 1000 MG tablet    omeprazole (PriLOSEC) 40 MG capsule    ondansetron (ZOFRAN) 4 mg tablet    sertraline (ZOLOFT) 50 mg tablet    triamcinolone (KENALOG) 0.025 % cream    medroxyPROGESTERone (PROVERA) 10 mg tablet    Allergies   Allergen Reactions    Trulicity [Dulaglutide] Swelling    Tdap [Tetanus-Diphth-Acell Pertussis] Rash     Objective     Blood pressure 122/78, pulse 79, temperature 97.9 °F (36.6 °C), temperature source Temporal, height 5' 2\" (1.575 m), weight 115 kg (253 lb), last menstrual period 12/01/2023, SpO2 97%. Body mass index is 46.27 kg/m².    Physical Exam  Vitals and nursing note reviewed.   Constitutional:       General: She is not in acute distress.     Appearance: She is well-developed.   HENT:      Head: Normocephalic and atraumatic.   Eyes:      General: No scleral icterus.     Conjunctiva/sclera: Conjunctivae normal.   Cardiovascular:      Rate and Rhythm: Normal rate.   Pulmonary:      Effort: Pulmonary effort is normal. No respiratory distress.   Abdominal:      General: Bowel sounds are normal. There is no distension.      Palpations: Abdomen is soft.      Tenderness: There is no abdominal tenderness. There is no guarding or rebound.   Musculoskeletal:         General: No swelling.   Skin:     General: Skin is warm and dry.      Coloration: Skin is not jaundiced.   Neurological:      General: No focal deficit present.      Mental Status: She is alert and oriented to person, place, and time.   Psychiatric:         Mood and Affect: Mood normal.         Behavior: Behavior normal.       Lab Results:   No visits with results within 1 Day(s) from this visit.   Latest known visit " with results is:   Hospital Outpatient Visit on 03/20/2024   Component Date Value    EXT Preg Test, Ur 03/20/2024 Negative     Control 03/20/2024 Valid     POC Glucose 03/20/2024 129     Case Report 03/20/2024                      Value:Surgical Pathology Report                         Case: [id number]                                  Authorizing Provider:  Diana M Jaiyeola, MD       Collected:           03/20/2024 0731              Ordering Location:     [facility] Received:            03/20/2024 1042                                     Operating Room                                                               Pathologist:           Jenni Tee DO                                                     Specimens:   A) - Esophagus, cold fcp bx hx of EOE-distal                                                        B) - Esophagus, cold fcp bx hx of EOE-proximal                                                      C) - Stomach, cold snare of fundic gland polyp x4                                          Final Diagnosis 03/20/2024                      Value:This result contains rich text formatting which cannot be displayed here.    Note 03/20/2024                      Value:This result contains rich text formatting which cannot be displayed here.    Additional Information 03/20/2024                      Value:This result contains rich text formatting which cannot be displayed here.    Gross Description 03/20/2024                      Value:This result contains rich text formatting which cannot be displayed here.     Radiology Results:   EGD    Result Date: 3/20/2024  Narrative: Table formatting from the original result was not included. [facility] Operating Room [address] [facility] [address] [phone] DATE OF SERVICE: 3/20/24 PHYSICIAN(S): Attending: Diana M Jaiyeola, MD Fellow: No Staff Documented INDICATION: Esophagitis determined by endoscopy POST-OP DIAGNOSIS: See the  impression below. PREPROCEDURE: Informed consent was obtained for the procedure, including sedation.  Risks of perforation, hemorrhage, adverse drug reaction and aspiration were discussed. The patient was placed in the left lateral decubitus position. Patient was explained about the risks and benefits of the procedure. Risks including but not limited to bleeding, infection, and perforation were explained in detail. Also explained about less than 100% sensitivity with the exam and other alternatives. PROCEDURE: EGD DETAILS OF PROCEDURE: Patient was taken to the procedure room where a time out was performed to confirm correct patient and correct procedure. The patient underwent monitored anesthesia care, which was administered by an anesthesia professional. The patient's blood pressure, heart rate, level of consciousness, respirations, oxygen, ECG and ETCO2 were monitored throughout the procedure. The scope was introduced through the mouth and advanced to the second part of the duodenum. Retroflexion was performed in the fundus. The patient experienced no blood loss. The procedure was not difficult. The patient tolerated the procedure well. There were no apparent adverse events. ANESTHESIA INFORMATION: ASA: III Anesthesia Type: IV Sedation with Anesthesia MEDICATIONS: No administrations occurring from 0706 to 0740 on 03/20/24 FINDINGS: Regular Z-line 35 cm from the incisors 3 cm hiatal hernia - GE junction 35 cm from the incisors, diaphragmatic impression 38 cm from the incisors Non-obstructing Schatzki ring in the GE junction The esophagus appeared normal. Performed random biopsy using biopsy forceps to rule out eosinophilic esophagitis. Multiple flat polyps measuring smaller than 5 mm in the fundus of the stomach; performed cold snare removal The duodenum appeared normal. SPECIMENS: ID Type Source Tests Collected by Time Destination 1 : cold fcp bx hx of EOE-distal Tissue Esophagus TISSUE EXAM Diana M Jaiyeola, MD  3/20/2024  7:31 AM  2 : cold fcp bx hx of EOE-proximal Tissue Esophagus TISSUE EXAM Diana M Jaiyeola, MD 3/20/2024  7:32 AM  3 : cold snare of fundic gland polyp x4 Tissue Stomach TISSUE EXAM Diana M Jaiyeola, MD 3/20/2024  7:36 AM      Impression: 3 cm hiatal hernia Schatzki ring in the GE junction The esophagus appeared normal. Performed random biopsy to rule out eosinophilic esophagitis. Multiple subcentimeter polyps in the fundus of the stomach were removed with cold snare The duodenum appeared normal. RECOMMENDATION:  Await pathology results  Follow up with GI Clinic  If eosinophil counts are improved decrease omeprazole to 40 mg once daily If eosinophil counts are elevated transition to swallowed budesonide    Diana M Jaiyeola, MD     IR spine and pain procedure    Result Date: 3/12/2024  Narrative: Pre-procedure Diagnosis: Lumbar facet arthropathy Post-procedure Diagnosis: Lumbar facet arthropathy Operation Title(s):  1. Radiofrequency ablation of [BILATERAL] L3, L4, L5 medial branch nerves     2. Intraoperative fluoroscopy Attending Surgeon:   Westley Keller MD Anesthesia:   Local Indications: The patient is a 48 y.o. year-old female with a diagnosis of lumbar facet arthropathy. The patient's history and physical exam were reviewed. The patient has previously undergone diagnostic and confirmatory lumbar medial branch blocks.  Fluoroscopy is being used for the precise placement of the needles at the lumbar medial branch nerves.  The risks, benefits and alternatives to the procedure were discussed, and all questions were answered to the patient's satisfaction. The patient agreed to proceed, and written informed consent was obtained. Procedure in Detail: The patient was brought into the procedure room and placed in the prone position on the fluoroscopy table. The low back and upper buttock were prepped with chloraprep times two and draped in a sterile manner. AP fluoroscopy was used to identify the lumbar  levels on the [LEFT] side. The fluoroscope beam was then obliqued to better visualize the junction of the superior articular process and transverse process on the [LEFT] side and then tilted caudally about 25 degrees. An 18-gauge, 150mm length, 10mm curved active tip radiofrequency probe was advanced toward the targeted points until bone was contacted. Multiple fluoroscopic views were made to ensure placement of the needle tip at the appropriate location of the medial branch nerve.  Motor stimulation was performed at 2 Hz and 1.2 volts generating a twitch in the paraspinal muscles with no motor activity in the lower extremities. Next, AP fluoroscopy was used to identify the L5-S1 level.  The fluoroscope been was tilted cephalad to visualize the sacral ala. The fluoroscope beam was then tilted about 45 degrees from that point and the skin and subcutaneous tissues in these identified areas were anesthetized with 1% lidocaine. An 18-gauge, 150mm length, 10mm curved active tip radiofrequency probe was advanced toward the targeted points until bone was contacted.  Motor stimulation was performed at 2 Hz and 1.2 volts generating a twitch in the paraspinal muscles with no motor activity in the lower extremities. 0.5ml of 2% lidocaine was injected prior to lesioning, which was performed for 90 seconds at 80 degrees centigrade. The lesion was repeated once more after slight repositioning of the needles on an oblique view.  Once the lesions were complete, 1ml of a solution consisting of 5mL 0.25% bupivacaine and 1 mL Depo-medrol (80mg/mL) was injected through each needle and then removed with a 1% lidocaine flush. The patient's back was cleaned, and bandages were placed at the needle insertion site. The same procedure was repeated at the lumbar levels on the [RIGHT] side Disposition: The patient tolerated the procedure well and there were no apparent complications. Vital signs remained stable throughout the procedure. The  patient was taken to the recovery area where written discharge instructions for the procedure were given. Estimated Blood Loss: None Specimens Obtained: N/A         Lela Jamison PA-C    **Please note:  Dictation voice to text software may have been used in the creation of this record.  Occasional wrong word or “sound alike” substitutions may have occurred due to the inherent limitations of voice recognition software.  Read the chart carefully and recognize, using context, where substitutions have occurred.**

## 2024-03-25 NOTE — PATIENT INSTRUCTIONS
Continue with daily omeprazole.   Continue with nightly famotidine.   Continue with small frequent meals.  Okay for zofran for nausea.   If nausea worsens, I would recommend gastric emptying study.     Repeat EGD In 1 year for finding of hyperplastic polyps.

## 2024-04-01 ENCOUNTER — HOSPITAL ENCOUNTER (EMERGENCY)
Facility: HOSPITAL | Age: 49
Discharge: HOME | End: 2024-04-01
Attending: EMERGENCY MEDICINE
Payer: COMMERCIAL

## 2024-04-01 ENCOUNTER — APPOINTMENT (EMERGENCY)
Dept: ULTRASOUND IMAGING | Facility: HOSPITAL | Age: 49
End: 2024-04-01
Payer: COMMERCIAL

## 2024-04-01 ENCOUNTER — NURSE TRIAGE (OUTPATIENT)
Age: 49
End: 2024-04-01

## 2024-04-01 VITALS
OXYGEN SATURATION: 96 % | SYSTOLIC BLOOD PRESSURE: 123 MMHG | DIASTOLIC BLOOD PRESSURE: 70 MMHG | RESPIRATION RATE: 18 BRPM | HEART RATE: 77 BPM | TEMPERATURE: 97.5 F

## 2024-04-01 DIAGNOSIS — N83.201 CYST OF RIGHT OVARY: Primary | ICD-10-CM

## 2024-04-01 LAB
ALBUMIN SERPL BCP-MCNC: 4.2 G/DL (ref 3.5–5)
ALP SERPL-CCNC: 44 U/L (ref 34–104)
ALT SERPL W P-5'-P-CCNC: 24 U/L (ref 7–52)
ANION GAP SERPL CALCULATED.3IONS-SCNC: 9 MMOL/L (ref 4–13)
AST SERPL W P-5'-P-CCNC: 15 U/L (ref 13–39)
BACTERIA UR QL AUTO: NORMAL /HPF
BASOPHILS # BLD AUTO: 0.12 THOUSANDS/ÂΜL (ref 0–0.1)
BASOPHILS NFR BLD AUTO: 1 % (ref 0–1)
BILIRUB SERPL-MCNC: 0.8 MG/DL (ref 0.2–1)
BILIRUB UR QL STRIP: NEGATIVE
BUN SERPL-MCNC: 13 MG/DL (ref 5–25)
CALCIUM SERPL-MCNC: 8.8 MG/DL (ref 8.4–10.2)
CHLORIDE SERPL-SCNC: 105 MMOL/L (ref 96–108)
CLARITY UR: CLEAR
CO2 SERPL-SCNC: 22 MMOL/L (ref 21–32)
COLOR UR: YELLOW
CREAT SERPL-MCNC: 0.61 MG/DL (ref 0.6–1.3)
EOSINOPHIL # BLD AUTO: 0.31 THOUSAND/ÂΜL (ref 0–0.61)
EOSINOPHIL NFR BLD AUTO: 3 % (ref 0–6)
ERYTHROCYTE [DISTWIDTH] IN BLOOD BY AUTOMATED COUNT: 16 % (ref 11.6–15.1)
GFR SERPL CREATININE-BSD FRML MDRD: 106 ML/MIN/1.73SQ M
GLUCOSE SERPL-MCNC: 200 MG/DL (ref 65–140)
GLUCOSE UR STRIP-MCNC: ABNORMAL MG/DL
HCT VFR BLD AUTO: 47.6 % (ref 34.8–46.1)
HGB BLD-MCNC: 14.3 G/DL (ref 11.5–15.4)
HGB UR QL STRIP.AUTO: ABNORMAL
IMM GRANULOCYTES # BLD AUTO: 0.04 THOUSAND/UL (ref 0–0.2)
IMM GRANULOCYTES NFR BLD AUTO: 0 % (ref 0–2)
KETONES UR STRIP-MCNC: NEGATIVE MG/DL
LEUKOCYTE ESTERASE UR QL STRIP: ABNORMAL
LYMPHOCYTES # BLD AUTO: 2.97 THOUSANDS/ÂΜL (ref 0.6–4.47)
LYMPHOCYTES NFR BLD AUTO: 29 % (ref 14–44)
MCH RBC QN AUTO: 23.8 PG (ref 26.8–34.3)
MCHC RBC AUTO-ENTMCNC: 30 G/DL (ref 31.4–37.4)
MCV RBC AUTO: 79 FL (ref 82–98)
MONOCYTES # BLD AUTO: 0.71 THOUSAND/ÂΜL (ref 0.17–1.22)
MONOCYTES NFR BLD AUTO: 7 % (ref 4–12)
NEUTROPHILS # BLD AUTO: 6.13 THOUSANDS/ÂΜL (ref 1.85–7.62)
NEUTS SEG NFR BLD AUTO: 60 % (ref 43–75)
NITRITE UR QL STRIP: NEGATIVE
NON-SQ EPI CELLS URNS QL MICRO: NORMAL /HPF
NRBC BLD AUTO-RTO: 0 /100 WBCS
PH UR STRIP.AUTO: 5.5 [PH]
PLATELET # BLD AUTO: 323 THOUSANDS/UL (ref 149–390)
PMV BLD AUTO: 8.7 FL (ref 8.9–12.7)
POTASSIUM SERPL-SCNC: 3.8 MMOL/L (ref 3.5–5.3)
PROT SERPL-MCNC: 7.2 G/DL (ref 6.4–8.4)
PROT UR STRIP-MCNC: NEGATIVE MG/DL
RBC # BLD AUTO: 6.01 MILLION/UL (ref 3.81–5.12)
RBC #/AREA URNS AUTO: NORMAL /HPF
SODIUM SERPL-SCNC: 136 MMOL/L (ref 135–147)
SP GR UR STRIP.AUTO: <=1.005 (ref 1–1.03)
UROBILINOGEN UR QL STRIP.AUTO: 0.2 E.U./DL
WBC # BLD AUTO: 10.28 THOUSAND/UL (ref 4.31–10.16)
WBC #/AREA URNS AUTO: NORMAL /HPF

## 2024-04-01 PROCEDURE — 99285 EMERGENCY DEPT VISIT HI MDM: CPT | Performed by: EMERGENCY MEDICINE

## 2024-04-01 PROCEDURE — 99284 EMERGENCY DEPT VISIT MOD MDM: CPT

## 2024-04-01 PROCEDURE — 76830 TRANSVAGINAL US NON-OB: CPT

## 2024-04-01 PROCEDURE — 85025 COMPLETE CBC W/AUTO DIFF WBC: CPT | Performed by: EMERGENCY MEDICINE

## 2024-04-01 PROCEDURE — 96375 TX/PRO/DX INJ NEW DRUG ADDON: CPT

## 2024-04-01 PROCEDURE — 76856 US EXAM PELVIC COMPLETE: CPT

## 2024-04-01 PROCEDURE — 36415 COLL VENOUS BLD VENIPUNCTURE: CPT | Performed by: EMERGENCY MEDICINE

## 2024-04-01 PROCEDURE — 81001 URINALYSIS AUTO W/SCOPE: CPT | Performed by: EMERGENCY MEDICINE

## 2024-04-01 PROCEDURE — 96374 THER/PROPH/DIAG INJ IV PUSH: CPT

## 2024-04-01 PROCEDURE — 80053 COMPREHEN METABOLIC PANEL: CPT | Performed by: EMERGENCY MEDICINE

## 2024-04-01 RX ORDER — ONDANSETRON 2 MG/ML
4 INJECTION INTRAMUSCULAR; INTRAVENOUS ONCE
Status: COMPLETED | OUTPATIENT
Start: 2024-04-01 | End: 2024-04-01

## 2024-04-01 RX ORDER — KETOROLAC TROMETHAMINE 30 MG/ML
30 INJECTION, SOLUTION INTRAMUSCULAR; INTRAVENOUS ONCE
Status: COMPLETED | OUTPATIENT
Start: 2024-04-01 | End: 2024-04-01

## 2024-04-01 RX ADMIN — ONDANSETRON 4 MG: 2 INJECTION INTRAMUSCULAR; INTRAVENOUS at 15:52

## 2024-04-01 RX ADMIN — KETOROLAC TROMETHAMINE 30 MG: 30 INJECTION, SOLUTION INTRAMUSCULAR; INTRAVENOUS at 15:52

## 2024-04-01 NOTE — ED PROVIDER NOTES
"History  Chief Complaint   Patient presents with    Back Pain     Pt reports lower back pain over last few days. Denies known injury. Pt reporting dizziness, fatigue and nausea. Known ovarian cyst currently being treated for.      Patient with known history of right ovarian cyst, had follow-up ultrasound 2 weeks ago at OB/GYN, demonstrating no change in the cyst, however has had increasing right low back pain over the past 2 days.  Called her OB/GYN today and was told to come to the emergency room for ultrasound to make sure that \"there was no twisting\".  Patient does complain of some lightheadedness and nausea.      History provided by:  Patient   used: No    Back Pain  Pain location: Right flank.  Quality:  Aching  Radiates to:  Does not radiate  Pain severity:  Moderate  Pain is:  Same all the time  Timing:  Constant  Progression:  Unchanged  Chronicity:  Recurrent  Relieved by:  Nothing  Worsened by:  Nothing  Ineffective treatments:  None tried  Associated symptoms: no abdominal pain, no bladder incontinence, no bowel incontinence, no chest pain, no dysuria, no fever, no headaches, no leg pain, no numbness, no tingling and no weakness        Prior to Admission Medications   Prescriptions Last Dose Informant Patient Reported? Taking?   Empagliflozin (Jardiance) 25 MG TABS   No No   Sig: Take 1 tablet (25 mg total) by mouth daily   Fluticasone-Salmeterol (Advair Diskus) 250-50 mcg/dose inhaler   No No   Sig: Inhale 1 puff 2 (two) times a day Rinse mouth after use.   Lancets (onetouch ultrasoft) lancets   No No   Sig: Use as instructed   acetaminophen (TYLENOL) 500 mg tablet   Yes No   Sig: Take 500 mg by mouth every 6 (six) hours as needed for mild pain   albuterol (Ventolin HFA) 90 mcg/act inhaler   No No   Sig: Inhale 2 puffs every 6 (six) hours as needed for wheezing or shortness of breath   atorvastatin (LIPITOR) 20 mg tablet   No No   Sig: Take 1 tablet (20 mg total) by mouth daily with " dinner   celecoxib (CeleBREX) 200 mg capsule   No No   Sig: Take 1 capsule (200 mg total) by mouth 2 (two) times a day   cetirizine (ZyrTEC) 10 mg tablet   No No   Si tab po daily x 1 month then prn allergies   clotrimazole-betamethasone (LOTRISONE) 1-0.05 % cream   No No   Sig: Apply 2 x daily to vaginal area until healed then prn irritation   ergocalciferol (VITAMIN D2) 50,000 units   No No   Sig: Take 1 capsule (50,000 Units total) by mouth once a week   famotidine (PEPCID) 40 MG tablet   No No   Sig: TAKE (1) TABLET BY MOUTH DAILY.   fluconazole (DIFLUCAN) 150 mg tablet   Yes No   fluticasone (FLONASE) 50 mcg/act nasal spray   No No   Si spray each nostril 2 x daily x 1 month then prn congestion or allergies   gabapentin (NEURONTIN) 300 mg capsule   No No   Sig: Take 1 capsule (300 mg total) by mouth 3 (three) times a day   glipiZIDE (GLUCOTROL) 10 mg tablet   No No   Sig: TAKE (2) TABLETS TWICE DAILY BEFORE MEALS.   glucose blood test strip   No No   Sig: Use as instructed   lisinopril (ZESTRIL) 10 mg tablet   No No   Sig: Take 1 tablet (10 mg total) by mouth daily   medroxyPROGESTERone (PROVERA) 10 mg tablet   No No   Sig: Take 1 tablet (10 mg total) by mouth daily for 10 days   Patient not taking: Reported on 3/25/2024   metFORMIN (GLUCOPHAGE) 1000 MG tablet   No No   Sig: Take 1 tablet (1,000 mg total) by mouth 2 (two) times a day with meals   omeprazole (PriLOSEC) 40 MG capsule   No No   Sig: Take 1 capsule (40 mg total) by mouth daily before breakfast   ondansetron (ZOFRAN) 4 mg tablet   No No   Sig: Take 1 tablet (4 mg total) by mouth every 8 (eight) hours as needed for nausea or vomiting   sertraline (ZOLOFT) 50 mg tablet   No No   Sig: TAKE (1) TABLET BY MOUTH DAILY AT BEDTIME.   triamcinolone (KENALOG) 0.025 % cream   No No   Sig: Apply 2 x daily to rash until healed then prn rash      Facility-Administered Medications: None       Past Medical History:   Diagnosis Date    Allergic     Seasonal  Allergies    Alopecia of scalp     Treated with Proscar    Asthma     Chronic pain disorder     back, bilat knees, bilat hips    CPAP (continuous positive airway pressure) dependence     Diabetes mellitus (HCC)     Fibromyalgia, primary     GERD (gastroesophageal reflux disease)     Hip fx, left, closed, with routine healing, subsequent encounter 05/2017    Hyperlipidemia     Hypertension     Insomnia     Irregular heartbeat     VICTOR HUGO on CPAP        Past Surgical History:   Procedure Laterality Date    CARPAL TUNNEL RELEASE Left     IR SPINE AND PAIN PROCEDURE  3/12/2024    LUMBAR EPIDURAL INJECTION      NERVE BLOCK Bilateral 12/29/2020    Procedure: L2 L3 L4 L5 MEDIAL BRANCH BLOCK #1;  Surgeon: Westley Keller MD;  Location: [facility];  Service: Pain Management     NERVE BLOCK Bilateral 1/19/2021    Procedure: L2 L3 L4 L5 MEDIAL BRANCH BLOCK #2;  Surgeon: Westley Keller MD;  Location: [facility];  Service: Pain Management     MO ARTHROCENTESIS ASPIR&/INJ MAJOR JT/BURSA W/O US Bilateral 2/7/2023    Procedure: INJECTION JOINT HIP;  Surgeon: Westley Keller MD;  Location: [facility];  Service: Pain Management     MO HYSTEROSCOPY BX ENDOMETRIUM&/POLYPC W/WO D&C N/A 4/25/2023    Procedure: HYSTEROSCOPY W/  RESECTION UTERINE TUMOR/FIBROID (POLYPECTOMY );  Surgeon: Donovan Ware DO;  Location: [facility];  Service: Gynecology    RADIOFREQUENCY ABLATION Right 2/25/2021    Procedure: L2 L3 L4 L5 RADIO FREQUENCY ABLATION right side;  Surgeon: Westley Keller MD;  Location: [facility];  Service: Pain Management     RADIOFREQUENCY ABLATION Left 3/16/2021    Procedure: L2 L3 L4 L5 RADIO FREQUENCY ABLATION;  Surgeon: Westley Keller MD;  Location: [facility];  Service: Pain Management     RHIZOTOMY Bilateral 4/4/2023    Procedure: RHIZOTOMY LUMBAR L3, L4, L5 medial branch nerves;  Surgeon: Westley Keller MD;  Location: [facility];  Service: Pain Management     UPPER GASTROINTESTINAL ENDOSCOPY      US GUIDED THYROID BIOPSY   7/14/2023       Family History   Problem Relation Age of Onset    Cancer Father     Hepatitis Father     Asthma Cousin     Hypertension Paternal Grandmother     No Known Problems Mother     No Known Problems Sister     No Known Problems Maternal Grandmother     No Known Problems Maternal Grandfather     No Known Problems Paternal Grandfather     No Known Problems Paternal Aunt     BRCA2 Positive Neg Hx     BRCA2 Negative Neg Hx     BRCA1 Positive Neg Hx     BRCA1 Negative Neg Hx     BRCA 1/2 Neg Hx     Ovarian cancer Neg Hx     Endometrial cancer Neg Hx     Colon cancer Neg Hx     Breast cancer additional onset Neg Hx     Breast cancer Neg Hx      I have reviewed and agree with the history as documented.    E-Cigarette/Vaping    E-Cigarette Use Never User      E-Cigarette/Vaping Substances    Nicotine No     THC No     CBD No     Flavoring No     Other No     Unknown No      Social History     Tobacco Use    Smoking status: Former    Smokeless tobacco: Never   Vaping Use    Vaping status: Never Used   Substance Use Topics    Alcohol use: No    Drug use: No       Review of Systems   Constitutional:  Negative for chills and fever.   HENT:  Negative for ear pain, hearing loss, sore throat, trouble swallowing and voice change.    Eyes:  Negative for pain and discharge.   Respiratory:  Negative for cough, shortness of breath and wheezing.    Cardiovascular:  Negative for chest pain and palpitations.   Gastrointestinal:  Negative for abdominal pain, blood in stool, bowel incontinence, constipation, diarrhea, nausea and vomiting.   Genitourinary:  Negative for bladder incontinence, dysuria, flank pain, frequency and hematuria.   Musculoskeletal:  Positive for back pain. Negative for joint swelling, neck pain and neck stiffness.   Skin:  Negative for rash and wound.   Neurological:  Negative for dizziness, tingling, seizures, syncope, facial asymmetry, weakness, numbness and headaches.   Psychiatric/Behavioral:  Negative  for hallucinations, self-injury and suicidal ideas.    All other systems reviewed and are negative.      Physical Exam  Physical Exam  Vitals and nursing note reviewed.   Constitutional:       General: She is not in acute distress.     Appearance: She is well-developed.   HENT:      Head: Normocephalic and atraumatic.      Right Ear: External ear normal.      Left Ear: External ear normal.   Eyes:      General: No scleral icterus.        Right eye: No discharge.         Left eye: No discharge.      Extraocular Movements: Extraocular movements intact.      Conjunctiva/sclera: Conjunctivae normal.   Cardiovascular:      Rate and Rhythm: Normal rate and regular rhythm.      Heart sounds: Normal heart sounds. No murmur heard.  Pulmonary:      Effort: Pulmonary effort is normal.      Breath sounds: Normal breath sounds. No wheezing or rales.   Abdominal:      General: Bowel sounds are normal. There is no distension.      Palpations: Abdomen is soft.      Tenderness: There is no abdominal tenderness. There is no guarding or rebound.   Musculoskeletal:         General: No deformity. Normal range of motion.      Cervical back: Normal range of motion and neck supple.   Skin:     General: Skin is warm and dry.      Findings: No rash.   Neurological:      General: No focal deficit present.      Mental Status: She is alert and oriented to person, place, and time.      Cranial Nerves: No cranial nerve deficit.   Psychiatric:         Mood and Affect: Mood normal.         Behavior: Behavior normal.         Thought Content: Thought content normal.         Judgment: Judgment normal.         Vital Signs  ED Triage Vitals   Temperature Pulse Respirations Blood Pressure SpO2   04/01/24 1509 04/01/24 1509 04/01/24 1509 04/01/24 1509 04/01/24 1509   97.5 °F (36.4 °C) 87 18 118/76 96 %      Temp Source Heart Rate Source Patient Position - Orthostatic VS BP Location FiO2 (%)   04/01/24 1509 04/01/24 1509 04/01/24 1509 04/01/24 1509 --    Temporal Monitor Sitting Left arm       Pain Score       04/01/24 1552       8           Vitals:    04/01/24 1509 04/01/24 1647   BP: 118/76 123/70   Pulse: 87 77   Patient Position - Orthostatic VS: Sitting Sitting         Visual Acuity      ED Medications  Medications   ketorolac (TORADOL) injection 30 mg (30 mg Intravenous Given 4/1/24 1552)   ondansetron (ZOFRAN) injection 4 mg (4 mg Intravenous Given 4/1/24 1552)       Diagnostic Studies  Results Reviewed       Procedure Component Value Units Date/Time    UA w Reflex to Microscopic w Reflex to Culture [[id number]]  (Abnormal) Collected: 04/01/24 1715    Lab Status: Final result Specimen: Urine, Clean Catch Updated: 04/01/24 1738     Color, UA Yellow     Clarity, UA Clear     Specific Gravity, UA <=1.005     pH, UA 5.5     Leukocytes, UA Elevated glucose may cause decreased leukocyte values. See urine microscopic for UWBC result     Nitrite, UA Negative     Protein, UA Negative mg/dl      Glucose, UA >=1000 (1%) mg/dl      Ketones, UA Negative mg/dl      Urobilinogen, UA 0.2 E.U./dl      Bilirubin, UA Negative     Occult Blood, UA Small    Urine Microscopic [[id number]] Collected: 04/01/24 1715    Lab Status: In process Specimen: Urine, Clean Catch Updated: 04/01/24 1738    Comprehensive metabolic panel [[id number]]  (Abnormal) Collected: 04/01/24 1551    Lab Status: Final result Specimen: Blood from Arm, Right Updated: 04/01/24 1617     Sodium 136 mmol/L      Potassium 3.8 mmol/L      Chloride 105 mmol/L      CO2 22 mmol/L      ANION GAP 9 mmol/L      BUN 13 mg/dL      Creatinine 0.61 mg/dL      Glucose 200 mg/dL      Calcium 8.8 mg/dL      AST 15 U/L      ALT 24 U/L      Alkaline Phosphatase 44 U/L      Total Protein 7.2 g/dL      Albumin 4.2 g/dL      Total Bilirubin 0.80 mg/dL      eGFR 106 ml/min/1.73sq m     Narrative:      National Kid[employer] guidelines for Chronic Kidney Disease (CKD):     Stage 1 with normal or high GFR (GFR > 90  mL/min/1.73 square meters)    Stage 2 Mild CKD (GFR = 60-89 mL/min/1.73 square meters)    Stage 3A Moderate CKD (GFR = 45-59 mL/min/1.73 square meters)    Stage 3B Moderate CKD (GFR = 30-44 mL/min/1.73 square meters)    Stage 4 Severe CKD (GFR = 15-29 mL/min/1.73 square meters)    Stage 5 End Stage CKD (GFR <15 mL/min/1.73 square meters)  Note: GFR calculation is accurate only with a steady state creatinine    CBC and differential [[id number]]  (Abnormal) Collected: 04/01/24 1551    Lab Status: Final result Specimen: Blood from Arm, Right Updated: 04/01/24 1600     WBC 10.28 Thousand/uL      RBC 6.01 Million/uL      Hemoglobin 14.3 g/dL      Hematocrit 47.6 %      MCV 79 fL      MCH 23.8 pg      MCHC 30.0 g/dL      RDW 16.0 %      MPV 8.7 fL      Platelets 323 Thousands/uL      nRBC 0 /100 WBCs      Neutrophils Relative 60 %      Immature Grans % 0 %      Lymphocytes Relative 29 %      Monocytes Relative 7 %      Eosinophils Relative 3 %      Basophils Relative 1 %      Neutrophils Absolute 6.13 Thousands/µL      Absolute Immature Grans 0.04 Thousand/uL      Absolute Lymphocytes 2.97 Thousands/µL      Absolute Monocytes 0.71 Thousand/µL      Eosinophils Absolute 0.31 Thousand/µL      Basophils Absolute 0.12 Thousands/µL                    US pelvis complete w transvaginal   Final Result by Usama Cotton MD (04/01 1711)      The right ovary is not visualized transabdominally or transvaginally. If clinical suspicion for ovarian torsion persists recommend OB/GYN consultation and consider repeat ultrasound and/or MRI of the pelvis.      Normal uterus and left ovary.                  Workstation performed: [id number]                    Procedures  Procedures         ED Course                               SBIRT 22yo+      Flowsheet Row Most Recent Value   Initial Alcohol Screen: US AUDIT-C     1. How often do you have a drink containing alcohol? 0 Filed at: 04/01/2024 1511   2. How many drinks containing  alcohol do you have on a typical day you are drinking?  0 Filed at: 04/01/2024 1511   3b. FEMALE Any Age, or MALE 65+: How often do you have 4 or more drinks on one occassion? 0 Filed at: 04/01/2024 1511   Audit-C Score 0 Filed at: 04/01/2024 1511   CHEIKH: How many times in the past year have you...    Used an illegal drug or used a prescription medication for non-medical reasons? Never Filed at: 04/01/2024 1511                      Medical Decision Making  Based on the history and medical screening exam performed the diagnostic considerations include but are not limited to ovarian cyst, chronic back pain, urinary tract infection, pyelonephritis.    Based on the work-up performed in the emergency room which includes physical examination, and which may include laboratory studies and imaging as warranted including advanced imaging such as CT scan or ultrasound, the diagnostic considerations are narrowed to exclude limb or life-threatening process.    The patient is stable for discharge.  No evidence of urinary tract infection, normal lab work.  Ultrasound does not image the right adnexa, making it less likely that there is a large torsed cyst.  Patient is clearly very comfortable in the room, scheduled to follow-up with her OB/GYN for definitive management.    Amount and/or Complexity of Data Reviewed  Labs: ordered. Decision-making details documented in ED Course.     Details: normal  Radiology: ordered and independent interpretation performed. Decision-making details documented in ED Course.     Details: Pelvic ultrasound nondiagnostic thereby lowering likelihood of large torsed cyst    Risk  Prescription drug management.             Disposition  Final diagnoses:   Cyst of right ovary     Time reflects when diagnosis was documented in both MDM as applicable and the Disposition within this note       Time User Action Codes Description Comment    4/1/2024  5:55 PM Efrain Haskins Add [N83.201] Cyst of right ovary            ED Disposition       ED Disposition   Discharge    Condition   Stable    Date/Time   Mon Apr 1, 2024 1750    Comment   Cristy Garcia discharge to home/self care.                   Follow-up Information       Follow up With Specialties Details Why Contact Info    [facility], Nurse Practitioner   [address] [place] [id number] [phone]              Patient's Medications   Discharge Prescriptions    No medications on file       No discharge procedures on file.    PDMP Review       None            ED Provider  Electronically Signed by             Efrain Haskins MD  04/01/24 3781

## 2024-04-01 NOTE — ED NOTES
Patient encouraged to provide urine sample. Reports unable to void at this time.  Will continue to monitor.     Allison A Schoener, RN  04/01/24 7517

## 2024-04-01 NOTE — TELEPHONE ENCOUNTER
"Pt called in reporting worsening pain on right side/lower back 8/10. Pain has worsened since office visit last month. Laying down makes it slightly better. Denies dysuria or urgency but does states she is urinating more frequently. Also reports red vaginal spotting that started two days ago. Denies any spotting today. Has not had her period in months. Denies fever. Also reports nausea and dizziness. Recommended pt be evaluated in the ED and she is in agreement.      Reason for Disposition   SEVERE abdominal pain (e.g., excruciating)    Answer Assessment - Initial Assessment Questions  1. LOCATION: \"Where does it hurt?\"       Lower right side and back  2. RADIATION: \"Does the pain shoot anywhere else?\" (e.g., chest, back)      denies  3. ONSET: \"When did the pain begin?\" (e.g., minutes, hours or days ago)       Ongoing but worsened the last couple of days  4. SUDDEN: \"Gradual or sudden onset?\"      gradual  5. PATTERN \"Does the pain come and go, or is it constant?\"     - If constant: \"Is it getting better, staying the same, or worsening?\"       (Note: Constant means the pain never goes away completely; most serious pain is constant and it progresses)      - If intermittent: \"How long does it last?\" \"Do you have pain now?\"      (Note: Intermittent means the pain goes away completely between bouts)      constant  6. SEVERITY: \"How bad is the pain?\"  (e.g., Scale 1-10; mild, moderate, or severe)    - MILD (1-3): doesn't interfere with normal activities, abdomen soft and not tender to touch     - MODERATE (4-7): interferes with normal activities or awakens from sleep, tender to touch     - SEVERE (8-10): excruciating pain, doubled over, unable to do any normal activities       8/10  7. RECURRENT SYMPTOM: \"Have you ever had this type of stomach pain before?\" If Yes, ask: \"When was the last time?\" and \"What happened that time?\"       Yes, ovarian cyst  8. CAUSE: \"What do you think is causing the stomach pain?\"      Ovarian " "cyst  9. RELIEVING/AGGRAVATING FACTORS: \"What makes it better or worse?\" (e.g., movement, antacids, bowel movement)      Laying down makes it slightly better  10. OTHER SYMPTOMS: \"Has there been any vomiting, diarrhea, constipation, or urine problems?\"        Nausea and urinary frequency  11. PREGNANCY: \"Is there any chance you are pregnant?\" \"When was your last menstrual period?\"        denies    Protocols used: Abdominal Pain - Female-ADULT-[place]    "

## 2024-04-04 RX ORDER — LANCING DEVICE/LANCETS
KIT MISCELLANEOUS
COMMUNITY
Start: 2024-01-31

## 2024-04-04 RX ORDER — FINASTERIDE 1 MG/1
1 TABLET, FILM COATED ORAL DAILY
COMMUNITY
Start: 2024-03-07

## 2024-04-04 RX ORDER — KETOCONAZOLE 20 MG/G
CREAM TOPICAL
COMMUNITY
Start: 2024-03-05

## 2024-04-04 RX ORDER — NYSTATIN 100000 [USP'U]/G
POWDER TOPICAL
COMMUNITY
Start: 2024-03-05

## 2024-04-08 ENCOUNTER — OFFICE VISIT (OUTPATIENT)
Dept: PAIN MEDICINE | Facility: CLINIC | Age: 49
End: 2024-04-08
Payer: COMMERCIAL

## 2024-04-08 ENCOUNTER — PREP FOR PROCEDURE (OUTPATIENT)
Dept: PAIN MEDICINE | Facility: CLINIC | Age: 49
End: 2024-04-08

## 2024-04-08 VITALS
RESPIRATION RATE: 18 BRPM | WEIGHT: 253 LBS | SYSTOLIC BLOOD PRESSURE: 112 MMHG | HEART RATE: 93 BPM | BODY MASS INDEX: 46.56 KG/M2 | DIASTOLIC BLOOD PRESSURE: 78 MMHG | TEMPERATURE: 97.2 F | HEIGHT: 62 IN | OXYGEN SATURATION: 96 %

## 2024-04-08 DIAGNOSIS — M16.0 PRIMARY OSTEOARTHRITIS OF BOTH HIPS: Primary | ICD-10-CM

## 2024-04-08 DIAGNOSIS — M16.0 PRIMARY OSTEOARTHRITIS OF BOTH HIPS: ICD-10-CM

## 2024-04-08 DIAGNOSIS — M47.816 LUMBAR SPONDYLOSIS: Primary | ICD-10-CM

## 2024-04-08 PROCEDURE — 99214 OFFICE O/P EST MOD 30 MIN: CPT | Performed by: ANESTHESIOLOGY

## 2024-04-08 NOTE — H&P (VIEW-ONLY)
Assessment  1. Lumbar spondylosis  -     Ambulatory referral to Physical Therapy; Future    2. Primary osteoarthritis of both hips  -     Ambulatory referral to Physical Therapy; Future    Greater than 80% improvement with radiofrequency ablation of repeat bilateral Medial branch nerves at L3-L5 performed 3/12/24; prior performed 4/4/23 provided pain relief with improved ability to participate with IADLs in significantly less pain for almost a year.  Now describes b/l hip pain with radiation to the groin; ttp over b/l gtb, pain with internal/external rotation of b/l hips; pain worse with standing/ambulation. Greater than 60% relief of pain with improved ability to participate with IADLs after recent b/l hip intra-artcular joint injections in the past. Lifestyle modifications extensively discussed including diet, exercise and weight loss in conjunction with optimal DM-2 control. Previously reported the following symptomatology:    Chronic axial low back pain described by primarily arthritic features.  Positive facet loading maneuvers as noted below.Mild noncompressive lumbar degenerative change at the L3-4 and L4-5 levels.  Previously had a left L3 transforaminal steroid injection with Dr. Franco which helped for about a week and a half.  Arthritic symptomatology greater than radicular features at this time. Reasonable to continue multimodal pain therapy plan.    Plan  -Celebrex 200 mg b.i.d. prn pain for lumbar facet syndrome/hip osteoarthritis.  Counseled regarding bleeding risk, GI risk, Renal risk of taking this medication.  -lifestyle modifications including diet, exercise and weight loss in addition to DM-2 control extensively discussed  -repeat bilateral hip intra-articular joint injections; f/u 2 weeks post procedure  -hip exercises provided; continue physical therapy and core exercises for lumbar facet syndrome, hip osteoarthritis    There are risks associated with opioid medications, including  dependence, addiction and tolerance. The patient understands and agrees to use these medications only as prescribed. Potential side effects of the medications include, but are not limited to, constipation, drowsiness, addiction, impaired judgment and risk of fatal overdose if not taken as prescribed. The patient was warned against driving while taking sedation medications.  Sharing medications is a felony. At this point in time, the patient is showing no signs of addiction, abuse, diversion or suicidal ideation.    [place] Prescription Drug Monitoring Program report was reviewed and was appropriate     Complete risks and benefits including bleeding, infection, tissue reaction, nerve injury and allergic reaction were discussed. The approach was demonstrated using models and literature was provided. Verbal and written consent was obtained.    My impressions and treatment recommendations were discussed in detail with the patient who verbalized understanding and had no further questions.  Discharge instructions were provided. I personally saw and examined the patient and I agree with the above discussed plan of care.    New Medications Ordered This Visit   Medications    nystatin powder     Sig: APPLY AS DIRECTED TO AFFECTED AREA DAILY    ketoconazole (NIZORAL) 2 % cream     Sig: APPLY 1-2 TIMES DAILY TO AFFECTED AREA AS NEEDED.    finasteride (PROPECIA) 1 MG tablet     Sig: Take 1 mg by mouth daily    glucose blood test strip    Lancet Devices (FDTEKuch Delica Plus Lancing) MISC       History of Present Illness    Greater than 80% improvement with radiofrequency ablation of repeat bilateral Medial branch nerves at L3-L5 performed 3/12/24; prior performed 4/4/23 provided pain relief with improved ability to participate with IADLs in significantly less pain for almost a year.  Now describes b/l hip pain with radiation to the groin; ttp over b/l gtb, pain with internal/external rotation of b/l hips; pain worse with  standing/ambulation. Greater than 60% relief of pain with improved ability to participate with IADLs after recent b/l hip intra-artcular joint injections in the past. Lifestyle modifications extensively discussed including diet, exercise and weight loss in conjunction with optimal DM-2 control. Previously reported the following symptomatology:    Cristy Garcia is a 48 y.o. female with past medical history of axial low back pain described primarily by arthritic features.  She presents with a 2 year history of chronic low back pain described primarily as arthritic in nature.   she describes 8/10 low back pain that is worse in the mornings and worse at the end of the day.  The pain is characterized by achy, nagging, indolent, crampy, stabbing pain in his/her axial low back.  The patient describes that the pain is worse with standing for long periods of time on hard surfaces as well as with walking.  The patient is a very active individual and feels as though this pain compromises her participation with independent activities of daily living. The pain can be debilitating at times and contribute to significant disability, compromising overall activity and independent activities of daily living.   She has tried physical therapy with limited relief of symptoms.  Medications the patient has tried in the past include   Celebrex,  and Flexeril. She describes minor radicular symptoms in the L3 and L4 dermatomal distribution but otherwise has good strength.  Previously she had left L3 transforaminal epidural steroid injection with relief for about 2 weeks. She denies any weakness numbness or paresthesias.  The patient denies any bowel or bladder dysfunction as well.        I have personally reviewed and/or updated the patient's past medical history, past surgical history, family history, social history, current medications, allergies, and vital signs today.     Review of Systems   Constitutional:  Positive for activity change.    HENT: Negative.     Eyes: Negative.    Respiratory: Negative.     Cardiovascular: Negative.    Gastrointestinal: Negative.    Endocrine: Negative.    Genitourinary: Negative.    Musculoskeletal:  Positive for arthralgias, back pain, gait problem and myalgias.   Skin: Negative.    Allergic/Immunologic: Negative.    Hematological: Negative.    Psychiatric/Behavioral: Negative.     All other systems reviewed and are negative.      Patient Active Problem List   Diagnosis    SOB (shortness of breath)    Chronic bilateral low back pain without sciatica    Chronic pain of left knee    Chronic pain of right knee    Fibromyalgia, primary    Primary osteoarthritis involving multiple joints    Mild persistent asthma without complication    Lumbar radiculopathy    Primary osteoarthritis of both hips    Left hip pain    Major depression, recurrent, chronic (HCC)    Lumbar spondylosis    Cervical radiculopathy    Morbid obesity with body mass index (BMI) of 45.0 to 49.9 in adult (HCC)    VICTOR HUGO on CPAP    Anxiety associated with depression    Diabetes mellitus (HCC)    Hyperlipidemia    Hypertension    Irregular heartbeat    Chronic pain disorder    GERD (gastroesophageal reflux disease)    Fatty liver    Elevated LFTs    Esophagitis determined by endoscopy       Past Medical History:   Diagnosis Date    Allergic     Seasonal Allergies    Alopecia of scalp     Treated with Proscar    Asthma     Chronic pain disorder     back, bilat knees, bilat hips    CPAP (continuous positive airway pressure) dependence     Diabetes mellitus (HCC)     Fibromyalgia, primary     GERD (gastroesophageal reflux disease)     Hip fx, left, closed, with routine healing, subsequent encounter 05/2017    Hyperlipidemia     Hypertension     Insomnia     Irregular heartbeat     VICTOR HUGO on CPAP        Past Surgical History:   Procedure Laterality Date    CARPAL TUNNEL RELEASE Left     IR SPINE AND PAIN PROCEDURE  3/12/2024    LUMBAR EPIDURAL INJECTION      NERVE  BLOCK Bilateral 12/29/2020    Procedure: L2 L3 L4 L5 MEDIAL BRANCH BLOCK #1;  Surgeon: Westley Keller MD;  Location: [facility];  Service: Pain Management     NERVE BLOCK Bilateral 1/19/2021    Procedure: L2 L3 L4 L5 MEDIAL BRANCH BLOCK #2;  Surgeon: Westley Keller MD;  Location: [facility];  Service: Pain Management     HI ARTHROCENTESIS ASPIR&/INJ MAJOR JT/BURSA W/O US Bilateral 2/7/2023    Procedure: INJECTION JOINT HIP;  Surgeon: Westley Keller MD;  Location: [facility];  Service: Pain Management     HI HYSTEROSCOPY BX ENDOMETRIUM&/POLYPC W/WO D&C N/A 4/25/2023    Procedure: HYSTEROSCOPY W/  RESECTION UTERINE TUMOR/FIBROID (POLYPECTOMY );  Surgeon: Donovan Ware DO;  Location: [facility];  Service: Gynecology    RADIOFREQUENCY ABLATION Right 2/25/2021    Procedure: L2 L3 L4 L5 RADIO FREQUENCY ABLATION right side;  Surgeon: Westley Keller MD;  Location: [facility];  Service: Pain Management     RADIOFREQUENCY ABLATION Left 3/16/2021    Procedure: L2 L3 L4 L5 RADIO FREQUENCY ABLATION;  Surgeon: Westley Keller MD;  Location: [facility];  Service: Pain Management     RHIZOTOMY Bilateral 4/4/2023    Procedure: RHIZOTOMY LUMBAR L3, L4, L5 medial branch nerves;  Surgeon: Westley Keller MD;  Location: [facility];  Service: Pain Management     UPPER GASTROINTESTINAL ENDOSCOPY      US GUIDED THYROID BIOPSY  7/14/2023       Family History   Problem Relation Age of Onset    Cancer Father     Hepatitis Father     Asthma Cousin     Hypertension Paternal Grandmother     No Known Problems Mother     No Known Problems Sister     No Known Problems Maternal Grandmother     No Known Problems Maternal Grandfather     No Known Problems Paternal Grandfather     No Known Problems Paternal Aunt     BRCA2 Positive Neg Hx     BRCA2 Negative Neg Hx     BRCA1 Positive Neg Hx     BRCA1 Negative Neg Hx     BRCA 1/2 Neg Hx     Ovarian cancer Neg Hx     Endometrial cancer Neg Hx     Colon cancer Neg Hx     Breast cancer additional onset  Neg Hx     Breast cancer Neg Hx        Social History     Occupational History    Not on file   Tobacco Use    Smoking status: Former    Smokeless tobacco: Never   Vaping Use    Vaping status: Never Used   Substance and Sexual Activity    Alcohol use: No    Drug use: No    Sexual activity: Not Currently       Current Outpatient Medications on File Prior to Visit   Medication Sig    acetaminophen (TYLENOL) 500 mg tablet Take 500 mg by mouth every 6 (six) hours as needed for mild pain    albuterol (Ventolin HFA) 90 mcg/act inhaler Inhale 2 puffs every 6 (six) hours as needed for wheezing or shortness of breath    atorvastatin (LIPITOR) 20 mg tablet Take 1 tablet (20 mg total) by mouth daily with dinner    celecoxib (CeleBREX) 200 mg capsule Take 1 capsule (200 mg total) by mouth 2 (two) times a day    cetirizine (ZyrTEC) 10 mg tablet 1 tab po daily x 1 month then prn allergies    clotrimazole-betamethasone (LOTRISONE) 1-0.05 % cream Apply 2 x daily to vaginal area until healed then prn irritation    Empagliflozin (Jardiance) 25 MG TABS Take 1 tablet (25 mg total) by mouth daily    ergocalciferol (VITAMIN D2) 50,000 units Take 1 capsule (50,000 Units total) by mouth once a week    famotidine (PEPCID) 40 MG tablet TAKE (1) TABLET BY MOUTH DAILY.    finasteride (PROPECIA) 1 MG tablet Take 1 mg by mouth daily    fluconazole (DIFLUCAN) 150 mg tablet     fluticasone (FLONASE) 50 mcg/act nasal spray 1 spray each nostril 2 x daily x 1 month then prn congestion or allergies    Fluticasone-Salmeterol (Advair Diskus) 250-50 mcg/dose inhaler Inhale 1 puff 2 (two) times a day Rinse mouth after use.    gabapentin (NEURONTIN) 300 mg capsule Take 1 capsule (300 mg total) by mouth 3 (three) times a day    glipiZIDE (GLUCOTROL) 10 mg tablet TAKE (2) TABLETS TWICE DAILY BEFORE MEALS.    glucose blood test strip Use as instructed    ketoconazole (NIZORAL) 2 % cream APPLY 1-2 TIMES DAILY TO AFFECTED AREA AS NEEDED.    Lancet Devices  "(OneTouch Delica Plus Lancing) MISC     Lancets (onetouch ultrasoft) lancets Use as instructed    lisinopril (ZESTRIL) 10 mg tablet Take 1 tablet (10 mg total) by mouth daily    metFORMIN (GLUCOPHAGE) 1000 MG tablet Take 1 tablet (1,000 mg total) by mouth 2 (two) times a day with meals    nystatin powder APPLY AS DIRECTED TO AFFECTED AREA DAILY    omeprazole (PriLOSEC) 40 MG capsule Take 1 capsule (40 mg total) by mouth daily before breakfast    ondansetron (ZOFRAN) 4 mg tablet Take 1 tablet (4 mg total) by mouth every 8 (eight) hours as needed for nausea or vomiting    sertraline (ZOLOFT) 50 mg tablet TAKE (1) TABLET BY MOUTH DAILY AT BEDTIME.    triamcinolone (KENALOG) 0.025 % cream Apply 2 x daily to rash until healed then prn rash    glucose blood test strip  (Patient not taking: Reported on 4/8/2024)    medroxyPROGESTERone (PROVERA) 10 mg tablet Take 1 tablet (10 mg total) by mouth daily for 10 days (Patient not taking: Reported on 3/25/2024)     No current facility-administered medications on file prior to visit.       Allergies   Allergen Reactions    Trulicity [Dulaglutide] Swelling    Tdap [Tetanus-Diphth-Acell Pertussis] Rash         Physical Exam    /78 (BP Location: Right arm, Patient Position: Sitting, Cuff Size: Large)   Pulse 93   Temp (!) 97.2 °F (36.2 °C)   Resp 18   Ht 5' 2\" (1.575 m)   Wt 115 kg (253 lb)   LMP 12/01/2023 (Approximate)   SpO2 96%   BMI 46.27 kg/m²     Constitutional: normal, well developed, well nourished, alert, in no distress and non-toxic and no overt pain behavior. and obese  Eyes: anicteric  HEENT: grossly intact  Neck: supple, symmetric, trachea midline and no masses   Pulmonary:even and unlabored  Cardiovascular:No edema or pitting edema present  Skin:Normal without rashes or lesions and well hydrated  Psychiatric:Mood and affect appropriate  Neurologic:Cranial Nerves II-XII grossly intact Sensation grossly intact; no clonus negative dupree's. Reflexes 2+ and " brisk. SLR negative bilaterally.  Musculoskeletal:  Steppage gait. Normal heel toe and tip toe walking.  Significant pain with lumbar facet loading bilaterally and with lateral spine rotation left greater than right.  TTP over lumbar paraspinal muscles. Negative wilda's test, negative gaenslen's negative SIJ loading bilaterally.  ttp over b/l gtb, pain with internal/external rotation of b/l hips    Imaging    MRI LUMBAR SPINE WITHOUT CONTRAST     INDICATION: M54.42: Lumbago with sciatica, left side  G89.29: Other chronic pain.     COMPARISON:  None.     TECHNIQUE:  Sagittal T1, sagittal T2, sagittal inversion recovery, axial T1 and axial T2, coronal T2.    IMAGE QUALITY:  Diagnostic     FINDINGS:     VERTEBRAL BODIES:  There are 5 lumbar type vertebral bodies.  Normal alignment of the lumbar spine.  No spondylolysis or spondylolisthesis. No scoliosis.  No compression fracture.    Normal marrow signal is identified within the visualized bony   structures.  No discrete marrow lesion.     SACRUM:  Normal signal within the sacrum. No evidence of insufficiency or stress fracture.     DISTAL CORD AND CONUS:  Normal size and signal within the distal cord and conus.     PARASPINAL SOFT TISSUES:  There is thickening of the endometrial cavity partially visualized on this examination towards the fundus.  There is a dominant follicle identified within the left ovary.     LOWER THORACIC DISC SPACES:  Normal disc height and signal.  No disc herniation, canal stenosis or foraminal narrowing.     LUMBAR DISC SPACES:     L1-L2:  Normal.     L2-L3:  Normal.     L3-L4:  Annular bulging with a small broad-based left foraminal and extraforaminal disc protrusion best seen on series 6 image 13.  There is no canal stenosis.  Only minimal left foraminal narrowing without nerve impingement.     L4-L5:  Disc desiccation and loss of disc height with mild annular bulging.  Small central disc herniation without canal stenosis or foraminal nerve  impingement.     L5-S1:  Normal disc height and signal without canal stenosis or foraminal narrowing.     IMPRESSION:     Mild noncompressive lumbar degenerative change at the L3-4 and L4-5 levels.     Fluid signal in the endometrial cavity partially visualized at the fundus.  If patient is postmenopausal, consider follow-up ultrasound imaging.     Signal

## 2024-04-08 NOTE — PROGRESS NOTES
Assessment  1. Lumbar spondylosis  -     Ambulatory referral to Physical Therapy; Future    2. Primary osteoarthritis of both hips  -     Ambulatory referral to Physical Therapy; Future    Greater than 80% improvement with radiofrequency ablation of repeat bilateral Medial branch nerves at L3-L5 performed 3/12/24; prior performed 4/4/23 provided pain relief with improved ability to participate with IADLs in significantly less pain for almost a year.  Now describes b/l hip pain with radiation to the groin; ttp over b/l gtb, pain with internal/external rotation of b/l hips; pain worse with standing/ambulation. Greater than 60% relief of pain with improved ability to participate with IADLs after recent b/l hip intra-artcular joint injections in the past. Lifestyle modifications extensively discussed including diet, exercise and weight loss in conjunction with optimal DM-2 control. Previously reported the following symptomatology:    Chronic axial low back pain described by primarily arthritic features.  Positive facet loading maneuvers as noted below.Mild noncompressive lumbar degenerative change at the L3-4 and L4-5 levels.  Previously had a left L3 transforaminal steroid injection with Dr. Franco which helped for about a week and a half.  Arthritic symptomatology greater than radicular features at this time. Reasonable to continue multimodal pain therapy plan.    Plan  -Celebrex 200 mg b.i.d. prn pain for lumbar facet syndrome/hip osteoarthritis.  Counseled regarding bleeding risk, GI risk, Renal risk of taking this medication.  -lifestyle modifications including diet, exercise and weight loss in addition to DM-2 control extensively discussed  -repeat bilateral hip intra-articular joint injections; f/u 2 weeks post procedure  -hip exercises provided; continue physical therapy and core exercises for lumbar facet syndrome, hip osteoarthritis    There are risks associated with opioid medications, including  dependence, addiction and tolerance. The patient understands and agrees to use these medications only as prescribed. Potential side effects of the medications include, but are not limited to, constipation, drowsiness, addiction, impaired judgment and risk of fatal overdose if not taken as prescribed. The patient was warned against driving while taking sedation medications.  Sharing medications is a felony. At this point in time, the patient is showing no signs of addiction, abuse, diversion or suicidal ideation.    [place] Prescription Drug Monitoring Program report was reviewed and was appropriate     Complete risks and benefits including bleeding, infection, tissue reaction, nerve injury and allergic reaction were discussed. The approach was demonstrated using models and literature was provided. Verbal and written consent was obtained.    My impressions and treatment recommendations were discussed in detail with the patient who verbalized understanding and had no further questions.  Discharge instructions were provided. I personally saw and examined the patient and I agree with the above discussed plan of care.    New Medications Ordered This Visit   Medications    nystatin powder     Sig: APPLY AS DIRECTED TO AFFECTED AREA DAILY    ketoconazole (NIZORAL) 2 % cream     Sig: APPLY 1-2 TIMES DAILY TO AFFECTED AREA AS NEEDED.    finasteride (PROPECIA) 1 MG tablet     Sig: Take 1 mg by mouth daily    glucose blood test strip    Lancet Devices (RuiYiuch Delica Plus Lancing) MISC       History of Present Illness    Greater than 80% improvement with radiofrequency ablation of repeat bilateral Medial branch nerves at L3-L5 performed 3/12/24; prior performed 4/4/23 provided pain relief with improved ability to participate with IADLs in significantly less pain for almost a year.  Now describes b/l hip pain with radiation to the groin; ttp over b/l gtb, pain with internal/external rotation of b/l hips; pain worse with  standing/ambulation. Greater than 60% relief of pain with improved ability to participate with IADLs after recent b/l hip intra-artcular joint injections in the past. Lifestyle modifications extensively discussed including diet, exercise and weight loss in conjunction with optimal DM-2 control. Previously reported the following symptomatology:    Cristy Garcia is a 48 y.o. female with past medical history of axial low back pain described primarily by arthritic features.  She presents with a 2 year history of chronic low back pain described primarily as arthritic in nature.   she describes 8/10 low back pain that is worse in the mornings and worse at the end of the day.  The pain is characterized by achy, nagging, indolent, crampy, stabbing pain in his/her axial low back.  The patient describes that the pain is worse with standing for long periods of time on hard surfaces as well as with walking.  The patient is a very active individual and feels as though this pain compromises her participation with independent activities of daily living. The pain can be debilitating at times and contribute to significant disability, compromising overall activity and independent activities of daily living.   She has tried physical therapy with limited relief of symptoms.  Medications the patient has tried in the past include   Celebrex,  and Flexeril. She describes minor radicular symptoms in the L3 and L4 dermatomal distribution but otherwise has good strength.  Previously she had left L3 transforaminal epidural steroid injection with relief for about 2 weeks. She denies any weakness numbness or paresthesias.  The patient denies any bowel or bladder dysfunction as well.        I have personally reviewed and/or updated the patient's past medical history, past surgical history, family history, social history, current medications, allergies, and vital signs today.     Review of Systems   Constitutional:  Positive for activity change.    HENT: Negative.     Eyes: Negative.    Respiratory: Negative.     Cardiovascular: Negative.    Gastrointestinal: Negative.    Endocrine: Negative.    Genitourinary: Negative.    Musculoskeletal:  Positive for arthralgias, back pain, gait problem and myalgias.   Skin: Negative.    Allergic/Immunologic: Negative.    Hematological: Negative.    Psychiatric/Behavioral: Negative.     All other systems reviewed and are negative.      Patient Active Problem List   Diagnosis    SOB (shortness of breath)    Chronic bilateral low back pain without sciatica    Chronic pain of left knee    Chronic pain of right knee    Fibromyalgia, primary    Primary osteoarthritis involving multiple joints    Mild persistent asthma without complication    Lumbar radiculopathy    Primary osteoarthritis of both hips    Left hip pain    Major depression, recurrent, chronic (HCC)    Lumbar spondylosis    Cervical radiculopathy    Morbid obesity with body mass index (BMI) of 45.0 to 49.9 in adult (HCC)    VICTOR HUGO on CPAP    Anxiety associated with depression    Diabetes mellitus (HCC)    Hyperlipidemia    Hypertension    Irregular heartbeat    Chronic pain disorder    GERD (gastroesophageal reflux disease)    Fatty liver    Elevated LFTs    Esophagitis determined by endoscopy       Past Medical History:   Diagnosis Date    Allergic     Seasonal Allergies    Alopecia of scalp     Treated with Proscar    Asthma     Chronic pain disorder     back, bilat knees, bilat hips    CPAP (continuous positive airway pressure) dependence     Diabetes mellitus (HCC)     Fibromyalgia, primary     GERD (gastroesophageal reflux disease)     Hip fx, left, closed, with routine healing, subsequent encounter 05/2017    Hyperlipidemia     Hypertension     Insomnia     Irregular heartbeat     VICTOR HUGO on CPAP        Past Surgical History:   Procedure Laterality Date    CARPAL TUNNEL RELEASE Left     IR SPINE AND PAIN PROCEDURE  3/12/2024    LUMBAR EPIDURAL INJECTION      NERVE  BLOCK Bilateral 12/29/2020    Procedure: L2 L3 L4 L5 MEDIAL BRANCH BLOCK #1;  Surgeon: Westley Keller MD;  Location: [facility];  Service: Pain Management     NERVE BLOCK Bilateral 1/19/2021    Procedure: L2 L3 L4 L5 MEDIAL BRANCH BLOCK #2;  Surgeon: Westley Keller MD;  Location: [facility];  Service: Pain Management     OH ARTHROCENTESIS ASPIR&/INJ MAJOR JT/BURSA W/O US Bilateral 2/7/2023    Procedure: INJECTION JOINT HIP;  Surgeon: Westley Keller MD;  Location: [facility];  Service: Pain Management     OH HYSTEROSCOPY BX ENDOMETRIUM&/POLYPC W/WO D&C N/A 4/25/2023    Procedure: HYSTEROSCOPY W/  RESECTION UTERINE TUMOR/FIBROID (POLYPECTOMY );  Surgeon: Donovan Ware DO;  Location: [facility];  Service: Gynecology    RADIOFREQUENCY ABLATION Right 2/25/2021    Procedure: L2 L3 L4 L5 RADIO FREQUENCY ABLATION right side;  Surgeon: Westley Keller MD;  Location: [facility];  Service: Pain Management     RADIOFREQUENCY ABLATION Left 3/16/2021    Procedure: L2 L3 L4 L5 RADIO FREQUENCY ABLATION;  Surgeon: Westley Keller MD;  [facility]: [facility];  Service: Pain Management     RHIZOTOMY Bilateral 4/4/2023    Procedure: RHIZOTOMY LUMBAR L3, L4, L5 medial branch nerves;  Surgeon: Westley Keller MD;  Location: [facility];  Service: Pain Management     UPPER GASTROINTESTINAL ENDOSCOPY      US GUIDED THYROID BIOPSY  7/14/2023       Family History   Problem Relation Age of Onset    Cancer Father     Hepatitis Father     Asthma Cousin     Hypertension Paternal Grandmother     No Known Problems Mother     No Known Problems Sister     No Known Problems Maternal Grandmother     No Known Problems Maternal Grandfather     No Known Problems Paternal Grandfather     No Known Problems Paternal Aunt     BRCA2 Positive Neg Hx     BRCA2 Negative Neg Hx     BRCA1 Positive Neg Hx     BRCA1 Negative Neg Hx     BRCA 1/2 Neg Hx     Ovarian cancer Neg Hx     Endometrial cancer Neg Hx     Colon cancer Neg Hx     Breast cancer additional onset  Neg Hx     Breast cancer Neg Hx        Social History     Occupational History    Not on file   Tobacco Use    Smoking status: Former    Smokeless tobacco: Never   Vaping Use    Vaping status: Never Used   Substance and Sexual Activity    Alcohol use: No    Drug use: No    Sexual activity: Not Currently       Current Outpatient Medications on File Prior to Visit   Medication Sig    acetaminophen (TYLENOL) 500 mg tablet Take 500 mg by mouth every 6 (six) hours as needed for mild pain    albuterol (Ventolin HFA) 90 mcg/act inhaler Inhale 2 puffs every 6 (six) hours as needed for wheezing or shortness of breath    atorvastatin (LIPITOR) 20 mg tablet Take 1 tablet (20 mg total) by mouth daily with dinner    celecoxib (CeleBREX) 200 mg capsule Take 1 capsule (200 mg total) by mouth 2 (two) times a day    cetirizine (ZyrTEC) 10 mg tablet 1 tab po daily x 1 month then prn allergies    clotrimazole-betamethasone (LOTRISONE) 1-0.05 % cream Apply 2 x daily to vaginal area until healed then prn irritation    Empagliflozin (Jardiance) 25 MG TABS Take 1 tablet (25 mg total) by mouth daily    ergocalciferol (VITAMIN D2) 50,000 units Take 1 capsule (50,000 Units total) by mouth once a week    famotidine (PEPCID) 40 MG tablet TAKE (1) TABLET BY MOUTH DAILY.    finasteride (PROPECIA) 1 MG tablet Take 1 mg by mouth daily    fluconazole (DIFLUCAN) 150 mg tablet     fluticasone (FLONASE) 50 mcg/act nasal spray 1 spray each nostril 2 x daily x 1 month then prn congestion or allergies    Fluticasone-Salmeterol (Advair Diskus) 250-50 mcg/dose inhaler Inhale 1 puff 2 (two) times a day Rinse mouth after use.    gabapentin (NEURONTIN) 300 mg capsule Take 1 capsule (300 mg total) by mouth 3 (three) times a day    glipiZIDE (GLUCOTROL) 10 mg tablet TAKE (2) TABLETS TWICE DAILY BEFORE MEALS.    glucose blood test strip Use as instructed    ketoconazole (NIZORAL) 2 % cream APPLY 1-2 TIMES DAILY TO AFFECTED AREA AS NEEDED.    Lancet Devices  "(OneTouch Delica Plus Lancing) MISC     Lancets (onetouch ultrasoft) lancets Use as instructed    lisinopril (ZESTRIL) 10 mg tablet Take 1 tablet (10 mg total) by mouth daily    metFORMIN (GLUCOPHAGE) 1000 MG tablet Take 1 tablet (1,000 mg total) by mouth 2 (two) times a day with meals    nystatin powder APPLY AS DIRECTED TO AFFECTED AREA DAILY    omeprazole (PriLOSEC) 40 MG capsule Take 1 capsule (40 mg total) by mouth daily before breakfast    ondansetron (ZOFRAN) 4 mg tablet Take 1 tablet (4 mg total) by mouth every 8 (eight) hours as needed for nausea or vomiting    sertraline (ZOLOFT) 50 mg tablet TAKE (1) TABLET BY MOUTH DAILY AT BEDTIME.    triamcinolone (KENALOG) 0.025 % cream Apply 2 x daily to rash until healed then prn rash    glucose blood test strip  (Patient not taking: Reported on 4/8/2024)    medroxyPROGESTERone (PROVERA) 10 mg tablet Take 1 tablet (10 mg total) by mouth daily for 10 days (Patient not taking: Reported on 3/25/2024)     No current facility-administered medications on file prior to visit.       Allergies   Allergen Reactions    Trulicity [Dulaglutide] Swelling    Tdap [Tetanus-Diphth-Acell Pertussis] Rash         Physical Exam    /78 (BP Location: Right arm, Patient Position: Sitting, Cuff Size: Large)   Pulse 93   Temp (!) 97.2 °F (36.2 °C)   Resp 18   Ht 5' 2\" (1.575 m)   Wt 115 kg (253 lb)   LMP 12/01/2023 (Approximate)   SpO2 96%   BMI 46.27 kg/m²     Constitutional: normal, well developed, well nourished, alert, in no distress and non-toxic and no overt pain behavior. and obese  Eyes: anicteric  HEENT: grossly intact  Neck: supple, symmetric, trachea midline and no masses   Pulmonary:even and unlabored  Cardiovascular:No edema or pitting edema present  Skin:Normal without rashes or lesions and well hydrated  Psychiatric:Mood and affect appropriate  Neurologic:Cranial Nerves II-XII grossly intact Sensation grossly intact; no clonus negative dupree's. Reflexes 2+ and " brisk. SLR negative bilaterally.  Musculoskeletal:  Steppage gait. Normal heel toe and tip toe walking.  Significant pain with lumbar facet loading bilaterally and with lateral spine rotation left greater than right.  TTP over lumbar paraspinal muscles. Negative wilda's test, negative gaenslen's negative SIJ loading bilaterally.  ttp over b/l gtb, pain with internal/external rotation of b/l hips    Imaging    MRI LUMBAR SPINE WITHOUT CONTRAST     INDICATION: M54.42: Lumbago with sciatica, left side  G89.29: Other chronic pain.     COMPARISON:  None.     TECHNIQUE:  Sagittal T1, sagittal T2, sagittal inversion recovery, axial T1 and axial T2, coronal T2.    IMAGE QUALITY:  Diagnostic     FINDINGS:     VERTEBRAL BODIES:  There are 5 lumbar type vertebral bodies.  Normal alignment of the lumbar spine.  No spondylolysis or spondylolisthesis. No scoliosis.  No compression fracture.    Normal marrow signal is identified within the visualized bony   structures.  No discrete marrow lesion.     SACRUM:  Normal signal within the sacrum. No evidence of insufficiency or stress fracture.     DISTAL CORD AND CONUS:  Normal size and signal within the distal cord and conus.     PARASPINAL SOFT TISSUES:  There is thickening of the endometrial cavity partially visualized on this examination towards the fundus.  There is a dominant follicle identified within the left ovary.     LOWER THORACIC DISC SPACES:  Normal disc height and signal.  No disc herniation, canal stenosis or foraminal narrowing.     LUMBAR DISC SPACES:     L1-L2:  Normal.     L2-L3:  Normal.     L3-L4:  Annular bulging with a small broad-based left foraminal and extraforaminal disc protrusion best seen on series 6 image 13.  There is no canal stenosis.  Only minimal left foraminal narrowing without nerve impingement.     L4-L5:  Disc desiccation and loss of disc height with mild annular bulging.  Small central disc herniation without canal stenosis or foraminal nerve  impingement.     L5-S1:  Normal disc height and signal without canal stenosis or foraminal narrowing.     IMPRESSION:     Mild noncompressive lumbar degenerative change at the L3-4 and L4-5 levels.     Fluid signal in the endometrial cavity partially visualized at the fundus.  If patient is postmenopausal, consider follow-up ultrasound imaging.     Signal

## 2024-04-09 ENCOUNTER — HOSPITAL ENCOUNTER (OUTPATIENT)
Dept: ULTRASOUND IMAGING | Facility: CLINIC | Age: 49
Discharge: HOME | End: 2024-04-09

## 2024-04-09 ENCOUNTER — TELEPHONE (OUTPATIENT)
Age: 49
End: 2024-04-09

## 2024-04-09 ENCOUNTER — TELEPHONE (OUTPATIENT)
Dept: FAMILY MEDICINE CLINIC | Facility: CLINIC | Age: 49
End: 2024-04-09

## 2024-04-09 ENCOUNTER — HOSPITAL ENCOUNTER (OUTPATIENT)
Dept: MAMMOGRAPHY | Facility: CLINIC | Age: 49
Discharge: HOME | End: 2024-04-09

## 2024-04-09 VITALS — BODY MASS INDEX: 46.56 KG/M2 | HEIGHT: 62 IN | WEIGHT: 253 LBS

## 2024-04-09 DIAGNOSIS — Z12.31 VISIT FOR SCREENING MAMMOGRAM: ICD-10-CM

## 2024-04-09 DIAGNOSIS — Z12.39 OTHER SCREENING BREAST EXAMINATION: ICD-10-CM

## 2024-04-09 DIAGNOSIS — R92.30 DENSE BREASTS: ICD-10-CM

## 2024-04-09 NOTE — TELEPHONE ENCOUNTER
Spoke to my manager, Charmaine, regarding her breast situation.  She suggested that the pt call her gynecologist to ask their advice, since this an issue that we really don't deal with in our office.  Left pt a vm & told her to call our office with any questions or concerns.

## 2024-04-09 NOTE — TELEPHONE ENCOUNTER
Spoke to pt to remind her that she is due for diabetic eye exam for this year.  She is established with [facility] and she is going to call for an appt.  I told her when she gets it done to please call the office & let us know so we can get a copy.

## 2024-04-09 NOTE — TELEPHONE ENCOUNTER
Pt tried getting mammo and US of right breast but she has an inch long tear under right breast. This must be addressed prior to pt being able to have these scans. Please contact pt for possible double book or other options.

## 2024-04-10 ENCOUNTER — OFFICE VISIT (OUTPATIENT)
Dept: DENTISTRY | Facility: CLINIC | Age: 49
End: 2024-04-10

## 2024-04-10 VITALS — HEART RATE: 115 BPM | DIASTOLIC BLOOD PRESSURE: 61 MMHG | SYSTOLIC BLOOD PRESSURE: 101 MMHG | TEMPERATURE: 97.8 F

## 2024-04-10 DIAGNOSIS — K02.9 COMPLEX DENTAL CARIES: ICD-10-CM

## 2024-04-10 DIAGNOSIS — K01.1 IMPACTED THIRD MOLAR TOOTH: Primary | ICD-10-CM

## 2024-04-10 PROCEDURE — D0150 COMPREHENSIVE ORAL EVALUATION - NEW OR ESTABLISHED PATIENT: HCPCS | Performed by: DENTIST

## 2024-04-10 PROCEDURE — D0274 BITEWINGS - 4 RADIOGRAPHIC IMAGES: HCPCS | Performed by: DENTIST

## 2024-04-10 PROCEDURE — D0230 INTRAORAL - PERIAPICAL EACH ADDITIONAL RADIOGRAPHIC IMAGE: HCPCS | Performed by: DENTIST

## 2024-04-10 PROCEDURE — D0330 PANORAMIC RADIOGRAPHIC IMAGE: HCPCS | Performed by: DENTIST

## 2024-04-10 PROCEDURE — D0220 INTRAORAL - PERIAPICAL FIRST RADIOGRAPHIC IMAGE: HCPCS | Performed by: DENTIST

## 2024-04-11 ENCOUNTER — HOSPITAL ENCOUNTER (OUTPATIENT)
Dept: GASTROENTEROLOGY | Facility: HOSPITAL | Age: 49
Setting detail: OUTPATIENT SURGERY
End: 2024-04-11
Attending: ANESTHESIOLOGY
Payer: COMMERCIAL

## 2024-04-11 ENCOUNTER — TELEPHONE (OUTPATIENT)
Dept: DENTISTRY | Facility: CLINIC | Age: 49
End: 2024-04-11

## 2024-04-11 VITALS
TEMPERATURE: 97.3 F | RESPIRATION RATE: 20 BRPM | DIASTOLIC BLOOD PRESSURE: 56 MMHG | OXYGEN SATURATION: 96 % | BODY MASS INDEX: 46.56 KG/M2 | SYSTOLIC BLOOD PRESSURE: 115 MMHG | HEART RATE: 76 BPM | WEIGHT: 253 LBS | HEIGHT: 62 IN

## 2024-04-11 DIAGNOSIS — M16.0 PRIMARY OSTEOARTHRITIS OF BOTH HIPS: ICD-10-CM

## 2024-04-11 LAB
EXT PREGNANCY TEST URINE: NEGATIVE
EXT. CONTROL: NORMAL
GLUCOSE SERPL-MCNC: 78 MG/DL (ref 65–140)

## 2024-04-11 PROCEDURE — 81025 URINE PREGNANCY TEST: CPT | Performed by: ANESTHESIOLOGY

## 2024-04-11 PROCEDURE — 77002 NEEDLE LOCALIZATION BY XRAY: CPT | Performed by: ANESTHESIOLOGY

## 2024-04-11 PROCEDURE — 20610 DRAIN/INJ JOINT/BURSA W/O US: CPT | Performed by: ANESTHESIOLOGY

## 2024-04-11 PROCEDURE — 82948 REAGENT STRIP/BLOOD GLUCOSE: CPT

## 2024-04-11 PROCEDURE — 77002 NEEDLE LOCALIZATION BY XRAY: CPT

## 2024-04-11 RX ORDER — LIDOCAINE HYDROCHLORIDE 10 MG/ML
INJECTION, SOLUTION EPIDURAL; INFILTRATION; INTRACAUDAL; PERINEURAL AS NEEDED
Status: COMPLETED | OUTPATIENT
Start: 2024-04-11 | End: 2024-04-11

## 2024-04-11 RX ORDER — METHYLPREDNISOLONE ACETATE 80 MG/ML
INJECTION, SUSPENSION INTRA-ARTICULAR; INTRALESIONAL; INTRAMUSCULAR; SOFT TISSUE AS NEEDED
Status: COMPLETED | OUTPATIENT
Start: 2024-04-11 | End: 2024-04-11

## 2024-04-11 RX ORDER — BUPIVACAINE HYDROCHLORIDE 2.5 MG/ML
INJECTION, SOLUTION EPIDURAL; INFILTRATION; INTRACAUDAL AS NEEDED
Status: COMPLETED | OUTPATIENT
Start: 2024-04-11 | End: 2024-04-11

## 2024-04-11 RX ADMIN — IOHEXOL 2 ML: 240 INJECTION, SOLUTION INTRATHECAL; INTRAVASCULAR; INTRAVENOUS; ORAL at 11:27

## 2024-04-11 RX ADMIN — METHYLPREDNISOLONE ACETATE 80 MG: 80 INJECTION, SUSPENSION INTRA-ARTICULAR; INTRALESIONAL; INTRAMUSCULAR; SOFT TISSUE at 11:27

## 2024-04-11 RX ADMIN — BUPIVACAINE HYDROCHLORIDE 9 ML: 2.5 INJECTION, SOLUTION EPIDURAL; INFILTRATION; INTRACAUDAL; PERINEURAL at 11:27

## 2024-04-11 RX ADMIN — LIDOCAINE HYDROCHLORIDE 10 ML: 10 INJECTION, SOLUTION EPIDURAL; INFILTRATION; INTRACAUDAL; PERINEURAL at 11:26

## 2024-04-11 NOTE — DISCHARGE INSTR - AVS FIRST PAGE
YOUR 2 WEEK FOLLOW UP HAS BEEN SCHEDULED; IF YOU WISH TO CHANGE THE FOLLOW UP, PLEASE CALL THE [facility] AT [place]: [phone]      Steroid Joint Injection   WHAT YOU NEED TO KNOW:   A steroid joint injection is a procedure to inject steroid medicine into a joint. Steroid medicine decreases pain and inflammation. The injection may also contain an anesthetic (numbing medicine) to decrease pain. It may be done to treat conditions such as arthritis, gout, or carpal tunnel syndrome. The injections may be given in your knee, ankle, shoulder, elbow, or wrist. Injections may also be given in your hip, toe, thumb, or finger.  DISCHARGE INSTRUCTIONS:   Contact your healthcare provider if:   You have fever or chills.     You have redness or swelling at the injection site.     You have more pain than usual in your joint for more than 72 hours.     You have questions or concerns about your condition or care.    Medicines:   Pain medicine  may be given. Ask how to take this medicine safely.     Take your medicine as directed.  Contact your healthcare provider if you think your medicine is not helping or if you have side effects. Tell your provider if you are allergic to any medicine. Keep a list of the medicines, vitamins, and herbs you take. Include the amounts, and when and why you take them. Bring the list or the pill bottles to follow-up visits. Carry your medicine list with you in case of an emergency.    Self-care:   Leave the bandage on for 8 to 12 hours.  Care for your wound as directed.    Rest the area  as directed. You may need to decrease weight on certain joints, such as the knee, for a period of time. Ask when you can return to your daily activities.     Elevate  your limb where the steroid injection was given. Elevate the limb above the level of your heart as often as you can. This will help decrease swelling and pain. Prop your limb on pillows or blankets to keep it elevated comfortably.          Apply ice  on your joint for 15 to 20 minutes every hour or as directed. Use an ice pack, or put crushed ice in a plastic bag. Cover it with a towel. Ice helps prevent tissue damage and decreases swelling and pain.    © Copyright [employer] 2023 Information is for End User's use only and may not be sold, redistributed or otherwise used for commercial purposes.  The above information is an  only. It is not intended as medical advice for individual conditions or treatments. Talk to your doctor, nurse or pharmacist before following any medical regimen to see if it is safe and effective for you.

## 2024-04-11 NOTE — DENTAL PROCEDURE DETAILS
"Comprehensive Oral Evaluation, Periodontal Charting, PAN, 4 BW and Selected PAs  Cristy Garcia presents for a Comprehensive exam. Verbal consent for treatment given in addition to the forms.  Consents signed: Yes   Reviewed health history - no changes.   Patient is ASA II  Pain Scale: 4  Chief Complain: \" Broken back top teeth on left and right\".  HPI: patient reported moderate dull ache of upper left and right back broken molars for a period of time with history of food impaction inside the broken cavities.    Radiographs: Started with FMX took some PA but DA couldn't proceed due to limited mouth opening and couldn't capture the root apices. Then took PAN and Bitewings x4   Perio: Generalized, Moderate bleeding, and Gingivitis. Deep perio pockets of #3,14,15,16,17,32. See periodontal charting.   Caries Assessment: Medium  Oral Hygiene instruction reviewed and given.  Recommended Hygiene recall visits with the [facility].     Treatment Plan:  1.  Infection control: referred for   2.  Periodontal therapy: adult prophy.   3.  Caries control: as charted. Extensive deep caries/broken teeth #3,14,15,16. Recommended extractions. Caries of partially impacted teeth #17 and #32. Recommended extractions.   4.  Impacted tooth #1. Recommended extraction.   5.  Occlusal evaluation: Class I occlusion. Occlusal and incisal attrition.   6.  Case Difficulty Type 2  7.  Recommended upper removable partial denture. Discussed and showed samples of metal base VS acrylic base. Patient opt for metal base partial denture.   Prognosis is Good.  Referrals needed: ASAP to [facility] for evaluation and extractions of non restorable teeth #1,3,14,15,16,17,32.   POI is given. Patient left satisfied and ambulatory.   NV1: Prophy with hyg.  NV2: Upper RPD after [facility].     "

## 2024-04-11 NOTE — INTERVAL H&P NOTE
H&P reviewed. After examining the patient I find no changes in the patients condition since the H&P had been written.    Vitals:    04/11/24 1101   BP: 126/71   Pulse: 89   Resp: 22   Temp: (!) 97.4 °F (36.3 °C)   SpO2: 99%

## 2024-04-12 DIAGNOSIS — E11.65 TYPE 2 DIABETES MELLITUS WITH HYPERGLYCEMIA, WITHOUT LONG-TERM CURRENT USE OF INSULIN (HCC): ICD-10-CM

## 2024-04-12 RX ORDER — GLIPIZIDE 10 MG/1
TABLET ORAL
Qty: 360 TABLET | Refills: 1 | Status: SHIPPED | OUTPATIENT
Start: 2024-04-12

## 2024-04-15 DIAGNOSIS — E11.65 TYPE 2 DIABETES MELLITUS WITH HYPERGLYCEMIA, WITHOUT LONG-TERM CURRENT USE OF INSULIN (HCC): ICD-10-CM

## 2024-04-15 NOTE — TELEPHONE ENCOUNTER
Medication: Glipizide    Dose/Frequency: 10 mg; take 2 tabs twice daily before meals    Quantity: 360    Pharmacy: [facility]    Office:   [x] PCP/Provider -   [] Speciality/Provider -     Does the patient have enough for 3 days?   [x] Yes   [] No - Send as HP to POD

## 2024-04-16 RX ORDER — GLIPIZIDE 10 MG/1
TABLET ORAL
Qty: 360 TABLET | Refills: 1 | OUTPATIENT
Start: 2024-04-16

## 2024-04-17 ENCOUNTER — EVALUATION (OUTPATIENT)
Dept: PHYSICAL THERAPY | Facility: CLINIC | Age: 49
End: 2024-04-17
Payer: COMMERCIAL

## 2024-04-17 DIAGNOSIS — M47.816 LUMBAR SPONDYLOSIS: ICD-10-CM

## 2024-04-17 DIAGNOSIS — M16.0 PRIMARY OSTEOARTHRITIS OF BOTH HIPS: ICD-10-CM

## 2024-04-17 PROCEDURE — 97163 PT EVAL HIGH COMPLEX 45 MIN: CPT

## 2024-04-17 PROCEDURE — 97535 SELF CARE MNGMENT TRAINING: CPT

## 2024-04-17 NOTE — PROGRESS NOTES
PT Evaluation     Today's date: 2024  Patient name: Cristy Garcia  : 1975  MRN: [id number]  Referring provider: Westley Keller MD  Dx:   Encounter Diagnosis     ICD-10-CM    1. Lumbar spondylosis  M47.816 Ambulatory referral to Physical Therapy      2. Primary osteoarthritis of both hips  M16.0 Ambulatory referral to Physical Therapy          Start Time: 1000  Stop Time: 1045  Total time in clinic (min): 45 minutes    Assessment  Assessment details: Pt is a 49 y/o female presenting to OP PT w/ c/o low back and bilateral hip pain. Upon evaluation, she presented with TTP on bilateral glutes/piriformis, TTP at B erectorspinae and paraspinals with L>R, pain with lumbar AROM and subsequent decreased mobility of extension, lateral flexion, and rotation. She also presented with BLE strength deficits with L>R and decreased hip flexion AROM due to pain. Special testing was unremarkable with reproduction of symptoms only with ANISA but symptoms were mild. Pt is also at an increased risk for falling based on 5xSTS time. Pt was educated in HEP for core and BLE strength and demonstrated understanding and independence. Pt would benefit from skilled PT in order to improve core and BLE strength and mobility to improve balance and functional mobility.   Impairments: abnormal gait, abnormal or restricted ROM, activity intolerance, impaired physical strength, lacks appropriate home exercise program, pain with function, weight-bearing intolerance and poor posture     Symptom irritability: high  Goals  STG to be met within 4 weeks:  1. Pt will increase BLE strength by 1/2 muscle grade to improve functional mobility and reduce pain.   2. Pt will be independent with HEP to decrease pain and improve quality of life.   3. Pt will report 3/10 pain at worst to improve functional mobility and quality of life.   4. Pt will improve 5xSTS time by 3 seconds to reduce risk for falling.     LTG to be met within 8 weeks:  1. Pt  will improve BLE strength to WNL to improve functional mobility and quality of life.   2. Pt will restore lumbar spine AROM to WNL to improve functional mobility and quality of life.   3. Pt will meet FOTO score goal at DC to improve lumbar spine mobility in pain free range.   4. Pt will improve 5xSTS by 10 seconds to reduce risk for falling.        Plan  Patient would benefit from: PT eval and skilled physical therapy  Planned modality interventions: cryotherapy and thermotherapy: hydrocollator packs  Planned therapy interventions: abdominal trunk stabilization, flexibility, functional ROM exercises, graded activity, graded exercise, home exercise program, joint mobilization, massage, neuromuscular re-education, patient education, self care, strengthening, stretching, therapeutic activities and therapeutic exercise  Frequency: 2x week  Duration in weeks: 6  Treatment plan discussed with: patient      Subjective Evaluation    History of Present Illness  Mechanism of injury: Patient reports she has a long h/o low back and B hip pain that progressively worsened around 2023. She then had an ablation on 3/12/24 of L3-5 and injections in B hips. Since then, she has had an improvement in her pain but is continuing to have high levels of pain especially when the weather is changing. She feels a lot of her pain is related to fibromyalgia.   Patient Goals  Patient goals for therapy: decreased pain, improved balance, increased motion and increased strength    Pain  Current pain ratin  At best pain ratin  At worst pain ratin  Location: globally  Quality: dull ache, throbbing, sharp and radiating  Relieving factors: medications  Aggravating factors: lifting, standing and walking  Progression: improved    Treatments  Previous treatment: injection treatment  Current treatment: physical therapy        Objective     Palpation   Left   Tenderness of the erector spinae, gluteus merissa, gluteus medius,  lumbar paraspinals, piriformis and quadratus lumborum.     Right   Tenderness of the erector spinae, gluteus merissa, gluteus medius, lumbar paraspinals and piriformis.     Tenderness     Left Hip   Tenderness in the PSIS. No tenderness in the greater trochanter.     Right Hip   Tenderness in the PSIS.     Neurological Testing     Sensation     Lumbar   Left   Intact: light touch    Right   Intact: light touch    Reflexes   Left   Patellar (L4): trace (1+)  Achilles (S1): trace (1+)    Right   Patellar (L4): trace (1+)  Achilles (S1): trace (1+)    Active Range of Motion     Lumbar   Flexion:  WFL  Extension: 15 degrees  with pain  Left lateral flexion:  with pain Restriction level: moderate  Right lateral flexion:  with pain Restriction level: moderate  Left rotation:  with pain Restriction level: moderate  Right rotation:  with pain Restriction level: moderate  Left Hip   Flexion: 85 degrees     Right Hip   Flexion: 85 degrees     Joint Play     Hypomobile: L2, L3, L4, L5 and S1     Pain: L2, L3, L4, L5 and S1     Strength/Myotome Testing     Left Hip   Planes of Motion   Flexion: 3+  Extension: 3  Abduction: 4-  External rotation: 4-  Internal rotation: 4-    Right Hip   Planes of Motion   Flexion: 4-  Extension: 3  Abduction: 4-  External rotation: 4-  Internal rotation: 4-    Left Knee   Flexion: 3+  Extension: 3+    Right Knee   Flexion: 4-  Extension: 4-    Muscle Activation   Patient unable to activate left transverse abdominals and right transverse abdominals.     Tests     Lumbar     Left   Negative crossed SLR, passive SLR and slump test.     Right   Negative crossed SLR, passive SLR and slump test.     Left Pelvic Girdle/Sacrum   Negative: active SLR test.     Right Pelvic Girdle/Sacrum   Negative: active SLR test.     Left Hip   Positive ANISA.   Negative FADIR.     Right Hip   Positive ANISA.   Negative FADIR.   Neuro Exam:     Functional outcomes   5x sit to stand: 23 (seconds)      Flowsheet Rows       Flowsheet Row Most Recent Value   PT/OT G-Codes    Current Score 39   Projected Score 44               Precautions: PMH HTN, T2DM, GERD, Fibromyalgia, B hip OA, h/o L hip fx      Manuals 4/17        B HS, piriformis, quad, gastroc, hip abd with manual traction         Lumbar PA mobs/ STM                           Neuro Re-Ed         DKTC         Bridge w/ hip abd         TrA c marches, SLR          Lumbar rotation w/ TB         Front and lateral lunges         Seated Tball trunk rotation w/ PNF                           Ther Ex         Nustep  for ROM          Leg press DL and SL         Supine SLR 3 way         [facility] update/review 15'        Ther Activity                           Gait Training                           Modalities         P

## 2024-04-18 ENCOUNTER — TELEPHONE (OUTPATIENT)
Dept: FAMILY MEDICINE CLINIC | Facility: CLINIC | Age: 49
End: 2024-04-18

## 2024-04-18 ENCOUNTER — TELEPHONE (OUTPATIENT)
Dept: OBGYN CLINIC | Facility: HOSPITAL | Age: 49
End: 2024-04-18

## 2024-04-18 NOTE — TELEPHONE ENCOUNTER
I had called the patient to schedule her diabetic foot exam, and she also asked me if she can get a letter to get out of Federal Jury Duty.  She said Suha has done one in the past for her for regular jury duty because she is unable to sit very long due to her medical conditions.

## 2024-04-19 ENCOUNTER — SOCIAL WORK (OUTPATIENT)
Dept: BEHAVIORAL/MENTAL HEALTH CLINIC | Facility: CLINIC | Age: 49
End: 2024-04-19
Payer: COMMERCIAL

## 2024-04-19 DIAGNOSIS — F33.9 MAJOR DEPRESSION, RECURRENT, CHRONIC (HCC): Primary | ICD-10-CM

## 2024-04-19 PROCEDURE — T1015 CLINIC SERVICE: HCPCS | Performed by: SOCIAL WORKER

## 2024-04-19 NOTE — PROGRESS NOTES
Daily Note     Today's date: 2024  Patient name: Cristy Garcia  : 1975  MRN: [id number]  Referring provider: Westley Keller MD  Dx:   Encounter Diagnosis     ICD-10-CM    1. Lumbar spondylosis  M47.816       2. Primary osteoarthritis of both hips  M16.0                      Subjective: ***      Objective: See treatment diary below      Assessment: Tolerated treatment {Tolerated treatment :8223981441}. Patient exhibited good technique with therapeutic exercises and would benefit from continued PT to increase ROM/strength and endurance to improve mobility.      Plan: Continue per plan of care.      Precautions: PMH HTN, T2DM, GERD, Fibromyalgia, B hip OA, h/o L hip fx      Manuals        B HS, piriformis, quad, gastroc, hip abd with manual traction         Lumbar PA mobs/ STM                           Neuro Re-Ed         DKTC         Bridge w/ hip abd         TrA c marches, SLR          Lumbar rotation w/ TB         Front and lateral lunges         Seated Tball trunk rotation w/ PNF                           Ther Ex         Nustep  for ROM          Leg press DL and SL         Supine SLR 3 way         Clamshells                                    HEP update/review 15'        Ther Activity                           Gait Training                           Modalities         MHP

## 2024-04-19 NOTE — PSYCH
"Behavioral Health Psychotherapy Progress Note    Psychotherapy Provided: Individual Psychotherapy     1. Major depression, recurrent, chronic (HCC)            Goals addressed in session: Creation of Recovery Treatment plan     DATA: The client reported that she is still having pain due to her physical health issues. She reported that she summoned for federal jury duty, but due to her physical health issues. During the session we created her recovery treatment plan, with the client making mandy improvement in addressing her depression. Coping skills were reviewed   During this session, this clinician used the following therapeutic modalities: Cognitive Behavioral Therapy, Mindfulness-based Strategies, Solution-Focused Therapy, and Supportive Psychotherapy    Substance Abuse was not addressed during this session. If the client is diagnosed with a co-occurring substance use disorder, please indicate any changes in the frequency or amount of use: n/a. Stage of change for addressing substance use diagnoses: No substance use/Not applicable    ASSESSMENT:  Cirsty Garcia presents with a Anxious and Depressed mood.     her affect is Normal range and intensity, which is congruent, with her mood and the content of the session. The client has made progress on their goals.    The client  Cristy Garcia presents with a none risk of suicide, none risk of self-harm, and none risk of harm to others.    For any risk assessment that surpasses a \"low\" rating, a safety plan must be developed.    A safety plan was indicated: no  If yes, describe in detail N/a    PLAN: Between sessions, Cristy Garcia will practice her coping skills when presented with anxiety and or depression. At the next session, the therapist will use Cognitive Behavioral Therapy, Mindfulness-based Strategies, Solution-Focused Therapy, and Supportive Psychotherapy to address the client's MH issues affecting her different relationships and environments.    Behavioral " Health Treatment Plan and Discharge Planning: Cristy Garcia is aware of and agrees to continue to work on their treatment plan. They have identified and are working toward their discharge goals. yes    Visit start and stop times:    04/19/24  Start Time: 1000  Stop Time: 1027  Total Visit Time: 27 minutes    Telemedicine consent    Patient: Cristy Garcia  Provider: YONATAN Luu  Provider located at [address] [address]    The patient was identified by name and date of birth. Cristy Garcia was informed that this is a telemedicine visit and that the visit is being conducted through Telephone.  My office door was closed. No one else was in the room.  She acknowledged consent and understanding of privacy and security of the video platform. The patient has agreed to participate and understands they can discontinue the visit at any time.    Patient is aware this is a billable service.     I spent 27 minutes with the patient during this visit.

## 2024-04-19 NOTE — BH TREATMENT PLAN
"Outpatient Behavioral Health Psychotherapy Treatment Plan     Cristy Garcia  1975      Date of Initial Psychotherapy Assessment: 08/30/20  Date of Current Treatment Plan: 04/19/24  Treatment Plan Target Date: 04/06/24  Treatment Plan Expiration Date: 10/19/24     Diagnosis:   1. Major depression, recurrent, chronic (HCC)                Area(s) of Need: The Client has a long history of depression and anxiety, which is effecting her different relationships and environments. It is hoped that The Client will achieve or maintain maximum functional capacity in performing daily activizes, taking into account both the functional capacity of the individual and those functional capacities appropriate for the individuals of the same age. Reduce or ameliorate the physical mental, behavioral, or developmental effects of an illness, condition, injury or disability. Present treatment plan will cover the next 6 months or completion of her recovery crisis plan      Long Term Goal 1 (in the client's own words): \" to make me feel better\"      Stage of Change: Action     Target Date for completion: 03/6/24             Anticipated therapeutic modalities: Supportive Therapy, Strengths Therapy,  and Cognitive behavioral Therapy will be the treatment modalities utilized during the session.             People identified to complete this goal: The Client her support team and this therapist                    Objective 1: (identify the means of measuring success in meeting the objective): The Client's depression score on her PHQ-9 will decrease from 17 to 8 or less (emerging score decreased by 8 points 04/19/24)                        Objective 2: (identify the means of measuring success in meeting the objective): The Client will utilize her  coping skills 3 out of 5 when presented with depression. (emerging 04/19/24)      Long Term Goal 2 (in the client's own words): \"It is not good to be anxious all the time\"      Stage of Change: " "Action     Target Date for completion:  03/6/24  Supportive Therapy, Strengths Therapy,  and Cognitive behavioral Therapy will be the treatment modalities utilized during the session.                Anticipated therapeutic modalities: The Client her support team and this therapist             People identified to complete this goal: the client her support team and this therapist                    Objective 1: (identify the means of measuring success in meeting the objective): The Client will become more aware of her anxiety when presented 3 out of 5 times(emerging-04/19/24)                       Objective 2: (identify the means of measuring success in meeting the objective): The Client will utilize her coping skill 3 out of 5 times when presented with anxiety. (emerging 04/19/24)     Long Term Goal 3 (in the client's own words): \" Attending all of my medical appointment makes me healthier\"      Stage of Change: Action     Target Date for completion:  03/6/24             Anticipated therapeutic modalities: Supportive Therapy, Strengths Therapy,  and Cognitive behavioral Therapy will be the treatment modalities utilized during the session.                   People identified to complete this goal: The Client her support team and this therapist                        Objective 1: (identify the means of measuring success in meeting the objective): The Client will attend all of her medical appointments 100% of the time. (emerging 04/19/24)                     I am currently under the care of a [facility] psychiatric provider: No Just PCP for Medication management      My [facility] psychiatric provider is: Lolis PEREZ/Medication management/ Kristina OSWALD LCSW at [facility]     I am currently taking psychiatric medications: Yes, as prescribed     I feel that I will be ready for discharge from mental health care when I reach the following (measurable goal/objective): \" I " "don't know maybe when my financials get better\"      For children and adults who have a legal guardian:          Has there been any change to custody orders and/or guardianship status? n/a. If yes, attach updated documentation.     I have created my Crisis Plan and have been offered a copy of this plan     Behavioral Health Treatment Plan [facility]: Diagnosis and Treatment Plan explained to Cristy Garcia acknowledges an understanding of their diagnosis. Cristy Garcia agrees to this treatment plan.     I have been offered a copy of this Treatment Plan. yes     "

## 2024-04-21 DIAGNOSIS — J45.30 MILD PERSISTENT ASTHMA WITHOUT COMPLICATION: ICD-10-CM

## 2024-04-21 DIAGNOSIS — E78.2 MIXED HYPERLIPIDEMIA: ICD-10-CM

## 2024-04-22 ENCOUNTER — APPOINTMENT (OUTPATIENT)
Dept: PHYSICAL THERAPY | Facility: CLINIC | Age: 49
End: 2024-04-22
Payer: COMMERCIAL

## 2024-04-22 DIAGNOSIS — M16.0 PRIMARY OSTEOARTHRITIS OF BOTH HIPS: ICD-10-CM

## 2024-04-22 DIAGNOSIS — M47.816 LUMBAR SPONDYLOSIS: Primary | ICD-10-CM

## 2024-04-22 RX ORDER — FLUTICASONE PROPIONATE AND SALMETEROL 250; 50 UG/1; UG/1
1 POWDER RESPIRATORY (INHALATION) 2 TIMES DAILY
Qty: 180 BLISTER | Refills: 1 | Status: SHIPPED | OUTPATIENT
Start: 2024-04-22 | End: 2025-04-17

## 2024-04-22 RX ORDER — ATORVASTATIN CALCIUM 20 MG/1
20 TABLET, FILM COATED ORAL
Qty: 90 TABLET | Refills: 1 | Status: SHIPPED | OUTPATIENT
Start: 2024-04-22

## 2024-04-23 NOTE — PROGRESS NOTES
"Daily Note     Today's date: 2024  Patient name: Cristy Garcia  : 1975  MRN: [id number]  Referring provider: Westley Keller MD  Dx:   Encounter Diagnosis     ICD-10-CM    1. Lumbar spondylosis  M47.816       2. Primary osteoarthritis of both hips  M16.0           Start Time: 1500  Stop Time: 1605  Total time in clinic (min): 65 minutes    Subjective: Patient reports she is feeling ok and that her pain is a 5-6/10.       Objective: See treatment diary below      Assessment: Tolerated treatment well. Patient exhibited good technique with therapeutic exercises and would benefit from continued PT to increase ROM/strength and endurance to improve mobility.      Plan: Continue per plan of care.      Precautions: PMH HTN, T2DM, GERD, Fibromyalgia, B hip OA, h/o L hip fx      Manuals        B HS, piriformis, quad, gastroc, hip abd with manual traction  15'       Lumbar PA mobs/ STM                           Neuro Re-Ed         DKTC  10x5\" hold       Bridge w/ hip abd  2x10 RTB       TrA c marches, SLR          LTR c Tball  10x5\" hold       Front and lateral lunges         Seated Tball trunk rotation w/ PNF                           Ther Ex         Nustep  for ROM   10' L1       Leg press DL and SL         Supine SLR 3 way         Clamshells  2x10 RTB                                  HEP update/review 15'        Ther Activity                           Gait Training                           Modalities         MHP  MHP 15'                        "

## 2024-04-24 ENCOUNTER — OFFICE VISIT (OUTPATIENT)
Dept: PHYSICAL THERAPY | Facility: CLINIC | Age: 49
End: 2024-04-24
Payer: COMMERCIAL

## 2024-04-24 DIAGNOSIS — M16.0 PRIMARY OSTEOARTHRITIS OF BOTH HIPS: ICD-10-CM

## 2024-04-24 DIAGNOSIS — M47.816 LUMBAR SPONDYLOSIS: Primary | ICD-10-CM

## 2024-04-24 PROCEDURE — 97112 NEUROMUSCULAR REEDUCATION: CPT

## 2024-04-24 PROCEDURE — 97110 THERAPEUTIC EXERCISES: CPT

## 2024-04-24 PROCEDURE — 97140 MANUAL THERAPY 1/> REGIONS: CPT

## 2024-04-28 NOTE — PROGRESS NOTES
"Daily Note     Today's date: 2024  Patient name: Cristy Garcia  : 1975  MRN: [id number]  Referring provider: Westley Keller MD  Dx:   Encounter Diagnosis     ICD-10-CM    1. Lumbar spondylosis  M47.816       2. Primary osteoarthritis of both hips  M16.0                      Subjective:       Objective: See treatment diary below      Assessment: Tolerated treatment {Tolerated treatment :[id number]}. Patient would benefit from continued PT      Plan: Continue per plan of care.      Precautions: PMH HTN, T2DM, GERD, Fibromyalgia, B hip OA, h/o L hip fx      Manuals       B HS, piriformis, quad, gastroc, hip abd with manual traction  15' 15'      Lumbar PA mobs/ STM                           Neuro Re-Ed         DKTC  10x5\" hold 5\"x10      Bridge w/ hip abd  2x10 RTB 2x10 RTB      TrA c marches, SLR          LTR c Tball  10x5\" hold 5\"x10      Front and lateral lunges         Seated Tball trunk rotation w/ PNF                           Ther Ex         Nustep  for ROM   10' L1 L1 10'      Leg press DL and SL         Supine SLR 3 way         Clamshells  2x10 RTB 2x10 RTB                                 HEP update/review 15'        Ther Activity                           Gait Training                           Modalities         MHP  MHP 15'  MHP 15'                        "

## 2024-04-29 ENCOUNTER — HOSPITAL ENCOUNTER (OUTPATIENT)
Dept: ULTRASOUND IMAGING | Facility: HOSPITAL | Age: 49
Discharge: HOME | End: 2024-04-29
Attending: OTOLARYNGOLOGY
Payer: COMMERCIAL

## 2024-04-29 ENCOUNTER — APPOINTMENT (OUTPATIENT)
Dept: LAB | Facility: HOSPITAL | Age: 49
End: 2024-04-29
Payer: COMMERCIAL

## 2024-04-29 ENCOUNTER — OFFICE VISIT (OUTPATIENT)
Dept: PHYSICAL THERAPY | Facility: CLINIC | Age: 49
End: 2024-04-29
Payer: COMMERCIAL

## 2024-04-29 DIAGNOSIS — M47.816 LUMBAR SPONDYLOSIS: Primary | ICD-10-CM

## 2024-04-29 DIAGNOSIS — E04.9 THYROID GOITER: ICD-10-CM

## 2024-04-29 DIAGNOSIS — M16.0 PRIMARY OSTEOARTHRITIS OF BOTH HIPS: ICD-10-CM

## 2024-04-29 LAB — TSH SERPL DL<=0.05 MIU/L-ACNC: 0.1 UIU/ML (ref 0.45–4.5)

## 2024-04-29 PROCEDURE — 36415 COLL VENOUS BLD VENIPUNCTURE: CPT

## 2024-04-29 PROCEDURE — 84443 ASSAY THYROID STIM HORMONE: CPT

## 2024-04-29 PROCEDURE — 97112 NEUROMUSCULAR REEDUCATION: CPT

## 2024-04-29 PROCEDURE — 97110 THERAPEUTIC EXERCISES: CPT

## 2024-04-29 PROCEDURE — 76536 US EXAM OF HEAD AND NECK: CPT

## 2024-04-29 PROCEDURE — 97140 MANUAL THERAPY 1/> REGIONS: CPT

## 2024-04-29 NOTE — PROGRESS NOTES
"Daily Note     Today's date: 2024  Patient name: Cristy Garcia  : 1975  MRN: [id number]  Referring provider: Westley Keller MD  Dx:   Encounter Diagnosis     ICD-10-CM    1. Lumbar spondylosis  M47.816       2. Primary osteoarthritis of both hips  M16.0           Start Time: 0800  Stop Time: 0910  Total time in clinic (min): 70 minutes    Subjective: Patient reports she is feeling \"OK\" today.      Objective: See treatment diary below      Assessment: Tolerated treatment well. Patient demonstrated fatigue post treatment, exhibited good technique with therapeutic exercises, and would benefit from continued PT to improve core and BLE strength to reduce pain with functional mobility.       Plan: Continue per plan of care.      Precautions: PMH HTN, T2DM, GERD, Fibromyalgia, B hip OA, h/o L hip fx      Manuals       B HS, piriformis, quad, gastroc, hip abd with manual traction  15' 15'      Lumbar PA mobs/ STM                           Neuro Re-Ed         DKTC  10x5\" hold 10x5\" hold      Bridge w/ hip abd  2x10 RTB 2x10 GTB      TrA c marches, SLR          LTR c Tball  10x5\" hold 10x5\" hold      Front and lateral lunges         Seated Tball trunk rotation w/ PNF         Supine crunch c ABD    10x5\" hold      Hip thrust c Tball   2x10       Ther Ex         Nustep  for ROM   10' L1 10' L1      Leg press DL and SL         Supine SLR 3 way         Clamshells  2x10 RTB 2x10 GTB                                  HEP update/review 15'        Ther Activity                           Gait Training                           Modalities         MHP  MHP 15  MHP 15'                        "

## 2024-04-30 ENCOUNTER — TELEPHONE (OUTPATIENT)
Dept: FAMILY MEDICINE CLINIC | Facility: CLINIC | Age: 49
End: 2024-04-30

## 2024-04-30 DIAGNOSIS — E07.9 THYROID DISORDER: Primary | ICD-10-CM

## 2024-04-30 NOTE — PROGRESS NOTES
"Daily Note     Today's date: 2024  Patient name: Cristy Garcia  : 1975  MRN: [id number]  Referring provider: Westley Keller MD  Dx:   Encounter Diagnosis     ICD-10-CM    1. Lumbar spondylosis  M47.816       2. Primary osteoarthritis of both hips  M16.0                      Subjective: Patient reports to PT ready and enthusiastic to begin her program today.      Objective: See treatment diary below      Assessment: Tolerated treatment well. Patient exhibited good technique with therapeutic exercises and would benefit from continued PT to increase ROM/strength and endurance to improve mobility.      Plan: Continue per plan of care.      Precautions: PMH HTN, T2DM, GERD, Fibromyalgia, B hip OA, h/o L hip fx      Manuals      B HS, piriformis, quad, gastroc, hip abd with manual traction  15' 15' 15'     Lumbar PA mobs/ STM                           Neuro Re-Ed       DKTC  10x5\" hold 10x5\" hold 10x5\"     Bridge w/ hip abd  2x10 RTB 2x10 GTB 2x10 GTB     TrA c marches, SLR          LTR c Tball  10x5\" hold 10x5\" hold 10x5\"bilat     Front and lateral lunges         Seated Tball trunk rotation w/ PNF    10x3\"ea bilat RFB     Supine crunch c ABD    10x5\" hold 10x5\"     Hip thrust c Tball   2x10  2x10     Ther Ex       Nustep  for ROM   10' L1 10' L1 10'L2     Leg press DL and SL         Supine SLR 3 way         Clamshells  2x10 RTB 2x10 GTB  2x10 GTB                                HEP update/review 15'        Ther Activity                         Gait Training                         Modalities       MHP  MHP 15'  MHP 15' 15' to LB                         "

## 2024-04-30 NOTE — TELEPHONE ENCOUNTER
----- Message from Gaurav Merino PA-C sent at 4/30/2024  8:39 AM EDT -----  TSH is low.  I want to get repeat thyroid lab work ASAP.  I will place these orders.  Does she see endocrinology?

## 2024-05-01 ENCOUNTER — DOCTOR'S OFFICE (OUTPATIENT)
Dept: URBAN - NONMETROPOLITAN AREA CLINIC 1 | Facility: CLINIC | Age: 49
Setting detail: OPHTHALMOLOGY
End: 2024-05-01
Payer: COMMERCIAL

## 2024-05-01 ENCOUNTER — OFFICE VISIT (OUTPATIENT)
Dept: PHYSICAL THERAPY | Facility: CLINIC | Age: 49
End: 2024-05-01
Payer: COMMERCIAL

## 2024-05-01 DIAGNOSIS — M47.816 LUMBAR SPONDYLOSIS: Primary | ICD-10-CM

## 2024-05-01 DIAGNOSIS — H25.013: ICD-10-CM

## 2024-05-01 DIAGNOSIS — E11.3293: ICD-10-CM

## 2024-05-01 DIAGNOSIS — M16.0 PRIMARY OSTEOARTHRITIS OF BOTH HIPS: ICD-10-CM

## 2024-05-01 LAB
LEFT EYE DIABETIC RETINOPATHY: POSITIVE
RIGHT EYE DIABETIC RETINOPATHY: POSITIVE

## 2024-05-01 PROCEDURE — 97110 THERAPEUTIC EXERCISES: CPT

## 2024-05-01 PROCEDURE — 97112 NEUROMUSCULAR REEDUCATION: CPT

## 2024-05-01 PROCEDURE — 92134 CPTRZ OPH DX IMG PST SGM RTA: CPT | Performed by: OPTOMETRIST

## 2024-05-01 PROCEDURE — 92004 COMPRE OPH EXAM NEW PT 1/>: CPT | Performed by: OPTOMETRIST

## 2024-05-01 PROCEDURE — 97140 MANUAL THERAPY 1/> REGIONS: CPT

## 2024-05-01 ASSESSMENT — CONFRONTATIONAL VISUAL FIELD TEST (CVF)
OS_FINDINGS: FULL
OD_FINDINGS: FULL

## 2024-05-02 ENCOUNTER — APPOINTMENT (OUTPATIENT)
Dept: LAB | Facility: HOSPITAL | Age: 49
End: 2024-05-02
Payer: COMMERCIAL

## 2024-05-02 DIAGNOSIS — E07.9 THYROID DISORDER: ICD-10-CM

## 2024-05-02 LAB
T3 SERPL-MCNC: 1.1 NG/ML
T4 FREE SERPL-MCNC: 0.73 NG/DL (ref 0.61–1.12)
T4 SERPL-MCNC: 8.19 UG/DL (ref 6.09–12.23)
TSH SERPL DL<=0.05 MIU/L-ACNC: 0.17 UIU/ML (ref 0.45–4.5)

## 2024-05-02 PROCEDURE — 84439 ASSAY OF FREE THYROXINE: CPT

## 2024-05-02 PROCEDURE — 84480 ASSAY TRIIODOTHYRONINE (T3): CPT

## 2024-05-02 PROCEDURE — 84436 ASSAY OF TOTAL THYROXINE: CPT

## 2024-05-02 PROCEDURE — 84443 ASSAY THYROID STIM HORMONE: CPT

## 2024-05-02 PROCEDURE — 36415 COLL VENOUS BLD VENIPUNCTURE: CPT

## 2024-05-03 ENCOUNTER — SOCIAL WORK (OUTPATIENT)
Dept: BEHAVIORAL/MENTAL HEALTH CLINIC | Facility: CLINIC | Age: 49
End: 2024-05-03
Payer: COMMERCIAL

## 2024-05-03 ENCOUNTER — OFFICE VISIT (OUTPATIENT)
Dept: FAMILY MEDICINE CLINIC | Facility: CLINIC | Age: 49
End: 2024-05-03
Payer: COMMERCIAL

## 2024-05-03 VITALS
BODY MASS INDEX: 46.27 KG/M2 | HEIGHT: 62 IN | OXYGEN SATURATION: 97 % | DIASTOLIC BLOOD PRESSURE: 90 MMHG | TEMPERATURE: 96.3 F | HEART RATE: 81 BPM | SYSTOLIC BLOOD PRESSURE: 110 MMHG

## 2024-05-03 DIAGNOSIS — D75.1 ERYTHROCYTOSIS: ICD-10-CM

## 2024-05-03 DIAGNOSIS — R11.0 NAUSEA: ICD-10-CM

## 2024-05-03 DIAGNOSIS — R07.9 CHEST PAIN, UNSPECIFIED TYPE: ICD-10-CM

## 2024-05-03 DIAGNOSIS — E11.65 TYPE 2 DIABETES MELLITUS WITH HYPERGLYCEMIA, WITHOUT LONG-TERM CURRENT USE OF INSULIN (HCC): Primary | ICD-10-CM

## 2024-05-03 DIAGNOSIS — R63.0 LOSS OF APPETITE: ICD-10-CM

## 2024-05-03 DIAGNOSIS — D72.829 LEUKOCYTOSIS, UNSPECIFIED TYPE: ICD-10-CM

## 2024-05-03 DIAGNOSIS — F33.9 MAJOR DEPRESSION, RECURRENT, CHRONIC (HCC): Primary | ICD-10-CM

## 2024-05-03 DIAGNOSIS — R42 DIZZINESS: ICD-10-CM

## 2024-05-03 DIAGNOSIS — R92.8 ABNORMAL SCREENING MAMMOGRAM: ICD-10-CM

## 2024-05-03 DIAGNOSIS — R53.83 FATIGUE, UNSPECIFIED TYPE: ICD-10-CM

## 2024-05-03 LAB — SL AMB POCT HEMOGLOBIN AIC: 6.5 (ref ?–6.5)

## 2024-05-03 PROCEDURE — 83036 HEMOGLOBIN GLYCOSYLATED A1C: CPT

## 2024-05-03 PROCEDURE — T1015 CLINIC SERVICE: HCPCS | Performed by: SOCIAL WORKER

## 2024-05-03 PROCEDURE — 99214 OFFICE O/P EST MOD 30 MIN: CPT

## 2024-05-03 NOTE — PROGRESS NOTES
Name: Cristy Garcia      : 1975      MRN: [id number]  Encounter Provider: Gaurav Merino PA-C  Encounter Date: 5/3/2024   Encounter department: [facility]    Assessment & Plan     1. Type 2 diabetes mellitus with hyperglycemia, without long-term current use of insulin (HCC)  -     POCT hemoglobin A1c    2. Leukocytosis, unspecified type  -     Ambulatory Referral to Hematology / Oncology; Future    3. Erythrocytosis  -     Ambulatory Referral to Hematology / Oncology; Future    4. Dizziness    5. Fatigue, unspecified type    6. Nausea    7. Loss of appetite    8. Chest pain, unspecified type  -     ECG 12 lead; Future  -     Echo complete w/ contrast if indicated; Future; Expected date: 2024    9. Abnormal screening mammogram  -     Mammo diagnostic right w cad; Future  -     US breast right complete (abus); Future; Expected date: 2024    Given current symptoms, and lab results/mammo results, patient is to follow-up with repeat testing for diagnostic mammogram and ultrasound of the right breast.  Will follow-up pending results.  Patient is also referred to hematology today for abnormal CBC.  Given the length and has been occurring and symptoms, is very possible it is a viral gastroenteritis, but will test for underlying causes.  Patient is to go to the ER with any worsening symptoms.  Patient is also encouraged to contact GI to let them know of the symptoms.       Subjective      Patient is a 48-year-old female presenting with follow-up.  Patient has had nausea, loss of appetite, fatigue, dizziness, headaches, confusion, chest pain for the past week.  Denies blood in stool, syncope, radiating pain, vomiting.  She does occasionally have diarrhea.  Has been taking Zofran that helps with the nausea.      Review of Systems   Constitutional:  Positive for fatigue. Negative for appetite change, chills, diaphoresis and fever.   HENT:  Negative for congestion, ear discharge,  ear pain, postnasal drip, rhinorrhea, sinus pressure, sinus pain, sneezing and sore throat.    Eyes:  Negative for pain, discharge, redness, itching and visual disturbance.   Respiratory:  Negative for apnea, cough, chest tightness, shortness of breath and wheezing.    Cardiovascular:  Positive for chest pain. Negative for palpitations and leg swelling.   Gastrointestinal:  Positive for diarrhea and nausea. Negative for abdominal pain, blood in stool, constipation and vomiting.   Endocrine: Negative for cold intolerance, heat intolerance, polydipsia and polyuria.   Genitourinary:  Negative for dysuria, flank pain, frequency, hematuria and urgency.   Musculoskeletal:  Negative for arthralgias, back pain, myalgias, neck pain and neck stiffness.   Skin:  Negative for color change and rash.   Allergic/Immunologic: Negative for environmental allergies and food allergies.   Neurological:  Positive for dizziness. Negative for tremors, seizures, syncope, speech difficulty, weakness, light-headedness, numbness and headaches.   Hematological:  Negative for adenopathy. Does not bruise/bleed easily.   Psychiatric/Behavioral:  Positive for confusion. Negative for agitation, decreased concentration, dysphoric mood, hallucinations, self-injury, sleep disturbance and suicidal ideas. The patient is not nervous/anxious and is not hyperactive.    All other systems reviewed and are negative.      Current Outpatient Medications on File Prior to Visit   Medication Sig    albuterol (Ventolin HFA) 90 mcg/act inhaler Inhale 2 puffs every 6 (six) hours as needed for wheezing or shortness of breath    atorvastatin (LIPITOR) 20 mg tablet Take 1 tablet (20 mg total) by mouth daily with dinner    celecoxib (CeleBREX) 200 mg capsule Take 1 capsule (200 mg total) by mouth 2 (two) times a day    cetirizine (ZyrTEC) 10 mg tablet 1 tab po daily x 1 month then prn allergies    clotrimazole-betamethasone (LOTRISONE) 1-0.05 % cream Apply 2 x daily to  vaginal area until healed then prn irritation    Empagliflozin (Jardiance) 25 MG TABS Take 1 tablet (25 mg total) by mouth daily    ergocalciferol (VITAMIN D2) 50,000 units Take 1 capsule (50,000 Units total) by mouth once a week    famotidine (PEPCID) 40 MG tablet TAKE (1) TABLET BY MOUTH DAILY.    finasteride (PROPECIA) 1 MG tablet Take 1 mg by mouth daily    fluticasone (FLONASE) 50 mcg/act nasal spray 1 spray each nostril 2 x daily x 1 month then prn congestion or allergies    Fluticasone-Salmeterol (Advair Diskus) 250-50 mcg/dose inhaler Inhale 1 puff 2 (two) times a day Rinse mouth after use.    gabapentin (NEURONTIN) 300 mg capsule Take 1 capsule (300 mg total) by mouth 3 (three) times a day    glipiZIDE (GLUCOTROL) 10 mg tablet TAKE (2) TABLETS TWICE DAILY BEFORE MEALS.    glucose blood test strip Use as instructed    ketoconazole (NIZORAL) 2 % cream APPLY 1-2 TIMES DAILY TO AFFECTED AREA AS NEEDED.    Lancet Devices (Serviouch Delica Plus Lancing) MISC     Lancets (onetouch ultrasoft) lancets Use as instructed    lisinopril (ZESTRIL) 10 mg tablet Take 1 tablet (10 mg total) by mouth daily    metFORMIN (GLUCOPHAGE) 1000 MG tablet Take 1 tablet (1,000 mg total) by mouth 2 (two) times a day with meals    nystatin powder APPLY AS DIRECTED TO AFFECTED AREA DAILY    omeprazole (PriLOSEC) 40 MG capsule Take 1 capsule (40 mg total) by mouth daily before breakfast    ondansetron (ZOFRAN) 4 mg tablet Take 1 tablet (4 mg total) by mouth every 8 (eight) hours as needed for nausea or vomiting    sertraline (ZOLOFT) 50 mg tablet TAKE (1) TABLET BY MOUTH DAILY AT BEDTIME.    triamcinolone (KENALOG) 0.025 % cream Apply 2 x daily to rash until healed then prn rash    acetaminophen (TYLENOL) 500 mg tablet Take 500 mg by mouth every 6 (six) hours as needed for mild pain (Patient not taking: Reported on 5/3/2024)    glucose blood test strip  (Patient not taking: Reported on 4/8/2024)    medroxyPROGESTERone (PROVERA) 10 mg  "tablet Take 1 tablet (10 mg total) by mouth daily for 10 days (Patient not taking: Reported on 3/25/2024)       Objective     /90 (BP Location: Right arm, Patient Position: Sitting)   Pulse 81   Temp (!) 96.3 °F (35.7 °C) (Tympanic)   Ht 5' 2\" (1.575 m)   SpO2 97%   BMI 46.27 kg/m²     Physical Exam  Vitals and nursing note reviewed.   Constitutional:       Appearance: Normal appearance. She is normal weight. She is not ill-appearing or toxic-appearing.   HENT:      Head: Normocephalic and atraumatic.      Right Ear: Tympanic membrane normal.      Left Ear: Tympanic membrane normal.      Nose: Nose normal.      Mouth/Throat:      Mouth: Mucous membranes are moist.      Pharynx: Oropharynx is clear.   Eyes:      Extraocular Movements: Extraocular movements intact.      Conjunctiva/sclera: Conjunctivae normal.      Pupils: Pupils are equal, round, and reactive to light.   Cardiovascular:      Rate and Rhythm: Normal rate and regular rhythm.      Pulses: Normal pulses.      Heart sounds: Normal heart sounds. No murmur heard.  Pulmonary:      Effort: Pulmonary effort is normal. No respiratory distress.      Breath sounds: Normal breath sounds. No wheezing.   Chest:      Chest wall: No tenderness.   Abdominal:      General: Bowel sounds are normal.      Palpations: Abdomen is soft. There is no mass.      Tenderness: There is no abdominal tenderness.   Musculoskeletal:         General: Swelling present. No tenderness. Normal range of motion.      Cervical back: Normal range of motion and neck supple. No tenderness.      Right lower leg: No edema.      Left lower leg: No edema.   Lymphadenopathy:      Cervical: No cervical adenopathy.   Skin:     General: Skin is warm.      Capillary Refill: Capillary refill takes less than 2 seconds.      Findings: No erythema, lesion or rash.   Neurological:      General: No focal deficit present.      Mental Status: She is alert and oriented to person, place, and time. Mental " status is at baseline.      Cranial Nerves: No cranial nerve deficit.      Sensory: No sensory deficit.      Motor: No weakness.      Coordination: Coordination normal.      Gait: Gait normal.      Deep Tendon Reflexes: Reflexes normal.   Psychiatric:         Mood and Affect: Mood normal.         Behavior: Behavior normal.         Thought Content: Thought content normal.         Judgment: Judgment normal.       Gaurav Merino PA-C

## 2024-05-03 NOTE — PSYCH
"Behavioral Health Psychotherapy Progress Note    Psychotherapy Provided: Individual Psychotherapy     1. Major depression, recurrent, chronic (HCC)            Goals addressed in session: Goal 1 and Goal 2     DATA: The client reported continued health issues and continued need to use her rolling walker. During the session we explored the client's increased physical health issues, and her increased depression. As team we explored her triggers with the client being able to verbalize her pain is her major issue. The client was able to verbalize that her mental health issues has some bearing on her physical health. Coping skills were reviewed during the session.   During this session, this clinician used the following therapeutic modalities: Cognitive Behavioral Therapy, Mindfulness-based Strategies, Solution-Focused Therapy, and Supportive Psychotherapy    Substance Abuse was not addressed during this session. If the client is diagnosed with a co-occurring substance use disorder, please indicate any changes in the frequency or amount of use: n/a. Stage of change for addressing substance use diagnoses: No substance use/Not applicable    ASSESSMENT:  Cristy Garcia presents with a Euthymic/ normal, Anxious, and Depressed mood.     her affect is Normal range and intensity, which is congruent, with her mood and the content of the session. The client has made progress on their goals.    The client  Cristy Garcia presents with a none risk of suicide, none risk of self-harm, and none risk of harm to others.    For any risk assessment that surpasses a \"low\" rating, a safety plan must be developed.    A safety plan was indicated: no  If yes, describe in detail n/a    PLAN: Between sessions, Cristy Garcia will practice her coping skills were . At the next session, the therapist will use Cognitive Behavioral Therapy, Mindfulness-based Strategies, Solution-Focused Therapy, and Supportive Psychotherapy to address the client's MH " issues presented with anxiety and or depression. .    Behavioral Health Treatment Plan and Discharge Planning: Cristy Garcia is aware of and agrees to continue to work on their treatment plan. They have identified and are working toward their discharge goals. no    Visit start and stop times:    05/03/24  Start Time: 1130  Stop Time: 1215  Total Visit Time: 45 minutes

## 2024-05-03 NOTE — PROGRESS NOTES
"Daily Note     Today's date: 2024  Patient name: Cristy Garcia  : 1975  MRN: [id number]  Referring provider: Westley Keller MD  Dx:   Encounter Diagnosis     ICD-10-CM    1. Lumbar spondylosis  M47.816       2. Primary osteoarthritis of both hips  M16.0                      Subjective: Patient reports that she is doing well today.      Objective: See treatment diary below      Assessment: Tolerated treatment well. Patient exhibited good technique with therapeutic exercises and would benefit from continued PT to increase ROM/strength and endurance to improve mobility.      Plan: Continue per plan of care.      Precautions: PMH HTN, T2DM, GERD, Fibromyalgia, B hip OA, h/o L hip fx      Manuals     B HS, piriformis, quad, gastroc, hip abd with manual traction  15' 15' 15' 15'    Lumbar PA mobs/ STM                           Neuro Re-Ed      DKTC  10x5\" hold 10x5\" hold 10x5\" 10x5\"    Bridge w/ hip abd  2x10 RTB 2x10 GTB 2x10 GTB 2x10 GTB    TrA c marches, SLR      10xea bilat    LTR c Tball  10x5\" hold 10x5\" hold 10x5\"bilat 10x5\"bilat    Front and lateral lunges         Seated Tball trunk rotation w/ PNF    10x3\"ea bilat RFB 10x3\"ea bilat soccer    Supine crunch c ABD    10x5\" hold 10x5\" 10x5\"    Hip thrust c Tball   2x10  2x10 2x10    Ther Ex      Nustep  for ROM   10' L1 10' L1 10'L2 10'L4    Leg press DL and SL         Supine SLR 3 way         Clamshells  2x10 RTB 2x10 GTB  2x10 GTB 2x10 GTB                               HEP update/review 15'        Ther Activity                        Gait Training                        Modalities      MHP  MHP 15'  MHP 15' 15' to LB 15' to LB                          "

## 2024-05-03 NOTE — ASSESSMENT & PLAN NOTE
A1c is 6.5 today.  Patient is to continue current management.  Diabetic foot exam is normal.  Will continue to monitor this.   Lab Results   Component Value Date    HGBA1C 6.5 05/03/2024

## 2024-05-05 DIAGNOSIS — E55.9 VITAMIN D DEFICIENCY: ICD-10-CM

## 2024-05-06 ENCOUNTER — OFFICE VISIT (OUTPATIENT)
Dept: PHYSICAL THERAPY | Facility: CLINIC | Age: 49
End: 2024-05-06
Payer: COMMERCIAL

## 2024-05-06 DIAGNOSIS — M16.0 PRIMARY OSTEOARTHRITIS OF BOTH HIPS: ICD-10-CM

## 2024-05-06 DIAGNOSIS — M47.816 LUMBAR SPONDYLOSIS: Primary | ICD-10-CM

## 2024-05-06 PROCEDURE — 97140 MANUAL THERAPY 1/> REGIONS: CPT

## 2024-05-06 PROCEDURE — 97112 NEUROMUSCULAR REEDUCATION: CPT

## 2024-05-06 PROCEDURE — 97110 THERAPEUTIC EXERCISES: CPT

## 2024-05-06 RX ORDER — GABAPENTIN 300 MG/1
CAPSULE ORAL
COMMUNITY
Start: 2024-05-04

## 2024-05-06 RX ORDER — CELECOXIB 200 MG/1
CAPSULE ORAL
COMMUNITY
Start: 2024-05-04

## 2024-05-06 RX ORDER — ATORVASTATIN CALCIUM 20 MG/1
TABLET, FILM COATED ORAL
COMMUNITY
Start: 2024-04-22

## 2024-05-06 RX ORDER — ERGOCALCIFEROL 1.25 MG/1
50000 CAPSULE ORAL WEEKLY
Qty: 12 CAPSULE | Refills: 0 | Status: SHIPPED | OUTPATIENT
Start: 2024-05-06

## 2024-05-07 NOTE — PROGRESS NOTES
"Daily Note     Today's date: 2024  Patient name: Cristy Garcia  : 1975  MRN: [id number]  Referring provider: Westley Keller MD  Dx:   Encounter Diagnosis     ICD-10-CM    1. Lumbar spondylosis  M47.816       2. Primary osteoarthritis of both hips  M16.0                      Subjective: Patient reports that she is sore today from the weather this morning.      Objective: See treatment diary below      Assessment: Tolerated treatment well. Patient exhibited good technique with therapeutic exercises and would benefit from continued PT to increase ROM/strength and endurance to improve mobility.      Plan: Continue per plan of care.      Precautions: PMH HTN, T2DM, GERD, Fibromyalgia, B hip OA, h/o L hip fx      Manuals    B HS, piriformis, quad, gastroc, hip abd with manual traction  15' 15' 15' 15' 15'   Lumbar PA mobs/ STM                           Neuro Re-Ed     DKTC on Pball  10x5\" hold 10x5\" hold 10x5\" 10x5\" 10x5\"   Bridge w/ hip abd  2x10 RTB 2x10 GTB 2x10 GTB 2x10 GTB 2x10 GTB   TrA c marches, SLR      10xea bilat 10xea bilat   LTR c Tball  10x5\" hold 10x5\" hold 10x5\"bilat 10x5\"bilat 10x5\"bilat   Front and lateral lunges         Seated Tball trunk rotation w/ PNF    10x3\"ea bilat RFB 10x3\"ea bilat soccer 10x3\"ea bilat soccer   Supine crunch c ABD    10x5\" hold 10x5\" 10x5\" 10x5\"   Hip thrust c Tball   2x10  2x10 2x10 2x10   Ther Ex     Nustep  for ROM   10' L1 10' L1 10'L2 10'L4 10'L4   Leg press DL and SL         Supine SLR 3 way         Clamshells  2x10 RTB 2x10 GTB  2x10 GTB 2x10 GTB 2x10 GTB                              HEP update/review 15'        Ther Activity                       Gait Training                       Modalities     MHP  MHP 15'  MHP 15' 15' to LB 15' to LB 15' to LB                           " RT Inhaler-Nebulizer Bronchodilator Protocol Note    There is a bronchodilator order in the chart from a provider indicating to follow the RT Bronchodilator Protocol and there is an Initiate RT Inhaler-Nebulizer Bronchodilator Protocol order as well (see protocol at bottom of note). CXR Findings:  XR CHEST PORTABLE    Result Date: 2/15/2022  Right PICC line has been removed. ET, NG, and right jugular line remain. Multifocal opacities are not significantly changed. XR CHEST PORTABLE    Result Date: 2/14/2022  Right IJ central venous catheter terminating in the lower SVC in satisfactory position. No pneumothorax. Diffuse bilateral airspace disease, not significantly changed. XR CHEST PORTABLE    Result Date: 2/14/2022  Stable support apparatus. Persistent diffuse bilateral airspace disease. Findings on the right have slightly progressed compared to prior. The findings from the last RT Protocol Assessment were as follows:   History Pulmonary Disease: Chronic pulmonary disease  Respiratory Pattern: Mild dyspnea at rest, irregular pattern, or RR 21-25 bpm  Breath Sounds: Inspiratory and expiratory or bilateral wheezing and/or rhonchi  Cough: Weak, productive  Indication for Bronchodilator Therapy: Wheezing associated with pulm disorder  Bronchodilator Assessment Score: 14    Aerosolized bronchodilator medication orders have been revised according to the RT Inhaler-Nebulizer Bronchodilator Protocol below. Respiratory Therapist to perform RT Therapy Protocol Assessment initially then follow the protocol. Repeat RT Therapy Protocol Assessment PRN for score 0-3 or on second treatment, BID, and PRN for scores above 3. No Indications  adjust the frequency to every 6 hours PRN wheezing or bronchospasm, if no treatments needed after 48 hours then discontinue using Per Protocol order mode.      If indication present, adjust the RT bronchodilator orders based on the Bronchodilator Assessment Score as indicated below. Use Inhaler orders unless patient has one or more of the following: on home nebulizer, not able to hold breath for 10 seconds, is not alert and oriented, cannot activate and use MDI correctly, or respiratory rate 25 breaths per minute or more, then use the equivalent nebulizer order(s) with same Frequency and PRN reasons based on the score. If a patient is on this medication at home then do not decrease Frequency below that used at home. 0-3  enter or revise RT bronchodilator order(s) to equivalent RT Bronchodilator order with Frequency of every 4 hours PRN for wheezing or increased work of breathing using Per Protocol order mode. 4-6  enter or revise RT Bronchodilator order(s) to two equivalent RT bronchodilator orders with one order with BID Frequency and one order with Frequency of every 4 hours PRN wheezing or increased work of breathing using Per Protocol order mode. 7-10  enter or revise RT Bronchodilator order(s) to two equivalent RT bronchodilator orders with one order with TID Frequency and one order with Frequency of every 4 hours PRN wheezing or increased work of breathing using Per Protocol order mode. 11-13  enter or revise RT Bronchodilator order(s) to one equivalent RT bronchodilator order with QID Frequency and an Albuterol order with Frequency of every 4 hours PRN wheezing or increased work of breathing using Per Protocol order mode. Greater than 13  enter or revise RT Bronchodilator order(s) to one equivalent RT bronchodilator order with every 4 hours Frequency and an Albuterol order with Frequency of every 2 hours PRN wheezing or increased work of breathing using Per Protocol order mode.          Electronically signed by Meena Pizarro RCP on 2/15/2022 at 10:25 PM

## 2024-05-08 ENCOUNTER — OFFICE VISIT (OUTPATIENT)
Dept: PHYSICAL THERAPY | Facility: CLINIC | Age: 49
End: 2024-05-08
Payer: COMMERCIAL

## 2024-05-08 DIAGNOSIS — M16.0 PRIMARY OSTEOARTHRITIS OF BOTH HIPS: ICD-10-CM

## 2024-05-08 DIAGNOSIS — M47.816 LUMBAR SPONDYLOSIS: Primary | ICD-10-CM

## 2024-05-08 PROCEDURE — 97140 MANUAL THERAPY 1/> REGIONS: CPT

## 2024-05-08 PROCEDURE — 97112 NEUROMUSCULAR REEDUCATION: CPT

## 2024-05-08 PROCEDURE — 97110 THERAPEUTIC EXERCISES: CPT

## 2024-05-09 ENCOUNTER — OFFICE VISIT (OUTPATIENT)
Dept: PAIN MEDICINE | Facility: CLINIC | Age: 49
End: 2024-05-09
Payer: COMMERCIAL

## 2024-05-09 VITALS
SYSTOLIC BLOOD PRESSURE: 122 MMHG | BODY MASS INDEX: 46.56 KG/M2 | OXYGEN SATURATION: 97 % | TEMPERATURE: 98 F | WEIGHT: 253 LBS | DIASTOLIC BLOOD PRESSURE: 84 MMHG | RESPIRATION RATE: 18 BRPM | HEART RATE: 86 BPM | HEIGHT: 62 IN

## 2024-05-09 DIAGNOSIS — M16.0 PRIMARY OSTEOARTHRITIS OF BOTH HIPS: ICD-10-CM

## 2024-05-09 DIAGNOSIS — M47.816 LUMBAR SPONDYLOSIS: Primary | ICD-10-CM

## 2024-05-09 PROCEDURE — 99213 OFFICE O/P EST LOW 20 MIN: CPT | Performed by: ANESTHESIOLOGY

## 2024-05-09 NOTE — PATIENT INSTRUCTIONS
Neck Exercises   AMBULATORY CARE:   Neck exercises  help reduce neck pain, and improve neck movement and strength. Neck exercises also help prevent long-term neck problems.  Call your doctor if:   Your pain does not get better, or gets worse.    You have questions or concerns about your condition, care, or exercise program.    What you need to know about exercise safety:   Move slowly, gently, and smoothly.  Avoid fast or jerky motions.    Stand and sit the way your healthcare provider shows you.  Good posture may reduce your neck pain. Check your posture often, even when you are not doing your neck exercises.    Follow the exercise program recommended by your healthcare provider.  He or she will tell you which exercises are best for your condition. He or she will also tell you how many repetitions to do and how often you should do the exercises.    How to perform neck exercises safely:   Exercise position:  You may sit or stand while you do neck exercises. Face forward. Your shoulders should be straight and relaxed, with a good posture.         Head tilts, forward and back:  Gently bow your head and try to touch your chin to your chest. Your healthcare provider may tell you to push on the back of your neck to help bow your head. Raise your chin back to the starting position. Tilt your head back as far as possible so you are looking up at the ceiling. Your healthcare provider may tell you to lift your chin to help tilt your head back. Return your head to the starting position.         Head tilts, side to side:  Tilt your head, bringing your ear toward your shoulder. Then tilt your head toward the other shoulder.         Head turns:  Turn your head to look over your shoulder. Tilt your chin down and try to touch it to your shoulder. Do not raise your shoulder to your chin. Face forward again. Do the same on the other side.         Head rolls:  Slowly bring your chin toward your chest. Next, roll your head to the  right. Your ear should be positioned over your shoulder. Hold this position for 5 seconds. Roll your head back toward your chest and to the left into the same position. Hold for 5 seconds. Gently roll your head back and around in a clockwise [place]q 3 times. Next, move your head in the reverse direction (counterclockwise) in a [place]iiq 3 times. Do not shrug your shoulders upwards while you do this exercise.       Follow up with your doctor as directed:  Write down your questions so you remember to ask them during your visits.  © Copyright [employer] 2023 Information is for End User's use only and may not be sold, redistributed or otherwise used for commercial purposes.  The above information is an  only. It is not intended as medical advice for individual conditions or treatments. Talk to your doctor, nurse or pharmacist before following any medical regimen to see if it is safe and effective for you.

## 2024-05-09 NOTE — PROGRESS NOTES
Assessment  1. Lumbar spondylosis    2. Primary osteoarthritis of both hips    Greater than 80% improvement with radiofrequency ablation of repeat bilateral Medial branch nerves at L3-L5 performed 3/12/24; prior performed 4/4/23 provided pain relief with improved ability to participate with IADLs in significantly less pain for almost a year.  Additionally greater than 90% relief of pain with improved ability to participate with IADLs after bilateral hip injections performed recently. Previously reported b/l hip pain with radiation to the groin; ttp over b/l gtb, pain with internal/external rotation of b/l hips; pain worse with standing/ambulation. Lifestyle modifications extensively discussed including diet, exercise and weight loss in conjunction with optimal DM-2 control. Previously reported the following symptomatology:    Chronic axial low back pain described by primarily arthritic features.  Positive facet loading maneuvers as noted below.Mild noncompressive lumbar degenerative change at the L3-4 and L4-5 levels.  Previously had a left L3 transforaminal steroid injection with Dr. Franco which helped for about a week and a half.  Arthritic symptomatology greater than radicular features at this time. Reasonable to continue multimodal pain therapy plan.    Plan  -Celebrex 200 mg b.i.d. prn pain for lumbar facet syndrome/hip osteoarthritis.  Counseled regarding bleeding risk, GI risk, Renal risk of taking this medication.  -lifestyle modifications including diet, exercise and weight loss in addition to DM-2 control extensively discussed  -f/u prn  -hip  and neckexercises provided; continue physical therapy and core exercises for lumbar facet syndrome, hip osteoarthritis, cervical spondylosis    There are risks associated with opioid medications, including dependence, addiction and tolerance. The patient understands and agrees to use these medications only as prescribed. Potential side effects of the  medications include, but are not limited to, constipation, drowsiness, addiction, impaired judgment and risk of fatal overdose if not taken as prescribed. The patient was warned against driving while taking sedation medications.  Sharing medications is a felony. At this point in time, the patient is showing no signs of addiction, abuse, diversion or suicidal ideation.    [place] Prescription Drug Monitoring Program report was reviewed and was appropriate     Complete risks and benefits including bleeding, infection, tissue reaction, nerve injury and allergic reaction were discussed. The approach was demonstrated using models and literature was provided. Verbal and written consent was obtained.    My impressions and treatment recommendations were discussed in detail with the patient who verbalized understanding and had no further questions.  Discharge instructions were provided. I personally saw and examined the patient and I agree with the above discussed plan of care.    New Medications Ordered This Visit   Medications    atorvastatin (LIPITOR) 20 mg tablet    celecoxib (CeleBREX) 200 mg capsule    gabapentin (NEURONTIN) 300 mg capsule       History of Present Illness    Greater than 80% improvement with radiofrequency ablation of repeat bilateral Medial branch nerves at L3-L5 performed 3/12/24; prior performed 4/4/23 provided pain relief with improved ability to participate with IADLs in significantly less pain for almost a year.  Additionally greater than 90% relief of pain with improved ability to participate with IADLs after bilateral hip injections performed recently. Previously reported b/l hip pain with radiation to the groin; ttp over b/l gtb, pain with internal/external rotation of b/l hips; pain worse with standing/ambulation. Lifestyle modifications extensively discussed including diet, exercise and weight loss in conjunction with optimal DM-2 control. Previously reported the following  symptomatology:    Cristy Garcia is a 48 y.o. female with past medical history of axial low back pain described primarily by arthritic features.  She presents with a 2 year history of chronic low back pain described primarily as arthritic in nature.   she describes 8/10 low back pain that is worse in the mornings and worse at the end of the day.  The pain is characterized by achy, nagging, indolent, crampy, stabbing pain in his/her axial low back.  The patient describes that the pain is worse with standing for long periods of time on hard surfaces as well as with walking.  The patient is a very active individual and feels as though this pain compromises her participation with independent activities of daily living. The pain can be debilitating at times and contribute to significant disability, compromising overall activity and independent activities of daily living.   She has tried physical therapy with limited relief of symptoms.  Medications the patient has tried in the past include   Celebrex,  and Flexeril. She describes minor radicular symptoms in the L3 and L4 dermatomal distribution but otherwise has good strength.  Previously she had left L3 transforaminal epidural steroid injection with relief for about 2 weeks. She denies any weakness numbness or paresthesias.  The patient denies any bowel or bladder dysfunction as well.        I have personally reviewed and/or updated the patient's past medical history, past surgical history, family history, social history, current medications, allergies, and vital signs today.     Review of Systems   Constitutional:  Positive for activity change.   HENT: Negative.     Eyes: Negative.    Respiratory: Negative.     Cardiovascular: Negative.    Gastrointestinal: Negative.    Endocrine: Negative.    Genitourinary: Negative.    Musculoskeletal:  Positive for arthralgias, back pain, gait problem and myalgias.   Skin: Negative.    Allergic/Immunologic: Negative.     Hematological: Negative.    Psychiatric/Behavioral: Negative.     All other systems reviewed and are negative.      Patient Active Problem List   Diagnosis    SOB (shortness of breath)    Chronic bilateral low back pain without sciatica    Chronic pain of left knee    Chronic pain of right knee    Fibromyalgia, primary    Primary osteoarthritis involving multiple joints    Mild persistent asthma without complication    Lumbar radiculopathy    Primary osteoarthritis of both hips    Left hip pain    Major depression, recurrent, chronic (HCC)    Lumbar spondylosis    Cervical radiculopathy    Morbid obesity with body mass index (BMI) of 45.0 to 49.9 in adult (HCC)    VICTOR HUGO on CPAP    Anxiety associated with depression    Diabetes mellitus (HCC)    Hyperlipidemia    Hypertension    Irregular heartbeat    Chronic pain disorder    GERD (gastroesophageal reflux disease)    Fatty liver    Elevated LFTs    Esophagitis determined by endoscopy    Impacted third molar tooth    Complex dental caries    Erythrocytosis    Leukocytosis       Past Medical History:   Diagnosis Date    Allergic     Seasonal Allergies    Alopecia of scalp     Treated with Proscar    Asthma     Chronic pain disorder     back, bilat knees, bilat hips    CPAP (continuous positive airway pressure) dependence     Diabetes mellitus (HCC)     Fibromyalgia, primary     GERD (gastroesophageal reflux disease)     Hip fx, left, closed, with routine healing, subsequent encounter 05/2017    Hyperlipidemia     Hypertension     Insomnia     Irregular heartbeat     VICTOR HUGO on CPAP        Past Surgical History:   Procedure Laterality Date    CARPAL TUNNEL RELEASE Left     IR SPINE AND PAIN PROCEDURE  3/12/2024    IR SPINE AND PAIN PROCEDURE  4/11/2024    LUMBAR EPIDURAL INJECTION      NERVE BLOCK Bilateral 12/29/2020    Procedure: L2 L3 L4 L5 MEDIAL BRANCH BLOCK #1;  Surgeon: Westley Keller MD;  Location: [facility];  Service: Pain Management     NERVE BLOCK Bilateral  1/19/2021    Procedure: L2 L3 L4 L5 MEDIAL BRANCH BLOCK #2;  Surgeon: Westley Keller MD;  Location: [facility];  Service: Pain Management     NE ARTHROCENTESIS ASPIR&/INJ MAJOR JT/BURSA W/O US Bilateral 2/7/2023    Procedure: INJECTION JOINT HIP;  Surgeon: Westley Keller MD;  Location: [facility];  Service: Pain Management     NE HYSTEROSCOPY BX ENDOMETRIUM&/POLYPC W/WO D&C N/A 4/25/2023    Procedure: HYSTEROSCOPY W/  RESECTION UTERINE TUMOR/FIBROID (POLYPECTOMY );  Surgeon: Donovan Ware DO;  Location: [facility];  Service: Gynecology    RADIOFREQUENCY ABLATION Right 2/25/2021    Procedure: L2 L3 L4 L5 RADIO FREQUENCY ABLATION right side;  Surgeon: Westley Keller MD;  Location: [facility];  Service: Pain Management     RADIOFREQUENCY ABLATION Left 3/16/2021    Procedure: L2 L3 L4 L5 RADIO FREQUENCY ABLATION;  Surgeon: Westley Keller MD;  Location: [facility];  Service: Pain Management     RHIZOTOMY Bilateral 4/4/2023    Procedure: RHIZOTOMY LUMBAR L3, L4, L5 medial branch nerves;  Surgeon: Westley Keller MD;  Location: [facility];  Service: Pain Management     UPPER GASTROINTESTINAL ENDOSCOPY      US GUIDED THYROID BIOPSY  7/14/2023       Family History   Problem Relation Age of Onset    Cancer Father     Hepatitis Father     Asthma Cousin     Hypertension Paternal Grandmother     No Known Problems Mother     No Known Problems Sister     No Known Problems Maternal Grandmother     No Known Problems Maternal Grandfather     No Known Problems Paternal Grandfather     No Known Problems Paternal Aunt     BRCA2 Positive Neg Hx     BRCA2 Negative Neg Hx     BRCA1 Positive Neg Hx     BRCA1 Negative Neg Hx     BRCA 1/2 Neg Hx     Ovarian cancer Neg Hx     Endometrial cancer Neg Hx     Colon cancer Neg Hx     Breast cancer additional onset Neg Hx     Breast cancer Neg Hx        Social History     Occupational History    Not on file   Tobacco Use    Smoking status: Former    Smokeless tobacco: Never   Vaping Use     Vaping status: Never Used   Substance and Sexual Activity    Alcohol use: No    Drug use: No    Sexual activity: Not Currently       Current Outpatient Medications on File Prior to Visit   Medication Sig    albuterol (Ventolin HFA) 90 mcg/act inhaler Inhale 2 puffs every 6 (six) hours as needed for wheezing or shortness of breath    atorvastatin (LIPITOR) 20 mg tablet Take 1 tablet (20 mg total) by mouth daily with dinner    atorvastatin (LIPITOR) 20 mg tablet     celecoxib (CeleBREX) 200 mg capsule Take 1 capsule (200 mg total) by mouth 2 (two) times a day    celecoxib (CeleBREX) 200 mg capsule     cetirizine (ZyrTEC) 10 mg tablet 1 tab po daily x 1 month then prn allergies    clotrimazole-betamethasone (LOTRISONE) 1-0.05 % cream Apply 2 x daily to vaginal area until healed then prn irritation    Empagliflozin (Jardiance) 25 MG TABS Take 1 tablet (25 mg total) by mouth daily    ergocalciferol (VITAMIN D2) 50,000 units Take 1 capsule (50,000 Units total) by mouth once a week    famotidine (PEPCID) 40 MG tablet TAKE (1) TABLET BY MOUTH DAILY.    finasteride (PROPECIA) 1 MG tablet Take 1 mg by mouth daily    fluticasone (FLONASE) 50 mcg/act nasal spray 1 spray each nostril 2 x daily x 1 month then prn congestion or allergies    Fluticasone-Salmeterol (Advair Diskus) 250-50 mcg/dose inhaler Inhale 1 puff 2 (two) times a day Rinse mouth after use.    gabapentin (NEURONTIN) 300 mg capsule Take 1 capsule (300 mg total) by mouth 3 (three) times a day    glipiZIDE (GLUCOTROL) 10 mg tablet TAKE (2) TABLETS TWICE DAILY BEFORE MEALS.    glucose blood test strip Use as instructed    ketoconazole (NIZORAL) 2 % cream APPLY 1-2 TIMES DAILY TO AFFECTED AREA AS NEEDED.    Lancet Devices (Medabiluch Delica Plus Lancing) MISC     Lancets (onetouch ultrasoft) lancets Use as instructed    lisinopril (ZESTRIL) 10 mg tablet Take 1 tablet (10 mg total) by mouth daily    metFORMIN (GLUCOPHAGE) 1000 MG tablet Take 1 tablet (1,000 mg total) by  "mouth 2 (two) times a day with meals    nystatin powder APPLY AS DIRECTED TO AFFECTED AREA DAILY    omeprazole (PriLOSEC) 40 MG capsule Take 1 capsule (40 mg total) by mouth daily before breakfast    ondansetron (ZOFRAN) 4 mg tablet Take 1 tablet (4 mg total) by mouth every 8 (eight) hours as needed for nausea or vomiting    sertraline (ZOLOFT) 50 mg tablet TAKE (1) TABLET BY MOUTH DAILY AT BEDTIME.    triamcinolone (KENALOG) 0.025 % cream Apply 2 x daily to rash until healed then prn rash    acetaminophen (TYLENOL) 500 mg tablet Take 500 mg by mouth every 6 (six) hours as needed for mild pain (Patient not taking: Reported on 5/3/2024)    gabapentin (NEURONTIN) 300 mg capsule  (Patient not taking: Reported on 5/9/2024)    glucose blood test strip  (Patient not taking: Reported on 4/8/2024)    medroxyPROGESTERone (PROVERA) 10 mg tablet Take 1 tablet (10 mg total) by mouth daily for 10 days (Patient not taking: Reported on 3/25/2024)     No current facility-administered medications on file prior to visit.       Allergies   Allergen Reactions    Trulicity [Dulaglutide] Swelling    Tdap [Tetanus-Diphth-Acell Pertussis] Rash         Physical Exam    /84 (BP Location: Right arm, Patient Position: Sitting, Cuff Size: Adult)   Pulse 86   Temp 98 °F (36.7 °C)   Resp 18   Ht 5' 2\" (1.575 m)   Wt 115 kg (253 lb)   SpO2 97%   BMI 46.27 kg/m²     Constitutional: normal, well developed, well nourished, alert, in no distress and non-toxic and no overt pain behavior. and obese  Eyes: anicteric  HEENT: grossly intact  Neck: supple, symmetric, trachea midline and no masses   Pulmonary:even and unlabored  Cardiovascular:No edema or pitting edema present  Skin:Normal without rashes or lesions and well hydrated  Psychiatric:Mood and affect appropriate  Neurologic:Cranial Nerves II-XII grossly intact Sensation grossly intact; no clonus negative dupree's. Reflexes 2+ and brisk. SLR negative bilaterally.  Musculoskeletal:  " Steppage gait. Normal heel toe and tip toe walking.  Significant pain with lumbar facet loading bilaterally and with lateral spine rotation left greater than right.  TTP over lumbar paraspinal muscles. Negative wilda's test, negative gaenslen's negative SIJ loading bilaterally.  ttp over b/l gtb, pain with internal/external rotation of b/l hips    Imaging    MRI LUMBAR SPINE WITHOUT CONTRAST     INDICATION: M54.42: Lumbago with sciatica, left side  G89.29: Other chronic pain.     COMPARISON:  None.     TECHNIQUE:  Sagittal T1, sagittal T2, sagittal inversion recovery, axial T1 and axial T2, coronal T2.    IMAGE QUALITY:  Diagnostic     FINDINGS:     VERTEBRAL BODIES:  There are 5 lumbar type vertebral bodies.  Normal alignment of the lumbar spine.  No spondylolysis or spondylolisthesis. No scoliosis.  No compression fracture.    Normal marrow signal is identified within the visualized bony   structures.  No discrete marrow lesion.     SACRUM:  Normal signal within the sacrum. No evidence of insufficiency or stress fracture.     DISTAL CORD AND CONUS:  Normal size and signal within the distal cord and conus.     PARASPINAL SOFT TISSUES:  There is thickening of the endometrial cavity partially visualized on this examination towards the fundus.  There is a dominant follicle identified within the left ovary.     LOWER THORACIC DISC SPACES:  Normal disc height and signal.  No disc herniation, canal stenosis or foraminal narrowing.     LUMBAR DISC SPACES:     L1-L2:  Normal.     L2-L3:  Normal.     L3-L4:  Annular bulging with a small broad-based left foraminal and extraforaminal disc protrusion best seen on series 6 image 13.  There is no canal stenosis.  Only minimal left foraminal narrowing without nerve impingement.     L4-L5:  Disc desiccation and loss of disc height with mild annular bulging.  Small central disc herniation without canal stenosis or foraminal nerve impingement.     L5-S1:  Normal disc height and  signal without canal stenosis or foraminal narrowing.     IMPRESSION:     Mild noncompressive lumbar degenerative change at the L3-4 and L4-5 levels.     Fluid signal in the endometrial cavity partially visualized at the fundus.  If patient is postmenopausal, consider follow-up ultrasound imaging.     Signal

## 2024-05-13 ENCOUNTER — OFFICE VISIT (OUTPATIENT)
Dept: PHYSICAL THERAPY | Facility: CLINIC | Age: 49
End: 2024-05-13
Payer: COMMERCIAL

## 2024-05-13 DIAGNOSIS — M47.816 LUMBAR SPONDYLOSIS: Primary | ICD-10-CM

## 2024-05-13 DIAGNOSIS — M16.0 PRIMARY OSTEOARTHRITIS OF BOTH HIPS: ICD-10-CM

## 2024-05-13 PROCEDURE — 97140 MANUAL THERAPY 1/> REGIONS: CPT | Performed by: PHYSICAL THERAPIST

## 2024-05-13 PROCEDURE — 97110 THERAPEUTIC EXERCISES: CPT | Performed by: PHYSICAL THERAPIST

## 2024-05-13 NOTE — PROGRESS NOTES
"Daily Note     Today's date: 2024  Patient name: Cristy Garcia  : 1975  MRN: [id number]  Referring provider: Westley Keller MD  Dx:   Encounter Diagnosis     ICD-10-CM    1. Lumbar spondylosis  M47.816       2. Primary osteoarthritis of both hips  M16.0                      Subjective: The patient states that she is a little sore today from walking around [employer].  She had been back to see the doctor for her follow up appointment, to continue with PT.  To see him again as needed for another appointment.          Objective: See treatment diary below      Assessment: Tolerated treatment well with no increase in symptoms noted during session.  Tightness noted in BLE with stretching.  Patient demonstrated fatigue post treatment and would benefit from continued PT to improve function.        Plan: Continue per plan of care.   Progress as able in upcoming visits.       Precautions: PMH HTN, T2DM, GERD, Fibromyalgia, B hip OA, h/o L hip fx      Manuals    B HS, piriformis, quad, gastroc, hip abd with manual traction 15' 15' 15' 15' 15' 15'   Lumbar PA mobs/ STM                           Neuro Re-Ed     DKTC on Pball 5\"x10 10x5\" hold 10x5\" hold 10x5\" 10x5\" 10x5\"   Bridge w/ hip abd 2x10 GTB 2x10 RTB 2x10 GTB 2x10 GTB 2x10 GTB 2x10 GTB   TrA c marches, SLR  10x ea Dre    10xea bilat 10xea bilat   LTR c Tball 5\"x10 Dre 10x5\" hold 10x5\" hold 10x5\"bilat 10x5\"bilat 10x5\"bilat   Front and lateral lunges         Seated Tball trunk rotation w/ PNF 3\"x10 ea Dre Soccer   10x3\"ea bilat RFB 10x3\"ea bilat soccer 10x3\"ea bilat soccer   Supine crunch c ABD  5\"x10  10x5\" hold 10x5\" 10x5\" 10x5\"   Hip thrust c Tball 2x10  2x10  2x10 2x10 2x10   Ther Ex     Nustep  for ROM  L4 10' 10' L1 10' L1 10'L2 10'L4 10'L4              Leg press DL and SL         Supine SLR 3 way         Clamshells 2x10 GTB 2x10 RTB 2x10 GTB  2x10 GTB 2x10 GTB 2x10 GTB                          "     HEP update/review         Ther Activity  4/24 5/1 5/6 5/8                     Gait Training  4/24 5/1 5/6 5/8                     Modalities  4/24 5/1 5/6 5/8   MHP 15' to LB MHP 15'  MHP 15' 15' to LB 15' to LB 15' to LB

## 2024-05-13 NOTE — PROGRESS NOTES
AMB US Pelvic Non OB    Date/Time: 5/15/2024 12:30 PM    Performed by: Leidy Mitchell  Authorized by: Donovan Ware DO  Universal Protocol:  Consent: Verbal consent obtained.  Consent given by: patient  Timeout called at: 5/15/2024 12:30 PM.  Patient understanding: patient states understanding of the procedure being performed  Patient identity confirmed: verbally with patient    Procedure details:     SIS Procedure: No    Indications: ovarian cysts      Technique:  US Pelvic, Non-OB with complete exam    Position: supine exam    Uterine findings:     Length (cm): 9.43    Height (cm):  4.29    Width (cm):  4.83    Endometrial stripe: identified      Endometrium thickness (mm):  9.7  Left ovary findings:     Left ovary:  Visualized    Length (cm): 3.77    Height (cm): 2.65    Width (cm): 2.8  Right ovary findings:     Right ovary:  Visualized    Length (cm): 2.8    Height (cm): 1.69    Width (cm): 1.89  Other findings:     Free pelvic fluid: not identified      Free peritoneal fluid: not identified    Post-Procedure Details:     Impression:  Anteverted uterus and bilateral ovaries appear within normal limits. The left ovary demonstrates a 2.1cm follicle. No free fluid.    Tolerance:  Tolerated well, no immediate complications    Complications: no complications    Additional Procedure Comments:      Transabdominal technique utilized for today's exam.     Ultrasound performed at:     [facility]  [address] [place] [address]  Phone: [phone]  Fax:  [phone]

## 2024-05-14 ENCOUNTER — HOSPITAL ENCOUNTER (OUTPATIENT)
Dept: NON INVASIVE DIAGNOSTICS | Facility: HOSPITAL | Age: 49
Discharge: HOME | End: 2024-05-14
Payer: COMMERCIAL

## 2024-05-14 VITALS
HEART RATE: 80 BPM | HEIGHT: 62 IN | SYSTOLIC BLOOD PRESSURE: 122 MMHG | DIASTOLIC BLOOD PRESSURE: 84 MMHG | WEIGHT: 253 LBS | BODY MASS INDEX: 46.56 KG/M2

## 2024-05-14 DIAGNOSIS — I51.7 MILD CONCENTRIC LEFT VENTRICULAR HYPERTROPHY: ICD-10-CM

## 2024-05-14 DIAGNOSIS — I51.89 DIASTOLIC DYSFUNCTION: Primary | ICD-10-CM

## 2024-05-14 DIAGNOSIS — R07.9 CHEST PAIN, UNSPECIFIED TYPE: ICD-10-CM

## 2024-05-14 LAB
AORTIC ROOT: 2.6 CM
APICAL FOUR CHAMBER EJECTION FRACTION: 62 %
BSA FOR ECHO PROCEDURE: 2.11 M2
E WAVE DECELERATION TIME: 194 MS
E/A RATIO: 0.59
FRACTIONAL SHORTENING: 34 (ref 28–44)
INTERVENTRICULAR SEPTUM IN DIASTOLE (PARASTERNAL SHORT AXIS VIEW): 1.3 CM
INTERVENTRICULAR SEPTUM: 1.3 CM (ref 0.6–1.1)
LAAS-AP2: 12.9 CM2
LAAS-AP4: 12.7 CM2
LEFT ATRIUM SIZE: 4.3 CM
LEFT ATRIUM VOLUME (MOD BIPLANE): 30 ML
LEFT ATRIUM VOLUME INDEX (MOD BIPLANE): 14.2 ML/M2
LEFT INTERNAL DIMENSION IN SYSTOLE: 2.7 CM (ref 2.1–4)
LEFT VENTRICULAR INTERNAL DIMENSION IN DIASTOLE: 4.1 CM (ref 3.5–6)
LEFT VENTRICULAR POSTERIOR WALL IN END DIASTOLE: 1.5 CM
LEFT VENTRICULAR STROKE VOLUME: 49 ML
LVSV (TEICH): 49 ML
MV E'TISSUE VEL-SEP: 7 CM/S
MV PEAK A VEL: 0.85 M/S
MV PEAK E VEL: 50 CM/S
RA PRESSURE ESTIMATED: 3 MMHG
RIGHT ATRIUM AREA SYSTOLE A4C: 10.7 CM2
RIGHT VENTRICLE ID DIMENSION: 2.9 CM
RV PSP: 30 MMHG
SL CV LEFT ATRIUM LENGTH A2C: 4.5 CM
SL CV LV EF: 61
SL CV PED ECHO LEFT VENTRICLE DIASTOLIC VOLUME (MOD BIPLANE) 2D: 76 ML
SL CV PED ECHO LEFT VENTRICLE SYSTOLIC VOLUME (MOD BIPLANE) 2D: 27 ML
TR MAX PG: 27 MMHG
TR PEAK VELOCITY: 2.6 M/S
TRICUSPID ANNULAR PLANE SYSTOLIC EXCURSION: 2.2 CM
TRICUSPID VALVE PEAK REGURGITATION VELOCITY: 2.6 M/S

## 2024-05-14 PROCEDURE — 93306 TTE W/DOPPLER COMPLETE: CPT

## 2024-05-15 ENCOUNTER — OFFICE VISIT (OUTPATIENT)
Dept: GYNECOLOGY | Facility: CLINIC | Age: 49
End: 2024-05-15
Payer: COMMERCIAL

## 2024-05-15 ENCOUNTER — ULTRASOUND (OUTPATIENT)
Dept: GYNECOLOGY | Facility: CLINIC | Age: 49
End: 2024-05-15
Payer: COMMERCIAL

## 2024-05-15 DIAGNOSIS — N83.201 BILATERAL OVARIAN CYSTS: Primary | ICD-10-CM

## 2024-05-15 DIAGNOSIS — N83.201 CYST OF RIGHT OVARY: Primary | ICD-10-CM

## 2024-05-15 DIAGNOSIS — N83.202 BILATERAL OVARIAN CYSTS: Primary | ICD-10-CM

## 2024-05-15 PROCEDURE — 99213 OFFICE O/P EST LOW 20 MIN: CPT | Performed by: OBSTETRICS & GYNECOLOGY

## 2024-05-15 PROCEDURE — 76856 US EXAM PELVIC COMPLETE: CPT | Performed by: OBSTETRICS & GYNECOLOGY

## 2024-05-15 NOTE — PROGRESS NOTES
Patient is G0. She presented to the emergency department on February 12 complaining of bilateral flank pain and abdominal pain. She stated that the symptoms started a few days prior to her visit. She had denied any burning, frequency or urgency with urination. She denied any diarrhea or constipation. She had some nausea with intermittent vomiting. She denied any fever. She stated that her menses have generally been regular up until this past month in which she had been experiencing some intermittent spotting. CBC was slightly elevated at 10.24. CT scan revealed a nonobstructing 2 mm left-sided intrarenal calculus without hydronephrosis. Diverticulosis was noted. There was a 4.6 cm simple cyst on the right ovary. Of note is that patient also has chronic lower back issues. She was placed on Provera 10 mg for 10 days and advised to return to the office for repeat vaginal ultrasound. The pain has nearly completely subsided.     Repeat ultrasound on March 6      Date/Time: 3/6/2024 4:00 PM     Performed by: Leidy Mitchell  Authorized by: Donovan Ware DO  Universal Protocol:  Consent: Verbal consent obtained.  Consent given by: patient  Patient understanding: patient states understanding of the procedure being performed  Patient identity confirmed: verbally with patient     Procedure details:     SIS Procedure: No    Indications: ovarian cysts      Technique:  US Pelvic, Non-OB with complete exam    Position: supine exam    Uterine findings:     Length (cm): 9.97    Height (cm):  4.43    Width (cm):  5.61    Endometrial stripe: identified      Endometrium thickness (mm):  6.01  Left ovary findings:     Left ovary:  Visualized    Length (cm): 4.79    Height (cm): 3.02    Width (cm): 3.62  Right ovary findings:     Right ovary:  Visualized    Length (cm): 5.41    Height (cm): 4.76    Width (cm): 3.58  Other findings:     Free pelvic fluid: not identified      Free peritoneal fluid: not identified    Post-Procedure  Details:     Impression:  Anteflexed uterus appears within normal limits. The bilateral ovaries each contain cyst. RT 4.8cm with septation and LT 2.4cm. No free fluid.    Tolerance:  Tolerated well, no immediate complications    Complications: no complications      Patient presented to the emergency department on April 1 complaining of significant lower back pain.  She had an ultrasound done at that time.  The uterus and left ovary appeared normal.  The right ovary was not visualized.  She does have lumbar spondylosis.  She presently undergoing ablative procedures and physical therapy.    Advised to return to the office for repeat transvaginal scan  Cosign Needed         AMB US Pelvic Non OB     Date/Time: 5/15/2024 12:30 PM     Performed by: Leidy Mitchell  Authorized by: Donovan Ware DO  Universal Protocol:  Consent: Verbal consent obtained.  Consent given by: patient  Timeout called at: 5/15/2024 12:30 PM.  Patient understanding: patient states understanding of the procedure being performed  Patient identity confirmed: verbally with patient     Procedure details:     SIS Procedure: No    Indications: ovarian cysts      Technique:  US Pelvic, Non-OB with complete exam    Position: supine exam    Uterine findings:     Length (cm): 9.43    Height (cm):  4.29    Width (cm):  4.83    Endometrial stripe: identified      Endometrium thickness (mm):  9.7  Left ovary findings:     Left ovary:  Visualized    Length (cm): 3.77    Height (cm): 2.65    Width (cm): 2.8  Right ovary findings:     Right ovary:  Visualized    Length (cm): 2.8    Height (cm): 1.69    Width (cm): 1.89  Other findings:     Free pelvic fluid: not identified      Free peritoneal fluid: not identified    Post-Procedure Details:     Impression:  Anteverted uterus and bilateral ovaries appear within normal limits. The left ovary demonstrates a 2.1cm follicle. No free fluid.    Tolerance:  Tolerated well, no immediate complications     Complications: no complications    Additional Procedure Comments:      Transabdominal technique utilized for today's exam.              Impression: Right ovarian cyst/resolved.  Plan: Return to the office as needed/annual

## 2024-05-15 NOTE — PROGRESS NOTES
PT Re-Evaluation     Today's date: 2024  Patient name: Cristy Garcia  : 1975  MRN: [id number]  Referring provider: Westley Keller MD  Dx:   Encounter Diagnosis     ICD-10-CM    1. Lumbar spondylosis  M47.816       2. Primary osteoarthritis of both hips  M16.0             Start Time: 0840  Stop Time: 0950  Total time in clinic (min): 70 minutes    Assessment  Impairments: abnormal gait, abnormal or restricted ROM, activity intolerance, impaired physical strength, lacks appropriate home exercise program, pain with function, weight-bearing intolerance and poor posture   Symptom irritability: high    Assessment details: Patient has been seen by OP PT for 8 visits and is making good progress towards her goals. She demonstrated improved BLE strength and lumbar AROM with decreased pain at end range. She is also demonstrating improved tolerance with exercises indicating increased core strength and lumbar stability. Her 5xSTS time decreased from 23 seconds to 16, indicating decreased fall risk. Patient continues to have difficulty with lifting heavy objects but was educated on proper mechanics and things to avoid to prevent future injuries. Patient would benefit from continued skilled PT in order to improve core and BLE strength to improve functional mobility.     Goals  STG to be met within 4 weeks:  1. Pt will increase BLE strength by 1/2 muscle grade to improve functional mobility and reduce pain. - met  2. Pt will be independent with HEP to decrease pain and improve quality of life.  - met  3. Pt will report 3/10 pain at worst to improve functional mobility and quality of life. - in progress  4. Pt will improve 5xSTS time by 3 seconds to reduce risk for falling. -    LTG to be met within 8 weeks:  1. Pt will improve BLE strength to WNL to improve functional mobility and quality of life. - in progress  2. Pt will restore lumbar spine AROM to WNL to improve functional mobility and quality of life. - in  progress  3. Pt will meet FOTO score goal at DC to improve lumbar spine mobility in pain free range.  - in progress  4. Pt will improve 5xSTS by 10 seconds to reduce risk for falling. - in progress       Plan  Patient would benefit from: skilled physical therapy  Planned modality interventions: cryotherapy and thermotherapy: hydrocollator packs    Planned therapy interventions: abdominal trunk stabilization, flexibility, functional ROM exercises, graded activity, graded exercise, home exercise program, joint mobilization, massage, neuromuscular re-education, patient education, self care, strengthening, stretching, therapeutic activities and therapeutic exercise    Frequency: 2x week  Duration in weeks: 4  Treatment plan discussed with: patient  Plan details: Continue with POC.      Subjective Evaluation    History of Present Illness  Mechanism of injury: Patient reports that since beginning PT, she feels she has made about 50%-60% improvement in her low back and B hip pain. She states she does not have pain as frequently as she had been and is able to do more around her home.   Patient Goals  Patient goals for therapy: decreased pain, improved balance, increased motion and increased strength    Pain  Current pain ratin  At best pain ratin  At worst pain ratin  Location: globally  Quality: dull ache, throbbing, sharp and radiating  Relieving factors: medications  Aggravating factors: lifting, standing and walking  Progression: improved    Treatments  Previous treatment: injection treatment  Current treatment: physical therapy        Objective     Palpation   Left   Tenderness of the erector spinae, gluteus merissa, gluteus medius, lumbar paraspinals, piriformis and quadratus lumborum.     Right   Tenderness of the erector spinae, gluteus merissa, gluteus medius, lumbar paraspinals and piriformis.     Tenderness     Left Hip   Tenderness in the PSIS. No tenderness in the greater trochanter.     Right Hip  "  Tenderness in the PSIS.     Neurological Testing     Sensation     Lumbar   Left   Intact: light touch    Right   Intact: light touch    Reflexes   Left   Patellar (L4): trace (1+)  Achilles (S1): trace (1+)    Right   Patellar (L4): trace (1+)  Achilles (S1): trace (1+)    Active Range of Motion     Lumbar   Flexion:  WFL  Extension:  Restriction level: minimal  Left lateral flexion:  Restriction level: minimal  Right lateral flexion:  Restriction level: minimal  Left rotation:  Restriction level: minimal  Right rotation:  with pain Restriction level: minimal  Left Hip   Flexion: 85 degrees     Right Hip   Flexion: 85 degrees     Joint Play     Hypomobile: L2, L3, L4, L5 and S1     Pain: L2, L3, L4, L5 and S1     Strength/Myotome Testing     Left Hip   Planes of Motion   Flexion: 4  Extension: 4-  Abduction: 4  External rotation: 4  Internal rotation: 4+    Right Hip   Planes of Motion   Flexion: 4  Extension: 4-  Abduction: 4  External rotation: 4  Internal rotation: 4+    Left Knee   Flexion: 4  Extension: 4    Right Knee   Flexion: 4  Extension: 4    Muscle Activation   Patient unable to activate left transverse abdominals and right transverse abdominals.     Tests     Lumbar     Left   Negative crossed SLR, passive SLR and slump test.     Right   Negative crossed SLR, passive SLR and slump test.     Left Pelvic Girdle/Sacrum   Negative: active SLR test.     Right Pelvic Girdle/Sacrum   Negative: active SLR test.     Left Hip   Positive ANISA.   Negative FADIR.     Right Hip   Positive ANISA.   Negative FADIR.   Neuro Exam:     Functional outcomes   5x sit to stand: 16 (seconds)               Precautions: PMH HTN, T2DM, GERD, Fibromyalgia, B hip OA, h/o L hip fx      Manuals 4/24 4/29 5/1 5/6 5/8 5/16   B HS, piriformis, quad, gastroc, hip abd with manual traction 15' 15' 15' 15' 15' 15'   Lumbar PA mobs/ STM                           Neuro Re-Ed 4/24 5/1 5/6 5/8    DKTC on Pball 10x5\" hold 10x5\" hold 10x5\" " "10x5\" 10x5\"    Bridge w/ hip abd 2x10 RTB 2x10 GTB 2x10 GTB 2x10 GTB 2x10 GTB 2x10 BTB   TrA c marches, SLR     10xea bilat 10xea bilat HEP   LTR c Tball 10x5\" hold 10x5\" hold 10x5\"bilat 10x5\"bilat 10x5\"bilat    Front and lateral lunges      10x ea bilat   Seated Tball trunk rotation w/ PNF   10x3\"ea bilat RFB 10x3\"ea bilat soccer 10x3\"ea bilat soccer    Supine crunch c ABD   10x5\" hold 10x5\" 10x5\" 10x5\"    Hip thrust c Tball  2x10  2x10 2x10 2x10    Ther Ex 4/24 5/1 5/6 5/8    Nustep  for ROM  10' L1 10' L1 10'L2 10'L4 10'L4 10' L4              Leg press DL and SL         Supine SLR 3 way         Clamshells 2x10 RTB 2x10 GTB  2x10 GTB 2x10 GTB 2x10 GTB HEP                              HEP update/review      25'   Ther Activity 4/24 5/1 5/6 5/8                      Gait Training 4/24 5/1 5/6 5/8                      Modalities 4/24 5/1 5/6 5/8    MHP MHP 15'  MHP 15' 15' to LB 15' to LB 15' to LB 15' to LB                   "

## 2024-05-16 ENCOUNTER — EVALUATION (OUTPATIENT)
Dept: PHYSICAL THERAPY | Facility: CLINIC | Age: 49
End: 2024-05-16
Payer: COMMERCIAL

## 2024-05-16 DIAGNOSIS — M47.816 LUMBAR SPONDYLOSIS: Primary | ICD-10-CM

## 2024-05-16 DIAGNOSIS — M16.0 PRIMARY OSTEOARTHRITIS OF BOTH HIPS: ICD-10-CM

## 2024-05-16 PROCEDURE — 97140 MANUAL THERAPY 1/> REGIONS: CPT

## 2024-05-16 PROCEDURE — 97535 SELF CARE MNGMENT TRAINING: CPT

## 2024-05-16 PROCEDURE — 97110 THERAPEUTIC EXERCISES: CPT

## 2024-05-17 ENCOUNTER — TELEMEDICINE (OUTPATIENT)
Dept: BEHAVIORAL/MENTAL HEALTH CLINIC | Facility: CLINIC | Age: 49
End: 2024-05-17
Payer: COMMERCIAL

## 2024-05-17 DIAGNOSIS — F33.9 MAJOR DEPRESSION, RECURRENT, CHRONIC (HCC): Primary | ICD-10-CM

## 2024-05-17 PROCEDURE — T1015 CLINIC SERVICE: HCPCS | Performed by: SOCIAL WORKER

## 2024-05-17 NOTE — PSYCH
"Behavioral Health Psychotherapy Progress Note    Psychotherapy Provided: Individual Psychotherapy     1. Major depression, recurrent, chronic (HCC)            Goals addressed in session: Goal 1 and Goal 2     DATA: The client reported continued issues with her physical health, and need for dental surgery to address her tooth decay. \" I am going to have 3 teeth pulled due to them being broken\" During the session we explored and processed the client's emotional regulation exploring  the coping skills of acting the opposite, checking the facts, and paying attention to positive events.   During this session, this clinician used the following therapeutic modalities: DBT Cognitive Behavioral Therapy, Mindfulness-based Strategies, Solution-Focused Therapy, and Supportive Psychotherapy    Substance Abuse was not addressed during this session. If the client is diagnosed with a co-occurring substance use disorder, please indicate any changes in the frequency or amount of use: n/a. Stage of change for addressing substance use diagnoses: No substance use/Not applicable    ASSESSMENT:  Cristy Garcia presents with a Euthymic/ normal, Anxious, and Depressed mood.     her affect is Normal range and intensity, which is congruent, with her mood and the content of the session. The client has made progress on their goals.    The client  Cristy Garcia presents with a none risk of suicide, none risk of self-harm, and none risk of harm to others.    For any risk assessment that surpasses a \"low\" rating, a safety plan must be developed.    A safety plan was indicated: no  If yes, describe in detail n/a    PLAN: Between sessions, Cristy Garcia will practice her coping skills when presented with anxiety and or depression. At the next session, the therapist will use Cognitive Behavioral Therapy, Mindfulness-based Strategies, Solution-Focused Therapy, and Supportive Psychotherapy to address the client's MH issues affecting his different " relationships and environments .    Behavioral Health Treatment Plan and Discharge Planning: Cristy Garcia is aware of and agrees to continue to work on their treatment plan. They have identified and are working toward their discharge goals. yes    Visit start and stop times:    05/17/24   Telemedicine consent    Patient: Cristy Garcia  Provider: YONATAN Luu  Provider [facility] at [place] [address] [facility] [id number]    The patient was identified by name and date of birth. Cristy Garcia was informed that this is a telemedicine visit and that the visit is being conducted through Telephone.  My office door was closed. No one else was in the room.  She acknowledged consent and understanding of privacy and security of the video platform. The patient has agreed to participate and understands they can discontinue the visit at any time.    Patient is aware this is a billable service.     I spent 34 minutes with the patient during this visit.

## 2024-05-20 ENCOUNTER — OFFICE VISIT (OUTPATIENT)
Dept: PHYSICAL THERAPY | Facility: CLINIC | Age: 49
End: 2024-05-20
Payer: COMMERCIAL

## 2024-05-20 DIAGNOSIS — L30.8 OTHER ECZEMA: ICD-10-CM

## 2024-05-20 DIAGNOSIS — M47.816 LUMBAR SPONDYLOSIS: Primary | ICD-10-CM

## 2024-05-20 DIAGNOSIS — M16.0 PRIMARY OSTEOARTHRITIS OF BOTH HIPS: ICD-10-CM

## 2024-05-20 DIAGNOSIS — R11.0 NAUSEA: ICD-10-CM

## 2024-05-20 DIAGNOSIS — E11.65 TYPE 2 DIABETES MELLITUS WITH HYPERGLYCEMIA, WITHOUT LONG-TERM CURRENT USE OF INSULIN (HCC): ICD-10-CM

## 2024-05-20 PROCEDURE — 97112 NEUROMUSCULAR REEDUCATION: CPT

## 2024-05-20 PROCEDURE — 97140 MANUAL THERAPY 1/> REGIONS: CPT

## 2024-05-20 PROCEDURE — 97110 THERAPEUTIC EXERCISES: CPT

## 2024-05-20 NOTE — PROGRESS NOTES
"Daily Note     Today's date: 2024  Patient name: Cristy Garcia  : 1975  MRN: [id number]  Referring provider: Westley Keller MD  Dx:   Encounter Diagnosis     ICD-10-CM    1. Lumbar spondylosis  M47.816       2. Primary osteoarthritis of both hips  M16.0           Start Time: 1055  Stop Time: 1205  Total time in clinic (min): 70 minutes    Subjective: Patient reports she is feeling good today.      Objective: See treatment diary below      Assessment: Tolerated treatment well. Patient demonstrated fatigue post treatment, exhibited good technique with therapeutic exercises, and would benefit from continued PT to improve core and BLE strength to improve functional mobility and QoL.      Plan: Continue per plan of care.      Precautions: PMH HTN, T2DM, GERD, Fibromyalgia, B hip OA, h/o L hip fx      Manuals    B HS, piriformis, quad, gastroc, hip abd with manual traction 15' 15' 15' 15' 15' 15'   Lumbar PA mobs/ STM                           Neuro Re-Ed       DKTC on Pball 10x5\" hold 10x5\" 10x5\" 10x5\"  10x5\"   Bridge w/ hip abd 2x10 GTB 2x10 GTB 2x10 GTB 2x10 GTB 2x10 BTB 2x10 BTB   TrA c marches, SLR    10xea bilat 10xea bilat HEP    LTR c Tball 10x5\" hold 10x5\"bilat 10x5\"bilat 10x5\"bilat  10x5\" bilat   Front and lateral lunges     10x ea bilat 10x ea bilat   Seated Tball trunk rotation w/ PNF  10x3\"ea bilat RFB 10x3\"ea bilat soccer 10x3\"ea bilat soccer  10x3\" ea bilat soccer ball   Supine crunch c ABD  10x5\" hold 10x5\" 10x5\" 10x5\"     Hip thrust c Tball 2x10  2x10 2x10 2x10  210   Ther Ex       Nustep  for ROM  10' L1 10'L2 10'L4 10'L4 10' L4 10' L4              Leg press DL and SL         Supine SLR 3 way         Clamshells 2x10 GTB  2x10 GTB 2x10 GTB 2x10 GTB HEP 2x10 BTB                              HEP update/review     25'    Ther Activity                         Gait Training                         Modalities     "   [facility] 15' to [facility] 15' to LB 15' to LB 15' to LB

## 2024-05-21 DIAGNOSIS — L30.8 OTHER ECZEMA: ICD-10-CM

## 2024-05-21 DIAGNOSIS — B37.9 CANDIDIASIS: ICD-10-CM

## 2024-05-21 DIAGNOSIS — L65.9 ALOPECIA: ICD-10-CM

## 2024-05-21 DIAGNOSIS — E11.65 TYPE 2 DIABETES MELLITUS WITH HYPERGLYCEMIA, WITHOUT LONG-TERM CURRENT USE OF INSULIN (HCC): ICD-10-CM

## 2024-05-21 RX ORDER — KETOCONAZOLE 20 MG/G
CREAM TOPICAL DAILY
Qty: 60 G | Refills: 0 | Status: SHIPPED | OUTPATIENT
Start: 2024-05-21

## 2024-05-21 RX ORDER — LANCETS
EACH MISCELLANEOUS
Qty: 100 EACH | Refills: 1 | Status: SHIPPED | OUTPATIENT
Start: 2024-05-21

## 2024-05-21 RX ORDER — TRIAMCINOLONE ACETONIDE 0.25 MG/G
CREAM TOPICAL
Qty: 60 G | Refills: 0 | Status: SHIPPED | OUTPATIENT
Start: 2024-05-21

## 2024-05-21 RX ORDER — ONDANSETRON 4 MG/1
4 TABLET, FILM COATED ORAL EVERY 8 HOURS PRN
Qty: 30 TABLET | Refills: 5 | Status: SHIPPED | OUTPATIENT
Start: 2024-05-21

## 2024-05-21 NOTE — PROGRESS NOTES
"Daily Note     Today's date: 2024  Patient name: Cristy Garcia  : 1975  MRN: [id number]  Referring provider: Westley Keller MD  Dx:   Encounter Diagnosis     ICD-10-CM    1. Lumbar spondylosis  M47.816       2. Primary osteoarthritis of both hips  M16.0                      Subjective: ***      Objective: See treatment diary below      Assessment: Tolerated treatment {Tolerated treatment :[id number]}. Patient exhibited good technique with therapeutic exercises and would benefit from continued PT to increase ROM/strength and endurance to improve mobility.      Plan: Continue per plan of care.      Precautions: PMH HTN, T2DM, GERD, Fibromyalgia, B hip OA, h/o L hip fx      Manuals    B HS, piriformis, quad, gastroc, hip abd with manual traction 15' 15' 15' 15'    Lumbar PA mobs/ STM                        Neuro Re-Ed    DKTC on Pball 10x5\" 10x5\"  10x5\"    Bridge w/ hip abd 2x10 GTB 2x10 GTB 2x10 BTB 2x10 BTB    TrA c marches, SLR  10xea bilat 10xea bilat HEP     LTR c Tball 10x5\"bilat 10x5\"bilat  10x5\" bilat    Front and lateral lunges   10x ea bilat 10x ea bilat    Seated Tball trunk rotation w/ PNF 10x3\"ea bilat soccer 10x3\"ea bilat soccer  10x3\" ea bilat soccer ball    Supine crunch c ABD  10x5\" 10x5\"      Hip thrust c Tball 2x10 2x10  210    Ther Ex    Nustep  for ROM  10'L4 10'L4 10' L4 10' L4              Leg press DL and SL        Supine SLR 3 way        Clamshells 2x10 GTB 2x10 GTB HEP 2x10 BTB                            HEP update/review   25'     Ther Activity                    Gait Training                    Modalities    MHP 15' to LB 15' to LB 15' to LB 15' to LB                     "

## 2024-05-22 ENCOUNTER — APPOINTMENT (OUTPATIENT)
Dept: PHYSICAL THERAPY | Facility: CLINIC | Age: 49
End: 2024-05-22
Payer: COMMERCIAL

## 2024-05-22 RX ORDER — CLOTRIMAZOLE AND BETAMETHASONE DIPROPIONATE 10; .64 MG/G; MG/G
CREAM TOPICAL
Qty: 45 G | Refills: 3 | Status: SHIPPED | OUTPATIENT
Start: 2024-05-22

## 2024-05-22 RX ORDER — BLOOD SUGAR DIAGNOSTIC
STRIP MISCELLANEOUS
Qty: 100 STRIP | Refills: 3 | OUTPATIENT
Start: 2024-05-22

## 2024-05-22 RX ORDER — TRIAMCINOLONE ACETONIDE 0.25 MG/G
CREAM TOPICAL
Qty: 80 G | Refills: 3 | OUTPATIENT
Start: 2024-05-22

## 2024-05-22 RX ORDER — LANCETS 33 GAUGE
EACH MISCELLANEOUS
Refills: 3 | OUTPATIENT
Start: 2024-05-22

## 2024-05-23 ENCOUNTER — VBI (OUTPATIENT)
Dept: ADMINISTRATIVE | Facility: OTHER | Age: 49
End: 2024-05-23

## 2024-05-23 ENCOUNTER — APPOINTMENT (OUTPATIENT)
Dept: PHYSICAL THERAPY | Facility: CLINIC | Age: 49
End: 2024-05-23
Payer: COMMERCIAL

## 2024-05-23 NOTE — PROGRESS NOTES
"Daily Note     Today's date: 2024  Patient name: Cristy Garcia  : 1975  MRN: [id number]  Referring provider: Westley Keller MD  Dx:   Encounter Diagnosis     ICD-10-CM    1. Lumbar spondylosis  M47.816       2. Primary osteoarthritis of both hips  M16.0                      Subjective:       Objective: See treatment diary below      Assessment: Tolerated treatment {Tolerated treatment :[id number]}. Patient would benefit from continued PT      Plan: Continue per plan of care.      Precautions: PMH HTN, T2DM, GERD, Fibromyalgia, B hip OA, h/o L hip fx      Manuals    B HS, piriformis, quad, gastroc, hip abd with manual traction 15' 15' 15' 15' 15' 15'   Lumbar PA mobs/ STM                           Neuro Re-Ed       DKTC on Pball 10x5\" hold 10x5\" 10x5\" 10x5\"  10x5\"   Bridge w/ hip abd 2x10 BTB 2x10 GTB 2x10 GTB 2x10 GTB 2x10 BTB 2x10 BTB   TrA c marches, SLR    10xea bilat 10xea bilat HEP    LTR c Tball 10x5\" hold 10x5\"bilat 10x5\"bilat 10x5\"bilat  10x5\" bilat   Front and lateral lunges 10x ea Dre    10x ea bilat 10x ea bilat   Seated Tball trunk rotation w/ PNF 10x3\" ea Dre soccer ball 10x3\"ea bilat RFB 10x3\"ea bilat soccer 10x3\"ea bilat soccer  10x3\" ea bilat soccer ball   Supine crunch c ABD   10x5\" 10x5\" 10x5\"     Hip thrust c Tball 2x10  2x10 2x10 2x10  210   Ther Ex       Nustep  for ROM  10' L4 10'L2 10'L4 10'L4 10' L4 10' L4              Leg press DL and SL         Supine SLR 3 way         Clamshells 2x10 BTB  2x10 GTB 2x10 GTB 2x10 GTB HEP 2x10 BTB                              HEP update/review     25'    Ther Activity                         Gait Training                         Modalities       MHP MHP 15' to LB 15' to LB 15' to LB 15' to LB 15' to LB 15' to LB                     "

## 2024-05-23 NOTE — TELEPHONE ENCOUNTER
.Upon review of the In Basket request we were able to locate, review, and update the patient chart as requested for Diabetic Eye Exam.    Any additional questions or concerns should be emailed to the Practice Liaisons via the appropriate education email address, please do not reply via In Basket.    Thank you  NORMAN WATKINS

## 2024-05-26 ENCOUNTER — OFFICE VISIT (OUTPATIENT)
Dept: URGENT CARE | Facility: CLINIC | Age: 49
End: 2024-05-26
Payer: COMMERCIAL

## 2024-05-26 VITALS
BODY MASS INDEX: 46.01 KG/M2 | DIASTOLIC BLOOD PRESSURE: 76 MMHG | HEIGHT: 62 IN | OXYGEN SATURATION: 98 % | SYSTOLIC BLOOD PRESSURE: 120 MMHG | HEART RATE: 92 BPM | TEMPERATURE: 97.2 F | WEIGHT: 250 LBS | RESPIRATION RATE: 16 BRPM

## 2024-05-26 DIAGNOSIS — K04.7 DENTAL INFECTION: Primary | ICD-10-CM

## 2024-05-26 PROCEDURE — 99213 OFFICE O/P EST LOW 20 MIN: CPT | Performed by: PHYSICIAN ASSISTANT

## 2024-05-26 RX ORDER — AMOXICILLIN AND CLAVULANATE POTASSIUM 875; 125 MG/1; MG/1
1 TABLET, FILM COATED ORAL EVERY 12 HOURS SCHEDULED
Qty: 14 TABLET | Refills: 0 | Status: SHIPPED | OUTPATIENT
Start: 2024-05-26 | End: 2024-06-02

## 2024-05-29 DIAGNOSIS — R09.82 PND (POST-NASAL DRIP): ICD-10-CM

## 2024-05-29 DIAGNOSIS — J01.00 ACUTE MAXILLARY SINUSITIS, RECURRENCE NOT SPECIFIED: ICD-10-CM

## 2024-05-29 DIAGNOSIS — R05.1 ACUTE COUGH: ICD-10-CM

## 2024-05-29 DIAGNOSIS — J06.9 ACUTE URI: ICD-10-CM

## 2024-05-29 DIAGNOSIS — H65.00 ACUTE SEROUS OTITIS MEDIA, RECURRENCE NOT SPECIFIED, UNSPECIFIED LATERALITY: ICD-10-CM

## 2024-05-30 RX ORDER — FLUTICASONE PROPIONATE 50 MCG
SPRAY, SUSPENSION (ML) NASAL
Qty: 18.2 ML | Refills: 0 | Status: SHIPPED | OUTPATIENT
Start: 2024-05-30

## 2024-05-31 ENCOUNTER — APPOINTMENT (OUTPATIENT)
Dept: PHYSICAL THERAPY | Facility: CLINIC | Age: 49
End: 2024-05-31
Payer: COMMERCIAL

## 2024-05-31 ENCOUNTER — OFFICE VISIT (OUTPATIENT)
Dept: CARDIOLOGY CLINIC | Facility: CLINIC | Age: 49
End: 2024-05-31
Payer: COMMERCIAL

## 2024-05-31 VITALS
WEIGHT: 242.2 LBS | OXYGEN SATURATION: 98 % | SYSTOLIC BLOOD PRESSURE: 148 MMHG | DIASTOLIC BLOOD PRESSURE: 96 MMHG | HEIGHT: 62 IN | BODY MASS INDEX: 44.57 KG/M2 | HEART RATE: 96 BPM

## 2024-05-31 DIAGNOSIS — I10 ESSENTIAL HYPERTENSION: ICD-10-CM

## 2024-05-31 DIAGNOSIS — I51.89 DIASTOLIC DYSFUNCTION: ICD-10-CM

## 2024-05-31 DIAGNOSIS — E66.01 MORBID OBESITY WITH BODY MASS INDEX (BMI) OF 45.0 TO 49.9 IN ADULT (HCC): ICD-10-CM

## 2024-05-31 DIAGNOSIS — I51.7 MILD CONCENTRIC LEFT VENTRICULAR HYPERTROPHY: Primary | ICD-10-CM

## 2024-05-31 DIAGNOSIS — E78.2 MIXED HYPERLIPIDEMIA: ICD-10-CM

## 2024-05-31 PROCEDURE — 99244 OFF/OP CNSLTJ NEW/EST MOD 40: CPT | Performed by: INTERNAL MEDICINE

## 2024-05-31 RX ORDER — LISINOPRIL 20 MG/1
10 TABLET ORAL DAILY
Qty: 90 TABLET | Refills: 3 | Status: SHIPPED | OUTPATIENT
Start: 2024-05-31

## 2024-05-31 NOTE — PROGRESS NOTES
[facility] [place] [address]  [facility] [address] [phone]      Patient name: Cristy Garcia   YOB: 1975   MR no: [id number]      Diagnosis ICD-10-CM Associated Orders   1. Mild concentric left ventricular hypertrophy  I51.7 Ambulatory Referral to Cardiology      2. Diastolic dysfunction  I51.89 Ambulatory Referral to Cardiology      3. Essential hypertension  I10 lisinopril (ZESTRIL) 20 mg tablet      4. Mixed hyperlipidemia  E78.2       5. Morbid obesity with body mass index (BMI) of 45.0 to 49.9 in adult ([facility])  E66.01     Z68.42         ASSESSMENT AND RECOMMENDATIONS:  1. Mild concentric left ventricular hypertrophy  Assessment & Plan:  Recent echocardiogram reported normal left ventricle systolic dysfunction with mild concentric left ventricular hypertrophy no significant valvular pathology.  This is not surprising in view of her history of hypertension.  Goal should be optimal blood pressure control.  Orders:  -     Ambulatory Referral to Cardiology  2. Diastolic dysfunction  -     Ambulatory Referral to Cardiology  3. Essential hypertension  Assessment & Plan:  Blood pressure is suboptimal.  I have advised her to increase the lisinopril to 20 mg daily and she will continue to follow with her primary care physician for further titration in her blood pressure.  Based on her diabetes, target blood pressure should be 120/80 or less.  Orders:  -     lisinopril (ZESTRIL) 20 mg tablet; Take 0.5 tablets (10 mg total) by mouth daily  4. Mixed hyperlipidemia  Assessment & Plan:  Most recent lipid profile showed excellent numbers on low-dose statins.  5. Morbid obesity with body mass index (BMI) of 45.0 to 49.9 in adult ([facility])  Assessment & Plan:  Patient states that she actually has lost some weight in the last few years.  Needs to continue aggressive lifestyle modification measures for weight loss.       CHIEF COMPLAINT:  KRAIG  dizziness, chest tightness.     HPI:  48-year-old female with a complex medical history including hypertension, type 2 diabetes mellitus, hyperlipidemia, fibromyalgia, chronic pain syndrome, chronic arthritis, obstructive sleep apnea presents for first cardiology visit.  She is very limited in her activities and uses a walker and also have a caregiver who takes care of most of her daily needs.  She has had dyspnea on exertion for many years.  She gets occasional atypical chest discomfort that only occurs when she has her arms above her head for a few minutes while shampooing her hair.  She also complains of occasional postural dizziness.  She denies any syncope.  She recently underwent an echocardiogram which showed mild concentric left ventricular hypertrophy grade with grade 1 diastolic dysfunction and that has led to the cardiology referral.    Past Medical History:   Diagnosis Date    Allergic     Seasonal Allergies    Alopecia of scalp     Treated with Proscar    Asthma     Chronic pain disorder     back, bilat knees, bilat hips    CPAP (continuous positive airway pressure) dependence     Diabetes mellitus (HCC)     Fibromyalgia, primary     GERD (gastroesophageal reflux disease)     Hip fx, left, closed, with routine healing, subsequent encounter 05/2017    Hyperlipidemia     Hypertension     Insomnia     Irregular heartbeat     VICTOR HUGO on CPAP         Past Surgical History:   Procedure Laterality Date    CARPAL TUNNEL RELEASE Left     IR SPINE AND PAIN PROCEDURE  3/12/2024    IR SPINE AND PAIN PROCEDURE  4/11/2024    LUMBAR EPIDURAL INJECTION      NERVE BLOCK Bilateral 12/29/2020    Procedure: L2 L3 L4 L5 MEDIAL BRANCH BLOCK #1;  Surgeon: Westley Keller MD;  [facility];  Service: Pain Management     NERVE BLOCK Bilateral 1/19/2021    Procedure: L2 L3 L4 L5 MEDIAL BRANCH BLOCK #2;  Surgeon: Westley Keller MD;  [facility];  Service: Pain Management     [place] ARTHROCENTESIS ASPIR&/INJ MAJOR JT/BURSA  W/O US Bilateral 2/7/2023    Procedure: INJECTION JOINT HIP;  Surgeon: Westley Keller MD;  [facility]: [facility];  Service: Pain Management     [place] HYSTEROSCOPY BX ENDOMETRIUM&/POLYPC W/WO D&C N/A 4/25/2023    Procedure: HYSTEROSCOPY W/  RESECTION UTERINE TUMOR/FIBROID (POLYPECTOMY );  Surgeon: Donovan Ware DO;  [facility];  Service: Gynecology    RADIOFREQUENCY ABLATION Right 2/25/2021    Procedure: L2 L3 L4 L5 RADIO FREQUENCY ABLATION right side;  Surgeon: Westley Keller MD;  [facility];  Service: Pain Management     RADIOFREQUENCY ABLATION Left 3/16/2021    Procedure: L2 L3 L4 L5 RADIO FREQUENCY ABLATION;  Surgeon: Westley Keller MD;  [facility];  Service: Pain Management     RHIZOTOMY Bilateral 4/4/2023    Procedure: RHIZOTOMY LUMBAR L3, L4, L5 medial branch nerves;  Surgeon: Westley Keller MD;  [facility];  Service: Pain Management     UPPER GASTROINTESTINAL ENDOSCOPY      US GUIDED THYROID BIOPSY  7/14/2023        Social History     Tobacco Use    Smoking status: Former    Smokeless tobacco: Never   Vaping Use    Vaping status: Never Used   Substance Use Topics    Alcohol use: No    Drug use: No       Family History   Problem Relation Age of Onset    Cancer Father     Hepatitis Father     Asthma Cousin     Hypertension Paternal Grandmother     No Known Problems Mother     No Known Problems Sister     No Known Problems Maternal Grandmother     No Known Problems Maternal Grandfather     No Known Problems Paternal Grandfather     No Known Problems Paternal Aunt     BRCA2 Positive Neg Hx     BRCA2 Negative Neg Hx     BRCA1 Positive Neg Hx     BRCA1 Negative Neg Hx     BRCA 1/2 Neg Hx     Ovarian cancer Neg Hx     Endometrial cancer Neg Hx     Colon cancer Neg Hx     Breast cancer additional onset Neg Hx     Breast cancer Neg Hx    Mother had MVP. NO premature CAD in parents or siblings.     Allergies   Allergen Reactions    Trulicity [Dulaglutide] Swelling    Tdap  [Tetanus-Diphth-Acell Pertussis] Rash         Current Outpatient Medications:     acetaminophen (TYLENOL) 500 mg tablet, Take 500 mg by mouth every 6 (six) hours as needed for mild pain, Disp: , Rfl:     albuterol (Ventolin HFA) 90 mcg/act inhaler, Inhale 2 puffs every 6 (six) hours as needed for wheezing or shortness of breath, Disp: 18 g, Rfl: 5    amoxicillin-clavulanate (AUGMENTIN) 875-125 mg per tablet, Take 1 tablet by mouth every 12 (twelve) hours for 7 days, Disp: 14 tablet, Rfl: 0    atorvastatin (LIPITOR) 20 mg tablet, Take 1 tablet (20 mg total) by mouth daily with dinner, Disp: 90 tablet, Rfl: 1    celecoxib (CeleBREX) 200 mg capsule, Take 1 capsule (200 mg total) by mouth 2 (two) times a day, Disp: 60 capsule, Rfl: 5    cetirizine (ZyrTEC) 10 mg tablet, 1 tab po daily x 1 month then prn allergies, Disp: 30 tablet, Rfl: 5    clotrimazole-betamethasone (LOTRISONE) 1-0.05 % cream, APPLY TWICE DAILY TO VAGINAL AREA UNTIL HEALED THEN AS NEEDED FOR IRRITATION., Disp: 45 g, Rfl: 3    Empagliflozin (Jardiance) 25 MG TABS, Take 1 tablet (25 mg total) by mouth daily, Disp: 30 tablet, Rfl: 5    ergocalciferol (VITAMIN D2) 50,000 units, Take 1 capsule (50,000 Units total) by mouth once a week, Disp: 12 capsule, Rfl: 0    famotidine (PEPCID) 40 MG tablet, TAKE (1) TABLET BY MOUTH DAILY., Disp: 30 tablet, Rfl: 5    finasteride (PROPECIA) 1 MG tablet, Take 1 mg by mouth daily, Disp: , Rfl:     fluticasone (FLONASE) 50 mcg/act nasal spray, 1 spray each nostril 2 x daily x 1 month then prn congestion or allergies, Disp: 18.2 mL, Rfl: 0    Fluticasone-Salmeterol (Advair Diskus) 250-50 mcg/dose inhaler, Inhale 1 puff 2 (two) times a day Rinse mouth after use., Disp: 180 blister, Rfl: 1    gabapentin (NEURONTIN) 300 mg capsule, Take 1 capsule (300 mg total) by mouth 3 (three) times a day, Disp: 270 capsule, Rfl: 3    glipiZIDE (GLUCOTROL) 10 mg tablet, TAKE (2) TABLETS TWICE DAILY BEFORE MEALS., Disp: 360 tablet, Rfl: 1     glucose blood test strip, Use as instructed, Disp: 100 each, Rfl: 1    ketoconazole (NIZORAL) 2 % cream, Apply topically daily, Disp: 60 g, Rfl: 0    Lancet Devices (OneTouch Delica Plus Lancing) MISC, , Disp: , Rfl:     Lancets (onetouch ultrasoft) lancets, Use as instructed, Disp: 100 each, Rfl: 1    lisinopril (ZESTRIL) 20 mg tablet, Take 0.5 tablets (10 mg total) by mouth daily, Disp: 90 tablet, Rfl: 3    metFORMIN (GLUCOPHAGE) 1000 MG tablet, Take 1 tablet (1,000 mg total) by mouth 2 (two) times a day with meals, Disp: 180 tablet, Rfl: 5    nystatin powder, APPLY AS DIRECTED TO AFFECTED AREA DAILY, Disp: , Rfl:     omeprazole (PriLOSEC) 40 MG capsule, Take 1 capsule (40 mg total) by mouth daily before breakfast, Disp: 90 capsule, Rfl: 3    ondansetron (ZOFRAN) 4 mg tablet, Take 1 tablet (4 mg total) by mouth every 8 (eight) hours as needed for nausea or vomiting, Disp: 30 tablet, Rfl: 5    sertraline (ZOLOFT) 50 mg tablet, TAKE (1) TABLET BY MOUTH DAILY AT BEDTIME., Disp: 90 tablet, Rfl: 1    triamcinolone (KENALOG) 0.025 % cream, Apply 2 x daily to rash until healed then prn rash, Disp: 60 g, Rfl: 0    atorvastatin (LIPITOR) 20 mg tablet, , Disp: , Rfl:     celecoxib (CeleBREX) 200 mg capsule, , Disp: , Rfl:     gabapentin (NEURONTIN) 300 mg capsule, , Disp: , Rfl:     glucose blood test strip, , Disp: , Rfl:     medroxyPROGESTERone (PROVERA) 10 mg tablet, Take 1 tablet (10 mg total) by mouth daily for 10 days (Patient not taking: Reported on 3/25/2024), Disp: 10 tablet, Rfl: 0     Lab Results   Component Value Date    CREATININE 0.61 04/01/2024    CREATININE 0.55 (L) 02/22/2024    CREATININE 0.54 (L) 02/12/2024    K 3.8 04/01/2024    K 3.8 02/22/2024    K 4.3 02/12/2024    SODIUM 136 04/01/2024    SODIUM 137 02/22/2024    SODIUM 136 02/12/2024    LDLCALC 46 02/22/2024    LDLCALC 106 (H) 04/20/2023    LDLCALC 126 (H) 09/16/2022    TRIG 113 02/22/2024    TRIG 153 (H) 04/20/2023    TRIG 147 09/16/2022    HDL 46  "(L) 02/22/2024    HDL 46 (L) 04/20/2023    HDL 45 (L) 09/16/2022    CHOLESTEROL 115 02/22/2024    CHOLESTEROL 183 04/20/2023    CHOLESTEROL 200 09/16/2022     REVIEW OF SYSTEMS   Positive for: Chronic pain, arthritis, dizziness, dyspnea on exertion  Negative for: All remaining as reviewed below and in HPI.    SYSTEM SYMPTOMS REVIEWED:  General--weight change, fever, night sweats  Respiratory--cough, wheezing, shortness of breath, sputum production  Cardiovascular--chest pain, syncope, dyspnea on exertion, edema, decline in exercise tolerance, claudication   Gastrointestinal--persistent vomiting, diarrhea, abdominal distention, blood in stool   Muscular or skeletal--joint pain or swelling   Neurologic--headaches, syncope, abnormal movement  Hematologic--history of easy bruising and bleeding   Endocrine--thyroid enlargement, heat or cold intolerance, polyuria   Psychiatric--anxiety, depression     Vitals:    05/31/24 1049 05/31/24 1052   BP:  148/96   Pulse:  96   SpO2:  98%   Weight:  110 kg (242 lb 3.2 oz)   Height: 5' 2\" (1.575 m) 5' 2\" (1.575 m)       Wt Readings from Last 3 Encounters:   05/31/24 110 kg (242 lb 3.2 oz)   05/26/24 113 kg (250 lb)   05/14/24 115 kg (253 lb)        Body mass index is 44.3 kg/m².     General physical examination:    General appearance: Alert, no acute distress, appears older than stated age, severe obesity.  HEENT: Mucous membranes are moist.  No obvious abnormality noted.  Neck: Supple with no lymphadenopathy.  No JVD.  Carotid pulses are intact.  No carotid bruit.  Cardiovascular system: Regular rhythm.  Normal S1 and S2.  No murmurs.  No rubs or gallops. Extremities: No edema. No cyanosis.  Pulmonary: Respirations unlabored.  Good air entry bilaterally.  Clear to auscultation bilaterally.  Gastrointestinal: Abdomen is soft and nontender.  Bowel sounds are positive.  Musculoskeletal: Upper Extremities: Normal upper motor strength. Lower Extremity: Normal motor strength. Gait: " "Labored, uses a walker  Skin: Skin is warm. No rashes or lesions.  Neurological: Patient is alert and oriented with no gross motor deficits.  Psychiatric: Mood is normal.  Behavior is normal.        Thank you for allowing me to be a part of this patient's care. If you have further questions, please feel free to contact me.    Feliz Braxton MD, FACC, FASE    Portions of the record  have been created with voice recognition software.  Occasional grammatical mistakes or wrong word or \"sound alike\" substitutions may have occurred due to the inherent limitations of voice recognition software. Please reach out to me directly for any clarifications.     "

## 2024-05-31 NOTE — ASSESSMENT & PLAN NOTE
Patient states that she actually has lost some weight in the last few years.  Needs to continue aggressive lifestyle modification measures for weight loss.

## 2024-05-31 NOTE — ASSESSMENT & PLAN NOTE
Blood pressure is suboptimal.  I have advised her to increase the lisinopril to 20 mg daily and she will continue to follow with her primary care physician for further titration in her blood pressure.  Based on her diabetes, target blood pressure should be 120/80 or less.

## 2024-05-31 NOTE — ASSESSMENT & PLAN NOTE
Recent echocardiogram reported normal left ventricle systolic dysfunction with mild concentric left ventricular hypertrophy no significant valvular pathology.  This is not surprising in view of her history of hypertension.  Goal should be optimal blood pressure control.

## 2024-06-01 NOTE — PROGRESS NOTES
[facility]        NAME: Cristy Garcia is a 48 y.o. female  : 1975    MRN: [id number]  DATE: May 26, 2024  TIME: 11:54 AM    Assessment and Plan   Dental infection [K04.7]  1. Dental infection  amoxicillin-clavulanate (AUGMENTIN) 875-125 mg per tablet            Patient Instructions     Pt has dental infection that I will treat with Augmentin and rec ice, soft diet, discussed pain control, and to follow through with dental appt on 6/10/2024 as planned/scheduled.   Follow up with PCP in 3-5 days.  Proceed to  ER if symptoms worsen.    If tests have been performed at [facility], our office will contact you with results if changes need to be made to the care plan discussed with you at the visit.  You can review your full results on [facility].    Chief Complaint     Chief Complaint   Patient presents with    Dental Pain     C/o pain over upper left back teeth and also left lower molar. Onset of pain 2 days ago. Does have an appointment with dentist on 6/10/24. Reports broken teeth upper left and partially erupted wisdom tooth on left lower area.         History of Present Illness       Pt presents with 2 day hx of dental pain that she believes is due to an infection. She has appt with dentist next month but is seeking treatment in the interim. It is located in her left upper gingiva as well as around a left lower molar. Denies bleeding/discharge, fever, chills, facial swelling. She admits to having poor dentition overall including broken teeth and she also has a wisdom tooth causing an issue.     Dental Pain         Review of Systems   Review of Systems   Constitutional: Negative.    HENT:  Positive for dental problem. Negative for facial swelling.    Respiratory: Negative.     Cardiovascular: Negative.    Gastrointestinal: Negative.    Genitourinary: Negative.          Current Medications       Current Outpatient Medications:     acetaminophen (TYLENOL) 500 mg tablet, Take 500 mg by mouth  every 6 (six) hours as needed for mild pain, Disp: , Rfl:     albuterol (Ventolin HFA) 90 mcg/act inhaler, Inhale 2 puffs every 6 (six) hours as needed for wheezing or shortness of breath, Disp: 18 g, Rfl: 5    amoxicillin-clavulanate (AUGMENTIN) 875-125 mg per tablet, Take 1 tablet by mouth every 12 (twelve) hours for 7 days, Disp: 14 tablet, Rfl: 0    atorvastatin (LIPITOR) 20 mg tablet, Take 1 tablet (20 mg total) by mouth daily with dinner, Disp: 90 tablet, Rfl: 1    celecoxib (CeleBREX) 200 mg capsule, Take 1 capsule (200 mg total) by mouth 2 (two) times a day, Disp: 60 capsule, Rfl: 5    cetirizine (ZyrTEC) 10 mg tablet, 1 tab po daily x 1 month then prn allergies, Disp: 30 tablet, Rfl: 5    clotrimazole-betamethasone (LOTRISONE) 1-0.05 % cream, APPLY TWICE DAILY TO VAGINAL AREA UNTIL HEALED THEN AS NEEDED FOR IRRITATION., Disp: 45 g, Rfl: 3    Empagliflozin (Jardiance) 25 MG TABS, Take 1 tablet (25 mg total) by mouth daily, Disp: 30 tablet, Rfl: 5    ergocalciferol (VITAMIN D2) 50,000 units, Take 1 capsule (50,000 Units total) by mouth once a week, Disp: 12 capsule, Rfl: 0    famotidine (PEPCID) 40 MG tablet, TAKE (1) TABLET BY MOUTH DAILY., Disp: 30 tablet, Rfl: 5    finasteride (PROPECIA) 1 MG tablet, Take 1 mg by mouth daily, Disp: , Rfl:     Fluticasone-Salmeterol (Advair Diskus) 250-50 mcg/dose inhaler, Inhale 1 puff 2 (two) times a day Rinse mouth after use., Disp: 180 blister, Rfl: 1    gabapentin (NEURONTIN) 300 mg capsule, Take 1 capsule (300 mg total) by mouth 3 (three) times a day, Disp: 270 capsule, Rfl: 3    glipiZIDE (GLUCOTROL) 10 mg tablet, TAKE (2) TABLETS TWICE DAILY BEFORE MEALS., Disp: 360 tablet, Rfl: 1    glucose blood test strip, Use as instructed, Disp: 100 each, Rfl: 1    ketoconazole (NIZORAL) 2 % cream, Apply topically daily, Disp: 60 g, Rfl: 0    Lancet Devices (OneTouch Delica Plus Lancing) MISC, , Disp: , Rfl:     Lancets (onetouch ultrasoft) lancets, Use as instructed, Disp: 100  each, Rfl: 1    metFORMIN (GLUCOPHAGE) 1000 MG tablet, Take 1 tablet (1,000 mg total) by mouth 2 (two) times a day with meals, Disp: 180 tablet, Rfl: 5    nystatin powder, APPLY AS DIRECTED TO AFFECTED AREA DAILY, Disp: , Rfl:     omeprazole (PriLOSEC) 40 MG capsule, Take 1 capsule (40 mg total) by mouth daily before breakfast, Disp: 90 capsule, Rfl: 3    ondansetron (ZOFRAN) 4 mg tablet, Take 1 tablet (4 mg total) by mouth every 8 (eight) hours as needed for nausea or vomiting, Disp: 30 tablet, Rfl: 5    sertraline (ZOLOFT) 50 mg tablet, TAKE (1) TABLET BY MOUTH DAILY AT BEDTIME., Disp: 90 tablet, Rfl: 1    triamcinolone (KENALOG) 0.025 % cream, Apply 2 x daily to rash until healed then prn rash, Disp: 60 g, Rfl: 0    atorvastatin (LIPITOR) 20 mg tablet, , Disp: , Rfl:     celecoxib (CeleBREX) 200 mg capsule, , Disp: , Rfl:     fluticasone (FLONASE) 50 mcg/act nasal spray, 1 spray each nostril 2 x daily x 1 month then prn congestion or allergies, Disp: 18.2 mL, Rfl: 0    gabapentin (NEURONTIN) 300 mg capsule, , Disp: , Rfl:     glucose blood test strip, , Disp: , Rfl:     lisinopril (ZESTRIL) 20 mg tablet, Take 0.5 tablets (10 mg total) by mouth daily, Disp: 90 tablet, Rfl: 3    medroxyPROGESTERone (PROVERA) 10 mg tablet, Take 1 tablet (10 mg total) by mouth daily for 10 days (Patient not taking: Reported on 3/25/2024), Disp: 10 tablet, Rfl: 0    Current Allergies     Allergies as of 05/26/2024 - Reviewed 05/26/2024   Allergen Reaction Noted    Trulicity [dulaglutide] Swelling 05/08/2023    Tdap [tetanus-diphth-acell pertussis] Rash 01/24/2019            The following portions of the patient's history were reviewed and updated as appropriate: allergies, current medications, past family history, past medical history, past social history, past surgical history and problem list.     Past Medical History:   Diagnosis Date    Allergic     Seasonal Allergies    Alopecia of scalp     Treated with Proscar    Asthma      Chronic pain disorder     back, bilat knees, bilat hips    CPAP (continuous positive airway pressure) dependence     Diabetes mellitus (HCC)     Fibromyalgia, primary     GERD (gastroesophageal reflux disease)     Hip fx, left, closed, with routine healing, subsequent encounter 05/2017    Hyperlipidemia     Hypertension     Insomnia     Irregular heartbeat     VICTOR HUGO on CPAP        Past Surgical History:   Procedure Laterality Date    CARPAL TUNNEL RELEASE Left     IR SPINE AND PAIN PROCEDURE  3/12/2024    IR SPINE AND PAIN PROCEDURE  4/11/2024    LUMBAR EPIDURAL INJECTION      NERVE BLOCK Bilateral 12/29/2020    Procedure: L2 L3 L4 L5 MEDIAL BRANCH BLOCK #1;  Surgeon: Westley Keller MD;  [facility];  Service: Pain Management     NERVE BLOCK Bilateral 1/19/2021    Procedure: L2 L3 L4 L5 MEDIAL BRANCH BLOCK #2;  Surgeon: Westley Keller MD;  [facility];  Service: Pain Management     [place] ARTHROCENTESIS ASPIR&/INJ MAJOR JT/BURSA W/O US Bilateral 2/7/2023    Procedure: INJECTION JOINT HIP;  Surgeon: Westley Keller MD;  [facility];  Service: Pain Management     NM HYSTEROSCOPY BX ENDOMETRIUM&/POLYPC W/WO D&C N/A 4/25/2023    Procedure: HYSTEROSCOPY W/  RESECTION UTERINE TUMOR/FIBROID (POLYPECTOMY );  Surgeon: Donovan Ware DO;  [facility];  Service: Gynecology    RADIOFREQUENCY ABLATION Right 2/25/2021    Procedure: L2 L3 L4 L5 RADIO FREQUENCY ABLATION right side;  Surgeon: Westley Keller MD;  [facility];  Service: Pain Management     RADIOFREQUENCY ABLATION Left 3/16/2021    Procedure: L2 L3 L4 L5 RADIO FREQUENCY ABLATION;  Surgeon: Westley Keller MD;  [facility];  Service: Pain Management     RHIZOTOMY Bilateral 4/4/2023    Procedure: RHIZOTOMY LUMBAR L3, L4, L5 medial branch nerves;  Surgeon: Westley Keller MD;  [facility];  Service: Pain Management     UPPER GASTROINTESTINAL ENDOSCOPY      US GUIDED THYROID BIOPSY  7/14/2023       Family History  "  Problem Relation Age of Onset    Cancer Father     Hepatitis Father     Asthma Cousin     Hypertension Paternal Grandmother     No Known Problems Mother     No Known Problems Sister     No Known Problems Maternal Grandmother     No Known Problems Maternal Grandfather     No Known Problems Paternal Grandfather     No Known Problems Paternal Aunt     BRCA2 Positive Neg Hx     BRCA2 Negative Neg Hx     BRCA1 Positive Neg Hx     BRCA1 Negative Neg Hx     BRCA 1/2 Neg Hx     Ovarian cancer Neg Hx     Endometrial cancer Neg Hx     Colon cancer Neg Hx     Breast cancer additional onset Neg Hx     Breast cancer Neg Hx          Medications have been verified.        Objective   /76   Pulse 92   Temp (!) 97.2 °F (36.2 °C)   Resp 16   Ht 5' 2\" (1.575 m)   Wt 113 kg (250 lb)   SpO2 98%   BMI 45.73 kg/m²   No LMP recorded. Patient is premenopausal.       Physical Exam     Physical Exam  Vitals reviewed.   Constitutional:       General: She is not in acute distress.     Appearance: She is well-developed.   HENT:      Head:      Jaw: No swelling.      Mouth/Throat:      Mouth: Mucous membranes are moist.      Pharynx: Oropharynx is clear.      Comments: Left upper gingiva with diffuse edema and inflammation with broken teeth noted. No bleeding/discharge noted. Also has localized swelling posterior left lower gingiva. Airway is patent with no sign of obstruction.   Neurological:      Mental Status: She is alert and oriented to person, place, and time.                   "

## 2024-06-02 NOTE — PSYCH
"Behavioral Health Psychotherapy Progress Note    Psychotherapy Provided: Individual Psychotherapy     1. Major depression, recurrent, chronic (HCC)            Goals addressed in session: Goal 1 and Goal 2     DATA:The client reported car troubles as the major reason why her appointment was switched to virtual. She was able to verbalize problems within her environment due to water issues. \" The water pressure is horrible and they didn't get their money's worth\" As team we validated her feels and reviewed supports in the community to help with her environmental stressors.   During this session, this clinician used the following therapeutic modalities: Cognitive Behavioral Therapy, Mindfulness-based Strategies, Solution-Focused Therapy, and Supportive Psychotherapy    Substance Abuse was not addressed during this session. If the client is diagnosed with a co-occurring substance use disorder, please indicate any changes in the frequency or amount of use: n/a-none. Stage of change for addressing substance use diagnoses: No substance use/Not applicable    ASSESSMENT:  Cristy Garcia presents with a Anxious and Depressed mood.     her affect is Normal range and intensity, which is congruent, with her mood and the content of the session. The client has made progress on their goals.    The client  Cristy Garcia presents with a none risk of suicide, none risk of self-harm, and none risk of harm to others.    For any risk assessment that surpasses a \"low\" rating, a safety plan must be developed.    A safety plan was indicated: no  If yes, describe in detail n/a    PLAN: Between sessions, Cristy Garcia will practice her coping skills when presented with anxiety and or depression. At the next session, the therapist will use Cognitive Behavioral Therapy, Mindfulness-based Strategies, Solution-Focused Therapy, and Supportive Psychotherapy to address the client's MH issues affecting her different relationships and " environments.    Behavioral Health Treatment Plan and Discharge Planning: Cristy Garcia is aware of and agrees to continue to work on their treatment plan. They have identified and are working toward their discharge goals. yes    Visit start and stop times:    06/03/24  Start Time: 1305  Stop Time: 1325  Total Visit Time: 20 minutesTelemedicine consent    Patient: Cristy Garciamons  Provider: YONATAN Luu  Provider located at [address] [address]    The patient was identified by name and date of birth. Forrestkailee Garcia was informed that this is a telemedicine visit and that the visit is being conducted through Telephone.  My office door was closed. No one else was in the room.  She acknowledged consent and understanding of privacy and security of the video platform. The patient has agreed to participate and understands they can discontinue the visit at any time.    Patient is aware this is a billable service.     I spent 20 minutes with the patient during this visit.

## 2024-06-03 ENCOUNTER — SOCIAL WORK (OUTPATIENT)
Dept: BEHAVIORAL/MENTAL HEALTH CLINIC | Facility: CLINIC | Age: 49
End: 2024-06-03
Payer: COMMERCIAL

## 2024-06-03 DIAGNOSIS — F33.9 MAJOR DEPRESSION, RECURRENT, CHRONIC (HCC): Primary | ICD-10-CM

## 2024-06-03 PROCEDURE — T1015 CLINIC SERVICE: HCPCS | Performed by: SOCIAL WORKER

## 2024-06-04 DIAGNOSIS — M79.7 FIBROMYALGIA, PRIMARY: Primary | ICD-10-CM

## 2024-06-04 DIAGNOSIS — F33.9 MAJOR DEPRESSION, RECURRENT, CHRONIC (HCC): ICD-10-CM

## 2024-06-04 RX ORDER — CELECOXIB 200 MG/1
CAPSULE ORAL
Qty: 60 CAPSULE | Refills: 5 | Status: SHIPPED | OUTPATIENT
Start: 2024-06-04

## 2024-06-16 NOTE — PSYCH
"Behavioral Health Psychotherapy Progress Note    Psychotherapy Provided: Individual Psychotherapy     1. Major depression, recurrent, chronic (HCC)            Goals addressed in session: Goal 1 and Goal 2     DATA: The client reported that she had dental surgery with 7 teeth pulled and has been experiencing extreme pain. She reported contacting the provider of the surgery, who asked to see the client, in his office. At the present time the client who turned down appointment to see the provider. The client reported using her prescribed percocet's to address the pain.  \" I am contacting a  to address this issues, due to the pain\". As team we explored the client's physical health issues that are affecting her different relationships and environments. \" I have not let the home for 1 week due to the pain\" The client's coping skills were reviewed during the session.   During this session, this clinician used the following therapeutic modalities: Cognitive Behavioral Therapy, Mindfulness-based Strategies, Solution-Focused Therapy, and Supportive Psychotherapy    Substance Abuse was addressed during this session. If the client is diagnosed with a co-occurring substance use disorder, please indicate any changes in the frequency or amount of use: none . Stage of change for addressing substance use diagnoses: No substance use/Not applicable    ASSESSMENT:  Cristy Garcia presents with a Anxious and Depressed mood.     her affect is Normal range and intensity, which is congruent, with her mood and the content of the session. The client has made progress on their goals.    The client  Cristy Garcia presents with a none risk of suicide, none risk of self-harm, and none risk of harm to others.    For any risk assessment that surpasses a \"low\" rating, a safety plan must be developed.    A safety plan was indicated: no  If yes, describe in detail n/a  Phone number for National Suicide Prevention Life Line was given to the " "Client/ [phone] /988  PHYSICAL PAIN SCALE NUMBERS: 6 as reported by the client due to her dental pain  PSYCH MENTAL STATUS PAIN 9 as reported by the client \" I am so pain\"   PLAN: Between sessions, Cristy Garcia will practice coping skills when presented with anxiety and or depression. At the next session, the therapist will use Cognitive Behavioral Therapy, Mindfulness-based Strategies, Solution-Focused Therapy, and Supportive Psychotherapy to address the client's MH issues affecting her different relationships and environments .    Behavioral Health Treatment Plan and Discharge Planning: Cristy Garcia is aware of and agrees to continue to work on their treatment plan. They have identified and are working toward their discharge goals. no    Visit start and stop times:      06/17/24  Start Time: 1400  Stop Time: 1423  Total Visit Time: 23 minutesTelemedicine consent    Patient: Cristy [place]  Provider: YONATAN Luu  Provider located at [address] [address]    The patient was identified by name and date of birth. Cristy Garcia was informed that this is a telemedicine visit and that the visit is being conducted through Telephone.  My office door was closed. No one else was in the room.  She acknowledged consent and understanding of privacy and security of the video platform. The patient has agreed to participate and understands they can discontinue the visit at any time.    Patient is aware this is a billable service.     I spent 23 minutes with the patient during this visit.  "

## 2024-06-17 ENCOUNTER — TELEMEDICINE (OUTPATIENT)
Dept: BEHAVIORAL/MENTAL HEALTH CLINIC | Facility: CLINIC | Age: 49
End: 2024-06-17
Payer: COMMERCIAL

## 2024-06-17 DIAGNOSIS — J45.30 MILD PERSISTENT ASTHMA WITHOUT COMPLICATION: ICD-10-CM

## 2024-06-17 DIAGNOSIS — F33.9 MAJOR DEPRESSION, RECURRENT, CHRONIC (HCC): Primary | ICD-10-CM

## 2024-06-17 PROCEDURE — T1015 CLINIC SERVICE: HCPCS | Performed by: SOCIAL WORKER

## 2024-06-17 RX ORDER — FLUTICASONE PROPIONATE AND SALMETEROL 250; 50 UG/1; UG/1
POWDER RESPIRATORY (INHALATION)
Qty: 60 BLISTER | Refills: 1 | Status: SHIPPED | OUTPATIENT
Start: 2024-06-17

## 2024-06-23 NOTE — PSYCH
"Behavioral Health Psychotherapy Progress Note    Psychotherapy Provided: Individual Psychotherapy     1. Major depression, recurrent, chronic (HCC)            Goals addressed in session: Goal 1 and Goal 2     DATA: The client reported that she is still having issues from her dental surgery. She reported that the pain has caused some isolation. During the session we explored and processed the connection between her physical and mental health. Coping skills were reviewed   During this session, this clinician used the following therapeutic modalities: Cognitive Behavioral Therapy, Mindfulness-based Strategies, Solution-Focused Therapy, and Supportive Psychotherapy    Substance Abuse was addressed during this session. If the client is diagnosed with a co-occurring substance use disorder, please indicate any changes in the frequency or amount of use: none . Stage of change for addressing substance use diagnoses: No substance use/Not applicable    ASSESSMENT:  Cristy Garcia presents with a Anxious and Depressed mood.     her affect is Normal range and intensity, which is congruent, with her mood and the content of the session. The client has made progress on their goals.    The client  Cristy Garcia presents with a none risk of suicide, none risk of self-harm, and none risk of harm to others.    For any risk assessment that surpasses a \"low\" rating, a safety plan must be developed.    A safety plan was indicated: no  If yes, describe in detail n/a  Phone number for National Suicide Prevention Life Line was given to the Client/ [phone]   PSYCH MENTAL STATUS PAIN 6 as reported by the client due to pain from her dental surgery  PHYSICAL PAIN SCALE NUMBERS: 7 as report due to ulcers in her mouth    PLAN: Between sessions, Cristy Garcia will utilize her coping skills when presented with anxiety and or depression. At the next session, the therapist will use Cognitive Behavioral Therapy, Mindfulness-based Strategies, " Solution-Focused Therapy, and Supportive Psychotherapy to address the client's MH issues affecting different relationships and environment.    Behavioral Health Treatment Plan and Discharge Planning: Cristy Garcia is aware of and agrees to continue to work on their treatment plan. They have identified and are working toward their discharge goals. yes    Visit start and stop times:    06/24/24  Start Time: 1610  Stop Time: 1630  Total Visit Time: 20 minutesTelemedicine consent    Patient: Cristy Garcia  Provider: YONATAN Luu  [facility] [place] [address] [facility] [address]    The patient was identified by name and date of birth. Cristy Garcia was informed that this is a telemedicine visit and that the visit is being conducted through Telephone.  My office door was closed. No one else was in the room.  She acknowledged consent and understanding of privacy and security of the video platform. The patient has agreed to participate and understands they can discontinue the visit at any time.    Patient is aware this is a billable service.     I spent 20 minutes with the patient during this visit.

## 2024-06-24 ENCOUNTER — TELEMEDICINE (OUTPATIENT)
Dept: BEHAVIORAL/MENTAL HEALTH CLINIC | Facility: CLINIC | Age: 49
End: 2024-06-24
Payer: COMMERCIAL

## 2024-06-24 ENCOUNTER — OFFICE VISIT (OUTPATIENT)
Dept: HEMATOLOGY ONCOLOGY | Facility: CLINIC | Age: 49
End: 2024-06-24
Payer: COMMERCIAL

## 2024-06-24 VITALS
TEMPERATURE: 97.7 F | WEIGHT: 240 LBS | SYSTOLIC BLOOD PRESSURE: 124 MMHG | HEART RATE: 77 BPM | HEIGHT: 62 IN | RESPIRATION RATE: 16 BRPM | DIASTOLIC BLOOD PRESSURE: 80 MMHG | OXYGEN SATURATION: 97 % | BODY MASS INDEX: 44.16 KG/M2

## 2024-06-24 DIAGNOSIS — D72.824 BASOPHILIC LEUKOCYTOSIS: ICD-10-CM

## 2024-06-24 DIAGNOSIS — F33.9 MAJOR DEPRESSION, RECURRENT, CHRONIC (HCC): Primary | ICD-10-CM

## 2024-06-24 DIAGNOSIS — D75.1 ERYTHROCYTOSIS: ICD-10-CM

## 2024-06-24 PROCEDURE — T1015 CLINIC SERVICE: HCPCS | Performed by: SOCIAL WORKER

## 2024-06-24 PROCEDURE — 99244 OFF/OP CNSLTJ NEW/EST MOD 40: CPT | Performed by: PHYSICIAN ASSISTANT

## 2024-06-24 NOTE — PATIENT INSTRUCTIONS
[facility] Medical Oncology and Hematology Team  Hope Line - [phone]    Your Team Member:  Advanced Practitioner:  Traa Squires PA-C    Please answer Private and Unavailable Calls - this may be your team(s) contacting you.  If you have medical questions/concerns/issues - contact us either by (1) My Chart (2) Hope Line

## 2024-06-24 NOTE — PROGRESS NOTES
[address]  [facility] HEMATOLOGY ONCOLOGY SPECIALISTS [place]  [address]  [facility] [address]  Hematology Ambulatory Consult  Cristy Garcia, 1975, [id number]  6/24/2024      Assessment and Plan   1. Erythrocytosis  Erythrocytosis and polycythemia were initially found in September through January 2023.  This resolved spontaneously within normal CBC in April 2023.    However, the patient's total red blood cell count elevated along with RDW and MCV decreased.  This pattern is more consistent with iron deficiency while maintaining a healthy hemoglobin.  Patient has not had iron studies completed.  But I recommended that she undergo the use at her earliest convenience.  Patient's diet is not iron poor however, the patient does rely on Meals on Wheels for most of her cooking since she cannot stand for long periods of time and her can her to prepare food.    Etiology: In January 2024 patient started with abnormal menses.  Patient underwent evaluation with gynecologist and has used progesterone to help slow this down.  Inflammatory changes secondary to perimenopause may be responsible for the increased blood loss and therefore iron deficiency and abnormalities on CBC.  Patient was encouraged to follow-up with GI for colonoscopy and other age-related screenings.  Patient does not have any bright red blood per rectum.    If the patient is found to have iron deficiency discussions regarding supplementation will be had at her next appointment.  Patient has not tried oral iron and this might be reasonable to try between now and her next follow-up appointment depending on blood test below.    I discussed that I am less concerned about an underlying hemoglobinopathy since the patient has had a normal hemoglobin evaluation with a normal MCV and RDW going back to 2020.  I do not have any other historical data to review today.    Iron deficiency is most likely issue considering worsening red blood cell  indices.    - Ambulatory Referral to Hematology / Oncology  - Hgb Fractionation Cascade; Future  - CBC and differential; Future  - Iron Panel (Includes Ferritin, Iron Sat%, Iron, and TIBC); Future  - Vitamin B12; Future  - Folate; Future  - BCR/ABL, Fish Manual; Future    2. Basophilic leukocytosis  Patient normally has a white blood cell count between 7.8 and 10,000/unit liter.  More recently, and likely secondary to acute inflammation from perimenopause, total white blood cell count has been consistently above 10.  ANC has been at the upper limit of normal going as high as 6100/unit liter.  The only intermittent abnormality is an abnormal basophil count which was present during the patient's last CBC test on 4/1/2024.  Considering the other changes, I have recommended a screening test for BCR able + repeat CBC analysis.    - Ambulatory Referral to Hematology / Oncology  - Hgb Fractionation Cascade; Future  - CBC and differential; Future  - Iron Panel (Includes Ferritin, Iron Sat%, Iron, and TIBC); Future  - Vitamin B12; Future  - Folate; Future  - Sedimentation rate, automated; Future  - C-reactive protein; Future  - BCR/ABL, Fish Manual; Future    The patient is scheduled for follow-up in approximately 2 months.     Patient voiced agreement and understanding to the above.   Patient knows to call the Hematology/Oncology office with any questions and concerns regarding the above.    Barrier(s) to care: None.   The patient is able to self care.    Tara Squires PA-C  Medical Oncology/Hematology  [facility]    Subjective     Chief Complaint   Patient presents with    Consult     Referring provider    Gaurav Merino PA-C  [address] [place] [id number]    History of present illness:   This is a 48-year-old female with past medical history of hypertension, hyperlipidemia, diabetes, GERD, fibromyalgia, asthma, allergy, obstructive sleep apnea and obesity who presents to the  hematology clinic for evaluation of CBC abnormalities.    6/19/2020 WBC 8.2, hemoglobin 15.1, MCV 88, platelet count 219, differential WNL  7/22/2021 WBC 9.8, RBC 14.9, hemoglobin 14.9, MCV 90, platelet count 245   ANC = 6.15, ABC=0.09  9/16/2022 RBC 16.4, WBC 9.0, hemoglobin 16.4, MCV 88, platelet count 287   ANC 5.4, ABC = 0.11  1/24/2023 WBC 8.8, RBC 5.2 hemoglobin 15.9, MCV 90, platelet count 247   Differential WNL  4/20/2023 WBC 7.8, hemoglobin 15.2, MCV 90, platelet count 280   Differential WNL    January 2024 patient notes that menstrual cycle became more erratic.  Patient was bleeding heavily throughout the month.  Patient required progesterone to kind of reset her.  Patient now has a menstruation for 3 to 4 days every 3 to 4 weeks.  Patient is no longer on progesterone.    2/12/2024 WBC 10.2, RBC 5.5, hemoglobin 13.4, MCV 77, RDW 15.8, platelet count 263  2/22/24 WBC 8.8, RBC 5.4,  hemoglobin 13.1, MCV 77, platelet count 309    4/1/2024 WBC 10.2, RBC 6.0, hemoglobin 14.3, MCV 79 RDW 16.0, platelet count 323   Differential WNL with exception of basophilia of 0.12    Patient is up-to-date on Pap smear, mammogram is pending for this year.  Patient notes that she is never had a colonoscopy but did have a Cologuard-12/15/2022- at 1 point in the past which was within normal limits.  Patient had a recent CT scan that demonstrated diverticulosis, patient denies rectal bleeding.    Review of Systems   Constitutional:  Negative for appetite change, fatigue, fever and unexpected weight change.   HENT:  Negative for nosebleeds.    Respiratory:  Negative for cough, choking and shortness of breath.         Negative hemoptysis.   Cardiovascular:  Negative for chest pain, palpitations and leg swelling.   Gastrointestinal: Negative.  Negative for abdominal distention, abdominal pain, anal bleeding, blood in stool, constipation, diarrhea, nausea and vomiting.   Endocrine: Negative.  Negative for cold intolerance.    Genitourinary:  Positive for hematuria, menstrual problem and vaginal bleeding. Negative for vaginal discharge and vaginal pain.   Musculoskeletal:  Positive for arthralgias and gait problem. Negative for myalgias, neck pain and neck stiffness.   Skin: Negative.  Negative for color change, pallor and rash.   Allergic/Immunologic: Negative.  Negative for immunocompromised state.   Neurological:  Negative for weakness and headaches.   Hematological:  Negative for adenopathy. Does not bruise/bleed easily.   All other systems reviewed and are negative.      Past Medical History:   Diagnosis Date    Allergic     Seasonal Allergies    Alopecia of scalp     Treated with Proscar    Asthma     Chronic pain disorder     back, bilat knees, bilat hips    CPAP (continuous positive airway pressure) dependence     Diabetes mellitus (HCC)     Fibromyalgia, primary     GERD (gastroesophageal reflux disease)     Hip fx, left, closed, with routine healing, subsequent encounter 05/2017    Hyperlipidemia     Hypertension     Insomnia     Irregular heartbeat     VICTOR HUGO on CPAP      Past Surgical History:   Procedure Laterality Date    CARPAL TUNNEL RELEASE Left     IR SPINE AND PAIN PROCEDURE  3/12/2024    IR SPINE AND PAIN PROCEDURE  4/11/2024    LUMBAR EPIDURAL INJECTION      NERVE BLOCK Bilateral 12/29/2020    Procedure: L2 L3 L4 L5 MEDIAL BRANCH BLOCK #1;  Surgeon: Westley Keller MD;  [facility];  Service: Pain Management     NERVE BLOCK Bilateral 1/19/2021    Procedure: L2 L3 L4 L5 MEDIAL BRANCH BLOCK #2;  Surgeon: Westley Keller MD;  [facility]: [facility];  Service: Pain Management     MA ARTHROCENTESIS ASPIR&/INJ MAJOR JT/BURSA W/O  Bilateral 2/7/2023    Procedure: INJECTION JOINT HIP;  Surgeon: Westley Keller MD;  Location: [facility];  Service: Pain Management     MA HYSTEROSCOPY BX ENDOMETRIUM&/POLYPC W/WO D&C N/A 4/25/2023    Procedure: HYSTEROSCOPY W/  RESECTION UTERINE TUMOR/FIBROID (POLYPECTOMY );  Surgeon:  Donovan Ware DO;  Location: [facility];  Service: Gynecology    RADIOFREQUENCY ABLATION Right 2021    Procedure: L2 L3 L4 L5 RADIO FREQUENCY ABLATION right side;  Surgeon: Westley Keller MD;  [facility]: [facility];  Service: Pain Management     RADIOFREQUENCY ABLATION Left 3/16/2021    Procedure: L2 L3 L4 L5 RADIO FREQUENCY ABLATION;  Surgeon: Westley Keller MD;  [facility]: [facility];  Service: Pain Management     RHIZOTOMY Bilateral 2023    Procedure: RHIZOTOMY LUMBAR L3, L4, L5 medial branch nerves;  Surgeon: Westley Keller MD;  [facility]: [facility];  Service: Pain Management     UPPER GASTROINTESTINAL ENDOSCOPY      US GUIDED THYROID BIOPSY  2023     Family History   Problem Relation Age of Onset    Cancer Father     Hepatitis Father     Asthma Cousin     Hypertension Paternal Grandmother     No Known Problems Mother     No Known Problems Sister     No Known Problems Maternal Grandmother     No Known Problems Maternal Grandfather     No Known Problems Paternal Grandfather     No Known Problems Paternal Aunt     BRCA2 Positive Neg Hx     BRCA2 Negative Neg Hx     BRCA1 Positive Neg Hx     BRCA1 Negative Neg Hx     BRCA 1/2 Neg Hx     Ovarian cancer Neg Hx     Endometrial cancer Neg Hx     Colon cancer Neg Hx     Breast cancer additional onset Neg Hx     Breast cancer Neg Hx      Social History     Socioeconomic History    Marital status: Single     Spouse name: None    Number of children: None    Years of education: None    Highest education level: None   Occupational History    None   Tobacco Use    Smoking status: Former     Types: Cigarettes     Start date:      Quit date:      Years since quittin.4     Passive exposure: Past    Smokeless tobacco: Never   Vaping Use    Vaping status: Never Used   Substance and Sexual Activity    Alcohol use: No    Drug use: No    Sexual activity: Not Currently   Other Topics Concern    None   Social History Narrative    None     Social  Determinants of Health     Financial Resource Strain: High Risk (2/21/2023)    Overall Financial Resource Strain (CARDIA)     Difficulty of Paying Living Expenses: Hard   Food Insecurity: No Food Insecurity (4/8/2021)    Hunger Vital Sign     Worried About Running Out of Food in the Last Year: Never true     Ran Out of Food in the Last Year: Never true   Transportation Needs: No Transportation Needs (4/8/2021)    PRAPARE - Transportation     Lack of Transportation (Medical): No     Lack of Transportation (Non-Medical): No   Physical Activity: Inactive (8/3/2020)    Exercise Vital Sign     Days of Exercise per Week: 0 days     Minutes of Exercise per Session: 0 min   Stress: Stress Concern Present (8/3/2020)    [employer] [place] - Occupational Stress Questionnaire     Feeling of Stress : Very much   Social Connections: Unknown (6/18/2024)    Received from [employer]     How often do you feel lonely or isolated from those around you? (Adult - for ages 18 years and over): Not on file   Intimate Partner Violence: Not At Risk (8/3/2020)    Humiliation, Afraid, Rape, and Kick questionnaire     Fear of Current or Ex-Partner: No     Emotionally Abused: No     Physically Abused: No     Sexually Abused: No   Housing Stability: Not on file         Current Outpatient Medications:     acetaminophen (TYLENOL) 500 mg tablet, Take 500 mg by mouth every 6 (six) hours as needed for mild pain, Disp: , Rfl:     albuterol (Ventolin HFA) 90 mcg/act inhaler, Inhale 2 puffs every 6 (six) hours as needed for wheezing or shortness of breath, Disp: 18 g, Rfl: 5    atorvastatin (LIPITOR) 20 mg tablet, Take 1 tablet (20 mg total) by mouth daily with dinner, Disp: 90 tablet, Rfl: 1    atorvastatin (LIPITOR) 20 mg tablet, , Disp: , Rfl:     celecoxib (CeleBREX) 200 mg capsule, Take 1 capsule (200 mg total) by mouth 2 (two) times a day, Disp: 60 capsule, Rfl: 5    celecoxib (CeleBREX) 200 mg capsule,  TAKE (1) CAPSULE BY MOUTH TWICE DAILY., Disp: 60 capsule, Rfl: 5    cetirizine (ZyrTEC) 10 mg tablet, 1 tab po daily x 1 month then prn allergies, Disp: 30 tablet, Rfl: 5    clotrimazole-betamethasone (LOTRISONE) 1-0.05 % cream, APPLY TWICE DAILY TO VAGINAL AREA UNTIL HEALED THEN AS NEEDED FOR IRRITATION., Disp: 45 g, Rfl: 3    Empagliflozin (Jardiance) 25 MG TABS, Take 1 tablet (25 mg total) by mouth daily, Disp: 30 tablet, Rfl: 5    ergocalciferol (VITAMIN D2) 50,000 units, Take 1 capsule (50,000 Units total) by mouth once a week, Disp: 12 capsule, Rfl: 0    famotidine (PEPCID) 40 MG tablet, TAKE (1) TABLET BY MOUTH DAILY., Disp: 30 tablet, Rfl: 5    finasteride (PROPECIA) 1 MG tablet, Take 1 mg by mouth daily, Disp: , Rfl:     fluticasone (FLONASE) 50 mcg/act nasal spray, 1 spray each nostril 2 x daily x 1 month then prn congestion or allergies, Disp: 18.2 mL, Rfl: 0    Fluticasone-Salmeterol (Advair) 250-50 mcg/dose inhaler, INHALE 1 DOSE 2 TIMES A DAY, RINSE MOUTH AFTER USE, Disp: 60 blister, Rfl: 1    gabapentin (NEURONTIN) 300 mg capsule, Take 1 capsule (300 mg total) by mouth 3 (three) times a day, Disp: 270 capsule, Rfl: 3    gabapentin (NEURONTIN) 300 mg capsule, Take 300 mg by mouth 3 (three) times a day, Disp: , Rfl:     glipiZIDE (GLUCOTROL) 10 mg tablet, TAKE (2) TABLETS TWICE DAILY BEFORE MEALS., Disp: 360 tablet, Rfl: 1    glucose blood test strip, , Disp: , Rfl:     glucose blood test strip, Use as instructed, Disp: 100 each, Rfl: 1    ketoconazole (NIZORAL) 2 % cream, Apply topically daily, Disp: 60 g, Rfl: 0    Lancet Devices (OneTouch Delica Plus Lancing) MISC, , Disp: , Rfl:     Lancets (onetouch ultrasoft) lancets, Use as instructed, Disp: 100 each, Rfl: 1    lisinopril (ZESTRIL) 20 mg tablet, Take 0.5 tablets (10 mg total) by mouth daily, Disp: 90 tablet, Rfl: 3    metFORMIN (GLUCOPHAGE) 1000 MG tablet, Take 1 tablet (1,000 mg total) by mouth 2 (two) times a day with meals, Disp: 180 tablet,  "Rfl: 5    nystatin powder, APPLY AS DIRECTED TO AFFECTED AREA DAILY, Disp: , Rfl:     omeprazole (PriLOSEC) 40 MG capsule, Take 1 capsule (40 mg total) by mouth daily before breakfast, Disp: 90 capsule, Rfl: 3    ondansetron (ZOFRAN) 4 mg tablet, Take 1 tablet (4 mg total) by mouth every 8 (eight) hours as needed for nausea or vomiting, Disp: 30 tablet, Rfl: 5    sertraline (ZOLOFT) 50 mg tablet, TAKE (1) TABLET BY MOUTH DAILY AT BEDTIME., Disp: 90 tablet, Rfl: 1    triamcinolone (KENALOG) 0.025 % cream, Apply 2 x daily to rash until healed then prn rash, Disp: 60 g, Rfl: 0  Allergies   Allergen Reactions    Trulicity [Dulaglutide] Swelling    Tdap [Tetanus-Diphth-Acell Pertussis] Rash       Objective   /80 (BP Location: Left arm, Patient Position: Sitting, Cuff Size: Adult)   Pulse 77   Temp 97.7 °F (36.5 °C) (Temporal)   Resp 16   Ht 5' 2\" (1.575 m)   Wt 109 kg (240 lb)   SpO2 97%   BMI 43.90 kg/m²   Physical Exam  Constitutional:       General: She is not in acute distress.     Appearance: She is well-developed.   HENT:      Head: Normocephalic and atraumatic.   Eyes:      General: No scleral icterus.     Conjunctiva/sclera: Conjunctivae normal.   Cardiovascular:      Rate and Rhythm: Normal rate.   Pulmonary:      Effort: Pulmonary effort is normal. No respiratory distress.   Skin:     General: Skin is warm.      Coloration: Skin is not pale.      Findings: No rash.   Neurological:      Mental Status: She is alert and oriented to person, place, and time.   Psychiatric:         Thought Content: Thought content normal.         Result Review  Labs:    Imaging:     Please note:  This report has been generated by a voice recognition software system. Therefore there may be syntax, spelling, and/or grammatical errors. Please call if you have any questions.    "

## 2024-06-27 ENCOUNTER — APPOINTMENT (OUTPATIENT)
Dept: LAB | Facility: HOSPITAL | Age: 49
End: 2024-06-27
Payer: COMMERCIAL

## 2024-06-27 DIAGNOSIS — D72.824 BASOPHILIC LEUKOCYTOSIS: ICD-10-CM

## 2024-06-27 DIAGNOSIS — Z00.6 ENCOUNTER FOR EXAMINATION FOR NORMAL COMPARISON OR CONTROL IN CLINICAL RESEARCH PROGRAM: ICD-10-CM

## 2024-06-27 DIAGNOSIS — D75.1 ERYTHROCYTOSIS: ICD-10-CM

## 2024-06-27 LAB
CRP SERPL QL: 2.5 MG/L
ERYTHROCYTE [SEDIMENTATION RATE] IN BLOOD: 11 MM/HOUR (ref 0–29)

## 2024-06-27 PROCEDURE — 36415 COLL VENOUS BLD VENIPUNCTURE: CPT

## 2024-06-27 PROCEDURE — 85652 RBC SED RATE AUTOMATED: CPT

## 2024-06-27 PROCEDURE — 88374 M/PHMTRC ALYS ISHQUANT/SEMIQ: CPT

## 2024-06-27 PROCEDURE — 86140 C-REACTIVE PROTEIN: CPT

## 2024-07-04 DIAGNOSIS — J45.30 MILD PERSISTENT ASTHMA WITHOUT COMPLICATION: ICD-10-CM

## 2024-07-04 DIAGNOSIS — J01.00 ACUTE MAXILLARY SINUSITIS, RECURRENCE NOT SPECIFIED: ICD-10-CM

## 2024-07-04 DIAGNOSIS — R05.1 ACUTE COUGH: ICD-10-CM

## 2024-07-04 DIAGNOSIS — L65.9 ALOPECIA: Primary | ICD-10-CM

## 2024-07-04 DIAGNOSIS — H65.00 ACUTE SEROUS OTITIS MEDIA, RECURRENCE NOT SPECIFIED, UNSPECIFIED LATERALITY: ICD-10-CM

## 2024-07-04 DIAGNOSIS — J06.9 ACUTE URI: ICD-10-CM

## 2024-07-04 DIAGNOSIS — R09.82 PND (POST-NASAL DRIP): ICD-10-CM

## 2024-07-05 DIAGNOSIS — M47.816 LUMBAR SPONDYLOSIS: Primary | ICD-10-CM

## 2024-07-05 LAB — SCAN RESULT: NORMAL

## 2024-07-05 RX ORDER — GABAPENTIN 300 MG/1
CAPSULE ORAL
Qty: 270 CAPSULE | Refills: 3 | Status: SHIPPED | OUTPATIENT
Start: 2024-07-05

## 2024-07-05 RX ORDER — FLUTICASONE PROPIONATE AND SALMETEROL 250; 50 UG/1; UG/1
1 POWDER RESPIRATORY (INHALATION) 2 TIMES DAILY
Qty: 200 BLISTER | Refills: 1 | Status: SHIPPED | OUTPATIENT
Start: 2024-07-05

## 2024-07-05 RX ORDER — FLUTICASONE PROPIONATE 50 MCG
SPRAY, SUSPENSION (ML) NASAL
Qty: 18.2 ML | Refills: 0 | Status: SHIPPED | OUTPATIENT
Start: 2024-07-05

## 2024-07-06 DIAGNOSIS — J30.89 SEASONAL ALLERGIC RHINITIS DUE TO OTHER ALLERGIC TRIGGER: ICD-10-CM

## 2024-07-06 DIAGNOSIS — B37.9 CANDIDIASIS: Primary | ICD-10-CM

## 2024-07-06 RX ORDER — CETIRIZINE HYDROCHLORIDE 10 MG/1
TABLET ORAL
Qty: 100 TABLET | Refills: 1 | Status: SHIPPED | OUTPATIENT
Start: 2024-07-06

## 2024-07-07 RX ORDER — FINASTERIDE 1 MG/1
1 TABLET, FILM COATED ORAL DAILY
Qty: 30 TABLET | Refills: 5 | Status: SHIPPED | OUTPATIENT
Start: 2024-07-07

## 2024-07-08 RX ORDER — NYSTATIN 100000 [USP'U]/G
POWDER TOPICAL 2 TIMES DAILY
Qty: 60 G | Refills: 3 | Status: SHIPPED | OUTPATIENT
Start: 2024-07-08

## 2024-07-11 ENCOUNTER — APPOINTMENT (OUTPATIENT)
Dept: LAB | Facility: HOSPITAL | Age: 49
End: 2024-07-11
Payer: COMMERCIAL

## 2024-07-11 DIAGNOSIS — E53.8 VITAMIN B12 DEFICIENCY: ICD-10-CM

## 2024-07-11 DIAGNOSIS — D75.1 ERYTHROCYTOSIS: ICD-10-CM

## 2024-07-11 DIAGNOSIS — E55.9 VITAMIN D DEFICIENCY: ICD-10-CM

## 2024-07-11 DIAGNOSIS — Z13.0 SCREENING FOR DEFICIENCY ANEMIA: ICD-10-CM

## 2024-07-11 DIAGNOSIS — D72.824 BASOPHILIC LEUKOCYTOSIS: ICD-10-CM

## 2024-07-11 DIAGNOSIS — E11.65 TYPE 2 DIABETES MELLITUS WITH HYPERGLYCEMIA, WITHOUT LONG-TERM CURRENT USE OF INSULIN (HCC): ICD-10-CM

## 2024-07-11 DIAGNOSIS — R71.8 MICROCYTOSIS: ICD-10-CM

## 2024-07-11 DIAGNOSIS — E78.49 OTHER HYPERLIPIDEMIA: ICD-10-CM

## 2024-07-11 LAB
25(OH)D3 SERPL-MCNC: 57.5 NG/ML (ref 30–100)
ALBUMIN SERPL BCG-MCNC: 4 G/DL (ref 3.5–5)
ALP SERPL-CCNC: 42 U/L (ref 34–104)
ALT SERPL W P-5'-P-CCNC: 24 U/L (ref 7–52)
ANION GAP SERPL CALCULATED.3IONS-SCNC: 8 MMOL/L (ref 4–13)
AST SERPL W P-5'-P-CCNC: 15 U/L (ref 13–39)
BASOPHILS # BLD AUTO: 0.08 THOUSANDS/ÂΜL (ref 0–0.1)
BASOPHILS NFR BLD AUTO: 1 % (ref 0–1)
BILIRUB SERPL-MCNC: 0.86 MG/DL (ref 0.2–1)
BUN SERPL-MCNC: 10 MG/DL (ref 5–25)
CALCIUM SERPL-MCNC: 9.2 MG/DL (ref 8.4–10.2)
CHLORIDE SERPL-SCNC: 104 MMOL/L (ref 96–108)
CHOLEST SERPL-MCNC: 106 MG/DL
CO2 SERPL-SCNC: 23 MMOL/L (ref 21–32)
CREAT SERPL-MCNC: 0.54 MG/DL (ref 0.6–1.3)
EOSINOPHIL # BLD AUTO: 0.33 THOUSAND/ÂΜL (ref 0–0.61)
EOSINOPHIL NFR BLD AUTO: 4 % (ref 0–6)
ERYTHROCYTE [DISTWIDTH] IN BLOOD BY AUTOMATED COUNT: 16.7 % (ref 11.6–15.1)
EST. AVERAGE GLUCOSE BLD GHB EST-MCNC: 134 MG/DL
FERRITIN SERPL-MCNC: 5 NG/ML (ref 11–307)
FOLATE SERPL-MCNC: 16 NG/ML
GFR SERPL CREATININE-BSD FRML MDRD: 110 ML/MIN/1.73SQ M
GLUCOSE P FAST SERPL-MCNC: 123 MG/DL (ref 65–99)
HBA1C MFR BLD: 6.3 %
HCT VFR BLD AUTO: 44.9 % (ref 34.8–46.1)
HDLC SERPL-MCNC: 43 MG/DL
HGB BLD-MCNC: 14.6 G/DL (ref 11.5–15.4)
IMM GRANULOCYTES # BLD AUTO: 0.05 THOUSAND/UL (ref 0–0.2)
IMM GRANULOCYTES NFR BLD AUTO: 1 % (ref 0–2)
IRON SATN MFR SERPL: 11 % (ref 15–50)
IRON SERPL-MCNC: 52 UG/DL (ref 50–212)
LDLC SERPL CALC-MCNC: 25 MG/DL (ref 0–100)
LYMPHOCYTES # BLD AUTO: 2.62 THOUSANDS/ÂΜL (ref 0.6–4.47)
LYMPHOCYTES NFR BLD AUTO: 31 % (ref 14–44)
MCH RBC QN AUTO: 26.6 PG (ref 26.8–34.3)
MCHC RBC AUTO-ENTMCNC: 32.5 G/DL (ref 31.4–37.4)
MCV RBC AUTO: 82 FL (ref 82–98)
MONOCYTES # BLD AUTO: 0.59 THOUSAND/ÂΜL (ref 0.17–1.22)
MONOCYTES NFR BLD AUTO: 7 % (ref 4–12)
NEUTROPHILS # BLD AUTO: 4.88 THOUSANDS/ÂΜL (ref 1.85–7.62)
NEUTS SEG NFR BLD AUTO: 56 % (ref 43–75)
NONHDLC SERPL-MCNC: 63 MG/DL
NRBC BLD AUTO-RTO: 0 /100 WBCS
PLATELET # BLD AUTO: 260 THOUSANDS/UL (ref 149–390)
PMV BLD AUTO: 9.5 FL (ref 8.9–12.7)
POTASSIUM SERPL-SCNC: 4.1 MMOL/L (ref 3.5–5.3)
PROT SERPL-MCNC: 7 G/DL (ref 6.4–8.4)
RBC # BLD AUTO: 5.48 MILLION/UL (ref 3.81–5.12)
SODIUM SERPL-SCNC: 135 MMOL/L (ref 135–147)
TIBC SERPL-MCNC: 467 UG/DL (ref 250–450)
TRIGL SERPL-MCNC: 189 MG/DL
UIBC SERPL-MCNC: 415 UG/DL (ref 155–355)
VIT B12 SERPL-MCNC: 412 PG/ML (ref 180–914)
WBC # BLD AUTO: 8.55 THOUSAND/UL (ref 4.31–10.16)

## 2024-07-11 PROCEDURE — 80061 LIPID PANEL: CPT

## 2024-07-11 PROCEDURE — 82746 ASSAY OF FOLIC ACID SERUM: CPT

## 2024-07-11 PROCEDURE — 85025 COMPLETE CBC W/AUTO DIFF WBC: CPT

## 2024-07-11 PROCEDURE — 82607 VITAMIN B-12: CPT

## 2024-07-11 PROCEDURE — 83540 ASSAY OF IRON: CPT

## 2024-07-11 PROCEDURE — 82728 ASSAY OF FERRITIN: CPT

## 2024-07-11 PROCEDURE — 83550 IRON BINDING TEST: CPT

## 2024-07-11 PROCEDURE — 83020 HEMOGLOBIN ELECTROPHORESIS: CPT

## 2024-07-11 PROCEDURE — 82306 VITAMIN D 25 HYDROXY: CPT

## 2024-07-11 PROCEDURE — 80053 COMPREHEN METABOLIC PANEL: CPT

## 2024-07-11 PROCEDURE — 83036 HEMOGLOBIN GLYCOSYLATED A1C: CPT

## 2024-07-11 PROCEDURE — 36415 COLL VENOUS BLD VENIPUNCTURE: CPT

## 2024-07-12 ENCOUNTER — TELEPHONE (OUTPATIENT)
Dept: HEMATOLOGY ONCOLOGY | Facility: CLINIC | Age: 49
End: 2024-07-12

## 2024-07-12 DIAGNOSIS — D75.1 ERYTHROCYTOSIS: Primary | ICD-10-CM

## 2024-07-12 DIAGNOSIS — D72.824 BASOPHILIC LEUKOCYTOSIS: ICD-10-CM

## 2024-07-12 LAB
APOB+LDLR+PCSK9 GENE MUT ANL BLD/T: NOT DETECTED
BRCA1+BRCA2 DEL+DUP + FULL MUT ANL BLD/T: NOT DETECTED
MLH1+MSH2+MSH6+PMS2 GN DEL+DUP+FUL M: NOT DETECTED

## 2024-07-12 NOTE — TELEPHONE ENCOUNTER
Telephone call spoke with Pt.  Reviewed Tara's note with Pt.  She stated she has been feeling tired and gets dizzy sometimes.  She prefers trying the Iron tablets 65 mg otc q other day rather than the iv iron.  Her sister started taking iron po and it helped her.  She is aware of the fu appt in Sept and she will get labs drawn prior to the visit.

## 2024-07-12 NOTE — TELEPHONE ENCOUNTER
----- Message from Tara Squires PA-C sent at 7/12/2024  3:03 PM EDT -----  + Iron deficiency without anemia.  If patient would like to trial 65 mg elemental iron every other day and recheck on follow-up in September that would be fine however, if patient is symptomatic, IV iron could also be offered to her now.    Please call and discussed with patient.

## 2024-07-15 DIAGNOSIS — R73.03 PREDIABETES: ICD-10-CM

## 2024-07-16 LAB
HGB A MFR BLD: 2.4 % (ref 1.8–3.2)
HGB A MFR BLD: 97.6 % (ref 96.4–98.8)
HGB F MFR BLD: 0 % (ref 0–2)
HGB FRACT BLD-IMP: NORMAL
HGB S MFR BLD: 0 %

## 2024-07-19 ENCOUNTER — TELEMEDICINE (OUTPATIENT)
Dept: BEHAVIORAL/MENTAL HEALTH CLINIC | Facility: CLINIC | Age: 49
End: 2024-07-19
Payer: COMMERCIAL

## 2024-07-19 DIAGNOSIS — F33.9 MAJOR DEPRESSION, RECURRENT, CHRONIC (HCC): Primary | ICD-10-CM

## 2024-07-19 PROCEDURE — T1015 CLINIC SERVICE: HCPCS | Performed by: SOCIAL WORKER

## 2024-07-19 NOTE — PSYCH
"Behavioral Health Psychotherapy Progress Note    Psychotherapy Provided: Individual Psychotherapy     1. Major depression, recurrent, chronic (HCC)            Goals addressed in session: Goal 1 and Goal 2     DATA: The client reported that she has been going out to the community swimming which has helped with her mobility. During the session we explored the client's view of her body and the messages she received from medial peers, family and culture. The Client was able to verbalize that she does have problems with how she views herself. As team we explored ways to increase her self-esteem and coping skills.   During this session, this clinician used the following therapeutic modalities: Cognitive Behavioral Therapy, Mindfulness-based Strategies, Solution-Focused Therapy, and Supportive Psychotherapy    Substance Abuse was addressed during this session. If the client is diagnosed with a co-occurring substance use disorder, please indicate any changes in the frequency or amount of use: none reported. Stage of change for addressing substance use diagnoses: No substance use/Not applicable    ASSESSMENT:  Cristy Garcia presents with a Anxious and Depressed mood.     her affect is Normal range and intensity, which is congruent, with her mood and the content of the session. The client has made progress on their goals.    The Client  Cristy Garcia presents with a none risk of suicide, none risk of self-harm, and none risk of harm to others.    For any risk assessment that surpasses a \"low\" rating, a safety plan must be developed.    A safety plan was indicated: no  If yes, describe in detail n/a    PLAN: Between sessions, Cristy Garcia will practice her coping skills when presented with anxiety and or depression. At the next session, the therapist will use Cognitive Behavioral Therapy, Mindfulness-based Strategies, Solution-Focused Therapy, and Supportive Psychotherapy to address the client's MH issues affecting different " relationships and environments.    Behavioral Health Treatment Plan and Discharge Planning: Cristy Garcia is aware of and agrees to continue to work on their treatment plan. They have identified and are working toward their discharge goals. yes    Visit start and stop times:    07/19/24  Start Time: 1409  Stop Time: 1435  Total Visit Time: 26 minutes    Telemedicine consent    Patient: Cristy Garcia  Provider: YONATAN Luu  Provider located at [address] [address]    The patient was identified by name and date of birth. Cristy Garcia was informed that this is a telemedicine visit and that the visit is being conducted through Telephone.  My office door was closed. No one else was in the room.  She acknowledged consent and understanding of privacy and security of the video platform. The patient has agreed to participate and understands they can discontinue the visit at any time.    Patient is aware this is a billable service.     I spent 26 minutes with the patient during this visit.

## 2024-07-22 ENCOUNTER — TELEPHONE (OUTPATIENT)
Dept: CARDIOLOGY CLINIC | Facility: CLINIC | Age: 49
End: 2024-07-22

## 2024-07-22 ENCOUNTER — PATIENT MESSAGE (OUTPATIENT)
Dept: CARDIOLOGY CLINIC | Facility: CLINIC | Age: 49
End: 2024-07-22

## 2024-07-22 DIAGNOSIS — I10 ESSENTIAL HYPERTENSION: ICD-10-CM

## 2024-07-23 RX ORDER — LISINOPRIL 20 MG/1
20 TABLET ORAL DAILY
Qty: 90 TABLET | Refills: 3 | Status: SHIPPED | OUTPATIENT
Start: 2024-07-23

## 2024-07-29 DIAGNOSIS — E11.65 TYPE 2 DIABETES MELLITUS WITH HYPERGLYCEMIA, WITHOUT LONG-TERM CURRENT USE OF INSULIN (HCC): ICD-10-CM

## 2024-07-30 RX ORDER — KETOCONAZOLE 20 MG/G
CREAM TOPICAL DAILY
Qty: 60 G | Refills: 0 | Status: SHIPPED | OUTPATIENT
Start: 2024-07-30

## 2024-07-31 ENCOUNTER — TELEMEDICINE (OUTPATIENT)
Dept: BEHAVIORAL/MENTAL HEALTH CLINIC | Facility: CLINIC | Age: 49
End: 2024-07-31
Payer: COMMERCIAL

## 2024-07-31 DIAGNOSIS — F33.9 MAJOR DEPRESSION, RECURRENT, CHRONIC (HCC): Primary | ICD-10-CM

## 2024-07-31 DIAGNOSIS — E78.2 MIXED HYPERLIPIDEMIA: Primary | ICD-10-CM

## 2024-07-31 PROCEDURE — T1015 CLINIC SERVICE: HCPCS | Performed by: SOCIAL WORKER

## 2024-07-31 NOTE — PSYCH
"Behavioral Health Psychotherapy Progress Note    Psychotherapy Provided: Individual Psychotherapy     1. Major depression, recurrent, chronic (HCC)            Goals addressed in session: Goal 1 and Goal 2     DATA: The client reported continued issues with the individuals in her building and smoking \"pot\" As team we explored and processed the client's self advocacy for herself and healthy ways to separate herself from other individuals who do not live up to her ethical standards. Healthy communication was explored during the session as well as her coping skills.    During this session, this clinician used the following therapeutic modalities: Cognitive Behavioral Therapy, Mindfulness-based Strategies, Solution-Focused Therapy, and Supportive Psychotherapy    Substance Abuse was addressed during this session. If the client is diagnosed with a co-occurring substance use disorder, please indicate any changes in the frequency or amount of use: none. Stage of change for addressing substance use diagnoses: No substance use/Not applicable    ASSESSMENT:  Cristy Garcia presents with a Anxious mood.     her affect is Normal range and intensity, which is congruent, with her mood and the content of the session. The client has made progress on their goals.    The client  Cristy Garcia presents with a none risk of suicide, none risk of self-harm, and none risk of harm to others.    For any risk assessment that surpasses a \"low\" rating, a safety plan must be developed.    A safety plan was indicated: no  If yes, describe in detail n/a    PLAN: Between sessions, Cristy Garcia will practice coping skills when presented with anxiety and or depression. At the next session, the therapist will use Cognitive Behavioral Therapy, Mindfulness-based Strategies, Solution-Focused Therapy, and Supportive Psychotherapy to address the client's MH issues affecting her different relationships and environments.    Behavioral Health Treatment Plan " and Discharge Planning: [place] is aware of and agrees to continue to work on their treatment plan. They have identified and are working toward their discharge goals. yes    Visit start and stop times:    07/31/24  Start Time: 1430  Stop Time: 1450  Total Visit Time: 20 minutes      Telemedicine consent    Patient: Cristy Garciamons  Provider: YONATAN Luu  Provider located at [address] [address]    The patient was identified by name and date of birth. Cristy Garcia was informed that this is a telemedicine visit and that the visit is being conducted through Telephone.  My office door was closed. No one else was in the room.  She acknowledged consent and understanding of privacy and security of the video platform. The patient has agreed to participate and understands they can discontinue the visit at any time.    Patient is aware this is a billable service.     I spent 20 minutes with the patient during this visit.

## 2024-08-01 DIAGNOSIS — E78.2 MIXED HYPERLIPIDEMIA: ICD-10-CM

## 2024-08-01 RX ORDER — ATORVASTATIN CALCIUM 20 MG/1
20 TABLET, FILM COATED ORAL DAILY
Qty: 30 TABLET | Refills: 0 | Status: SHIPPED | OUTPATIENT
Start: 2024-08-01 | End: 2024-08-01 | Stop reason: SDUPTHER

## 2024-08-01 RX ORDER — ATORVASTATIN CALCIUM 20 MG/1
20 TABLET, FILM COATED ORAL DAILY
Qty: 30 TABLET | Refills: 0 | Status: SHIPPED | OUTPATIENT
Start: 2024-08-01

## 2024-08-02 ENCOUNTER — HOSPITAL ENCOUNTER (OUTPATIENT)
Dept: RADIOLOGY | Facility: CLINIC | Age: 49
End: 2024-08-02
Payer: COMMERCIAL

## 2024-08-02 VITALS — HEIGHT: 62 IN | BODY MASS INDEX: 44.16 KG/M2 | WEIGHT: 240 LBS

## 2024-08-02 PROCEDURE — 77067 SCR MAMMO BI INCL CAD: CPT

## 2024-08-02 PROCEDURE — 77063 BREAST TOMOSYNTHESIS BI: CPT

## 2024-08-06 ENCOUNTER — TELEPHONE (OUTPATIENT)
Dept: FAMILY MEDICINE CLINIC | Facility: CLINIC | Age: 49
End: 2024-08-06

## 2024-08-06 NOTE — TELEPHONE ENCOUNTER
----- Message from CHRIS Phan sent at 8/5/2024  7:42 PM EDT -----  Please notify patient her Mammogram is WNL

## 2024-08-07 DIAGNOSIS — E78.2 MIXED HYPERLIPIDEMIA: ICD-10-CM

## 2024-08-07 RX ORDER — ATORVASTATIN CALCIUM 20 MG/1
20 TABLET, FILM COATED ORAL DAILY
Qty: 100 TABLET | Refills: 1 | Status: SHIPPED | OUTPATIENT
Start: 2024-08-07

## 2024-08-12 DIAGNOSIS — E55.9 VITAMIN D DEFICIENCY: ICD-10-CM

## 2024-08-12 RX ORDER — ERGOCALCIFEROL 1.25 MG/1
50000 CAPSULE, LIQUID FILLED ORAL WEEKLY
Qty: 12 CAPSULE | Refills: 1 | Status: SHIPPED | OUTPATIENT
Start: 2024-08-12

## 2024-08-13 ENCOUNTER — OFFICE VISIT (OUTPATIENT)
Dept: DENTISTRY | Facility: CLINIC | Age: 49
End: 2024-08-13

## 2024-08-13 VITALS — DIASTOLIC BLOOD PRESSURE: 82 MMHG | SYSTOLIC BLOOD PRESSURE: 120 MMHG | TEMPERATURE: 97.7 F | HEART RATE: 81 BPM

## 2024-08-13 DIAGNOSIS — Z01.21 ENCOUNTER FOR DENTAL EXAMINATION AND CLEANING WITH ABNORMAL FINDINGS: Primary | ICD-10-CM

## 2024-08-13 PROCEDURE — D1110 PROPHYLAXIS - ADULT: HCPCS | Performed by: DENTAL HYGIENIST

## 2024-08-13 NOTE — DENTAL PROCEDURE DETAILS
ASA  II  Pain - 0  Reviewed M/DH    Prophylaxis completed with ultrasonic  and hand instrumentation.    ---Lt to mod calc, stain, and plaque  ---Soft plaque removed and sub/ supragingival calculus removed from all teeth.    ---Polished with prophy cup and paste.    ---Flossed and provided Oral Health Instructions.    ---Demonstrated proper brushing and flossing technique.    ---Patient left satisfied and ambulatory.    Exam:  none  Referral:  none  [place] plan:  no changes  ---Requested pre-auth for UPD - cast metal    NV1:  6mrc - 50 min

## 2024-08-21 ENCOUNTER — SOCIAL WORK (OUTPATIENT)
Dept: BEHAVIORAL/MENTAL HEALTH CLINIC | Facility: CLINIC | Age: 49
End: 2024-08-21
Payer: COMMERCIAL

## 2024-08-21 DIAGNOSIS — F33.9 MAJOR DEPRESSION, RECURRENT, CHRONIC (HCC): Primary | ICD-10-CM

## 2024-08-21 PROCEDURE — T1015 CLINIC SERVICE: HCPCS | Performed by: SOCIAL WORKER

## 2024-08-21 NOTE — PSYCH
"Behavioral Health Psychotherapy Progress Note    Psychotherapy Provided: Individual Psychotherapy     1. Major depression, recurrent, chronic (HCC)            Goals addressed in session: Goal 1 and Goal 2     DATA: The client reported that the client is having problems in her home environment, with other members of the apartment smoking THC and cigarette smoke. As team we explored and processed the affects on her mental health and ways to address her anxiety and depression through use of her coping skills and self-advocacy, to address her needs and wants and increase her independence.   During this session, this clinician used the following therapeutic modalities: Cognitive Behavioral Therapy, Mindfulness-based Strategies, Solution-Focused Therapy, and Supportive Psychotherapy    Substance Abuse was addressed during this session. If the client is diagnosed with a co-occurring substance use disorder, please indicate any changes in the frequency or amount of use: none. Stage of change for addressing substance use diagnoses: No substance use/Not applicable    ASSESSMENT:  Cristy Garcia presents with a Anxious and Depressed mood.     her affect is Normal range and intensity, which is congruent, with her mood and the content of the session. The client has made progress on their goals.     Cristy Garcia presents with a none risk of suicide, none risk of self-harm, and none risk of harm to others.    For any risk assessment that surpasses a \"low\" rating, a safety plan must be developed.    A safety plan was indicated: no  If yes, describe in detail n/a    PLAN: Between sessions, Cristy Garcia will practice her coping skills when presented with anxiety and or depression. At the next session, the therapist will use Cognitive Behavioral Therapy, Mindfulness-based Strategies, Solution-Focused Therapy, and Supportive Psychotherapy to address the client's MH issues affecting different relationships and " environments.    Behavioral Health Treatment Plan and Discharge Planning: Cristy Garcia is aware of and agrees to continue to work on their treatment plan. They have identified and are working toward their discharge goals. yes    Visit start and stop times:    08/21/24  Start Time: 1400  Stop Time: 1420  Total Visit Time: 20 minutesTelemedicine consent    Patient: Cristy Garciamons  Provider: YONATAN Luu  Provider located at [address] [address]    The patient was identified by name and date of birth. Cristy Garcia was informed that this is a telemedicine visit and that the visit is being conducted through Telephone.  My office door was closed. No one else was in the room.  She acknowledged consent and understanding of privacy and security of the video platform. The patient has agreed to participate and understands they can discontinue the visit at any time.    Patient is aware this is a billable service.     I spent 20 minutes with the patient during this visit.

## 2024-08-27 DIAGNOSIS — J06.9 ACUTE URI: ICD-10-CM

## 2024-08-27 DIAGNOSIS — R09.82 PND (POST-NASAL DRIP): ICD-10-CM

## 2024-08-27 DIAGNOSIS — R05.1 ACUTE COUGH: ICD-10-CM

## 2024-08-27 DIAGNOSIS — J01.00 ACUTE MAXILLARY SINUSITIS, RECURRENCE NOT SPECIFIED: ICD-10-CM

## 2024-08-27 DIAGNOSIS — H65.00 ACUTE SEROUS OTITIS MEDIA, RECURRENCE NOT SPECIFIED, UNSPECIFIED LATERALITY: ICD-10-CM

## 2024-08-28 RX ORDER — FLUTICASONE PROPIONATE 50 MCG
SPRAY, SUSPENSION (ML) NASAL
Qty: 16 G | Refills: 1 | Status: SHIPPED | OUTPATIENT
Start: 2024-08-28

## 2024-09-03 ENCOUNTER — OFFICE VISIT (OUTPATIENT)
Dept: FAMILY MEDICINE CLINIC | Facility: CLINIC | Age: 49
End: 2024-09-03
Payer: COMMERCIAL

## 2024-09-03 VITALS
SYSTOLIC BLOOD PRESSURE: 102 MMHG | TEMPERATURE: 95.5 F | DIASTOLIC BLOOD PRESSURE: 80 MMHG | BODY MASS INDEX: 44.57 KG/M2 | OXYGEN SATURATION: 97 % | HEART RATE: 77 BPM | WEIGHT: 242.2 LBS | HEIGHT: 62 IN

## 2024-09-03 DIAGNOSIS — G50.0 TRIGEMINAL NEURALGIA: ICD-10-CM

## 2024-09-03 DIAGNOSIS — I10 ESSENTIAL HYPERTENSION: ICD-10-CM

## 2024-09-03 DIAGNOSIS — R53.82 CHRONIC FATIGUE: ICD-10-CM

## 2024-09-03 DIAGNOSIS — D75.1 ERYTHROCYTOSIS: ICD-10-CM

## 2024-09-03 DIAGNOSIS — K21.00 GASTROESOPHAGEAL REFLUX DISEASE WITH ESOPHAGITIS WITHOUT HEMORRHAGE: ICD-10-CM

## 2024-09-03 DIAGNOSIS — Z86.19: ICD-10-CM

## 2024-09-03 DIAGNOSIS — J45.30 MILD PERSISTENT ASTHMA WITHOUT COMPLICATION: ICD-10-CM

## 2024-09-03 DIAGNOSIS — D72.829 LEUKOCYTOSIS, UNSPECIFIED TYPE: ICD-10-CM

## 2024-09-03 DIAGNOSIS — E78.2 MIXED HYPERLIPIDEMIA: ICD-10-CM

## 2024-09-03 DIAGNOSIS — M54.12 CERVICAL RADICULOPATHY: ICD-10-CM

## 2024-09-03 DIAGNOSIS — M15.9 PRIMARY OSTEOARTHRITIS INVOLVING MULTIPLE JOINTS: ICD-10-CM

## 2024-09-03 DIAGNOSIS — H10.13 ALLERGIC CONJUNCTIVITIS OF BOTH EYES: ICD-10-CM

## 2024-09-03 DIAGNOSIS — E55.9 VITAMIN D DEFICIENCY: ICD-10-CM

## 2024-09-03 DIAGNOSIS — M79.7 FIBROMYALGIA, PRIMARY: ICD-10-CM

## 2024-09-03 DIAGNOSIS — F33.9 MAJOR DEPRESSION, RECURRENT, CHRONIC (HCC): ICD-10-CM

## 2024-09-03 DIAGNOSIS — Z00.00 ANNUAL PHYSICAL EXAM: Primary | ICD-10-CM

## 2024-09-03 DIAGNOSIS — E11.9 TYPE 2 DIABETES MELLITUS WITHOUT COMPLICATION, WITHOUT LONG-TERM CURRENT USE OF INSULIN (HCC): ICD-10-CM

## 2024-09-03 DIAGNOSIS — E66.01 MORBID OBESITY WITH BODY MASS INDEX (BMI) OF 45.0 TO 49.9 IN ADULT (HCC): ICD-10-CM

## 2024-09-03 DIAGNOSIS — B37.31 VAGINAL CANDIDIASIS: ICD-10-CM

## 2024-09-03 DIAGNOSIS — L65.9 HAIR LOSS: ICD-10-CM

## 2024-09-03 DIAGNOSIS — E53.8 VITAMIN B12 DEFICIENCY: ICD-10-CM

## 2024-09-03 DIAGNOSIS — M54.16 LUMBAR RADICULOPATHY: ICD-10-CM

## 2024-09-03 PROCEDURE — 99396 PREV VISIT EST AGE 40-64: CPT

## 2024-09-03 PROCEDURE — 99214 OFFICE O/P EST MOD 30 MIN: CPT

## 2024-09-03 RX ORDER — CROMOLYN SODIUM 40 MG/ML
1 SOLUTION/ DROPS OPHTHALMIC 4 TIMES DAILY
Qty: 10 ML | Refills: 2 | Status: SHIPPED | OUTPATIENT
Start: 2024-09-03

## 2024-09-03 RX ORDER — FLUCONAZOLE 150 MG/1
150 TABLET ORAL ONCE
Qty: 1 TABLET | Refills: 2 | Status: SHIPPED | OUTPATIENT
Start: 2024-09-03 | End: 2024-09-03

## 2024-09-03 NOTE — ASSESSMENT & PLAN NOTE
Likely is multifactorial with iron deficiency, and fibromyalgia.  Will check lab work again to rule out any other underlying causes.

## 2024-09-03 NOTE — ASSESSMENT & PLAN NOTE
Last A1c 6.3.  Will continue Jardiance and metformin.  Lab Results   Component Value Date    HGBA1C 6.3 (H) 07/11/2024

## 2024-09-03 NOTE — ASSESSMENT & PLAN NOTE
Not in acute exacerbation today.  Continue albuterol every 6 hours as needed.  Contact office if exacerbation occurs.

## 2024-09-03 NOTE — PROGRESS NOTES
Adult Annual Physical  Name: Cristy Garcia      : 1975      MRN: [id number]  Encounter Provider: Gaurav Merino PA-C  Encounter Date: 9/3/2024   Encounter department: [facility]    Assessment & Plan   1. Annual physical exam  2. Essential hypertension  Assessment & Plan:  At goal today.  Continue current management.  Encouraged to take at home blood pressures and contact office if persistently elevated.  3. Mild persistent asthma without complication  Assessment & Plan:  Not in acute exacerbation today.  Continue albuterol every 6 hours as needed.  Contact office if exacerbation occurs.  4. Gastroesophageal reflux disease with esophagitis without hemorrhage  Assessment & Plan:  Stable on current management.  Contact office with exacerbation.  No red flag symptoms.  5. Type 2 diabetes mellitus without complication, without long-term current use of insulin (HCC)  Assessment & Plan:  Last A1c 6.3.  Will continue Jardiance and metformin.  Lab Results   Component Value Date    HGBA1C 6.3 (H) 2024     Orders:  -     Albumin / creatinine urine ratio; Future; Expected date: 2024  -     Comprehensive metabolic panel; Future; Expected date: 2024  -     Hemoglobin A1C; Future; Expected date: 2024  6. Cervical radiculopathy  Assessment & Plan:  Stable on Celebrex as needed.  Contact office with worsening.  7. Lumbar radiculopathy  Assessment & Plan:  Stable on Celebrex as needed.  Contact office with worsening.  8. Fibromyalgia, primary  Assessment & Plan:  Likely part of multifactorial chronic fatigue.  Is currently stable otherwise.  9. Primary osteoarthritis involving multiple joints  Assessment & Plan:  Stable on Celebrex as needed.  Contact office with worsening.  10. Major depression, recurrent, chronic (HCC)  Assessment & Plan:  Stable on Zoloft.  Denies SI/HI.  Contact office if any worsening.  11. Mixed hyperlipidemia  Assessment & Plan:  Will continue to  monitor.  Continue current management.  Educated on healthy diet and activity.  12. Morbid obesity with body mass index (BMI) of 45.0 to 49.9 in adult ([facility])  Assessment & Plan:  Educated on healthy diet and activity.  13. Vitamin D deficiency  Assessment & Plan:  Will follow-up pending lab results.    Orders:  -     Vitamin D 25 hydroxy; Future  14. Erythrocytosis  Assessment & Plan:  Follows with hematology.  15. Leukocytosis, unspecified type  Assessment & Plan:  Follows with hematology.  16. Chronic fatigue  Assessment & Plan:  Likely is multifactorial with iron deficiency, and fibromyalgia.  Will check lab work again to rule out any other underlying causes.  Orders:  -     TSH, 3rd generation with Free T4 reflex; Future  -     T4; Future  -     T3; Future  17. Hair loss  Assessment & Plan:  Likely is multifactorial with iron deficiency, and fibromyalgia.  Will check lab work again to rule out any other underlying causes.  Orders:  -     TSH, 3rd generation with Free T4 reflex; Future  -     T4; Future  -     T3; Future  18. Hx of giardiasis  Assessment & Plan:  Will follow-up pending lab results.  Contact office if symptoms do occur.  Orders:  -     Giardia antigen; Future  19. Vaginal candidiasis  Assessment & Plan:  Contact office if no improvement on fluconazole.  Educated on side effects.  Orders:  -     fluconazole (DIFLUCAN) 150 mg tablet; Take 1 tablet (150 mg total) by mouth once for 1 dose  20. Vitamin B12 deficiency  Assessment & Plan:  Will follow-up pending lab results.    Orders:  -     Vitamin B12; Future  21. Trigeminal neuralgia  Assessment & Plan:  Will continue gabapentin management for a month or 2 longer.  Will see if there is improvement.  If no improvement, will discuss carbamazepine.  Contact office with any worsening.  22. Allergic conjunctivitis of both eyes  Assessment & Plan:  Will prescribe cromolyn.  Educated on side effects.  Contact office if no improvement or  worsening.  Orders:  -     cromolyn (OPTICROM) 4 % ophthalmic solution; Administer 1 drop to both eyes 4 (four) times a day    Immunizations and preventive care screenings were discussed with patient today. Appropriate education was printed on patient's after visit summary.    Counseling:  Alcohol/drug use: discussed moderation in alcohol intake, the recommendations for healthy alcohol use, and avoidance of illicit drug use.  Dental Health: discussed importance of regular tooth brushing, flossing, and dental visits.  Injury prevention: discussed safety/seat belts, safety helmets, smoke detectors, carbon dioxide detectors, and smoking near bedding or upholstery.  Sexual health: discussed sexually transmitted diseases, partner selection, use of condoms, avoidance of unintended pregnancy, and contraceptive alternatives.  Exercise: the importance of regular exercise/physical activity was discussed. Recommend exercise 3-5 times per week for at least 30 minutes.          History of Present Illness     Adult Annual Physical:  Patient presents for annual physical. Patient is a 48-year-old female presenting for annual physical.  Patient states she has had an increase of tiredness and fatigue.  She did have recent lab work that showed iron deficiency with hematology.  Patient also had dental surgery that has been leading to nerve pain that radiates from the jaw up the left lateral side of the face up into the temple area.  Worsened with touch, talking, cold wind.  Patient also needs fluconazole for vaginal candidiasis.  She does get this occasionally due to the Jardiance.  Patient also has been having worsening of red/allergic eyes.  Previously on cromolyn which helped this greatly.  Patient also was exposed to a dog who has Giardia.  Has not had any symptoms of diarrhea, blood in stool, nausea, vomiting, watery stools, abdominal pain.  Patient has no other concerns today..     Diet and Physical Activity:  - Diet/Nutrition:  well balanced diet.  - Exercise: no formal exercise.    General Health:  - Sleep: sleeps well.  - Hearing: normal hearing bilateral ears.  - Vision: no vision problems and wears glasses.  - Dental: regular dental visits.    /GYN Health:  - Follows with GYN: yes.     Review of Systems   Constitutional:  Positive for fatigue. Negative for appetite change, chills, diaphoresis and fever.   HENT:  Negative for congestion, ear discharge, ear pain, postnasal drip, rhinorrhea, sinus pressure, sinus pain, sneezing and sore throat.         Face pain.   Eyes:  Positive for redness and itching. Negative for pain, discharge and visual disturbance.   Respiratory:  Negative for apnea, cough, chest tightness, shortness of breath and wheezing.    Cardiovascular:  Negative for chest pain, palpitations and leg swelling.   Gastrointestinal:  Negative for abdominal pain, blood in stool, constipation, diarrhea, nausea and vomiting.   Endocrine: Negative for cold intolerance, heat intolerance, polydipsia and polyuria.        Hair loss.   Genitourinary:  Negative for dysuria, flank pain, frequency, hematuria and urgency.        Vaginal candidiasis.   Musculoskeletal:  Negative for arthralgias, back pain, myalgias, neck pain and neck stiffness.   Skin:  Negative for color change and rash.   Allergic/Immunologic: Negative for environmental allergies and food allergies.   Neurological:  Negative for dizziness, tremors, seizures, syncope, speech difficulty, weakness, light-headedness, numbness and headaches.   Hematological:  Negative for adenopathy. Does not bruise/bleed easily.   Psychiatric/Behavioral:  Negative for agitation, confusion, decreased concentration, dysphoric mood, hallucinations, self-injury, sleep disturbance and suicidal ideas. The patient is not nervous/anxious and is not hyperactive.    All other systems reviewed and are negative.    Medical History Reviewed by provider this encounter:       Current Outpatient  Medications on File Prior to Visit   Medication Sig Dispense Refill    albuterol (Ventolin HFA) 90 mcg/act inhaler Inhale 2 puffs every 6 (six) hours as needed for wheezing or shortness of breath 18 g 5    atorvastatin (LIPITOR) 20 mg tablet Take 1 tablet (20 mg total) by mouth daily 100 tablet 1    celecoxib (CeleBREX) 200 mg capsule TAKE (1) CAPSULE BY MOUTH TWICE DAILY. 60 capsule 5    cetirizine (ZyrTEC) 10 mg tablet 1 tab po daily x 1 month then prn allergies 100 tablet 1    clotrimazole-betamethasone (LOTRISONE) 1-0.05 % cream APPLY TWICE DAILY TO VAGINAL AREA UNTIL HEALED THEN AS NEEDED FOR IRRITATION. 45 g 3    Empagliflozin (Jardiance) 25 MG TABS Take 1 tablet (25 mg total) by mouth daily 30 tablet 5    ergocalciferol (VITAMIN D2) 50,000 units Take 1 capsule (50,000 Units total) by mouth once a week 12 capsule 1    famotidine (PEPCID) 40 MG tablet TAKE (1) TABLET BY MOUTH DAILY. 30 tablet 5    finasteride (PROPECIA) 1 MG tablet Take 1 tablet (1 mg total) by mouth daily 30 tablet 5    fluticasone (FLONASE) 50 mcg/act nasal spray USE 1 SPRAY INTO EACH NOSTRIL TWICE A DAY FOR 1 MONTH THEN AS NEEDED FOR CONGESTION AND ALLERGIES 16 g 1    Fluticasone-Salmeterol (Advair) 250-50 mcg/dose inhaler Inhale 1 puff 2 (two) times a day Rinse mouth after use. 200 blister 1    gabapentin (NEURONTIN) 300 mg capsule TAKE (1) CAPSULE THREE TIMES DAILY. 270 capsule 3    glipiZIDE (GLUCOTROL) 10 mg tablet TAKE (2) TABLETS TWICE DAILY BEFORE MEALS. 360 tablet 1    ketoconazole (NIZORAL) 2 % cream Apply topically daily 60 g 0    lisinopril (ZESTRIL) 20 mg tablet Take 1 tablet (20 mg total) by mouth daily 90 tablet 3    metFORMIN (GLUCOPHAGE) 1000 MG tablet TAKE (1) TABLET TWICE A DAY WITH MEALS. 200 tablet 1    nystatin powder Apply topically 2 (two) times a day 60 g 3    omeprazole (PriLOSEC) 40 MG capsule Take 1 capsule (40 mg total) by mouth daily before breakfast 90 capsule 3    ondansetron (ZOFRAN) 4 mg tablet Take 1 tablet  (4 mg total) by mouth every 8 (eight) hours as needed for nausea or vomiting 30 tablet 5    sertraline (ZOLOFT) 50 mg tablet TAKE (1) TABLET BY MOUTH DAILY AT BEDTIME. 90 tablet 1    triamcinolone (KENALOG) 0.025 % cream Apply 2 x daily to rash until healed then prn rash 60 g 0    glucose blood test strip       glucose blood test strip Use as instructed 100 each 1    Lancet Devices (OneTouch Delica Plus Lancing) MISC       Lancets (onetouch ultrasoft) lancets Use as instructed 100 each 1    [DISCONTINUED] acetaminophen (TYLENOL) 500 mg tablet Take 500 mg by mouth every 6 (six) hours as needed for mild pain (Patient not taking: Reported on 9/3/2024)      [DISCONTINUED] atorvastatin (LIPITOR) 20 mg tablet Take 1 tablet (20 mg total) by mouth daily with dinner (Patient not taking: Reported on 9/3/2024) 90 tablet 1    [DISCONTINUED] celecoxib (CeleBREX) 200 mg capsule Take 1 capsule (200 mg total) by mouth 2 (two) times a day (Patient not taking: Reported on 9/3/2024) 60 capsule 5    [DISCONTINUED] ergocalciferol (VITAMIN D2) 50,000 units TAKE ONE CAPSULE BY MOUTH ONCE A WEEK (Patient not taking: Reported on 9/3/2024) 12 capsule 1    [DISCONTINUED] gabapentin (NEURONTIN) 300 mg capsule Take 1 capsule (300 mg total) by mouth 3 (three) times a day (Patient not taking: Reported on 9/3/2024) 270 capsule 3    [DISCONTINUED] gabapentin (NEURONTIN) 300 mg capsule Take 300 mg by mouth 3 (three) times a day (Patient not taking: Reported on 9/3/2024)       No current facility-administered medications on file prior to visit.      Social History     Tobacco Use    Smoking status: Former     Types: Cigarettes     Start date:      Quit date:      Years since quittin.6     Passive exposure: Past    Smokeless tobacco: Never   Vaping Use    Vaping status: Never Used   Substance and Sexual Activity    Alcohol use: No    Drug use: No    Sexual activity: Not Currently       Objective     /80 (BP Location: Left arm,  "Patient Position: Sitting)   Pulse 77   Temp (!) 95.5 °F (35.3 °C) (Tympanic)   Ht 5' 2\" (1.575 m)   Wt 110 kg (242 lb 3.2 oz)   SpO2 97%   BMI 44.30 kg/m²     Physical Exam  Vitals and nursing note reviewed.   Constitutional:       General: She is not in acute distress.     Appearance: Normal appearance. She is well-developed. She is obese. She is not ill-appearing, toxic-appearing or diaphoretic.   HENT:      Head: Normocephalic and atraumatic.      Right Ear: Tympanic membrane normal.      Left Ear: Tympanic membrane normal.      Nose: Nose normal.      Mouth/Throat:      Mouth: Mucous membranes are moist.      Pharynx: Oropharynx is clear.   Eyes:      Conjunctiva/sclera: Conjunctivae normal.      Pupils: Pupils are equal, round, and reactive to light.   Cardiovascular:      Rate and Rhythm: Normal rate and regular rhythm.      Pulses: Normal pulses. no weak pulses.           Dorsalis pedis pulses are 2+ on the right side and 2+ on the left side.        Posterior tibial pulses are 2+ on the right side and 2+ on the left side.      Heart sounds: Normal heart sounds. No murmur heard.  Pulmonary:      Effort: Pulmonary effort is normal. No respiratory distress.      Breath sounds: Normal breath sounds. No wheezing.   Chest:      Chest wall: No tenderness.   Abdominal:      General: Bowel sounds are normal.      Palpations: Abdomen is soft. There is no mass.      Tenderness: There is no abdominal tenderness.   Musculoskeletal:         General: No swelling or tenderness. Normal range of motion.      Cervical back: Normal range of motion and neck supple. No tenderness.      Right lower leg: No edema.      Left lower leg: No edema.   Feet:      Right foot:      Skin integrity: No ulcer, skin breakdown, erythema, warmth, callus or dry skin.      Left foot:      Skin integrity: No ulcer, skin breakdown, erythema, warmth, callus or dry skin.   Lymphadenopathy:      Cervical: No cervical adenopathy.   Skin:     " General: Skin is warm and dry.      Capillary Refill: Capillary refill takes less than 2 seconds.      Findings: No erythema, lesion or rash.   Neurological:      General: No focal deficit present.      Mental Status: She is alert and oriented to person, place, and time. Mental status is at baseline.      Motor: No weakness.      Coordination: Coordination normal.      Gait: Gait normal.   Psychiatric:         Mood and Affect: Mood normal.         Behavior: Behavior normal.         Thought Content: Thought content normal.         Judgment: Judgment normal.     Patient's shoes and socks removed.    Right Foot/Ankle   Right Foot Inspection  Skin Exam: skin normal and skin intact. No dry skin, no warmth, no callus, no erythema, no maceration, no abnormal color, no pre-ulcer, no ulcer and no callus.     Toe Exam: ROM and strength within normal limits.     Sensory   Monofilament testing: intact    Vascular  Capillary refills: < 3 seconds  The right DP pulse is 2+. The right PT pulse is 2+.     Left Foot/Ankle  Left Foot Inspection  Skin Exam: skin normal and skin intact. No dry skin, no warmth, no erythema, no maceration, normal color, no pre-ulcer, no ulcer and no callus.     Toe Exam: ROM and strength within normal limits.     Sensory   Monofilament testing: intact    Vascular  Capillary refills: < 3 seconds  The left DP pulse is 2+. The left PT pulse is 2+.     Assign Risk Category  No deformity present  No loss of protective sensation  No weak pulses  Risk: 0

## 2024-09-03 NOTE — ASSESSMENT & PLAN NOTE
Will prescribe cromolyn.  Educated on side effects.  Contact office if no improvement or worsening.

## 2024-09-03 NOTE — ASSESSMENT & PLAN NOTE
At goal today.  Continue current management.  Encouraged to take at home blood pressures and contact office if persistently elevated.

## 2024-09-03 NOTE — ASSESSMENT & PLAN NOTE
Will continue gabapentin management for a month or 2 longer.  Will see if there is improvement.  If no improvement, will discuss carbamazepine.  Contact office with any worsening.

## 2024-09-05 ENCOUNTER — TELEMEDICINE (OUTPATIENT)
Dept: BEHAVIORAL/MENTAL HEALTH CLINIC | Facility: CLINIC | Age: 49
End: 2024-09-05
Payer: COMMERCIAL

## 2024-09-05 DIAGNOSIS — F33.9 MAJOR DEPRESSION, RECURRENT, CHRONIC (HCC): Primary | ICD-10-CM

## 2024-09-05 PROCEDURE — T1015 CLINIC SERVICE: HCPCS | Performed by: SOCIAL WORKER

## 2024-09-05 NOTE — PSYCH
Telemedicine consent    Patient: Cristy Garcia  Provider: YONAATN Luu  Provider located at [address] [address]    The patient was identified by name and date of birth. Critsy Garcia was informed that this is a telemedicine visit and that the visit is being conducted through the [employer] platform. She agrees to proceed..  My office door was closed. No one else was in the room.  She acknowledged consent and understanding of privacy and security of the video platform. The patient has agreed to participate and understands they can discontinue the visit at any time.    Patient is aware this is a billable service.     I spent 28 minutes with the patient during this visit.    Behavioral Health Psychotherapy Progress Note    Psychotherapy Provided: Individual Psychotherapy     1. Major depression, recurrent, chronic (HCC)            Goals addressed in session: Goal 1 and Goal 2     DATA: The client reported continued issues with the pain in her jaw, due to dental surgery. She will be contacting a  to address this issues. During the session we explored and processed her anger and the effects on her different relationships and environments. As team we explore healthy ways to mange her stress through supportive relationships, quality sleep, nutrients, and physical activities.   During this session, this clinician used the following therapeutic modalities: Cognitive Behavioral Therapy, Mindfulness-based Strategies, Solution-Focused Therapy, and Supportive Psychotherapy    Substance Abuse was addressed during this session. If the client is diagnosed with a co-occurring substance use disorder, please indicate any changes in the frequency or amount of use: none. Stage of change for addressing substance use diagnoses: No substance use/Not applicable    ASSESSMENT:  Cristy Garcia presents with a Angry and Depressed mood.     her  "affect is Normal range and intensity, which is congruent, with her mood and the content of the session. The client has made progress on their goals.    The client  Cristy Garcia presents with a none risk of suicide, none risk of self-harm, and none risk of harm to others.    For any risk assessment that surpasses a \"low\" rating, a safety plan must be developed.    A safety plan was indicated: no  If yes, describe in detail n/a    PLAN: Between sessions, Cristy Garcia will practice her coping skills when presented anxiety and or depression. At the next session, the therapist will use Cognitive Behavioral Therapy, Mindfulness-based Strategies, Solution-Focused Therapy, and Supportive Psychotherapy to address the client's MH issues affecting different relationships and environments.    Behavioral Health Treatment Plan and Discharge Planning: Cristy Garcia is aware of and agrees to continue to work on their treatment plan. They have identified and are working toward their discharge goals. yes    Visit start and stop times:    09/05/24  Start Time: 1400  Stop Time: 1428  Total Visit Time: 28 minutes  "

## 2024-09-09 ENCOUNTER — TELEPHONE (OUTPATIENT)
Age: 49
End: 2024-09-09

## 2024-09-09 NOTE — TELEPHONE ENCOUNTER
Westley Keller MD  You; Radha Ashraf now (3:15 PM)       Plese schedule for b/l hip injection under fluoro thanks

## 2024-09-09 NOTE — TELEPHONE ENCOUNTER
Caller: Bubba Friend    Doctor: Dr Pate     Reason for call: Bubba called in to schedule a hip injection     Call back#: [phone]

## 2024-09-09 NOTE — TELEPHONE ENCOUNTER
S/w pt and caregiver Aleksandra(verbal consent). Pt reports same B/L non radiating hip pain as before last hip injection 4/11/24. Pain is dull and achy. Pt reports ~90% relief until a few weeks ago.  Pt advised if injection approved, pt will be contacted by .

## 2024-09-09 NOTE — TELEPHONE ENCOUNTER
Caller: Bubba Friend     Doctor: Dr Pate      Reason for call: Bubba called in to schedule a hip injection.  Aleksandra will only be with the pt until 3     Call back#: [phone]

## 2024-09-10 ENCOUNTER — OFFICE VISIT (OUTPATIENT)
Dept: DENTISTRY | Facility: CLINIC | Age: 49
End: 2024-09-10

## 2024-09-10 ENCOUNTER — PREP FOR PROCEDURE (OUTPATIENT)
Dept: PAIN MEDICINE | Facility: CLINIC | Age: 49
End: 2024-09-10

## 2024-09-10 VITALS — HEART RATE: 77 BPM | DIASTOLIC BLOOD PRESSURE: 82 MMHG | TEMPERATURE: 97.3 F | SYSTOLIC BLOOD PRESSURE: 118 MMHG

## 2024-09-10 DIAGNOSIS — K08.109 TEETH MISSING: Primary | ICD-10-CM

## 2024-09-10 DIAGNOSIS — M16.0 PRIMARY OSTEOARTHRITIS OF BOTH HIPS: Primary | ICD-10-CM

## 2024-09-10 PROCEDURE — D5899 UNSPECIFIED REMOVABLE PROSTHODONTIC PROCEDURE, BY REPORT: HCPCS | Performed by: DENTIST

## 2024-09-10 PROCEDURE — WIS5000 PRELIMINARY IMPRESSIONS: Performed by: DENTIST

## 2024-09-10 NOTE — DENTAL PROCEDURE DETAILS
Preliminary Impressions For Maxillary Removable Metal Base Partial denture    Cristy Garcia 48 y.o. female presents with self to [facility] for partial denture preliminary impressions.  PMH reviewed, no changes, ASA II  Pain level:  0/10  Diagnosis: Partially edentulous upper dental arch. Missing teeth #3,14,15.   Radiographs: current.   Consent:  Risks of specific procedure: difficult to achieve ideal retention and function, higher likelihood of pt dissatisfaction.  Risks of any dental procedure: post procedural pain or sensitivity, local anesthetic side effects, allergic reaction to dental materials and medications, breakage of local anesthetic needle, aspiration of small dental tools, injury to nearby hard and soft tissues and anatomical structures.  Benefits: replace missing teeth without invasive procedure.  Alternatives: no tx.  Tx plan for upper Cast metal RPD reviewed, pt given opportunity to ask questions, all questions answered to degree of medical and dental certainty.  Patient understands and consent given by self via verbal consent.  Explained denture making process to patient, including the necessary number of visits and timeframe to make denture. Reviewed denture expectations, adjustment period, and hygiene.   Procedure details:  Performed quick intraoral exam. Soft tissue within normal limits, conditions adequate for preliminary impressions.  Obtained preliminary impressions with Silgimix in stock trays.  Impression free of voids and captures important anatomy including vestibules and peripheral roll.  POI is given.  Reviewed oral hygiene and need for recall visits.   Patient dismissed ambulatory and alert  NV: Rest seat prep, border molding and final impressions for upper RPD.

## 2024-09-11 ENCOUNTER — TELEPHONE (OUTPATIENT)
Dept: FAMILY MEDICINE CLINIC | Facility: CLINIC | Age: 49
End: 2024-09-11

## 2024-09-11 ENCOUNTER — APPOINTMENT (OUTPATIENT)
Dept: LAB | Facility: HOSPITAL | Age: 49
End: 2024-09-11
Payer: COMMERCIAL

## 2024-09-11 DIAGNOSIS — R53.82 CHRONIC FATIGUE: ICD-10-CM

## 2024-09-11 DIAGNOSIS — E55.9 VITAMIN D DEFICIENCY: ICD-10-CM

## 2024-09-11 DIAGNOSIS — L65.9 HAIR LOSS: ICD-10-CM

## 2024-09-11 DIAGNOSIS — Z86.19: ICD-10-CM

## 2024-09-11 DIAGNOSIS — E53.8 VITAMIN B12 DEFICIENCY: ICD-10-CM

## 2024-09-11 LAB
25(OH)D3 SERPL-MCNC: 59 NG/ML (ref 30–100)
BASOPHILS # BLD AUTO: 0.07 THOUSANDS/ÂΜL (ref 0–0.1)
BASOPHILS NFR BLD AUTO: 1 % (ref 0–1)
EOSINOPHIL # BLD AUTO: 0.27 THOUSAND/ÂΜL (ref 0–0.61)
EOSINOPHIL NFR BLD AUTO: 3 % (ref 0–6)
ERYTHROCYTE [DISTWIDTH] IN BLOOD BY AUTOMATED COUNT: 15.4 % (ref 11.6–15.1)
HCT VFR BLD AUTO: 49.1 % (ref 34.8–46.1)
HGB BLD-MCNC: 15.5 G/DL (ref 11.5–15.4)
IMM GRANULOCYTES # BLD AUTO: 0.06 THOUSAND/UL (ref 0–0.2)
IMM GRANULOCYTES NFR BLD AUTO: 1 % (ref 0–2)
LYMPHOCYTES # BLD AUTO: 2.37 THOUSANDS/ÂΜL (ref 0.6–4.47)
LYMPHOCYTES NFR BLD AUTO: 29 % (ref 14–44)
MCH RBC QN AUTO: 27.5 PG (ref 26.8–34.3)
MCHC RBC AUTO-ENTMCNC: 31.6 G/DL (ref 31.4–37.4)
MCV RBC AUTO: 87 FL (ref 82–98)
MONOCYTES # BLD AUTO: 0.52 THOUSAND/ÂΜL (ref 0.17–1.22)
MONOCYTES NFR BLD AUTO: 6 % (ref 4–12)
NEUTROPHILS # BLD AUTO: 4.86 THOUSANDS/ÂΜL (ref 1.85–7.62)
NEUTS SEG NFR BLD AUTO: 60 % (ref 43–75)
NRBC BLD AUTO-RTO: 0 /100 WBCS
PLATELET # BLD AUTO: 238 THOUSANDS/UL (ref 149–390)
PMV BLD AUTO: 9 FL (ref 8.9–12.7)
RBC # BLD AUTO: 5.63 MILLION/UL (ref 3.81–5.12)
T3 SERPL-MCNC: 1 NG/ML
T4 SERPL-MCNC: 9 UG/DL (ref 6.09–12.23)
TSH SERPL DL<=0.05 MIU/L-ACNC: 0.86 UIU/ML (ref 0.45–4.5)
VIT B12 SERPL-MCNC: 457 PG/ML (ref 180–914)
WBC # BLD AUTO: 8.15 THOUSAND/UL (ref 4.31–10.16)

## 2024-09-11 PROCEDURE — 84436 ASSAY OF TOTAL THYROXINE: CPT

## 2024-09-11 PROCEDURE — 87329 GIARDIA AG IA: CPT

## 2024-09-11 PROCEDURE — 84480 ASSAY TRIIODOTHYRONINE (T3): CPT

## 2024-09-11 PROCEDURE — 36415 COLL VENOUS BLD VENIPUNCTURE: CPT

## 2024-09-11 PROCEDURE — 82306 VITAMIN D 25 HYDROXY: CPT

## 2024-09-11 PROCEDURE — 82607 VITAMIN B-12: CPT

## 2024-09-11 PROCEDURE — 84443 ASSAY THYROID STIM HORMONE: CPT

## 2024-09-11 NOTE — TELEPHONE ENCOUNTER
----- Message from Gaurav Merino PA-C sent at 9/11/2024  1:11 PM EDT -----  Labs are stable.   Inpatient Rehabilitation Functional Measures Assessment and Plan of Care    Plan of Care  Communication    [ST] Comprehension(Active)  Current Status(09/26/2023): Trace to mild auditory comprehension deficits  Weekly Goal(10/02/2023): receptive ID of object following verbal description  Discharge Goal: WFL communication and comprehension    [ST] Expression(Active)  Current Status(09/26/2023): Fluent aphasia w/ anomia  Weekly Goal(10/02/2023): Provide 3+ verbal descriptors of an object/pic/word  Discharge Goal: WFL communication    Functional Measures  JOHN Eating:  JOHN Grooming:  JOHN Bathing:  JOHN Upper Body Dressing:  JOHN Lower Body Dressing:  JOHN Toileting:    JOHN Bladder Management  Level of Assistance:  Frequency/Number of Accidents this Shift:    JONH Bowel Management  Level of Assistance:  Frequency/Number of Accidents this Shift:    JOHN Bed/Chair/Wheelchair Transfer:  JOHN Toilet Transfer:  JOHN Tub/Shower Transfer:    Previously Documented Mode of Locomotion at Discharge:  JOHN Expected Mode of Locomotion at Discharge:  JOHN Walk/Wheelchair:  JOHN Stairs:    JOHN Comprehension:  JOHN Expression:  JOHN Social Interaction:  JOHN Problem Solving:  JOHN Memory:    Therapy Mode Minutes  Occupational Therapy:  Physical Therapy:  Speech Language Pathology: Individual: 35 minutes.    Discharge Functional Goals:    Signed by: JUSTO Schwartz

## 2024-09-12 ENCOUNTER — APPOINTMENT (OUTPATIENT)
Dept: LAB | Facility: HOSPITAL | Age: 49
End: 2024-09-12

## 2024-09-16 ENCOUNTER — TELEPHONE (OUTPATIENT)
Dept: FAMILY MEDICINE CLINIC | Facility: CLINIC | Age: 49
End: 2024-09-16

## 2024-09-17 ENCOUNTER — OFFICE VISIT (OUTPATIENT)
Dept: HEMATOLOGY ONCOLOGY | Facility: CLINIC | Age: 49
End: 2024-09-17
Payer: COMMERCIAL

## 2024-09-17 VITALS
OXYGEN SATURATION: 97 % | HEART RATE: 84 BPM | HEIGHT: 62 IN | TEMPERATURE: 97.2 F | RESPIRATION RATE: 16 BRPM | WEIGHT: 245 LBS | SYSTOLIC BLOOD PRESSURE: 122 MMHG | DIASTOLIC BLOOD PRESSURE: 80 MMHG | BODY MASS INDEX: 45.08 KG/M2

## 2024-09-17 DIAGNOSIS — D75.1 ERYTHROCYTOSIS: ICD-10-CM

## 2024-09-17 DIAGNOSIS — D50.8 OTHER IRON DEFICIENCY ANEMIA: Primary | ICD-10-CM

## 2024-09-17 PROCEDURE — 99215 OFFICE O/P EST HI 40 MIN: CPT | Performed by: PHYSICIAN ASSISTANT

## 2024-09-17 NOTE — PROGRESS NOTES
[address]  [facility] [place]  [address] [id number]  Hematology Ambulatory Follow-Up  Cristy Garcia, 1975, [id number]  9/17/2024      Assessment and Plan   1. Other iron deficiency anemia  Etiology of iron deficiency likely due to abnormal menses starting in January 2024.  I have made the recommendation for the patient to undergo colonoscopy, not Cologuard for further assessment.  Patient will follow-up with GI as she has an appointment with them in the near future.    Recommended follow-up for further assessment of iron deficiency was recommended.  Patient is doing quite well with oral iron supplementation and I do not see a need to transition to IV iron especially with a robust response with every other day dosing, without a side effect profile.    Regimen:  Elemental iron 65 mg p.o. every other day    - CBC and differential; Future  - Iron Panel (Includes Ferritin, Iron Sat%, Iron, and TIBC); Future  - MISCELLANEOUS LAB TEST; Future  - Vitamin B12; Future  - Folate; Future    2. Erythrocytosis  Etiology unknown.  Hemoglobin cascade demonstrates normal responses.  Alpha thalassemia will be investigated.    Patient's blood test to demonstrate recovery.  MCV is better MCV is normalized.  RDW is normalizing.  If erythrocytosis persists and alpha thalassemia is negative, observation would be recommended.    - CBC and differential; Future  - MISCELLANEOUS LAB TEST; Future    The patient is scheduled for follow-up in approximately 2 months.     Patient voiced agreement and understanding to the above.   Patient advised to call the Hematology/Oncology office with any questions and concerns regarding the above.    Barrier(s) to care: None  The patient is able to self care.    Tara Squires PA-C  Medical Oncology/Hematology  [facility]    Subjective     Chief Complaint   Patient presents with    Follow-up       History of present  illness:   This is a 48-year-old female with past medical history of hypertension, hyperlipidemia, diabetes, GERD, fibromyalgia, asthma, allergy, obstructive sleep apnea and obesity who presents to the hematology clinic for evaluation of CBC abnormalities.     6/19/2020 WBC 8.2, hemoglobin 15.1, MCV 88, platelet count 219, differential WNL  7/22/2021 WBC 9.8, RBC 14.9, hemoglobin 14.9, MCV 90, platelet count 245              ANC = 6.15, ABC=0.09  9/16/2022 RBC 16.4, WBC 9.0, hemoglobin 16.4, MCV 88, platelet count 287              ANC 5.4, ABC = 0.11  1/24/2023 WBC 8.8, RBC 5.2 hemoglobin 15.9, MCV 90, platelet count 247              Differential WNL  4/20/2023 WBC 7.8, hemoglobin 15.2, MCV 90, platelet count 280              Differential WNL     January 2024 patient notes that menstrual cycle became more erratic.  Patient was bleeding heavily throughout the month.  Patient required progesterone to kind of reset her.  Patient now has a menstruation for 3 to 4 days every 3 to 4 weeks.  Patient is no longer on progesterone.     2/12/2024 WBC 10.2, RBC 5.5, hemoglobin 13.4, MCV 77, RDW 15.8, platelet count 263  2/22/24 WBC 8.8, RBC 5.4,  hemoglobin 13.1, MCV 77, platelet count 309     4/1/2024 WBC 10.2, RBC 6.0, hemoglobin 14.3, MCV 79 RDW 16.0, platelet count 323              Differential WNL with exception of basophilia of 0.12    Never had a colonoscopy but did have a Cologuard-12/15/2022- at 1 point in the past which was within normal limits.  Patient had a recent CT scan that demonstrated diverticulosis, patient denies rectal bleeding.    6/27/2024: BCR/able FISH negative    7/11/2024: WBC 8.5, hemoglobin 14.6, hematocrit 44.9, MCV 82, RDW 16.7, platelet count 260   Iron saturation 11%, TIBC 467, ferritin 5   Hemoglobin cascade demonstrates normal hemoglobin present and no variant or beta thalassemia identified.   Folate 16, B12 450    Patient was started on oral iron 65 mg every other day and has been compliant  in taking this since blood work was taken in July.    9/11/2024 WBC 8.15, RBC 5.6, hemoglobin 15.5, hematocrit 49.1, MCV 87, RDW 15.4, platelets 238    Interval history:  seeing Gyn occult bleeding from vagina? Wants a hysterectomy.     Review of Systems   Constitutional:  Negative for appetite change, fatigue, fever and unexpected weight change.   HENT:  Negative for nosebleeds.    Respiratory:  Negative for cough, choking and shortness of breath.         Negative hemoptysis.   Cardiovascular:  Negative for chest pain, palpitations and leg swelling.   Gastrointestinal: Negative.  Negative for abdominal distention, abdominal pain, anal bleeding, blood in stool, constipation, diarrhea, nausea and vomiting.   Endocrine: Negative.  Negative for cold intolerance.   Genitourinary: Negative.  Negative for hematuria, menstrual problem, vaginal bleeding, vaginal discharge and vaginal pain.   Musculoskeletal: Negative.  Negative for arthralgias, myalgias, neck pain and neck stiffness.   Skin: Negative.  Negative for color change, pallor and rash.   Allergic/Immunologic: Negative.  Negative for immunocompromised state.   Neurological: Negative.  Negative for weakness and headaches.   Hematological:  Negative for adenopathy. Does not bruise/bleed easily.   All other systems reviewed and are negative.      Patient Active Problem List   Diagnosis    SOB (shortness of breath)    Chronic bilateral low back pain without sciatica    Chronic pain of left knee    Chronic pain of right knee    Fibromyalgia, primary    Primary osteoarthritis involving multiple joints    Mild persistent asthma without complication    Lumbar radiculopathy    Primary osteoarthritis of both hips    Left hip pain    Major depression, recurrent, chronic (HCC)    Lumbar spondylosis    Cervical radiculopathy    Morbid obesity with body mass index (BMI) of 45.0 to 49.9 in adult (HCC)    VICTOR HUGO on CPAP    Anxiety associated with depression    Diabetes mellitus (HCC)     Hyperlipidemia    Essential hypertension    Irregular heartbeat    Chronic pain disorder    GERD (gastroesophageal reflux disease)    Fatty liver    Elevated LFTs    Esophagitis determined by endoscopy    Impacted third molar tooth    Complex dental caries    Erythrocytosis    Leukocytosis    Mild concentric left ventricular hypertrophy    Chronic fatigue    Vitamin D deficiency    Hair loss    Hx of giardiasis    Vaginal candidiasis    Trigeminal neuralgia    Vitamin B12 deficiency    Allergic conjunctivitis of both eyes    Teeth missing     Past Medical History:   Diagnosis Date    Allergic     Seasonal Allergies    Alopecia of scalp     Treated with Proscar    Asthma     Chronic pain disorder     back, bilat knees, bilat hips    CPAP (continuous positive airway pressure) dependence     Diabetes mellitus (HCC)     Fibromyalgia, primary     GERD (gastroesophageal reflux disease)     Hip fx, left, closed, with routine healing, subsequent encounter 05/2017    Hyperlipidemia     Hypertension     Insomnia     Irregular heartbeat     VICTOR HUGO on CPAP      Past Surgical History:   Procedure Laterality Date    CARPAL TUNNEL RELEASE Left     IR SPINE AND PAIN PROCEDURE  3/12/2024    IR SPINE AND PAIN PROCEDURE  4/11/2024    LUMBAR EPIDURAL INJECTION      NERVE BLOCK Bilateral 12/29/2020    Procedure: L2 L3 L4 L5 MEDIAL BRANCH BLOCK #1;  Surgeon: Westley Keller MD;  Location: [facility];  Service: Pain Management     NERVE BLOCK Bilateral 1/19/2021    Procedure: L2 L3 L4 L5 MEDIAL BRANCH BLOCK #2;  Surgeon: Westley Keller MD;  [facility]: [facility];  Service: Pain Management     IL ARTHROCENTESIS ASPIR&/INJ MAJOR JT/BURSA W/O  Bilateral 2/7/2023    Procedure: INJECTION JOINT HIP;  Surgeon: Westley Keller MD;  Location: [facility];  Service: Pain Management     IL HYSTEROSCOPY BX ENDOMETRIUM&/POLYPC W/WO D&C N/A 4/25/2023    Procedure: HYSTEROSCOPY W/  RESECTION UTERINE TUMOR/FIBROID (POLYPECTOMY );  Surgeon: Donovan  DO Chaz;  Location: [facility];  Service: Gynecology    RADIOFREQUENCY ABLATION Right 2021    Procedure: L2 L3 L4 L5 RADIO FREQUENCY ABLATION right side;  Surgeon: Westley Keller MD;  Location: [facility];  Service: Pain Management     RADIOFREQUENCY ABLATION Left 3/16/2021    Procedure: L2 L3 L4 L5 RADIO FREQUENCY ABLATION;  Surgeon: Westley Keller MD;  [facility]: [facility];  Service: Pain Management     RHIZOTOMY Bilateral 2023    Procedure: RHIZOTOMY LUMBAR L3, L4, L5 medial branch nerves;  Surgeon: Westley Keller MD;  Location: [facility];  Service: Pain Management     UPPER GASTROINTESTINAL ENDOSCOPY      US GUIDED THYROID BIOPSY  2023     Family History   Problem Relation Age of Onset    Cancer Father     Hepatitis Father     Asthma Cousin     Hypertension Paternal Grandmother     No Known Problems Mother     No Known Problems Sister     No Known Problems Maternal Grandmother     No Known Problems Maternal Grandfather     No Known Problems Paternal Grandfather     No Known Problems Paternal Aunt     BRCA2 Positive Neg Hx     BRCA2 Negative Neg Hx     BRCA1 Positive Neg Hx     BRCA1 Negative Neg Hx     BRCA 1/2 Neg Hx     Ovarian cancer Neg Hx     Endometrial cancer Neg Hx     Colon cancer Neg Hx     Breast cancer additional onset Neg Hx     Breast cancer Neg Hx      Social History     Socioeconomic History    Marital status: Single     Spouse name: None    Number of children: None    Years of education: None    Highest education level: None   Occupational History    None   Tobacco Use    Smoking status: Former     Types: Cigarettes     Start date:      Quit date:      Years since quittin.7     Passive exposure: Past    Smokeless tobacco: Never   Vaping Use    Vaping status: Never Used   Substance and Sexual Activity    Alcohol use: No    Drug use: No    Sexual activity: Not Currently   Other Topics Concern    None   Social History Narrative    None     Social Determinants of  Health     Financial Resource Strain: High Risk (2/21/2023)    Overall Financial Resource Strain (CARDIA)     Difficulty of Paying Living Expenses: Hard   Food Insecurity: No Food Insecurity (4/8/2021)    Hunger Vital Sign     Worried About Running Out of Food in the Last Year: Never true     Ran Out of Food in the Last Year: Never true   Transportation Needs: No Transportation Needs (4/8/2021)    PRAPARE - Transportation     Lack of Transportation (Medical): No     Lack of Transportation (Non-Medical): No   Physical Activity: Inactive (8/3/2020)    Exercise Vital Sign     Days of Exercise per Week: 0 days     Minutes of Exercise per Session: 0 min   Stress: Stress Concern Present (8/3/2020)    [employer] - Occupational Stress Questionnaire     Feeling of Stress : Very much   Social Connections: Unknown (6/18/2024)    Received from [facility]     How often do you feel lonely or isolated from those around you? (Adult - for ages 18 years and over): Not on file   Intimate Partner Violence: Not At Risk (8/3/2020)    Humiliation, Afraid, Rape, and Kick questionnaire     Fear of Current or Ex-Partner: No     Emotionally Abused: No     Physically Abused: No     Sexually Abused: No   Housing Stability: Not on file       Current Outpatient Medications:     albuterol (Ventolin HFA) 90 mcg/act inhaler, Inhale 2 puffs every 6 (six) hours as needed for wheezing or shortness of breath, Disp: 18 g, Rfl: 5    atorvastatin (LIPITOR) 20 mg tablet, Take 1 tablet (20 mg total) by mouth daily, Disp: 100 tablet, Rfl: 1    celecoxib (CeleBREX) 200 mg capsule, TAKE (1) CAPSULE BY MOUTH TWICE DAILY., Disp: 60 capsule, Rfl: 5    cetirizine (ZyrTEC) 10 mg tablet, 1 tab po daily x 1 month then prn allergies, Disp: 100 tablet, Rfl: 1    clotrimazole-betamethasone (LOTRISONE) 1-0.05 % cream, APPLY TWICE DAILY TO VAGINAL AREA UNTIL HEALED THEN AS NEEDED FOR IRRITATION., Disp: 45 g, Rfl: 3     cromolyn (OPTICROM) 4 % ophthalmic solution, Administer 1 drop to both eyes 4 (four) times a day, Disp: 10 mL, Rfl: 2    Empagliflozin (Jardiance) 25 MG TABS, Take 1 tablet (25 mg total) by mouth daily, Disp: 30 tablet, Rfl: 5    ergocalciferol (VITAMIN D2) 50,000 units, Take 1 capsule (50,000 Units total) by mouth once a week, Disp: 12 capsule, Rfl: 1    famotidine (PEPCID) 40 MG tablet, TAKE (1) TABLET BY MOUTH DAILY., Disp: 30 tablet, Rfl: 5    finasteride (PROPECIA) 1 MG tablet, Take 1 tablet (1 mg total) by mouth daily, Disp: 30 tablet, Rfl: 5    fluticasone (FLONASE) 50 mcg/act nasal spray, USE 1 SPRAY INTO EACH NOSTRIL TWICE A DAY FOR 1 MONTH THEN AS NEEDED FOR CONGESTION AND ALLERGIES, Disp: 16 g, Rfl: 1    Fluticasone-Salmeterol (Advair) 250-50 mcg/dose inhaler, Inhale 1 puff 2 (two) times a day Rinse mouth after use., Disp: 200 blister, Rfl: 1    gabapentin (NEURONTIN) 300 mg capsule, TAKE (1) CAPSULE THREE TIMES DAILY., Disp: 270 capsule, Rfl: 3    glipiZIDE (GLUCOTROL) 10 mg tablet, TAKE (2) TABLETS TWICE DAILY BEFORE MEALS., Disp: 360 tablet, Rfl: 1    glucose blood test strip, , Disp: , Rfl:     glucose blood test strip, Use as instructed, Disp: 100 each, Rfl: 1    ketoconazole (NIZORAL) 2 % cream, Apply topically daily, Disp: 60 g, Rfl: 0    Lancet Devices (OneTouch Delica Plus Lancing) MISC, , Disp: , Rfl:     Lancets (onetouch ultrasoft) lancets, Use as instructed, Disp: 100 each, Rfl: 1    lisinopril (ZESTRIL) 20 mg tablet, Take 1 tablet (20 mg total) by mouth daily, Disp: 90 tablet, Rfl: 3    metFORMIN (GLUCOPHAGE) 1000 MG tablet, TAKE (1) TABLET TWICE A DAY WITH MEALS., Disp: 200 tablet, Rfl: 1    nystatin powder, Apply topically 2 (two) times a day, Disp: 60 g, Rfl: 3    omeprazole (PriLOSEC) 40 MG capsule, Take 1 capsule (40 mg total) by mouth daily before breakfast, Disp: 90 capsule, Rfl: 3    ondansetron (ZOFRAN) 4 mg tablet, Take 1 tablet (4 mg total) by mouth every 8 (eight) hours as needed  "for nausea or vomiting, Disp: 30 tablet, Rfl: 5    sertraline (ZOLOFT) 50 mg tablet, TAKE (1) TABLET BY MOUTH DAILY AT BEDTIME., Disp: 90 tablet, Rfl: 1    triamcinolone (KENALOG) 0.025 % cream, Apply 2 x daily to rash until healed then prn rash, Disp: 60 g, Rfl: 0  Allergies   Allergen Reactions    Trulicity [Dulaglutide] Swelling    Tdap [Tetanus-Diphth-Acell Pertussis] Rash       Objective   /80 (BP Location: Left arm, Patient Position: Sitting, Cuff Size: Large)   Pulse 84   Temp (!) 97.2 °F (36.2 °C) (Temporal)   Resp 16   Ht 5' 2\" (1.575 m)   Wt 111 kg (245 lb)   SpO2 97%   BMI 44.81 kg/m²    Physical Exam  Constitutional:       General: She is not in acute distress.     Appearance: She is well-developed.   HENT:      Head: Normocephalic and atraumatic.   Eyes:      General: No scleral icterus.     Conjunctiva/sclera: Conjunctivae normal.   Cardiovascular:      Rate and Rhythm: Normal rate.   Pulmonary:      Effort: Pulmonary effort is normal. No respiratory distress.   Skin:     General: Skin is warm.      Coloration: Skin is not pale.      Findings: No rash.   Neurological:      Mental Status: She is alert and oriented to person, place, and time.   Psychiatric:         Thought Content: Thought content normal.         Result Review  Labs:  Appointment on 09/11/2024   Component Date Value Ref Range Status    TSH 3RD GENERATON 09/11/2024 0.865  0.450 - 4.500 uIU/mL Final    The recommended reference ranges for TSH during pregnancy are as follows:   First trimester 0.100 to 2.500 uIU/mL   Second trimester  0.200 to 3.000 uIU/mL   Third trimester 0.300 to 3.000 uIU/m    Note: Normal ranges may not apply to patients who are transgender, non-binary, or whose legal sex, sex at birth, and gender identity differ.  Adult TSH (3rd generation) reference range follows the recommended guidelines of the American Thyroid Association, January, 2020.    T4 TOTAL 09/11/2024 9.00  6.09 - 12.23 ug/dL Final    T3, " Total 09/11/2024 1.0  0.9-1.8 ng/mL ng/mL Final    Vit D, 25-Hydroxy 09/11/2024 59.0  30.0 - 100.0 ng/mL Final    Vitamin D guidelines established by Clinical Guidelines Subcommittee  of the Endocrine Society Task Force, 2011    Deficiency <20ng/ml   Insufficiency 20-30ng/ml   Sufficient  ng/ml     Vitamin B-12 09/11/2024 457  180 - 914 pg/mL Final       Please note:  This report has been generated by a voice recognition software system. Therefore there may be syntax, spelling, and/or grammatical errors. Please call if you have any questions.

## 2024-09-23 ENCOUNTER — TELEPHONE (OUTPATIENT)
Dept: FAMILY MEDICINE CLINIC | Facility: CLINIC | Age: 49
End: 2024-09-23

## 2024-09-23 LAB — G LAMBLIA AG STL QL IA: NORMAL

## 2024-09-23 NOTE — TELEPHONE ENCOUNTER
----- Message from Gaurav Merino PA-C sent at 9/23/2024 10:53 AM EDT -----  Giardia couldn't be performed because the specimen wasn't received.

## 2024-09-24 ENCOUNTER — HOSPITAL ENCOUNTER (OUTPATIENT)
Dept: INTERVENTIONAL RADIOLOGY/VASCULAR | Facility: HOSPITAL | Age: 49
Discharge: HOME | End: 2024-09-24
Attending: ANESTHESIOLOGY
Payer: COMMERCIAL

## 2024-09-24 VITALS
HEART RATE: 75 BPM | SYSTOLIC BLOOD PRESSURE: 129 MMHG | RESPIRATION RATE: 18 BRPM | TEMPERATURE: 97.8 F | OXYGEN SATURATION: 95 % | DIASTOLIC BLOOD PRESSURE: 82 MMHG

## 2024-09-24 DIAGNOSIS — M16.0 PRIMARY OSTEOARTHRITIS OF BOTH HIPS: ICD-10-CM

## 2024-09-24 LAB
EXT PREGNANCY TEST URINE: NEGATIVE
EXT. CONTROL: NORMAL
GLUCOSE SERPL-MCNC: 115 MG/DL (ref 65–140)

## 2024-09-24 PROCEDURE — 20610 DRAIN/INJ JOINT/BURSA W/O US: CPT | Performed by: ANESTHESIOLOGY

## 2024-09-24 PROCEDURE — 77002 NEEDLE LOCALIZATION BY XRAY: CPT | Performed by: ANESTHESIOLOGY

## 2024-09-24 PROCEDURE — 77002 NEEDLE LOCALIZATION BY XRAY: CPT

## 2024-09-24 PROCEDURE — 81025 URINE PREGNANCY TEST: CPT | Performed by: ANESTHESIOLOGY

## 2024-09-24 PROCEDURE — 82948 REAGENT STRIP/BLOOD GLUCOSE: CPT

## 2024-09-24 RX ORDER — LIDOCAINE HYDROCHLORIDE 10 MG/ML
INJECTION, SOLUTION EPIDURAL; INFILTRATION; INTRACAUDAL; PERINEURAL AS NEEDED
Status: COMPLETED | OUTPATIENT
Start: 2024-09-24 | End: 2024-09-24

## 2024-09-24 RX ORDER — METHYLPREDNISOLONE ACETATE 80 MG/ML
INJECTION, SUSPENSION INTRA-ARTICULAR; INTRALESIONAL; INTRAMUSCULAR; PARENTERAL; SOFT TISSUE AS NEEDED
Status: COMPLETED | OUTPATIENT
Start: 2024-09-24 | End: 2024-09-24

## 2024-09-24 RX ORDER — BUPIVACAINE HCL/PF 2.5 MG/ML
VIAL (ML) INJECTION AS NEEDED
Status: COMPLETED | OUTPATIENT
Start: 2024-09-24 | End: 2024-09-24

## 2024-09-24 RX ADMIN — BUPIVACAINE HYDROCHLORIDE 9 ML: 2.5 INJECTION, SOLUTION EPIDURAL; INFILTRATION; INTRACAUDAL; PERINEURAL at 08:55

## 2024-09-24 RX ADMIN — METHYLPREDNISOLONE ACETATE 80 MG: 80 INJECTION, SUSPENSION INTRA-ARTICULAR; INTRALESIONAL; INTRAMUSCULAR; SOFT TISSUE at 08:55

## 2024-09-24 RX ADMIN — LIDOCAINE HYDROCHLORIDE 10 ML: 10 INJECTION, SOLUTION EPIDURAL; INFILTRATION; INTRACAUDAL; PERINEURAL at 08:52

## 2024-09-24 RX ADMIN — IOHEXOL 2 ML: 240 INJECTION, SOLUTION INTRATHECAL; INTRAVASCULAR; INTRAVENOUS; ORAL at 08:54

## 2024-09-24 NOTE — H&P
Assessment  1. Primary osteoarthritis of both hips  -     IR spine and pain procedure; Standing  -     IR spine and pain procedure  Greater than 80% improvement with radiofrequency ablation of repeat bilateral Medial branch nerves at L3-L5 performed 3/12/24; prior performed 4/4/23 provided pain relief with improved ability to participate with IADLs in significantly less pain for almost a year.  Additionally greater than 90% relief of pain with improved ability to participate with IADLs after bilateral hip injections performed 4/11/24 with return of pain after nearly 5 months of relief. Previously reported b/l hip pain with radiation to the groin; ttp over b/l gtb, pain with internal/external rotation of b/l hips; pain worse with standing/ambulation. Lifestyle modifications extensively discussed including diet, exercise and weight loss in conjunction with optimal DM-2 control. Previously reported the following symptomatology:    Chronic axial low back pain described by primarily arthritic features.  Positive facet loading maneuvers as noted below.Mild noncompressive lumbar degenerative change at the L3-4 and L4-5 levels.  Previously had a left L3 transforaminal steroid injection with Dr. Franco which helped for about a week and a half.  Arthritic symptomatology greater than radicular features at this time. Reasonable to continue multimodal pain therapy plan.    Plan  -Celebrex 200 mg b.i.d. prn pain for lumbar facet syndrome/hip osteoarthritis.  Counseled regarding bleeding risk, GI risk, Renal risk of taking this medication.  -lifestyle modifications including diet, exercise and weight loss in addition to DM-2 control extensively discussed  -bilateral hip joint injection; f/u 2 weeks post proc  -hip  and neckexercises provided; continue physical therapy and core exercises for lumbar facet syndrome, hip osteoarthritis, cervical spondylosis    There are risks associated with opioid medications, including  dependence, addiction and tolerance. The patient understands and agrees to use these medications only as prescribed. Potential side effects of the medications include, but are not limited to, constipation, drowsiness, addiction, impaired judgment and risk of fatal overdose if not taken as prescribed. The patient was warned against driving while taking sedation medications.  Sharing medications is a felony. At this point in time, the patient is showing no signs of addiction, abuse, diversion or suicidal ideation.    [place] Prescription Drug Monitoring Program report was reviewed and was appropriate     Complete risks and benefits including bleeding, infection, tissue reaction, nerve injury and allergic reaction were discussed. The approach was demonstrated using models and literature was provided. Verbal and written consent was obtained.    My impressions and treatment recommendations were discussed in detail with the patient who verbalized understanding and had no further questions.  Discharge instructions were provided. I personally saw and examined the patient and I agree with the above discussed plan of care.    No orders of the defined types were placed in this encounter.      History of Present Illness    Greater than 80% improvement with radiofrequency ablation of repeat bilateral Medial branch nerves at L3-L5 performed 3/12/24; prior performed 4/4/23 provided pain relief with improved ability to participate with IADLs in significantly less pain for almost a year.  Additionally greater than 990% relief of pain with improved ability to participate with IADLs after bilateral hip injections performed 4/11/24 with return of pain after nearly 5 months of relief. Previously reported b/l hip pain with radiation to the groin; ttp over b/l gtb, pain with internal/external rotation of b/l hips; pain worse with standing/ambulation. Lifestyle modifications extensively discussed including diet, exercise and weight  loss in conjunction with optimal DM-2 control. Previously reported the following symptomatology:    Cristy Garcia is a 48 y.o. female with past medical history of axial low back pain described primarily by arthritic features.  She presents with a 2 year history of chronic low back pain described primarily as arthritic in nature.   she describes 8/10 low back pain that is worse in the mornings and worse at the end of the day.  The pain is characterized by achy, nagging, indolent, crampy, stabbing pain in his/her axial low back.  The patient describes that the pain is worse with standing for long periods of time on hard surfaces as well as with walking.  The patient is a very active individual and feels as though this pain compromises her participation with independent activities of daily living. The pain can be debilitating at times and contribute to significant disability, compromising overall activity and independent activities of daily living.   She has tried physical therapy with limited relief of symptoms.  Medications the patient has tried in the past include   Celebrex,  and Flexeril. She describes minor radicular symptoms in the L3 and L4 dermatomal distribution but otherwise has good strength.  Previously she had left L3 transforaminal epidural steroid injection with relief for about 2 weeks. She denies any weakness numbness or paresthesias.  The patient denies any bowel or bladder dysfunction as well.        I have personally reviewed and/or updated the patient's past medical history, past surgical history, family history, social history, current medications, allergies, and vital signs today.     Review of Systems   Constitutional:  Positive for activity change.   HENT: Negative.     Eyes: Negative.    Respiratory: Negative.     Cardiovascular: Negative.    Gastrointestinal: Negative.    Endocrine: Negative.    Genitourinary: Negative.    Musculoskeletal:  Positive for arthralgias, back pain, gait problem  and myalgias.   Skin: Negative.    Allergic/Immunologic: Negative.    Hematological: Negative.    Psychiatric/Behavioral: Negative.     All other systems reviewed and are negative.      Patient Active Problem List   Diagnosis    SOB (shortness of breath)    Chronic bilateral low back pain without sciatica    Chronic pain of left knee    Chronic pain of right knee    Fibromyalgia, primary    Primary osteoarthritis involving multiple joints    Mild persistent asthma without complication    Lumbar radiculopathy    Primary osteoarthritis of both hips    Left hip pain    Major depression, recurrent, chronic (HCC)    Lumbar spondylosis    Cervical radiculopathy    Morbid obesity with body mass index (BMI) of 45.0 to 49.9 in adult (HCC)    VICTOR HUGO on CPAP    Anxiety associated with depression    Diabetes mellitus (HCC)    Hyperlipidemia    Essential hypertension    Irregular heartbeat    Chronic pain disorder    GERD (gastroesophageal reflux disease)    Fatty liver    Elevated LFTs    Esophagitis determined by endoscopy    Impacted third molar tooth    Complex dental caries    Erythrocytosis    Leukocytosis    Mild concentric left ventricular hypertrophy    Chronic fatigue    Vitamin D deficiency    Hair loss    Hx of giardiasis    Vaginal candidiasis    Trigeminal neuralgia    Vitamin B12 deficiency    Allergic conjunctivitis of both eyes    Teeth missing       Past Medical History:   Diagnosis Date    Allergic     Seasonal Allergies    Alopecia of scalp     Treated with Proscar    Asthma     Chronic pain disorder     back, bilat knees, bilat hips    CPAP (continuous positive airway pressure) dependence     Diabetes mellitus (HCC)     Fibromyalgia, primary     GERD (gastroesophageal reflux disease)     Hip fx, left, closed, with routine healing, subsequent encounter 05/2017    Hyperlipidemia     Hypertension     Insomnia     Irregular heartbeat     VICTOR HUGO on CPAP        Past Surgical History:   Procedure Laterality Date     CARPAL TUNNEL RELEASE Left     IR SPINE AND PAIN PROCEDURE  3/12/2024    IR SPINE AND PAIN PROCEDURE  4/11/2024    LUMBAR EPIDURAL INJECTION      NERVE BLOCK Bilateral 12/29/2020    Procedure: L2 L3 L4 L5 MEDIAL BRANCH BLOCK #1;  Surgeon: Westley Keller MD;  Location: [facility];  Service: Pain Management     NERVE BLOCK Bilateral 1/19/2021    Procedure: L2 L3 L4 L5 MEDIAL BRANCH BLOCK #2;  Surgeon: Westley Keller MD;  Location: [facility];  Service: Pain Management     DE ARTHROCENTESIS ASPIR&/INJ MAJOR JT/BURSA W/O US Bilateral 2/7/2023    Procedure: INJECTION JOINT HIP;  Surgeon: Westley Keller MD;  Location: [facility];  Service: Pain Management     DE HYSTEROSCOPY BX ENDOMETRIUM&/POLYPC W/WO D&C N/A 4/25/2023    Procedure: HYSTEROSCOPY W/  RESECTION UTERINE TUMOR/FIBROID (POLYPECTOMY );  Surgeon: Donovan Ware DO;  Location: [facility];  Service: Gynecology    RADIOFREQUENCY ABLATION Right 2/25/2021    Procedure: L2 L3 L4 L5 RADIO FREQUENCY ABLATION right side;  Surgeon: Westley Keller MD;  Location: [facility];  Service: Pain Management     RADIOFREQUENCY ABLATION Left 3/16/2021    Procedure: L2 L3 L4 L5 RADIO FREQUENCY ABLATION;  Surgeon: Westley Keller MD;  [facility]: [facility];  Service: Pain Management     RHIZOTOMY Bilateral 4/4/2023    Procedure: RHIZOTOMY LUMBAR L3, L4, L5 medial branch nerves;  Surgeon: Westley Keller MD;  Location: [facility];  Service: Pain Management     UPPER GASTROINTESTINAL ENDOSCOPY      US GUIDED THYROID BIOPSY  7/14/2023       Family History   Problem Relation Age of Onset    Cancer Father     Hepatitis Father     Asthma Cousin     Hypertension Paternal Grandmother     No Known Problems Mother     No Known Problems Sister     No Known Problems Maternal Grandmother     No Known Problems Maternal Grandfather     No Known Problems Paternal Grandfather     No Known Problems Paternal Aunt     BRCA2 Positive Neg Hx     BRCA2 Negative Neg Hx     BRCA1 Positive Neg Hx     BRCA1  Negative Neg Hx     BRCA 1/2 Neg Hx     Ovarian cancer Neg Hx     Endometrial cancer Neg Hx     Colon cancer Neg Hx     Breast cancer additional onset Neg Hx     Breast cancer Neg Hx        Social History     Occupational History    Not on file   Tobacco Use    Smoking status: Former     Types: Cigarettes     Start date:      Quit date:      Years since quittin.7     Passive exposure: Past    Smokeless tobacco: Never   Vaping Use    Vaping status: Never Used   Substance and Sexual Activity    Alcohol use: No    Drug use: No    Sexual activity: Not Currently       Current Outpatient Medications on File Prior to Encounter   Medication Sig    albuterol (Ventolin HFA) 90 mcg/act inhaler Inhale 2 puffs every 6 (six) hours as needed for wheezing or shortness of breath    atorvastatin (LIPITOR) 20 mg tablet Take 1 tablet (20 mg total) by mouth daily    celecoxib (CeleBREX) 200 mg capsule TAKE (1) CAPSULE BY MOUTH TWICE DAILY.    cetirizine (ZyrTEC) 10 mg tablet 1 tab po daily x 1 month then prn allergies    cromolyn (OPTICROM) 4 % ophthalmic solution Administer 1 drop to both eyes 4 (four) times a day    Empagliflozin (Jardiance) 25 MG TABS Take 1 tablet (25 mg total) by mouth daily    ergocalciferol (VITAMIN D2) 50,000 units Take 1 capsule (50,000 Units total) by mouth once a week    famotidine (PEPCID) 40 MG tablet TAKE (1) TABLET BY MOUTH DAILY.    finasteride (PROPECIA) 1 MG tablet Take 1 tablet (1 mg total) by mouth daily    fluticasone (FLONASE) 50 mcg/act nasal spray USE 1 SPRAY INTO EACH NOSTRIL TWICE A DAY FOR 1 MONTH THEN AS NEEDED FOR CONGESTION AND ALLERGIES    Fluticasone-Salmeterol (Advair) 250-50 mcg/dose inhaler Inhale 1 puff 2 (two) times a day Rinse mouth after use.    gabapentin (NEURONTIN) 300 mg capsule TAKE (1) CAPSULE THREE TIMES DAILY.    glipiZIDE (GLUCOTROL) 10 mg tablet TAKE (2) TABLETS TWICE DAILY BEFORE MEALS.    lisinopril (ZESTRIL) 20 mg tablet Take 1 tablet (20 mg total) by  mouth daily    metFORMIN (GLUCOPHAGE) 1000 MG tablet TAKE (1) TABLET TWICE A DAY WITH MEALS.    omeprazole (PriLOSEC) 40 MG capsule Take 1 capsule (40 mg total) by mouth daily before breakfast    ondansetron (ZOFRAN) 4 mg tablet Take 1 tablet (4 mg total) by mouth every 8 (eight) hours as needed for nausea or vomiting    sertraline (ZOLOFT) 50 mg tablet TAKE (1) TABLET BY MOUTH DAILY AT BEDTIME.    clotrimazole-betamethasone (LOTRISONE) 1-0.05 % cream APPLY TWICE DAILY TO VAGINAL AREA UNTIL HEALED THEN AS NEEDED FOR IRRITATION.    glucose blood test strip     glucose blood test strip Use as instructed    ketoconazole (NIZORAL) 2 % cream Apply topically daily    Lancet Devices (OneTouch Delica Plus Lancing) MISC     Lancets (onetouch ultrasoft) lancets Use as instructed    nystatin powder Apply topically 2 (two) times a day    triamcinolone (KENALOG) 0.025 % cream Apply 2 x daily to rash until healed then prn rash     No current facility-administered medications on file prior to encounter.       Allergies   Allergen Reactions    Trulicity [Dulaglutide] Swelling    Tdap [Tetanus-Diphth-Acell Pertussis] Rash         Physical Exam    /88   Pulse 74   Temp 97.5 °F (36.4 °C) (Temporal)   Resp 20   LMP 07/22/2024   SpO2 95%     Constitutional: normal, well developed, well nourished, alert, in no distress and non-toxic and no overt pain behavior. and obese  Eyes: anicteric  HEENT: grossly intact  Neck: supple, symmetric, trachea midline and no masses   Pulmonary:even and unlabored  Cardiovascular:No edema or pitting edema present  Skin:Normal without rashes or lesions and well hydrated  Psychiatric:Mood and affect appropriate  Neurologic:Cranial Nerves II-XII grossly intact Sensation grossly intact; no clonus negative dupree's. Reflexes 2+ and brisk. SLR negative bilaterally.  Musculoskeletal:  Steppage gait. Normal heel toe and tip toe walking.  Significant pain with lumbar facet loading bilaterally and with  lateral spine rotation left greater than right.  TTP over lumbar paraspinal muscles. Negative wilda's test, negative gaenslen's negative SIJ loading bilaterally.  ttp over b/l gtb, pain with internal/external rotation of b/l hips    Imaging    MRI LUMBAR SPINE WITHOUT CONTRAST     INDICATION: M54.42: Lumbago with sciatica, left side  G89.29: Other chronic pain.     COMPARISON:  None.     TECHNIQUE:  Sagittal T1, sagittal T2, sagittal inversion recovery, axial T1 and axial T2, coronal T2.    IMAGE QUALITY:  Diagnostic     FINDINGS:     VERTEBRAL BODIES:  There are 5 lumbar type vertebral bodies.  Normal alignment of the lumbar spine.  No spondylolysis or spondylolisthesis. No scoliosis.  No compression fracture.    Normal marrow signal is identified within the visualized bony   structures.  No discrete marrow lesion.     SACRUM:  Normal signal within the sacrum. No evidence of insufficiency or stress fracture.     DISTAL CORD AND CONUS:  Normal size and signal within the distal cord and conus.     PARASPINAL SOFT TISSUES:  There is thickening of the endometrial cavity partially visualized on this examination towards the fundus.  There is a dominant follicle identified within the left ovary.     LOWER THORACIC DISC SPACES:  Normal disc height and signal.  No disc herniation, canal stenosis or foraminal narrowing.     LUMBAR DISC SPACES:     L1-L2:  Normal.     L2-L3:  Normal.     L3-L4:  Annular bulging with a small broad-based left foraminal and extraforaminal disc protrusion best seen on series 6 image 13.  There is no canal stenosis.  Only minimal left foraminal narrowing without nerve impingement.     L4-L5:  Disc desiccation and loss of disc height with mild annular bulging.  Small central disc herniation without canal stenosis or foraminal nerve impingement.     L5-S1:  Normal disc height and signal without canal stenosis or foraminal narrowing.     IMPRESSION:     Mild noncompressive lumbar degenerative change  at the L3-4 and L4-5 levels.     Fluid signal in the endometrial cavity partially visualized at the fundus.  If patient is postmenopausal, consider follow-up ultrasound imaging.     Signal

## 2024-09-24 NOTE — DISCHARGE INSTR - AVS FIRST PAGE
YOUR 2 WEEK FOLLOW UP HAS BEEN SCHEDULED; IF YOU WISH TO CHANGE THE FOLLOW UP, PLEASE CALL THE [facility] [place]: [phone]      Steroid Joint Injection   WHAT YOU NEED TO KNOW:   A steroid joint injection is a procedure to inject steroid medicine into a joint. Steroid medicine decreases pain and inflammation. The injection may also contain an anesthetic (numbing medicine) to decrease pain. It may be done to treat conditions such as arthritis, gout, or carpal tunnel syndrome. The injections may be given in your knee, ankle, shoulder, elbow, or wrist. Injections may also be given in your hip, toe, thumb, or finger.  DISCHARGE INSTRUCTIONS:   Contact your healthcare provider if:   You have fever or chills.     You have redness or swelling at the injection site.     You have more pain than usual in your joint for more than 72 hours.     You have questions or concerns about your condition or care.    Medicines:   Pain medicine  may be given. Ask how to take this medicine safely.     Take your medicine as directed.  Contact your healthcare provider if you think your medicine is not helping or if you have side effects. Tell your provider if you are allergic to any medicine. Keep a list of the medicines, vitamins, and herbs you take. Include the amounts, and when and why you take them. Bring the list or the pill bottles to follow-up visits. Carry your medicine list with you in case of an emergency.    Self-care:   Leave the bandage on for 8 to 12 hours.  Care for your wound as directed.    Rest the area  as directed. You may need to decrease weight on certain joints, such as the knee, for a period of time. Ask when you can return to your daily activities.     Elevate  your limb where the steroid injection was given. Elevate the limb above the level of your heart as often as you can. This will help decrease swelling and pain. Prop your limb on pillows or blankets to keep it elevated comfortably.          Apply ice  on your joint for 15 to 20 minutes every hour or as directed. Use an ice pack, or put crushed ice in a plastic bag. Cover it with a towel. Ice helps prevent tissue damage and decreases swelling and pain.    © Copyright [employer] 2023 Information is for End User's use only and may not be sold, redistributed or otherwise used for commercial purposes.  The above information is an  only. It is not intended as medical advice for individual conditions or treatments. Talk to your doctor, nurse or pharmacist before following any medical regimen to see if it is safe and effective for you.

## 2024-09-25 ENCOUNTER — TELEMEDICINE (OUTPATIENT)
Dept: BEHAVIORAL/MENTAL HEALTH CLINIC | Facility: CLINIC | Age: 49
End: 2024-09-25
Payer: COMMERCIAL

## 2024-09-25 DIAGNOSIS — F33.9 MAJOR DEPRESSION, RECURRENT, CHRONIC (HCC): Primary | ICD-10-CM

## 2024-09-25 PROCEDURE — T1015 CLINIC SERVICE: HCPCS | Performed by: SOCIAL WORKER

## 2024-09-25 NOTE — BH TREATMENT PLAN
"Outpatient Behavioral Health Psychotherapy Treatment Plan     Cristy Garcia  1975      Date of Initial Psychotherapy Assessment: 08/30/20  Date of Current Treatment Plan: 03/25/25  Treatment Plan Target Date: 04/06/24  Treatment Plan Expiration Date: 09/25/24     Diagnosis:   1. Major depression, recurrent, chronic (HCC)                Area(s) of Need: The Client has a long history of depression and anxiety, which is effecting her different relationships and environments. It is hoped that The Client will achieve or maintain maximum functional capacity in performing daily activizes, taking into account both the functional capacity of the individual and those functional capacities appropriate for the individuals of the same age. Reduce or ameliorate the physical mental, behavioral, or developmental effects of an illness, condition, injury or disability. Present treatment plan will cover the next 6 months or completion of her recovery crisis plan      Long Term Goal 1 (in the client's own words): \" to make me feel better\"      Stage of Change: Action     Target Date for completion: 09/24/25             Anticipated therapeutic modalities: Supportive Therapy, Strengths Therapy,  and Cognitive behavioral Therapy will be the treatment modalities utilized during the session.             People identified to complete this goal: The Client her support team and this therapist                    Objective 1: (identify the means of measuring success in meeting the objective): The Client will identify her depression when presented 4 out 7 times when presented                       Objective 2: (identify the means of measuring success in meeting the objective): The Client will utilize her  coping skills 3 out of 5 when presented with depression. (emerging 09/24/24)      Long Term Goal 2 (in the client's own words): \"It is not good to be anxious all the time\"      Stage of Change: Action     Target Date for completion:  " "03/6/24  Supportive Therapy, Strengths Therapy,  and Cognitive behavioral Therapy will be the treatment modalities utilized during the session.                Anticipated therapeutic modalities: The Client her support team and this therapist             People identified to complete this goal: the client her support team and this therapist                    Objective 1: (identify the means of measuring success in meeting the objective): The Client will become more aware of her anxiety when presented 3 out of 5 times(emerging-09/24/2024)                       Objective 2: (identify the means of measuring success in meeting the objective): The Client will utilize her coping skill 3 out of 5 times when presented with anxiety. (emerging 09/24/24)     Long Term Goal 3 (in the client's own words): \" Attending all of my medical appointment makes me healthier\"      Stage of Change: Action     Target Date for completion:  03/6/24             Anticipated therapeutic modalities: Supportive Therapy, Strengths Therapy,  and Cognitive behavioral Therapy will be the treatment modalities utilized during the session.                   People identified to complete this goal: The Client her support team and this therapist                        Objective 1: (identify the means of measuring success in meeting the objective): The Client will attend all of her medical appointments 100% of the time. (emerging 10/24/24)                     I am currently under the care of a [facility] psychiatric provider: No Just PCP for Medication management      My [facility] psychiatric provider is: Lolis PEREZ/Medication management/ Kristina OSWALD LCSW at [facility]     I am currently taking psychiatric medications: Yes, as prescribed     I feel that I will be ready for discharge from mental health care when I reach the following (measurable goal/objective): \" I don't know maybe when my financials get " "better\"      For children and adults who have a legal guardian:          Has there been any change to custody orders and/or guardianship status? n/a. If yes, attach updated documentation.     I have created my Crisis Plan and have been offered a copy of this plan     Behavioral Health Treatment Plan [facility]: Diagnosis and Treatment Plan explained to Cristy Garcia acknowledges an understanding of their diagnosis. Cristy Garcia agrees to this treatment plan.     I have been offered a copy of this Treatment Plan. yes     "

## 2024-09-25 NOTE — PSYCH
"Behavioral Health Psychotherapy Progress Note    Psychotherapy Provided: Individual Psychotherapy     1. Major depression, recurrent, chronic (HCC)            Goals addressed in session: Goal 1 and Goal 2     DATA: The client reported that she has been having problems with her neighbors smoking pot causing increase blood pressure issues. During the session we created her recovery treatment plan with the client completing her long term goal number one. The present treatment plan will cover the next 6 months or completion of the client's recovery treatment plan.   During this session, this clinician used the following therapeutic modalities: Cognitive Behavioral Therapy, Mindfulness-based Strategies, Solution-Focused Therapy, and Supportive Psychotherapy    Substance Abuse was addressed during this session. If the client is diagnosed with a co-occurring substance use disorder, please indicate any changes in the frequency or amount of use: none. Stage of change for addressing substance use diagnoses: No substance use/Not applicable    ASSESSMENT:  Cristy Garcia presents with a Anxious mood.     her affect is Normal range and intensity, which is congruent, with her mood and the content of the session. The client has made progress on their goals.    The client Cristy Garcia presents with a none risk of suicide, none risk of self-harm, and none risk of harm to others.    For any risk assessment that surpasses a \"low\" rating, a safety plan must be developed.    A safety plan was indicated: no  If yes, describe in detail n/a   PLAN: Between sessions, Cristy Garcia will will utilize her coping skills when presented with anxiety andor depression. At the next session, the therapist will use Cognitive Behavioral Therapy, Mindfulness-based Strategies, Solution-Focused Therapy, and Supportive Psychotherapy to address the client's MH issues affecting different relationships and environments.    Behavioral Health Treatment Plan and " Discharge Planning: Cristy Garcia is aware of and agrees to continue to work on their treatment plan. They have identified and are working toward their discharge goals. yes    Visit start and stop times:    09/25/24  Start Time: 1303  Stop Time: 1335  Total Visit Time: 32 minutes    Telemedicine consent    Patient: Cristy Garciamons  Provider: Kristina Guerrero LCSW  Provider located at [address] [address]    The patient was identified by name and date of birth. Cristy Garcia was informed that this is a telemedicine visit and that the visit is being conducted through the [employer] platform. She agrees to proceed..  My office door was closed. No one else was in the room.  She acknowledged consent and understanding of privacy and security of the video platform. The patient has agreed to participate and understands they can discontinue the visit at any time.    Patient is aware this is a billable service.     I spent 30 minutes with the patient during this visit.

## 2024-09-26 ENCOUNTER — OFFICE VISIT (OUTPATIENT)
Dept: GASTROENTEROLOGY | Facility: CLINIC | Age: 49
End: 2024-09-26
Payer: COMMERCIAL

## 2024-09-26 VITALS
HEIGHT: 62 IN | SYSTOLIC BLOOD PRESSURE: 121 MMHG | WEIGHT: 240.6 LBS | BODY MASS INDEX: 44.27 KG/M2 | OXYGEN SATURATION: 96 % | TEMPERATURE: 98.6 F | DIASTOLIC BLOOD PRESSURE: 75 MMHG | HEART RATE: 76 BPM

## 2024-09-26 DIAGNOSIS — K76.0 HEPATIC STEATOSIS: ICD-10-CM

## 2024-09-26 DIAGNOSIS — E61.1 IRON DEFICIENCY: ICD-10-CM

## 2024-09-26 DIAGNOSIS — Z12.11 SCREENING FOR COLON CANCER: ICD-10-CM

## 2024-09-26 DIAGNOSIS — K31.7 GASTRIC POLYPS: ICD-10-CM

## 2024-09-26 DIAGNOSIS — R63.4 WEIGHT LOSS, ABNORMAL: ICD-10-CM

## 2024-09-26 DIAGNOSIS — R13.10 DYSPHAGIA, UNSPECIFIED TYPE: ICD-10-CM

## 2024-09-26 DIAGNOSIS — K21.00 GASTROESOPHAGEAL REFLUX DISEASE WITH ESOPHAGITIS WITHOUT HEMORRHAGE: Primary | ICD-10-CM

## 2024-09-26 PROCEDURE — 99214 OFFICE O/P EST MOD 30 MIN: CPT | Performed by: PHYSICIAN ASSISTANT

## 2024-09-26 NOTE — PROGRESS NOTES
[facility] - Outpatient Follow-up Note  Cristy Garcia 48 y.o. female MRN: [id number]  Encounter: [id number]    ASSESSMENT AND PLAN:      1. Gastroesophageal reflux disease with esophagitis without hemorrhage  2. Dysphagia, unspecified type    Patient has history of heartburn and dysphagia.  Her initial EGD demonstrated severe esophagitis, and repeat EGD in 03/2024 demonstrated healing of esophagitis, 3 cm hiatal hernia, and a nonobstructing Schatzki's ring.    Pt is feeling well on PPI daily and H2RA QHS.   Symptoms quiescent at this time.   Continue to focus on small frequent meals, diet and lifestyle modifications for GERD.     3. Gastric polyps    Noted, small subcentimeter polyps in the fundus of the stomach were noted to be hyperplastic polyps.  Recommend repeat EGD in 1 year for surveillance purposes.  This would take her to repeat in 03/2025.     4. Iron deficiency    Patient does have evidence of iron deficiency without anemia.  Most likely etiology secondary to her abnormal vaginal bleeding that she has been dealing with in the new year, however, occult losses from the GI tract are possible.  She has only had a Cologuard within the past couple of years and her hematologist is recommending a colonoscopy.  I am in agreement with this and we will plan to schedule colonoscopy for her.  We will proceed with the MiraLAX bowel cleanse, though we will give her 4 doses of MiraLAX 2 days prior to the procedure to start to get things moving in the hopes that she will have an adequate cleanout.    I discussed informed consent with the patient. The risks/benefits/alternatives of the procedure were discussed with the patient. Risks included, but not limited to, infection, bleeding, perforation, injury to organs in the abdomen, missed lesion and incomplete procedure were discussed. Patient was agreeable.    Diabetic instructions will be given to patient.    If patient should develop  progressive iron deficiency anemia and the colonoscopy is unremarkable, a wireless capsule endoscopy may need to be considered.    - Colonoscopy; Future  - Miralax/dulcolax/gatorade     5. Screening for colon cancer    Noted, average risk, no family history of colon cancer.  Cologuard was negative in 12/2022, though given new iron deficiency, we will proceed with a colonoscopy now.    6. Hepatic steatosis    Suspected NAFLD/metabolic etiology.  Elastography in 02/2024 with S0, F0 to F1.  LFTs have been in normal range.    Discussed recommendations in regards to fatty liver including:   Strict control of contributing comorbidities (obesity, prediabetes/diabetes, hypertension, and hypertriglyceridemia).  Weight loss of approx 10-15% of patient's current body weight over a period of 6-12 months through low fat diet and cardiovascular exercise as tolerated.  Limiting alcohol consumption, preferably complete abstinence.  Monitor hepatic function every 6 months with routine labs.     7. Weight loss     Patient has had approximately 13 pound weight loss since 3/2024.  She has had an upper endoscopy and 3D imaging of the abdomen and pelvis in the past year.  We are proceeding with a colonoscopy now.  She is not having any specific GI symptoms at present, though I wonder if perhaps she may have some degree of delayed gastric emptying particularly in the setting of underlying diabetes that may be contributing to this.  She should ensure she is up-to-date on all cancer screening modalities.  PCP should image her chest.  Continue to monitor weight at f/u OV.     We will follow up after colonoscopy is completed.   ______________________________________________________________________    SUBJECTIVE: Patient is a 48 y.o. female who presents today for follow-up regarding GERD. Pmhx sig for sig for DM2, GERD, hepatic steatosis, asthma, chronic pain disorder, fibromyalgia, MDD, BMI 44.     Patient was last evaluated in 03/2024.  At  that time she was complaining of some a.m. nausea which improved throughout the day.  She was taking a PPI for her GI symptoms.  She had an EGD in 03/2024 which demonstrated some hyperplastic polyps and surveillance was recommended.    09/27/24:     Patient is here today with her caregiver.  She feels the omeprazole has been working well for her heartburn and she is not at present having any significant reflux, nausea, vomiting, dysphagia or odynophagia.  She has lost some additional weight since last office visit and is unclear as to whether this has been intentional (13 lbs).   She is having formed brown stool daily.  No BRBPR or melena.  No nocturnal BMs.  No abdominal pain or rectal pain related to defecation.    She has been dealing with low iron levels.  She has had some metrorrhagia. Her hematologist would like her to have a colonoscopy.    NSAIDs: celebrex daily, no additional NSAIDs.   Acetaminophen: none   Etoh: none      09/2024: Hb 15.5, MCV 87, platelets 238  07/2024: TSAT 11, TIBC 467, iron 52, ferritin 5  02/2024: BUN 12, Cr 0.55, AST 15, ALT 20, ALP 40, albumin 4.3, t bili 0.81   01/2023: Hep B surface Ag non-reactive, Hep B surface Abs < 3.10, Hep B Core total Abs non-reactive, Hep A total AB non-reactive, Hep C Ab non-reactive, INR 0.92, AST 22, ALT 33, ALP 39, albumin 4.5, T. bili 0.6  01/2023: Elastography: F0-F1, S3     12/2022: negative cologuard, TSAT 21, TIBC 439, iron 90, ferritin 37  09/2022: Hb 16.4, Hct 47.1, Plt 287, MCV 88, BUN 12, Cr 0.75, AST 50, ALT 91, ALP 49, albumin 3.9, t bili 0.80, TSH 0.857  07/2021: AST 31, ALT 74, ALP 44, albumin 3.5, t bili 0.48, Hep C abs non-reactive     Endoscopic history:   EGD: 05/2023: exam limited by large amt of food in stomach  EGD: 09/2023: 3 cm hiatal hernia - GE junction 35 cm from the incisors, diaphragmatic impression 38 cm from the incisors; Moderate, generalized abnormal mucosa with plaque in the upper third of the esophagus, middle third of  the esophagus and lower third of the esophagus; Grade D esophagitis with mucosal breaks measuring 5 mm or more, Multiple polyps measuring 5-9 mm in the stomach   A. Stomach, cold fcp biopsy:  Chronic inactive oxyntic and antral gastritis.  Negative for Helicobacter pylori, by H&E stain.  Negative for intestinal metaplasia, dysplasia or carcinoma.  B. Esophagus, cold fcp bx distal:  Esophageal squamous mucosa with intraepithelial eosinophils (up to 90 per high powered field) with significant intercellular edema and eosinophilic microabscesses.  C. Stomach, cold fcp bx gastric polyp:  Hyperplastic polyp. Negative for dysplasia.   D. Esophagus, cold fcp bx proximal: Esophageal squamous mucosa with intraepithelial eosinophils (up to 40 per high powered field) with significant intercellular edema and eosinophilic microabscesses  EGD: 03/2024: 3 cm hiatal hernia; Schatzki ring in GEJ; Multiple subcentimeter polyps in the fundus of the stomach   A. Esophagus, Distal, Biopsy: Squamous mucosa with no specific pathologic change.  B. Esophagus, Proximal, Biopsy: Squamous epithelium with no specific pathologic change.  C. Stomach (Polyp x 4), Biopsy: Hyperplastic polyps  Colon: none    Review of Systems   Otherwise Per HPI    Historical Information   Past Medical History:   Diagnosis Date    Allergic     Seasonal Allergies    Alopecia of scalp     Treated with Proscar    Asthma     Chronic pain disorder     back, bilat knees, bilat hips    CPAP (continuous positive airway pressure) dependence     Diabetes mellitus (HCC)     Fibromyalgia, primary     GERD (gastroesophageal reflux disease)     Hip fx, left, closed, with routine healing, subsequent encounter 05/2017    Hyperlipidemia     Hypertension     Insomnia     Irregular heartbeat     VICTOR HUGO on CPAP      Past Surgical History:   Procedure Laterality Date    CARPAL TUNNEL RELEASE Left     IR SPINE AND PAIN PROCEDURE  3/12/2024    IR SPINE AND PAIN PROCEDURE  4/11/2024    IR  SPINE AND PAIN PROCEDURE  2024    LUMBAR EPIDURAL INJECTION      NERVE BLOCK Bilateral 2020    Procedure: L2 L3 L4 L5 MEDIAL BRANCH BLOCK #1;  Surgeon: Westley Keller MD;  Location: [facility];  Service: Pain Management     NERVE BLOCK Bilateral 2021    Procedure: L2 L3 L4 L5 MEDIAL BRANCH BLOCK #2;  Surgeon: Westley Keller MD;  Location: [facility];  Service: Pain Management     DE ARTHROCENTESIS ASPIR&/INJ MAJOR JT/BURSA W/O US Bilateral 2023    Procedure: INJECTION JOINT HIP;  Surgeon: Westley Keller MD;  Location: [facility];  Service: Pain Management     DE HYSTEROSCOPY BX ENDOMETRIUM&/POLYPC W/WO D&C N/A 2023    Procedure: HYSTEROSCOPY W/  RESECTION UTERINE TUMOR/FIBROID (POLYPECTOMY );  Surgeon: Donovan Ware DO;  Location: [facility];  Service: Gynecology    RADIOFREQUENCY ABLATION Right 2021    Procedure: L2 L3 L4 L5 RADIO FREQUENCY ABLATION right side;  Surgeon: Westley Kelelr MD;  Location: [facility];  Service: Pain Management     RADIOFREQUENCY ABLATION Left 3/16/2021    Procedure: L2 L3 L4 L5 RADIO FREQUENCY ABLATION;  Surgeon: Westley Keller MD;  Location: [facility];  Service: Pain Management     RHIZOTOMY Bilateral 2023    Procedure: RHIZOTOMY LUMBAR L3, L4, L5 medial branch nerves;  Surgeon: Westley Keller MD;  Location: [facility];  Service: Pain Management     UPPER GASTROINTESTINAL ENDOSCOPY      US GUIDED THYROID BIOPSY  2023     Social History   Social History     Substance and Sexual Activity   Alcohol Use No     Social History     Substance and Sexual Activity   Drug Use No     Social History     Tobacco Use   Smoking Status Former    Types: Cigarettes    Start date:     Quit date:     Years since quittin.7    Passive exposure: Past   Smokeless Tobacco Never     Family History   Problem Relation Age of Onset    Cancer Father     Hepatitis Father     Asthma Cousin     Hypertension Paternal Grandmother     No Known Problems Mother     No  "Known Problems Sister     No Known Problems Maternal Grandmother     No Known Problems Maternal Grandfather     No Known Problems Paternal Grandfather     No Known Problems Paternal Aunt     BRCA2 Positive Neg Hx     BRCA2 Negative Neg Hx     BRCA1 Positive Neg Hx     BRCA1 Negative Neg Hx     BRCA 1/2 Neg Hx     Ovarian cancer Neg Hx     Endometrial cancer Neg Hx     Colon cancer Neg Hx     Breast cancer additional onset Neg Hx     Breast cancer Neg Hx      Meds/Allergies       Current Outpatient Medications:     albuterol (Ventolin HFA) 90 mcg/act inhaler    atorvastatin (LIPITOR) 20 mg tablet    celecoxib (CeleBREX) 200 mg capsule    cetirizine (ZyrTEC) 10 mg tablet    clotrimazole-betamethasone (LOTRISONE) 1-0.05 % cream    cromolyn (OPTICROM) 4 % ophthalmic solution    Empagliflozin (Jardiance) 25 MG TABS    ergocalciferol (VITAMIN D2) 50,000 units    famotidine (PEPCID) 40 MG tablet    finasteride (PROPECIA) 1 MG tablet    fluticasone (FLONASE) 50 mcg/act nasal spray    Fluticasone-Salmeterol (Advair) 250-50 mcg/dose inhaler    gabapentin (NEURONTIN) 300 mg capsule    glipiZIDE (GLUCOTROL) 10 mg tablet    glucose blood test strip    glucose blood test strip    ketoconazole (NIZORAL) 2 % cream    Lancet Devices (AkesoGenXuch Delica Plus Lancing) MISC    Lancets (onetouch ultrasoft) lancets    lisinopril (ZESTRIL) 20 mg tablet    metFORMIN (GLUCOPHAGE) 1000 MG tablet    nystatin powder    omeprazole (PriLOSEC) 40 MG capsule    ondansetron (ZOFRAN) 4 mg tablet    sertraline (ZOLOFT) 50 mg tablet    triamcinolone (KENALOG) 0.025 % cream    Allergies   Allergen Reactions    Trulicity [Dulaglutide] Swelling    Tdap [Tetanus-Diphth-Acell Pertussis] Rash     Objective     Blood pressure 121/75, pulse 76, temperature 98.6 °F (37 °C), temperature source Temporal, height 5' 2\" (1.575 m), weight 109 kg (240 lb 9.6 oz), last menstrual period 07/22/2024, SpO2 96%. Body mass index is 44.01 kg/m².    Physical Exam  Vitals and " nursing note reviewed.   Constitutional:       General: She is not in acute distress.     Appearance: She is well-developed.   HENT:      Head: Normocephalic and atraumatic.   Eyes:      General: No scleral icterus.     Conjunctiva/sclera: Conjunctivae normal.   Cardiovascular:      Rate and Rhythm: Normal rate.      Heart sounds: No murmur heard.  Pulmonary:      Effort: Pulmonary effort is normal. No respiratory distress.      Breath sounds: Normal breath sounds.   Abdominal:      General: Bowel sounds are normal. There is no distension.      Palpations: Abdomen is soft.      Tenderness: There is no abdominal tenderness. There is no guarding.   Skin:     General: Skin is warm and dry.      Coloration: Skin is not jaundiced.   Neurological:      General: No focal deficit present.      Mental Status: She is alert and oriented to person, place, and time.   Psychiatric:         Mood and Affect: Mood normal.       Lab Results:   No visits with results within 1 Day(s) from this visit.   Latest known visit with results is:   Hospital Outpatient Visit on 09/24/2024   Component Date Value    EXT Preg Test, Ur 09/24/2024 Negative     Control 09/24/2024 Valid     POC Glucose 09/24/2024 115      Radiology Results:   IR spine and pain procedure    Result Date: 9/24/2024  Narrative: Pre-procedure Diagnosis:Hip osteoarthritis/Hip pain Post-procedure Diagnosis:Hip osteoarthritis/Hip pain Operation Title(s):                   1. [BILATERAL] hip intra-articular steroid injection                                                 2. Intraoperative fluoroscopy Attending Surgeon:                 Westley Keller MD Anesthesia:                             Local  Indications: The patient is 48 y.o. year-old female with a diagnosis of Hip osteoarthritis/Hip pain. The patient's history and physical exam were reviewed.   The risks, benefits and alternatives to the procedure were discussed, and all questions were answered to the patient's  satisfaction. The patient agreed to proceed, and written informed consent was obtained.  Procedure in Detail: The patient was brought into the procedure room and placed in the supine position on the fluoroscopy table. The [RIGHT] hip(s) were prepped with chloraprep time two and draped in a sterile manner.  AP fluoroscopy was used to identify and mandy the [RIGHT] femoral neck and head. The C-arm was obliqued 20° to displace the femoral nerve in vessels .The skin and subcutaneous tissues in the area was anesthetized with 1% lidocaine. A 22-gauge, 3.5-inch spinal needle was advanced toward the identified point under fluoroscopic guidance until bone was contacted and the joint space was entered.  Then, after negative aspiration, 1-ml Omnipaque 240 contrast solution was injected showing an appropriate arthrogram. Then, a solution consisting of [4.5-mL] 0.25% bupivacaine and [0.5-mL] Depo-Medrol (80mg/ml) was easily injected. The needle was removed with a 1% lidocaine flush.  The patient's hip(s) was cleaned and a bandage was placed over the sites of needle insertion.  [THE SAME PROCEDURE WAS REPEATED FOR THE LEFT HIP]  Disposition: The patient tolerated the procedure well and there were no apparent complications.  The patient was taken to the recovery area where written discharge instructions for the procedure were given.  Estimated Blood Loss: None Specimens Obtained: N/A     Lela Jamison PA-C    **Please note:  Dictation voice to text software may have been used in the creation of this record.  Occasional wrong word or “sound alike” substitutions may have occurred due to the inherent limitations of voice recognition software.  Read the chart carefully and recognize, using context, where substitutions have occurred.**

## 2024-09-26 NOTE — PATIENT INSTRUCTIONS
Stay on omeprazole daily.   Stay on famotidine at night.   I wonder if your low iron might be in part from the usage of the acid blocking pills.   However, we need to thoroughly investigate your GI tract therefore we are going to investigate with a colonoscopy.   2 days before the colonoscopy, I would like you to take 4 capfuls or 4 doses of MiraLAX in 28 ounces of Gatorade or something similar and consume this later in the afternoon.  This is to jumpstart things to get moving so that we can have a successful cleanse.  The day before the colonoscopy I want you to follow the traditional MiraLAX/Dulcolax/Gatorade prep instructions.    You will be due for repeat EGD in 03/2025.

## 2024-10-01 ENCOUNTER — TELEPHONE (OUTPATIENT)
Dept: DENTISTRY | Facility: CLINIC | Age: 49
End: 2024-10-01

## 2024-10-02 ENCOUNTER — ANESTHESIA EVENT (OUTPATIENT)
Dept: PERIOP | Facility: HOSPITAL | Age: 49
End: 2024-10-02
Payer: COMMERCIAL

## 2024-10-02 ENCOUNTER — HOSPITAL ENCOUNTER (OUTPATIENT)
Dept: PERIOP | Facility: HOSPITAL | Age: 49
Setting detail: OUTPATIENT SURGERY
Discharge: HOME | End: 2024-10-02
Admitting: STUDENT IN AN ORGANIZED HEALTH CARE EDUCATION/TRAINING PROGRAM
Payer: COMMERCIAL

## 2024-10-02 ENCOUNTER — ANESTHESIA (OUTPATIENT)
Dept: PERIOP | Facility: HOSPITAL | Age: 49
End: 2024-10-02
Payer: COMMERCIAL

## 2024-10-02 ENCOUNTER — TELEPHONE (OUTPATIENT)
Dept: DENTISTRY | Facility: CLINIC | Age: 49
End: 2024-10-02

## 2024-10-02 VITALS
HEIGHT: 62 IN | OXYGEN SATURATION: 97 % | DIASTOLIC BLOOD PRESSURE: 76 MMHG | BODY MASS INDEX: 44.16 KG/M2 | HEART RATE: 67 BPM | RESPIRATION RATE: 18 BRPM | SYSTOLIC BLOOD PRESSURE: 123 MMHG | WEIGHT: 240 LBS | TEMPERATURE: 96.6 F

## 2024-10-02 DIAGNOSIS — E61.1 IRON DEFICIENCY: ICD-10-CM

## 2024-10-02 LAB
EXT PREGNANCY TEST URINE: NEGATIVE
EXT. CONTROL: NORMAL
GLUCOSE SERPL-MCNC: 130 MG/DL (ref 65–140)

## 2024-10-02 PROCEDURE — 82948 REAGENT STRIP/BLOOD GLUCOSE: CPT

## 2024-10-02 PROCEDURE — 45378 DIAGNOSTIC COLONOSCOPY: CPT | Performed by: STUDENT IN AN ORGANIZED HEALTH CARE EDUCATION/TRAINING PROGRAM

## 2024-10-02 PROCEDURE — 81025 URINE PREGNANCY TEST: CPT | Performed by: STUDENT IN AN ORGANIZED HEALTH CARE EDUCATION/TRAINING PROGRAM

## 2024-10-02 RX ORDER — SODIUM CHLORIDE, SODIUM LACTATE, POTASSIUM CHLORIDE, CALCIUM CHLORIDE 600; 310; 30; 20 MG/100ML; MG/100ML; MG/100ML; MG/100ML
INJECTION, SOLUTION INTRAVENOUS CONTINUOUS PRN
Status: DISCONTINUED | OUTPATIENT
Start: 2024-10-02 | End: 2024-10-02

## 2024-10-02 RX ORDER — PROPOFOL 10 MG/ML
INJECTION, EMULSION INTRAVENOUS AS NEEDED
Status: DISCONTINUED | OUTPATIENT
Start: 2024-10-02 | End: 2024-10-02

## 2024-10-02 RX ORDER — SODIUM CHLORIDE, SODIUM LACTATE, POTASSIUM CHLORIDE, CALCIUM CHLORIDE 600; 310; 30; 20 MG/100ML; MG/100ML; MG/100ML; MG/100ML
125 INJECTION, SOLUTION INTRAVENOUS CONTINUOUS
Status: DISCONTINUED | OUTPATIENT
Start: 2024-10-02 | End: 2024-10-06 | Stop reason: HOSPADM

## 2024-10-02 RX ORDER — ONDANSETRON 2 MG/ML
4 INJECTION INTRAMUSCULAR; INTRAVENOUS ONCE AS NEEDED
Status: DISCONTINUED | OUTPATIENT
Start: 2024-10-02 | End: 2024-10-06 | Stop reason: HOSPADM

## 2024-10-02 RX ORDER — LIDOCAINE HYDROCHLORIDE 10 MG/ML
INJECTION, SOLUTION EPIDURAL; INFILTRATION; INTRACAUDAL; PERINEURAL AS NEEDED
Status: DISCONTINUED | OUTPATIENT
Start: 2024-10-02 | End: 2024-10-02

## 2024-10-02 RX ADMIN — PROPOFOL 120 MG: 10 INJECTION, EMULSION INTRAVENOUS at 07:30

## 2024-10-02 RX ADMIN — LIDOCAINE HYDROCHLORIDE 50 MG: 10 INJECTION, SOLUTION EPIDURAL; INFILTRATION; INTRACAUDAL; PERINEURAL at 07:30

## 2024-10-02 RX ADMIN — PROPOFOL 30 MG: 10 INJECTION, EMULSION INTRAVENOUS at 07:32

## 2024-10-02 RX ADMIN — SODIUM CHLORIDE, SODIUM LACTATE, POTASSIUM CHLORIDE, AND CALCIUM CHLORIDE: .6; .31; .03; .02 INJECTION, SOLUTION INTRAVENOUS at 07:20

## 2024-10-02 RX ADMIN — PROPOFOL 150 MCG/KG/MIN: 10 INJECTION, EMULSION INTRAVENOUS at 07:31

## 2024-10-02 NOTE — ANESTHESIA POSTPROCEDURE EVALUATION
Post-Op Assessment Note    CV Status:  Stable    Pain management: adequate       Mental Status:  Alert and awake   Hydration Status:  Euvolemic   PONV Controlled:  Controlled   Airway Patency:  Patent     Post Op Vitals Reviewed: Yes    No anethesia notable event occurred.    Staff: CRNA               BP   113/66   Temp   97.8   Pulse  68   Resp   18   SpO2   98%

## 2024-10-02 NOTE — H&P
[facility]  History & Physical     PATIENT INFO     Name: Cristy Garcia  YOB: 1975   Age: 48 y.o.   Sex: female   MRN: [id number]     HISTORY OF PRESENT ILLNESS     Cristy Garcia is a 48 y.o. year old female who presents for colonoscopy for colon cancer screening and weight loss.  No antithrombotics or anticoagulants.     REVIEW OF SYSTEMS     Per the HPI, and otherwise unremarkable.    Historical Information   Past Medical History:   Diagnosis Date    Allergic     Seasonal Allergies    Alopecia of scalp     Treated with Proscar    Asthma     Chronic pain disorder     back, bilat knees, bilat hips    CPAP (continuous positive airway pressure) dependence     Diabetes mellitus (HCC)     Fibromyalgia, primary     GERD (gastroesophageal reflux disease)     Hip fx, left, closed, with routine healing, subsequent encounter 05/2017    Hyperlipidemia     Hypertension     Insomnia     Irregular heartbeat     VICTOR HUGO on CPAP      Past Surgical History:   Procedure Laterality Date    CARPAL TUNNEL RELEASE Left     IR SPINE AND PAIN PROCEDURE  3/12/2024    IR SPINE AND PAIN PROCEDURE  4/11/2024    IR SPINE AND PAIN PROCEDURE  9/24/2024    LUMBAR EPIDURAL INJECTION      NERVE BLOCK Bilateral 12/29/2020    Procedure: L2 L3 L4 L5 MEDIAL BRANCH BLOCK #1;  Surgeon: Westley Keller MD;  Location: [facility];  Service: Pain Management     NERVE BLOCK Bilateral 1/19/2021    Procedure: L2 L3 L4 L5 MEDIAL BRANCH BLOCK #2;  Surgeon: Westley Keller MD;  [facility]: [facility];  Service: Pain Management     MI ARTHROCENTESIS ASPIR&/INJ MAJOR JT/BURSA W/O  Bilateral 2/7/2023    Procedure: INJECTION JOINT HIP;  Surgeon: Westley Keller MD;  Location: [facility];  Service: Pain Management     MI HYSTEROSCOPY BX ENDOMETRIUM&/POLYPC W/WO D&C N/A 4/25/2023    Procedure: HYSTEROSCOPY W/  RESECTION UTERINE TUMOR/FIBROID (POLYPECTOMY );  Surgeon: Donovan Ware DO;  Location: [facility];  Service:  Gynecology    RADIOFREQUENCY ABLATION Right 2021    Procedure: L2 L3 L4 L5 RADIO FREQUENCY ABLATION right side;  Surgeon: Westley Keller MD;  Location: [facility];  Service: Pain Management     RADIOFREQUENCY ABLATION Left 3/16/2021    Procedure: L2 L3 L4 L5 RADIO FREQUENCY ABLATION;  Surgeon: Westley Keller MD;  Location: [facility];  Service: Pain Management     RHIZOTOMY Bilateral 2023    Procedure: RHIZOTOMY LUMBAR L3, L4, L5 medial branch nerves;  Surgeon: Westley Keller MD;  Location: [facility];  Service: Pain Management     UPPER GASTROINTESTINAL ENDOSCOPY      US GUIDED THYROID BIOPSY  2023     Social History   Social History     Substance and Sexual Activity   Alcohol Use No     Social History     Substance and Sexual Activity   Drug Use No     Social History     Tobacco Use   Smoking Status Former    Types: Cigarettes    Start date:     Quit date:     Years since quittin.7    Passive exposure: Past   Smokeless Tobacco Never     Family History   Problem Relation Age of Onset    Cancer Father     Hepatitis Father     Asthma Cousin     Hypertension Paternal Grandmother     No Known Problems Mother     No Known Problems Sister     No Known Problems Maternal Grandmother     No Known Problems Maternal Grandfather     No Known Problems Paternal Grandfather     No Known Problems Paternal Aunt     BRCA2 Positive Neg Hx     BRCA2 Negative Neg Hx     BRCA1 Positive Neg Hx     BRCA1 Negative Neg Hx     BRCA 1/2 Neg Hx     Ovarian cancer Neg Hx     Endometrial cancer Neg Hx     Colon cancer Neg Hx     Breast cancer additional onset Neg Hx     Breast cancer Neg Hx         MEDICATIONS & ALLERGIES     Current Outpatient Medications   Medication Instructions    albuterol (Ventolin HFA) 90 mcg/act inhaler 2 puffs, Inhalation, Every 6 hours PRN    atorvastatin (LIPITOR) 20 mg, Oral, Daily    celecoxib (CeleBREX) 200 mg capsule TAKE (1) CAPSULE BY MOUTH TWICE DAILY.    cetirizine (ZyrTEC) 10 mg  "tablet 1 tab po daily x 1 month then prn allergies    clotrimazole-betamethasone (LOTRISONE) 1-0.05 % cream APPLY TWICE DAILY TO VAGINAL AREA UNTIL HEALED THEN AS NEEDED FOR IRRITATION.    cromolyn (OPTICROM) 4 % ophthalmic solution 1 drop, Both Eyes, 4 times daily    Empagliflozin (JARDIANCE) 25 mg, Oral, Daily    ergocalciferol (VITAMIN D2) 50,000 Units, Oral, Weekly    famotidine (PEPCID) 40 MG tablet TAKE (1) TABLET BY MOUTH DAILY.    finasteride (PROPECIA) 1 mg, Oral, Daily    fluticasone (FLONASE) 50 mcg/act nasal spray USE 1 SPRAY INTO EACH NOSTRIL TWICE A DAY FOR 1 MONTH THEN AS NEEDED FOR CONGESTION AND ALLERGIES    Fluticasone-Salmeterol (Advair) 250-50 mcg/dose inhaler 1 puff, Inhalation, 2 times daily, Rinse mouth after use.    gabapentin (NEURONTIN) 300 mg capsule TAKE (1) CAPSULE THREE TIMES DAILY.    glipiZIDE (GLUCOTROL) 10 mg tablet TAKE (2) TABLETS TWICE DAILY BEFORE MEALS.    glucose blood test strip     glucose blood test strip Use as instructed    ketoconazole (NIZORAL) 2 % cream Topical, Daily    Lancet Devices (OneTouch Delica Plus Lancing) MISC     Lancets (onetouch ultrasoft) lancets Use as instructed    lisinopril (ZESTRIL) 20 mg, Oral, Daily    metFORMIN (GLUCOPHAGE) 1000 MG tablet TAKE (1) TABLET TWICE A DAY WITH MEALS.    nystatin powder Topical, 2 times daily    omeprazole (PRILOSEC) 40 mg, Oral, Daily before breakfast    ondansetron (ZOFRAN) 4 mg, Oral, Every 8 hours PRN    sertraline (ZOLOFT) 50 mg tablet TAKE (1) TABLET BY MOUTH DAILY AT BEDTIME.    triamcinolone (KENALOG) 0.025 % cream Apply 2 x daily to rash until healed then prn rash     Allergies   Allergen Reactions    Trulicity [Dulaglutide] Swelling    Tdap [Tetanus-Diphth-Acell Pertussis] Rash        PHYSICAL EXAM      Objective   Blood pressure 135/81, pulse 66, temperature (!) 97.1 °F (36.2 °C), temperature source Temporal, resp. rate 18, height 5' 2\" (1.575 m), weight 109 kg (240 lb), last menstrual period 07/22/2024, SpO2 " 94%. Body mass index is 43.9 kg/m².    General Appearance:   Alert, cooperative, no distress   Lungs:   Equal chest rise, respirations unlabored    Heart:   Regular rate and rhythm   Abdomen:   Soft, non-tender, non-distended; normal bowel sounds; no masses, no organomegaly    Extremities:   No edema       ASSESSMENT & PLAN     This is a 48 y.o. year old female here for colonoscopy, and she is stable and optimized for her procedure.      Ciro Hidalgo D.O.  [facility]  Division of Gastroenterology & Hepatology  Available on [employer]  [email or web address]    ** Please Note: This note is constructed using a voice recognition dictation system. **

## 2024-10-02 NOTE — ANESTHESIA PREPROCEDURE EVALUATION
Procedure:  COLONOSCOPY    Relevant Problems   CARDIO   (+) Essential hypertension   (+) Hyperlipidemia      GI/HEPATIC   (+) Fatty liver   (+) GERD (gastroesophageal reflux disease)      MUSCULOSKELETAL   (+) Chronic bilateral low back pain without sciatica   (+) Fibromyalgia, primary   (+) Lumbar spondylosis   (+) Primary osteoarthritis involving multiple joints   (+) Primary osteoarthritis of both hips      NEURO/PSYCH   (+) Anxiety associated with depression   (+) Chronic bilateral low back pain without sciatica   (+) Chronic pain disorder   (+) Fibromyalgia, primary   (+) Major depression, recurrent, chronic (HCC)      PULMONARY   (+) Mild persistent asthma without complication   (+) VICTOR HUGO on CPAP   (+) SOB (shortness of breath)      Hypertension    Insomnia    GERD (gastroesophageal reflux disease)    VICTOR HUGO on CPAP    Allergic Seasonal Allergies   Alopecia of scalp Treated with Proscar   Hip fx, left, closed, with routine healing, subsequent encounter    Chronic pain disorder back, bilat knees, bilat hips   Fibromyalgia, primary    Diabetes mellitus (HCC)    Irregular heartbeat    Hyperlipidemia    Asthma    CPAP (continuous positive airway pressure) dependence         Left Ventricle: Left ventricular cavity size is normal. There is mild  concentric hypertrophy. The left ventricular ejection fraction is 61% by  single dimension measurement. Systolic function is normal. Wall motion is  normal. Diastolic function is mildly abnormal, consistent with grade I  (abnormal) relaxation.  Left atrial filling pressure is normal.    Tricuspid Valve: The right ventricular systolic pressure is normal. The  estimated right ventricular systolic pressure is 30.00 mmHg.    Physical Exam    Airway    Mallampati score: III  TM Distance: >3 FB  Neck ROM: full     Dental       Cardiovascular  Cardiovascular exam normal    Pulmonary  Pulmonary exam normal     Other Findings  post-pubertal.      Anesthesia Plan  ASA Score- 3      Anesthesia Type- IV sedation with anesthesia with ASA Monitors.         Additional Monitors:     Airway Plan:            Plan Factors-Exercise tolerance (METS): >4 METS.    Chart reviewed. EKG reviewed. Imaging results reviewed. Existing labs reviewed. Patient summary reviewed.                  Induction- intravenous.    Postoperative Plan-         Informed Consent- Anesthetic plan and risks discussed with patient.  I personally reviewed this patient with the CRNA. Discussed and agreed on the Anesthesia Plan with the CRNA..

## 2024-10-03 PROBLEM — H10.13 ALLERGIC CONJUNCTIVITIS OF BOTH EYES: Status: RESOLVED | Noted: 2024-09-03 | Resolved: 2024-10-03

## 2024-10-06 DIAGNOSIS — L65.9 ALOPECIA: ICD-10-CM

## 2024-10-06 DIAGNOSIS — M79.7 FIBROMYALGIA, PRIMARY: ICD-10-CM

## 2024-10-07 RX ORDER — FINASTERIDE 1 MG/1
1 TABLET, FILM COATED ORAL DAILY
Qty: 30 TABLET | Refills: 0 | Status: SHIPPED | OUTPATIENT
Start: 2024-10-07

## 2024-10-07 RX ORDER — CELECOXIB 200 MG/1
200 CAPSULE ORAL 2 TIMES DAILY
Qty: 60 CAPSULE | Refills: 0 | Status: SHIPPED | OUTPATIENT
Start: 2024-10-07

## 2024-10-07 NOTE — PSYCH
"Behavioral Health Psychotherapy Progress Note    Psychotherapy Provided: Individual Psychotherapy     1. 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 As team we explored and processed her physical health issues and reviewed ways to increase her develop a growth mindset, through the development by confronting difficult situations rather than avoiding them. Coping skills were reviewed during the session  During this session, this clinician used the following therapeutic modalities: Client-centered Therapy, Cognitive Behavioral Therapy, Mindfulness-based Strategies, and Supportive Psychotherapy    Substance Abuse was addressed during this session. If the client is diagnosed with a co-occurring substance use disorder, please indicate any changes in the frequency or amount of use: none. Stage of change for addressing substance use diagnoses: No substance use/Not applicable    ASSESSMENT:  Cristy Garcia presents with a Anxious and Depressed mood.     her affect is Normal range and intensity, which is congruent, with her mood and the content of the session. The client has made progress on their goals.     Cristy Garcia presents with a none risk of suicide, none risk of self-harm, and none risk of harm to others.    For any risk assessment that surpasses a \"low\" rating, a safety plan must be developed.    A safety plan was indicated: no  If yes, describe in detail n/    PLAN: Between sessions, Cristy Garcia will work on silver cloud to address her weaknesses that are affecting different relationships and environments. At the next session, the therapist will use Cognitive Behavioral Therapy, Mindfulness-based Strategies, Solution-Focused Therapy, and Supportive Psychotherapy to address issues affecting different relationships and " environments.    Behavioral Health Treatment Plan and Discharge Planning: Cristy Garcia is aware of and agrees to continue to work on their treatment plan. They have identified and are working toward their discharge goals. yes    Visit start and stop times:    10/09/24  Start Time: 1459  Stop Time: 1517  Total Visit Time: 18 minutes    Telemedicine consent    Patient: Cristy Garciamons  Provider: Kristina Guerrero LCSW  Provider located at [address] [id number]    The patient was identified by name and date of birth. Cristy Garcia was informed that this is a telemedicine visit and that the visit is being conducted through the [employer] platform. She agrees to proceed..  My office door was closed. No one else was in the room.  She acknowledged consent and understanding of privacy and security of the video platform. The patient has agreed to participate and understands they can discontinue the visit at any time.    Patient is aware this is a billable service.     I spent 18 minutes with the patient during this visit.

## 2024-10-09 ENCOUNTER — TELEMEDICINE (OUTPATIENT)
Dept: BEHAVIORAL/MENTAL HEALTH CLINIC | Facility: CLINIC | Age: 49
End: 2024-10-09
Payer: COMMERCIAL

## 2024-10-09 DIAGNOSIS — F33.9 MAJOR DEPRESSION, RECURRENT, CHRONIC (HCC): Primary | ICD-10-CM

## 2024-10-09 PROCEDURE — T1015 CLINIC SERVICE: HCPCS | Performed by: SOCIAL WORKER

## 2024-10-10 RX ORDER — FLUCONAZOLE 150 MG/1
TABLET ORAL
COMMUNITY
Start: 2024-10-03

## 2024-10-14 ENCOUNTER — OFFICE VISIT (OUTPATIENT)
Dept: PAIN MEDICINE | Facility: CLINIC | Age: 49
End: 2024-10-14
Payer: COMMERCIAL

## 2024-10-14 VITALS
RESPIRATION RATE: 20 BRPM | OXYGEN SATURATION: 98 % | DIASTOLIC BLOOD PRESSURE: 80 MMHG | HEART RATE: 77 BPM | HEIGHT: 62 IN | SYSTOLIC BLOOD PRESSURE: 114 MMHG | BODY MASS INDEX: 44.16 KG/M2 | TEMPERATURE: 97.6 F | WEIGHT: 240 LBS

## 2024-10-14 DIAGNOSIS — M47.816 LUMBAR SPONDYLOSIS: Primary | ICD-10-CM

## 2024-10-14 DIAGNOSIS — M16.0 PRIMARY OSTEOARTHRITIS OF BOTH HIPS: ICD-10-CM

## 2024-10-14 PROCEDURE — 99213 OFFICE O/P EST LOW 20 MIN: CPT | Performed by: ANESTHESIOLOGY

## 2024-10-14 NOTE — PROGRESS NOTES
Assessment  1. Lumbar spondylosis  2. Primary osteoarthritis of both hips  Greater than 80% improvement with radiofrequency ablation of repeat bilateral Medial branch nerves at L3-L5 performed 3/12/24;  prior procedure improved ability to participate with IADLs in significantly less pain for almost a year.  Additionally greater than 90% relief of pain with improved ability to participate with IADLs after bilateral hip injections performed recently. Previously reported b/l hip pain with radiation to the groin; ttp over b/l gtb, pain with internal/external rotation of b/l hips; pain worse with standing/ambulation. Lifestyle modifications extensively discussed including diet, exercise and weight loss in conjunction with optimal DM-2 control. Previously reported the following symptomatology:    Chronic axial low back pain described by primarily arthritic features.  Positive facet loading maneuvers as noted below.Mild noncompressive lumbar degenerative change at the L3-4 and L4-5 levels.  Previously had a left L3 transforaminal steroid injection with Dr. Franco which helped for about a week and a half.  Arthritic symptomatology greater than radicular features at this time. Reasonable to continue multimodal pain therapy plan.    Plan  -Celebrex 200 mg b.i.d. prn pain for lumbar facet syndrome/hip osteoarthritis.  Counseled regarding bleeding risk, GI risk, Renal risk of taking this medication.  -lifestyle modifications including diet, exercise and weight loss in addition to DM-2 control extensively discussed  -f/u prn  -hip  and neckexercises provided; continue physical therapy and core exercises for lumbar facet syndrome, hip osteoarthritis, cervical spondylosis    There are risks associated with opioid medications, including dependence, addiction and tolerance. The patient understands and agrees to use these medications only as prescribed. Potential side effects of the medications include, but are not limited to,  constipation, drowsiness, addiction, impaired judgment and risk of fatal overdose if not taken as prescribed. The patient was warned against driving while taking sedation medications.  Sharing medications is a felony. At this point in time, the patient is showing no signs of addiction, abuse, diversion or suicidal ideation.    [place] Prescription Drug Monitoring Program report was reviewed and was appropriate     Complete risks and benefits including bleeding, infection, tissue reaction, nerve injury and allergic reaction were discussed. The approach was demonstrated using models and literature was provided. Verbal and written consent was obtained.    My impressions and treatment recommendations were discussed in detail with the patient who verbalized understanding and had no further questions.  Discharge instructions were provided. I personally saw and examined the patient and I agree with the above discussed plan of care.    New Medications Ordered This Visit   Medications    fluconazole (DIFLUCAN) 150 mg tablet     Sig: TAKE ONE TABLET BY MOUTH ONCE DAILY FOR ONE DAY       History of Present Illness    Greater than 80% improvement with radiofrequency ablation of repeat bilateral Medial branch nerves at L3-L5 performed 3/12/24;  prior procedure improved ability to participate with IADLs in significantly less pain for almost a year.  Additionally greater than 90% relief of pain with improved ability to participate with IADLs after bilateral hip injections performed recently. Previously reported b/l hip pain with radiation to the groin worse with standing/ambulation. Previously reported the following symptomatology:    Cristy Garcia is a 48 y.o. female with past medical history of axial low back pain described primarily by arthritic features.  She presents with a 2 year history of chronic low back pain described primarily as arthritic in nature.   she describes 8/10 low back pain that is worse in the mornings  and worse at the end of the day.  The pain is characterized by achy, nagging, indolent, crampy, stabbing pain in his/her axial low back.  The patient describes that the pain is worse with standing for long periods of time on hard surfaces as well as with walking.  The patient is a very active individual and feels as though this pain compromises her participation with independent activities of daily living. The pain can be debilitating at times and contribute to significant disability, compromising overall activity and independent activities of daily living.   She has tried physical therapy with limited relief of symptoms.  Medications the patient has tried in the past include   Celebrex,  and Flexeril. She describes minor radicular symptoms in the L3 and L4 dermatomal distribution but otherwise has good strength.  Previously she had left L3 transforaminal epidural steroid injection with relief for about 2 weeks. She denies any weakness numbness or paresthesias.  The patient denies any bowel or bladder dysfunction as well.        I have personally reviewed and/or updated the patient's past medical history, past surgical history, family history, social history, current medications, allergies, and vital signs today.     Review of Systems   Constitutional:  Positive for activity change.   HENT: Negative.     Eyes: Negative.    Respiratory: Negative.     Cardiovascular: Negative.    Gastrointestinal: Negative.    Endocrine: Negative.    Genitourinary: Negative.    Musculoskeletal:  Positive for arthralgias, back pain, gait problem and myalgias.   Skin: Negative.    Allergic/Immunologic: Negative.    Hematological: Negative.    Psychiatric/Behavioral: Negative.     All other systems reviewed and are negative.      Patient Active Problem List   Diagnosis    SOB (shortness of breath)    Chronic bilateral low back pain without sciatica    Chronic pain of left knee    Chronic pain of right knee    Fibromyalgia, primary     Primary osteoarthritis involving multiple joints    Mild persistent asthma without complication    Lumbar radiculopathy    Primary osteoarthritis of both hips    Left hip pain    Major depression, recurrent, chronic (HCC)    Lumbar spondylosis    Cervical radiculopathy    Morbid obesity with body mass index (BMI) of 45.0 to 49.9 in adult (HCC)    VICTOR HUGO on CPAP    Anxiety associated with depression    Diabetes mellitus (HCC)    Hyperlipidemia    Essential hypertension    Irregular heartbeat    Chronic pain disorder    GERD (gastroesophageal reflux disease)    Fatty liver    Elevated LFTs    Esophagitis determined by endoscopy    Impacted third molar tooth    Complex dental caries    Erythrocytosis    Leukocytosis    Mild concentric left ventricular hypertrophy    Chronic fatigue    Vitamin D deficiency    Hair loss    Hx of giardiasis    Vaginal candidiasis    Trigeminal neuralgia    Vitamin B12 deficiency    Teeth missing       Past Medical History:   Diagnosis Date    Allergic     Seasonal Allergies    Alopecia of scalp     Treated with Proscar    Asthma     Chronic pain disorder     back, bilat knees, bilat hips    CPAP (continuous positive airway pressure) dependence     Diabetes mellitus (HCC)     Fibromyalgia, primary     GERD (gastroesophageal reflux disease)     Hip fx, left, closed, with routine healing, subsequent encounter 05/2017    Hyperlipidemia     Hypertension     Insomnia     Irregular heartbeat     VICTOR HUGO on CPAP        Past Surgical History:   Procedure Laterality Date    CARPAL TUNNEL RELEASE Left     IR SPINE AND PAIN PROCEDURE  3/12/2024    IR SPINE AND PAIN PROCEDURE  4/11/2024    IR SPINE AND PAIN PROCEDURE  9/24/2024    LUMBAR EPIDURAL INJECTION      NERVE BLOCK Bilateral 12/29/2020    Procedure: L2 L3 L4 L5 MEDIAL BRANCH BLOCK #1;  Surgeon: Westley Keller MD;  Location: [facility];  Service: Pain Management     NERVE BLOCK Bilateral 1/19/2021    Procedure: L2 L3 L4 L5 MEDIAL BRANCH BLOCK #2;   Surgeon: Westley Keller MD;  Location: [facility];  Service: Pain Management     NM ARTHROCENTESIS ASPIR&/INJ MAJOR JT/BURSA W/O US Bilateral 2023    Procedure: INJECTION JOINT HIP;  Surgeon: Westley Keller MD;  Location: [facility];  Service: Pain Management     NM HYSTEROSCOPY BX ENDOMETRIUM&/POLYPC W/WO D&C N/A 2023    Procedure: HYSTEROSCOPY W/  RESECTION UTERINE TUMOR/FIBROID (POLYPECTOMY );  Surgeon: Donovan Ware DO;  Location: [facility];  Service: Gynecology    RADIOFREQUENCY ABLATION Right 2021    Procedure: L2 L3 L4 L5 RADIO FREQUENCY ABLATION right side;  Surgeon: Westley Keller MD;  Location: [facility];  Service: Pain Management     RADIOFREQUENCY ABLATION Left 3/16/2021    Procedure: L2 L3 L4 L5 RADIO FREQUENCY ABLATION;  Surgeon: Westley Keller MD;  Location: [facility];  Service: Pain Management     RHIZOTOMY Bilateral 2023    Procedure: RHIZOTOMY LUMBAR L3, L4, L5 medial branch nerves;  Surgeon: Westley Keller MD;  Location: [facility];  Service: Pain Management     UPPER GASTROINTESTINAL ENDOSCOPY      US GUIDED THYROID BIOPSY  2023       Family History   Problem Relation Age of Onset    Cancer Father     Hepatitis Father     Asthma Cousin     Hypertension Paternal Grandmother     No Known Problems Mother     No Known Problems Sister     No Known Problems Maternal Grandmother     No Known Problems Maternal Grandfather     No Known Problems Paternal Grandfather     No Known Problems Paternal Aunt     BRCA2 Positive Neg Hx     BRCA2 Negative Neg Hx     BRCA1 Positive Neg Hx     BRCA1 Negative Neg Hx     BRCA 1/2 Neg Hx     Ovarian cancer Neg Hx     Endometrial cancer Neg Hx     Colon cancer Neg Hx     Breast cancer additional onset Neg Hx     Breast cancer Neg Hx        Social History     Occupational History    Not on file   Tobacco Use    Smoking status: Former     Types: Cigarettes     Start date:      Quit date:      Years since quittin.8     Passive  exposure: Past    Smokeless tobacco: Never   Vaping Use    Vaping status: Never Used   Substance and Sexual Activity    Alcohol use: No    Drug use: No    Sexual activity: Not Currently       Current Outpatient Medications on File Prior to Visit   Medication Sig    albuterol (Ventolin HFA) 90 mcg/act inhaler Inhale 2 puffs every 6 (six) hours as needed for wheezing or shortness of breath    atorvastatin (LIPITOR) 20 mg tablet Take 1 tablet (20 mg total) by mouth daily (Patient taking differently: Take 20 mg by mouth daily at bedtime)    celecoxib (CeleBREX) 200 mg capsule Take 1 capsule (200 mg total) by mouth 2 (two) times a day    cetirizine (ZyrTEC) 10 mg tablet 1 tab po daily x 1 month then prn allergies (Patient taking differently: Take 10 mg by mouth daily 1 tab po daily x 1 month then prn allergies)    clotrimazole-betamethasone (LOTRISONE) 1-0.05 % cream APPLY TWICE DAILY TO VAGINAL AREA UNTIL HEALED THEN AS NEEDED FOR IRRITATION.    cromolyn (OPTICROM) 4 % ophthalmic solution Administer 1 drop to both eyes 4 (four) times a day    Empagliflozin (Jardiance) 25 MG TABS Take 1 tablet (25 mg total) by mouth daily    ergocalciferol (VITAMIN D2) 50,000 units Take 1 capsule (50,000 Units total) by mouth once a week    famotidine (PEPCID) 40 MG tablet TAKE (1) TABLET BY MOUTH DAILY.    finasteride (PROPECIA) 1 MG tablet Take 1 tablet (1 mg total) by mouth daily    fluconazole (DIFLUCAN) 150 mg tablet TAKE ONE TABLET BY MOUTH ONCE DAILY FOR ONE DAY    fluticasone (FLONASE) 50 mcg/act nasal spray USE 1 SPRAY INTO EACH NOSTRIL TWICE A DAY FOR 1 MONTH THEN AS NEEDED FOR CONGESTION AND ALLERGIES    Fluticasone-Salmeterol (Advair) 250-50 mcg/dose inhaler Inhale 1 puff 2 (two) times a day Rinse mouth after use.    gabapentin (NEURONTIN) 300 mg capsule TAKE (1) CAPSULE THREE TIMES DAILY.    glipiZIDE (GLUCOTROL) 10 mg tablet TAKE (2) TABLETS TWICE DAILY BEFORE MEALS.    glucose blood test strip     glucose blood test strip  "Use as instructed    ketoconazole (NIZORAL) 2 % cream Apply topically daily    Lancet Devices (OneTouch Delica Plus Lancing) MISC     Lancets (onetouch ultrasoft) lancets Use as instructed    lisinopril (ZESTRIL) 20 mg tablet Take 1 tablet (20 mg total) by mouth daily    metFORMIN (GLUCOPHAGE) 1000 MG tablet TAKE (1) TABLET TWICE A DAY WITH MEALS.    nystatin powder Apply topically 2 (two) times a day    omeprazole (PriLOSEC) 40 MG capsule Take 1 capsule (40 mg total) by mouth daily before breakfast    ondansetron (ZOFRAN) 4 mg tablet Take 1 tablet (4 mg total) by mouth every 8 (eight) hours as needed for nausea or vomiting    sertraline (ZOLOFT) 50 mg tablet TAKE (1) TABLET BY MOUTH DAILY AT BEDTIME.    triamcinolone (KENALOG) 0.025 % cream Apply 2 x daily to rash until healed then prn rash     No current facility-administered medications on file prior to visit.       Allergies   Allergen Reactions    Trulicity [Dulaglutide] Swelling    Tdap [Tetanus-Diphth-Acell Pertussis] Rash         Physical Exam    /80 (BP Location: Right arm, Patient Position: Sitting, Cuff Size: Adult)   Pulse 77   Temp 97.6 °F (36.4 °C)   Resp 20   Ht 5' 2\" (1.575 m)   Wt 109 kg (240 lb)   LMP 07/22/2024   SpO2 98%   BMI 43.90 kg/m²     Constitutional: normal, well developed, well nourished, alert, in no distress and non-toxic and no overt pain behavior. and obese  Eyes: anicteric  HEENT: grossly intact  Neck: supple, symmetric, trachea midline and no masses   Pulmonary:even and unlabored  Cardiovascular:No edema or pitting edema present  Skin:Normal without rashes or lesions and well hydrated  Psychiatric:Mood and affect appropriate  Neurologic:Cranial Nerves II-XII grossly intact Sensation grossly intact; no clonus negative dupree's. Reflexes 2+ and brisk. SLR negative bilaterally.  Musculoskeletal:  Steppage gait. Normal heel toe and tip toe walking.  Significant pain with lumbar facet loading bilaterally and with lateral " spine rotation left greater than right.  TTP over lumbar paraspinal muscles. Negative wilda's test, negative gaenslen's negative SIJ loading bilaterally.  ttp over b/l gtb, pain with internal/external rotation of b/l hips    Imaging    MRI LUMBAR SPINE WITHOUT CONTRAST     INDICATION: M54.42: Lumbago with sciatica, left side  G89.29: Other chronic pain.     COMPARISON:  None.     TECHNIQUE:  Sagittal T1, sagittal T2, sagittal inversion recovery, axial T1 and axial T2, coronal T2.    IMAGE QUALITY:  Diagnostic     FINDINGS:     VERTEBRAL BODIES:  There are 5 lumbar type vertebral bodies.  Normal alignment of the lumbar spine.  No spondylolysis or spondylolisthesis. No scoliosis.  No compression fracture.    Normal marrow signal is identified within the visualized bony   structures.  No discrete marrow lesion.     SACRUM:  Normal signal within the sacrum. No evidence of insufficiency or stress fracture.     DISTAL CORD AND CONUS:  Normal size and signal within the distal cord and conus.     PARASPINAL SOFT TISSUES:  There is thickening of the endometrial cavity partially visualized on this examination towards the fundus.  There is a dominant follicle identified within the left ovary.     LOWER THORACIC DISC SPACES:  Normal disc height and signal.  No disc herniation, canal stenosis or foraminal narrowing.     LUMBAR DISC SPACES:     L1-L2:  Normal.     L2-L3:  Normal.     L3-L4:  Annular bulging with a small broad-based left foraminal and extraforaminal disc protrusion best seen on series 6 image 13.  There is no canal stenosis.  Only minimal left foraminal narrowing without nerve impingement.     L4-L5:  Disc desiccation and loss of disc height with mild annular bulging.  Small central disc herniation without canal stenosis or foraminal nerve impingement.     L5-S1:  Normal disc height and signal without canal stenosis or foraminal narrowing.     IMPRESSION:     Mild noncompressive lumbar degenerative change at the  L3-4 and L4-5 levels.     Fluid signal in the endometrial cavity partially visualized at the fundus.  If patient is postmenopausal, consider follow-up ultrasound imaging.     Signal

## 2024-10-14 NOTE — PATIENT INSTRUCTIONS
Patient Education     Hip Bursitis Exercises   About this topic   A bursa is a small, fluid-filled sac. It acts as a cushion between your bone and tendon. A tendon is a thick band that attaches your muscle to the bone. Bursae help the tendons glide and let your joints move easier. In the hip, there are three sets of bursae. There is one around the outer shannon part of your hip joint. It is called the greater trochanteric bursae. Another set of bursae is in front of your hip near the groin area. These are called iliopsoas bursae. The last bursae is the ischial bursae. It covers the bones in your pelvis that you sit on. These bursae can get swollen and hurt. This problem is called hip bursitis. Exercises can help make this problem better.  General   Before starting with a program, ask your doctor if you are healthy enough to do these exercises. Your doctor may have you work with a  or physical therapist to make a safe exercise program to meet your needs.  Stretching Exercises   Stretching exercises keep your muscles flexible. They also stop them from getting tight. Start by doing each of these stretches 2 to 3 times. In order for your body to make changes, you will need to hold these stretches for 20 to 30 seconds. Try to do the stretches 2 to 3 times each day. Do all exercises slowly.  Single knee to chest stretches ? Lie on your back. Pull one knee towards your chest until you feel a stretch in your lower back and buttock area. Repeat with the other knee. If you have knee problems, pull your knee up by grabbing the back of your thigh instead of the front of your knee. You can also do this exercise by grabbing both knees at the same time.  Deep hip stretches lying down ? Lie on your back and bend one knee, keeping that foot flat on the floor. Cross the other leg over your knee. Pull the bottom leg towards your chest until you feel a stretch in the other buttock. Repeat using the opposite leg as the bottom  leg.  Hamstring stretches on back ? Lie on your back with both knees bent and feet flat on the floor. Grab the back of your left thigh. Straighten your knee until you feel a stretch at the back of your thigh. Now, pull your toes down towards your head. Repeat on the other leg.  Iliotibial band stretches:  To stretch the left IT band: Stand up with your right leg crossed over the left one. Try to point the toes of the feet towards each other. Your toes should almost be touching. This is a very awkward position. Lean your upper body towards the right while bending your right knee. You should feel a stretch near the left hip. Hold and repeat.  To stretch the right IT band: Stand up with your left leg crossed over the right one. Try to point the toes of the feet towards each other. Your toes should almost be touching. This is a very awkward position. Lean your upper body towards the left while bending your left knee. You should feel a stretch near the right hip. Hold and repeat.  Strengthening Exercises   Strengthening exercises keep your muscles firm and strong. Start by repeating each exercise 2 to 3 times. Work up to doing each exercise 10 times. Try to do the exercises 2 to 3 times each day. Do all exercises slowly.  Side leg lifts ? Lie on your side. Have your legs straight and lined up with your back. Lift the top leg up while keeping the knee straight. Do not let your leg go forward. Then, do this exercise on your other side.  Bent knee side leg lifts ? Lie on your side with your painful hip on top. Bend your knees up slightly and have your knees and feet together. Lift your top leg up while keeping your feet together. Lower your leg back down and repeat.               What will the results be?   Less pain and swelling  Better range of motion  Increased strength  Easier to walk and do other activities  Helpful tips   Stay active and work out to keep your muscles strong and flexible.  Keep a healthy weight to  avoid putting too much stress on your spine. Eat a healthy diet to keep your muscles healthy.  Be sure you do not hold your breath when exercising. This can raise your blood pressure. If you tend to hold your breath, try counting out loud when exercising. If any exercise bothers you, stop right away.  Always warm up before stretching. Heated muscles stretch much easier than cool muscles. Stretching cool muscles can lead to injury.  Try walking or cycling at an easy pace for a few minutes to warm up your muscles. Do this again after exercising.  Never bounce when doing stretches.  Doing exercises before a meal may be a good way to get into a routine.  After exercising, it is a good idea to use ice. Place an ice pack or a bag of frozen peas wrapped in a towel over the painful part. Never put ice right on the skin. Do not leave the ice on more than 10 to 15 minutes at a time. Ice after activity may help decrease pain and swelling. Never ice before stretching.  Exercise may be slightly uncomfortable, but you should not have sharp pains. If you do get sharp pains, stop what you are doing. If the sharp pains continue, call your doctor.  Avoid sitting on hard surfaces and use a cushion.  Last Reviewed Date   2024-05-09  Consumer Information Use and Disclaimer   This generalized information is a limited summary of diagnosis, treatment, and/or medication information. It is not meant to be comprehensive and should be used as a tool to help the user understand and/or assess potential diagnostic and treatment options. It does NOT include all information about conditions, treatments, medications, side effects, or risks that may apply to a specific patient. It is not intended to be medical advice or a substitute for the medical advice, diagnosis, or treatment of a health care provider based on the health care provider's examination and assessment of a patient’s specific and unique circumstances. Patients must speak with a health  care provider for complete information about their health, medical questions, and treatment options, including any risks or benefits regarding use of medications. This information does not endorse any treatments or medications as safe, effective, or approved for treating a specific patient. [employer] and its affiliates disclaim any warranty or liability relating to this information or the use thereof. The use of this information is governed by the Terms of Use, available at https://www.[employer].com/en/know/clinical-effectiveness-terms   Copyright   Copyright © 2024 [employer], [employer] and its affiliates and/or licensors. All rights reserved.  Patient Education     Core Strengthening Exercises on Back or on Hands and Knees   About this topic   Your core muscles are in your chest, back, buttock, and stomach area. They are your abdominal, back, and pelvis muscles. These muscles help keep your body stable when using your arms or legs. They also help with balance and posture. There are many exercises you can do to keep these muscles strong.  If you have back problems like a compression fracture or a ruptured disc, doing some of these exercises could make your problem worse. Some of these exercises may cause lower back pain.  General   Before starting with a program, ask your doctor if you are healthy enough to do these exercises. Your doctor may have you work with a , chiropractor, or physical therapist to make a safe exercise program to meet your needs.  Strengthening Exercises   Strengthening exercises keep your muscles firm and strong. Start by repeating each exercise 2 to 3 times. Work up to doing each exercise 10 times. Try to do the exercises 2 to 3 times each day. Hold each exercise for 3 to 5 seconds. Do all exercises slowly.  Hip lifts ? Lie on your back with your knees bent and feet flat on the floor. Tighten your stomach muscles and push your heels into the floor to lift your buttocks off the  floor. Relax.  Pelvic tilts ? Lie on your back with your knees bent and feet flat on the floor. Tighten your stomach muscles and press your lower back down to the floor. Relax.  Straight leg raises lying down ? Lie on your back with one leg straight. Bend your other knee so the foot is flat on the bed. Keeping your leg straight, lift the leg up to the level of your other knee. Lower it back down. Repeat with the other leg.  Knee flex lying down ? Lie on your back with both knees bent and your feet flat on the floor. Tighten your belly muscles. Raise one leg up and back down as if you are marching in slow motion. Keep belly muscles tight while you move your leg. Switch legs. To make this exercise harder, raise both arms straight up in the air. Tighten your belly muscles. When you raise one leg up, reach the opposite arm over your head. Switch, moving the opposite arm and leg until you have done 10 repetitions on each side.  Abdominal crunches ? Lie on your back with both knees bent. Keep your feet flat on the floor. Place your hands in one of these positions. Try starting with the first position since it is the easiest. As you get better, use the other positions to make it harder.  Crunches with arms at sides.  Crunches with arms across chest.  Crunches with arms behind head. Be careful not to interlock your fingers behind your neck or head while doing crunches. This may add tension to your neck and cause strain.  Look at the ceiling. Tighten your belly muscles and lift your shoulders and upper back off the floor. Breathe out while you are doing this. Lower your shoulders to the floor. Breathe in while you are doing this. Relax your belly muscles all the way before starting another crunch.  Arm and leg lifts on hands and knees ? Start on your hands and knees. With all of these exercises, keep your back as level as possible. If you are having trouble with this, you may want to put a small object on your back such as a  book. If it falls off, you are not keeping your back level enough during the exercise.  Lift one arm up to shoulder level and hold. Lower it back down. Now, lift up the other arm and hold.  Lift one leg up and kick it straight out until it is in line with your back and hold. Lower it back down. Now, lift up the other leg and hold.  Lift one arm and the OPPOSITE leg up at the same time and hold. Lower them down. Now, repeat using the other arm and leg. This is a very hard exercise. It may take time to be able to do this.               What will the results be?   Stronger core  Better balance  More toned belly and back muscles  Easier to do daily activities  Better sports performance  Less low back pain  Helpful tips   Stay active and work out to keep your muscles strong and flexible.  Keep a healthy weight to avoid putting too much stress on your spine. Eat a healthy diet to keep your muscles healthy.  Be sure you do not hold your breath when exercising. This can raise your blood pressure. If you tend to hold your breath, try counting out loud when exercising. If any exercise bothers you, stop right away.  Try walking or cycling at an easy pace for a few minutes to warm up your muscles. Do this again after exercising.  Exercise may be slightly uncomfortable, but you should not have sharp pains. If you do get sharp pains, stop what you are doing. If the sharp pains continue, call your doctor.  Last Reviewed Date   2021-03-18  Consumer Information Use and Disclaimer   This generalized information is a limited summary of diagnosis, treatment, and/or medication information. It is not meant to be comprehensive and should be used as a tool to help the user understand and/or assess potential diagnostic and treatment options. It does NOT include all information about conditions, treatments, medications, side effects, or risks that may apply to a specific patient. It is not intended to be medical advice or a substitute for  the medical advice, diagnosis, or treatment of a health care provider based on the health care provider's examination and assessment of a patient’s specific and unique circumstances. Patients must speak with a health care provider for complete information about their health, medical questions, and treatment options, including any risks or benefits regarding use of medications. This information does not endorse any treatments or medications as safe, effective, or approved for treating a specific patient. UpToDate, Inc. and its affiliates disclaim any warranty or liability relating to this information or the use thereof. The use of this information is governed by the Terms of Use, available at https://www.[employer].com/en/know/clinical-effectiveness-terms   Copyright   Copyright © 2024 [employer]ToDate, Inc. and its affiliates and/or licensors. All rights reserved.

## 2024-10-15 ENCOUNTER — TELEPHONE (OUTPATIENT)
Dept: HEMATOLOGY ONCOLOGY | Facility: CLINIC | Age: 49
End: 2024-10-15

## 2024-10-15 DIAGNOSIS — E11.65 TYPE 2 DIABETES MELLITUS WITH HYPERGLYCEMIA, WITHOUT LONG-TERM CURRENT USE OF INSULIN (HCC): ICD-10-CM

## 2024-10-15 NOTE — TELEPHONE ENCOUNTER
Called and left a message to advise to pt that appt with Tara Squires on 11/25 was moved from 9:20am to 9am due to a change in the providers schedule

## 2024-10-16 RX ORDER — GLIPIZIDE 10 MG/1
TABLET ORAL
Qty: 360 TABLET | Refills: 1 | Status: SHIPPED | OUTPATIENT
Start: 2024-10-16

## 2024-10-22 ENCOUNTER — NURSE TRIAGE (OUTPATIENT)
Age: 49
End: 2024-10-22

## 2024-10-22 ENCOUNTER — PATIENT MESSAGE (OUTPATIENT)
Dept: CARDIOLOGY CLINIC | Facility: CLINIC | Age: 49
End: 2024-10-22

## 2024-10-22 DIAGNOSIS — R00.2 PALPITATIONS: Primary | ICD-10-CM

## 2024-10-22 RX ORDER — METOPROLOL SUCCINATE 25 MG/1
25 TABLET, EXTENDED RELEASE ORAL DAILY
Qty: 30 TABLET | Refills: 1 | Status: SHIPPED | OUTPATIENT
Start: 2024-10-22 | End: 2024-10-23 | Stop reason: SDUPTHER

## 2024-10-22 NOTE — TELEPHONE ENCOUNTER
Called and explained to Aleksandra her caregiver had to reschedule the appt today, but her in with her cardiologist also sent the message to Dr Le  on 11/26/2024, and also sent a message to provider with s/s. Advised will update once we here from the provider.

## 2024-10-22 NOTE — TELEPHONE ENCOUNTER
"Reason for Disposition   Skipped or extra beat(s) and increases with exercise or exertion    Answer Assessment - Initial Assessment Questions  1. DESCRIPTION: \"Please describe your heart rate or heartbeat that you are having\" (e.g., fast/slow, regular/irregular, skipped or extra beats, \"palpitations\")      Tachycardia  checks manually and smart watch   Heart racing in am, has coughing attacks, the racing goes away, but will still have the fatigued and chest discomfort.  Does have episodes of sob  Has been going on for about 2.5 weeks  2. ONSET: \"When did it start?\" (Minutes, hours or days)        2. 5 weeks ago  3. DURATION: \"How long does it last\" (e.g., seconds, minutes, hours)      It verifies  4. PATTERN \"Does it come and go, or has it been constant since it started?\"  \"Does it get worse with exertion?\"   \"Are you feeling it now?\"      Daily  5. TAP: \"Using your hand, can you tap out what you are feeling on a chair or table in front of you, so that I can hear?\" (Note: not all patients can do this)        Caregiver calling, not pt/unable   6. HEART RATE: \"Can you tell me your heart rate?\" \"How many beats in 15 seconds?\"  (Note: not all patients can do this)         safe watch, and has been using her watch.   7. RECURRENT SYMPTOM: \"Have you ever had this before?\" If Yes, ask: \"When was the last time?\" and \"What happened that time?\"       Every morning for the last 2.5 weeks  8. CAUSE: \"What do you think is causing the palpitations?\"      Unsure   9. CARDIAC HISTORY: \"Do you have any history of heart disease?\" (e.g., heart attack, angina, bypass surgery, angioplasty, arrhythmia)       hyperlipidemia  10. OTHER SYMPTOMS: \"Do you have any other symptoms?\" (e.g., dizziness, chest pain, sweating, difficulty breathing)        Chest discomfort goes on for hours  Sweating denies, dizziness  Sob occasionally and has become for frequent    Scheduled today in [facility] with provider. Called office at [facility] and " spoke to Tracee and advised.    Protocols used: Heart Rate and Heartbeat Questions-ADULT-[place]

## 2024-10-22 NOTE — TELEPHONE ENCOUNTER
Called Aleksandra caregiver and left a message on [facility] appt for today has been cancellated. Advised did rescheduled and put her in for 11/26/2024 at 1140 am with her primary cardiologist.

## 2024-10-22 NOTE — TELEPHONE ENCOUNTER
Please let the patient know that there is no cause for any concern or alarm as her heart function was quite good on her recent echocardiogram.  Obviously because of her other medical conditions, she will continue to have intermittent symptoms.  However if she is really bothered by her palpitations, I can prescribe a low-dose beta-blocker like metoprolol 25 mg once a day and she can try using that.  She can reach out to her primary care physician for any other issues.

## 2024-10-22 NOTE — TELEPHONE ENCOUNTER
Spoke to Aleksandra she is aware and verbally understands instructions     Aleksandra is aware script was sent to [facility]

## 2024-10-23 ENCOUNTER — APPOINTMENT (EMERGENCY)
Dept: RADIOLOGY | Facility: HOSPITAL | Age: 49
End: 2024-10-23
Payer: COMMERCIAL

## 2024-10-23 ENCOUNTER — HOSPITAL ENCOUNTER (EMERGENCY)
Facility: HOSPITAL | Age: 49
Discharge: HOME | End: 2024-10-23
Attending: EMERGENCY MEDICINE
Payer: COMMERCIAL

## 2024-10-23 VITALS
RESPIRATION RATE: 13 BRPM | SYSTOLIC BLOOD PRESSURE: 117 MMHG | DIASTOLIC BLOOD PRESSURE: 89 MMHG | WEIGHT: 240 LBS | OXYGEN SATURATION: 99 % | TEMPERATURE: 97.2 F | HEART RATE: 85 BPM | BODY MASS INDEX: 43.9 KG/M2

## 2024-10-23 DIAGNOSIS — E83.42 HYPOMAGNESEMIA: Primary | ICD-10-CM

## 2024-10-23 DIAGNOSIS — E83.42 HYPOMAGNESEMIA: ICD-10-CM

## 2024-10-23 DIAGNOSIS — R00.2 PALPITATIONS: ICD-10-CM

## 2024-10-23 DIAGNOSIS — R00.2 PALPITATIONS: Primary | ICD-10-CM

## 2024-10-23 DIAGNOSIS — R07.9 CHEST PAIN, UNSPECIFIED TYPE: ICD-10-CM

## 2024-10-23 LAB
ALBUMIN SERPL BCG-MCNC: 4.1 G/DL (ref 3.5–5)
ALP SERPL-CCNC: 43 U/L (ref 34–104)
ALT SERPL W P-5'-P-CCNC: 27 U/L (ref 7–52)
ANION GAP SERPL CALCULATED.3IONS-SCNC: 13 MMOL/L (ref 4–13)
AST SERPL W P-5'-P-CCNC: 15 U/L (ref 13–39)
ATRIAL RATE: 101 BPM
BASOPHILS # BLD AUTO: 0.08 THOUSANDS/ΜL (ref 0–0.1)
BASOPHILS NFR BLD AUTO: 1 % (ref 0–1)
BILIRUB SERPL-MCNC: 0.77 MG/DL (ref 0.2–1)
BUN SERPL-MCNC: 16 MG/DL (ref 5–25)
CALCIUM SERPL-MCNC: 8.8 MG/DL (ref 8.4–10.2)
CARDIAC TROPONIN I PNL SERPL HS: <2 NG/L
CHLORIDE SERPL-SCNC: 104 MMOL/L (ref 96–108)
CO2 SERPL-SCNC: 17 MMOL/L (ref 21–32)
CREAT SERPL-MCNC: 0.59 MG/DL (ref 0.6–1.3)
D DIMER PPP FEU-MCNC: <0.27 UG/ML FEU
EOSINOPHIL # BLD AUTO: 0.2 THOUSAND/ΜL (ref 0–0.61)
EOSINOPHIL NFR BLD AUTO: 2 % (ref 0–6)
ERYTHROCYTE [DISTWIDTH] IN BLOOD BY AUTOMATED COUNT: 15 % (ref 11.6–15.1)
GFR SERPL CREATININE-BSD FRML MDRD: 107 ML/MIN/1.73SQ M
GLUCOSE SERPL-MCNC: 208 MG/DL (ref 65–140)
HCT VFR BLD AUTO: 46.9 % (ref 34.8–46.1)
HGB BLD-MCNC: 15.8 G/DL (ref 11.5–15.4)
IMM GRANULOCYTES # BLD AUTO: 0.08 THOUSAND/UL (ref 0–0.2)
IMM GRANULOCYTES NFR BLD AUTO: 1 % (ref 0–2)
LYMPHOCYTES # BLD AUTO: 2.14 THOUSANDS/ΜL (ref 0.6–4.47)
LYMPHOCYTES NFR BLD AUTO: 26 % (ref 14–44)
MAGNESIUM SERPL-MCNC: 1.7 MG/DL (ref 1.9–2.7)
MCH RBC QN AUTO: 28.9 PG (ref 26.8–34.3)
MCHC RBC AUTO-ENTMCNC: 33.7 G/DL (ref 31.4–37.4)
MCV RBC AUTO: 86 FL (ref 82–98)
MONOCYTES # BLD AUTO: 0.52 THOUSAND/ΜL (ref 0.17–1.22)
MONOCYTES NFR BLD AUTO: 6 % (ref 4–12)
NEUTROPHILS # BLD AUTO: 5.18 THOUSANDS/ΜL (ref 1.85–7.62)
NEUTS SEG NFR BLD AUTO: 64 % (ref 43–75)
NRBC BLD AUTO-RTO: 0 /100 WBCS
P AXIS: 31 DEGREES
PLATELET # BLD AUTO: 235 THOUSANDS/UL (ref 149–390)
PMV BLD AUTO: 9.3 FL (ref 8.9–12.7)
POTASSIUM SERPL-SCNC: 4 MMOL/L (ref 3.5–5.3)
PR INTERVAL: 128 MS
PROT SERPL-MCNC: 7.1 G/DL (ref 6.4–8.4)
QRS AXIS: 28 DEGREES
QRSD INTERVAL: 84 MS
QT INTERVAL: 354 MS
QTC INTERVAL: 459 MS
RBC # BLD AUTO: 5.46 MILLION/UL (ref 3.81–5.12)
SODIUM SERPL-SCNC: 134 MMOL/L (ref 135–147)
T WAVE AXIS: 25 DEGREES
TSH SERPL DL<=0.05 MIU/L-ACNC: 0.89 UIU/ML (ref 0.45–4.5)
VENTRICULAR RATE: 101 BPM
WBC # BLD AUTO: 8.2 THOUSAND/UL (ref 4.31–10.16)

## 2024-10-23 PROCEDURE — 85379 FIBRIN DEGRADATION QUANT: CPT | Performed by: EMERGENCY MEDICINE

## 2024-10-23 PROCEDURE — 99285 EMERGENCY DEPT VISIT HI MDM: CPT

## 2024-10-23 PROCEDURE — 93005 ELECTROCARDIOGRAM TRACING: CPT

## 2024-10-23 PROCEDURE — 71045 X-RAY EXAM CHEST 1 VIEW: CPT

## 2024-10-23 PROCEDURE — 85025 COMPLETE CBC W/AUTO DIFF WBC: CPT | Performed by: EMERGENCY MEDICINE

## 2024-10-23 PROCEDURE — 99285 EMERGENCY DEPT VISIT HI MDM: CPT | Performed by: EMERGENCY MEDICINE

## 2024-10-23 PROCEDURE — 80053 COMPREHEN METABOLIC PANEL: CPT | Performed by: EMERGENCY MEDICINE

## 2024-10-23 PROCEDURE — 93010 ELECTROCARDIOGRAM REPORT: CPT | Performed by: INTERNAL MEDICINE

## 2024-10-23 PROCEDURE — 36415 COLL VENOUS BLD VENIPUNCTURE: CPT | Performed by: EMERGENCY MEDICINE

## 2024-10-23 PROCEDURE — 83735 ASSAY OF MAGNESIUM: CPT | Performed by: EMERGENCY MEDICINE

## 2024-10-23 PROCEDURE — 84443 ASSAY THYROID STIM HORMONE: CPT | Performed by: EMERGENCY MEDICINE

## 2024-10-23 PROCEDURE — 84484 ASSAY OF TROPONIN QUANT: CPT | Performed by: EMERGENCY MEDICINE

## 2024-10-23 RX ORDER — LANOLIN ALCOHOL/MO/W.PET/CERES
400 CREAM (GRAM) TOPICAL ONCE
Status: COMPLETED | OUTPATIENT
Start: 2024-10-23 | End: 2024-10-23

## 2024-10-23 RX ORDER — METOPROLOL SUCCINATE 25 MG/1
25 TABLET, EXTENDED RELEASE ORAL DAILY
Qty: 30 TABLET | Refills: 1 | Status: SHIPPED | OUTPATIENT
Start: 2024-10-23

## 2024-10-23 RX ADMIN — Medication 400 MG: at 10:45

## 2024-10-23 NOTE — Clinical Note
Cristy Garcia was seen and treated in our emergency department on 10/23/2024.                Diagnosis:     Cristy  may return to work on return date.    She may return on this date: 10/24/2024         If you have any questions or concerns, please don't hesitate to call.      Ye Hemphill MD    ______________________________           _______________          _______________  Hospital Representative                              Date                                Time

## 2024-10-23 NOTE — ED PROVIDER NOTES
Time reflects when diagnosis was documented in both MDM as applicable and the Disposition within this note       Time User Action Codes Description Comment    10/23/2024 10:30 AM Ye Hemphill Add [R00.2] Palpitations     10/23/2024 10:30 AM Ye Hemphlil Add [R07.9] Chest pain, unspecified type     10/23/2024 10:30 AM Ye Hemphill Add [E83.42] Hypomagnesemia           ED Disposition       ED Disposition   Discharge    Condition   Stable    Date/Time   Wed Oct 23, 2024 10:30 AM    Comment   Cristy Garcia discharge to home/self care.                   Assessment & Plan       Medical Decision Making  0917: Patient appears well, vital signs reviewed.  Placed on the monitor.  Plan to complete basic labs including cardiac enzymes, D-dimer.  Check EKG and chest x-ray.  Normal cardiopulmonary exam at this time.    1040: Chest x-ray and labs reviewed.  The patient has remained stable throughout ED course.  Stable for discharge.  The patient is requesting her sutures to be removed as they were scheduled to be removed this morning, she missed her appointment to be evaluated here.  She has reached out to her plastic surgeon Dr. Gary from [facility], he is asked if we can remove him for her.  Sutures removed, see procedure note for full detail.  Patient to follow-up with her plastic surgeon for wound care and wound evaluations from here.    Amount and/or Complexity of Data Reviewed  External Data Reviewed: labs, radiology, ECG and notes.     Details: Echocardiogram 5/14/24  Labs: ordered.  Radiology: ordered and independent interpretation performed.     Details: Chest x-ray--no acute pathology  ECG/medicine tests: ordered and independent interpretation performed.     Details: Sinus tachycardia 101 bpm, no acute ischemia, Q waves inferiorly unchanged from 2019    Risk  OTC drugs.             Medications   magnesium Oxide (MAG-OX) tablet 400 mg (400 mg Oral Given 10/23/24 1045)       ED Risk Strat Scores   HEART Risk  Score      Flowsheet Row Most Recent Value   Heart Score Risk Calculator    History 0 Filed at: 10/23/2024 1030   ECG 0 Filed at: 10/23/2024 1030   Age 1 Filed at: 10/23/2024 1030   Risk Factors 0 Filed at: 10/23/2024 1030   Troponin 0 Filed at: 10/23/2024 1030   HEART Score 1 Filed at: 10/23/2024 1030                               SBIRT 22yo+      Flowsheet Row Most Recent Value   Initial Alcohol Screen: US AUDIT-C     1. How often do you have a drink containing alcohol? 0 Filed at: 10/23/2024 0935   2. How many drinks containing alcohol do you have on a typical day you are drinking?  0 Filed at: 10/23/2024 0935   3b. FEMALE Any Age, or MALE 65+: How often do you have 4 or more drinks on one occassion? 0 Filed at: 10/23/2024 0935   Audit-C Score 0 Filed at: 10/23/2024 0935   CHEIKH: How many times in the past year have you...    Used an illegal drug or used a prescription medication for non-medical reasons? Never Filed at: 10/23/2024 0935                            History of Present Illness       Chief Complaint   Patient presents with    Palpitations     Chest pain, dizziness, with tachycardia starting over the weekend. States that her HR is 114 and cannot get it to come down.        Past Medical History:   Diagnosis Date    Allergic     Seasonal Allergies    Alopecia of scalp     Treated with Proscar    Asthma     Chronic pain disorder     back, bilat knees, bilat hips    CPAP (continuous positive airway pressure) dependence     Diabetes mellitus (HCC)     Fibromyalgia, primary     GERD (gastroesophageal reflux disease)     Hip fx, left, closed, with routine healing, subsequent encounter 05/2017    Hyperlipidemia     Hypertension     Insomnia     Irregular heartbeat     VICTOR HUGO on CPAP       Past Surgical History:   Procedure Laterality Date    CARPAL TUNNEL RELEASE Left     IR SPINE AND PAIN PROCEDURE  3/12/2024    IR SPINE AND PAIN PROCEDURE  4/11/2024    IR SPINE AND PAIN PROCEDURE  9/24/2024    LUMBAR EPIDURAL  INJECTION      NERVE BLOCK Bilateral 12/29/2020    Procedure: L2 L3 L4 L5 MEDIAL BRANCH BLOCK #1;  Surgeon: Westley Keller MD;  Location: [facility];  Service: Pain Management     NERVE BLOCK Bilateral 1/19/2021    Procedure: L2 L3 L4 L5 MEDIAL BRANCH BLOCK #2;  Surgeon: Westley Keller MD;  Location: [facility];  Service: Pain Management     SC ARTHROCENTESIS ASPIR&/INJ MAJOR JT/BURSA W/O US Bilateral 2/7/2023    Procedure: INJECTION JOINT HIP;  Surgeon: Westley Keller MD;  Location: [facility];  Service: Pain Management     SC HYSTEROSCOPY BX ENDOMETRIUM&/POLYPC W/WO D&C N/A 4/25/2023    Procedure: HYSTEROSCOPY W/  RESECTION UTERINE TUMOR/FIBROID (POLYPECTOMY );  Surgeon: Donovan Ware DO;  Location: [facility];  Service: Gynecology    RADIOFREQUENCY ABLATION Right 2/25/2021    Procedure: L2 L3 L4 L5 RADIO FREQUENCY ABLATION right side;  Surgeon: Westley Keller MD;  [facility]: [facility];  Service: Pain Management     RADIOFREQUENCY ABLATION Left 3/16/2021    Procedure: L2 L3 L4 L5 RADIO FREQUENCY ABLATION;  Surgeon: Westley Keller MD;  [facility]: [facility];  Service: Pain Management     RHIZOTOMY Bilateral 4/4/2023    Procedure: RHIZOTOMY LUMBAR L3, L4, L5 medial branch nerves;  Surgeon: Westley Keller MD;  Location: [facility];  Service: Pain Management     UPPER GASTROINTESTINAL ENDOSCOPY      US GUIDED THYROID BIOPSY  7/14/2023      Family History   Problem Relation Age of Onset    Cancer Father     Hepatitis Father     Asthma Cousin     Hypertension Paternal Grandmother     No Known Problems Mother     No Known Problems Sister     No Known Problems Maternal Grandmother     No Known Problems Maternal Grandfather     No Known Problems Paternal Grandfather     No Known Problems Paternal Aunt     BRCA2 Positive Neg Hx     BRCA2 Negative Neg Hx     BRCA1 Positive Neg Hx     BRCA1 Negative Neg Hx     BRCA 1/2 Neg Hx     Ovarian cancer Neg Hx     Endometrial cancer Neg Hx     Colon cancer Neg Hx     Breast cancer  additional onset Neg Hx     Breast cancer Neg Hx       Social History     Tobacco Use    Smoking status: Former     Types: Cigarettes     Start date:      Quit date:      Years since quittin.8     Passive exposure: Past    Smokeless tobacco: Never   Vaping Use    Vaping status: Never Used   Substance Use Topics    Alcohol use: No    Drug use: No      E-Cigarette/Vaping    E-Cigarette Use Never User       E-Cigarette/Vaping Substances    Nicotine No     THC No     CBD No     Flavoring No     Other No     Unknown No       I have reviewed and agree with the history as documented.       History provided by:  Medical records, patient and caregiver  Palpitations  Palpitations quality:  Fast  Onset quality:  Sudden  Duration:  4 days  Timing:  Intermittent  Progression:  Waxing and waning  Chronicity:  New  Context comment:  Patient with 4 days of intermittent palpitations, chest tightness  Relieved by:  Nothing  Worsened by:  Nothing  Ineffective treatments:  None tried  Associated symptoms: no back pain, no chest pain, no cough, no dizziness, no nausea, no shortness of breath, no vomiting and no weakness    Risk factors: no hx of PE    Risk factors comment:  Patient had echocardiogram 3 months ago for lightheadedness when standing      Review of Systems   Constitutional:  Negative for chills, fatigue and fever.   HENT:  Negative for ear discharge, ear pain, rhinorrhea and sore throat.    Eyes:  Negative for pain and visual disturbance.   Respiratory:  Positive for chest tightness. Negative for cough and shortness of breath.    Cardiovascular:  Positive for palpitations. Negative for chest pain.   Gastrointestinal:  Negative for abdominal pain, diarrhea, nausea and vomiting.   Endocrine: Negative for polydipsia, polyphagia and polyuria.   Genitourinary:  Negative for difficulty urinating, dysuria, flank pain and hematuria.   Musculoskeletal:  Negative for arthralgias and back pain.   Skin:  Negative for color  change and rash.   Allergic/Immunologic: Negative for immunocompromised state.   Neurological:  Negative for dizziness, seizures, syncope, weakness and headaches.   Psychiatric/Behavioral:  Negative for confusion and self-injury. The patient is not nervous/anxious.    All other systems reviewed and are negative.          Objective       ED Triage Vitals   Temperature Pulse Blood Pressure Respirations SpO2 Patient Position - Orthostatic VS   10/23/24 0911 10/23/24 0911 10/23/24 0911 10/23/24 0911 10/23/24 0911 10/23/24 0911   (!) 97.2 °F (36.2 °C) 104 155/81 20 97 % Lying      Temp Source Heart Rate Source BP Location FiO2 (%) Pain Score    10/23/24 0911 10/23/24 0911 10/23/24 0911 -- 10/23/24 1029    Temporal Monitor Left arm  No Pain      Vitals      Date and Time Temp Pulse SpO2 Resp BP Pain Score FACES Pain Rating User   10/23/24 1029 -- 85 99 % 13 117/89 No Pain -- KZ   10/23/24 0930 -- 83 97 % 18 116/63 -- -- KZ   10/23/24 0911 97.2 °F (36.2 °C) 104 97 % 20 155/81 -- -- SS            Physical Exam  Vitals and nursing note reviewed.   Constitutional:       General: She is not in acute distress.     Appearance: Normal appearance. She is obese. She is not ill-appearing, toxic-appearing or diaphoretic.   HENT:      Head: Normocephalic and atraumatic.      Nose: Nose normal. No congestion or rhinorrhea.      Mouth/Throat:      Mouth: Mucous membranes are moist.      Pharynx: Oropharynx is clear. No oropharyngeal exudate or posterior oropharyngeal erythema.   Eyes:      General:         Right eye: No discharge.         Left eye: No discharge.   Cardiovascular:      Rate and Rhythm: Normal rate and regular rhythm.      Pulses: Normal pulses.      Heart sounds: Normal heart sounds. No murmur heard.     No gallop.      Comments: 80 bpm  Pulmonary:      Effort: Pulmonary effort is normal. No respiratory distress.      Breath sounds: Normal breath sounds. No stridor. No wheezing, rhonchi or rales.   Chest:      Chest  wall: No tenderness.   Abdominal:      General: Bowel sounds are normal. There is no distension.      Palpations: Abdomen is soft. There is no mass.      Tenderness: There is no abdominal tenderness. There is no right CVA tenderness, left CVA tenderness, guarding or rebound.      Hernia: No hernia is present.   Musculoskeletal:         General: Normal range of motion.      Cervical back: Normal range of motion and neck supple.   Skin:     General: Skin is warm and dry.      Capillary Refill: Capillary refill takes less than 2 seconds.      Comments: Mild alopecia.  The patient has 2 skin cancer excision sites on her back with 4 sutures each, sites without evidence of infection and healing well, nontender.   Neurological:      General: No focal deficit present.      Mental Status: She is alert and oriented to person, place, and time.      Cranial Nerves: No cranial nerve deficit.      Sensory: No sensory deficit.      Motor: No weakness.      Coordination: Coordination normal.      Gait: Gait normal.      Deep Tendon Reflexes: Reflexes normal.   Psychiatric:         Mood and Affect: Mood normal.         Behavior: Behavior normal.         Thought Content: Thought content normal.         Judgment: Judgment normal.         Results Reviewed       Procedure Component Value Units Date/Time    D-Dimer [[id number]]  (Normal) Collected: 10/23/24 0949    Lab Status: Final result Specimen: Blood from Arm, Left Updated: 10/23/24 1031     D-Dimer, Quant <0.27 ug/ml FEU     Narrative:      In the evaluation for possible pulmonary embolism, in the appropriate (Well's Score of 4 or less) patient, the age adjusted d-dimer cutoff for this patient can be calculated as:    Age x 0.01 (in ug/mL) for Age-adjusted D-dimer exclusion threshold for a patient over 50 years.    TSH, 3rd generation with Free T4 reflex [[id number]]  (Normal) Collected: 10/23/24 0949    Lab Status: Final result Specimen: Blood from Arm, Left Updated: 10/23/24 1027      TSH 3RD GENERATON 0.888 uIU/mL     HS Troponin 0hr (reflex protocol) [[id number]]  (Normal) Collected: 10/23/24 0949    Lab Status: Final result Specimen: Blood from Arm, Left Updated: 10/23/24 1018     hs TnI 0hr <2 ng/L     HS Troponin I 2hr [[id number]]     Lab Status: No result Specimen: Blood     Comprehensive metabolic panel [[id number]]  (Abnormal) Collected: 10/23/24 0949    Lab Status: Final result Specimen: Blood from Arm, Left Updated: 10/23/24 1015     Sodium 134 mmol/L      Potassium 4.0 mmol/L      Chloride 104 mmol/L      CO2 17 mmol/L      ANION GAP 13 mmol/L      BUN 16 mg/dL      Creatinine 0.59 mg/dL      Glucose 208 mg/dL      Calcium 8.8 mg/dL      AST 15 U/L      ALT 27 U/L      Alkaline Phosphatase 43 U/L      Total Protein 7.1 g/dL      Albumin 4.1 g/dL      Total Bilirubin 0.77 mg/dL      eGFR 107 ml/min/1.73sq m     Narrative:      National Kidney Disease Foundation guidelines for Chronic Kidney Disease (CKD):     Stage 1 with normal or high GFR (GFR > 90 mL/min/1.73 square meters)    Stage 2 Mild CKD (GFR = 60-89 mL/min/1.73 square meters)    Stage 3A Moderate CKD (GFR = 45-59 mL/min/1.73 square meters)    Stage 3B Moderate CKD (GFR = 30-44 mL/min/1.73 square meters)    Stage 4 Severe CKD (GFR = 15-29 mL/min/1.73 square meters)    Stage 5 End Stage CKD (GFR <15 mL/min/1.73 square meters)  Note: GFR calculation is accurate only with a steady state creatinine    Magnesium [[id number]]  (Abnormal) Collected: 10/23/24 0949    Lab Status: Final result Specimen: Blood from Arm, Left Updated: 10/23/24 1015     Magnesium 1.7 mg/dL     CBC and differential [[id number]]  (Abnormal) Collected: 10/23/24 0949    Lab Status: Final result Specimen: Blood from Arm, Left Updated: 10/23/24 0955     WBC 8.20 Thousand/uL      RBC 5.46 Million/uL      Hemoglobin 15.8 g/dL      Hematocrit 46.9 %      MCV 86 fL      MCH 28.9 pg      MCHC 33.7 g/dL      RDW 15.0 %      MPV 9.3 fL      Platelets 235 Thousands/uL   "    nRBC 0 /100 WBCs      Segmented % 64 %      Immature Grans % 1 %      Lymphocytes % 26 %      Monocytes % 6 %      Eosinophils Relative 2 %      Basophils Relative 1 %      Absolute Neutrophils 5.18 Thousands/µL      Absolute Immature Grans 0.08 Thousand/uL      Absolute Lymphocytes 2.14 Thousands/µL      Absolute Monocytes 0.52 Thousand/µL      Eosinophils Absolute 0.20 Thousand/µL      Basophils Absolute 0.08 Thousands/µL             XR chest 1 view portable    (Results Pending)       Suture removal    Date/Time: 10/23/2024 10:50 AM    Performed by: Ye Hemphill MD  Authorized by: Ye Hemphill MD  [place] Protocol:  procedure performed by consultantConsent: Verbal consent obtained.  Risks and benefits: risks, benefits and alternatives were discussed  Consent given by: patient and guardian  Time out: Immediately prior to procedure a \"time out\" was called to verify the correct patient, procedure, equipment, support staff and site/side marked as required.  Timeout called at: 10/23/2024 10:50 AM.  Patient identity confirmed: verbally with patient, arm band, provided demographic data and hospital-assigned identification number      Patient location:  ED  Location:     Location:  Trunk    Trunk location:  Back  Procedure details:     Tools used:  Suture removal kit    Wound appearance:  No sign(s) of infection, good wound healing and clean    Number of sutures removed:  8  Post-procedure details:     Post-removal:  No dressing applied    Patient tolerance of procedure:  Tolerated well, no immediate complications      ED Medication and Procedure Management   Prior to Admission Medications   Prescriptions Last Dose Informant Patient Reported? Taking?   Empagliflozin (Jardiance) 25 MG TABS  Self No No   Sig: Take 1 tablet (25 mg total) by mouth daily   Fluticasone-Salmeterol (Advair) 250-50 mcg/dose inhaler  Self No No   Sig: Inhale 1 puff 2 (two) times a day Rinse mouth after use.   Lancet Devices " (OneTouch Delica Plus Lancing) MISC  Self Yes No   Lancets (onetouch ultrasoft) lancets  Self No No   Sig: Use as instructed   albuterol (Ventolin HFA) 90 mcg/act inhaler  Self No No   Sig: Inhale 2 puffs every 6 (six) hours as needed for wheezing or shortness of breath   atorvastatin (LIPITOR) 20 mg tablet  Self No No   Sig: Take 1 tablet (20 mg total) by mouth daily   Patient taking differently: Take 20 mg by mouth daily at bedtime   celecoxib (CeleBREX) 200 mg capsule   No No   Sig: Take 1 capsule (200 mg total) by mouth 2 (two) times a day   cetirizine (ZyrTEC) 10 mg tablet  Self No No   Si tab po daily x 1 month then prn allergies   Patient taking differently: Take 10 mg by mouth daily 1 tab po daily x 1 month then prn allergies   clotrimazole-betamethasone (LOTRISONE) 1-0.05 % cream  Self No No   Sig: APPLY TWICE DAILY TO VAGINAL AREA UNTIL HEALED THEN AS NEEDED FOR IRRITATION.   cromolyn (OPTICROM) 4 % ophthalmic solution  Self No No   Sig: Administer 1 drop to both eyes 4 (four) times a day   ergocalciferol (VITAMIN D2) 50,000 units  Self No No   Sig: Take 1 capsule (50,000 Units total) by mouth once a week   famotidine (PEPCID) 40 MG tablet  Self No No   Sig: TAKE (1) TABLET BY MOUTH DAILY.   finasteride (PROPECIA) 1 MG tablet   No No   Sig: Take 1 tablet (1 mg total) by mouth daily   fluconazole (DIFLUCAN) 150 mg tablet   Yes No   Sig: TAKE ONE TABLET BY MOUTH ONCE DAILY FOR ONE DAY   fluticasone (FLONASE) 50 mcg/act nasal spray  Self No No   Sig: USE 1 SPRAY INTO EACH NOSTRIL TWICE A DAY FOR 1 MONTH THEN AS NEEDED FOR CONGESTION AND ALLERGIES   gabapentin (NEURONTIN) 300 mg capsule  Self No No   Sig: TAKE (1) CAPSULE THREE TIMES DAILY.   glipiZIDE (GLUCOTROL) 10 mg tablet   No No   Sig: TAKE (2) TABLETS TWICE DAILY BEFORE MEALS.   glucose blood test strip  Self Yes No   glucose blood test strip  Self No No   Sig: Use as instructed   ketoconazole (NIZORAL) 2 % cream  Self No No   Sig: Apply topically  daily   lisinopril (ZESTRIL) 20 mg tablet  Self No No   Sig: Take 1 tablet (20 mg total) by mouth daily   metFORMIN (GLUCOPHAGE) 1000 MG tablet  Self No No   Sig: TAKE (1) TABLET TWICE A DAY WITH MEALS.   metoprolol succinate (TOPROL-XL) 25 mg 24 hr tablet   No No   Sig: Take 1 tablet (25 mg total) by mouth daily   nystatin powder  Self No No   Sig: Apply topically 2 (two) times a day   omeprazole (PriLOSEC) 40 MG capsule  Self No No   Sig: Take 1 capsule (40 mg total) by mouth daily before breakfast   ondansetron (ZOFRAN) 4 mg tablet  Self No No   Sig: Take 1 tablet (4 mg total) by mouth every 8 (eight) hours as needed for nausea or vomiting   sertraline (ZOLOFT) 50 mg tablet  Self No No   Sig: TAKE (1) TABLET BY MOUTH DAILY AT BEDTIME.   triamcinolone (KENALOG) 0.025 % cream  Self No No   Sig: Apply 2 x daily to rash until healed then prn rash      Facility-Administered Medications: None     Discharge Medication List as of 10/23/2024 10:31 AM        CONTINUE these medications which have NOT CHANGED    Details   albuterol (Ventolin HFA) 90 mcg/act inhaler Inhale 2 puffs every 6 (six) hours as needed for wheezing or shortness of breath, Starting Thu 6/8/2023, Normal      atorvastatin (LIPITOR) 20 mg tablet Take 1 tablet (20 mg total) by mouth daily, Starting Wed 8/7/2024, Normal      celecoxib (CeleBREX) 200 mg capsule Take 1 capsule (200 mg total) by mouth 2 (two) times a day, Starting Mon 10/7/2024, Normal      cetirizine (ZyrTEC) 10 mg tablet 1 tab po daily x 1 month then prn allergies, Normal      clotrimazole-betamethasone (LOTRISONE) 1-0.05 % cream APPLY TWICE DAILY TO VAGINAL AREA UNTIL HEALED THEN AS NEEDED FOR IRRITATION., Normal      cromolyn (OPTICROM) 4 % ophthalmic solution Administer 1 drop to both eyes 4 (four) times a day, Starting Tue 9/3/2024, Normal      Empagliflozin (Jardiance) 25 MG TABS Take 1 tablet (25 mg total) by mouth daily, Starting Wed 11/8/2023, Normal      ergocalciferol (VITAMIN D2)  50,000 units Take 1 capsule (50,000 Units total) by mouth once a week, Starting Mon 8/12/2024, Normal      famotidine (PEPCID) 40 MG tablet TAKE (1) TABLET BY MOUTH DAILY., Normal      finasteride (PROPECIA) 1 MG tablet Take 1 tablet (1 mg total) by mouth daily, Starting Mon 10/7/2024, Normal      fluconazole (DIFLUCAN) 150 mg tablet TAKE ONE TABLET BY MOUTH ONCE DAILY FOR ONE DAY, Historical Med      fluticasone (FLONASE) 50 mcg/act nasal spray USE 1 SPRAY INTO EACH NOSTRIL TWICE A DAY FOR 1 MONTH THEN AS NEEDED FOR CONGESTION AND ALLERGIES, Normal      Fluticasone-Salmeterol (Advair) 250-50 mcg/dose inhaler Inhale 1 puff 2 (two) times a day Rinse mouth after use., Starting Fri 7/5/2024, Normal      gabapentin (NEURONTIN) 300 mg capsule TAKE (1) CAPSULE THREE TIMES DAILY., Normal      glipiZIDE (GLUCOTROL) 10 mg tablet TAKE (2) TABLETS TWICE DAILY BEFORE MEALS., Normal      !! glucose blood test strip Historical Med      !! glucose blood test strip Use as instructed, Normal      ketoconazole (NIZORAL) 2 % cream Apply topically daily, Starting Tue 7/30/2024, Normal      Lancet Devices (OneTouch Delica Plus Lancing) MISC Historical Med      Lancets (onetouch ultrasoft) lancets Use as instructed, Normal      lisinopril (ZESTRIL) 20 mg tablet Take 1 tablet (20 mg total) by mouth daily, Starting Tue 7/23/2024, Normal      metFORMIN (GLUCOPHAGE) 1000 MG tablet TAKE (1) TABLET TWICE A DAY WITH MEALS., Normal      metoprolol succinate (TOPROL-XL) 25 mg 24 hr tablet Take 1 tablet (25 mg total) by mouth daily, Starting Tue 10/22/2024, Phone In      nystatin powder Apply topically 2 (two) times a day, Starting Mon 7/8/2024, Normal      omeprazole (PriLOSEC) 40 MG capsule Take 1 capsule (40 mg total) by mouth daily before breakfast, Starting Fri 3/22/2024, Normal      ondansetron (ZOFRAN) 4 mg tablet Take 1 tablet (4 mg total) by mouth every 8 (eight) hours as needed for nausea or vomiting, Starting Tue 5/21/2024, Normal       sertraline (ZOLOFT) 50 mg tablet TAKE (1) TABLET BY MOUTH DAILY AT BEDTIME., Normal      triamcinolone (KENALOG) 0.025 % cream Apply 2 x daily to rash until healed then prn rash, Normal       !! - Potential duplicate medications found. Please discuss with provider.        No discharge procedures on file.  ED SEPSIS DOCUMENTATION   Time reflects when diagnosis was documented in both MDM as applicable and the Disposition within this note       Time User Action Codes Description Comment    10/23/2024 10:30 AM Ye Hemphill Add [R00.2] Palpitations     10/23/2024 10:30 AM Ye Hemphill Add [R07.9] Chest pain, unspecified type     10/23/2024 10:30 AM Ye Hemphill Add [E83.42] Hypomagnesemia                  Ye Hemphill MD  10/23/24 2040

## 2024-10-24 ENCOUNTER — TELEPHONE (OUTPATIENT)
Dept: FAMILY MEDICINE CLINIC | Facility: CLINIC | Age: 49
End: 2024-10-24

## 2024-10-24 ENCOUNTER — APPOINTMENT (OUTPATIENT)
Dept: LAB | Facility: HOSPITAL | Age: 49
End: 2024-10-24
Payer: COMMERCIAL

## 2024-10-24 DIAGNOSIS — D72.824 BASOPHILIC LEUKOCYTOSIS: ICD-10-CM

## 2024-10-24 DIAGNOSIS — E83.42 HYPOMAGNESEMIA: ICD-10-CM

## 2024-10-24 DIAGNOSIS — E83.42 HYPOMAGNESEMIA: Primary | ICD-10-CM

## 2024-10-24 DIAGNOSIS — D50.8 OTHER IRON DEFICIENCY ANEMIA: ICD-10-CM

## 2024-10-24 DIAGNOSIS — Z86.19: ICD-10-CM

## 2024-10-24 DIAGNOSIS — D75.1 ERYTHROCYTOSIS: ICD-10-CM

## 2024-10-24 LAB — MAGNESIUM SERPL-MCNC: 1.9 MG/DL (ref 1.9–2.7)

## 2024-10-24 PROCEDURE — 83735 ASSAY OF MAGNESIUM: CPT

## 2024-10-24 PROCEDURE — 36415 COLL VENOUS BLD VENIPUNCTURE: CPT

## 2024-10-24 NOTE — TELEPHONE ENCOUNTER
----- Message from Gaurav Merino PA-C sent at 10/24/2024 12:40 PM EDT -----  Magnesium is normal at 1.9.  I will check this again in 2 weeks to make sure it is stable.

## 2024-10-26 NOTE — PATIENT INSTRUCTIONS
Problem: Chronic Conditions and Co-morbidities  Goal: Patient's chronic conditions and co-morbidity symptoms are monitored and maintained or improved  10/26/2024 0956 by Tatum Lyman, RN  Outcome: Progressing  10/25/2024 2145 by Jennifer Mondragon, RN  Outcome: Progressing      [phone]

## 2024-10-26 NOTE — PSYCH
"Telemedicine consent    Patient: Cristy Garcia  Provider: Kristina Guerrero LCSW  Provider located at [place] [address] [facility] [address]    The patient was identified by name and date of birth. Cristy Garcia was informed that this is a telemedicine visit and that the visit is being conducted through the [employer] platform. She agrees to proceed..  My office door was closed. No one else was in the room.  She acknowledged consent and understanding of privacy and security of the video platform. The patient has agreed to participate and understands they can discontinue the visit at any time.    Patient is aware this is a billable service.     I spent 20 minutes with the patient during this visit.  Behavioral Health Psychotherapy Progress Note    Psychotherapy Provided: Individual Psychotherapy     1. Major depression, recurrent, chronic (HCC)            Goals addressed in session: Goal 1 and Goal 2     DATA: The client reported that her skin cancer went well and she is still waiting for the results.The client reported that her anxiety and depression has been cycling over the past few days waiting for the results. As team we explored the client's physical health is affecting mental health. \" I am always getting something to get done\"  Coping skills were reviewed during the session.   During this session, this clinician used the following therapeutic modalities: Cognitive Behavioral Therapy, Mindfulness-based Strategies, Solution-Focused Therapy, and Supportive Psychotherapy    Substance Abuse was addressed during this session. If the client is diagnosed with a co-occurring substance use disorder, please indicate any changes in the frequency or amount of use: none . Stage of change for addressing substance use diagnoses: No substance use/Not applicable    ASSESSMENT:  Cristy Garcia presents with a Anxious and Depressed mood.     her affect is Normal " "range and intensity, which is congruent, with her mood and the content of the session. The client has made progress on their goals.    The client  Cristy Garcia presents with a none risk of suicide, none risk of self-harm, and none risk of harm to others.    For any risk assessment that surpasses a \"low\" rating, a safety plan must be developed.    A safety plan was indicated: no  If yes, describe in detail n/a  PHYSICAL PAIN SCALE NUMBERS: 7 as reported by the client  PSYCH MENTAL STATUS PAIN 6 as reported by the client   PLAN: Between sessions, Cristy Garcia will practice coping skills when presented with anxiety or depression. At the next session, the therapist will use Cognitive Behavioral Therapy, Mindfulness-based Strategies, Solution-Focused Therapy, and Supportive Psychotherapy to address the client's MH Issues affecting different relationships and environments.    Behavioral Health Treatment Plan and Discharge Planning: Cristy Garcia is aware of and agrees to continue to work on their treatment plan. They have identified and are working toward their discharge goals. yes    Visit start and stop times:    10/28/24  Start Time: 1508  Stop Time: 1528  Total Visit Time: 20 minutes  "

## 2024-10-28 ENCOUNTER — TELEMEDICINE (OUTPATIENT)
Dept: BEHAVIORAL/MENTAL HEALTH CLINIC | Facility: CLINIC | Age: 49
End: 2024-10-28

## 2024-10-28 DIAGNOSIS — F33.9 MAJOR DEPRESSION, RECURRENT, CHRONIC (HCC): Primary | ICD-10-CM

## 2024-11-05 DIAGNOSIS — E11.65 TYPE 2 DIABETES MELLITUS WITH HYPERGLYCEMIA, WITHOUT LONG-TERM CURRENT USE OF INSULIN (HCC): ICD-10-CM

## 2024-11-05 DIAGNOSIS — M79.7 FIBROMYALGIA, PRIMARY: ICD-10-CM

## 2024-11-06 RX ORDER — EMPAGLIFLOZIN 25 MG/1
25 TABLET, FILM COATED ORAL DAILY
Qty: 30 TABLET | Refills: 5 | Status: SHIPPED | OUTPATIENT
Start: 2024-11-06

## 2024-11-06 RX ORDER — CELECOXIB 200 MG/1
200 CAPSULE ORAL 2 TIMES DAILY
Qty: 60 CAPSULE | Refills: 5 | Status: SHIPPED | OUTPATIENT
Start: 2024-11-06

## 2024-11-08 ENCOUNTER — TELEPHONE (OUTPATIENT)
Age: 49
End: 2024-11-08

## 2024-11-08 NOTE — TELEPHONE ENCOUNTER
Last visit 09/03/2024  Next appt 12/12/2024    Leigh [facility] called to make a preop appt for the patient, however there was no availability. The surgery is for a hysterectomy on 12/27 with Dr Seymour. Please contact Leigh to schedule an appt for patient at [phone]. Thank you.

## 2024-11-11 ENCOUNTER — APPOINTMENT (OUTPATIENT)
Dept: LAB | Facility: HOSPITAL | Age: 49
End: 2024-11-11
Payer: COMMERCIAL

## 2024-11-11 ENCOUNTER — TELEPHONE (OUTPATIENT)
Dept: FAMILY MEDICINE CLINIC | Facility: CLINIC | Age: 49
End: 2024-11-11

## 2024-11-11 DIAGNOSIS — E83.42 HYPOMAGNESEMIA: ICD-10-CM

## 2024-11-11 DIAGNOSIS — E11.9 TYPE 2 DIABETES MELLITUS WITHOUT COMPLICATION, WITHOUT LONG-TERM CURRENT USE OF INSULIN (HCC): ICD-10-CM

## 2024-11-11 LAB
ALBUMIN SERPL BCG-MCNC: 4.3 G/DL (ref 3.5–5)
ALP SERPL-CCNC: 44 U/L (ref 34–104)
ALT SERPL W P-5'-P-CCNC: 34 U/L (ref 7–52)
ANION GAP SERPL CALCULATED.3IONS-SCNC: 8 MMOL/L (ref 4–13)
AST SERPL W P-5'-P-CCNC: 20 U/L (ref 13–39)
BASOPHILS # BLD AUTO: 0.09 THOUSANDS/ÂΜL (ref 0–0.1)
BASOPHILS NFR BLD AUTO: 1 % (ref 0–1)
BILIRUB SERPL-MCNC: 0.92 MG/DL (ref 0.2–1)
BUN SERPL-MCNC: 17 MG/DL (ref 5–25)
CALCIUM SERPL-MCNC: 8.9 MG/DL (ref 8.4–10.2)
CHLORIDE SERPL-SCNC: 103 MMOL/L (ref 96–108)
CO2 SERPL-SCNC: 25 MMOL/L (ref 21–32)
CREAT SERPL-MCNC: 0.65 MG/DL (ref 0.6–1.3)
CREAT UR-MCNC: 75.8 MG/DL
EOSINOPHIL # BLD AUTO: 0.29 THOUSAND/ÂΜL (ref 0–0.61)
EOSINOPHIL NFR BLD AUTO: 3 % (ref 0–6)
ERYTHROCYTE [DISTWIDTH] IN BLOOD BY AUTOMATED COUNT: 14 % (ref 11.6–15.1)
EST. AVERAGE GLUCOSE BLD GHB EST-MCNC: 143 MG/DL
FERRITIN SERPL-MCNC: 23 NG/ML (ref 11–307)
FOLATE SERPL-MCNC: >22.3 NG/ML
GFR SERPL CREATININE-BSD FRML MDRD: 103 ML/MIN/1.73SQ M
GLUCOSE P FAST SERPL-MCNC: 148 MG/DL (ref 65–99)
HBA1C MFR BLD: 6.6 %
HCT VFR BLD AUTO: 49.2 % (ref 34.8–46.1)
HGB BLD-MCNC: 16.1 G/DL (ref 11.5–15.4)
IMM GRANULOCYTES # BLD AUTO: 0.07 THOUSAND/UL (ref 0–0.2)
IMM GRANULOCYTES NFR BLD AUTO: 1 % (ref 0–2)
IRON SATN MFR SERPL: 31 % (ref 15–50)
IRON SERPL-MCNC: 133 UG/DL (ref 50–212)
LYMPHOCYTES # BLD AUTO: 2.8 THOUSANDS/ÂΜL (ref 0.6–4.47)
LYMPHOCYTES NFR BLD AUTO: 29 % (ref 14–44)
MAGNESIUM SERPL-MCNC: 1.9 MG/DL (ref 1.9–2.7)
MCH RBC QN AUTO: 29.4 PG (ref 26.8–34.3)
MCHC RBC AUTO-ENTMCNC: 32.7 G/DL (ref 31.4–37.4)
MCV RBC AUTO: 90 FL (ref 82–98)
MICROALBUMIN UR-MCNC: <7 MG/L
MONOCYTES # BLD AUTO: 0.45 THOUSAND/ÂΜL (ref 0.17–1.22)
MONOCYTES NFR BLD AUTO: 5 % (ref 4–12)
NEUTROPHILS # BLD AUTO: 5.98 THOUSANDS/ÂΜL (ref 1.85–7.62)
NEUTS SEG NFR BLD AUTO: 61 % (ref 43–75)
NRBC BLD AUTO-RTO: 0 /100 WBCS
PLATELET # BLD AUTO: 271 THOUSANDS/UL (ref 149–390)
PMV BLD AUTO: 9.1 FL (ref 8.9–12.7)
POTASSIUM SERPL-SCNC: 4.3 MMOL/L (ref 3.5–5.3)
PROT SERPL-MCNC: 7 G/DL (ref 6.4–8.4)
RBC # BLD AUTO: 5.47 MILLION/UL (ref 3.81–5.12)
SODIUM SERPL-SCNC: 136 MMOL/L (ref 135–147)
TIBC SERPL-MCNC: 423 UG/DL (ref 250–450)
UIBC SERPL-MCNC: 290 UG/DL (ref 155–355)
VIT B12 SERPL-MCNC: 519 PG/ML (ref 180–914)
WBC # BLD AUTO: 9.68 THOUSAND/UL (ref 4.31–10.16)

## 2024-11-11 PROCEDURE — 80053 COMPREHEN METABOLIC PANEL: CPT

## 2024-11-11 PROCEDURE — 36415 COLL VENOUS BLD VENIPUNCTURE: CPT

## 2024-11-11 PROCEDURE — 82043 UR ALBUMIN QUANTITATIVE: CPT

## 2024-11-11 PROCEDURE — 82570 ASSAY OF URINE CREATININE: CPT

## 2024-11-11 PROCEDURE — 83036 HEMOGLOBIN GLYCOSYLATED A1C: CPT

## 2024-11-11 PROCEDURE — 83735 ASSAY OF MAGNESIUM: CPT

## 2024-11-11 NOTE — TELEPHONE ENCOUNTER
----- Message from Gaurav Merino PA-C sent at 11/11/2024 11:16 AM EST -----  Metabolic panel and magnesium stable.

## 2024-11-12 ENCOUNTER — TELEPHONE (OUTPATIENT)
Dept: FAMILY MEDICINE CLINIC | Facility: CLINIC | Age: 49
End: 2024-11-12

## 2024-11-12 NOTE — TELEPHONE ENCOUNTER
----- Message from Gaurav Merino PA-C sent at 11/12/2024  1:46 PM EST -----  A1c has worsened but still stable at 6.6. No changes at this time.  Watch sugars, staches, and fats.  Urine ratio is stable.

## 2024-11-17 NOTE — PSYCH
"Behavioral Health Psychotherapy Progress Note    Psychotherapy Provided: Individual Psychotherapy     1. Major depression, recurrent, chronic (HCC)            Goals addressed in session: Goal 1 and Goal 2 /Safety Plan    DATA: The client reported that she will be having hysterectomy on December 27 th [facility], with Dr José Luis Campuzano. During the session we explored and processed the client's changes in her body and the effects on her mental health. She was able to verbalize that the surgery is a ' blessing to stop the pain and bleeding\" During the session we created her recovery safety plan which will cover the next year.   During this session, this clinician used the following therapeutic modalities: Cognitive Behavioral Therapy, Mindfulness-based Strategies, Solution-Focused Therapy, and Supportive Psychotherapy    Substance Abuse was addressed during this session. If the client is diagnosed with a co-occurring substance use disorder, please indicate any changes in the frequency or amount of use: none . Stage of change for addressing substance use diagnoses: No substance use/Not applicable    ASSESSMENT:  Cristy Garcia presents with a Anxious and Depressed mood.     her affect is Normal range and intensity, which is congruent, with her mood and the content of the session. The client has made progress on their goals.    The client  Cristy Garcia presents with a none risk of suicide, none risk of self-harm, and none risk of harm to others.    For any risk assessment that surpasses a \"low\" rating, a safety plan must be developed.    A safety plan was indicated: no  If yes, describe in detail n/a    PLAN: Between sessions, Cristy Garcia will practice coping skills when presented with anxiety and or depression. At the next session, the therapist will use Cognitive Behavioral Therapy, Mindfulness-based Strategies, Solution-Focused Therapy, and Supportive Psychotherapy to address the client's MH issues affecting her " different relationships and environments.    Behavioral Health Treatment Plan and Discharge Planning: Cristy Garcia is aware of and agrees to continue to work on their treatment plan. They have identified and are working toward their discharge goals. yes    Visit start and stop times:    11/18/24  Start Time: 1052  Stop Time: 1129  Total Visit Time: 37 minutes      Telemedicine consent    Patient: Cristy Garcia  Provider: Kristina Guerrero [facility]  Provider located at [address] [address]    The patient was identified by name and date of birth. Cristy Garcia was informed that this is a telemedicine visit and that the visit is being conducted through the [employer] Embedded platform. She agrees to proceed..  My office door was closed. No one else was in the room.  She acknowledged consent and understanding of privacy and security of the video platform. The patient has agreed to participate and understands they can discontinue the visit at any time.    Patient is aware this is a billable service.     I spent 37 minutes with the patient during this visit.

## 2024-11-18 ENCOUNTER — TELEMEDICINE (OUTPATIENT)
Dept: BEHAVIORAL/MENTAL HEALTH CLINIC | Facility: CLINIC | Age: 49
End: 2024-11-18

## 2024-11-18 DIAGNOSIS — F33.9 MAJOR DEPRESSION, RECURRENT, CHRONIC (HCC): Primary | ICD-10-CM

## 2024-11-18 NOTE — BH CRISIS PLAN
Client Name: Cristy Garcia       Client YOB: 1975    MichaelCodey Safety Plan      Creation Date: 1/16/24 Update Date: 11/18/24   Created By: Kristina Guerrero LCSW Last Updated By: Kristina Guerrero LCSW      Step 1: Warning Signs:   Warning Signs   Pain increases   isolate   cranky            Step 2: Internal Coping Strategies:   Internal Coping Strategies   go outside   play with the cats   watch movies   listen to music            Step 3: People and social settings that provide distraction:   Name Contact Information   W[place]ka in phone   Suellen in phone   Kary In phone   [place] in phone    Places   go to [facility] [employer]   outside   go on support group discord           Step 4: People whom I can ask for help during a crisis:      Name Contact Information    Suellen in phone    Waleska In phone    Kary in phone      Step 5: Professionals or agencies I can contact during a crisis:      Clinican/Agency Name Phone Emergency Contact    [facility] [phone] [facility] [phone] [place]      [facility] Emergency Department Emergency Department Phone Emergency Department Address    [facility] [phone] [address] [place] [address]. JOHN Tapia        Crisis Phone Numbers:   Suicide Prevention Lifeline: Call or Text  450 Crisis Text Line: Text HOME to 604-727   Please note: Some [facility] do not have a separate number for Child/Adolescent specific crisis. If your county is not listed under Child/Adolescent, please call the adult number for your county      Adult Crisis Numbers: Child/Adolescent Crisis Numbers   [facility]: [phone] [facility]: [phone]   [facility]: [phone] [facility]: [phone]   Psychiatric: [phone] [place]: [phone]   [facility]: [phone] [facility] [facility] [facility]: [phone]   [facility] [facility]: [phone]   [facility]: [phone]   [facility]: [phone]  "  [facility]: [phone] (daytime) [phone] (after hours, weekends, holidays)      Step 6: Making the environment safer (plan for lethal means safety):   Plan: The client does not have any guns, medication is put away     Optional: What is most important to me and worth living for?   \" My sister and my Pets\"      Ольга Safety Plan. Josiane Rodriguez and Mack Alegre. Used with permission of the authors.           "

## 2024-11-23 DIAGNOSIS — B37.31 VAGINAL CANDIDIASIS: Primary | ICD-10-CM

## 2024-11-25 ENCOUNTER — OFFICE VISIT (OUTPATIENT)
Dept: HEMATOLOGY ONCOLOGY | Facility: CLINIC | Age: 49
End: 2024-11-25
Payer: COMMERCIAL

## 2024-11-25 VITALS
SYSTOLIC BLOOD PRESSURE: 114 MMHG | OXYGEN SATURATION: 97 % | HEIGHT: 62 IN | DIASTOLIC BLOOD PRESSURE: 78 MMHG | BODY MASS INDEX: 45.18 KG/M2 | RESPIRATION RATE: 16 BRPM | HEART RATE: 75 BPM | WEIGHT: 245.5 LBS | TEMPERATURE: 96.9 F

## 2024-11-25 DIAGNOSIS — D50.8 OTHER IRON DEFICIENCY ANEMIA: ICD-10-CM

## 2024-11-25 DIAGNOSIS — D75.1 ERYTHROCYTOSIS: ICD-10-CM

## 2024-11-25 DIAGNOSIS — D75.1 RELATIVE POLYCYTHEMIA: Primary | ICD-10-CM

## 2024-11-25 LAB
Lab: NORMAL
MISCELLANEOUS LAB TEST RESULT: NORMAL

## 2024-11-25 PROCEDURE — 99214 OFFICE O/P EST MOD 30 MIN: CPT | Performed by: PHYSICIAN ASSISTANT

## 2024-11-25 NOTE — PROGRESS NOTES
[address]  [facility] [place]  [address] [address]  Hematology Ambulatory Follow-Up  Cristy Garcia, 1975, [id number]  11/25/2024      Assessment and Plan   1. Relative polycythemia (Primary)  Patient likely has relative polycythemia secondary to fasting, obstructive sleep apnea.  Patient will go for nonfasting blood work in 6 months.  We will continue to monitor for iron abnormalities see #2 below.  - CBC and differential; Future  - Iron Panel (Includes Ferritin, Iron Sat%, Iron, and TIBC); Future    2. Other iron deficiency anemia  Is responding quite well to oral iron regimen.  I will see the patient back in 6 months and if labs remain stable she will be discharged back to PCP for further monitoring.    Regimen:  Ferrous sulfate 65 mg elemental iron p.o. every other day for tolerance.    - CBC and differential; Future  - Iron Panel (Includes Ferritin, Iron Sat%, Iron, and TIBC); Future    3. Erythrocytosis  See #1 above, erythrocytosis is not secondary to an underlying thalassemia/hemoglobin apathy from previous evaluation with GI electrophoresis as well as specific genetic testing for alpha thalassemia.    Observation.    - CBC and differential; Future  - Iron Panel (Includes Ferritin, Iron Sat%, Iron, and TIBC); Future    The patient is scheduled for follow-up in approximately 6 months.     Patient voiced agreement and understanding to the above.   Patient advised to call the Hematology/Oncology office with any questions and concerns regarding the above.    Barrier(s) to care: None  The patient is able to self care.    JANET HenryC  Medical Oncology/Hematology  [facility]    Subjective     Chief Complaint   Patient presents with    Follow-up     History of present illness:   This is a 48-year-old female with past medical history of hypertension, hyperlipidemia, diabetes, GERD, fibromyalgia, asthma, allergy,  obstructive sleep apnea and obesity who presents to the hematology clinic for evaluation of CBC abnormalities.     6/19/2020 WBC 8.2, hemoglobin 15.1, MCV 88, platelet count 219, differential WNL  7/22/2021 WBC 9.8, RBC 14.9, hemoglobin 14.9, MCV 90, platelet count 245              ANC = 6.15, ABC=0.09  9/16/2022 RBC 16.4, WBC 9.0, hemoglobin 16.4, MCV 88, platelet count 287              ANC 5.4, ABC = 0.11  1/24/2023 WBC 8.8, RBC 5.2 hemoglobin 15.9, MCV 90, platelet count 247              Differential WNL  4/20/2023 WBC 7.8, hemoglobin 15.2, MCV 90, platelet count 280              Differential WNL     January 2024 patient notes that menstrual cycle became more erratic.  Patient was bleeding heavily throughout the month.  Patient required progesterone to kind of reset her.  Patient now has a menstruation for 3 to 4 days every 3 to 4 weeks.  Patient is no longer on progesterone.     2/12/2024 WBC 10.2, RBC 5.5, hemoglobin 13.4, MCV 77, RDW 15.8, platelet count 263  2/22/24 WBC 8.8, RBC 5.4,  hemoglobin 13.1, MCV 77, platelet count 309     4/1/2024 WBC 10.2, RBC 6.0, hemoglobin 14.3, MCV 79 RDW 16.0, platelet count 323              Differential WNL with exception of basophilia of 0.12     Never had a colonoscopy but did have a Cologuard-12/15/2022- at 1 point in the past which was within normal limits.  Patient had a recent CT scan that demonstrated diverticulosis, patient denies rectal bleeding.     6/27/2024: BCR/able FISH negative     7/11/2024: WBC 8.5, hemoglobin 14.6, hematocrit 44.9, MCV 82, RDW 16.7, platelet count 260              Iron saturation 11%, TIBC 467, ferritin 5              Hemoglobin cascade demonstrates normal hemoglobin present and no variant or beta thalassemia identified.              Folate 16, B12 450     Patient was started on oral iron 65 mg every other day and has been compliant in taking this since blood work was taken in July.     9/11/2024 WBC 8.15, RBC 5.6, hemoglobin 15.5,  hematocrit 49.1, MCV 87, RDW 15.4, platelets 238  11/11/2024 WBC 9.6, hemoglobin 16.1, MCV 90, platelet count 271   Ferritin 23, iron saturation 31%, TIBC normalizing 423   B12 519, folate greater than 22   Alpha thalassemia DNA analysis: Negative.    Interval history: Notes compliance on CPAP machine.  Patient feeling well today.  Patient notes no side effects with oral iron supplements.    Review of Systems   Constitutional:  Negative for activity change, appetite change, chills, fatigue, fever and unexpected weight change.   HENT:  Negative for hearing loss, mouth sores, nosebleeds, sore throat, trouble swallowing and voice change.    Eyes:  Negative for visual disturbance.   Respiratory:  Negative for cough and shortness of breath.    Cardiovascular:  Negative for chest pain, palpitations and leg swelling.   Gastrointestinal:  Negative for abdominal pain, blood in stool, constipation, diarrhea, nausea and vomiting.   Endocrine: Negative for cold intolerance.   Genitourinary:  Negative for decreased urine volume, dysuria and hematuria.   Musculoskeletal:  Positive for arthralgias and gait problem. Negative for myalgias.   Skin:  Negative for rash.   Neurological:  Negative for dizziness, weakness, numbness and headaches.   Hematological:  Negative for adenopathy. Does not bruise/bleed easily.   Psychiatric/Behavioral:  Negative for dysphoric mood.        Patient Active Problem List   Diagnosis    SOB (shortness of breath)    Chronic bilateral low back pain without sciatica    Chronic pain of left knee    Chronic pain of right knee    Fibromyalgia, primary    Primary osteoarthritis involving multiple joints    Mild persistent asthma without complication    Lumbar radiculopathy    Primary osteoarthritis of both hips    Left hip pain    Major depression, recurrent, chronic (HCC)    Lumbar spondylosis    Cervical radiculopathy    Morbid obesity with body mass index (BMI) of 45.0 to 49.9 in adult (HCC)    VICTOR HUGO on CPAP     Anxiety associated with depression    Diabetes mellitus (HCC)    Hyperlipidemia    Essential hypertension    Irregular heartbeat    Chronic pain disorder    GERD (gastroesophageal reflux disease)    Fatty liver    Elevated LFTs    Esophagitis determined by endoscopy    Impacted third molar tooth    Complex dental caries    Erythrocytosis    Leukocytosis    Mild concentric left ventricular hypertrophy    Chronic fatigue    Vitamin D deficiency    Hair loss    Hx of giardiasis    Vaginal candidiasis    Trigeminal neuralgia    Vitamin B12 deficiency    Teeth missing     Past Medical History:   Diagnosis Date    Allergic     Seasonal Allergies    Alopecia of scalp     Treated with Proscar    Asthma     Chronic pain disorder     back, bilat knees, bilat hips    CPAP (continuous positive airway pressure) dependence     Diabetes mellitus (HCC)     Fibromyalgia, primary     GERD (gastroesophageal reflux disease)     Hip fx, left, closed, with routine healing, subsequent encounter 05/2017    Hyperlipidemia     Hypertension     Insomnia     Irregular heartbeat     VICTOR HUGO on CPAP      Past Surgical History:   Procedure Laterality Date    CARPAL TUNNEL RELEASE Left     IR SPINE AND PAIN PROCEDURE  3/12/2024    IR SPINE AND PAIN PROCEDURE  4/11/2024    IR SPINE AND PAIN PROCEDURE  9/24/2024    LUMBAR EPIDURAL INJECTION      NERVE BLOCK Bilateral 12/29/2020    Procedure: L2 L3 L4 L5 MEDIAL BRANCH BLOCK #1;  Surgeon: Westley Keller MD;  Location: [facility];  Service: Pain Management     NERVE BLOCK Bilateral 1/19/2021    Procedure: L2 L3 L4 L5 MEDIAL BRANCH BLOCK #2;  Surgeon: Westley Keller MD;  Location: [facility];  Service: Pain Management     ME ARTHROCENTESIS ASPIR&/INJ MAJOR JT/BURSA W/O  Bilateral 2/7/2023    Procedure: INJECTION JOINT HIP;  Surgeon: Westley Keller MD;  Location: [facility];  Service: Pain Management     ME HYSTEROSCOPY BX ENDOMETRIUM&/POLYPC W/WO D&C N/A 4/25/2023    Procedure: HYSTEROSCOPY W/   RESECTION UTERINE TUMOR/FIBROID (POLYPECTOMY );  Surgeon: Donovan Ware DO;  Location: [facility];  Service: Gynecology    RADIOFREQUENCY ABLATION Right 2021    Procedure: L2 L3 L4 L5 RADIO FREQUENCY ABLATION right side;  Surgeon: Westley Keller MD;  [facility]: [facility];  Service: Pain Management     RADIOFREQUENCY ABLATION Left 3/16/2021    Procedure: L2 L3 L4 L5 RADIO FREQUENCY ABLATION;  Surgeon: Westley Keller MD;  [facility]: [facility];  Service: Pain Management     RHIZOTOMY Bilateral 2023    Procedure: RHIZOTOMY LUMBAR L3, L4, L5 medial branch nerves;  Surgeon: Westley Keller MD;  Location: [facility];  Service: Pain Management     UPPER GASTROINTESTINAL ENDOSCOPY      US GUIDED THYROID BIOPSY  2023     Family History   Problem Relation Age of Onset    Cancer Father     Hepatitis Father     Asthma Cousin     Hypertension Paternal Grandmother     No Known Problems Mother     No Known Problems Sister     No Known Problems Maternal Grandmother     No Known Problems Maternal Grandfather     No Known Problems Paternal Grandfather     No Known Problems Paternal Aunt     BRCA2 Positive Neg Hx     BRCA2 Negative Neg Hx     BRCA1 Positive Neg Hx     BRCA1 Negative Neg Hx     BRCA 1/2 Neg Hx     Ovarian cancer Neg Hx     Endometrial cancer Neg Hx     Colon cancer Neg Hx     Breast cancer additional onset Neg Hx     Breast cancer Neg Hx      Social History     Socioeconomic History    Marital status: Single     Spouse name: None    Number of children: None    Years of education: None    Highest education level: None   Occupational History    None   Tobacco Use    Smoking status: Former     Types: Cigarettes     Start date:      Quit date:      Years since quittin.9     Passive exposure: Past    Smokeless tobacco: Never   Vaping Use    Vaping status: Never Used   Substance and Sexual Activity    Alcohol use: No    Drug use: No    Sexual activity: Not Currently   Other Topics Concern     None   Social History Narrative    None     Social Drivers of Health     Financial Resource Strain: High Risk (2/21/2023)    Overall Financial Resource Strain (CARDIA)     Difficulty of Paying Living Expenses: Hard   Food Insecurity: No Food Insecurity (4/8/2021)    Hunger Vital Sign     Worried About Running Out of Food in the Last Year: Never true     Ran Out of Food in the Last Year: Never true   Transportation Needs: No Transportation Needs (4/8/2021)    PRAPARE - Transportation     Lack of Transportation (Medical): No     Lack of Transportation (Non-Medical): No   Physical Activity: Inactive (8/3/2020)    Exercise Vital Sign     Days of Exercise per Week: 0 days     Minutes of Exercise per Session: 0 min   Stress: Stress Concern Present (8/3/2020)    [employer] - Occupational Stress Questionnaire     Feeling of Stress : Very much   Social Connections: Unknown (6/18/2024)    Received from [facility]     How often do you feel lonely or isolated from those around you? (Adult - for ages 18 years and over): Not on file   Intimate Partner Violence: Not At Risk (8/3/2020)    Humiliation, Afraid, Rape, and Kick questionnaire     Fear of Current or Ex-Partner: No     Emotionally Abused: No     Physically Abused: No     Sexually Abused: No   Housing Stability: Not on file       Current Outpatient Medications:     albuterol (Ventolin HFA) 90 mcg/act inhaler, Inhale 2 puffs every 6 (six) hours as needed for wheezing or shortness of breath, Disp: 18 g, Rfl: 5    atorvastatin (LIPITOR) 20 mg tablet, Take 1 tablet (20 mg total) by mouth daily (Patient taking differently: Take 20 mg by mouth daily at bedtime), Disp: 100 tablet, Rfl: 1    celecoxib (CeleBREX) 200 mg capsule, Take 1 capsule (200 mg total) by mouth 2 (two) times a day, Disp: 60 capsule, Rfl: 5    cetirizine (ZyrTEC) 10 mg tablet, 1 tab po daily x 1 month then prn allergies (Patient taking differently: Take 10 mg  by mouth daily 1 tab po daily x 1 month then prn allergies), Disp: 100 tablet, Rfl: 1    clotrimazole-betamethasone (LOTRISONE) 1-0.05 % cream, APPLY TWICE DAILY TO VAGINAL AREA UNTIL HEALED THEN AS NEEDED FOR IRRITATION., Disp: 45 g, Rfl: 3    cromolyn (OPTICROM) 4 % ophthalmic solution, Administer 1 drop to both eyes 4 (four) times a day, Disp: 10 mL, Rfl: 2    ergocalciferol (VITAMIN D2) 50,000 units, Take 1 capsule (50,000 Units total) by mouth once a week, Disp: 12 capsule, Rfl: 1    famotidine (PEPCID) 40 MG tablet, TAKE (1) TABLET BY MOUTH DAILY., Disp: 30 tablet, Rfl: 5    finasteride (PROPECIA) 1 MG tablet, Take 1 tablet (1 mg total) by mouth daily, Disp: 30 tablet, Rfl: 0    fluconazole (DIFLUCAN) 150 mg tablet, TAKE ONE TABLET BY MOUTH ONCE DAILY FOR ONE DAY, Disp: , Rfl:     fluticasone (FLONASE) 50 mcg/act nasal spray, USE 1 SPRAY INTO EACH NOSTRIL TWICE A DAY FOR 1 MONTH THEN AS NEEDED FOR CONGESTION AND ALLERGIES, Disp: 16 g, Rfl: 1    Fluticasone-Salmeterol (Advair) 250-50 mcg/dose inhaler, Inhale 1 puff 2 (two) times a day Rinse mouth after use., Disp: 200 blister, Rfl: 1    gabapentin (NEURONTIN) 300 mg capsule, TAKE (1) CAPSULE THREE TIMES DAILY., Disp: 270 capsule, Rfl: 3    glipiZIDE (GLUCOTROL) 10 mg tablet, TAKE (2) TABLETS TWICE DAILY BEFORE MEALS., Disp: 360 tablet, Rfl: 1    glucose blood test strip, , Disp: , Rfl:     glucose blood test strip, Use as instructed, Disp: 100 each, Rfl: 1    Jardiance 25 MG TABS, TAKE (1) TABLET BY MOUTH DAILY., Disp: 30 tablet, Rfl: 5    ketoconazole (NIZORAL) 2 % cream, Apply topically daily, Disp: 60 g, Rfl: 0    Lancet Devices (OneTouch Delica Plus Lancing) MISC, , Disp: , Rfl:     Lancets (onetouch ultrasoft) lancets, Use as instructed, Disp: 100 each, Rfl: 1    lisinopril (ZESTRIL) 20 mg tablet, Take 1 tablet (20 mg total) by mouth daily, Disp: 90 tablet, Rfl: 3    metFORMIN (GLUCOPHAGE) 1000 MG tablet, TAKE (1) TABLET TWICE A DAY WITH MEALS., Disp: 200  "tablet, Rfl: 1    metoprolol succinate (TOPROL-XL) 25 mg 24 hr tablet, Take 1 tablet (25 mg total) by mouth daily, Disp: 30 tablet, Rfl: 1    nystatin powder, Apply topically 2 (two) times a day, Disp: 60 g, Rfl: 3    omeprazole (PriLOSEC) 40 MG capsule, Take 1 capsule (40 mg total) by mouth daily before breakfast, Disp: 90 capsule, Rfl: 3    ondansetron (ZOFRAN) 4 mg tablet, Take 1 tablet (4 mg total) by mouth every 8 (eight) hours as needed for nausea or vomiting, Disp: 30 tablet, Rfl: 5    sertraline (ZOLOFT) 50 mg tablet, TAKE (1) TABLET BY MOUTH DAILY AT BEDTIME., Disp: 90 tablet, Rfl: 1    triamcinolone (KENALOG) 0.025 % cream, Apply 2 x daily to rash until healed then prn rash, Disp: 60 g, Rfl: 0  Allergies   Allergen Reactions    Trulicity [Dulaglutide] Swelling    Tdap [Tetanus-Diphth-Acell Pertussis] Rash       Objective   /78 (Patient Position: Sitting, Cuff Size: Large)   Pulse 75   Temp (!) 96.9 °F (36.1 °C) (Temporal)   Resp 16   Ht 5' 2\" (1.575 m)   Wt 111 kg (245 lb 8 oz)   SpO2 97%   BMI 44.90 kg/m²    Physical Exam  Constitutional:       General: She is not in acute distress.     Appearance: She is well-developed.   HENT:      Head: Normocephalic and atraumatic.   Eyes:      General: No scleral icterus.     Conjunctiva/sclera: Conjunctivae normal.   Cardiovascular:      Rate and Rhythm: Normal rate.   Pulmonary:      Effort: Pulmonary effort is normal. No respiratory distress.   Skin:     General: Skin is warm.      Coloration: Skin is not pale.      Findings: No rash.   Neurological:      Mental Status: She is alert and oriented to person, place, and time.   Psychiatric:         Thought Content: Thought content normal.         Result Review  Labs:  Appointment on 11/11/2024   Component Date Value Ref Range Status    Magnesium 11/11/2024 1.9  1.9 - 2.7 mg/dL Final    Creatinine, Ur 11/11/2024 75.8  Reference range not established. mg/dL Final    Albumin,U,Random 11/11/2024 <7.0  <20.0 " mg/L Final    Albumin Creat Ratio 11/11/2024    Final    Unable to calculate.    Sodium 11/11/2024 136  135 - 147 mmol/L Final    Potassium 11/11/2024 4.3  3.5 - 5.3 mmol/L Final    Chloride 11/11/2024 103  96 - 108 mmol/L Final    CO2 11/11/2024 25  21 - 32 mmol/L Final    ANION GAP 11/11/2024 8  4 - 13 mmol/L Final    BUN 11/11/2024 17  5 - 25 mg/dL Final    Creatinine 11/11/2024 0.65  0.60 - 1.30 mg/dL Final    Standardized to IDMS reference method    Glucose, Fasting 11/11/2024 148 (H)  65 - 99 mg/dL Final    Calcium 11/11/2024 8.9  8.4 - 10.2 mg/dL Final    AST 11/11/2024 20  13 - 39 U/L Final    ALT 11/11/2024 34  7 - 52 U/L Final    Specimen collection should occur prior to Sulfasalazine administration due to the potential for falsely depressed results.     Alkaline Phosphatase 11/11/2024 44  34 - 104 U/L Final    Total Protein 11/11/2024 7.0  6.4 - 8.4 g/dL Final    Albumin 11/11/2024 4.3  3.5 - 5.0 g/dL Final    Total Bilirubin 11/11/2024 0.92  0.20 - 1.00 mg/dL Final    Use of this assay is not recommended for patients undergoing treatment with eltrombopag due to the potential for falsely elevated results.  N-acetyl-p-benzoquinone imine (metabolite of Acetaminophen) will generate erroneously low results in samples for patients that have taken an overdose of Acetaminophen.    eGFR 11/11/2024 103  ml/min/1.73sq m Final    Hemoglobin A1C 11/11/2024 6.6 (H)  Normal 4.0-5.6%; PreDiabetic 5.7-6.4%; Diabetic >=6.5%; Glycemic control for adults with diabetes <7.0% % Final    EAG 11/11/2024 143  mg/dl Final       Please note:  This report has been generated by a voice recognition software system. Therefore there may be syntax, spelling, and/or grammatical errors. Please call if you have any questions.

## 2024-11-25 NOTE — PATIENT INSTRUCTIONS
[facility] Medical Oncology and Hematology Team  [place] - [phone]    Your Team Member:  Advanced Practitioner:  Tara Squires PA-C    Please answer Private and Unavailable Calls - this may be your team(s) contacting you.  If you have medical questions/concerns/issues - contact us either by (1) My Chart (2) [facility]       Do NOT FAST!  Go after breakfast and lunch.

## 2024-11-26 ENCOUNTER — APPOINTMENT (OUTPATIENT)
Dept: LAB | Facility: HOSPITAL | Age: 49
End: 2024-11-26
Payer: COMMERCIAL

## 2024-11-26 ENCOUNTER — OFFICE VISIT (OUTPATIENT)
Dept: CARDIOLOGY CLINIC | Facility: CLINIC | Age: 49
End: 2024-11-26
Payer: COMMERCIAL

## 2024-11-26 VITALS
OXYGEN SATURATION: 98 % | HEIGHT: 62 IN | HEART RATE: 85 BPM | TEMPERATURE: 98.2 F | WEIGHT: 246 LBS | DIASTOLIC BLOOD PRESSURE: 80 MMHG | BODY MASS INDEX: 45.27 KG/M2 | SYSTOLIC BLOOD PRESSURE: 118 MMHG

## 2024-11-26 DIAGNOSIS — I10 ESSENTIAL HYPERTENSION: ICD-10-CM

## 2024-11-26 DIAGNOSIS — E05.90 HYPERTHYROIDISM: ICD-10-CM

## 2024-11-26 DIAGNOSIS — I51.7 MILD CONCENTRIC LEFT VENTRICULAR HYPERTROPHY: Primary | ICD-10-CM

## 2024-11-26 DIAGNOSIS — G47.33 OSA ON CPAP: ICD-10-CM

## 2024-11-26 DIAGNOSIS — E66.01 MORBID OBESITY WITH BODY MASS INDEX (BMI) OF 45.0 TO 49.9 IN ADULT (HCC): ICD-10-CM

## 2024-11-26 DIAGNOSIS — E78.2 MIXED HYPERLIPIDEMIA: ICD-10-CM

## 2024-11-26 LAB
T3FREE SERPL-MCNC: 3.26 PG/ML (ref 2.5–3.9)
T4 FREE SERPL-MCNC: 0.85 NG/DL (ref 0.61–1.12)
TSH SERPL DL<=0.05 MIU/L-ACNC: 0.53 UIU/ML (ref 0.45–4.5)

## 2024-11-26 PROCEDURE — 84439 ASSAY OF FREE THYROXINE: CPT

## 2024-11-26 PROCEDURE — 36415 COLL VENOUS BLD VENIPUNCTURE: CPT

## 2024-11-26 PROCEDURE — 99214 OFFICE O/P EST MOD 30 MIN: CPT | Performed by: INTERNAL MEDICINE

## 2024-11-26 PROCEDURE — 84443 ASSAY THYROID STIM HORMONE: CPT

## 2024-11-26 PROCEDURE — 84481 FREE ASSAY (FT-3): CPT

## 2024-11-26 RX ORDER — FLUCONAZOLE 150 MG/1
150 TABLET ORAL ONCE
Qty: 1 TABLET | Refills: 0 | Status: SHIPPED | OUTPATIENT
Start: 2024-11-26 | End: 2024-11-26

## 2024-11-26 NOTE — ASSESSMENT & PLAN NOTE
Labs from July 2024 showed LDL of 25 and triglycerides of 186.  I would keep the statin at the same dose and further reduction in triglycerides should be achieved with weight loss and exercise.

## 2024-11-26 NOTE — ASSESSMENT & PLAN NOTE
Blood pressure is much better now on the current combination of lisinopril and metoprolol.  Continue the same strategy.

## 2024-11-27 ENCOUNTER — RESULTS FOLLOW-UP (OUTPATIENT)
Dept: FAMILY MEDICINE CLINIC | Facility: CLINIC | Age: 49
End: 2024-11-27

## 2024-11-27 LAB — G LAMBLIA AG STL QL IA: NEGATIVE

## 2024-12-01 NOTE — PSYCH
"  Telemedicine consent    Patient: Cristy Garcia  Provider: Kristina Guerrero LCSW  Provider [facility] [place] [address] [facility] [address]    The patient was identified by name and date of birth. Cristy Garcia was informed that this is a telemedicine visit and that the visit is being conducted through the Epic Embedded platform. She agrees to proceed..  My office door was closed. No one else was in the room.  She acknowledged consent and understanding of privacy and security of the video platform. The patient has agreed to participate and understands they can discontinue the visit at any time.    Patient is aware this is a billable service.     I spent 22 minutes with the patient during this visit.  Behavioral Health Psychotherapy Progress Note    Psychotherapy Provided: Individual Psychotherapy     1. Major depression, recurrent, chronic (HCC)            Goals addressed in session: Goal 1 and Goal 2     DATA the client reported that her food stamps have decreased. She reported that this issues has caused problems with econmic issues. \" I am doing okay but I am broke now\" During the session we explored and processed the client's changes in her life and the effects on her mental health. The client was able to verbalize increased anxiety over the changes. Coping skills were reviewed to address her weaknesses  During this session, this clinician used the following therapeutic modalities: Cognitive Behavioral Therapy, Mindfulness-based Strategies, Solution-Focused Therapy, and Supportive Psychotherapy    Substance Abuse was addressed during this session. If the client is diagnosed with a co-occurring substance use disorder, please indicate any changes in the frequency or amount of use: none. Stage of change for addressing substance use diagnoses: No substance use/Not applicable    ASSESSMENT:  Cristy Garcia presents with a Anxious and Depressed " "mood.     her affect is Normal range and intensity, which is congruent, with her mood and the content of the session. The client has made progress on their goals.    The client, Cristy Garcia presents with a none risk of suicide, none risk of self-harm, and none risk of harm to others.    For any risk assessment that surpasses a \"low\" rating, a safety plan must be developed.    A safety plan was indicated: no  If yes, describe in detail n/a    PLAN: Between sessions, Cristy Garcia will work on her silver cloud. At the next session, the therapist will use Cognitive Behavioral Therapy, Mindfulness-based Strategies, Solution-Focused Therapy, and Supportive Psychotherapy to address the client's MH issues affecting her different relationships and environments.    Behavioral Health Treatment Plan and Discharge Planning: Cristy Garcia is aware of and agrees to continue to work on their treatment plan. They have identified and are working toward their discharge goals. yes    Visit start and stop times:    12/02/24  Start Time: 1402  Stop Time: 1424  Total Visit Time: 22 minutes  "

## 2024-12-02 ENCOUNTER — TELEMEDICINE (OUTPATIENT)
Dept: BEHAVIORAL/MENTAL HEALTH CLINIC | Facility: CLINIC | Age: 49
End: 2024-12-02
Payer: COMMERCIAL

## 2024-12-02 DIAGNOSIS — R05.1 ACUTE COUGH: ICD-10-CM

## 2024-12-02 DIAGNOSIS — F33.9 MAJOR DEPRESSION, RECURRENT, CHRONIC (HCC): ICD-10-CM

## 2024-12-02 DIAGNOSIS — H65.00 ACUTE SEROUS OTITIS MEDIA, RECURRENCE NOT SPECIFIED, UNSPECIFIED LATERALITY: ICD-10-CM

## 2024-12-02 DIAGNOSIS — F33.9 MAJOR DEPRESSION, RECURRENT, CHRONIC (HCC): Primary | ICD-10-CM

## 2024-12-02 DIAGNOSIS — R09.82 PND (POST-NASAL DRIP): ICD-10-CM

## 2024-12-02 DIAGNOSIS — J01.00 ACUTE MAXILLARY SINUSITIS, RECURRENCE NOT SPECIFIED: ICD-10-CM

## 2024-12-02 DIAGNOSIS — J06.9 ACUTE URI: ICD-10-CM

## 2024-12-02 PROCEDURE — T1015 CLINIC SERVICE: HCPCS | Performed by: SOCIAL WORKER

## 2024-12-04 RX ORDER — FLUTICASONE PROPIONATE 50 MCG
SPRAY, SUSPENSION (ML) NASAL
Qty: 16 G | Refills: 1 | Status: SHIPPED | OUTPATIENT
Start: 2024-12-04

## 2024-12-12 ENCOUNTER — TELEPHONE (OUTPATIENT)
Dept: FAMILY MEDICINE CLINIC | Facility: CLINIC | Age: 49
End: 2024-12-12

## 2024-12-12 ENCOUNTER — CONSULT (OUTPATIENT)
Dept: FAMILY MEDICINE CLINIC | Facility: CLINIC | Age: 49
End: 2024-12-12
Payer: COMMERCIAL

## 2024-12-12 VITALS
RESPIRATION RATE: 16 BRPM | SYSTOLIC BLOOD PRESSURE: 128 MMHG | DIASTOLIC BLOOD PRESSURE: 88 MMHG | BODY MASS INDEX: 46.01 KG/M2 | OXYGEN SATURATION: 96 % | TEMPERATURE: 97.7 F | WEIGHT: 250 LBS | HEIGHT: 62 IN | HEART RATE: 76 BPM

## 2024-12-12 DIAGNOSIS — G50.0 TRIGEMINAL NEURALGIA: ICD-10-CM

## 2024-12-12 DIAGNOSIS — M15.0 PRIMARY OSTEOARTHRITIS INVOLVING MULTIPLE JOINTS: ICD-10-CM

## 2024-12-12 DIAGNOSIS — M79.7 FIBROMYALGIA, PRIMARY: ICD-10-CM

## 2024-12-12 DIAGNOSIS — N92.0 MENORRHAGIA WITH REGULAR CYCLE: Primary | ICD-10-CM

## 2024-12-12 DIAGNOSIS — F51.01 PRIMARY INSOMNIA: ICD-10-CM

## 2024-12-12 DIAGNOSIS — G47.33 OSA ON CPAP: ICD-10-CM

## 2024-12-12 DIAGNOSIS — Z01.818 PRE-OP EXAMINATION: ICD-10-CM

## 2024-12-12 PROCEDURE — 99213 OFFICE O/P EST LOW 20 MIN: CPT

## 2024-12-12 NOTE — PROGRESS NOTES
Presurgical Evaluation    Subjective:      Patient ID: Cristy Garcia is a 48 y.o. female.    Chief Complaint   Patient presents with    Pre-op Exam     Hysterectomy with [facility] on 12/27 Dr Seymour.        Patient is a 48-year-old female presenting for preop DOMINIK/bilateral salpingectomy.  Patient has been having increasing joint aching, trigeminal neuralgia, and insomnia.  Has history of polyarthritis, trigeminal neuralgia.  Has never tried anything for insomnia.  Currently on Celebrex for joint aching and gabapentin for trigeminal neuralgia/fibromyalgia.        The following portions of the patient's history were reviewed and updated as appropriate: allergies, current medications, past family history, past medical history, past social history, past surgical history, and problem list.    Procedure date: December 27, 2024    Surgeon:  Dr. Seymour  Planned procedure:  DOMINIK/bilat salpingectomy  Diagnosis for procedure:  Menorrhagia with regular cycle    Prior anesthesia: Yes   General; Complications:  None / Tolerated well    CAD History: None   NOTE: Patient should see Cardiology if time available before surgery, and if appropriate.    Pulmonary History: Asthma  VICTOR HUGO (Sleep Apnea)    Renal history: None    Diabetes History:  Type 2  Controlled     Neurological History: None     On Immunosuppressant meds/biologics: No      Review of Systems   Constitutional:  Negative for appetite change, chills, diaphoresis, fatigue and fever.   HENT:  Negative for congestion, ear discharge, ear pain, postnasal drip, rhinorrhea, sinus pressure, sinus pain, sneezing and sore throat.    Eyes:  Negative for pain, discharge, redness, itching and visual disturbance.   Respiratory:  Negative for apnea, cough, chest tightness, shortness of breath and wheezing.    Cardiovascular:  Negative for chest pain, palpitations and leg swelling.   Gastrointestinal:  Negative for abdominal pain, blood in stool, constipation, diarrhea, nausea and vomiting.    Endocrine: Negative for cold intolerance, heat intolerance, polydipsia and polyuria.   Genitourinary:  Negative for dysuria, flank pain, frequency, hematuria and urgency.   Musculoskeletal:  Positive for arthralgias, back pain and myalgias. Negative for neck pain and neck stiffness.   Skin:  Negative for color change and rash.   Allergic/Immunologic: Negative for environmental allergies and food allergies.   Neurological:  Negative for dizziness, tremors, seizures, syncope, speech difficulty, weakness, light-headedness, numbness and headaches.   Hematological:  Negative for adenopathy. Does not bruise/bleed easily.   Psychiatric/Behavioral:  Positive for sleep disturbance. Negative for agitation, confusion, decreased concentration, dysphoric mood, hallucinations, self-injury and suicidal ideas. The patient is not nervous/anxious and is not hyperactive.    All other systems reviewed and are negative.        Current Outpatient Medications   Medication Sig Dispense Refill    albuterol (Ventolin HFA) 90 mcg/act inhaler Inhale 2 puffs every 6 (six) hours as needed for wheezing or shortness of breath 18 g 5    atorvastatin (LIPITOR) 20 mg tablet Take 1 tablet (20 mg total) by mouth daily 100 tablet 1    celecoxib (CeleBREX) 200 mg capsule Take 1 capsule (200 mg total) by mouth 2 (two) times a day 60 capsule 5    cetirizine (ZyrTEC) 10 mg tablet 1 tab po daily x 1 month then prn allergies 100 tablet 1    clotrimazole-betamethasone (LOTRISONE) 1-0.05 % cream APPLY TWICE DAILY TO VAGINAL AREA UNTIL HEALED THEN AS NEEDED FOR IRRITATION. 45 g 3    cromolyn (OPTICROM) 4 % ophthalmic solution Administer 1 drop to both eyes 4 (four) times a day 10 mL 2    ergocalciferol (VITAMIN D2) 50,000 units Take 1 capsule (50,000 Units total) by mouth once a week 12 capsule 1    famotidine (PEPCID) 40 MG tablet TAKE (1) TABLET BY MOUTH DAILY. 30 tablet 5    finasteride (PROPECIA) 1 MG tablet Take 1 tablet (1 mg total) by mouth daily 30  tablet 0    fluticasone (FLONASE) 50 mcg/act nasal spray USE 1 SPRAY INTO EACH NOSTRIL TWICE A DAY FOR 1 MONTH THEN AS NEEDED FOR CONGESTION AND ALLERGIES 16 g 1    Fluticasone-Salmeterol (Advair) 250-50 mcg/dose inhaler Inhale 1 puff 2 (two) times a day Rinse mouth after use. 200 blister 1    gabapentin (NEURONTIN) 300 mg capsule TAKE (1) CAPSULE THREE TIMES DAILY. 270 capsule 3    glipiZIDE (GLUCOTROL) 10 mg tablet TAKE (2) TABLETS TWICE DAILY BEFORE MEALS. 360 tablet 1    glucose blood test strip       glucose blood test strip Use as instructed 100 each 1    Jardiance 25 MG TABS TAKE (1) TABLET BY MOUTH DAILY. 30 tablet 5    ketoconazole (NIZORAL) 2 % cream Apply topically daily 60 g 0    Lancet Devices (OneTouch Delica Plus Lancing) MISC       Lancets (onetouch ultrasoft) lancets Use as instructed 100 each 1    lisinopril (ZESTRIL) 20 mg tablet Take 1 tablet (20 mg total) by mouth daily 90 tablet 3    metFORMIN (GLUCOPHAGE) 1000 MG tablet TAKE (1) TABLET TWICE A DAY WITH MEALS. 200 tablet 1    metoprolol succinate (TOPROL-XL) 25 mg 24 hr tablet Take 1 tablet (25 mg total) by mouth daily 30 tablet 1    nystatin powder Apply topically 2 (two) times a day 60 g 3    omeprazole (PriLOSEC) 40 MG capsule Take 1 capsule (40 mg total) by mouth daily before breakfast 90 capsule 3    ondansetron (ZOFRAN) 4 mg tablet Take 1 tablet (4 mg total) by mouth every 8 (eight) hours as needed for nausea or vomiting 30 tablet 5    sertraline (ZOLOFT) 50 mg tablet TAKE (1) TABLET BY MOUTH DAILY AT BEDTIME. 90 tablet 1    triamcinolone (KENALOG) 0.025 % cream Apply 2 x daily to rash until healed then prn rash 60 g 0     No current facility-administered medications for this visit.       Allergies on file:   Trulicity [dulaglutide] and Tdap [tetanus-diphth-acell pertussis]    Patient Active Problem List   Diagnosis    SOB (shortness of breath)    Chronic bilateral low back pain without sciatica    Chronic pain of left knee    Chronic  pain of right knee    Fibromyalgia, primary    Primary osteoarthritis involving multiple joints    Mild persistent asthma without complication    Lumbar radiculopathy    Primary osteoarthritis of both hips    Left hip pain    Major depression, recurrent, chronic (HCC)    Lumbar spondylosis    Cervical radiculopathy    Morbid obesity with body mass index (BMI) of 45.0 to 49.9 in adult (HCC)    VICTOR HUGO on CPAP    Anxiety associated with depression    Diabetes mellitus (HCC)    Hyperlipidemia    Essential hypertension    Irregular heartbeat    Chronic pain disorder    GERD (gastroesophageal reflux disease)    Fatty liver    Elevated LFTs    Esophagitis determined by endoscopy    Impacted third molar tooth    Complex dental caries    Erythrocytosis    Leukocytosis    Mild concentric left ventricular hypertrophy    Chronic fatigue    Vitamin D deficiency    Hair loss    Hx of giardiasis    Vaginal candidiasis    Trigeminal neuralgia    Vitamin B12 deficiency    Teeth missing        Past Medical History:   Diagnosis Date    Allergic     Seasonal Allergies    Alopecia of scalp     Treated with Proscar    Asthma     Chronic pain disorder     back, bilat knees, bilat hips    CPAP (continuous positive airway pressure) dependence     Diabetes mellitus (HCC)     Fibromyalgia, primary     GERD (gastroesophageal reflux disease)     Hip fx, left, closed, with routine healing, subsequent encounter 05/2017    Hyperlipidemia     Hypertension     Insomnia     Irregular heartbeat     VICTOR HUGO on CPAP        Past Surgical History:   Procedure Laterality Date    CARPAL TUNNEL RELEASE Left     IR SPINE AND PAIN PROCEDURE  3/12/2024    IR SPINE AND PAIN PROCEDURE  4/11/2024    IR SPINE AND PAIN PROCEDURE  9/24/2024    LUMBAR EPIDURAL INJECTION      NERVE BLOCK Bilateral 12/29/2020    Procedure: L2 L3 L4 L5 MEDIAL BRANCH BLOCK #1;  Surgeon: Westley Keller MD;  Location: [facility];  Service: Pain Management     NERVE BLOCK Bilateral 1/19/2021     Procedure: L2 L3 L4 L5 MEDIAL BRANCH BLOCK #2;  Surgeon: Westley Keller MD;  Location: [facility];  Service: Pain Management     MO ARTHROCENTESIS ASPIR&/INJ MAJOR JT/BURSA W/O US Bilateral 2023    Procedure: INJECTION JOINT HIP;  Surgeon: Westley Keller MD;  Location: [facility];  Service: Pain Management     MO HYSTEROSCOPY BX ENDOMETRIUM&/POLYPC W/WO D&C N/A 2023    Procedure: HYSTEROSCOPY W/  RESECTION UTERINE TUMOR/FIBROID (POLYPECTOMY );  Surgeon: Donovan Ware DO;  Location: [facility];  Service: Gynecology    RADIOFREQUENCY ABLATION Right 2021    Procedure: L2 L3 L4 L5 RADIO FREQUENCY ABLATION right side;  Surgeon: Westley Keller MD;  Location: [facility];  Service: Pain Management     RADIOFREQUENCY ABLATION Left 3/16/2021    Procedure: L2 L3 L4 L5 RADIO FREQUENCY ABLATION;  Surgeon: Westley Keller MD;  Location: [facility];  Service: Pain Management     RHIZOTOMY Bilateral 2023    Procedure: RHIZOTOMY LUMBAR L3, L4, L5 medial branch nerves;  Surgeon: Westley Keller MD;  Location: [facility];  Service: Pain Management     UPPER GASTROINTESTINAL ENDOSCOPY      US GUIDED THYROID BIOPSY  2023       Family History   Problem Relation Age of Onset    Cancer Father     Hepatitis Father     Asthma Cousin     Hypertension Paternal Grandmother     No Known Problems Mother     No Known Problems Sister     No Known Problems Maternal Grandmother     No Known Problems Maternal Grandfather     No Known Problems Paternal Grandfather     No Known Problems Paternal Aunt     BRCA2 Positive Neg Hx     BRCA2 Negative Neg Hx     BRCA1 Positive Neg Hx     BRCA1 Negative Neg Hx     BRCA 1/2 Neg Hx     Ovarian cancer Neg Hx     Endometrial cancer Neg Hx     Colon cancer Neg Hx     Breast cancer additional onset Neg Hx     Breast cancer Neg Hx        Social History     Tobacco Use    Smoking status: Former     Types: Cigarettes     Start date:      Quit date:      Years since quittin.9      "Passive exposure: Past    Smokeless tobacco: Never   Vaping Use    Vaping status: Never Used   Substance Use Topics    Alcohol use: No    Drug use: No       Objective:    Vitals:    12/12/24 1131   BP: 128/88   BP Location: Right arm   Patient Position: Sitting   Pulse: 76   Resp: 16   Temp: 97.7 °F (36.5 °C)   TempSrc: Tympanic   SpO2: 96%   Weight: 113 kg (250 lb)   Height: 5' 2\" (1.575 m)        Physical Exam  Vitals and nursing note reviewed.   Constitutional:       Appearance: Normal appearance. She is obese. She is not ill-appearing or toxic-appearing.   HENT:      Head: Normocephalic and atraumatic.      Right Ear: Tympanic membrane normal.      Left Ear: Tympanic membrane normal.      Nose: Nose normal.      Mouth/Throat:      Mouth: Mucous membranes are moist.      Pharynx: Oropharynx is clear.   Eyes:      Extraocular Movements: Extraocular movements intact.      Conjunctiva/sclera: Conjunctivae normal.      Pupils: Pupils are equal, round, and reactive to light.   Cardiovascular:      Rate and Rhythm: Normal rate and regular rhythm.      Pulses: Normal pulses.      Heart sounds: Normal heart sounds. No murmur heard.  Pulmonary:      Effort: Pulmonary effort is normal. No respiratory distress.      Breath sounds: Normal breath sounds. No wheezing.   Chest:      Chest wall: No tenderness.   Abdominal:      General: Bowel sounds are normal.      Palpations: Abdomen is soft. There is no mass.      Tenderness: There is no abdominal tenderness.   Musculoskeletal:         General: No tenderness. Normal range of motion.      Cervical back: Normal range of motion and neck supple. No tenderness.      Right lower leg: No edema.      Left lower leg: No edema.   Lymphadenopathy:      Cervical: No cervical adenopathy.   Skin:     General: Skin is warm.      Capillary Refill: Capillary refill takes less than 2 seconds.      Findings: No erythema or rash.   Neurological:      General: No focal deficit present.      " Mental Status: She is alert and oriented to person, place, and time. Mental status is at baseline.      Motor: No weakness.      Gait: Gait normal.   Psychiatric:         Mood and Affect: Mood normal.         Behavior: Behavior normal.         Thought Content: Thought content normal.         Judgment: Judgment normal.           Preop labs/testing available and reviewed: yes               EKG no    Echo no    Stress test/cath no    PFT/[place] no    Functional capacity: Walking , 4-5 MPH               4 Mets   Pick the highest level patient can comfortably perform   4 mets or greater for surgery    RCRI  High Risk surgery?         1 Point  CAD History:         1 Point   MI; Positive Stress Test; CP due to Mi;  Nitrate Usage to control Angina; Pathologic Q wave on EKG  CHF Active:         1 Point   Pulm Edema; Paroxysmal Nocturnal Dyspnea;  Bibasilar Rales (crackles);S3; CHF on CXR  Cerebrovascular Disease (TIA or CVA):     1 Point  DM on Insulin:        1 Point  Serum Creat >2.0 mg/dl:       1 Point          Total Points: 0     Scorin: Class I, Very Low Risk (0.4%)     1: Class II, Low risk (0.9%)     2: Class III Moderate (6.6%)     3: Class IV High (>11%)      JHONNY Risk:  GFR:        For PCP:  If GFR>60, Hold ACE/ARB/Diuretic on the day of surgery, and NSAIDS 10 days before.    If GFR<45, Consider PRE and POST op Nephrology Consult.    If 46 <GFR> 59 : Has Patient had JHONNY in last 6 Months? no   If YES: Preop Nephrology consult   If No:  Post Op Nephrology consult.           Assessment/Plan:    Patient is medically optimized (cleared) for the planned procedure.    Further testing/evaluation is not required.    Postop concerns: no    Problem List Items Addressed This Visit       Fibromyalgia, primary    Primary osteoarthritis involving multiple joints    VICTOR HUGO on CPAP    Relevant Orders    Cpap DME    Trigeminal neuralgia     Other Visit Diagnoses         Menorrhagia with regular cycle    -  Primary      Pre-op  examination          Primary insomnia                   Diagnoses and all orders for this visit:    Menorrhagia with regular cycle    VICTOR HUGO on CPAP  -     Cpap DME    Pre-op examination    Trigeminal neuralgia    Fibromyalgia, primary    Primary osteoarthritis involving multiple joints    Primary insomnia     Patient is medically optimized for surgery.  Will discuss medication options and changes at follow-up in 1 month in regards to trigeminal neuralgia, fibromyalgia, joint pain, and insomnia.    Pre-Surgery Instructions:   Medication Instructions    albuterol (Ventolin HFA) 90 mcg/act inhaler per anesthesia guidelines     atorvastatin (LIPITOR) 20 mg tablet per anesthesia guidelines     celecoxib (CeleBREX) 200 mg capsule per anesthesia guidelines     cetirizine (ZyrTEC) 10 mg tablet per anesthesia guidelines     clotrimazole-betamethasone (LOTRISONE) 1-0.05 % cream per anesthesia guidelines     cromolyn (OPTICROM) 4 % ophthalmic solution per anesthesia guidelines     ergocalciferol (VITAMIN D2) 50,000 units per anesthesia guidelines     famotidine (PEPCID) 40 MG tablet per anesthesia guidelines     finasteride (PROPECIA) 1 MG tablet per anesthesia guidelines     fluticasone (FLONASE) 50 mcg/act nasal spray per anesthesia guidelines     Fluticasone-Salmeterol (Advair) 250-50 mcg/dose inhaler per anesthesia guidelines     gabapentin (NEURONTIN) 300 mg capsule per anesthesia guidelines     glipiZIDE (GLUCOTROL) 10 mg tablet per anesthesia guidelines     glucose blood test strip per anesthesia guidelines     glucose blood test strip per anesthesia guidelines     Jardiance 25 MG TABS per anesthesia guidelines     ketoconazole (NIZORAL) 2 % cream per anesthesia guidelines     Lancet Devices (OneTouch Delica Plus Lancing) MISC per anesthesia guidelines     Lancets (onetouch ultrasoft) lancets per anesthesia guidelines     lisinopril (ZESTRIL) 20 mg tablet per anesthesia guidelines     metFORMIN (GLUCOPHAGE) 1000 MG tablet  "per anesthesia guidelines     metoprolol succinate (TOPROL-XL) 25 mg 24 hr tablet per anesthesia guidelines     nystatin powder per anesthesia guidelines     omeprazole (PriLOSEC) 40 MG capsule per anesthesia guidelines     ondansetron (ZOFRAN) 4 mg tablet per anesthesia guidelines     sertraline (ZOLOFT) 50 mg tablet per anesthesia guidelines     triamcinolone (KENALOG) 0.025 % cream per anesthesia guidelines         NOTE: Please use the above to review important meds for your specialty, the remainder \"per anesthesia Guidelines.\"    NOTE: Please place an Inbasket message for \"SOC\" pool for complicated patients.     "

## 2024-12-16 ENCOUNTER — OFFICE VISIT (OUTPATIENT)
Dept: DENTISTRY | Facility: CLINIC | Age: 49
End: 2024-12-16

## 2024-12-16 VITALS — SYSTOLIC BLOOD PRESSURE: 130 MMHG | TEMPERATURE: 98 F | DIASTOLIC BLOOD PRESSURE: 86 MMHG | HEART RATE: 78 BPM

## 2024-12-16 DIAGNOSIS — K08.109 TEETH MISSING: Primary | ICD-10-CM

## 2024-12-16 PROCEDURE — WIS5001 FINAL IMPRESSIONS DENTURE

## 2024-12-16 PROCEDURE — D5899 UNSPECIFIED REMOVABLE PROSTHODONTIC PROCEDURE, BY REPORT: HCPCS

## 2024-12-21 NOTE — DENTAL PROCEDURE DETAILS
Denture Final Impression    Cristy Garcia presents for maxillary partial denture rest seat prep and final impression. PMH reviewed. ASA 2, pain 0. Pt understands extent of denture fabrication process and consents to tx today.     Tooth modifications performed, see media    Custom tray modified to properly fit arch and extend short of vestibule. Tray adhesive applied and dried. Border molding performed with green stick compound around vestibules. Light body placed on teeth, medium body in tray. Contours, vestibule and intaglio captured.     NV: upper framework / Wax rim try in

## 2024-12-21 NOTE — PROGRESS NOTES
Procedure Details  [id number] - FINAL IMPRESSIONS DENTURE  Denture Final Impression    Cristy Garcia presents for maxillary partial denture rest seat prep and final impression. PMH reviewed. ASA 2, pain 0. Pt understands extent of denture fabrication process and consents to tx today.     Tooth modifications performed, see media    Custom tray modified to properly fit arch and extend short of vestibule. Tray adhesive applied and dried. Border molding performed with green stick compound around vestibules. Light body placed on teeth, medium body in tray. Contours, vestibule and intaglio captured.     NV: upper framework / Wax rim try in

## 2024-12-22 NOTE — PSYCH
"Behavioral Health Psychotherapy Progress Note    Psychotherapy Provided: Individual Psychotherapy     1. Major depression, recurrent, chronic (HCC)            Goals addressed in session: Goal 1 and Goal 2     DATA: The client reported that she is doing better mentally but will be having an hysterectomy due to her large volume of bleeding. During the session we explored and processed how the client's MH issue affected by her physical health. Coping skills were reviewed   During this session, this clinician used the following therapeutic modalities: Cognitive Behavioral Therapy, Mindfulness-based Strategies, Solution-Focused Therapy, and Supportive Psychotherapy    Substance Abuse was addressed during this session. If the client is diagnosed with a co-occurring substance use disorder, please indicate any changes in the frequency or amount of use: none. Stage of change for addressing substance use diagnoses: No substance use/Not applicable    ASSESSMENT:  Cristy Garcia presents with a Anxious and Depressed mood.     her affect is Normal range and intensity, which is congruent, with her mood and the content of the session. The client has made progress on their goals.     Cristy Garcia presents with a none risk of suicide, none risk of self-harm, and none risk of harm to others.    For any risk assessment that surpasses a \"low\" rating, a safety plan must be developed.    A safety plan was indicated: no  If yes, describe in detail n/a    PLAN: Between sessions, Cristy Garcia will practice coping skills when presented with anxiety and or depression as well as work on [employer] program. At the next session, the therapist will use Cognitive Behavioral Therapy, Mindfulness-based Strategies, Solution-Focused Therapy, and Supportive Psychotherapy to address the client 's MH issues affecting different relationships and environments.    Behavioral Health Treatment Plan and Discharge Planning: Cristy Garcia is aware of and " agrees to continue to work on their treatment plan. They have identified and are working toward their discharge goals. yes    Depression Follow-up Plan Completed: Not applicable    Visit start and stop times:    12/23/24  Start Time: 1355  Stop Time: 1412  Total Visit Time: 17 minutes        Telemedicine consent    Patient: Cristy Garcia  Provider: Kristina Guerrero LCSW  Provider located at [address] [id number]    The patient was identified by name and date of birth. Cristy Garcia was informed that this is a telemedicine visit and that the visit is being conducted through the Epic Embedded platform. She agrees to proceed..  My office door was closed. No one else was in the room.  She acknowledged consent and understanding of privacy and security of the video platform. The patient has agreed to participate and understands they can discontinue the visit at any time.    Patient is aware this is a billable service.     I spent 17 minutes with the patient during this visit.

## 2024-12-23 ENCOUNTER — TELEMEDICINE (OUTPATIENT)
Dept: BEHAVIORAL/MENTAL HEALTH CLINIC | Facility: CLINIC | Age: 49
End: 2024-12-23
Payer: COMMERCIAL

## 2024-12-23 DIAGNOSIS — F33.9 MAJOR DEPRESSION, RECURRENT, CHRONIC (HCC): Primary | ICD-10-CM

## 2024-12-23 PROCEDURE — T1015 CLINIC SERVICE: HCPCS | Performed by: SOCIAL WORKER

## 2024-12-26 DIAGNOSIS — K21.00 GASTROESOPHAGEAL REFLUX DISEASE WITH ESOPHAGITIS WITHOUT HEMORRHAGE: ICD-10-CM

## 2024-12-26 DIAGNOSIS — K20.90 ESOPHAGITIS DETERMINED BY ENDOSCOPY: ICD-10-CM

## 2024-12-27 RX ORDER — FAMOTIDINE 40 MG/1
TABLET, FILM COATED ORAL
Qty: 30 TABLET | Refills: 5 | Status: SHIPPED | OUTPATIENT
Start: 2024-12-27

## 2025-01-03 ENCOUNTER — TELEMEDICINE (OUTPATIENT)
Dept: FAMILY MEDICINE CLINIC | Facility: CLINIC | Age: 50
End: 2025-01-03
Payer: COMMERCIAL

## 2025-01-03 VITALS — HEIGHT: 62 IN | BODY MASS INDEX: 45.73 KG/M2

## 2025-01-03 DIAGNOSIS — F51.01 PRIMARY INSOMNIA: ICD-10-CM

## 2025-01-03 DIAGNOSIS — M79.7 FIBROMYALGIA, PRIMARY: Primary | ICD-10-CM

## 2025-01-03 DIAGNOSIS — J06.9 ACUTE URI: ICD-10-CM

## 2025-01-03 DIAGNOSIS — M15.0 PRIMARY OSTEOARTHRITIS INVOLVING MULTIPLE JOINTS: ICD-10-CM

## 2025-01-03 PROCEDURE — 99214 OFFICE O/P EST MOD 30 MIN: CPT

## 2025-01-03 RX ORDER — BACLOFEN 10 MG/1
10 TABLET ORAL 3 TIMES DAILY
Qty: 90 TABLET | Refills: 1 | Status: SHIPPED | OUTPATIENT
Start: 2025-01-03

## 2025-01-03 RX ORDER — MELOXICAM 15 MG/1
15 TABLET ORAL DAILY PRN
Qty: 90 TABLET | Refills: 1 | Status: SHIPPED | OUTPATIENT
Start: 2025-01-03

## 2025-01-03 RX ORDER — OXYCODONE AND ACETAMINOPHEN 5; 325 MG/1; MG/1
1 TABLET ORAL EVERY 6 HOURS PRN
COMMUNITY
Start: 2024-12-27 | End: 2025-01-03

## 2025-01-03 NOTE — PROGRESS NOTES
Virtual Regular Visit  Name: Cristy Garcia      : 1975      MRN: [id number]  Encounter Provider: Gaurav Merino PA-C  Encounter Date: 1/3/2025   Encounter department: [facility]      Verification of patient location:  Patient is located at Home in the following state in which I hold an active license [place] :  Assessment & Plan  Fibromyalgia, primary  Will switch from Celebrex to Mobic.  Will start baclofen.  Educated on side effects of medication.  Educated to start baclofen at night in case side effects do occur.  Contact office in 2 weeks with symptoms progression.  Orders:    baclofen 10 mg tablet; Take 1 tablet (10 mg total) by mouth 3 (three) times a day    Primary osteoarthritis involving multiple joints  Will switch from Celebrex to Mobic.  Educated on side effects of medication.  Contact office in 2 weeks with symptoms progression.  Orders:    meloxicam (MOBIC) 15 mg tablet; Take 1 tablet (15 mg total) by mouth daily as needed for moderate pain    Acute URI  Recommend treatment with over-the-counter and home remedies.  Discussed recommendation for nasal congestion using Sudafed.  For cough and chest congestion using Mucinex twice a day or honey as a natural cough suppressant.  For headaches, sinus pressure/pain can use Tylenol or ibuprofen.  For runny nose can use Flonase nasal spray, antihistamine nasal spray as well as Zyrtec/Allegra/Claritin.  Recommend hydration and adequate nutrition.  Discussed the anticipated course of viral upper respiratory infection.  Patient to follow-up if symptoms worsen or do not improve as discussed.         Primary insomnia  Patient is to report symptom progression with switching to Mobic and starting baclofen to help with pain control.  If sleep improves then we will not add any further management.  If needed, will start hydroxyzine.  Discussed medication and side effects today.  Report symptoms progression in 2 weeks.       Fibromyalgia,  primary         Primary osteoarthritis involving multiple joints         Acute URI               Encounter provider Gaurav Merino PA-C    The patient was identified by name and date of birth. Cristy Garcia was informed that this is a telemedicine visit and that the visit is being conducted through the Epic Embedded platform. She agrees to proceed..  My office door was closed. No one else was in the room.  She acknowledged consent and understanding of privacy and security of the video platform. The patient has agreed to participate and understands they can discontinue the visit at any time.    Patient is aware this is a billable service.     History of Present Illness     Pt is a 50yo female presenting for follow up.  Discussed at preop visit her worsening joint pain, myalgias, and insomnia.  These are chronic issues.  She is currently on gabapentin through spine/pain management.  She is also on celecoxib which is no longer helping with arthralgias and myalgias.  Denies trauma, swelling, fever, chills.  She did recently have a DOMINIK which she was prescribed Percocet post op but has not taken this at all.  Also has URI symptoms since last night.  Congestion, sore throat, cough.  Denies CP, SOB, CORNELL, fever/chills, urinary/abd symptoms.      Review of Systems   Constitutional:  Negative for appetite change, chills, diaphoresis, fatigue and fever.   HENT:  Positive for congestion, postnasal drip and sore throat. Negative for ear discharge, ear pain, rhinorrhea, sinus pressure, sinus pain and sneezing.    Eyes:  Negative for pain, discharge, redness, itching and visual disturbance.   Respiratory:  Positive for cough. Negative for apnea, chest tightness, shortness of breath and wheezing.    Cardiovascular:  Negative for chest pain, palpitations and leg swelling.   Gastrointestinal:  Negative for abdominal pain, blood in stool, constipation, diarrhea, nausea and vomiting.   Endocrine: Negative for cold intolerance, heat  "intolerance, polydipsia and polyuria.   Genitourinary:  Negative for dysuria, flank pain, frequency, hematuria and urgency.   Musculoskeletal:  Positive for arthralgias and myalgias. Negative for back pain, neck pain and neck stiffness.   Skin:  Negative for color change and rash.   Allergic/Immunologic: Negative for environmental allergies and food allergies.   Neurological:  Negative for dizziness, tremors, seizures, syncope, speech difficulty, weakness, light-headedness, numbness and headaches.   Hematological:  Negative for adenopathy. Does not bruise/bleed easily.   Psychiatric/Behavioral:  Positive for sleep disturbance. Negative for agitation, confusion, decreased concentration, dysphoric mood, hallucinations, self-injury and suicidal ideas. The patient is not nervous/anxious and is not hyperactive.    All other systems reviewed and are negative.      Objective   Ht 5' 2\" (1.575 m)   BMI 45.73 kg/m²     Physical Exam  Constitutional:       General: She is not in acute distress.     Appearance: Normal appearance. She is not ill-appearing, toxic-appearing or diaphoretic.   Pulmonary:      Effort: Pulmonary effort is normal. No respiratory distress.   Neurological:      General: No focal deficit present.      Mental Status: She is alert and oriented to person, place, and time. Mental status is at baseline.   Psychiatric:         Mood and Affect: Mood normal.         Behavior: Behavior normal.         Thought Content: Thought content normal.         Judgment: Judgment normal.     Unable to complete full PE due to virtual appt.     Visit Time  Total Visit Duration: 7min 20sec  "

## 2025-01-03 NOTE — ASSESSMENT & PLAN NOTE
Will switch from Celebrex to Mobic.  Educated on side effects of medication.  Contact office in 2 weeks with symptoms progression.  Orders:    meloxicam (MOBIC) 15 mg tablet; Take 1 tablet (15 mg total) by mouth daily as needed for moderate pain

## 2025-01-03 NOTE — ASSESSMENT & PLAN NOTE
Will switch from Celebrex to Mobic.  Will start baclofen.  Educated on side effects of medication.  Educated to start baclofen at night in case side effects do occur.  Contact office in 2 weeks with symptoms progression.  Orders:    baclofen 10 mg tablet; Take 1 tablet (10 mg total) by mouth 3 (three) times a day

## 2025-01-04 DIAGNOSIS — R00.2 PALPITATIONS: ICD-10-CM

## 2025-01-07 RX ORDER — METOPROLOL SUCCINATE 25 MG/1
25 TABLET, EXTENDED RELEASE ORAL DAILY
Qty: 90 TABLET | Refills: 1 | Status: SHIPPED | OUTPATIENT
Start: 2025-01-07

## 2025-01-15 ENCOUNTER — APPOINTMENT (OUTPATIENT)
Dept: LAB | Facility: HOSPITAL | Age: 50
End: 2025-01-15
Payer: COMMERCIAL

## 2025-01-15 DIAGNOSIS — R00.2 HEART PALPITATIONS: ICD-10-CM

## 2025-01-15 DIAGNOSIS — E04.9 THYROID GOITER: ICD-10-CM

## 2025-01-15 DIAGNOSIS — R73.03 PREDIABETES: ICD-10-CM

## 2025-01-15 LAB — TSH SERPL DL<=0.05 MIU/L-ACNC: 0.61 UIU/ML (ref 0.45–4.5)

## 2025-01-15 PROCEDURE — 84443 ASSAY THYROID STIM HORMONE: CPT

## 2025-01-15 PROCEDURE — 36415 COLL VENOUS BLD VENIPUNCTURE: CPT

## 2025-01-16 ENCOUNTER — TELEPHONE (OUTPATIENT)
Dept: GASTROENTEROLOGY | Facility: MEDICAL CENTER | Age: 50
End: 2025-01-16

## 2025-01-16 NOTE — LETTER
1/16/2025  Cristy Garcia  [address]  [place] [id number]    Dear Cristy Garcia,    Review of our records shows you are due for the following:    EGD    Please call the following office to schedule your appointment:  Please schedule for the month of March 2025      We look forward to hearing from you.    Sincerely,    [facility]  [phone]

## 2025-01-20 ENCOUNTER — APPOINTMENT (EMERGENCY)
Dept: CT IMAGING | Facility: HOSPITAL | Age: 50
End: 2025-01-20
Payer: COMMERCIAL

## 2025-01-20 ENCOUNTER — HOSPITAL ENCOUNTER (EMERGENCY)
Facility: HOSPITAL | Age: 50
Discharge: HOME | End: 2025-01-20
Attending: EMERGENCY MEDICINE
Payer: COMMERCIAL

## 2025-01-20 VITALS
SYSTOLIC BLOOD PRESSURE: 127 MMHG | HEART RATE: 61 BPM | RESPIRATION RATE: 17 BRPM | DIASTOLIC BLOOD PRESSURE: 87 MMHG | TEMPERATURE: 97 F | OXYGEN SATURATION: 97 %

## 2025-01-20 DIAGNOSIS — R10.9 ABDOMINAL PAIN: Primary | ICD-10-CM

## 2025-01-20 LAB
ALBUMIN SERPL BCG-MCNC: 4.1 G/DL (ref 3.5–5)
ALP SERPL-CCNC: 42 U/L (ref 34–104)
ALT SERPL W P-5'-P-CCNC: 23 U/L (ref 7–52)
ANION GAP SERPL CALCULATED.3IONS-SCNC: 9 MMOL/L (ref 4–13)
AST SERPL W P-5'-P-CCNC: 14 U/L (ref 13–39)
BACTERIA UR QL AUTO: NORMAL /HPF
BASOPHILS # BLD AUTO: 0.12 THOUSANDS/ΜL (ref 0–0.1)
BASOPHILS NFR BLD AUTO: 1 % (ref 0–1)
BILIRUB SERPL-MCNC: 0.9 MG/DL (ref 0.2–1)
BILIRUB UR QL STRIP: NEGATIVE
BUN SERPL-MCNC: 16 MG/DL (ref 5–25)
CALCIUM SERPL-MCNC: 9.5 MG/DL (ref 8.4–10.2)
CHLORIDE SERPL-SCNC: 103 MMOL/L (ref 96–108)
CLARITY UR: CLEAR
CO2 SERPL-SCNC: 24 MMOL/L (ref 21–32)
COLOR UR: YELLOW
CREAT SERPL-MCNC: 0.57 MG/DL (ref 0.6–1.3)
EOSINOPHIL # BLD AUTO: 1.14 THOUSAND/ΜL (ref 0–0.61)
EOSINOPHIL NFR BLD AUTO: 11 % (ref 0–6)
ERYTHROCYTE [DISTWIDTH] IN BLOOD BY AUTOMATED COUNT: 12.8 % (ref 11.6–15.1)
GFR SERPL CREATININE-BSD FRML MDRD: 107 ML/MIN/1.73SQ M
GLUCOSE SERPL-MCNC: 159 MG/DL (ref 65–140)
GLUCOSE UR STRIP-MCNC: ABNORMAL MG/DL
HCT VFR BLD AUTO: 48.3 % (ref 34.8–46.1)
HGB BLD-MCNC: 16.2 G/DL (ref 11.5–15.4)
HGB UR QL STRIP.AUTO: ABNORMAL
IMM GRANULOCYTES # BLD AUTO: 0.08 THOUSAND/UL (ref 0–0.2)
IMM GRANULOCYTES NFR BLD AUTO: 1 % (ref 0–2)
KETONES UR STRIP-MCNC: NEGATIVE MG/DL
LEUKOCYTE ESTERASE UR QL STRIP: ABNORMAL
LIPASE SERPL-CCNC: 40 U/L (ref 11–82)
LYMPHOCYTES # BLD AUTO: 2.91 THOUSANDS/ΜL (ref 0.6–4.47)
LYMPHOCYTES NFR BLD AUTO: 27 % (ref 14–44)
MCH RBC QN AUTO: 30 PG (ref 26.8–34.3)
MCHC RBC AUTO-ENTMCNC: 33.5 G/DL (ref 31.4–37.4)
MCV RBC AUTO: 89 FL (ref 82–98)
MONOCYTES # BLD AUTO: 0.64 THOUSAND/ΜL (ref 0.17–1.22)
MONOCYTES NFR BLD AUTO: 6 % (ref 4–12)
NEUTROPHILS # BLD AUTO: 5.95 THOUSANDS/ΜL (ref 1.85–7.62)
NEUTS SEG NFR BLD AUTO: 54 % (ref 43–75)
NITRITE UR QL STRIP: NEGATIVE
NON-SQ EPI CELLS URNS QL MICRO: NORMAL /HPF
NRBC BLD AUTO-RTO: 0 /100 WBCS
PH UR STRIP.AUTO: 6 [PH]
PLATELET # BLD AUTO: 215 THOUSANDS/UL (ref 149–390)
PMV BLD AUTO: 9.1 FL (ref 8.9–12.7)
POTASSIUM SERPL-SCNC: 4.1 MMOL/L (ref 3.5–5.3)
PROT SERPL-MCNC: 6.9 G/DL (ref 6.4–8.4)
PROT UR STRIP-MCNC: NEGATIVE MG/DL
RBC # BLD AUTO: 5.4 MILLION/UL (ref 3.81–5.12)
RBC #/AREA URNS AUTO: NORMAL /HPF
SODIUM SERPL-SCNC: 136 MMOL/L (ref 135–147)
SP GR UR STRIP.AUTO: 1.01 (ref 1–1.03)
UROBILINOGEN UR QL STRIP.AUTO: 0.2 E.U./DL
WBC # BLD AUTO: 10.84 THOUSAND/UL (ref 4.31–10.16)
WBC #/AREA URNS AUTO: NORMAL /HPF

## 2025-01-20 PROCEDURE — 36415 COLL VENOUS BLD VENIPUNCTURE: CPT | Performed by: EMERGENCY MEDICINE

## 2025-01-20 PROCEDURE — 96361 HYDRATE IV INFUSION ADD-ON: CPT

## 2025-01-20 PROCEDURE — 83690 ASSAY OF LIPASE: CPT | Performed by: EMERGENCY MEDICINE

## 2025-01-20 PROCEDURE — 96375 TX/PRO/DX INJ NEW DRUG ADDON: CPT

## 2025-01-20 PROCEDURE — 99285 EMERGENCY DEPT VISIT HI MDM: CPT | Performed by: EMERGENCY MEDICINE

## 2025-01-20 PROCEDURE — 74177 CT ABD & PELVIS W/CONTRAST: CPT

## 2025-01-20 PROCEDURE — 99284 EMERGENCY DEPT VISIT MOD MDM: CPT

## 2025-01-20 PROCEDURE — 81001 URINALYSIS AUTO W/SCOPE: CPT | Performed by: EMERGENCY MEDICINE

## 2025-01-20 PROCEDURE — 80053 COMPREHEN METABOLIC PANEL: CPT | Performed by: EMERGENCY MEDICINE

## 2025-01-20 PROCEDURE — 96374 THER/PROPH/DIAG INJ IV PUSH: CPT

## 2025-01-20 PROCEDURE — 85025 COMPLETE CBC W/AUTO DIFF WBC: CPT | Performed by: EMERGENCY MEDICINE

## 2025-01-20 RX ORDER — OXYCODONE AND ACETAMINOPHEN 5; 325 MG/1; MG/1
1 TABLET ORAL EVERY 8 HOURS PRN
Qty: 12 TABLET | Refills: 0 | Status: SHIPPED | OUTPATIENT
Start: 2025-01-20 | End: 2025-01-24

## 2025-01-20 RX ORDER — ONDANSETRON 2 MG/ML
4 INJECTION INTRAMUSCULAR; INTRAVENOUS ONCE
Status: COMPLETED | OUTPATIENT
Start: 2025-01-20 | End: 2025-01-20

## 2025-01-20 RX ORDER — MORPHINE SULFATE 4 MG/ML
4 INJECTION, SOLUTION INTRAMUSCULAR; INTRAVENOUS ONCE
Status: COMPLETED | OUTPATIENT
Start: 2025-01-20 | End: 2025-01-20

## 2025-01-20 RX ADMIN — SODIUM CHLORIDE 1000 ML: 0.9 INJECTION, SOLUTION INTRAVENOUS at 14:51

## 2025-01-20 RX ADMIN — ONDANSETRON 4 MG: 2 INJECTION INTRAMUSCULAR; INTRAVENOUS at 14:52

## 2025-01-20 RX ADMIN — MORPHINE SULFATE 4 MG: 4 INJECTION INTRAVENOUS at 14:53

## 2025-01-20 RX ADMIN — IOHEXOL 75 ML: 350 INJECTION, SOLUTION INTRAVENOUS at 15:55

## 2025-01-20 NOTE — ED PROVIDER NOTES
Time reflects when diagnosis was documented in both MDM as applicable and the Disposition within this note       Time User Action Codes Description Comment    1/20/2025  4:45 PM Analilia Bernal Add [R10.9] Abdominal pain           ED Disposition       ED Disposition   Discharge    Condition   Stable    Date/Time   Mon Jan 20, 2025  4:45 PM    Comment   Cristy Jose discharge to home/self care.                   Assessment & Plan       Medical Decision Making  Differential diagnosis includes but not limited to: Wound dehiscence, postoperative pain, muscle spasm, visceral injury, abdominal wall abscess    Amount and/or Complexity of Data Reviewed  Labs: ordered.  Radiology: ordered.    Risk  Prescription drug management.             Medications   sodium chloride 0.9 % bolus 1,000 mL (0 mL Intravenous Stopped 1/20/25 1555)   morphine injection 4 mg (4 mg Intravenous Given 1/20/25 1453)   ondansetron (ZOFRAN) injection 4 mg (4 mg Intravenous Given 1/20/25 1452)   iohexol (OMNIPAQUE) 350 MG/ML injection (MULTI-DOSE) 75 mL (75 mL Intravenous Given 1/20/25 1555)       ED Risk Strat Scores                          SBIRT 22yo+      Flowsheet Row Most Recent Value   Initial Alcohol Screen: US AUDIT-C     1. How often do you have a drink containing alcohol? 0 Filed at: 01/20/2025 1322   2. How many drinks containing alcohol do you have on a typical day you are drinking?  0 Filed at: 01/20/2025 1322   3b. FEMALE Any Age, or MALE 65+: How often do you have 4 or more drinks on one occassion? 0 Filed at: 01/20/2025 1322   Audit-C Score 0 Filed at: 01/20/2025 1322   CHEIKH: How many times in the past year have you...    Used an illegal drug or used a prescription medication for non-medical reasons? Never Filed at: 01/20/2025 1322                            History of Present Illness       Chief Complaint   Patient presents with    Post-op Problem     Pt presents to the ER reporting a hysterectomy on 12/27, c/o lower abd pain and  incisional pain. Denies vaginal discharge, denies fever or chills. Messaged Dr. Mario and advised to come to ED for further eval.        Past Medical History:   Diagnosis Date    Allergic     Seasonal Allergies    Alopecia of scalp     Treated with Proscar    Asthma     Chronic pain disorder     back, bilat knees, bilat hips    CPAP (continuous positive airway pressure) dependence     Diabetes mellitus (HCC)     Fibromyalgia, primary     GERD (gastroesophageal reflux disease)     Hip fx, left, closed, with routine healing, subsequent encounter 05/2017    Hyperlipidemia     Hypertension     Insomnia     Irregular heartbeat     VICTOR HUGO on CPAP       Past Surgical History:   Procedure Laterality Date    CARPAL TUNNEL RELEASE Left     HYSTERECTOMY N/A 12/27/2024    IR SPINE AND PAIN PROCEDURE  03/12/2024    IR SPINE AND PAIN PROCEDURE  04/11/2024    IR SPINE AND PAIN PROCEDURE  09/24/2024    LUMBAR EPIDURAL INJECTION      NERVE BLOCK Bilateral 12/29/2020    Procedure: L2 L3 L4 L5 MEDIAL BRANCH BLOCK #1;  Surgeon: Westley Keller MD;  Location: [facility];  Service: Pain Management     NERVE BLOCK Bilateral 01/19/2021    Procedure: L2 L3 L4 L5 MEDIAL BRANCH BLOCK #2;  Surgeon: Westley Keller MD;  Location: [facility];  Service: Pain Management     KS ARTHROCENTESIS ASPIR&/INJ MAJOR JT/BURSA W/O US Bilateral 02/07/2023    Procedure: INJECTION JOINT HIP;  Surgeon: Westley Keller MD;  Location: [facility];  Service: Pain Management     KS HYSTEROSCOPY BX ENDOMETRIUM&/POLYPC W/WO D&C N/A 04/25/2023    Procedure: HYSTEROSCOPY W/  RESECTION UTERINE TUMOR/FIBROID (POLYPECTOMY );  Surgeon: Donovan Ware DO;  Location: [facility];  Service: Gynecology    RADIOFREQUENCY ABLATION Right 02/25/2021    Procedure: L2 L3 L4 L5 RADIO FREQUENCY ABLATION right side;  Surgeon: Westley Keller MD;  Location: [facility];  Service: Pain Management     RADIOFREQUENCY ABLATION Left 03/16/2021    Procedure: L2 L3 L4 L5 RADIO FREQUENCY ABLATION;   Surgeon: Westley Keller MD;  [facility]: [facility];  Service: Pain Management     RHIZOTOMY Bilateral 2023    Procedure: RHIZOTOMY LUMBAR L3, L4, L5 medial branch nerves;  Surgeon: Westley Keller MD;  Location: [facility];  Service: Pain Management     UPPER GASTROINTESTINAL ENDOSCOPY      US GUIDED THYROID BIOPSY  2023      Family History   Problem Relation Age of Onset    Cancer Father     Hepatitis Father     Asthma Cousin     Hypertension Paternal Grandmother     No Known Problems Mother     No Known Problems Sister     No Known Problems Maternal Grandmother     No Known Problems Maternal Grandfather     No Known Problems Paternal Grandfather     No Known Problems Paternal Aunt     BRCA2 Positive Neg Hx     BRCA2 Negative Neg Hx     BRCA1 Positive Neg Hx     BRCA1 Negative Neg Hx     BRCA 1/2 Neg Hx     Ovarian cancer Neg Hx     Endometrial cancer Neg Hx     Colon cancer Neg Hx     Breast cancer additional onset Neg Hx     Breast cancer Neg Hx       Social History     Tobacco Use    Smoking status: Former     Types: Cigarettes     Start date:      Quit date:      Years since quittin.0     Passive exposure: Past    Smokeless tobacco: Never   Vaping Use    Vaping status: Never Used   Substance Use Topics    Alcohol use: No    Drug use: No      E-Cigarette/Vaping    E-Cigarette Use Never User       E-Cigarette/Vaping Substances    Nicotine No     THC No     CBD No     Flavoring No     Other No     Unknown No       I have reviewed and agree with the history as documented.     This is a 49-year-old female presenting to the ED for evaluation of lower abdominal pain and incisional pain.  Patient had a hysterectomy on  and states that her wounds have been healing well however is having pain at the site of incision.  She is accompanied by her daughter who states that patient has been doing activities she should not be such as lifting.  Patient denies any fever, chills, nausea, vomiting  or diarrhea.  She further denies any urinary symptoms.        Review of Systems   Constitutional:  Negative for chills and fever.   HENT:  Negative for ear pain and sore throat.    Eyes:  Negative for pain and visual disturbance.   Respiratory:  Negative for cough and shortness of breath.    Cardiovascular:  Negative for chest pain and palpitations.   Gastrointestinal:  Positive for abdominal pain. Negative for vomiting.   Genitourinary: Negative.  Negative for dysuria, hematuria and pelvic pain.   Musculoskeletal:  Negative for arthralgias and back pain.   Skin:  Negative for color change and rash.   Neurological:  Negative for seizures and syncope.   All other systems reviewed and are negative.          Objective       ED Triage Vitals   Temperature Pulse Blood Pressure Respirations SpO2 Patient Position - Orthostatic VS   01/20/25 1321 01/20/25 1321 01/20/25 1321 01/20/25 1321 01/20/25 1321 01/20/25 1321   (!) 97 °F (36.1 °C) 69 130/98 17 98 % Sitting      Temp Source Heart Rate Source BP Location FiO2 (%) Pain Score    01/20/25 1321 01/20/25 1321 01/20/25 1321 -- 01/20/25 1453    Temporal Monitor Right arm  7      Vitals      Date and Time Temp Pulse SpO2 Resp BP Pain Score FACES Pain Rating User   01/20/25 1735 -- 61 97 % 17 127/87 3 -- NB   01/20/25 1525 -- 62 97 % 18 127/71 -- -- NB   01/20/25 1453 -- -- -- -- -- 7 -- NB   01/20/25 1321 97 °F (36.1 °C) 69 98 % 17 130/98 -- -- MB            Physical Exam  Vitals and nursing note reviewed.   Constitutional:       General: She is in acute distress.      Appearance: Normal appearance. She is well-developed. She is obese.   HENT:      Head: Normocephalic and atraumatic.      Mouth/Throat:      Mouth: Mucous membranes are moist.   Eyes:      Extraocular Movements: Extraocular movements intact.      Conjunctiva/sclera: Conjunctivae normal.   Cardiovascular:      Rate and Rhythm: Normal rate and regular rhythm.      Heart sounds: No murmur heard.  Pulmonary:       Effort: Pulmonary effort is normal. No respiratory distress.      Breath sounds: Normal breath sounds.   Abdominal:      General: Abdomen is flat. There is no distension.      Palpations: Abdomen is soft.      Tenderness: There is abdominal tenderness. There is no guarding.      Comments: Suprapubic tenderness as well as tenderness at the site of the incision, well-healed abdominal scars from surgery without any wound dehiscence erythema or crepitus   Musculoskeletal:         General: No swelling. Normal range of motion.      Cervical back: Normal range of motion and neck supple.   Skin:     General: Skin is warm and dry.      Capillary Refill: Capillary refill takes less than 2 seconds.   Neurological:      General: No focal deficit present.      Mental Status: She is alert and oriented to person, place, and time. Mental status is at baseline.   Psychiatric:         Mood and Affect: Mood normal.         Results Reviewed       Procedure Component Value Units Date/Time    [facility] [[id number]]  (Abnormal) Collected: 01/20/25 1450    Lab Status: Final result Specimen: Blood from Arm, Right Updated: 01/20/25 1541     Sodium 136 mmol/L      Potassium 4.1 mmol/L      Chloride 103 mmol/L      CO2 24 mmol/L      ANION GAP 9 mmol/L      BUN 16 mg/dL      Creatinine 0.57 mg/dL      Glucose 159 mg/dL      Calcium 9.5 mg/dL      AST 14 U/L      ALT 23 U/L      Alkaline Phosphatase 42 U/L      Total Protein 6.9 g/dL      Albumin 4.1 g/dL      Total Bilirubin 0.90 mg/dL      eGFR 107 ml/min/1.73sq m     Narrative:      National [employer] guidelines for Chronic Kidney Disease (CKD):     Stage 1 with normal or high GFR (GFR > 90 mL/min/1.73 square meters)    Stage 2 Mild CKD (GFR = 60-89 mL/min/1.73 square meters)    Stage 3A Moderate CKD (GFR = 45-59 mL/min/1.73 square meters)    Stage 3B Moderate CKD (GFR = 30-44 mL/min/1.73 square meters)    Stage 4 Severe CKD (GFR = 15-29 mL/min/1.73 square meters)    Stage 5 End  Stage CKD (GFR <15 mL/min/1.73 square meters)  Note: GFR calculation is accurate only with a steady state creatinine    Lipase [[id number]]  (Normal) Collected: 01/20/25 1450    Lab Status: Final result Specimen: Blood from Arm, Right Updated: 01/20/25 1541     Lipase 40 u/L     Urine Microscopic [[id number]]  (Normal) Collected: 01/20/25 1450    Lab Status: Final result Specimen: Urine, Clean Catch Updated: 01/20/25 1513     RBC, UA 0-1 /hpf      WBC, UA 0-1 /hpf      Epithelial Cells Occasional /hpf      Bacteria, UA None Seen /hpf     UA w Reflex to Microscopic w Reflex to Culture [[id number]]  (Abnormal) Collected: 01/20/25 1450    Lab Status: Final result Specimen: Urine, Clean Catch Updated: 01/20/25 1505     Color, UA Yellow     Clarity, UA Clear     Specific Gravity, UA 1.015     pH, UA 6.0     Leukocytes, UA Elevated glucose may cause decreased leukocyte values. See urine microscopic for UWBC result     Nitrite, UA Negative     Protein, UA Negative mg/dl      Glucose, UA >=1000 (1%) mg/dl      Ketones, UA Negative mg/dl      Urobilinogen, UA 0.2 E.U./dl      Bilirubin, UA Negative     Occult Blood, UA Trace-Intact    CBC and differential [[id number]]  (Abnormal) Collected: 01/20/25 1450    Lab Status: Final result Specimen: Blood from Arm, Right Updated: 01/20/25 1502     WBC 10.84 Thousand/uL      RBC 5.40 Million/uL      Hemoglobin 16.2 g/dL      Hematocrit 48.3 %      MCV 89 fL      MCH 30.0 pg      MCHC 33.5 g/dL      RDW 12.8 %      MPV 9.1 fL      Platelets 215 Thousands/uL      nRBC 0 /100 WBCs      Segmented % 54 %      Immature Grans % 1 %      Lymphocytes % 27 %      Monocytes % 6 %      Eosinophils Relative 11 %      Basophils Relative 1 %      Absolute Neutrophils 5.95 Thousands/µL      Absolute Immature Grans 0.08 Thousand/uL      Absolute Lymphocytes 2.91 Thousands/µL      Absolute Monocytes 0.64 Thousand/µL      Eosinophils Absolute 1.14 Thousand/µL      Basophils Absolute 0.12 Thousands/µL              CT abdomen pelvis with contrast   Final Interpretation by Michael Huston MD ( 299)      No acute abdominopelvic pathology.         Workstation performed: [id number]             Procedures    ED Medication and Procedure Management   Prior to Admission Medications   Prescriptions Last Dose Informant Patient Reported? Taking?   Fluticasone-Salmeterol (Advair) 250-50 mcg/dose inhaler  Self No No   Sig: Inhale 1 puff 2 (two) times a day Rinse mouth after use.   Jardiance 25 MG TABS  Self No No   Sig: TAKE (1) TABLET BY MOUTH DAILY.   Lancet Devices (OneTouch Delica Plus Lancing) MISC  Self Yes No   Lancets (onetouch ultrasoft) lancets  Self No No   Sig: Use as instructed   albuterol (Ventolin HFA) 90 mcg/act inhaler  Self No No   Sig: Inhale 2 puffs every 6 (six) hours as needed for wheezing or shortness of breath   atorvastatin (LIPITOR) 20 mg tablet  Self No No   Sig: Take 1 tablet (20 mg total) by mouth daily   baclofen 10 mg tablet   No No   Sig: Take 1 tablet (10 mg total) by mouth 3 (three) times a day   celecoxib (CeleBREX) 200 mg capsule  Self No No   Sig: Take 1 capsule (200 mg total) by mouth 2 (two) times a day   cetirizine (ZyrTEC) 10 mg tablet  Self No No   Si tab po daily x 1 month then prn allergies   clotrimazole-betamethasone (LOTRISONE) 1-0.05 % cream  Self No No   Sig: APPLY TWICE DAILY TO VAGINAL AREA UNTIL HEALED THEN AS NEEDED FOR IRRITATION.   Patient taking differently: as needed APPLY TWICE DAILY TO VAGINAL AREA UNTIL HEALED THEN AS NEEDED FOR IRRITATION.   cromolyn (OPTICROM) 4 % ophthalmic solution  Self No No   Sig: Administer 1 drop to both eyes 4 (four) times a day   ergocalciferol (VITAMIN D2) 50,000 units  Self No No   Sig: Take 1 capsule (50,000 Units total) by mouth once a week   famotidine (PEPCID) 40 MG tablet   No No   Sig: TAKE (1) TABLET BY MOUTH DAILY.   finasteride (PROPECIA) 1 MG tablet  Self No No   Sig: Take 1 tablet (1 mg total) by mouth daily   fluticasone  (FLONASE) 50 mcg/act nasal spray   No No   Sig: USE 1 SPRAY INTO EACH NOSTRIL TWICE A DAY FOR 1 MONTH THEN AS NEEDED FOR CONGESTION AND ALLERGIES   gabapentin (NEURONTIN) 300 mg capsule  Self No No   Sig: TAKE (1) CAPSULE THREE TIMES DAILY.   glipiZIDE (GLUCOTROL) 10 mg tablet  Self No No   Sig: TAKE (2) TABLETS TWICE DAILY BEFORE MEALS.   glucose blood test strip  Self Yes No   glucose blood test strip  Self No No   Sig: Use as instructed   ketoconazole (NIZORAL) 2 % cream  Self No No   Sig: Apply topically daily   lisinopril (ZESTRIL) 20 mg tablet  Self No No   Sig: Take 1 tablet (20 mg total) by mouth daily   meloxicam (MOBIC) 15 mg tablet   No No   Sig: Take 1 tablet (15 mg total) by mouth daily as needed for moderate pain   metFORMIN (GLUCOPHAGE) 1000 MG tablet   No No   Sig: TAKE (1) TABLET TWICE A DAY WITH MEALS.   metoprolol succinate (TOPROL-XL) 25 mg 24 hr tablet   No No   Sig: Take 1 tablet (25 mg total) by mouth daily   nystatin powder  Self No No   Sig: Apply topically 2 (two) times a day   omeprazole (PriLOSEC) 40 MG capsule  Self No No   Sig: Take 1 capsule (40 mg total) by mouth daily before breakfast   ondansetron (ZOFRAN) 4 mg tablet  Self No No   Sig: Take 1 tablet (4 mg total) by mouth every 8 (eight) hours as needed for nausea or vomiting   sertraline (ZOLOFT) 50 mg tablet   No No   Sig: TAKE (1) TABLET BY MOUTH DAILY AT BEDTIME.   triamcinolone (KENALOG) 0.025 % cream  Self No No   Sig: Apply 2 x daily to rash until healed then prn rash   Patient taking differently: as needed Apply 2 x daily to rash until healed then prn rash      Facility-Administered Medications: None     Discharge Medication List as of 1/20/2025  4:46 PM        CONTINUE these medications which have NOT CHANGED    Details   albuterol (Ventolin HFA) 90 mcg/act inhaler Inhale 2 puffs every 6 (six) hours as needed for wheezing or shortness of breath, Starting Thu 6/8/2023, Normal      atorvastatin (LIPITOR) 20 mg tablet Take 1  tablet (20 mg total) by mouth daily, Starting Wed 8/7/2024, Normal      baclofen 10 mg tablet Take 1 tablet (10 mg total) by mouth 3 (three) times a day, Starting Fri 1/3/2025, Normal      celecoxib (CeleBREX) 200 mg capsule Take 1 capsule (200 mg total) by mouth 2 (two) times a day, Starting Wed 11/6/2024, Normal      cetirizine (ZyrTEC) 10 mg tablet 1 tab po daily x 1 month then prn allergies, Normal      clotrimazole-betamethasone (LOTRISONE) 1-0.05 % cream APPLY TWICE DAILY TO VAGINAL AREA UNTIL HEALED THEN AS NEEDED FOR IRRITATION., Normal      cromolyn (OPTICROM) 4 % ophthalmic solution Administer 1 drop to both eyes 4 (four) times a day, Starting Tue 9/3/2024, Normal      ergocalciferol (VITAMIN D2) 50,000 units Take 1 capsule (50,000 Units total) by mouth once a week, Starting Mon 8/12/2024, Normal      famotidine (PEPCID) 40 MG tablet TAKE (1) TABLET BY MOUTH DAILY., Normal      finasteride (PROPECIA) 1 MG tablet Take 1 tablet (1 mg total) by mouth daily, Starting Mon 10/7/2024, Normal      fluticasone (FLONASE) 50 mcg/act nasal spray USE 1 SPRAY INTO EACH NOSTRIL TWICE A DAY FOR 1 MONTH THEN AS NEEDED FOR CONGESTION AND ALLERGIES, Normal      Fluticasone-Salmeterol (Advair) 250-50 mcg/dose inhaler Inhale 1 puff 2 (two) times a day Rinse mouth after use., Starting Fri 7/5/2024, Normal      gabapentin (NEURONTIN) 300 mg capsule TAKE (1) CAPSULE THREE TIMES DAILY., Normal      glipiZIDE (GLUCOTROL) 10 mg tablet TAKE (2) TABLETS TWICE DAILY BEFORE MEALS., Normal      !! glucose blood test strip Historical Med      !! glucose blood test strip Use as instructed, Normal      Jardiance 25 MG TABS TAKE (1) TABLET BY MOUTH DAILY., Starting Wed 11/6/2024, Normal      ketoconazole (NIZORAL) 2 % cream Apply topically daily, Starting Tue 7/30/2024, Normal      Lancet Devices (OneTouch Delica Plus Lancing) MISC Historical Med      Lancets (onetouch ultrasoft) lancets Use as instructed, Normal      lisinopril (ZESTRIL) 20  mg tablet Take 1 tablet (20 mg total) by mouth daily, Starting Tue 7/23/2024, Normal      meloxicam (MOBIC) 15 mg tablet Take 1 tablet (15 mg total) by mouth daily as needed for moderate pain, Starting Fri 1/3/2025, Normal      metFORMIN (GLUCOPHAGE) 1000 MG tablet TAKE (1) TABLET TWICE A DAY WITH MEALS., Normal      metoprolol succinate (TOPROL-XL) 25 mg 24 hr tablet Take 1 tablet (25 mg total) by mouth daily, Starting Tue 1/7/2025, Normal      nystatin powder Apply topically 2 (two) times a day, Starting Mon 7/8/2024, Normal      omeprazole (PriLOSEC) 40 MG capsule Take 1 capsule (40 mg total) by mouth daily before breakfast, Starting Fri 3/22/2024, Normal      ondansetron (ZOFRAN) 4 mg tablet Take 1 tablet (4 mg total) by mouth every 8 (eight) hours as needed for nausea or vomiting, Starting Tue 5/21/2024, Normal      sertraline (ZOLOFT) 50 mg tablet TAKE (1) TABLET BY MOUTH DAILY AT BEDTIME., Normal      triamcinolone (KENALOG) 0.025 % cream Apply 2 x daily to rash until healed then prn rash, Normal       !! - Potential duplicate medications found. Please discuss with provider.        No discharge procedures on file.  ED SEPSIS DOCUMENTATION   Time reflects when diagnosis was documented in both MDM as applicable and the Disposition within this note       Time User Action Codes Description Comment    1/20/2025  4:45 PM Analilia Bernal [R10.9] Abdominal pain                  Analilia Bernal DO  01/20/25 1925

## 2025-01-20 NOTE — DISCHARGE INSTRUCTIONS
Return to the ER immediately for any worsening symptoms.  Please follow-up with your surgeon for further evaluation and management.

## 2025-01-21 ENCOUNTER — TELEPHONE (OUTPATIENT)
Dept: BEHAVIORAL/MENTAL HEALTH CLINIC | Facility: CLINIC | Age: 50
End: 2025-01-21

## 2025-01-21 NOTE — TELEPHONE ENCOUNTER
Appointment was scheduled for 01/24/25, prior appointment was cancelled due to the client being hospitalized due to physical health issues

## 2025-01-24 ENCOUNTER — TELEMEDICINE (OUTPATIENT)
Dept: BEHAVIORAL/MENTAL HEALTH CLINIC | Facility: CLINIC | Age: 50
End: 2025-01-24
Payer: COMMERCIAL

## 2025-01-24 DIAGNOSIS — F33.9 MAJOR DEPRESSION, RECURRENT, CHRONIC (HCC): Primary | ICD-10-CM

## 2025-01-24 PROCEDURE — T1015 CLINIC SERVICE: HCPCS | Performed by: SOCIAL WORKER

## 2025-01-24 NOTE — PSYCH
"Behavioral Health Psychotherapy Progress Note    Psychotherapy Provided: Individual Psychotherapy     1. Major depression, recurrent, chronic (HCC)            Goals addressed in session: Goal 1     DATA: The client reported continued issues with her depression and anxiety due to her physical health and the pain that she has been encountering. She has been limiting her entrance into the community which is the primary trigger for her depression. As team we explored and processed her anxiety and depression  During this session, this clinician used the following therapeutic modalities: Cognitive Behavioral Therapy, Mindfulness-based Strategies, Solution-Focused Therapy, and Supportive Psychotherapy    Substance Abuse was addressed during this session. If the client is diagnosed with a co-occurring substance use disorder, please indicate any changes in the frequency or amount of use: n/a. Stage of change for addressing substance use diagnoses: No substance use/Not applicable    ASSESSMENT:  Cristy Garcia presents with a Anxious and Depressed mood.     her affect is Normal range and intensity, which is congruent, with her mood and the content of the session. The client has made progress on their goals.    The client  Cristy Garcia presents with a none risk of suicide, none risk of self-harm, and none risk of harm to others.    For any risk assessment that surpasses a \"low\" rating, a safety plan must be developed.    A safety plan was indicated: no  If yes, describe in detail n/a    PLAN: Between sessions, Cristy Garcia will practice her coping skills when presented with anxiety and or depression. At the next session, the therapist will use Cognitive Behavioral Therapy, Mindfulness-based Strategies, Solution-Focused Therapy, and Supportive Psychotherapy to address the client's MH issues affecting her different relationships and environments.    Behavioral Health Treatment Plan and Discharge Planning: Cristy Garcia is aware " of and agrees to continue to work on their treatment plan. They have identified and are working toward their discharge goals. yes    Depression Follow-up Plan Completed: Not applicable    Visit start and stop times:    01/24/25  Start Time: 1420  Stop Time: 1437  Total Visit Time: 17 minutes

## 2025-01-31 ENCOUNTER — TELEPHONE (OUTPATIENT)
Dept: DENTISTRY | Facility: CLINIC | Age: 50
End: 2025-01-31

## 2025-01-31 DIAGNOSIS — M15.0 PRIMARY OSTEOARTHRITIS INVOLVING MULTIPLE JOINTS: ICD-10-CM

## 2025-02-03 DIAGNOSIS — K21.00 GASTROESOPHAGEAL REFLUX DISEASE WITH ESOPHAGITIS WITHOUT HEMORRHAGE: ICD-10-CM

## 2025-02-03 DIAGNOSIS — K20.90 ESOPHAGITIS DETERMINED BY ENDOSCOPY: ICD-10-CM

## 2025-02-03 RX ORDER — MELOXICAM 15 MG/1
15 TABLET ORAL DAILY PRN
Qty: 90 TABLET | Refills: 0 | Status: SHIPPED | OUTPATIENT
Start: 2025-02-03

## 2025-02-04 RX ORDER — FAMOTIDINE 40 MG/1
TABLET, FILM COATED ORAL
Qty: 30 TABLET | Refills: 5 | Status: SHIPPED | OUTPATIENT
Start: 2025-02-04

## 2025-02-06 ENCOUNTER — TELEPHONE (OUTPATIENT)
Age: 50
End: 2025-02-06

## 2025-02-06 NOTE — TELEPHONE ENCOUNTER
Caller: Cristy Ken     Doctor: Dr. Keller     Reason for call: Cristy Ken called to schedule procedure for patient please advise     Call back#: [phone]

## 2025-02-06 NOTE — TELEPHONE ENCOUNTER
Repeat Injections  Injection type: B/L hip injections  Last injection date: 9/24/24  Last office visit: 10/14/24  Where is pain located: B/L hips  Is the pain the same area as before: Yes  Current pain level: 8/10  How much % of relief did the last injection provide:>90%  Duration of relief from last injection: 4 1/2 months  When did pain return: 2 weeks  Is the pain effecting your ADLs: yes     Blood thinners/NSAIDS? N/A  Diabetes? Yes                  CGM? No  Ha1c 6.6           Repeat Injections  Injection type: B/L L3-5 RFA  Last injection date: 3/12/24  Last office visit: 10/14/24  Where is pain located: B/L lower back  Is the pain the same area as before: Yes  Current pain level: 8/10  How much % of relief did the last injection provide: >80%  Duration of relief from last injection:~9 months  When did pain return: End of December  Is the pain effecting your ADLs:yes     Blood thinners/NSAIDS? Yes/No/N/A  Diabetes? Yes                   CGM? No

## 2025-02-07 ENCOUNTER — PREP FOR PROCEDURE (OUTPATIENT)
Dept: PAIN MEDICINE | Facility: CLINIC | Age: 50
End: 2025-02-07

## 2025-02-07 DIAGNOSIS — M16.0 PRIMARY OSTEOARTHRITIS OF BOTH HIPS: Primary | ICD-10-CM

## 2025-02-07 NOTE — TELEPHONE ENCOUNTER
Spoke to patient, scheduled procedure for 2/19/25. Patient aware of instructions. No food/drink one hour prior. Wear comfortable clothing. A  is required. Denies blood thinners. Continue all other prescribed medications on day of procedure. Call if prescribed an antibiotic or become sick. Refrain from vaccinations two weeks prior and two weeks after. Patient aware [facility] nurse will call day prior with instructions and report time. Call with questions.

## 2025-02-10 ENCOUNTER — OFFICE VISIT (OUTPATIENT)
Dept: GASTROENTEROLOGY | Facility: CLINIC | Age: 50
End: 2025-02-10
Payer: COMMERCIAL

## 2025-02-10 VITALS
TEMPERATURE: 97.3 F | SYSTOLIC BLOOD PRESSURE: 126 MMHG | HEIGHT: 62 IN | DIASTOLIC BLOOD PRESSURE: 84 MMHG | WEIGHT: 254 LBS | OXYGEN SATURATION: 96 % | HEART RATE: 73 BPM | BODY MASS INDEX: 46.74 KG/M2

## 2025-02-10 DIAGNOSIS — E61.1 IRON DEFICIENCY: ICD-10-CM

## 2025-02-10 DIAGNOSIS — K76.0 HEPATIC STEATOSIS: ICD-10-CM

## 2025-02-10 DIAGNOSIS — Z12.11 SCREENING FOR COLON CANCER: ICD-10-CM

## 2025-02-10 DIAGNOSIS — R13.10 DYSPHAGIA, UNSPECIFIED TYPE: ICD-10-CM

## 2025-02-10 DIAGNOSIS — K21.00 GASTROESOPHAGEAL REFLUX DISEASE WITH ESOPHAGITIS WITHOUT HEMORRHAGE: ICD-10-CM

## 2025-02-10 DIAGNOSIS — K31.7 GASTRIC POLYPS: Primary | ICD-10-CM

## 2025-02-10 PROCEDURE — 99214 OFFICE O/P EST MOD 30 MIN: CPT | Performed by: PHYSICIAN ASSISTANT

## 2025-02-10 RX ORDER — SODIUM CHLORIDE, SODIUM LACTATE, POTASSIUM CHLORIDE, CALCIUM CHLORIDE 600; 310; 30; 20 MG/100ML; MG/100ML; MG/100ML; MG/100ML
125 INJECTION, SOLUTION INTRAVENOUS CONTINUOUS
OUTPATIENT
Start: 2025-02-10

## 2025-02-10 NOTE — PATIENT INSTRUCTIONS
Stay on omeprazole and famotidine.   Repeat EGD in 03/2025 for your history of stomach polyps.   We will continue to follow your iron levels.

## 2025-02-10 NOTE — PROGRESS NOTES
Name: Cristy Garcia      : 1975      MRN: [id number]  Encounter Provider: Lela Jamison PA-C  Encounter Date: 2/10/2025   Encounter department: [facility] [place]  :  Assessment & Plan  Gastric polyps  History of such, hyperplastic in origin.  Recommendation for repeat EGD in 1 year for surveillance purposes was made.  Patient is due in 2025, and EGD has been ordered today.  We reviewed adjusting preparation to have a clear liquid diet the day prior to the procedure to ensure stomach is free of food contents for EGD.    I discussed informed consent with the patient. The risks/benefits/alternatives of the procedure were discussed with the patient. Risks included, but not limited to, infection, bleeding, perforation, injury to organs in the abdomen, missed lesion and incomplete procedure were discussed. Patient was agreeable.    Orders:    EGD; Future    Gastroesophageal reflux disease with esophagitis without hemorrhage  History of such, she had a history of severe esophagitis and a repeat EGD last completed in 2024 demonstrated healing of esophagitis, 3 cm hiatal hernia, and a nonobstructing Schatzki's ring.  Continue PPI and H2RA.   She is on celebrex daily and is unable to discontinue at this time, therefore I think she will be on some degree of acid suppression long term for gastroprotectin.    Recommend diet and lifestyle modifications for GERD.  This includes avoiding spicy, saucy, greasy/oily foods, citrus, EtOH, NSAIDs, tobacco.  Avoid eating within 2 to 3 hours of bed.  Elevating height of bed 6 inches on blocks may be beneficial.  Weight loss would likely be beneficial.       Dysphagia, unspecified type  Hx of such, hx of esophagitis and Schatzki ring.   Recommend chewing thoroughly, sitting upright during and after meals, alternating solids with sips of liquids, coating dry solids and liquid, etc.  EGD to evaluate for new intraluminal sources of dysphagia.         Iron deficiency  Hx of such without anemia.   Up-to-date on bidirectional endoscopic evaluation with no obvious sources of bleeding.  In the past it was thought to be secondary to abnormal menometrorrhagia, and she is s/p hysterectomy in 12/2024.  She follows with hematology.  In regards to iron supplementation requirements, I did review with patient that we could repeat her iron levels in a couple of months, and so long as no significant change, perhaps we could try discontinuing the supplement and monitoring.    If pt has progressive CAMERON and no alternate etiol is identified, WCE will need to be considered.        Hepatic steatosis  Hx of such, suspect metabolic etiol  Last elastography in 02/2024 with S0, F0-F1.   LFTs remain wnl.     Discussed recommendations in regards to fatty liver including:   Strict control of contributing comorbidities (obesity, prediabetes/diabetes, hypertension, and hypertriglyceridemia).  Weight loss of approx 10-15% of patient's current body weight over a period of 6-12 months through low fat diet and cardiovascular exercise as tolerated.  Limiting alcohol consumption, preferably complete abstinence.  Monitor hepatic function every 6 months with routine labs.        Screening for colon cancer  Colonoscopy in 10/2024 with scattered diverticulosis.  No history of colon polyps or family history of colon cancer.  Recall for cancer screening purposes in 10 years.         We will follow up in 6 months to reassess symptoms.     History of Present Illness   HPI  Cristy Garcia is a 49 y.o. female who presents for f/u for colonoscopy. Pmhx sig for DM2, GERD, hepatic steatosis, asthma, chronic pain disorder, fibromyalgia, MDD, BMI 44. Hx of lap hysterectomy in 12/2024.   History obtained from: patient    This patient was last evaluated in 09/2024. At that time, a colonoscopy was recommended for her history of iron deficiency.  This demonstrated diverticulosis only.  She did have a history  of gastric polyps which were noted to be hyperplastic and a recall for 2025 was recommended.    02/10/25:     Pt feels stable overall since last OV. Rare heartburn with certain oral intake, ie red sauce. No nausea, emesis. No early satiety. Notes had some dysphagia while ingesting hard boiled eggs since last OV, wonders if she may need her esophagus stretched again.    Eating salads, higher fiber diet in general. No constipation or diarrhea. No BRBPR or melena. No abd pain or rectal pain related to defecation.    NSAIDs: celebrex daily, no additional NSAIDs.   Acetaminophen: none   Etoh: none      01/2025: Hb 16.2, MCV 89, platelets 215, BUN 16, creatinine 0.57, AST 14, ALT 23, ALP 42, albumin 4.1, T. bili 0.9  01/2025: CT A/P: no acute pathology   09/2024: Hb 15.5, MCV 87, platelets 238  07/2024: TSAT 11, TIBC 467, iron 52, ferritin 5  02/2024: BUN 12, Cr 0.55, AST 15, ALT 20, ALP 40, albumin 4.3, t bili 0.81   01/2023: Hep B surface Ag non-reactive, Hep B surface Abs < 3.10, Hep B Core total Abs non-reactive, Hep A total AB non-reactive, Hep C Ab non-reactive, INR 0.92, AST 22, ALT 33, ALP 39, albumin 4.5, T. bili 0.6  01/2023: Elastography: F0-F1, S3  12/2022: negative cologuard, TSAT 21, TIBC 439, iron 90, ferritin 37     Endoscopic history:   EGD: 05/2023: exam limited by large amt of food in stomach  EGD: 09/2023: 3 cm hiatal hernia - GE junction 35 cm from the incisors, diaphragmatic impression 38 cm from the incisors; Moderate, generalized abnormal mucosa with plaque in the upper third of the esophagus, middle third of the esophagus and lower third of the esophagus; Grade D esophagitis with mucosal breaks measuring 5 mm or more, Multiple polyps measuring 5-9 mm in the stomach   A. Stomach, cold fcp biopsy:  Chronic inactive oxyntic and antral gastritis.  Negative for Helicobacter pylori, by H&E stain.  Negative for intestinal metaplasia, dysplasia or carcinoma.  B. Esophagus, cold fcp bx distal:  Esophageal  squamous mucosa with intraepithelial eosinophils (up to 90 per high powered field) with significant intercellular edema and eosinophilic microabscesses.  C. Stomach, cold fcp bx gastric polyp:  Hyperplastic polyp. Negative for dysplasia.   D. Esophagus, cold fcp bx proximal: Esophageal squamous mucosa with intraepithelial eosinophils (up to 40 per high powered field) with significant intercellular edema and eosinophilic microabscesses  EGD: 03/2024: 3 cm hiatal hernia; Schatzki ring in GEJ; Multiple subcentimeter polyps in the fundus of the stomach   A. Esophagus, Distal, Biopsy: Squamous mucosa with no specific pathologic change.  B. Esophagus, Proximal, Biopsy: Squamous epithelium with no specific pathologic change.  C. Stomach (Polyp x 4), Biopsy: Hyperplastic polyps  Colon: 10/2024: scattered diverticulosis in sigmoid and rectosigmoid     Review of Systems  Per HPI    Past Medical History   Past Medical History:   Diagnosis Date    Allergic     Seasonal Allergies    Alopecia of scalp     Treated with Proscar    Asthma     Chronic pain disorder     back, bilat knees, bilat hips    CPAP (continuous positive airway pressure) dependence     Diabetes mellitus (HCC)     Fibromyalgia, primary     GERD (gastroesophageal reflux disease)     Hip fx, left, closed, with routine healing, subsequent encounter 05/2017    Hyperlipidemia     Hypertension     Insomnia     Irregular heartbeat     VICTOR HUGO on CPAP      Past Surgical History:   Procedure Laterality Date    CARPAL TUNNEL RELEASE Left     HYSTERECTOMY N/A 12/27/2024    IR SPINE AND PAIN PROCEDURE  03/12/2024    IR SPINE AND PAIN PROCEDURE  04/11/2024    IR SPINE AND PAIN PROCEDURE  09/24/2024    LUMBAR EPIDURAL INJECTION      NERVE BLOCK Bilateral 12/29/2020    Procedure: L2 L3 L4 L5 MEDIAL BRANCH BLOCK #1;  Surgeon: Westley Keller MD;  Location:  END[facility];  Service: Pain Management     NERVE BLOCK Bilateral 01/19/2021    Procedure: L2 L3 L4 L5 MEDIAL BRANCH BLOCK #2;   Surgeon: Westley Keller MD;  Location: [facility];  Service: Pain Management     MO ARTHROCENTESIS ASPIR&/INJ MAJOR JT/BURSA W/O US Bilateral 02/07/2023    Procedure: INJECTION JOINT HIP;  Surgeon: Westley Keller MD;  Location: [facility];  Service: Pain Management     MO HYSTEROSCOPY BX ENDOMETRIUM&/POLYPC W/WO D&C N/A 04/25/2023    Procedure: HYSTEROSCOPY W/  RESECTION UTERINE TUMOR/FIBROID (POLYPECTOMY );  Surgeon: Donovan Ware DO;  Location: [facility];  Service: Gynecology    RADIOFREQUENCY ABLATION Right 02/25/2021    Procedure: L2 L3 L4 L5 RADIO FREQUENCY ABLATION right side;  Surgeon: Westley Keller MD;  [facility]: [facility];  Service: Pain Management     RADIOFREQUENCY ABLATION Left 03/16/2021    Procedure: L2 L3 L4 L5 RADIO FREQUENCY ABLATION;  Surgeon: Westley Keller MD;  [facility]: [facility];  Service: Pain Management     RHIZOTOMY Bilateral 04/04/2023    Procedure: RHIZOTOMY LUMBAR L3, L4, L5 medial branch nerves;  Surgeon: Westley Keller MD;  [facility]: [facility];  Service: Pain Management     UPPER GASTROINTESTINAL ENDOSCOPY      US GUIDED THYROID BIOPSY  07/14/2023     Family History   Problem Relation Age of Onset    Cancer Father     Hepatitis Father     Asthma Cousin     Hypertension Paternal Grandmother     No Known Problems Mother     No Known Problems Sister     No Known Problems Maternal Grandmother     No Known Problems Maternal Grandfather     No Known Problems Paternal Grandfather     No Known Problems Paternal Aunt     BRCA2 Positive Neg Hx     BRCA2 Negative Neg Hx     BRCA1 Positive Neg Hx     BRCA1 Negative Neg Hx     BRCA 1/2 Neg Hx     Ovarian cancer Neg Hx     Endometrial cancer Neg Hx     Colon cancer Neg Hx     Breast cancer additional onset Neg Hx     Breast cancer Neg Hx       reports that she quit smoking about 32 years ago. Her smoking use included cigarettes. She started smoking about 26 years ago. She has been exposed to tobacco smoke. She has never used smokeless  tobacco. She reports that she does not drink alcohol and does not use drugs.  Current Outpatient Medications on File Prior to Visit   Medication Sig Dispense Refill    albuterol (Ventolin HFA) 90 mcg/act inhaler Inhale 2 puffs every 6 (six) hours as needed for wheezing or shortness of breath 18 g 5    atorvastatin (LIPITOR) 20 mg tablet Take 1 tablet (20 mg total) by mouth daily 100 tablet 1    baclofen 10 mg tablet Take 1 tablet (10 mg total) by mouth 3 (three) times a day 90 tablet 1    celecoxib (CeleBREX) 200 mg capsule Take 1 capsule (200 mg total) by mouth 2 (two) times a day 60 capsule 5    cetirizine (ZyrTEC) 10 mg tablet 1 tab po daily x 1 month then prn allergies 100 tablet 1    clotrimazole-betamethasone (LOTRISONE) 1-0.05 % cream APPLY TWICE DAILY TO VAGINAL AREA UNTIL HEALED THEN AS NEEDED FOR IRRITATION. (Patient taking differently: as needed APPLY TWICE DAILY TO VAGINAL AREA UNTIL HEALED THEN AS NEEDED FOR IRRITATION.) 45 g 3    cromolyn (OPTICROM) 4 % ophthalmic solution Administer 1 drop to both eyes 4 (four) times a day 10 mL 2    ergocalciferol (VITAMIN D2) 50,000 units Take 1 capsule (50,000 Units total) by mouth once a week 12 capsule 1    famotidine (PEPCID) 40 MG tablet TAKE (1) TABLET BY MOUTH DAILY. 30 tablet 5    finasteride (PROPECIA) 1 MG tablet Take 1 tablet (1 mg total) by mouth daily 30 tablet 0    fluticasone (FLONASE) 50 mcg/act nasal spray USE 1 SPRAY INTO EACH NOSTRIL TWICE A DAY FOR 1 MONTH THEN AS NEEDED FOR CONGESTION AND ALLERGIES 16 g 1    Fluticasone-Salmeterol (Advair) 250-50 mcg/dose inhaler Inhale 1 puff 2 (two) times a day Rinse mouth after use. 200 blister 1    gabapentin (NEURONTIN) 300 mg capsule TAKE (1) CAPSULE THREE TIMES DAILY. 270 capsule 3    glipiZIDE (GLUCOTROL) 10 mg tablet TAKE (2) TABLETS TWICE DAILY BEFORE MEALS. 360 tablet 1    glucose blood test strip       glucose blood test strip Use as instructed 100 each 1    Jardiance 25 MG TABS TAKE (1) TABLET BY  MOUTH DAILY. 30 tablet 5    ketoconazole (NIZORAL) 2 % cream Apply topically daily 60 g 0    Lancet Devices (OneTouch Delica Plus Lancing) MISC       Lancets (onetouch ultrasoft) lancets Use as instructed 100 each 1    lisinopril (ZESTRIL) 20 mg tablet Take 1 tablet (20 mg total) by mouth daily 90 tablet 3    meloxicam (MOBIC) 15 mg tablet Take 1 tablet (15 mg total) by mouth daily as needed for moderate pain 90 tablet 0    metFORMIN (GLUCOPHAGE) 1000 MG tablet TAKE (1) TABLET TWICE A DAY WITH MEALS. 200 tablet 1    metoprolol succinate (TOPROL-XL) 25 mg 24 hr tablet Take 1 tablet (25 mg total) by mouth daily 90 tablet 1    nystatin powder Apply topically 2 (two) times a day 60 g 3    omeprazole (PriLOSEC) 40 MG capsule Take 1 capsule (40 mg total) by mouth daily before breakfast 90 capsule 3    ondansetron (ZOFRAN) 4 mg tablet Take 1 tablet (4 mg total) by mouth every 8 (eight) hours as needed for nausea or vomiting 30 tablet 5    sertraline (ZOLOFT) 50 mg tablet TAKE (1) TABLET BY MOUTH DAILY AT BEDTIME. 90 tablet 1    triamcinolone (KENALOG) 0.025 % cream Apply 2 x daily to rash until healed then prn rash (Patient taking differently: as needed Apply 2 x daily to rash until healed then prn rash) 60 g 0     No current facility-administered medications on file prior to visit.     Allergies   Allergen Reactions    Trulicity [Dulaglutide] Swelling    Tdap [Tetanus-Diphth-Acell Pertussis] Rash         Objective   LMP  (LMP Unknown)      Physical Exam  Vitals and nursing note reviewed.   Constitutional:       General: She is not in acute distress.     Appearance: She is well-developed.   HENT:      Head: Normocephalic and atraumatic.   Eyes:      General: No scleral icterus.     Conjunctiva/sclera: Conjunctivae normal.   Cardiovascular:      Rate and Rhythm: Normal rate.   Pulmonary:      Effort: Pulmonary effort is normal. No respiratory distress.   Abdominal:      General: Bowel sounds are normal. There is no  distension.      Palpations: Abdomen is soft.      Tenderness: There is no abdominal tenderness. There is no guarding or rebound.   Musculoskeletal:      Right lower leg: No edema.      Left lower leg: No edema.   Skin:     General: Skin is warm and dry.      Coloration: Skin is not jaundiced.   Neurological:      General: No focal deficit present.      Mental Status: She is alert.   Psychiatric:         Mood and Affect: Mood normal.         Behavior: Behavior normal.       **Please note:  Dictation voice to text software may have been used in the creation of this record.  Occasional wrong word or “sound alike” substitutions may have occurred due to the inherent limitations of voice recognition software.  Read the chart carefully and recognize, using context, where substitutions have occurred.**

## 2025-02-10 NOTE — ASSESSMENT & PLAN NOTE
History of such, she had a history of severe esophagitis and a repeat EGD last completed in 03/2024 demonstrated healing of esophagitis, 3 cm hiatal hernia, and a nonobstructing Schatzki's ring.  Continue PPI and H2RA.   She is on celebrex daily and is unable to discontinue at this time, therefore I think she will be on some degree of acid suppression long term for gastroprotectin.    Recommend diet and lifestyle modifications for GERD.  This includes avoiding spicy, saucy, greasy/oily foods, citrus, EtOH, NSAIDs, tobacco.  Avoid eating within 2 to 3 hours of bed.  Elevating height of bed 6 inches on blocks may be beneficial.  Weight loss would likely be beneficial.

## 2025-02-12 ENCOUNTER — TELEPHONE (OUTPATIENT)
Age: 50
End: 2025-02-12

## 2025-02-12 DIAGNOSIS — E78.2 MIXED HYPERLIPIDEMIA: ICD-10-CM

## 2025-02-12 DIAGNOSIS — H65.00 ACUTE SEROUS OTITIS MEDIA, RECURRENCE NOT SPECIFIED, UNSPECIFIED LATERALITY: ICD-10-CM

## 2025-02-12 DIAGNOSIS — K20.90 ESOPHAGITIS DETERMINED BY ENDOSCOPY: ICD-10-CM

## 2025-02-12 DIAGNOSIS — J45.30 MILD PERSISTENT ASTHMA WITHOUT COMPLICATION: ICD-10-CM

## 2025-02-12 DIAGNOSIS — R09.82 PND (POST-NASAL DRIP): ICD-10-CM

## 2025-02-12 DIAGNOSIS — J01.00 ACUTE MAXILLARY SINUSITIS, RECURRENCE NOT SPECIFIED: ICD-10-CM

## 2025-02-12 DIAGNOSIS — R05.1 ACUTE COUGH: ICD-10-CM

## 2025-02-12 DIAGNOSIS — J06.9 ACUTE URI: ICD-10-CM

## 2025-02-12 DIAGNOSIS — E55.9 VITAMIN D DEFICIENCY: ICD-10-CM

## 2025-02-12 DIAGNOSIS — K21.00 GASTROESOPHAGEAL REFLUX DISEASE WITH ESOPHAGITIS WITHOUT HEMORRHAGE: ICD-10-CM

## 2025-02-12 RX ORDER — ERGOCALCIFEROL 1.25 MG/1
50000 CAPSULE, LIQUID FILLED ORAL WEEKLY
Qty: 12 CAPSULE | Refills: 0 | Status: SHIPPED | OUTPATIENT
Start: 2025-02-12

## 2025-02-12 RX ORDER — ATORVASTATIN CALCIUM 20 MG/1
20 TABLET, FILM COATED ORAL DAILY
Qty: 100 TABLET | Refills: 0 | Status: SHIPPED | OUTPATIENT
Start: 2025-02-12

## 2025-02-12 NOTE — TELEPHONE ENCOUNTER
Caller: saran Francois's friend    Doctor: Dr. Keller    Reason for call: pt's friend stated they called before to schedule a repeat RFA and they have not hear anything    Call back#: [phone]

## 2025-02-12 NOTE — TELEPHONE ENCOUNTER
Pt called in to schedule repeat hip injections (scheduled for 2/19) and repeat lumbar RFA. How much time between injections would you like? Discuss RFA at hip inj follow up on 3/10?

## 2025-02-13 ENCOUNTER — TELEPHONE (OUTPATIENT)
Dept: BEHAVIORAL/MENTAL HEALTH CLINIC | Facility: CLINIC | Age: 50
End: 2025-02-13

## 2025-02-13 RX ORDER — FAMOTIDINE 40 MG/1
TABLET, FILM COATED ORAL
Qty: 30 TABLET | Refills: 0 | OUTPATIENT
Start: 2025-02-13

## 2025-02-13 RX ORDER — FLUTICASONE PROPIONATE AND SALMETEROL 250; 50 UG/1; UG/1
1 POWDER RESPIRATORY (INHALATION) 2 TIMES DAILY
Qty: 200 BLISTER | Refills: 0 | Status: SHIPPED | OUTPATIENT
Start: 2025-02-13

## 2025-02-13 RX ORDER — FLUTICASONE PROPIONATE 50 MCG
SPRAY, SUSPENSION (ML) NASAL
Qty: 16 G | Refills: 0 | Status: SHIPPED | OUTPATIENT
Start: 2025-02-13

## 2025-02-18 ENCOUNTER — OFFICE VISIT (OUTPATIENT)
Dept: DENTISTRY | Facility: CLINIC | Age: 50
End: 2025-02-18

## 2025-02-18 VITALS — DIASTOLIC BLOOD PRESSURE: 67 MMHG | SYSTOLIC BLOOD PRESSURE: 112 MMHG | HEART RATE: 67 BPM | TEMPERATURE: 97.2 F

## 2025-02-18 DIAGNOSIS — Z01.20 ENCOUNTER FOR DENTAL EXAMINATION: Primary | ICD-10-CM

## 2025-02-18 PROCEDURE — D1330 ORAL HYGIENE INSTRUCTIONS: HCPCS | Performed by: DENTAL HYGIENIST

## 2025-02-18 PROCEDURE — D0120 PERIODIC ORAL EVALUATION - ESTABLISHED PATIENT: HCPCS | Performed by: DENTIST

## 2025-02-18 PROCEDURE — D1110 PROPHYLAXIS - ADULT: HCPCS | Performed by: DENTAL HYGIENIST

## 2025-02-18 NOTE — PROGRESS NOTES
PERIODIC EXAM, ADULT PROPHY , (no xrays due )   REVIEWED MED HX: meds, allergies, health changes reviewed in [facility]. All consents signed.  CHIEF CONCERN: no dental pain or concerns  PAIN SCALE:  0  ASA CLASS:  II  PLAQUE:  mild  CALCULUS:  light  BLEEDING:   light  STAIN :   none      PERIO: Mild gingivitis    Hygiene Procedures:  Scaled, Polished, Flossed and Used Cavitron    Oral Hygiene Instruction: Brushing Minimum 2x daily for 2 minutes, daily flossing, Listerine, and Recommended soft toothbrush only    Dispensed: Toothbrush, Toothpaste, Floss    Visual and Tactile Intraoral/ Extraoral evaluation: Oral and Oropharyngeal cancer evaluation. No findings   ---Small buccal mis on maxillary    Dr. Andrews   Reviewed with patient clinical and radiographic findings and patient verbalized understanding. All questions and concerns addressed.     REFERRALS: none    CARIES FINDINGS: no decay noted       TREATMENT  PLAN :   NV1: Upper framework / Wax rim try in - 60 min w/  Friedman  NV2:  6mrc - w/ Bws - 50 min    Last BWX:    Last Panorex   Last FMX :   4/10/24

## 2025-02-19 ENCOUNTER — HOSPITAL ENCOUNTER (OUTPATIENT)
Dept: GASTROENTEROLOGY | Facility: HOSPITAL | Age: 50
Setting detail: OUTPATIENT SURGERY
Discharge: HOME | End: 2025-02-19
Attending: ANESTHESIOLOGY
Payer: COMMERCIAL

## 2025-02-19 ENCOUNTER — PREP FOR PROCEDURE (OUTPATIENT)
Dept: PAIN MEDICINE | Facility: CLINIC | Age: 50
End: 2025-02-19

## 2025-02-19 VITALS
OXYGEN SATURATION: 98 % | HEIGHT: 62 IN | TEMPERATURE: 98.5 F | SYSTOLIC BLOOD PRESSURE: 116 MMHG | BODY MASS INDEX: 46.74 KG/M2 | DIASTOLIC BLOOD PRESSURE: 58 MMHG | WEIGHT: 254 LBS | RESPIRATION RATE: 18 BRPM | HEART RATE: 65 BPM

## 2025-02-19 DIAGNOSIS — M47.816 LUMBAR SPONDYLOSIS: Primary | ICD-10-CM

## 2025-02-19 DIAGNOSIS — M16.0 PRIMARY OSTEOARTHRITIS OF BOTH HIPS: ICD-10-CM

## 2025-02-19 PROCEDURE — 77002 NEEDLE LOCALIZATION BY XRAY: CPT

## 2025-02-19 PROCEDURE — 77002 NEEDLE LOCALIZATION BY XRAY: CPT | Performed by: ANESTHESIOLOGY

## 2025-02-19 PROCEDURE — 20610 DRAIN/INJ JOINT/BURSA W/O US: CPT | Performed by: ANESTHESIOLOGY

## 2025-02-19 RX ORDER — LIDOCAINE HYDROCHLORIDE 10 MG/ML
INJECTION, SOLUTION EPIDURAL; INFILTRATION; INTRACAUDAL; PERINEURAL AS NEEDED
Status: COMPLETED | OUTPATIENT
Start: 2025-02-19 | End: 2025-02-19

## 2025-02-19 RX ORDER — BUPIVACAINE HYDROCHLORIDE 2.5 MG/ML
INJECTION, SOLUTION EPIDURAL; INFILTRATION; INTRACAUDAL AS NEEDED
Status: COMPLETED | OUTPATIENT
Start: 2025-02-19 | End: 2025-02-19

## 2025-02-19 RX ORDER — METHYLPREDNISOLONE ACETATE 80 MG/ML
INJECTION, SUSPENSION INTRA-ARTICULAR; INTRALESIONAL; INTRAMUSCULAR; SOFT TISSUE AS NEEDED
Status: COMPLETED | OUTPATIENT
Start: 2025-02-19 | End: 2025-02-19

## 2025-02-19 RX ADMIN — BUPIVACAINE HYDROCHLORIDE 9 ML: 2.5 INJECTION, SOLUTION EPIDURAL; INFILTRATION; INTRACAUDAL; PERINEURAL at 12:20

## 2025-02-19 RX ADMIN — IOHEXOL 2 ML: 240 INJECTION, SOLUTION INTRATHECAL; INTRAVASCULAR; INTRAVENOUS; ORAL at 12:19

## 2025-02-19 RX ADMIN — METHYLPREDNISOLONE ACETATE 80 MG: 80 INJECTION, SUSPENSION INTRA-ARTICULAR; INTRALESIONAL; INTRAMUSCULAR; SOFT TISSUE at 12:25

## 2025-02-19 RX ADMIN — LIDOCAINE HYDROCHLORIDE 10 ML: 10 INJECTION, SOLUTION EPIDURAL; INFILTRATION; INTRACAUDAL; PERINEURAL at 12:19

## 2025-02-19 NOTE — DISCHARGE INSTR - AVS FIRST PAGE
YOUR 2 WEEK FOLLOW UP HAS BEEN SCHEDULED; IF YOU WISH TO CHANGE THE FOLLOW UP, PLEASE CALL THE [facility] AT [place]: [phone]      Steroid Joint Injection   WHAT YOU NEED TO KNOW:   A steroid joint injection is a procedure to inject steroid medicine into a joint. Steroid medicine decreases pain and inflammation. The injection may also contain an anesthetic (numbing medicine) to decrease pain. It may be done to treat conditions such as arthritis, gout, or carpal tunnel syndrome. The injections may be given in your knee, ankle, shoulder, elbow, or wrist. Injections may also be given in your hip, toe, thumb, or finger.  DISCHARGE INSTRUCTIONS:   Contact your healthcare provider if:   You have fever or chills.     You have redness or swelling at the injection site.     You have more pain than usual in your joint for more than 72 hours.     You have questions or concerns about your condition or care.    Medicines:   Pain medicine  may be given. Ask how to take this medicine safely.     Take your medicine as directed.  Contact your healthcare provider if you think your medicine is not helping or if you have side effects. Tell your provider if you are allergic to any medicine. Keep a list of the medicines, vitamins, and herbs you take. Include the amounts, and when and why you take them. Bring the list or the pill bottles to follow-up visits. Carry your medicine list with you in case of an emergency.    Self-care:   Leave the bandage on for 8 to 12 hours.  Care for your wound as directed.    Rest the area  as directed. You may need to decrease weight on certain joints, such as the knee, for a period of time. Ask when you can return to your daily activities.     Elevate  your limb where the steroid injection was given. Elevate the limb above the level of your heart as often as you can. This will help decrease swelling and pain. Prop your limb on pillows or blankets to keep it elevated comfortably.          Apply ice  on your joint for 15 to 20 minutes every hour or as directed. Use an ice pack, or put crushed ice in a plastic bag. Cover it with a towel. Ice helps prevent tissue damage and decreases swelling and pain.    © Copyright [employer] 2023 Information is for End User's use only and may not be sold, redistributed or otherwise used for commercial purposes.  The above information is an  only. It is not intended as medical advice for individual conditions or treatments. Talk to your doctor, nurse or pharmacist before following any medical regimen to see if it is safe and effective for you.

## 2025-02-19 NOTE — H&P (VIEW-ONLY)
Assessment  1. Primary osteoarthritis of both hips  -     IR spine and pain procedure; Standing  -     IR spine and pain procedure  Greater than 80% improvement with radiofrequency ablation of repeat bilateral Medial branch nerves at L3-L5 performed 3/12/24;  procedure improved ability to participate with IADLs in significantly less pain for almost a year before return of pain.  Additionally greater than 90% relief of pain with improved ability to participate with IADLs after bilateral hip injections performed 9/24/24 until recent return of pain. Reports b/l hip pain with radiation to the groin; ttp over b/l gtb, pain with internal/external rotation of b/l hips; pain worse with standing/ambulation. Lifestyle modifications extensively discussed including diet, exercise and weight loss in conjunction with optimal DM-2 control. Has been compliant with home exercises for axial low back pain and b/l hip pain. Previously reported the following symptomatology:    Chronic axial low back pain described by primarily arthritic features.  Positive facet loading maneuvers as noted below.Mild noncompressive lumbar degenerative change at the L3-4 and L4-5 levels.  Previously had a left L3 transforaminal steroid injection with Dr. Franco which helped for about a week and a half.  Arthritic symptomatology greater than radicular features at this time. Reasonable to continue multimodal pain therapy plan.    Plan  -Celebrex 200 mg b.i.d. prn pain for lumbar facet syndrome/hip osteoarthritis.  Counseled regarding bleeding risk, GI risk, Renal risk of taking this medication.  -lifestyle modifications including diet, exercise and weight loss in addition to DM-2 control extensively discussed  -b/l hip injections with fluoroscopic guidance; 2 weeks later will perform b/l L3, L4, L5 medial branch nerve RFA with IV sedation.  -hip  and neckexercises provided; continue physical therapy and core exercises for lumbar facet syndrome, hip  osteoarthritis, cervical spondylosis    There are risks associated with opioid medications, including dependence, addiction and tolerance. The patient understands and agrees to use these medications only as prescribed. Potential side effects of the medications include, but are not limited to, constipation, drowsiness, addiction, impaired judgment and risk of fatal overdose if not taken as prescribed. The patient was warned against driving while taking sedation medications.  Sharing medications is a felony. At this point in time, the patient is showing no signs of addiction, abuse, diversion or suicidal ideation.    [place] Prescription Drug Monitoring Program report was reviewed and was appropriate     Complete risks and benefits including bleeding, infection, tissue reaction, nerve injury and allergic reaction were discussed. The approach was demonstrated using models and literature was provided. Verbal and written consent was obtained.    My impressions and treatment recommendations were discussed in detail with the patient who verbalized understanding and had no further questions.  Discharge instructions were provided. I personally saw and examined the patient and I agree with the above discussed plan of care.    No orders of the defined types were placed in this encounter.      History of Present Illness    Greater than 80% improvement with radiofrequency ablation of repeat bilateral Medial branch nerves at L3-L5 performed 3/12/24;  prior procedure improved ability to participate with IADLs in significantly less pain for almost a year.  Additionally greater than 90% relief of pain with improved ability to participate with IADLs after bilateral hip injections performed recently. Previously reported b/l hip pain with radiation to the groin worse with standing/ambulation. Previously reported the following symptomatology:    Cristy Garcia is a 49 y.o. female with past medical history of axial low back pain  described primarily by arthritic features.  She presents with a 2 year history of chronic low back pain described primarily as arthritic in nature.   she describes 8/10 low back pain that is worse in the mornings and worse at the end of the day.  The pain is characterized by achy, nagging, indolent, crampy, stabbing pain in his/her axial low back.  The patient describes that the pain is worse with standing for long periods of time on hard surfaces as well as with walking.  The patient is a very active individual and feels as though this pain compromises her participation with independent activities of daily living. The pain can be debilitating at times and contribute to significant disability, compromising overall activity and independent activities of daily living.   She has tried physical therapy with limited relief of symptoms.  Medications the patient has tried in the past include   Celebrex,  and Flexeril. She describes minor radicular symptoms in the L3 and L4 dermatomal distribution but otherwise has good strength.  Previously she had left L3 transforaminal epidural steroid injection with relief for about 2 weeks. She denies any weakness numbness or paresthesias.  The patient denies any bowel or bladder dysfunction as well.        I have personally reviewed and/or updated the patient's past medical history, past surgical history, family history, social history, current medications, allergies, and vital signs today.     Review of Systems   Constitutional:  Positive for activity change.   HENT: Negative.     Eyes: Negative.    Respiratory: Negative.     Cardiovascular: Negative.    Gastrointestinal: Negative.    Endocrine: Negative.    Genitourinary: Negative.    Musculoskeletal:  Positive for arthralgias, back pain, gait problem and myalgias.   Skin: Negative.    Allergic/Immunologic: Negative.    Hematological: Negative.    Psychiatric/Behavioral: Negative.     All other systems reviewed and are  negative.      Patient Active Problem List   Diagnosis    SOB (shortness of breath)    Chronic bilateral low back pain without sciatica    Chronic pain of left knee    Chronic pain of right knee    Fibromyalgia, primary    Primary osteoarthritis involving multiple joints    Mild persistent asthma without complication    Lumbar radiculopathy    Primary osteoarthritis of both hips    Left hip pain    Major depression, recurrent, chronic (HCC)    Lumbar spondylosis    Cervical radiculopathy    Morbid obesity with body mass index (BMI) of 45.0 to 49.9 in adult (HCC)    VICTOR HUGO on CPAP    Anxiety associated with depression    Diabetes mellitus (HCC)    Hyperlipidemia    Essential hypertension    Irregular heartbeat    Chronic pain disorder    GERD (gastroesophageal reflux disease)    Fatty liver    Elevated LFTs    Esophagitis determined by endoscopy    Impacted third molar tooth    Complex dental caries    Erythrocytosis    Leukocytosis    Mild concentric left ventricular hypertrophy    Chronic fatigue    Vitamin D deficiency    Hair loss    Hx of giardiasis    Vaginal candidiasis    Trigeminal neuralgia    Vitamin B12 deficiency    Teeth missing       Past Medical History:   Diagnosis Date    Allergic     Seasonal Allergies    Alopecia of scalp     Treated with Proscar    Asthma     Chronic pain disorder     back, bilat knees, bilat hips    CPAP (continuous positive airway pressure) dependence     Diabetes mellitus (HCC)     Fibromyalgia, primary     GERD (gastroesophageal reflux disease)     Hip fx, left, closed, with routine healing, subsequent encounter 05/2017    Hyperlipidemia     Hypertension     Insomnia     Irregular heartbeat     VICTOR HUGO on CPAP        Past Surgical History:   Procedure Laterality Date    CARPAL TUNNEL RELEASE Left     HYSTERECTOMY N/A 12/27/2024    HYSTERECTOMY      IR SPINE AND PAIN PROCEDURE  03/12/2024    IR SPINE AND PAIN PROCEDURE  04/11/2024    IR SPINE AND PAIN PROCEDURE  09/24/2024    LUMBAR  EPIDURAL INJECTION      NERVE BLOCK Bilateral 12/29/2020    Procedure: L2 L3 L4 L5 MEDIAL BRANCH BLOCK #1;  Surgeon: Westley Keller MD;  Location: [facility];  Service: Pain Management     NERVE BLOCK Bilateral 01/19/2021    Procedure: L2 L3 L4 L5 MEDIAL BRANCH BLOCK #2;  Surgeon: Westley Keller MD;  Location: [facility];  Service: Pain Management     PA ARTHROCENTESIS ASPIR&/INJ MAJOR JT/BURSA W/O US Bilateral 02/07/2023    Procedure: INJECTION JOINT HIP;  Surgeon: Westley Keller MD;  Location: [facility];  Service: Pain Management     PA HYSTEROSCOPY BX ENDOMETRIUM&/POLYPC W/WO D&C N/A 04/25/2023    Procedure: HYSTEROSCOPY W/  RESECTION UTERINE TUMOR/FIBROID (POLYPECTOMY );  Surgeon: Donovan Ware DO;  Location: [facility];  Service: Gynecology    RADIOFREQUENCY ABLATION Right 02/25/2021    Procedure: L2 L3 L4 L5 RADIO FREQUENCY ABLATION right side;  Surgeon: Westley Keller MD;  Location: [facility];  Service: Pain Management     RADIOFREQUENCY ABLATION Left 03/16/2021    Procedure: L2 L3 L4 L5 RADIO FREQUENCY ABLATION;  Surgeon: Westley Keller MD;  Location: [facility];  Service: Pain Management     RHIZOTOMY Bilateral 04/04/2023    Procedure: RHIZOTOMY LUMBAR L3, L4, L5 medial branch nerves;  Surgeon: Westley Keller MD;  Location: [facility];  Service: Pain Management     UPPER GASTROINTESTINAL ENDOSCOPY      US GUIDED THYROID BIOPSY  07/14/2023       Family History   Problem Relation Age of Onset    Cancer Father     Hepatitis Father     Asthma Cousin     Hypertension Paternal Grandmother     No Known Problems Mother     No Known Problems Sister     No Known Problems Maternal Grandmother     No Known Problems Maternal Grandfather     No Known Problems Paternal Grandfather     No Known Problems Paternal Aunt     BRCA2 Positive Neg Hx     BRCA2 Negative Neg Hx     BRCA1 Positive Neg Hx     BRCA1 Negative Neg Hx     BRCA 1/2 Neg Hx     Ovarian cancer Neg Hx     Endometrial cancer Neg Hx     Colon cancer Neg  Hx     Breast cancer additional onset Neg Hx     Breast cancer Neg Hx        Social History     Occupational History    Not on file   Tobacco Use    Smoking status: Former     Types: Cigarettes     Start date:      Quit date:      Years since quittin.1     Passive exposure: Past    Smokeless tobacco: Never   Vaping Use    Vaping status: Never Used   Substance and Sexual Activity    Alcohol use: No    Drug use: No    Sexual activity: Not Currently       Current Outpatient Medications on File Prior to Encounter   Medication Sig    albuterol (Ventolin HFA) 90 mcg/act inhaler Inhale 2 puffs every 6 (six) hours as needed for wheezing or shortness of breath    atorvastatin (LIPITOR) 20 mg tablet Take 1 tablet (20 mg total) by mouth daily    baclofen 10 mg tablet Take 1 tablet (10 mg total) by mouth 3 (three) times a day    cetirizine (ZyrTEC) 10 mg tablet 1 tab po daily x 1 month then prn allergies    clotrimazole-betamethasone (LOTRISONE) 1-0.05 % cream APPLY TWICE DAILY TO VAGINAL AREA UNTIL HEALED THEN AS NEEDED FOR IRRITATION. (Patient taking differently: as needed APPLY TWICE DAILY TO VAGINAL AREA UNTIL HEALED THEN AS NEEDED FOR IRRITATION.)    cromolyn (OPTICROM) 4 % ophthalmic solution Administer 1 drop to both eyes 4 (four) times a day    famotidine (PEPCID) 40 MG tablet TAKE (1) TABLET BY MOUTH DAILY.    finasteride (PROPECIA) 1 MG tablet Take 1 tablet (1 mg total) by mouth daily    fluticasone (FLONASE) 50 mcg/act nasal spray USE 1 SPRAY INTO EACH NOSTRIL TWICE A DAY FOR 1 MONTH THEN AS NEEDED FOR CONGESTION AND ALLERGIES    Fluticasone-Salmeterol (Advair) 250-50 mcg/dose inhaler Inhale 1 puff 2 (two) times a day Rinse mouth after use.    gabapentin (NEURONTIN) 300 mg capsule TAKE (1) CAPSULE THREE TIMES DAILY.    glipiZIDE (GLUCOTROL) 10 mg tablet TAKE (2) TABLETS TWICE DAILY BEFORE MEALS.    glucose blood test strip     glucose blood test strip Use as instructed    Jardiance 25 MG TABS TAKE (1)  "TABLET BY MOUTH DAILY.    ketoconazole (NIZORAL) 2 % cream Apply topically daily    Lancet Devices (OneTouch Delica Plus Lancing) MISC     Lancets (onetouch ultrasoft) lancets Use as instructed    lisinopril (ZESTRIL) 20 mg tablet Take 1 tablet (20 mg total) by mouth daily    meloxicam (MOBIC) 15 mg tablet Take 1 tablet (15 mg total) by mouth daily as needed for moderate pain    metFORMIN (GLUCOPHAGE) 1000 MG tablet TAKE (1) TABLET TWICE A DAY WITH MEALS.    metoprolol succinate (TOPROL-XL) 25 mg 24 hr tablet Take 1 tablet (25 mg total) by mouth daily    nystatin powder Apply topically 2 (two) times a day    omeprazole (PriLOSEC) 40 MG capsule Take 1 capsule (40 mg total) by mouth daily before breakfast    ondansetron (ZOFRAN) 4 mg tablet Take 1 tablet (4 mg total) by mouth every 8 (eight) hours as needed for nausea or vomiting    sertraline (ZOLOFT) 50 mg tablet TAKE (1) TABLET BY MOUTH DAILY AT BEDTIME.    triamcinolone (KENALOG) 0.025 % cream Apply 2 x daily to rash until healed then prn rash (Patient taking differently: as needed Apply 2 x daily to rash until healed then prn rash)    ergocalciferol (VITAMIN D2) 50,000 units Take 1 capsule (50,000 Units total) by mouth once a week    [DISCONTINUED] celecoxib (CeleBREX) 200 mg capsule Take 1 capsule (200 mg total) by mouth 2 (two) times a day     No current facility-administered medications on file prior to encounter.       Allergies   Allergen Reactions    Trulicity [Dulaglutide] Swelling    Tdap [Tetanus-Diphth-Acell Pertussis] Rash         Physical Exam    /69   Pulse 64   Temp 98.2 °F (36.8 °C) (Oral)   Resp 18   Ht 5' 2\" (1.575 m)   Wt 115 kg (254 lb)   LMP  (LMP Unknown)   SpO2 97%   BMI 46.46 kg/m²     Constitutional: normal, well developed, well nourished, alert, in no distress and non-toxic and no overt pain behavior. and obese  Eyes: anicteric  HEENT: grossly intact  Neck: supple, symmetric, trachea midline and no masses   Pulmonary:even " and unlabored  Cardiovascular:No edema or pitting edema present  Skin:Normal without rashes or lesions and well hydrated  Psychiatric:Mood and affect appropriate  Neurologic:Cranial Nerves II-XII grossly intact Sensation grossly intact; no clonus negative dupree's. Reflexes 2+ and brisk. SLR negative bilaterally.  Musculoskeletal:  Steppage gait. Normal heel toe and tip toe walking.  Significant pain with lumbar facet loading bilaterally and with lateral spine rotation left greater than right.  TTP over lumbar paraspinal muscles. Negative wilda's test, negative gaenslen's negative SIJ loading bilaterally.  ttp over b/l gtb, pain with internal/external rotation of b/l hips    Imaging    MRI LUMBAR SPINE WITHOUT CONTRAST     INDICATION: M54.42: Lumbago with sciatica, left side  G89.29: Other chronic pain.     COMPARISON:  None.     TECHNIQUE:  Sagittal T1, sagittal T2, sagittal inversion recovery, axial T1 and axial T2, coronal T2.    IMAGE QUALITY:  Diagnostic     FINDINGS:     VERTEBRAL BODIES:  There are 5 lumbar type vertebral bodies.  Normal alignment of the lumbar spine.  No spondylolysis or spondylolisthesis. No scoliosis.  No compression fracture.    Normal marrow signal is identified within the visualized bony   structures.  No discrete marrow lesion.     SACRUM:  Normal signal within the sacrum. No evidence of insufficiency or stress fracture.     DISTAL CORD AND CONUS:  Normal size and signal within the distal cord and conus.     PARASPINAL SOFT TISSUES:  There is thickening of the endometrial cavity partially visualized on this examination towards the fundus.  There is a dominant follicle identified within the left ovary.     LOWER THORACIC DISC SPACES:  Normal disc height and signal.  No disc herniation, canal stenosis or foraminal narrowing.     LUMBAR DISC SPACES:     L1-L2:  Normal.     L2-L3:  Normal.     L3-L4:  Annular bulging with a small broad-based left foraminal and extraforaminal disc  protrusion best seen on series 6 image 13.  There is no canal stenosis.  Only minimal left foraminal narrowing without nerve impingement.     L4-L5:  Disc desiccation and loss of disc height with mild annular bulging.  Small central disc herniation without canal stenosis or foraminal nerve impingement.     L5-S1:  Normal disc height and signal without canal stenosis or foraminal narrowing.     IMPRESSION:     Mild noncompressive lumbar degenerative change at the L3-4 and L4-5 levels.     Fluid signal in the endometrial cavity partially visualized at the fundus.  If patient is postmenopausal, consider follow-up ultrasound imaging.     Signal

## 2025-02-19 NOTE — H&P
Assessment  1. Primary osteoarthritis of both hips  -     IR spine and pain procedure; Standing  -     IR spine and pain procedure  Greater than 80% improvement with radiofrequency ablation of repeat bilateral Medial branch nerves at L3-L5 performed 3/12/24;  procedure improved ability to participate with IADLs in significantly less pain for almost a year before return of pain.  Additionally greater than 90% relief of pain with improved ability to participate with IADLs after bilateral hip injections performed 9/24/24 until recent return of pain. Reports b/l hip pain with radiation to the groin; ttp over b/l gtb, pain with internal/external rotation of b/l hips; pain worse with standing/ambulation. Lifestyle modifications extensively discussed including diet, exercise and weight loss in conjunction with optimal DM-2 control. Has been compliant with home exercises for axial low back pain and b/l hip pain. Previously reported the following symptomatology:    Chronic axial low back pain described by primarily arthritic features.  Positive facet loading maneuvers as noted below.Mild noncompressive lumbar degenerative change at the L3-4 and L4-5 levels.  Previously had a left L3 transforaminal steroid injection with Dr. Franco which helped for about a week and a half.  Arthritic symptomatology greater than radicular features at this time. Reasonable to continue multimodal pain therapy plan.    Plan  -Celebrex 200 mg b.i.d. prn pain for lumbar facet syndrome/hip osteoarthritis.  Counseled regarding bleeding risk, GI risk, Renal risk of taking this medication.  -lifestyle modifications including diet, exercise and weight loss in addition to DM-2 control extensively discussed  -b/l hip injections with fluoroscopic guidance; 2 weeks later will perform b/l L3, L4, L5 medial branch nerve RFA with IV sedation.  -hip  and neckexercises provided; continue physical therapy and core exercises for lumbar facet syndrome, hip  osteoarthritis, cervical spondylosis    There are risks associated with opioid medications, including dependence, addiction and tolerance. The patient understands and agrees to use these medications only as prescribed. Potential side effects of the medications include, but are not limited to, constipation, drowsiness, addiction, impaired judgment and risk of fatal overdose if not taken as prescribed. The patient was warned against driving while taking sedation medications.  Sharing medications is a felony. At this point in time, the patient is showing no signs of addiction, abuse, diversion or suicidal ideation.    [place] Prescription Drug Monitoring Program report was reviewed and was appropriate     Complete risks and benefits including bleeding, infection, tissue reaction, nerve injury and allergic reaction were discussed. The approach was demonstrated using models and literature was provided. Verbal and written consent was obtained.    My impressions and treatment recommendations were discussed in detail with the patient who verbalized understanding and had no further questions.  Discharge instructions were provided. I personally saw and examined the patient and I agree with the above discussed plan of care.    No orders of the defined types were placed in this encounter.      History of Present Illness    Greater than 80% improvement with radiofrequency ablation of repeat bilateral Medial branch nerves at L3-L5 performed 3/12/24;  prior procedure improved ability to participate with IADLs in significantly less pain for almost a year.  Additionally greater than 90% relief of pain with improved ability to participate with IADLs after bilateral hip injections performed recently. Previously reported b/l hip pain with radiation to the groin worse with standing/ambulation. Previously reported the following symptomatology:    Cristy Garcia is a 49 y.o. female with past medical history of axial low back pain  described primarily by arthritic features.  She presents with a 2 year history of chronic low back pain described primarily as arthritic in nature.   she describes 8/10 low back pain that is worse in the mornings and worse at the end of the day.  The pain is characterized by achy, nagging, indolent, crampy, stabbing pain in his/her axial low back.  The patient describes that the pain is worse with standing for long periods of time on hard surfaces as well as with walking.  The patient is a very active individual and feels as though this pain compromises her participation with independent activities of daily living. The pain can be debilitating at times and contribute to significant disability, compromising overall activity and independent activities of daily living.   She has tried physical therapy with limited relief of symptoms.  Medications the patient has tried in the past include   Celebrex,  and Flexeril. She describes minor radicular symptoms in the L3 and L4 dermatomal distribution but otherwise has good strength.  Previously she had left L3 transforaminal epidural steroid injection with relief for about 2 weeks. She denies any weakness numbness or paresthesias.  The patient denies any bowel or bladder dysfunction as well.        I have personally reviewed and/or updated the patient's past medical history, past surgical history, family history, social history, current medications, allergies, and vital signs today.     Review of Systems   Constitutional:  Positive for activity change.   HENT: Negative.     Eyes: Negative.    Respiratory: Negative.     Cardiovascular: Negative.    Gastrointestinal: Negative.    Endocrine: Negative.    Genitourinary: Negative.    Musculoskeletal:  Positive for arthralgias, back pain, gait problem and myalgias.   Skin: Negative.    Allergic/Immunologic: Negative.    Hematological: Negative.    Psychiatric/Behavioral: Negative.     All other systems reviewed and are  negative.      Patient Active Problem List   Diagnosis    SOB (shortness of breath)    Chronic bilateral low back pain without sciatica    Chronic pain of left knee    Chronic pain of right knee    Fibromyalgia, primary    Primary osteoarthritis involving multiple joints    Mild persistent asthma without complication    Lumbar radiculopathy    Primary osteoarthritis of both hips    Left hip pain    Major depression, recurrent, chronic (HCC)    Lumbar spondylosis    Cervical radiculopathy    Morbid obesity with body mass index (BMI) of 45.0 to 49.9 in adult (HCC)    VICTOR HUGO on CPAP    Anxiety associated with depression    Diabetes mellitus (HCC)    Hyperlipidemia    Essential hypertension    Irregular heartbeat    Chronic pain disorder    GERD (gastroesophageal reflux disease)    Fatty liver    Elevated LFTs    Esophagitis determined by endoscopy    Impacted third molar tooth    Complex dental caries    Erythrocytosis    Leukocytosis    Mild concentric left ventricular hypertrophy    Chronic fatigue    Vitamin D deficiency    Hair loss    Hx of giardiasis    Vaginal candidiasis    Trigeminal neuralgia    Vitamin B12 deficiency    Teeth missing       Past Medical History:   Diagnosis Date    Allergic     Seasonal Allergies    Alopecia of scalp     Treated with Proscar    Asthma     Chronic pain disorder     back, bilat knees, bilat hips    CPAP (continuous positive airway pressure) dependence     Diabetes mellitus (HCC)     Fibromyalgia, primary     GERD (gastroesophageal reflux disease)     Hip fx, left, closed, with routine healing, subsequent encounter 05/2017    Hyperlipidemia     Hypertension     Insomnia     Irregular heartbeat     VICTOR HUGO on CPAP        Past Surgical History:   Procedure Laterality Date    CARPAL TUNNEL RELEASE Left     HYSTERECTOMY N/A 12/27/2024    HYSTERECTOMY      IR SPINE AND PAIN PROCEDURE  03/12/2024    IR SPINE AND PAIN PROCEDURE  04/11/2024    IR SPINE AND PAIN PROCEDURE  09/24/2024    LUMBAR  EPIDURAL INJECTION      NERVE BLOCK Bilateral 12/29/2020    Procedure: L2 L3 L4 L5 MEDIAL BRANCH BLOCK #1;  Surgeon: Westley Keller MD;  Location: [facility];  Service: Pain Management     NERVE BLOCK Bilateral 01/19/2021    Procedure: L2 L3 L4 L5 MEDIAL BRANCH BLOCK #2;  Surgeon: Westley Keller MD;  Location: [facility];  Service: Pain Management     AR ARTHROCENTESIS ASPIR&/INJ MAJOR JT/BURSA W/O US Bilateral 02/07/2023    Procedure: INJECTION JOINT HIP;  Surgeon: Westley Keller MD;  Location: [facility];  Service: Pain Management     AR HYSTEROSCOPY BX ENDOMETRIUM&/POLYPC W/WO D&C N/A 04/25/2023    Procedure: HYSTEROSCOPY W/  RESECTION UTERINE TUMOR/FIBROID (POLYPECTOMY );  Surgeon: Donovan Ware DO;  Location: [facility];  Service: Gynecology    RADIOFREQUENCY ABLATION Right 02/25/2021    Procedure: L2 L3 L4 L5 RADIO FREQUENCY ABLATION right side;  Surgeon: eWstley Keller MD;  Location: [facility];  Service: Pain Management     RADIOFREQUENCY ABLATION Left 03/16/2021    Procedure: L2 L3 L4 L5 RADIO FREQUENCY ABLATION;  Surgeon: Westley Keller MD;  Location: [facility];  Service: Pain Management     RHIZOTOMY Bilateral 04/04/2023    Procedure: RHIZOTOMY LUMBAR L3, L4, L5 medial branch nerves;  Surgeon: Westley Keller MD;  Location: [facility];  Service: Pain Management     UPPER GASTROINTESTINAL ENDOSCOPY      US GUIDED THYROID BIOPSY  07/14/2023       Family History   Problem Relation Age of Onset    Cancer Father     Hepatitis Father     Asthma Cousin     Hypertension Paternal Grandmother     No Known Problems Mother     No Known Problems Sister     No Known Problems Maternal Grandmother     No Known Problems Maternal Grandfather     No Known Problems Paternal Grandfather     No Known Problems Paternal Aunt     BRCA2 Positive Neg Hx     BRCA2 Negative Neg Hx     BRCA1 Positive Neg Hx     BRCA1 Negative Neg Hx     BRCA 1/2 Neg Hx     Ovarian cancer Neg Hx     Endometrial cancer Neg Hx     Colon cancer Neg  Hx     Breast cancer additional onset Neg Hx     Breast cancer Neg Hx        Social History     Occupational History    Not on file   Tobacco Use    Smoking status: Former     Types: Cigarettes     Start date:      Quit date:      Years since quittin.1     Passive exposure: Past    Smokeless tobacco: Never   Vaping Use    Vaping status: Never Used   Substance and Sexual Activity    Alcohol use: No    Drug use: No    Sexual activity: Not Currently       Current Outpatient Medications on File Prior to Encounter   Medication Sig    albuterol (Ventolin HFA) 90 mcg/act inhaler Inhale 2 puffs every 6 (six) hours as needed for wheezing or shortness of breath    atorvastatin (LIPITOR) 20 mg tablet Take 1 tablet (20 mg total) by mouth daily    baclofen 10 mg tablet Take 1 tablet (10 mg total) by mouth 3 (three) times a day    cetirizine (ZyrTEC) 10 mg tablet 1 tab po daily x 1 month then prn allergies    clotrimazole-betamethasone (LOTRISONE) 1-0.05 % cream APPLY TWICE DAILY TO VAGINAL AREA UNTIL HEALED THEN AS NEEDED FOR IRRITATION. (Patient taking differently: as needed APPLY TWICE DAILY TO VAGINAL AREA UNTIL HEALED THEN AS NEEDED FOR IRRITATION.)    cromolyn (OPTICROM) 4 % ophthalmic solution Administer 1 drop to both eyes 4 (four) times a day    famotidine (PEPCID) 40 MG tablet TAKE (1) TABLET BY MOUTH DAILY.    finasteride (PROPECIA) 1 MG tablet Take 1 tablet (1 mg total) by mouth daily    fluticasone (FLONASE) 50 mcg/act nasal spray USE 1 SPRAY INTO EACH NOSTRIL TWICE A DAY FOR 1 MONTH THEN AS NEEDED FOR CONGESTION AND ALLERGIES    Fluticasone-Salmeterol (Advair) 250-50 mcg/dose inhaler Inhale 1 puff 2 (two) times a day Rinse mouth after use.    gabapentin (NEURONTIN) 300 mg capsule TAKE (1) CAPSULE THREE TIMES DAILY.    glipiZIDE (GLUCOTROL) 10 mg tablet TAKE (2) TABLETS TWICE DAILY BEFORE MEALS.    glucose blood test strip     glucose blood test strip Use as instructed    Jardiance 25 MG TABS TAKE (1)  "TABLET BY MOUTH DAILY.    ketoconazole (NIZORAL) 2 % cream Apply topically daily    Lancet Devices (OneTouch Delica Plus Lancing) MISC     Lancets (onetouch ultrasoft) lancets Use as instructed    lisinopril (ZESTRIL) 20 mg tablet Take 1 tablet (20 mg total) by mouth daily    meloxicam (MOBIC) 15 mg tablet Take 1 tablet (15 mg total) by mouth daily as needed for moderate pain    metFORMIN (GLUCOPHAGE) 1000 MG tablet TAKE (1) TABLET TWICE A DAY WITH MEALS.    metoprolol succinate (TOPROL-XL) 25 mg 24 hr tablet Take 1 tablet (25 mg total) by mouth daily    nystatin powder Apply topically 2 (two) times a day    omeprazole (PriLOSEC) 40 MG capsule Take 1 capsule (40 mg total) by mouth daily before breakfast    ondansetron (ZOFRAN) 4 mg tablet Take 1 tablet (4 mg total) by mouth every 8 (eight) hours as needed for nausea or vomiting    sertraline (ZOLOFT) 50 mg tablet TAKE (1) TABLET BY MOUTH DAILY AT BEDTIME.    triamcinolone (KENALOG) 0.025 % cream Apply 2 x daily to rash until healed then prn rash (Patient taking differently: as needed Apply 2 x daily to rash until healed then prn rash)    ergocalciferol (VITAMIN D2) 50,000 units Take 1 capsule (50,000 Units total) by mouth once a week    [DISCONTINUED] celecoxib (CeleBREX) 200 mg capsule Take 1 capsule (200 mg total) by mouth 2 (two) times a day     No current facility-administered medications on file prior to encounter.       Allergies   Allergen Reactions    Trulicity [Dulaglutide] Swelling    Tdap [Tetanus-Diphth-Acell Pertussis] Rash         Physical Exam    /69   Pulse 64   Temp 98.2 °F (36.8 °C) (Oral)   Resp 18   Ht 5' 2\" (1.575 m)   Wt 115 kg (254 lb)   LMP  (LMP Unknown)   SpO2 97%   BMI 46.46 kg/m²     Constitutional: normal, well developed, well nourished, alert, in no distress and non-toxic and no overt pain behavior. and obese  Eyes: anicteric  HEENT: grossly intact  Neck: supple, symmetric, trachea midline and no masses   Pulmonary:even " and unlabored  Cardiovascular:No edema or pitting edema present  Skin:Normal without rashes or lesions and well hydrated  Psychiatric:Mood and affect appropriate  Neurologic:Cranial Nerves II-XII grossly intact Sensation grossly intact; no clonus negative dupree's. Reflexes 2+ and brisk. SLR negative bilaterally.  Musculoskeletal:  Steppage gait. Normal heel toe and tip toe walking.  Significant pain with lumbar facet loading bilaterally and with lateral spine rotation left greater than right.  TTP over lumbar paraspinal muscles. Negative wilda's test, negative gaenslen's negative SIJ loading bilaterally.  ttp over b/l gtb, pain with internal/external rotation of b/l hips    Imaging    MRI LUMBAR SPINE WITHOUT CONTRAST     INDICATION: M54.42: Lumbago with sciatica, left side  G89.29: Other chronic pain.     COMPARISON:  None.     TECHNIQUE:  Sagittal T1, sagittal T2, sagittal inversion recovery, axial T1 and axial T2, coronal T2.    IMAGE QUALITY:  Diagnostic     FINDINGS:     VERTEBRAL BODIES:  There are 5 lumbar type vertebral bodies.  Normal alignment of the lumbar spine.  No spondylolysis or spondylolisthesis. No scoliosis.  No compression fracture.    Normal marrow signal is identified within the visualized bony   structures.  No discrete marrow lesion.     SACRUM:  Normal signal within the sacrum. No evidence of insufficiency or stress fracture.     DISTAL CORD AND CONUS:  Normal size and signal within the distal cord and conus.     PARASPINAL SOFT TISSUES:  There is thickening of the endometrial cavity partially visualized on this examination towards the fundus.  There is a dominant follicle identified within the left ovary.     LOWER THORACIC DISC SPACES:  Normal disc height and signal.  No disc herniation, canal stenosis or foraminal narrowing.     LUMBAR DISC SPACES:     L1-L2:  Normal.     L2-L3:  Normal.     L3-L4:  Annular bulging with a small broad-based left foraminal and extraforaminal disc  protrusion best seen on series 6 image 13.  There is no canal stenosis.  Only minimal left foraminal narrowing without nerve impingement.     L4-L5:  Disc desiccation and loss of disc height with mild annular bulging.  Small central disc herniation without canal stenosis or foraminal nerve impingement.     L5-S1:  Normal disc height and signal without canal stenosis or foraminal narrowing.     IMPRESSION:     Mild noncompressive lumbar degenerative change at the L3-4 and L4-5 levels.     Fluid signal in the endometrial cavity partially visualized at the fundus.  If patient is postmenopausal, consider follow-up ultrasound imaging.     Signal

## 2025-02-21 DIAGNOSIS — L65.9 ALOPECIA: ICD-10-CM

## 2025-02-21 DIAGNOSIS — E11.65 TYPE 2 DIABETES MELLITUS WITH HYPERGLYCEMIA, WITHOUT LONG-TERM CURRENT USE OF INSULIN (HCC): ICD-10-CM

## 2025-02-21 DIAGNOSIS — B37.9 CANDIDIASIS: ICD-10-CM

## 2025-02-21 DIAGNOSIS — J45.30 MILD PERSISTENT ASTHMA WITHOUT COMPLICATION: ICD-10-CM

## 2025-02-21 DIAGNOSIS — R73.03 PREDIABETES: ICD-10-CM

## 2025-02-21 RX ORDER — LANCETS
EACH MISCELLANEOUS
Qty: 100 EACH | Refills: 1 | Status: SHIPPED | OUTPATIENT
Start: 2025-02-21

## 2025-02-21 RX ORDER — CLOTRIMAZOLE AND BETAMETHASONE DIPROPIONATE 10; .64 MG/G; MG/G
CREAM TOPICAL
Qty: 45 G | Refills: 0 | Status: SHIPPED | OUTPATIENT
Start: 2025-02-21

## 2025-02-21 RX ORDER — ALBUTEROL SULFATE 90 UG/1
2 INHALANT RESPIRATORY (INHALATION) EVERY 6 HOURS PRN
Qty: 18 G | Refills: 0 | Status: SHIPPED | OUTPATIENT
Start: 2025-02-21

## 2025-02-24 ENCOUNTER — TELEPHONE (OUTPATIENT)
Dept: DENTISTRY | Facility: CLINIC | Age: 50
End: 2025-02-24

## 2025-03-06 ENCOUNTER — HOSPITAL ENCOUNTER (OUTPATIENT)
Dept: GASTROENTEROLOGY | Facility: HOSPITAL | Age: 50
Setting detail: OUTPATIENT SURGERY
End: 2025-03-06
Attending: ANESTHESIOLOGY
Payer: COMMERCIAL

## 2025-03-06 ENCOUNTER — TELEMEDICINE (OUTPATIENT)
Dept: BEHAVIORAL/MENTAL HEALTH CLINIC | Facility: CLINIC | Age: 50
End: 2025-03-06
Payer: COMMERCIAL

## 2025-03-06 VITALS
SYSTOLIC BLOOD PRESSURE: 131 MMHG | RESPIRATION RATE: 20 BRPM | TEMPERATURE: 97.6 F | OXYGEN SATURATION: 91 % | HEART RATE: 72 BPM | DIASTOLIC BLOOD PRESSURE: 66 MMHG

## 2025-03-06 DIAGNOSIS — F33.9 MAJOR DEPRESSION, RECURRENT, CHRONIC (HCC): Primary | ICD-10-CM

## 2025-03-06 DIAGNOSIS — M47.816 LUMBAR SPONDYLOSIS: ICD-10-CM

## 2025-03-06 LAB — GLUCOSE SERPL-MCNC: 117 MG/DL (ref 65–140)

## 2025-03-06 PROCEDURE — T1015 CLINIC SERVICE: HCPCS | Performed by: SOCIAL WORKER

## 2025-03-06 PROCEDURE — 64636 DESTROY L/S FACET JNT ADDL: CPT | Performed by: ANESTHESIOLOGY

## 2025-03-06 PROCEDURE — 64635 DESTROY LUMB/SAC FACET JNT: CPT | Performed by: ANESTHESIOLOGY

## 2025-03-06 PROCEDURE — 82948 REAGENT STRIP/BLOOD GLUCOSE: CPT

## 2025-03-06 RX ORDER — METHYLPREDNISOLONE ACETATE 80 MG/ML
INJECTION, SUSPENSION INTRA-ARTICULAR; INTRALESIONAL; INTRAMUSCULAR; SOFT TISSUE AS NEEDED
Status: COMPLETED | OUTPATIENT
Start: 2025-03-06 | End: 2025-03-06

## 2025-03-06 RX ORDER — BUPIVACAINE HYDROCHLORIDE 2.5 MG/ML
INJECTION, SOLUTION EPIDURAL; INFILTRATION; INTRACAUDAL AS NEEDED
Status: COMPLETED | OUTPATIENT
Start: 2025-03-06 | End: 2025-03-06

## 2025-03-06 RX ORDER — LIDOCAINE HYDROCHLORIDE 20 MG/ML
INJECTION, SOLUTION EPIDURAL; INFILTRATION; INTRACAUDAL; PERINEURAL AS NEEDED
Status: COMPLETED | OUTPATIENT
Start: 2025-03-06 | End: 2025-03-06

## 2025-03-06 RX ORDER — LIDOCAINE HYDROCHLORIDE 10 MG/ML
INJECTION, SOLUTION EPIDURAL; INFILTRATION; INTRACAUDAL; PERINEURAL AS NEEDED
Status: COMPLETED | OUTPATIENT
Start: 2025-03-06 | End: 2025-03-06

## 2025-03-06 RX ADMIN — LIDOCAINE HYDROCHLORIDE 10 ML: 10 INJECTION, SOLUTION EPIDURAL; INFILTRATION; INTRACAUDAL; PERINEURAL at 09:06

## 2025-03-06 RX ADMIN — LIDOCAINE HYDROCHLORIDE 3 ML: 20 INJECTION, SOLUTION EPIDURAL; INFILTRATION; INTRACAUDAL; PERINEURAL at 09:06

## 2025-03-06 RX ADMIN — BUPIVACAINE HYDROCHLORIDE 5 ML: 2.5 INJECTION, SOLUTION EPIDURAL; INFILTRATION; INTRACAUDAL; PERINEURAL at 09:06

## 2025-03-06 RX ADMIN — METHYLPREDNISOLONE ACETATE 80 MG: 80 INJECTION, SUSPENSION INTRA-ARTICULAR; INTRALESIONAL; INTRAMUSCULAR; SOFT TISSUE at 09:07

## 2025-03-06 NOTE — PSYCH
"Behavioral Health Psychotherapy Progress Note    Psychotherapy Provided: Individual Psychotherapy     1. Major depression, recurrent, chronic (HCC)            Goals addressed in session: Goal 1     DATA: The client reported that she had minor surgery on her back to burn her nerves today\" The pain will be gone for 1 years hopefully like last time\" She reported her physical health issues have affected her mental health. \" I was down due to the pain\" As team we explored her personal relationships and the growth due to her financial stability. \" My relationships have grown over the the past few years and I have my own formed family\" The client and her support team explored what is her strengths in forming relationships, and the connection with her mental health. Coping skills were reviewed during the session  During this session, this clinician used the following therapeutic modalities: Cognitive Behavioral Therapy, Mindfulness-based Strategies, Solution-Focused Therapy, and Supportive Psychotherapy    Substance Abuse was addressed during this session. If the client is diagnosed with a co-occurring substance use disorder, please indicate any changes in the frequency or amount of use: none. Stage of change for addressing substance use diagnoses: No substance use/Not applicable    ASSESSMENT:  Cristy Garcia presents with a Anxious and Depressed mood.     her affect is Normal range and intensity, which is congruent, with her mood and the content of the session. The client has made progress on their goals.    The client  Cristy Garcia presents with a none risk of suicide, none risk of self-harm, and none risk of harm to others.    For any risk assessment that surpasses a \"low\" rating, a safety plan must be developed.    A safety plan was indicated: no  If yes, describe in detail n/a    PLAN: Between sessions, Cristy Garcia will practice coping skills when presented with anxiety and or depression. At the next session, the " therapist will use Cognitive Behavioral Therapy, Mindfulness-based Strategies, Solution-Focused Therapy, and Supportive Psychotherapy to address the client's MH issues affecting different relationships and environments.    Behavioral Health Treatment Plan and Discharge Planning: Cristy Garcia is aware of and agrees to continue to work on their treatment plan. They have identified and are working toward their discharge goals. yes    Depression Follow-up Plan Completed: Not applicable    Visit start and stop times:    03/06/25  Start Time: 1450  Stop Time: 1510  Total Visit Time: 20 minutesAdministrative Statements   Encounter provider Kristina Guerrero LCSW    The Patient is located at Home and in the following state in which I hold an active license [place].    The patient was identified by name and date of birth. Cristy Garcia was informed that this is a telemedicine visit and that the visit is being conducted through the Epic Embedded platform. She agrees to proceed..  My office door was closed. No one else was in the room.  She acknowledged consent and understanding of privacy and security of the video platform. The patient has agreed to participate and understands they can discontinue the visit at any time.    I have spent a total time of 20 minutes in caring for this patient on the day of the visit/encounter including Counseling / Coordination of care, not including the time spent for establishing the audio/video connection.

## 2025-03-06 NOTE — DISCHARGE INSTR - AVS FIRST PAGE
YOUR 2 WEEK FOLLOW UP HAS BEEN SCHEDULED; IF YOU WISH TO CHANGE THE FOLLOW UP, PLEASE CALL THE [facility] AT [place]: [phone]    Lumbar Radiofrequency Ablation   WHAT YOU NEED TO KNOW:   Lumbar radiofrequency ablation (RFA) is a procedure used to treat facet joint pain in your lower back. Facet joints are found at the back of each vertebra. A needle electrode is used to send electrical currents to the nerves in your facet joint. The electrical currents create heat that damages the nerve so it cannot send pain signals.   DISCHARGE INSTRUCTIONS:   Seek care immediately if:   You cannot move your leg.    You cannot control your urine or bowel movements.    You have severe pain in your lower back.    Call your doctor if:   You have leg weakness.     You develop new symptoms.     You have questions or concerns about your condition or care.    Medicines:   Pain medicine  may be given. Ask how to take this medicine safely.    Take your medicine as directed.  Contact your healthcare provider if you think your medicine is not helping or if you have side effects. Tell your provider if you are allergic to any medicine. Keep a list of the medicines, vitamins, and herbs you take. Include the amounts, and when and why you take them. Bring the list or the pill bottles to follow-up visits. Carry your medicine list with you in case of an emergency.    Activity:  Do not drive a car or operate machinery within 24 hours after your procedure. Ask your healthcare provider about any other activities you should avoid.  Follow up with your doctor as directed:  Write down your questions so you remember to ask them during your visits.    RADIO FREQUENCY MEDIAL BRANCH NEUROTOMY DISCHARGE INSTRUCTIONS      ACTIVITY  Do not drive or operate machinery today.  No strenuous activity today - bending, lifting, etc.  You may show today, but do not sit in a tub of water.  Resume normal activities tomorrow as tolerated.    CARE OF  THE INJECTION SITE  If you have soreness or pain, apply ice to the area today (20 minute on/20 minutes off).  Starting tomorrow, you may use warm, moist heat or ice if needed.  Notify the [facility] if you have any of the following: redness, drainage, swelling or fever above 100°F.    SPECIAL INSTRUCTIONS  Our office will call you tomorrow for a progress report and make an appointment for a follow-up visit in 4 weeks.  If you feel a sunburn-like sensation in the area of your procedure, call our office.    MEDICATIONS  Continue to take all routine medications.  Our office may have instructed you to hold some medications.  You may resume _______________________________________________.    If you have any problems specifically related to your procedure, please call our office at [phone]. Problems not related to your procedure should be directed at your primary care physician.

## 2025-03-06 NOTE — INTERVAL H&P NOTE
H&P reviewed. After examining the patient I find no changes in the patients condition since the H&P had been written.    Vitals:    03/06/25 0834   BP: 131/84   Pulse: 71   Resp: 20   Temp: (!) 96.9 °F (36.1 °C)   SpO2: 94%

## 2025-03-07 ENCOUNTER — TELEPHONE (OUTPATIENT)
Dept: PAIN MEDICINE | Facility: CLINIC | Age: 50
End: 2025-03-07

## 2025-03-07 NOTE — TELEPHONE ENCOUNTER
S/w the patient and she stated she has no pain. She feels pretty good. She took the band aids off and everything seems good. Reviewed the postoperative instructions again and she appreciated the call.

## 2025-03-12 ENCOUNTER — HOSPITAL ENCOUNTER (OUTPATIENT)
Dept: PERIOP | Facility: HOSPITAL | Age: 50
Setting detail: OUTPATIENT SURGERY
Discharge: HOME | End: 2025-03-12
Payer: COMMERCIAL

## 2025-03-12 ENCOUNTER — ANESTHESIA EVENT (OUTPATIENT)
Dept: PERIOP | Facility: HOSPITAL | Age: 50
End: 2025-03-12
Payer: COMMERCIAL

## 2025-03-12 ENCOUNTER — ANESTHESIA (OUTPATIENT)
Dept: PERIOP | Facility: HOSPITAL | Age: 50
End: 2025-03-12
Payer: COMMERCIAL

## 2025-03-12 VITALS
DIASTOLIC BLOOD PRESSURE: 65 MMHG | RESPIRATION RATE: 16 BRPM | OXYGEN SATURATION: 96 % | TEMPERATURE: 97 F | HEART RATE: 73 BPM | BODY MASS INDEX: 46.01 KG/M2 | HEIGHT: 62 IN | WEIGHT: 250 LBS | SYSTOLIC BLOOD PRESSURE: 112 MMHG

## 2025-03-12 DIAGNOSIS — K31.7 GASTRIC POLYPS: ICD-10-CM

## 2025-03-12 LAB
GLUCOSE SERPL-MCNC: 124 MG/DL (ref 65–140)
GLUCOSE SERPL-MCNC: 152 MG/DL (ref 65–140)

## 2025-03-12 PROCEDURE — 43239 EGD BIOPSY SINGLE/MULTIPLE: CPT | Performed by: INTERNAL MEDICINE

## 2025-03-12 PROCEDURE — 88305 TISSUE EXAM BY PATHOLOGIST: CPT | Performed by: PATHOLOGY

## 2025-03-12 PROCEDURE — 82948 REAGENT STRIP/BLOOD GLUCOSE: CPT

## 2025-03-12 PROCEDURE — 43251 EGD REMOVE LESION SNARE: CPT | Performed by: INTERNAL MEDICINE

## 2025-03-12 RX ORDER — SODIUM CHLORIDE, SODIUM LACTATE, POTASSIUM CHLORIDE, CALCIUM CHLORIDE 600; 310; 30; 20 MG/100ML; MG/100ML; MG/100ML; MG/100ML
125 INJECTION, SOLUTION INTRAVENOUS CONTINUOUS
Status: CANCELLED | OUTPATIENT
Start: 2025-03-12

## 2025-03-12 RX ORDER — SODIUM CHLORIDE, SODIUM LACTATE, POTASSIUM CHLORIDE, CALCIUM CHLORIDE 600; 310; 30; 20 MG/100ML; MG/100ML; MG/100ML; MG/100ML
125 INJECTION, SOLUTION INTRAVENOUS CONTINUOUS
Status: DISCONTINUED | OUTPATIENT
Start: 2025-03-12 | End: 2025-03-16 | Stop reason: HOSPADM

## 2025-03-12 RX ORDER — ONDANSETRON 2 MG/ML
4 INJECTION INTRAMUSCULAR; INTRAVENOUS ONCE AS NEEDED
Status: CANCELLED | OUTPATIENT
Start: 2025-03-12

## 2025-03-12 RX ORDER — LIDOCAINE HYDROCHLORIDE 20 MG/ML
INJECTION, SOLUTION EPIDURAL; INFILTRATION; INTRACAUDAL; PERINEURAL AS NEEDED
Status: DISCONTINUED | OUTPATIENT
Start: 2025-03-12 | End: 2025-03-12

## 2025-03-12 RX ORDER — PROPOFOL 10 MG/ML
INJECTION, EMULSION INTRAVENOUS AS NEEDED
Status: DISCONTINUED | OUTPATIENT
Start: 2025-03-12 | End: 2025-03-12

## 2025-03-12 RX ORDER — ALBUMIN HUMAN 50 G/1000ML
12.5 SOLUTION INTRAVENOUS ONCE
Status: CANCELLED | OUTPATIENT
Start: 2025-03-12 | End: 2025-03-12

## 2025-03-12 RX ADMIN — PROPOFOL 100 MG: 10 INJECTION, EMULSION INTRAVENOUS at 09:39

## 2025-03-12 RX ADMIN — PROPOFOL 120 MG: 10 INJECTION, EMULSION INTRAVENOUS at 09:32

## 2025-03-12 RX ADMIN — PROPOFOL 80 MG: 10 INJECTION, EMULSION INTRAVENOUS at 09:34

## 2025-03-12 RX ADMIN — PROPOFOL 100 MG: 10 INJECTION, EMULSION INTRAVENOUS at 09:36

## 2025-03-12 RX ADMIN — LIDOCAINE HYDROCHLORIDE 100 MG: 20 INJECTION, SOLUTION EPIDURAL; INFILTRATION; INTRACAUDAL at 09:32

## 2025-03-12 RX ADMIN — SODIUM CHLORIDE, SODIUM LACTATE, POTASSIUM CHLORIDE, AND CALCIUM CHLORIDE 125 ML/HR: .6; .31; .03; .02 INJECTION, SOLUTION INTRAVENOUS at 08:35

## 2025-03-12 NOTE — ANESTHESIA POSTPROCEDURE EVALUATION
Post-Op Assessment Note    CV Status:  Stable    Pain management: adequate       Mental Status:  Sleepy   Hydration Status:  Euvolemic   PONV Controlled:  Controlled   Airway Patency:  Patent     Post Op Vitals Reviewed: Yes    No anethesia notable event occurred.    Staff: CRNA           Last Filed PACU Vitals:  Vitals Value Taken Time   Temp 97.8    Pulse 72 03/12/25 0947   /66    Resp 18 03/12/25 0947   SpO2 98 % 03/12/25 0947   Vitals shown include unfiled device data.

## 2025-03-12 NOTE — H&P
History and Physical - [facility]  Cristy Garcia 49 y.o. female MRN: [id number]                  HPI: Cristy Garcia is a 49 y.o. year old female who presents for history of gastric polyps.      REVIEW OF SYSTEMS: Per the HPI, and otherwise unremarkable.    Historical Information   Past Medical History:   Diagnosis Date    Allergic     Seasonal Allergies    Alopecia of scalp     Treated with Proscar    Asthma     Chronic pain disorder     back, bilat knees, bilat hips    CPAP (continuous positive airway pressure) dependence     Diabetes mellitus (HCC)     Fibromyalgia, primary     GERD (gastroesophageal reflux disease)     Hip fx, left, closed, with routine healing, subsequent encounter 05/2017    Hyperlipidemia     Hypertension     Insomnia     Irregular heartbeat     VICTOR HUGO on CPAP      Past Surgical History:   Procedure Laterality Date    CARPAL TUNNEL RELEASE Left     HYSTERECTOMY N/A 12/27/2024    HYSTERECTOMY      IR SPINE AND PAIN PROCEDURE  03/12/2024    IR SPINE AND PAIN PROCEDURE  04/11/2024    IR SPINE AND PAIN PROCEDURE  09/24/2024    IR SPINE AND PAIN PROCEDURE  2/19/2025    IR SPINE AND PAIN PROCEDURE  3/6/2025    LUMBAR EPIDURAL INJECTION      NERVE BLOCK Bilateral 12/29/2020    Procedure: L2 L3 L4 L5 MEDIAL BRANCH BLOCK #1;  Surgeon: Westley Keller MD;  [facility];  Service: Pain Management     NERVE BLOCK Bilateral 01/19/2021    Procedure: L2 L3 L4 L5 MEDIAL BRANCH BLOCK #2;  Surgeon: Westley Keller MD;  [facility];  Service: Pain Management     DE ARTHROCENTESIS ASPIR&/INJ MAJOR JT/BURSA W/O  Bilateral 02/07/2023    Procedure: INJECTION JOINT HIP;  Surgeon: Westley Keller MD;  [facility]: [facility];  Service: Pain Management     DE HYSTEROSCOPY BX ENDOMETRIUM&/POLYPC W/WO D&C N/A 04/25/2023    Procedure: HYSTEROSCOPY W/  RESECTION UTERINE TUMOR/FIBROID (POLYPECTOMY );  Surgeon: Donovan Ware DO;  [facility];  Service: Gynecology     RADIOFREQUENCY ABLATION Right 2021    Procedure: L2 L3 L4 L5 RADIO FREQUENCY ABLATION right side;  Surgeon: Westley Keller MD;  [facility];  Service: Pain Management     RADIOFREQUENCY ABLATION Left 2021    Procedure: L2 L3 L4 L5 RADIO FREQUENCY ABLATION;  Surgeon: Westley Keller MD;  [facility];  Service: Pain Management     RHIZOTOMY Bilateral 2023    Procedure: RHIZOTOMY LUMBAR L3, L4, L5 medial branch nerves;  Surgeon: Westley Keller MD;  [facility]: [facility];  Service: Pain Management     UPPER GASTROINTESTINAL ENDOSCOPY      US GUIDED THYROID BIOPSY  2023     Social History   Social History     Substance and Sexual Activity   Alcohol Use No     Social History     Substance and Sexual Activity   Drug Use No     Social History     Tobacco Use   Smoking Status Former    Types: Cigarettes    Start date:     Quit date:     Years since quittin.2    Passive exposure: Past   Smokeless Tobacco Never     Family History   Problem Relation Age of Onset    Cancer Father     Hepatitis Father     Asthma Cousin     Hypertension Paternal Grandmother     No Known Problems Mother     No Known Problems Sister     No Known Problems Maternal Grandmother     No Known Problems Maternal Grandfather     No Known Problems Paternal Grandfather     No Known Problems Paternal Aunt     BRCA2 Positive Neg Hx     BRCA2 Negative Neg Hx     BRCA1 Positive Neg Hx     BRCA1 Negative Neg Hx     BRCA 1/2 Neg Hx     Ovarian cancer Neg Hx     Endometrial cancer Neg Hx     Colon cancer Neg Hx     Breast cancer additional onset Neg Hx     Breast cancer Neg Hx        Meds/Allergies       Current Outpatient Medications:     albuterol (Ventolin HFA) 90 mcg/act inhaler    atorvastatin (LIPITOR) 20 mg tablet    baclofen 10 mg tablet    cetirizine (ZyrTEC) 10 mg tablet    famotidine (PEPCID) 40 MG tablet    finasteride (PROPECIA) 1 MG tablet    fluticasone (FLONASE) 50 mcg/act nasal spray     "Fluticasone-Salmeterol (Advair) 250-50 mcg/dose inhaler    gabapentin (NEURONTIN) 300 mg capsule    glipiZIDE (GLUCOTROL) 10 mg tablet    lisinopril (ZESTRIL) 20 mg tablet    meloxicam (MOBIC) 15 mg tablet    metFORMIN (GLUCOPHAGE) 1000 MG tablet    metoprolol succinate (TOPROL-XL) 25 mg 24 hr tablet    omeprazole (PriLOSEC) 40 MG capsule    ondansetron (ZOFRAN) 4 mg tablet    sertraline (ZOLOFT) 50 mg tablet    clotrimazole-betamethasone (LOTRISONE) 1-0.05 % cream    cromolyn (OPTICROM) 4 % ophthalmic solution    ergocalciferol (VITAMIN D2) 50,000 units    glucose blood test strip    glucose blood test strip    Jardiance 25 MG TABS    ketoconazole (NIZORAL) 2 % cream    Lancet Devices (OneTouch Delica Plus Lancing) MISC    Lancets (onetouch ultrasoft) lancets    nystatin powder    triamcinolone (KENALOG) 0.025 % cream    Current Facility-Administered Medications:     lactated ringers infusion, 125 mL/hr, Intravenous, Continuous, 125 mL/hr at 03/12/25 0835    Allergies   Allergen Reactions    Trulicity [Dulaglutide] Swelling    Tdap [Tetanus-Diphth-Acell Pertussis] Rash       Objective     /72   Pulse 69   Temp (!) 97 °F (36.1 °C) (Tympanic)   Resp 16   Ht 5' 2\" (1.575 m)   Wt 113 kg (250 lb)   LMP  (LMP Unknown)   SpO2 97%   BMI 45.73 kg/m²       PHYSICAL EXAM    Gen: NAD  Head: NCAT  CV: RRR  CHEST: Clear  ABD: soft, NT/ND  EXT: no edema      ASSESSMENT/PLAN:  This is a 49 y.o. year old female here for upper endoscopy, and she is stable and optimized for her procedure.        "

## 2025-03-12 NOTE — ANESTHESIA PREPROCEDURE EVALUATION
Procedure:  EGD    Relevant Problems   ANESTHESIA (within normal limits)      CARDIO   (+) Essential hypertension   (+) Hyperlipidemia   (-) Angina at rest (HCC)   (-) Angina of effort (HCC)   (-) Chest pain   (-) CORNELL (dyspnea on exertion)      GI/HEPATIC   (+) Fatty liver   (+) GERD (gastroesophageal reflux disease)   (-) Chronic liver disease      /RENAL   (-) Chronic kidney disease      MUSCULOSKELETAL   (+) Chronic bilateral low back pain without sciatica   (+) Fibromyalgia, primary   (+) Lumbar spondylosis   (+) Primary osteoarthritis involving multiple joints   (+) Primary osteoarthritis of both hips      NEURO/PSYCH   (+) Anxiety associated with depression   (+) Chronic bilateral low back pain without sciatica   (+) Chronic pain disorder   (+) Fibromyalgia, primary   (+) Major depression, recurrent, chronic (HCC)   (-) CVA (cerebral vascular accident) (HCC)   (-) Seizures (HCC)      PULMONARY   (+) Mild persistent asthma without complication   (+) VICTOR HUGO on CPAP   (+) SOB (shortness of breath)        Physical Exam    Airway    Mallampati score: III  TM Distance: >3 FB  Neck ROM: full     Dental   No notable dental hx     Cardiovascular      Pulmonary      Other Findings  post-pubertal.      Anesthesia Plan  ASA Score- 3     Anesthesia Type- IV sedation with anesthesia with ASA Monitors.         Additional Monitors:     Airway Plan:            Plan Factors-Exercise tolerance (METS): >4 METS.    Chart reviewed.                      Induction- intravenous.    Postoperative Plan-         Informed Consent- Anesthetic plan and risks discussed with patient.  I personally reviewed this patient with the CRNA. Discussed and agreed on the Anesthesia Plan with the CRNA..      NPO Status:  Vitals Value Taken Time   Date of last liquid 03/11/25 03/12/25 0815   Time of last liquid 2100 03/12/25 0815   Date of last solid 03/10/25 03/12/25 0815   Time of last solid

## 2025-03-17 ENCOUNTER — RESULTS FOLLOW-UP (OUTPATIENT)
Dept: GASTROENTEROLOGY | Facility: CLINIC | Age: 50
End: 2025-03-17

## 2025-03-17 DIAGNOSIS — K21.00 GASTROESOPHAGEAL REFLUX DISEASE WITH ESOPHAGITIS WITHOUT HEMORRHAGE: Primary | ICD-10-CM

## 2025-03-17 DIAGNOSIS — E11.65 TYPE 2 DIABETES MELLITUS WITH HYPERGLYCEMIA, WITHOUT LONG-TERM CURRENT USE OF INSULIN (HCC): ICD-10-CM

## 2025-03-17 DIAGNOSIS — K20.0 EOSINOPHILIC ESOPHAGITIS: ICD-10-CM

## 2025-03-17 DIAGNOSIS — J45.30 MILD PERSISTENT ASTHMA WITHOUT COMPLICATION: ICD-10-CM

## 2025-03-17 PROCEDURE — 88305 TISSUE EXAM BY PATHOLOGIST: CPT | Performed by: PATHOLOGY

## 2025-03-17 RX ORDER — OMEPRAZOLE 20 MG/1
20 CAPSULE, DELAYED RELEASE ORAL DAILY
Qty: 30 CAPSULE | Refills: 5 | Status: SHIPPED | OUTPATIENT
Start: 2025-03-17

## 2025-03-18 RX ORDER — FLUTICASONE PROPIONATE AND SALMETEROL 250; 50 UG/1; UG/1
1 POWDER RESPIRATORY (INHALATION) 2 TIMES DAILY
Qty: 200 BLISTER | Refills: 0 | Status: SHIPPED | OUTPATIENT
Start: 2025-03-18

## 2025-03-18 RX ORDER — KETOCONAZOLE 20 MG/G
CREAM TOPICAL DAILY
Qty: 60 G | Refills: 0 | Status: SHIPPED | OUTPATIENT
Start: 2025-03-18

## 2025-03-18 NOTE — RESULT ENCOUNTER NOTE
I called her with her results and left a voicemail to call me back.  Repeat EGD in 1 year for surveillance of hyperplastic polyps in her stomach. Decrease omeprazole to 20 mg daily since esophageal biopsies have remained normal on 40 mg daily.

## 2025-03-19 DIAGNOSIS — J01.00 ACUTE MAXILLARY SINUSITIS, RECURRENCE NOT SPECIFIED: ICD-10-CM

## 2025-03-19 DIAGNOSIS — J06.9 ACUTE URI: ICD-10-CM

## 2025-03-19 DIAGNOSIS — R09.82 PND (POST-NASAL DRIP): ICD-10-CM

## 2025-03-19 DIAGNOSIS — R05.1 ACUTE COUGH: ICD-10-CM

## 2025-03-19 DIAGNOSIS — H65.00 ACUTE SEROUS OTITIS MEDIA, RECURRENCE NOT SPECIFIED, UNSPECIFIED LATERALITY: ICD-10-CM

## 2025-03-20 RX ORDER — FLUTICASONE PROPIONATE 50 MCG
SPRAY, SUSPENSION (ML) NASAL
Qty: 16 G | Refills: 1 | Status: SHIPPED | OUTPATIENT
Start: 2025-03-20

## 2025-03-27 DIAGNOSIS — I10 ESSENTIAL HYPERTENSION: ICD-10-CM

## 2025-03-28 RX ORDER — LISINOPRIL 20 MG/1
20 TABLET ORAL DAILY
Qty: 90 TABLET | Refills: 1 | Status: SHIPPED | OUTPATIENT
Start: 2025-03-28

## 2025-04-02 ENCOUNTER — OFFICE VISIT (OUTPATIENT)
Dept: FAMILY MEDICINE CLINIC | Facility: CLINIC | Age: 50
End: 2025-04-02
Payer: COMMERCIAL

## 2025-04-02 VITALS
WEIGHT: 261 LBS | DIASTOLIC BLOOD PRESSURE: 82 MMHG | TEMPERATURE: 97.2 F | BODY MASS INDEX: 47.74 KG/M2 | HEART RATE: 73 BPM | SYSTOLIC BLOOD PRESSURE: 130 MMHG | OXYGEN SATURATION: 97 %

## 2025-04-02 DIAGNOSIS — B37.31 VAGINAL CANDIDIASIS: ICD-10-CM

## 2025-04-02 DIAGNOSIS — J45.41 MODERATE PERSISTENT ASTHMA WITH ACUTE EXACERBATION: Primary | ICD-10-CM

## 2025-04-02 PROCEDURE — 99213 OFFICE O/P EST LOW 20 MIN: CPT

## 2025-04-02 RX ORDER — FLUTICASONE PROPIONATE AND SALMETEROL 500; 50 UG/1; UG/1
1 POWDER RESPIRATORY (INHALATION) 2 TIMES DAILY
Qty: 60 BLISTER | Refills: 5 | Status: SHIPPED | OUTPATIENT
Start: 2025-04-02 | End: 2025-09-29

## 2025-04-02 RX ORDER — FLUCONAZOLE 150 MG/1
150 TABLET ORAL ONCE
Qty: 1 TABLET | Refills: 2 | Status: SHIPPED | OUTPATIENT
Start: 2025-04-02 | End: 2025-04-02

## 2025-04-02 RX ORDER — PREDNISONE 20 MG/1
40 TABLET ORAL DAILY
Qty: 10 TABLET | Refills: 0 | Status: SHIPPED | OUTPATIENT
Start: 2025-04-02 | End: 2025-04-07

## 2025-04-02 NOTE — PSYCH
"Virtual Regular VisitName: Cristy Garcia      : 1975      MRN: [id number]  Encounter Provider: Kristina Guerrero LCSW  Encounter Date: 4/3/2025   Encounter department: [facility] [place]  :  Assessment & Plan  Major depression, recurrent, chronic (HCC)         Major depression, recurrent, chronic (HCC)               Goals addressed in session: Creation of Recovery Treatment Plan and Crisis plan     DATA: The client reported continued anxiety and depression due to her neighbors smoking THC and tobacco. She reported that her PCP has placed her on prednisone to address her increased asthma issues. The client was given contact information from [facility], to help address continued issue with the [facility]. During the session we created her recovery treatment plan and crisis plan that will be covered for the next 6 months  During this session, this clinician used the following therapeutic modalities: Cognitive Behavioral Therapy, Mindfulness-based Strategies, Solution-Focused Therapy, and Supportive Psychotherapy    Substance Abuse was addressed during this session. If the client is diagnosed with a co-occurring substance use disorder, please indicate any changes in the frequency or amount of use: none. Stage of change for addressing substance use diagnoses: No substance use/Not applicable    ASSESSMENT:  Cristy presents with a Anxious and Depressed mood. Cristy's affect is Normal range and intensity, which is congruent, with their mood and the content of the session. The client has made progress on their goals as evidenced by creation of Recovery treatment and crisis plan.    Cristy presents with a none risk of suicide, none risk of self-harm, and none risk of harm to others.    For any risk assessment that surpasses a \"low\" rating, a safety plan must be developed.    A safety plan was indicated: no  If yes, describe in detail n/a    PLAN: Between " sessions, Cristy will practice coping skills when presented with anxiety and or depression, as well as contact housing support program . At the next session, the therapist will use Cognitive Behavioral Therapy, Mindfulness-based Strategies, Solution-Focused Therapy, and Supportive Psychotherapy to address the client's MH issues affecting different relationships and environments.    Behavioral Health Treatment Plan [facility]: Diagnosis and Treatment Plan explained to Cristy, Cristy relates understanding diagnosis and is agreeable to Treatment Plan. Yes     Depression Follow-up Plan Completed: Not applicable     Reason for visit is No chief complaint on file.     Recent Visits  Date Type Provider Dept   04/02/25 Office Visit [facility]   Showing recent visits within past 7 days and meeting all other requirements  Today's Visits  Date Type Provider Dept   04/03/25 Telemedicine [facility]   Showing today's visits and meeting all other requirements  Future Appointments  No visits were found meeting these conditions.  Showing future appointments within next 150 days and meeting all other requirements     History of Present Illness     HPI    Past Medical History   Past Medical History:   Diagnosis Date    Allergic     Seasonal Allergies    Alopecia of scalp     Treated with Proscar    Asthma     Chronic pain disorder     back, bilat knees, bilat hips    CPAP (continuous positive airway pressure) dependence     Diabetes mellitus (HCC)     Fibromyalgia, primary     GERD (gastroesophageal reflux disease)     Hip fx, left, closed, with routine healing, subsequent encounter 05/2017    Hyperlipidemia     Hypertension     Insomnia     Irregular heartbeat     VICTOR HUGO on CPAP      Past Surgical History:   Procedure Laterality Date    CARPAL TUNNEL RELEASE Left     HYSTERECTOMY N/A 12/27/2024    HYSTERECTOMY      IR SPINE AND PAIN PROCEDURE  03/12/2024    IR SPINE AND PAIN  PROCEDURE  04/11/2024    IR SPINE AND PAIN PROCEDURE  09/24/2024    IR SPINE AND PAIN PROCEDURE  2/19/2025    IR SPINE AND PAIN PROCEDURE  3/6/2025    LUMBAR EPIDURAL INJECTION      NERVE BLOCK Bilateral 12/29/2020    Procedure: L2 L3 L4 L5 MEDIAL BRANCH BLOCK #1;  Surgeon: Westley Keller MD;  [facility];  Service: Pain Management     NERVE BLOCK Bilateral 01/19/2021    Procedure: L2 L3 L4 L5 MEDIAL BRANCH BLOCK #2;  Surgeon: Westley Keller MD;  [facility];  Service: Pain Management     AL ARTHROCENTESIS ASPIR&/INJ MAJOR JT/BURSA W/O US Bilateral 02/07/2023    Procedure: INJECTION JOINT HIP;  Surgeon: Westley Keller MD;  [facility];  Service: Pain Management     AL HYSTEROSCOPY BX ENDOMETRIUM&/POLYPC W/WO D&C N/A 04/25/2023    Procedure: HYSTEROSCOPY W/  RESECTION UTERINE TUMOR/FIBROID (POLYPECTOMY );  Surgeon: Donovan Ware DO;  [facility];  Service: Gynecology    RADIOFREQUENCY ABLATION Right 02/25/2021    Procedure: L2 L3 L4 L5 RADIO FREQUENCY ABLATION right side;  Surgeon: Westley Keller MD;  [facility];  Service: Pain Management     RADIOFREQUENCY ABLATION Left 03/16/2021    Procedure: L2 L3 L4 L5 RADIO FREQUENCY ABLATION;  Surgeon: Westley Keller MD;  [facility];  Service: Pain Management     RHIZOTOMY Bilateral 04/04/2023    Procedure: RHIZOTOMY LUMBAR L3, L4, L5 medial branch nerves;  Surgeon: Westley Keller MD;  [facility];  Service: Pain Management     UPPER GASTROINTESTINAL ENDOSCOPY      US GUIDED THYROID BIOPSY  07/14/2023     Current Outpatient Medications   Medication Instructions    albuterol (Ventolin HFA) 90 mcg/act inhaler 2 puffs, Inhalation, Every 6 hours PRN    atorvastatin (LIPITOR) 20 mg, Oral, Daily    baclofen 10 mg, Oral, 3 times daily    cetirizine (ZyrTEC) 10 mg tablet 1 tab po daily x 1 month then prn allergies    clotrimazole-betamethasone (LOTRISONE) 1-0.05 % cream APPLY TWICE DAILY TO VAGINAL AREA UNTIL HEALED  THEN AS NEEDED FOR IRRITATION.    cromolyn (OPTICROM) 4 % ophthalmic solution 1 drop, Both Eyes, 4 times daily    ergocalciferol (VITAMIN D2) 50,000 Units, Oral, Weekly    famotidine (PEPCID) 40 MG tablet TAKE (1) TABLET BY MOUTH DAILY.    finasteride (PROPECIA) 1 mg, Oral, Daily    fluticasone (FLONASE) 50 mcg/act nasal spray USE 1 SPRAY INTO EACH NOSTRIL TWICE A DAY FOR 1 MONTH THEN AS NEEDED FOR CONGESTION AND ALLERGIES    Fluticasone-Salmeterol (Advair Diskus) 500-50 mcg/dose inhaler 1 puff, Inhalation, 2 times daily, Rinse mouth after use.    gabapentin (NEURONTIN) 300 mg capsule TAKE (1) CAPSULE THREE TIMES DAILY.    glipiZIDE (GLUCOTROL) 10 mg tablet TAKE (2) TABLETS TWICE DAILY BEFORE MEALS.    glucose blood test strip     glucose blood test strip Use as instructed    Jardiance 25 mg, Oral, Daily    ketoconazole (NIZORAL) 2 % cream Topical, Daily    Lancet Devices (OneTouch Delica Plus Lancing) MISC     Lancets (onetouch ultrasoft) lancets Use as instructed    lisinopril (ZESTRIL) 20 mg, Oral, Daily    meloxicam (MOBIC) 15 mg, Oral, Daily PRN    metFORMIN (GLUCOPHAGE) 1000 MG tablet TAKE (1) TABLET TWICE A DAY WITH MEALS.    metoprolol succinate (TOPROL-XL) 25 mg, Oral, Daily    nystatin powder Topical, 2 times daily    omeprazole (PRILOSEC) 20 mg, Oral, Daily    ondansetron (ZOFRAN) 4 mg, Oral, Every 8 hours PRN    predniSONE 40 mg, Oral, Daily    sertraline (ZOLOFT) 50 mg tablet TAKE (1) TABLET BY MOUTH DAILY AT BEDTIME.    triamcinolone (KENALOG) 0.025 % cream Apply 2 x daily to rash until healed then prn rash     Allergies   Allergen Reactions    Trulicity [Dulaglutide] Swelling    Tdap [Tetanus-Diphth-Acell Pertussis] Rash       Objective   LMP  (LMP Unknown)     Video Exam  Physical Exam     Administrative Statements   Encounter provider Kristina Guerrero LCSW    The Patient is located at Home and in the following state in which I hold an active license [place].    The patient was identified by name and  date of birth. Cristy Garcia was informed that this is a telemedicine visit and that the visit is being conducted through the Epic Embedded platform. She agrees to proceed..  My office door was closed. No one else was in the room.  She acknowledged consent and understanding of privacy and security of the video platform. The patient has agreed to participate and understands they can discontinue the visit at any time.    I have spent a total time of 22 minutes in caring for this patient on the day of the visit/encounter including Counseling / Coordination of care and creation of recovery treatment plan , not including the time spent for establishing the audio/video connection.    Visit Time  Start Time: 0945  Stop Time: 1007  Total Visit Time: 22 minutes    Treatment Plan Tracking    # 11 Treatment Plan not completed within required time limits due to:  when appointment was scheduled staff had an emergency .

## 2025-04-02 NOTE — ASSESSMENT & PLAN NOTE
Will prescribe fluconazole.  Educated on side effects.  Contact office if no improvement.  Orders:    fluconazole (DIFLUCAN) 150 mg tablet; Take 1 tablet (150 mg total) by mouth once for 1 dose

## 2025-04-02 NOTE — PROGRESS NOTES
Name: Cristy Garcia      : 1975      MRN: [id number]  Encounter Provider: Gaurav Merino PA-C  Encounter Date: 2025   Encounter department: [facility]  :  Assessment & Plan  Moderate persistent asthma with acute exacerbation  Will prescribe prednisone burst, and increase Advair dosing.  Will get repeat pulmonary function test.  Will follow-up in 1 month with progression.  Educated patient we want her taking albuterol 2 or less times per week.  If this is not occurring in 1 month we will refer to pulmonology.  Verbalizes understanding and agrees to plan.  Orders:    predniSONE 20 mg tablet; Take 2 tablets (40 mg total) by mouth daily for 5 days    Complete PFT with post bronchodilator; Future    Fluticasone-Salmeterol (Advair Diskus) 500-50 mcg/dose inhaler; Inhale 1 puff 2 (two) times a day Rinse mouth after use.    Vaginal candidiasis  Will prescribe fluconazole.  Educated on side effects.  Contact office if no improvement.  Orders:    fluconazole (DIFLUCAN) 150 mg tablet; Take 1 tablet (150 mg total) by mouth once for 1 dose           History of Present Illness   Patient is a 49-year-old female presenting for asthma follow-up.  Has been using albuterol multiple times a day.  Typically was only using it once every couple weeks in the past, but has neighbors that moved and that will now smoke cigarettes and marijuana.  This is exacerbating her asthma.  Does not feel that albuterol is helping.  Denies chest pain, dizziness, syncope.  No other questions or concerns.      Review of Systems   Constitutional:  Negative for appetite change, chills, diaphoresis, fatigue and fever.   HENT:  Negative for congestion, ear discharge, ear pain, postnasal drip, rhinorrhea, sinus pressure, sinus pain, sneezing and sore throat.    Eyes:  Negative for pain, discharge, redness, itching and visual disturbance.   Respiratory:  Positive for cough, shortness of breath and wheezing. Negative for  apnea and chest tightness.    Cardiovascular:  Negative for chest pain, palpitations and leg swelling.   Gastrointestinal:  Negative for abdominal pain, blood in stool, constipation, diarrhea, nausea and vomiting.   Endocrine: Negative for cold intolerance, heat intolerance, polydipsia and polyuria.   Genitourinary:  Negative for dysuria, flank pain, frequency, hematuria and urgency.   Musculoskeletal:  Negative for arthralgias, back pain, myalgias, neck pain and neck stiffness.   Skin:  Negative for color change and rash.   Allergic/Immunologic: Negative for environmental allergies and food allergies.   Neurological:  Negative for dizziness, tremors, seizures, syncope, speech difficulty, weakness, light-headedness, numbness and headaches.   Hematological:  Negative for adenopathy. Does not bruise/bleed easily.   Psychiatric/Behavioral:  Negative for agitation, confusion, decreased concentration, dysphoric mood, hallucinations, self-injury, sleep disturbance and suicidal ideas. The patient is not nervous/anxious and is not hyperactive.    All other systems reviewed and are negative.      Objective   /82 (BP Location: Right arm, Patient Position: Sitting)   Pulse 73   Temp (!) 97.2 °F (36.2 °C) (Tympanic)   Wt 118 kg (261 lb)   LMP  (LMP Unknown)   SpO2 97%   BMI 47.74 kg/m²      Physical Exam  Vitals and nursing note reviewed.   Constitutional:       General: She is not in acute distress.     Appearance: Normal appearance. She is well-developed and normal weight. She is not ill-appearing, toxic-appearing or diaphoretic.   HENT:      Head: Normocephalic and atraumatic.      Right Ear: Tympanic membrane normal.      Left Ear: Tympanic membrane normal.      Nose: Nose normal.      Mouth/Throat:      Mouth: Mucous membranes are moist.      Pharynx: Oropharynx is clear.   Eyes:      Conjunctiva/sclera: Conjunctivae normal.      Pupils: Pupils are equal, round, and reactive to light.   Neck:      Vascular: No  carotid bruit.   Cardiovascular:      Rate and Rhythm: Normal rate and regular rhythm.      Pulses: Normal pulses.      Heart sounds: Normal heart sounds. No murmur heard.  Pulmonary:      Effort: Pulmonary effort is normal. No respiratory distress.      Breath sounds: No stridor. Wheezing present. No rhonchi or rales.   Chest:      Chest wall: No tenderness.   Abdominal:      General: Bowel sounds are normal.      Palpations: Abdomen is soft. There is no mass.      Tenderness: There is no abdominal tenderness.   Musculoskeletal:         General: No swelling or tenderness.      Cervical back: Normal range of motion and neck supple. No tenderness.      Right lower leg: No edema.      Left lower leg: No edema.   Lymphadenopathy:      Cervical: No cervical adenopathy.   Skin:     General: Skin is warm and dry.      Capillary Refill: Capillary refill takes less than 2 seconds.      Findings: No erythema, lesion or rash.   Neurological:      General: No focal deficit present.      Mental Status: She is alert and oriented to person, place, and time. Mental status is at baseline.      Motor: No weakness.      Coordination: Coordination normal.      Gait: Gait normal.   Psychiatric:         Mood and Affect: Mood normal.         Behavior: Behavior normal.         Thought Content: Thought content normal.         Judgment: Judgment normal.

## 2025-04-02 NOTE — BH TREATMENT PLAN
"Outpatient Behavioral Health Psychotherapy Treatment Plan     Cristy Garcia  1975      Date of Initial Psychotherapy Assessment: 08/30/20  Date of Current Treatment Plan: 04/03/2025  Treatment Plan Target Date: 04/06/24  Treatment Plan Expiration Date: 10/03/2025     Diagnosis:   1. Major depression, recurrent, chronic (HCC)                Area(s) of Need: The Client has a long history of depression and anxiety, which is effecting her different relationships and environments. It is hoped that The Client will achieve or maintain maximum functional capacity in performing daily activizes, taking into account both the functional capacity of the individual and those functional capacities appropriate for the individuals of the same age. Reduce or ameliorate the physical mental, behavioral, or developmental effects of an illness, condition, injury or disability. Present treatment plan will cover the next 6 months or completion of her recovery crisis plan      Long Term Goal 1 (in the client's own words): \" to make me feel better\"      Stage of Change: Action     Target Date for completion: 09/24/25             Anticipated therapeutic modalities: Supportive Therapy, Strengths Therapy,  and Cognitive behavioral Therapy will be the treatment modalities utilized during the session.             People identified to complete this goal: The Client her support team and this therapist                    Objective 1: (identify the means of measuring success in meeting the objective): The Client will identify her depression when presented 4 out 7 times when presented (emerging 04/03/2025)                       Objective 2: (identify the means of measuring success in meeting the objective): The Client will utilize her  coping skills 3 out of 5 when presented with depression. (emerging 04/03/25)      Long Term Goal 2 (in the client's own words): \"It is not good to be anxious all the time\"      Stage of Change: Action     Target " "Date for completion:  03/6/24  Supportive Therapy, Strengths Therapy,  and Cognitive behavioral Therapy will be the treatment modalities utilized during the session.                Anticipated therapeutic modalities: The Client her support team and this therapist             People identified to complete this goal: the client her support team and this therapist                    Objective 1: (identify the means of measuring success in meeting the objective): The Client will become more aware of her anxiety when presented 3 out of 5 times(emerging-04/03/2025)                       Objective 2: (identify the means of measuring success in meeting the objective): The Client will utilize her coping skill 3 out of 5 times when presented with anxiety. (emerging 04/03/25)     Long Term Goal 3 (in the client's own words): \" Attending all of my medical appointment makes me healthier\"      Stage of Change: Action     Target Date for completion:  03/6/24             Anticipated therapeutic modalities: Supportive Therapy, Strengths Therapy,  and Cognitive behavioral Therapy will be the treatment modalities utilized during the session.                   People identified to complete this goal: The Client her support team and this therapist                        Objective 1: (identify the means of measuring success in meeting the objective): The Client will attend all of her medical appointments 100% of the time. (emerging 04/03/25)                     I am currently under the care of a [facility] psychiatric provider: No Just PCP for Medication management      My [facility] psychiatric provider is: Lolis PEREZ/Medication management/ Kristina OSWALD LCSW at [facility]     I am currently taking psychiatric medications: Yes, as prescribed     I feel that I will be ready for discharge from mental health care when I reach the following (measurable goal/objective): \" I don't know maybe " "when my financials get better\"      For children and adults who have a legal guardian:          Has there been any change to custody orders and/or guardianship status? n/a. If yes, attach updated documentation.     I had reviewed my Crisis Plan and have been offered a copy of this plan     Behavioral Health Treatment Plan [facility]: Diagnosis and Treatment Plan explained to Cristy Garcia acknowledges an understanding of their diagnosis. Cristy Garcia agrees to this treatment plan.     I have been offered a copy of this Treatment Plan. yes     "

## 2025-04-03 ENCOUNTER — OFFICE VISIT (OUTPATIENT)
Dept: PAIN MEDICINE | Facility: CLINIC | Age: 50
End: 2025-04-03
Payer: COMMERCIAL

## 2025-04-03 ENCOUNTER — HOSPITAL ENCOUNTER (OUTPATIENT)
Dept: RADIOLOGY | Facility: CLINIC | Age: 50
End: 2025-04-03
Payer: COMMERCIAL

## 2025-04-03 ENCOUNTER — TELEMEDICINE (OUTPATIENT)
Dept: BEHAVIORAL/MENTAL HEALTH CLINIC | Facility: CLINIC | Age: 50
End: 2025-04-03
Payer: COMMERCIAL

## 2025-04-03 VITALS — BODY MASS INDEX: 46.38 KG/M2 | HEIGHT: 62 IN | WEIGHT: 252 LBS

## 2025-04-03 DIAGNOSIS — M16.0 PRIMARY OSTEOARTHRITIS OF BOTH HIPS: Primary | ICD-10-CM

## 2025-04-03 DIAGNOSIS — M47.816 LUMBAR SPONDYLOSIS: ICD-10-CM

## 2025-04-03 DIAGNOSIS — F33.9 MAJOR DEPRESSION, RECURRENT, CHRONIC (HCC): Primary | ICD-10-CM

## 2025-04-03 DIAGNOSIS — M16.0 PRIMARY OSTEOARTHRITIS OF BOTH HIPS: ICD-10-CM

## 2025-04-03 PROCEDURE — T1015 CLINIC SERVICE: HCPCS | Performed by: SOCIAL WORKER

## 2025-04-03 PROCEDURE — 73521 X-RAY EXAM HIPS BI 2 VIEWS: CPT

## 2025-04-03 PROCEDURE — 99214 OFFICE O/P EST MOD 30 MIN: CPT | Performed by: ANESTHESIOLOGY

## 2025-04-03 RX ORDER — GABAPENTIN 600 MG/1
600 TABLET ORAL 3 TIMES DAILY
Qty: 90 TABLET | Refills: 2 | Status: SHIPPED | OUTPATIENT
Start: 2025-04-03

## 2025-04-03 NOTE — PROGRESS NOTES
Assessment  1. Primary osteoarthritis of both hips  -     XR hips bilateral 2 vw w pelvis if performed; Future; Expected date: 04/03/2025  2. Lumbar spondylosis  -     gabapentin (Neurontin) 600 MG tablet; Take 1 tablet (600 mg total) by mouth 3 (three) times a day  Greater than 80% improvement with radiofrequency ablation of repeat bilateral Medial branch nerves at L3-L5 performed 3/6/25;  procedure improved ability to participate with IADLs in significantly less pain for almost a year before return of pain.  Additionally greater than 90% relief of pain with improved ability to participate with IADLs after bilateral hip injections performed in the past without significant relief of left hip painwith repeat procedure. Reports left hip pain with radiation to the groin; ttp over gtb, pain with internal/external rotation left hip; pain worse with standing/ambulation. Lifestyle modifications extensively discussed including diet, exercise and weight loss in conjunction with optimal DM-2 control. Has been compliant with home exercises for axial low back pain and b/l hip pain. Previously reported the following symptomatology:    Chronic axial low back pain described by primarily arthritic features.  Positive facet loading maneuvers as noted below.Mild noncompressive lumbar degenerative change at the L3-4 and L4-5 levels.  Previously had a left L3 transforaminal steroid injection with Dr. Franco which helped for about a week and a half.  Arthritic symptomatology greater than radicular features at this time. Reasonable to continue multimodal pain therapy plan.    Plan  -meloxicam 15 mg daily. prn pain for lumbar facet syndrome/hip osteoarthritis.  Counseled regarding bleeding risk, GI risk, Renal risk of taking this medication.  -gabapentin increased to 600mg TID; counseled regarding risks, sedation and drowsiness and advised gradual uptitiration; advised caution with use of driving/operating heavy machinery when  taking medication  -lifestyle modifications including diet, exercise and weight loss in addition to DM-2 control extensively discussed  -b/l hip xrays; will f/u result  -hip and neck exercises provided; continue physical therapy and core exercises for lumbar facet syndrome, hip osteoarthritis, cervical spondylosis    There are risks associated with opioid medications, including dependence, addiction and tolerance. The patient understands and agrees to use these medications only as prescribed. Potential side effects of the medications include, but are not limited to, constipation, drowsiness, addiction, impaired judgment and risk of fatal overdose if not taken as prescribed. The patient was warned against driving while taking sedation medications.  Sharing medications is a felony. At this point in time, the patient is showing no signs of addiction, abuse, diversion or suicidal ideation.    [place] Prescription Drug Monitoring Program report was reviewed and was appropriate     Complete risks and benefits including bleeding, infection, tissue reaction, nerve injury and allergic reaction were discussed. The approach was demonstrated using models and literature was provided. Verbal and written consent was obtained.    My impressions and treatment recommendations were discussed in detail with the patient who verbalized understanding and had no further questions.  Discharge instructions were provided. I personally saw and examined the patient and I agree with the above discussed plan of care.    New Medications Ordered This Visit   Medications    gabapentin (Neurontin) 600 MG tablet     Sig: Take 1 tablet (600 mg total) by mouth 3 (three) times a day     Dispense:  90 tablet     Refill:  2       History of Present Illness    Cristy Garcia is a 49 y.o. female with past medical history of axial low back pain described primarily by arthritic features.  She presents with a 2 year history of chronic low back pain  described primarily as arthritic in nature.   she describes 8/10 low back pain that is worse in the mornings and worse at the end of the day.  The pain is characterized by achy, nagging, indolent, crampy, stabbing pain in his/her axial low back.  The patient describes that the pain is worse with standing for long periods of time on hard surfaces as well as with walking.  The patient is a very active individual and feels as though this pain compromises her participation with independent activities of daily living. The pain can be debilitating at times and contribute to significant disability, compromising overall activity and independent activities of daily living.   She has tried physical therapy with limited relief of symptoms.  Medications the patient has tried in the past include   Celebrex,  and Flexeril. She describes minor radicular symptoms in the L3 and L4 dermatomal distribution but otherwise has good strength.  Previously she had left L3 transforaminal epidural steroid injection with relief for about 2 weeks. She denies any weakness numbness or paresthesias.  The patient denies any bowel or bladder dysfunction as well.        I have personally reviewed and/or updated the patient's past medical history, past surgical history, family history, social history, current medications, allergies, and vital signs today.     Review of Systems   Constitutional:  Positive for activity change.   HENT: Negative.     Eyes: Negative.    Respiratory: Negative.     Cardiovascular: Negative.    Gastrointestinal: Negative.    Endocrine: Negative.    Genitourinary: Negative.    Musculoskeletal:  Positive for arthralgias, back pain, gait problem and myalgias.   Skin: Negative.    Allergic/Immunologic: Negative.    Hematological: Negative.    Psychiatric/Behavioral: Negative.     All other systems reviewed and are negative.      Patient Active Problem List   Diagnosis    SOB (shortness of breath)    Chronic bilateral low back  pain without sciatica    Chronic pain of left knee    Chronic pain of right knee    Fibromyalgia, primary    Primary osteoarthritis involving multiple joints    Mild persistent asthma without complication    Lumbar radiculopathy    Primary osteoarthritis of both hips    Left hip pain    Major depression, recurrent, chronic (HCC)    Lumbar spondylosis    Cervical radiculopathy    Morbid obesity with body mass index (BMI) of 45.0 to 49.9 in adult (HCC)    VICTOR HUGO on CPAP    Anxiety associated with depression    Diabetes mellitus (HCC)    Hyperlipidemia    Essential hypertension    Irregular heartbeat    Chronic pain disorder    GERD (gastroesophageal reflux disease)    Fatty liver    Elevated LFTs    Esophagitis determined by endoscopy    Impacted third molar tooth    Complex dental caries    Erythrocytosis    Leukocytosis    Mild concentric left ventricular hypertrophy    Chronic fatigue    Vitamin D deficiency    Hair loss    Hx of giardiasis    Vaginal candidiasis    Trigeminal neuralgia    Vitamin B12 deficiency    Teeth missing       Past Medical History:   Diagnosis Date    Allergic     Seasonal Allergies    Alopecia of scalp     Treated with Proscar    Asthma     Chronic pain disorder     back, bilat knees, bilat hips    CPAP (continuous positive airway pressure) dependence     Diabetes mellitus (HCC)     Fibromyalgia, primary     GERD (gastroesophageal reflux disease)     Hip fx, left, closed, with routine healing, subsequent encounter 05/2017    Hyperlipidemia     Hypertension     Insomnia     Irregular heartbeat     VICTOR HUGO on CPAP        Past Surgical History:   Procedure Laterality Date    CARPAL TUNNEL RELEASE Left     HYSTERECTOMY N/A 12/27/2024    HYSTERECTOMY      IR SPINE AND PAIN PROCEDURE  03/12/2024    IR SPINE AND PAIN PROCEDURE  04/11/2024    IR SPINE AND PAIN PROCEDURE  09/24/2024    IR SPINE AND PAIN PROCEDURE  2/19/2025    IR SPINE AND PAIN PROCEDURE  3/6/2025    LUMBAR EPIDURAL INJECTION      NERVE  BLOCK Bilateral 12/29/2020    Procedure: L2 L3 L4 L5 MEDIAL BRANCH BLOCK #1;  Surgeon: Westley Keller MD;  Location: [facility];  Service: Pain Management     NERVE BLOCK Bilateral 01/19/2021    Procedure: L2 L3 L4 L5 MEDIAL BRANCH BLOCK #2;  Surgeon: Westley Keller MD;  Location: [facility];  Service: Pain Management     PA ARTHROCENTESIS ASPIR&/INJ MAJOR JT/BURSA W/O US Bilateral 02/07/2023    Procedure: INJECTION JOINT HIP;  Surgeon: Westley Keller MD;  Location: [facility];  Service: Pain Management     PA HYSTEROSCOPY BX ENDOMETRIUM&/POLYPC W/WO D&C N/A 04/25/2023    Procedure: HYSTEROSCOPY W/  RESECTION UTERINE TUMOR/FIBROID (POLYPECTOMY );  Surgeon: Donovan Ware DO;  Location: [facility];  Service: Gynecology    RADIOFREQUENCY ABLATION Right 02/25/2021    Procedure: L2 L3 L4 L5 RADIO FREQUENCY ABLATION right side;  Surgeon: Westley Keller MD;  Location: [facility];  Service: Pain Management     RADIOFREQUENCY ABLATION Left 03/16/2021    Procedure: L2 L3 L4 L5 RADIO FREQUENCY ABLATION;  Surgeon: Westley Keller MD;  Location: [facility];  Service: Pain Management     RHIZOTOMY Bilateral 04/04/2023    Procedure: RHIZOTOMY LUMBAR L3, L4, L5 medial branch nerves;  Surgeon: Westley Keller MD;  Location: [facility];  Service: Pain Management     UPPER GASTROINTESTINAL ENDOSCOPY      US GUIDED THYROID BIOPSY  07/14/2023       Family History   Problem Relation Age of Onset    Cancer Father     Hepatitis Father     Asthma Cousin     Hypertension Paternal Grandmother     No Known Problems Mother     No Known Problems Sister     No Known Problems Maternal Grandmother     No Known Problems Maternal Grandfather     No Known Problems Paternal Grandfather     No Known Problems Paternal Aunt     BRCA2 Positive Neg Hx     BRCA2 Negative Neg Hx     BRCA1 Positive Neg Hx     BRCA1 Negative Neg Hx     BRCA 1/2 Neg Hx     Ovarian cancer Neg Hx     Endometrial cancer Neg Hx     Colon cancer Neg Hx     Breast cancer additional  onset Neg Hx     Breast cancer Neg Hx        Social History     Occupational History    Not on file   Tobacco Use    Smoking status: Former     Types: Cigarettes     Start date:      Quit date:      Years since quittin.2     Passive exposure: Past    Smokeless tobacco: Never   Vaping Use    Vaping status: Never Used   Substance and Sexual Activity    Alcohol use: No    Drug use: No    Sexual activity: Not Currently       Current Outpatient Medications on File Prior to Visit   Medication Sig    albuterol (Ventolin HFA) 90 mcg/act inhaler Inhale 2 puffs every 6 (six) hours as needed for wheezing or shortness of breath    atorvastatin (LIPITOR) 20 mg tablet Take 1 tablet (20 mg total) by mouth daily    baclofen 10 mg tablet Take 1 tablet (10 mg total) by mouth 3 (three) times a day    cetirizine (ZyrTEC) 10 mg tablet 1 tab po daily x 1 month then prn allergies    clotrimazole-betamethasone (LOTRISONE) 1-0.05 % cream APPLY TWICE DAILY TO VAGINAL AREA UNTIL HEALED THEN AS NEEDED FOR IRRITATION.    cromolyn (OPTICROM) 4 % ophthalmic solution Administer 1 drop to both eyes 4 (four) times a day    ergocalciferol (VITAMIN D2) 50,000 units Take 1 capsule (50,000 Units total) by mouth once a week    famotidine (PEPCID) 40 MG tablet TAKE (1) TABLET BY MOUTH DAILY.    finasteride (PROPECIA) 1 MG tablet Take 1 tablet (1 mg total) by mouth daily    fluticasone (FLONASE) 50 mcg/act nasal spray USE 1 SPRAY INTO EACH NOSTRIL TWICE A DAY FOR 1 MONTH THEN AS NEEDED FOR CONGESTION AND ALLERGIES    Fluticasone-Salmeterol (Advair Diskus) 500-50 mcg/dose inhaler Inhale 1 puff 2 (two) times a day Rinse mouth after use.    glipiZIDE (GLUCOTROL) 10 mg tablet TAKE (2) TABLETS TWICE DAILY BEFORE MEALS.    glucose blood test strip     glucose blood test strip Use as instructed    Jardiance 25 MG TABS TAKE (1) TABLET BY MOUTH DAILY.    ketoconazole (NIZORAL) 2 % cream Apply topically daily    Lancet Devices (OneTouch Delica Plus  "Lancing) MISC     Lancets (onetouch ultrasoft) lancets Use as instructed    lisinopril (ZESTRIL) 20 mg tablet Take 1 tablet (20 mg total) by mouth daily    meloxicam (MOBIC) 15 mg tablet Take 1 tablet (15 mg total) by mouth daily as needed for moderate pain    metFORMIN (GLUCOPHAGE) 1000 MG tablet TAKE (1) TABLET TWICE A DAY WITH MEALS.    metoprolol succinate (TOPROL-XL) 25 mg 24 hr tablet Take 1 tablet (25 mg total) by mouth daily    nystatin powder Apply topically 2 (two) times a day    omeprazole (PriLOSEC) 20 mg delayed release capsule Take 1 capsule (20 mg total) by mouth daily    ondansetron (ZOFRAN) 4 mg tablet Take 1 tablet (4 mg total) by mouth every 8 (eight) hours as needed for nausea or vomiting    predniSONE 20 mg tablet Take 2 tablets (40 mg total) by mouth daily for 5 days    sertraline (ZOLOFT) 50 mg tablet TAKE (1) TABLET BY MOUTH DAILY AT BEDTIME.    [DISCONTINUED] gabapentin (NEURONTIN) 300 mg capsule TAKE (1) CAPSULE THREE TIMES DAILY.    [] fluconazole (DIFLUCAN) 150 mg tablet Take 1 tablet (150 mg total) by mouth once for 1 dose    triamcinolone (KENALOG) 0.025 % cream Apply 2 x daily to rash until healed then prn rash (Patient taking differently: as needed Apply 2 x daily to rash until healed then prn rash)     No current facility-administered medications on file prior to visit.       Allergies   Allergen Reactions    Trulicity [Dulaglutide] Swelling    Tdap [Tetanus-Diphth-Acell Pertussis] Rash         Physical Exam    Ht 5' 2\" (1.575 m)   Wt 114 kg (252 lb)   LMP  (LMP Unknown)   BMI 46.09 kg/m²     Constitutional: normal, well developed, well nourished, alert, in no distress and non-toxic and no overt pain behavior. and obese  Eyes: anicteric  HEENT: grossly intact  Neck: supple, symmetric, trachea midline and no masses   Pulmonary:even and unlabored  Cardiovascular:No edema or pitting edema present  Skin:Normal without rashes or lesions and well hydrated  Psychiatric:Mood and " affect appropriate  Neurologic:Cranial Nerves II-XII grossly intact Sensation grossly intact; no clonus negative dupree's. Reflexes 2+ and brisk. SLR negative bilaterally.  Musculoskeletal:  Steppage gait. Normal heel toe and tip toe walking.  Significant pain with lumbar facet loading bilaterally and with lateral spine rotation left greater than right.  TTP over lumbar paraspinal muscles. Negative wilda's test, negative gaenslen's negative SIJ loading bilaterally.  ttp over b/l gtb, pain with internal/external rotation of b/l hips    Imaging    MRI LUMBAR SPINE WITHOUT CONTRAST     INDICATION: M54.42: Lumbago with sciatica, left side  G89.29: Other chronic pain.     COMPARISON:  None.     TECHNIQUE:  Sagittal T1, sagittal T2, sagittal inversion recovery, axial T1 and axial T2, coronal T2.    IMAGE QUALITY:  Diagnostic     FINDINGS:     VERTEBRAL BODIES:  There are 5 lumbar type vertebral bodies.  Normal alignment of the lumbar spine.  No spondylolysis or spondylolisthesis. No scoliosis.  No compression fracture.    Normal marrow signal is identified within the visualized bony   structures.  No discrete marrow lesion.     SACRUM:  Normal signal within the sacrum. No evidence of insufficiency or stress fracture.     DISTAL CORD AND CONUS:  Normal size and signal within the distal cord and conus.     PARASPINAL SOFT TISSUES:  There is thickening of the endometrial cavity partially visualized on this examination towards the fundus.  There is a dominant follicle identified within the left ovary.     LOWER THORACIC DISC SPACES:  Normal disc height and signal.  No disc herniation, canal stenosis or foraminal narrowing.     LUMBAR DISC SPACES:     L1-L2:  Normal.     L2-L3:  Normal.     L3-L4:  Annular bulging with a small broad-based left foraminal and extraforaminal disc protrusion best seen on series 6 image 13.  There is no canal stenosis.  Only minimal left foraminal narrowing without nerve impingement.     L4-L5:   Disc desiccation and loss of disc height with mild annular bulging.  Small central disc herniation without canal stenosis or foraminal nerve impingement.     L5-S1:  Normal disc height and signal without canal stenosis or foraminal narrowing.     IMPRESSION:     Mild noncompressive lumbar degenerative change at the L3-4 and L4-5 levels.     Fluid signal in the endometrial cavity partially visualized at the fundus.  If patient is postmenopausal, consider follow-up ultrasound imaging.     Signal

## 2025-04-03 NOTE — PATIENT INSTRUCTIONS
Patient Education     Hip Bursitis Exercises   About this topic   A bursa is a small, fluid-filled sac. It acts as a cushion between your bone and tendon. A tendon is a thick band that attaches your muscle to the bone. Bursae help the tendons glide and let your joints move easier. In the hip, there are three sets of bursae. There is one around the outer shannon part of your hip joint. It is called the greater trochanteric bursae. Another set of bursae is in front of your hip near the groin area. These are called iliopsoas bursae. The last bursae is the ischial bursae. It covers the bones in your pelvis that you sit on. These bursae can get swollen and hurt. This problem is called hip bursitis. Exercises can help make this problem better.  General   Before starting with a program, ask your doctor if you are healthy enough to do these exercises. Your doctor may have you work with a  or physical therapist to make a safe exercise program to meet your needs.  Stretching Exercises   Stretching exercises keep your muscles flexible. They also stop them from getting tight. Start by doing each of these stretches 2 to 3 times. In order for your body to make changes, you will need to hold these stretches for 20 to 30 seconds. Try to do the stretches 2 to 3 times each day. Do all exercises slowly.  Single knee to chest stretches ? Lie on your back. Pull one knee towards your chest until you feel a stretch in your lower back and buttock area. Repeat with the other knee. If you have knee problems, pull your knee up by grabbing the back of your thigh instead of the front of your knee. You can also do this exercise by grabbing both knees at the same time.  Deep hip stretches lying down ? Lie on your back and bend one knee, keeping that foot flat on the floor. Cross the other leg over your knee. Pull the bottom leg towards your chest until you feel a stretch in the other buttock. Repeat using the opposite leg as the bottom  leg.  Hamstring stretches on back ? Lie on your back with both knees bent and feet flat on the floor. Grab the back of your left thigh. Straighten your knee until you feel a stretch at the back of your thigh. Now, pull your toes down towards your head. Repeat on the other leg.  Iliotibial band stretches:  To stretch the left IT band: Stand up with your right leg crossed over the left one. Try to point the toes of the feet towards each other. Your toes should almost be touching. This is a very awkward position. Lean your upper body towards the right while bending your right knee. You should feel a stretch near the left hip. Hold and repeat.  To stretch the right IT band: Stand up with your left leg crossed over the right one. Try to point the toes of the feet towards each other. Your toes should almost be touching. This is a very awkward position. Lean your upper body towards the left while bending your left knee. You should feel a stretch near the right hip. Hold and repeat.  Strengthening Exercises   Strengthening exercises keep your muscles firm and strong. Start by repeating each exercise 2 to 3 times. Work up to doing each exercise 10 times. Try to do the exercises 2 to 3 times each day. Do all exercises slowly.  Side leg lifts ? Lie on your side. Have your legs straight and lined up with your back. Lift the top leg up while keeping the knee straight. Do not let your leg go forward. Then, do this exercise on your other side.  Bent knee side leg lifts ? Lie on your side with your painful hip on top. Bend your knees up slightly and have your knees and feet together. Lift your top leg up while keeping your feet together. Lower your leg back down and repeat.               What will the results be?   Less pain and swelling  Better range of motion  Increased strength  Easier to walk and do other activities  Helpful tips   Stay active and work out to keep your muscles strong and flexible.  Keep a healthy weight to  avoid putting too much stress on your spine. Eat a healthy diet to keep your muscles healthy.  Be sure you do not hold your breath when exercising. This can raise your blood pressure. If you tend to hold your breath, try counting out loud when exercising. If any exercise bothers you, stop right away.  Always warm up before stretching. Heated muscles stretch much easier than cool muscles. Stretching cool muscles can lead to injury.  Try walking or cycling at an easy pace for a few minutes to warm up your muscles. Do this again after exercising.  Never bounce when doing stretches.  Doing exercises before a meal may be a good way to get into a routine.  After exercising, it is a good idea to use ice. Place an ice pack or a bag of frozen peas wrapped in a towel over the painful part. Never put ice right on the skin. Do not leave the ice on more than 10 to 15 minutes at a time. Ice after activity may help decrease pain and swelling. Never ice before stretching.  Exercise may be slightly uncomfortable, but you should not have sharp pains. If you do get sharp pains, stop what you are doing. If the sharp pains continue, call your doctor.  Avoid sitting on hard surfaces and use a cushion.  Last Reviewed Date   2024-05-09  Consumer Information Use and Disclaimer   This generalized information is a limited summary of diagnosis, treatment, and/or medication information. It is not meant to be comprehensive and should be used as a tool to help the user understand and/or assess potential diagnostic and treatment options. It does NOT include all information about conditions, treatments, medications, side effects, or risks that may apply to a specific patient. It is not intended to be medical advice or a substitute for the medical advice, diagnosis, or treatment of a health care provider based on the health care provider's examination and assessment of a patient’s specific and unique circumstances. Patients must speak with a health  care provider for complete information about their health, medical questions, and treatment options, including any risks or benefits regarding use of medications. This information does not endorse any treatments or medications as safe, effective, or approved for treating a specific patient. [employer] and its affiliates disclaim any warranty or liability relating to this information or the use thereof. The use of this information is governed by the Terms of Use, available at https://www.wolters[employer].com/en/know/clinical-effectiveness-terms   Copyright   Copyright © 2024 [employer], [employer] and its affiliates and/or licensors. All rights reserved.

## 2025-04-03 NOTE — BH CRISIS PLAN
Client Name: Cristy Garcia       Client YOB: 1975    MichaelCodey Safety Plan      Creation Date: 1/16/24 Update Date: 4/3/25   Created By: Kristina Guerrero LCSW Last Updated By: Kristina Guerrero LCSW      Step 1: Warning Signs:   Warning Signs   Pain increases   isolate   cranky            Step 2: Internal Coping Strategies:   Internal Coping Strategies   go outside   play with the cats   watch movies   listen to music            Step 3: People and social settings that provide distraction:   Name Contact Information   [place] in phone   [place] in phone   Kary In phone   [place] in phone    Places   [facility] to [facility] [employer] [facility]   outside   go on support group discord           Step 4: People whom I can ask for help during a crisis:      Name Contact Information    [place] in phone    [place] In phone    Kary in phone      Step 5: Professionals or agencies I can contact during a crisis:      Clinican/Agency Name Phone Emergency Contact    [facility] [phone] [facility] [phone] [place]      [facility] Emergency Department Emergency Department Phone Emergency Department Address    [facility] [phone] [address] [place] [address]. JOHN Tapia        Crisis Phone Numbers:   Suicide Prevention Lifeline: Call or Text  673 Crisis Text Line: Text HOME to 914-654   Please note: Some [facility] do not have a separate number for Child/Adolescent specific crisis. If your county is not listed under Child/Adolescent, please call the adult number for your county      Adult Crisis Numbers: Child/Adolescent Crisis Numbers   [facility]: [phone] [facility]: [phone]   [facility]: [phone] [facility]: [phone]   [facility]: [phone] [place]: [phone]   [facility]: [phone] [place] [facility]: [phone]   [place] [facility]: [phone]   [facility]: [phone]   [facility]: [phone]  "  [place] [facility]: [phone] (daytime) [phone] (after hours, weekends, holidays)      Step 6: Making the environment safer (plan for lethal means safety):   Plan: The client does not have any guns, medication is put away     Optional: What is most important to me and worth living for?   \" My sister and my Pets\"      Ольга Safety Plan. Josiane Rodriguez and Mack Alegre. Used with permission of the authors.           "

## 2025-04-04 ENCOUNTER — RESULTS FOLLOW-UP (OUTPATIENT)
Dept: PAIN MEDICINE | Facility: CLINIC | Age: 50
End: 2025-04-04

## 2025-04-04 DIAGNOSIS — M16.12 PRIMARY OSTEOARTHRITIS OF LEFT HIP: Primary | ICD-10-CM

## 2025-04-08 DIAGNOSIS — M15.0 PRIMARY OSTEOARTHRITIS INVOLVING MULTIPLE JOINTS: ICD-10-CM

## 2025-04-09 ENCOUNTER — APPOINTMENT (EMERGENCY)
Dept: RADIOLOGY | Facility: HOSPITAL | Age: 50
End: 2025-04-09
Payer: COMMERCIAL

## 2025-04-09 ENCOUNTER — NURSE TRIAGE (OUTPATIENT)
Age: 50
End: 2025-04-09

## 2025-04-09 ENCOUNTER — HOSPITAL ENCOUNTER (EMERGENCY)
Facility: HOSPITAL | Age: 50
Discharge: HOME | End: 2025-04-09
Attending: EMERGENCY MEDICINE
Payer: COMMERCIAL

## 2025-04-09 VITALS
HEART RATE: 71 BPM | DIASTOLIC BLOOD PRESSURE: 72 MMHG | OXYGEN SATURATION: 95 % | SYSTOLIC BLOOD PRESSURE: 117 MMHG | TEMPERATURE: 97.8 F | BODY MASS INDEX: 47.06 KG/M2 | RESPIRATION RATE: 20 BRPM | WEIGHT: 255.73 LBS | HEIGHT: 62 IN

## 2025-04-09 DIAGNOSIS — R07.89 ATYPICAL CHEST PAIN: Primary | ICD-10-CM

## 2025-04-09 LAB
ALBUMIN SERPL BCG-MCNC: 4.2 G/DL (ref 3.5–5)
ALP SERPL-CCNC: 36 U/L (ref 34–104)
ALT SERPL W P-5'-P-CCNC: 26 U/L (ref 7–52)
ANION GAP SERPL CALCULATED.3IONS-SCNC: 9 MMOL/L (ref 4–13)
AST SERPL W P-5'-P-CCNC: 30 U/L (ref 13–39)
ATRIAL RATE: 77 BPM
ATRIAL RATE: 80 BPM
BASOPHILS # BLD AUTO: 0.12 THOUSANDS/ÂΜL (ref 0–0.1)
BASOPHILS NFR BLD AUTO: 1 % (ref 0–1)
BILIRUB SERPL-MCNC: 1.31 MG/DL (ref 0.2–1)
BNP SERPL-MCNC: 17 PG/ML (ref 0–100)
BUN SERPL-MCNC: 18 MG/DL (ref 5–25)
CALCIUM SERPL-MCNC: 9.1 MG/DL (ref 8.4–10.2)
CARDIAC TROPONIN I PNL SERPL HS: <2 NG/L (ref ?–50)
CARDIAC TROPONIN I PNL SERPL HS: <2 NG/L (ref ?–50)
CHLORIDE SERPL-SCNC: 101 MMOL/L (ref 96–108)
CO2 SERPL-SCNC: 23 MMOL/L (ref 21–32)
CREAT SERPL-MCNC: 0.61 MG/DL (ref 0.6–1.3)
EOSINOPHIL # BLD AUTO: 0.27 THOUSAND/ÂΜL (ref 0–0.61)
EOSINOPHIL NFR BLD AUTO: 3 % (ref 0–6)
ERYTHROCYTE [DISTWIDTH] IN BLOOD BY AUTOMATED COUNT: 13.2 % (ref 11.6–15.1)
GFR SERPL CREATININE-BSD FRML MDRD: 105 ML/MIN/1.73SQ M
GLUCOSE SERPL-MCNC: 151 MG/DL (ref 65–140)
HCT VFR BLD AUTO: 50.2 % (ref 34.8–46.1)
HGB BLD-MCNC: 16.8 G/DL (ref 11.5–15.4)
IMM GRANULOCYTES # BLD AUTO: 0.14 THOUSAND/UL (ref 0–0.2)
IMM GRANULOCYTES NFR BLD AUTO: 1 % (ref 0–2)
LIPASE SERPL-CCNC: 27 U/L (ref 11–82)
LYMPHOCYTES # BLD AUTO: 3.4 THOUSANDS/ÂΜL (ref 0.6–4.47)
LYMPHOCYTES NFR BLD AUTO: 32 % (ref 14–44)
MCH RBC QN AUTO: 29.6 PG (ref 26.8–34.3)
MCHC RBC AUTO-ENTMCNC: 33.5 G/DL (ref 31.4–37.4)
MCV RBC AUTO: 89 FL (ref 82–98)
MONOCYTES # BLD AUTO: 0.67 THOUSAND/ÂΜL (ref 0.17–1.22)
MONOCYTES NFR BLD AUTO: 6 % (ref 4–12)
NEUTROPHILS # BLD AUTO: 5.88 THOUSANDS/ÂΜL (ref 1.85–7.62)
NEUTS SEG NFR BLD AUTO: 57 % (ref 43–75)
NRBC BLD AUTO-RTO: 0 /100 WBCS
P AXIS: 34 DEGREES
P AXIS: 6 DEGREES
PLATELET # BLD AUTO: 199 THOUSANDS/UL (ref 149–390)
PMV BLD AUTO: 9.1 FL (ref 8.9–12.7)
POTASSIUM SERPL-SCNC: 4.8 MMOL/L (ref 3.5–5.3)
PR INTERVAL: 128 MS
PR INTERVAL: 138 MS
PROT SERPL-MCNC: 7.3 G/DL (ref 6.4–8.4)
QRS AXIS: -10 DEGREES
QRS AXIS: 13 DEGREES
QRSD INTERVAL: 90 MS
QRSD INTERVAL: 90 MS
QT INTERVAL: 386 MS
QT INTERVAL: 402 MS
QTC INTERVAL: 436 MS
QTC INTERVAL: 463 MS
RBC # BLD AUTO: 5.67 MILLION/UL (ref 3.81–5.12)
SODIUM SERPL-SCNC: 133 MMOL/L (ref 135–147)
T WAVE AXIS: 12 DEGREES
T WAVE AXIS: 27 DEGREES
VENTRICULAR RATE: 77 BPM
VENTRICULAR RATE: 80 BPM
WBC # BLD AUTO: 10.48 THOUSAND/UL (ref 4.31–10.16)

## 2025-04-09 PROCEDURE — 84484 ASSAY OF TROPONIN QUANT: CPT | Performed by: EMERGENCY MEDICINE

## 2025-04-09 PROCEDURE — 71045 X-RAY EXAM CHEST 1 VIEW: CPT

## 2025-04-09 PROCEDURE — 36415 COLL VENOUS BLD VENIPUNCTURE: CPT | Performed by: EMERGENCY MEDICINE

## 2025-04-09 PROCEDURE — 93010 ELECTROCARDIOGRAM REPORT: CPT | Performed by: INTERNAL MEDICINE

## 2025-04-09 PROCEDURE — 99285 EMERGENCY DEPT VISIT HI MDM: CPT

## 2025-04-09 PROCEDURE — 83880 ASSAY OF NATRIURETIC PEPTIDE: CPT | Performed by: EMERGENCY MEDICINE

## 2025-04-09 PROCEDURE — 80053 COMPREHEN METABOLIC PANEL: CPT | Performed by: EMERGENCY MEDICINE

## 2025-04-09 PROCEDURE — 85025 COMPLETE CBC W/AUTO DIFF WBC: CPT | Performed by: EMERGENCY MEDICINE

## 2025-04-09 PROCEDURE — 93005 ELECTROCARDIOGRAM TRACING: CPT

## 2025-04-09 PROCEDURE — 83690 ASSAY OF LIPASE: CPT | Performed by: EMERGENCY MEDICINE

## 2025-04-09 RX ORDER — ASPIRIN 81 MG/1
324 TABLET, CHEWABLE ORAL ONCE
Status: COMPLETED | OUTPATIENT
Start: 2025-04-09 | End: 2025-04-09

## 2025-04-09 RX ORDER — SODIUM CHLORIDE 9 MG/ML
3 INJECTION INTRAVENOUS
Status: DISCONTINUED | OUTPATIENT
Start: 2025-04-09 | End: 2025-04-09 | Stop reason: HOSPADM

## 2025-04-09 RX ORDER — MELOXICAM 15 MG/1
15 TABLET ORAL DAILY PRN
Qty: 90 TABLET | Refills: 0 | Status: SHIPPED | OUTPATIENT
Start: 2025-04-09

## 2025-04-09 RX ADMIN — ASPIRIN 81 MG CHEWABLE TABLET 324 MG: 81 TABLET CHEWABLE at 12:56

## 2025-04-09 NOTE — DISCHARGE INSTRUCTIONS
Return to the ER immediately for any worsening symptoms. Please follow up with the cardiologist for further evaluation and management.

## 2025-04-09 NOTE — ED PROVIDER NOTES
Time reflects when diagnosis was documented in both MDM as applicable and the Disposition within this note       Time User Action Codes Description Comment    4/9/2025  3:25 PM Analilia Bernal Add [R07.89] Atypical chest pain           ED Disposition       ED Disposition   Discharge    Condition   Stable    Date/Time   Wed Apr 9, 2025  3:25 PM    Comment   Cristy Jose discharge to home/self care.                   Assessment & Plan       Medical Decision Making  Differential diagnosis includes but not limited to: MI, ACS, costochondritis, pericarditis, dissection    Amount and/or Complexity of Data Reviewed  Labs: ordered.  Radiology: ordered.    Risk  OTC drugs.  Prescription drug management.        ED Course as of 04/09/25 1533   Wed Apr 09, 2025   1522 Patient has no acute findings on the work up in the ED. No acute EKG findings and two negative cardiac enzymes, patient is stable for discharge home.        Medications   sodium chloride (PF) 0.9 % injection 3 mL (has no administration in time range)   aspirin chewable tablet 324 mg (324 mg Oral Given 4/9/25 1256)       ED Risk Strat Scores   HEART Risk Score      Flowsheet Row Most Recent Value   Heart Score Risk Calculator    History 0 Filed at: 04/09/2025 1309   ECG 0 Filed at: 04/09/2025 1309   Age 1 Filed at: 04/09/2025 1309   Risk Factors 2 Filed at: 04/09/2025 1309   Troponin 0 Filed at: 04/09/2025 1309   HEART Score 3 Filed at: 04/09/2025 1309          HEART Risk Score      Flowsheet Row Most Recent Value   Heart Score Risk Calculator    History 0 Filed at: 04/09/2025 1309   ECG 0 Filed at: 04/09/2025 1309   Age 1 Filed at: 04/09/2025 1309   Risk Factors 2 Filed at: 04/09/2025 1309   Troponin 0 Filed at: 04/09/2025 1309   HEART Score 3 Filed at: 04/09/2025 1309                      No data recorded        SBIRT 22yo+      Flowsheet Row Most Recent Value   Initial Alcohol Screen: US AUDIT-C     1. How often do you have a drink containing alcohol? 0 Filed  at: 04/09/2025 1215   2. How many drinks containing alcohol do you have on a typical day you are drinking?  0 Filed at: 04/09/2025 1215   3b. FEMALE Any Age, or MALE 65+: How often do you have 4 or more drinks on one occassion? 0 Filed at: 04/09/2025 1215   Audit-C Score 0 Filed at: 04/09/2025 1215   CHEIKH: How many times in the past year have you...    Used an illegal drug or used a prescription medication for non-medical reasons? Never Filed at: 04/09/2025 1215                            History of Present Illness       Chief Complaint   Patient presents with    Chest Pain     Started with dull, non-radiating mid chest pain Saturday along with SOB and dizziness. Symptoms have been intermittent.       Past Medical History:   Diagnosis Date    Allergic     Seasonal Allergies    Alopecia of scalp     Treated with Proscar    Asthma     Chronic pain disorder     back, bilat knees, bilat hips    CPAP (continuous positive airway pressure) dependence     Diabetes mellitus (HCC)     Fibromyalgia, primary     GERD (gastroesophageal reflux disease)     Hip fx, left, closed, with routine healing, subsequent encounter 05/2017    Hyperlipidemia     Hypertension     Insomnia     Irregular heartbeat     VICTOR HUGO on CPAP       Past Surgical History:   Procedure Laterality Date    CARPAL TUNNEL RELEASE Left     HYSTERECTOMY N/A 12/27/2024    HYSTERECTOMY      IR SPINE AND PAIN PROCEDURE  03/12/2024    IR SPINE AND PAIN PROCEDURE  04/11/2024    IR SPINE AND PAIN PROCEDURE  09/24/2024    IR SPINE AND PAIN PROCEDURE  2/19/2025    IR SPINE AND PAIN PROCEDURE  3/6/2025    LUMBAR EPIDURAL INJECTION      NERVE BLOCK Bilateral 12/29/2020    Procedure: L2 L3 L4 L5 MEDIAL BRANCH BLOCK #1;  Surgeon: Westley Keller MD;  Location: [facility];  Service: Pain Management     NERVE BLOCK Bilateral 01/19/2021    Procedure: L2 L3 L4 L5 MEDIAL BRANCH BLOCK #2;  Surgeon: Westley Keller MD;  Location: [facility];  Service: Pain Management     [place]  ARTHROCENTESIS ASPIR&/INJ MAJOR JT/BURSA W/O US Bilateral 2023    Procedure: INJECTION JOINT HIP;  Surgeon: Westley Keller MD;  Location: [facility];  Service: Pain Management     IA HYSTEROSCOPY BX ENDOMETRIUM&/POLYPC W/WO D&C N/A 2023    Procedure: HYSTEROSCOPY W/  RESECTION UTERINE TUMOR/FIBROID (POLYPECTOMY );  Surgeon: Donovan Ware DO;  Location: [facility];  Service: Gynecology    RADIOFREQUENCY ABLATION Right 2021    Procedure: L2 L3 L4 L5 RADIO FREQUENCY ABLATION right side;  Surgeon: Westley Keller MD;  Location: [facility];  Service: Pain Management     RADIOFREQUENCY ABLATION Left 2021    Procedure: L2 L3 L4 L5 RADIO FREQUENCY ABLATION;  Surgeon: Westley Keller MD;  Location: [facility];  Service: Pain Management     RHIZOTOMY Bilateral 2023    Procedure: RHIZOTOMY LUMBAR L3, L4, L5 medial branch nerves;  Surgeon: Westley Keller MD;  Location: [facility];  Service: Pain Management     UPPER GASTROINTESTINAL ENDOSCOPY      US GUIDED THYROID BIOPSY  2023      Family History   Problem Relation Age of Onset    Cancer Father     Hepatitis Father     Asthma Cousin     Hypertension Paternal Grandmother     No Known Problems Mother     No Known Problems Sister     No Known Problems Maternal Grandmother     No Known Problems Maternal Grandfather     No Known Problems Paternal Grandfather     No Known Problems Paternal Aunt     BRCA2 Positive Neg Hx     BRCA2 Negative Neg Hx     BRCA1 Positive Neg Hx     BRCA1 Negative Neg Hx     BRCA 1/2 Neg Hx     Ovarian cancer Neg Hx     Endometrial cancer Neg Hx     Colon cancer Neg Hx     Breast cancer additional onset Neg Hx     Breast cancer Neg Hx       Social History     Tobacco Use    Smoking status: Former     Types: Cigarettes     Start date:      Quit date:      Years since quittin.2     Passive exposure: Past    Smokeless tobacco: Never   Vaping Use    Vaping status: Never Used   Substance Use Topics    Alcohol  use: No    Drug use: No      E-Cigarette/Vaping    E-Cigarette Use Never User       E-Cigarette/Vaping Substances    Nicotine No     THC No     CBD No     Flavoring No     Other No     Unknown No       I have reviewed and agree with the history as documented.     This is a 49-year-old female presenting to the ED for evaluation of dull nonradiating midsternal chest pain that began on Saturday with shortness of breath and dizziness.  Patient states that her pain is symptomatic pressure like and on a scale of 5 out of 10.  She denies any chest trauma.  Patient denies any recent travel or URI symptoms.        Review of Systems   Constitutional:  Negative for chills and fever.   HENT:  Negative for ear pain and sore throat.    Eyes:  Negative for pain and visual disturbance.   Respiratory:  Positive for shortness of breath. Negative for cough.    Cardiovascular:  Positive for chest pain. Negative for palpitations.   Gastrointestinal:  Negative for abdominal pain and vomiting.   Genitourinary:  Negative for dysuria and hematuria.   Musculoskeletal:  Negative for arthralgias and back pain.   Skin:  Negative for color change and rash.   Neurological:  Positive for dizziness. Negative for seizures and syncope.   All other systems reviewed and are negative.          Objective       ED Triage Vitals [04/09/25 1214]   Temperature Pulse Blood Pressure Respirations SpO2 Patient Position - Orthostatic VS   97.8 °F (36.6 °C) 83 141/83 17 96 % Lying      Temp Source Heart Rate Source BP Location FiO2 (%) Pain Score    Temporal Monitor Left arm -- 5      Vitals      Date and Time Temp Pulse SpO2 Resp BP Pain Score FACES Pain Rating User   04/09/25 1515 -- 81 96 % 21 102/56 -- --    04/09/25 1500 -- 75 96 % 13 125/79 -- --    04/09/25 1445 -- 73 95 % 17 125/79 -- --    04/09/25 1430 -- 76 97 % 21 124/77 -- --    04/09/25 1415 -- 82 97 % 20 129/76 -- --    04/09/25 1400 -- 82 95 % 20 131/75 -- --    04/09/25 1345 -- 81 95 %  17 132/78 -- --    04/09/25 1315 -- 77 95 % 20 136/84 -- --    04/09/25 1245 -- 79 95 % 20 135/87 -- --    04/09/25 1214 97.8 °F (36.6 °C) 83 96 % 17 141/83 5 -- SM            Physical Exam  Vitals and nursing note reviewed.   Constitutional:       General: She is not in acute distress.     Appearance: She is well-developed and normal weight.   HENT:      Head: Normocephalic and atraumatic.   Eyes:      Extraocular Movements: Extraocular movements intact.      Conjunctiva/sclera: Conjunctivae normal.   Cardiovascular:      Rate and Rhythm: Normal rate and regular rhythm.      Heart sounds: Normal heart sounds. No murmur heard.  Pulmonary:      Effort: Pulmonary effort is normal. No respiratory distress.      Breath sounds: Normal breath sounds. No decreased breath sounds, wheezing, rhonchi or rales.   Chest:      Chest wall: No mass, deformity, tenderness, crepitus or edema. There is no dullness to percussion.   Abdominal:      General: Bowel sounds are normal.      Palpations: Abdomen is soft.      Tenderness: There is no abdominal tenderness.   Musculoskeletal:         General: No swelling. Normal range of motion.      Cervical back: Normal range of motion and neck supple.      Right lower leg: No tenderness. No edema.      Left lower leg: No tenderness. No edema.   Skin:     General: Skin is warm and dry.      Capillary Refill: Capillary refill takes less than 2 seconds.   Neurological:      General: No focal deficit present.      Mental Status: She is alert and oriented to person, place, and time.   Psychiatric:         Mood and Affect: Mood normal.         Results Reviewed       Procedure Component Value Units Date/Time    HS Troponin I 2hr [[id number]] Collected: 04/09/25 1423    Lab Status: Final result Specimen: Blood from Arm, Right Updated: 04/09/25 1513     hs TnI 2hr <2 ng/L      Delta 2hr hsTnI --    HS Troponin I 4hr [[id number]]     Lab Status: No result Specimen: Blood     HS Troponin 0hr (reflex  protocol) [[id number]]  (Normal) Collected: 04/09/25 1223    Lab Status: Final result Specimen: Blood from Arm, Right Updated: 04/09/25 1258     hs TnI 0hr <2 ng/L     B-Type Natriuretic Peptide(BNP) [[id number]]  (Normal) Collected: 04/09/25 1223    Lab Status: Final result Specimen: Blood from Arm, Right Updated: 04/09/25 1256     BNP 17 pg/mL     Comprehensive metabolic panel [[id number]]  (Abnormal) Collected: 04/09/25 1223    Lab Status: Final result Specimen: Blood from Arm, Right Updated: 04/09/25 1250     Sodium 133 mmol/L      Potassium 4.8 mmol/L      Chloride 101 mmol/L      CO2 23 mmol/L      ANION GAP 9 mmol/L      BUN 18 mg/dL      Creatinine 0.61 mg/dL      Glucose 151 mg/dL      Calcium 9.1 mg/dL      AST 30 U/L      ALT 26 U/L      Alkaline Phosphatase 36 U/L      Total Protein 7.3 g/dL      Albumin 4.2 g/dL      Total Bilirubin 1.31 mg/dL      eGFR 105 ml/min/1.73sq m     Narrative:      [employer] Kid[employer] guidelines for Chronic Kidney Disease (CKD):     Stage 1 with normal or high GFR (GFR > 90 mL/min/1.73 square meters)    Stage 2 Mild CKD (GFR = 60-89 mL/min/1.73 square meters)    Stage 3A Moderate CKD (GFR = 45-59 mL/min/1.73 square meters)    Stage 3B Moderate CKD (GFR = 30-44 mL/min/1.73 square meters)    Stage 4 Severe CKD (GFR = 15-29 mL/min/1.73 square meters)    Stage 5 End Stage CKD (GFR <15 mL/min/1.73 square meters)  Note: GFR calculation is accurate only with a steady state creatinine    Lipase [[id number]]  (Normal) Collected: 04/09/25 1223    Lab Status: Final result Specimen: Blood from Arm, Right Updated: 04/09/25 1250     Lipase 27 u/L     CBC and differential [[id number]]  (Abnormal) Collected: 04/09/25 1223    Lab Status: Final result Specimen: Blood from Arm, Right Updated: 04/09/25 1232     WBC 10.48 Thousand/uL      RBC 5.67 Million/uL      Hemoglobin 16.8 g/dL      Hematocrit 50.2 %      MCV 89 fL      MCH 29.6 pg      MCHC 33.5 g/dL      RDW 13.2 %      MPV 9.1  fL      Platelets 199 Thousands/uL      nRBC 0 /100 WBCs      Segmented % 57 %      Immature Grans % 1 %      Lymphocytes % 32 %      Monocytes % 6 %      Eosinophils Relative 3 %      Basophils Relative 1 %      Absolute Neutrophils 5.88 Thousands/µL      Absolute Immature Grans 0.14 Thousand/uL      Absolute Lymphocytes 3.40 Thousands/µL      Absolute Monocytes 0.67 Thousand/µL      Eosinophils Absolute 0.27 Thousand/µL      Basophils Absolute 0.12 Thousands/µL             X-ray chest 1 view portable   Final Interpretation by Amaury Asencio MD ( 130)      No acute cardiopulmonary disease.            Workstation performed: [id number]             Procedures    ED Medication and Procedure Management   Prior to Admission Medications   Prescriptions Last Dose Informant Patient Reported? Taking?   Fluticasone-Salmeterol (Advair Diskus) 500-50 mcg/dose inhaler   No No   Sig: Inhale 1 puff 2 (two) times a day Rinse mouth after use.   Jardiance 25 MG TABS  Self No No   Sig: TAKE (1) TABLET BY MOUTH DAILY.   Lancet Devices (Speech Kingdomuch Delica Plus Lancing) MISC  Self Yes No   Lancets (onetouch ultrasoft) lancets   No No   Sig: Use as instructed   albuterol (Ventolin HFA) 90 mcg/act inhaler   No No   Sig: Inhale 2 puffs every 6 (six) hours as needed for wheezing or shortness of breath   atorvastatin (LIPITOR) 20 mg tablet   No No   Sig: Take 1 tablet (20 mg total) by mouth daily   baclofen 10 mg tablet   No No   Sig: Take 1 tablet (10 mg total) by mouth 3 (three) times a day   cetirizine (ZyrTEC) 10 mg tablet  Self No No   Si tab po daily x 1 month then prn allergies   clotrimazole-betamethasone (LOTRISONE) 1-0.05 % cream   No No   Sig: APPLY TWICE DAILY TO VAGINAL AREA UNTIL HEALED THEN AS NEEDED FOR IRRITATION.   cromolyn (OPTICROM) 4 % ophthalmic solution  Self No No   Sig: Administer 1 drop to both eyes 4 (four) times a day   ergocalciferol (VITAMIN D2) 50,000 units   No No   Sig: Take 1 capsule (50,000  Units total) by mouth once a week   famotidine (PEPCID) 40 MG tablet   No No   Sig: TAKE (1) TABLET BY MOUTH DAILY.   finasteride (PROPECIA) 1 MG tablet  Self No No   Sig: Take 1 tablet (1 mg total) by mouth daily   fluticasone (FLONASE) 50 mcg/act nasal spray   No No   Sig: USE 1 SPRAY INTO EACH NOSTRIL TWICE A DAY FOR 1 MONTH THEN AS NEEDED FOR CONGESTION AND ALLERGIES   gabapentin (Neurontin) 600 MG tablet   No No   Sig: Take 1 tablet (600 mg total) by mouth 3 (three) times a day   glipiZIDE (GLUCOTROL) 10 mg tablet  Self No No   Sig: TAKE (2) TABLETS TWICE DAILY BEFORE MEALS.   glucose blood test strip  Self Yes No   glucose blood test strip   No No   Sig: Use as instructed   ketoconazole (NIZORAL) 2 % cream   No No   Sig: Apply topically daily   lisinopril (ZESTRIL) 20 mg tablet   No No   Sig: Take 1 tablet (20 mg total) by mouth daily   meloxicam (MOBIC) 15 mg tablet   No No   Sig: Take 1 tablet (15 mg total) by mouth daily as needed for moderate pain   metFORMIN (GLUCOPHAGE) 1000 MG tablet   No No   Sig: TAKE (1) TABLET TWICE A DAY WITH MEALS.   metoprolol succinate (TOPROL-XL) 25 mg 24 hr tablet   No No   Sig: Take 1 tablet (25 mg total) by mouth daily   nystatin powder  Self No No   Sig: Apply topically 2 (two) times a day   omeprazole (PriLOSEC) 20 mg delayed release capsule   No No   Sig: Take 1 capsule (20 mg total) by mouth daily   ondansetron (ZOFRAN) 4 mg tablet  Self No No   Sig: Take 1 tablet (4 mg total) by mouth every 8 (eight) hours as needed for nausea or vomiting   sertraline (ZOLOFT) 50 mg tablet   No No   Sig: TAKE (1) TABLET BY MOUTH DAILY AT BEDTIME.   triamcinolone (KENALOG) 0.025 % cream  Self No No   Sig: Apply 2 x daily to rash until healed then prn rash   Patient taking differently: as needed Apply 2 x daily to rash until healed then prn rash      Facility-Administered Medications: None     Patient's Medications   Discharge Prescriptions    No medications on file       ED SEPSIS  DOCUMENTATION   Time reflects when diagnosis was documented in both MDM as applicable and the Disposition within this note       Time User Action Codes Description Comment    4/9/2025  3:25 PM Analilia Bernal Add [R07.89] Atypical chest pain                  Analilia Bernal DO  04/09/25 1533

## 2025-04-09 NOTE — TELEPHONE ENCOUNTER
Please tell the patient that if she is noticing tachycardia, she can take an additional 25 mg of metoprolol succinate once a day and can continue to take that until she comes to clinic.

## 2025-04-09 NOTE — TELEPHONE ENCOUNTER
"FOLLOW UP: Urgent follow up scheduled with Dr Braxton on Tuesday 4/15.    REASON FOR CONVERSATION: Tachycardia  Spoke with friend Aleksandra on the phone with Cristy present in the background. Patient has been experiencing a HR ranging 100-110 since Saturday . HR does go down on occasion into the 80s.  SYMPTOMS: Chest discomfort in center of chest, dizziness, shortness of breath.    OTHER: Pulse has been determined on a pulse ox meter.  O2 sat is 97 % with feeling short of breath.  She said a new BP cuff is being delivered to her home today so does not currently monitor BP.    I advised ED.  Cristy is unlikely to follow this recommendation as she said the last time this happened she was advised to just call the office and to go in for an office visit. The first opening I see is on 4/15 and she is scheduled and Cristy said she would go to the ED if her symptoms worsen but prefers to be seen in the office.    DISPOSITION: Go to ED    Answer Assessment - Initial Assessment Questions  1. DESCRIPTION: \"Please describe your heart rate or heartbeat that you are having\" (e.g., fast/slow, regular/irregular, skipped or extra beats, \"palpitations\")      HR has been reaching 100-110 for the last 4 days  2. ONSET: \"When did it start?\" (e.g., minutes, hours, days)       4 days ago  3. DURATION: \"How long does it last\" (e.g., seconds, minutes, hours)      Comes and goes  4. PATTERN \"Does it come and go, or has it been constant since it started?\"  \"Does it get worse with exertion?\"   \"Are you feeling it now?\"      Comes and goes    7. RECURRENT SYMPTOM: \"Have you ever had this before?\" If Yes, ask: \"When was the last time?\" and \"What happened that time?\"       Yes  8. CAUSE: \"What do you think is causing the palpitations?\"      Unsure  9. CARDIAC HISTORY: \"Do you have any history of heart disease?\" (e.g., heart attack, angina, bypass surgery, angioplasty, arrhythmia)       Left ventricular hypertrophy  10. OTHER SYMPTOMS: \"Do you have any " "other symptoms?\" (e.g., dizziness, chest pain, sweating, difficulty breathing)        Has been experiencing chest discomfort, shortness of breath, dizziness.    Protocols used: Heart Rate and Heartbeat Questions-Adult-[place]    "

## 2025-04-11 RX ORDER — FLUCONAZOLE 150 MG/1
1 TABLET ORAL DAILY
COMMUNITY
Start: 2025-04-02

## 2025-04-14 ENCOUNTER — TELEPHONE (OUTPATIENT)
Dept: PAIN MEDICINE | Facility: CLINIC | Age: 50
End: 2025-04-14

## 2025-04-14 DIAGNOSIS — L65.9 ALOPECIA: ICD-10-CM

## 2025-04-14 DIAGNOSIS — E11.65 TYPE 2 DIABETES MELLITUS WITH HYPERGLYCEMIA, WITHOUT LONG-TERM CURRENT USE OF INSULIN (HCC): ICD-10-CM

## 2025-04-14 DIAGNOSIS — E55.9 VITAMIN D DEFICIENCY: ICD-10-CM

## 2025-04-15 ENCOUNTER — OFFICE VISIT (OUTPATIENT)
Dept: OBGYN CLINIC | Facility: CLINIC | Age: 50
End: 2025-04-15
Payer: COMMERCIAL

## 2025-04-15 VITALS — WEIGHT: 254.4 LBS | BODY MASS INDEX: 46.81 KG/M2 | HEIGHT: 62 IN

## 2025-04-15 DIAGNOSIS — M16.12 PRIMARY OSTEOARTHRITIS OF LEFT HIP: ICD-10-CM

## 2025-04-15 PROCEDURE — 99244 OFF/OP CNSLTJ NEW/EST MOD 40: CPT | Performed by: STUDENT IN AN ORGANIZED HEALTH CARE EDUCATION/TRAINING PROGRAM

## 2025-04-15 RX ORDER — GLIPIZIDE 10 MG/1
TABLET ORAL
Qty: 360 TABLET | Refills: 1 | Status: SHIPPED | OUTPATIENT
Start: 2025-04-15

## 2025-04-15 RX ORDER — FINASTERIDE 1 MG/1
1 TABLET, FILM COATED ORAL DAILY
Qty: 30 TABLET | Refills: 5 | Status: SHIPPED | OUTPATIENT
Start: 2025-04-15

## 2025-04-15 RX ORDER — ERGOCALCIFEROL 1.25 MG/1
50000 CAPSULE, LIQUID FILLED ORAL WEEKLY
Qty: 12 CAPSULE | Refills: 0 | Status: SHIPPED | OUTPATIENT
Start: 2025-04-15

## 2025-04-15 NOTE — PROGRESS NOTES
Assessment & Plan  Primary osteoarthritis of left hip  49-year-old female with morbid obesity with bilateral hip pain left worse than right.  Imaging with mild degenerative changes, likely tendinopathy as well of the abductors.  History of intra-articular injections with very relief.  Discussed role of PT, continued anti-inflammatory medications, continue corticosteroid injections, and surgery.  Discussed surgery in the form of total hip replacement which I do not perform.  Additionally patient's not a candidate for surgery given her BMI of 46.  PT prescription provided.  Patient to follow-up with Dr. Keller for any future intra-articular injections if she wants them.  Discussed she should follow-up with Dr. Kwok should she become a surgical candidate or to discuss surgical options.  Orders:    Ambulatory referral to Orthopedic Surgery    Ambulatory Referral to Physical Therapy; Future        Chief Complaint   Patient presents with    Left Hip - Pain    Right Hip - Pain        Subjective    Poppy Jose is a 49 y.o. female who presents with left hip pain:         History of Present Illness   Hip pain started many years ago  The pain is 5/10. The pain is located groin. The pain is described as Sharp and Sore. They have tried PT, NSAIDS, and Injections for their problem. Pain improves with rest. Pain worsens with weight bearing.    The pain does not shoot down into the foot. The patient does not have numbness and/or tingling in the foot. The hip does not  pop. The patient does not  have pain with coughing or sneezing. The patient does not  have bowel or bladder problems.    [facility]th[facility] Medicine Patient Answers  Failed to redirect to the Timeline version of the [employer] SmartLink.    Allergies   Allergen Reactions    Trulicity [Dulaglutide] Swelling    Tdap [Tetanus-Diphth-Acell Pertussis] Rash     Outpatient Encounter Medications as of 4/15/2025   Medication Sig Dispense Refill    albuterol (Ventolin HFA) 90  mcg/act inhaler Inhale 2 puffs every 6 (six) hours as needed for wheezing or shortness of breath 18 g 0    atorvastatin (LIPITOR) 20 mg tablet Take 1 tablet (20 mg total) by mouth daily 100 tablet 0    baclofen 10 mg tablet Take 1 tablet (10 mg total) by mouth 3 (three) times a day 90 tablet 1    cetirizine (ZyrTEC) 10 mg tablet 1 tab po daily x 1 month then prn allergies 100 tablet 1    clotrimazole-betamethasone (LOTRISONE) 1-0.05 % cream APPLY TWICE DAILY TO VAGINAL AREA UNTIL HEALED THEN AS NEEDED FOR IRRITATION. 45 g 0    cromolyn (OPTICROM) 4 % ophthalmic solution Administer 1 drop to both eyes 4 (four) times a day 10 mL 2    ergocalciferol (VITAMIN D2) 50,000 units Take 1 capsule (50,000 Units total) by mouth once a week 12 capsule 0    famotidine (PEPCID) 40 MG tablet TAKE (1) TABLET BY MOUTH DAILY. 30 tablet 5    finasteride (PROPECIA) 1 MG tablet Take 1 tablet (1 mg total) by mouth daily 30 tablet 5    fluconazole (DIFLUCAN) 150 mg tablet Take 1 tablet by mouth in the morning      fluticasone (FLONASE) 50 mcg/act nasal spray USE 1 SPRAY INTO EACH NOSTRIL TWICE A DAY FOR 1 MONTH THEN AS NEEDED FOR CONGESTION AND ALLERGIES 16 g 1    Fluticasone-Salmeterol (Advair Diskus) 500-50 mcg/dose inhaler Inhale 1 puff 2 (two) times a day Rinse mouth after use. 60 blister 5    gabapentin (Neurontin) 600 MG tablet Take 1 tablet (600 mg total) by mouth 3 (three) times a day 90 tablet 2    glipiZIDE (GLUCOTROL) 10 mg tablet As of 10/16/2024: TAKE (2) TABLETS TWICE DAILY BEFORE MEALS. Strength: 10 mg 360 tablet 1    glucose blood test strip       glucose blood test strip Use as instructed 100 each 1    Jardiance 25 MG TABS TAKE (1) TABLET BY MOUTH DAILY. 30 tablet 5    ketoconazole (NIZORAL) 2 % cream Apply topically daily 60 g 0    Lancet Devices (WowOwowuch Delica Plus Lancing) MISC       Lancets (onetouch ultrasoft) lancets Use as instructed 100 each 1    lisinopril (ZESTRIL) 20 mg tablet Take 1 tablet (20 mg total) by  mouth daily 90 tablet 1    meloxicam (MOBIC) 15 mg tablet Take 1 tablet (15 mg total) by mouth daily as needed for moderate pain 90 tablet 0    metFORMIN (GLUCOPHAGE) 1000 MG tablet TAKE (1) TABLET TWICE A DAY WITH MEALS. 200 tablet 1    metoprolol succinate (TOPROL-XL) 25 mg 24 hr tablet Take 1 tablet (25 mg total) by mouth daily 90 tablet 1    nystatin powder Apply topically 2 (two) times a day 60 g 3    omeprazole (PriLOSEC) 20 mg delayed release capsule Take 1 capsule (20 mg total) by mouth daily 30 capsule 5    ondansetron (ZOFRAN) 4 mg tablet Take 1 tablet (4 mg total) by mouth every 8 (eight) hours as needed for nausea or vomiting 30 tablet 5    sertraline (ZOLOFT) 50 mg tablet TAKE (1) TABLET BY MOUTH DAILY AT BEDTIME. 90 tablet 1    triamcinolone (KENALOG) 0.025 % cream Apply 2 x daily to rash until healed then prn rash (Patient taking differently: as needed Apply 2 x daily to rash until healed then prn rash) 60 g 0    [DISCONTINUED] ergocalciferol (VITAMIN D2) 50,000 units Take 1 capsule (50,000 Units total) by mouth once a week 12 capsule 0    [DISCONTINUED] finasteride (PROPECIA) 1 MG tablet Take 1 tablet (1 mg total) by mouth daily 30 tablet 0    [DISCONTINUED] glipiZIDE (GLUCOTROL) 10 mg tablet TAKE (2) TABLETS TWICE DAILY BEFORE MEALS. 360 tablet 1     No facility-administered encounter medications on file as of 4/15/2025.     Past Medical History:   Diagnosis Date    Allergic     Seasonal Allergies    Alopecia of scalp     Treated with Proscar    Asthma     Chronic pain disorder     back, bilat knees, bilat hips    CPAP (continuous positive airway pressure) dependence     Diabetes mellitus (HCC)     Fibromyalgia, primary     GERD (gastroesophageal reflux disease)     Hip fx, left, closed, with routine healing, subsequent encounter 05/2017    Hyperlipidemia     Hypertension     Insomnia     Irregular heartbeat     VICTOR HUGO on CPAP        Past Surgical History:   Procedure Laterality Date    CARPAL TUNNEL  RELEASE Left     HYSTERECTOMY N/A 12/27/2024    HYSTERECTOMY      IR SPINE AND PAIN PROCEDURE  03/12/2024    IR SPINE AND PAIN PROCEDURE  04/11/2024    IR SPINE AND PAIN PROCEDURE  09/24/2024    IR SPINE AND PAIN PROCEDURE  2/19/2025    IR SPINE AND PAIN PROCEDURE  3/6/2025    LUMBAR EPIDURAL INJECTION      NERVE BLOCK Bilateral 12/29/2020    Procedure: L2 L3 L4 L5 MEDIAL BRANCH BLOCK #1;  Surgeon: Westley Keller MD;  Location: [facility];  Service: Pain Management     NERVE BLOCK Bilateral 01/19/2021    Procedure: L2 L3 L4 L5 MEDIAL BRANCH BLOCK #2;  Surgeon: Westley Keller MD;  Location: [facility];  Service: Pain Management     OR ARTHROCENTESIS ASPIR&/INJ MAJOR JT/BURSA W/O US Bilateral 02/07/2023    Procedure: INJECTION JOINT HIP;  Surgeon: Westley Keller MD;  Location: [facility];  Service: Pain Management     OR HYSTEROSCOPY BX ENDOMETRIUM&/POLYPC W/WO D&C N/A 04/25/2023    Procedure: HYSTEROSCOPY W/  RESECTION UTERINE TUMOR/FIBROID (POLYPECTOMY );  Surgeon: Donovan Ware DO;  Location: [facility];  Service: Gynecology    RADIOFREQUENCY ABLATION Right 02/25/2021    Procedure: L2 L3 L4 L5 RADIO FREQUENCY ABLATION right side;  Surgeon: Westley Keller MD;  Location: [facility];  Service: Pain Management     RADIOFREQUENCY ABLATION Left 03/16/2021    Procedure: L2 L3 L4 L5 RADIO FREQUENCY ABLATION;  Surgeon: Westley Keller MD;  Location: [facility];  Service: Pain Management     RHIZOTOMY Bilateral 04/04/2023    Procedure: RHIZOTOMY LUMBAR L3, L4, L5 medial branch nerves;  Surgeon: Westley Keller MD;  Location: [facility];  Service: Pain Management     UPPER GASTROINTESTINAL ENDOSCOPY      US GUIDED THYROID BIOPSY  07/14/2023     Family History   Problem Relation Age of Onset    Cancer Father     Hepatitis Father     Asthma Cousin     Hypertension Paternal Grandmother     No Known Problems Mother     No Known Problems Sister     No Known Problems Maternal Grandmother     No Known Problems Maternal  Grandfather     No Known Problems Paternal Grandfather     No Known Problems Paternal Aunt     BRCA2 Positive Neg Hx     BRCA2 Negative Neg Hx     BRCA1 Positive Neg Hx     BRCA1 Negative Neg Hx     BRCA 1/2 Neg Hx     Ovarian cancer Neg Hx     Endometrial cancer Neg Hx     Colon cancer Neg Hx     Breast cancer additional onset Neg Hx     Breast cancer Neg Hx        Social History     Tobacco Use    Smoking status: Former     Types: Cigarettes     Start date:      Quit date:      Years since quittin.3     Passive exposure: Past    Smokeless tobacco: Never   Vaping Use    Vaping status: Never Used   Substance Use Topics    Alcohol use: No    Drug use: No           Objective    [unfilled]  [unfilled]  Body mass index is 46.53 kg/m².  Physical Exam  Hip/Spine Exam  Side: left   Gait: Antalgic   Tenderness: None       Hip range of motion   Flexion Extension IR ER   Left 90 5 5 15   Right 90 5 5 15   Hip strength   Flexion Extension Abduction Adduction   Left 5 5 5 5   Right 5 5 5 5   Provocative Tests:  Flexion/Internal rotation (FADIR) test for impingement: Positive   Scour test Positive   ANISA test: Positive   Stinchfield test Positive   Log roll test Negative   Sid's test Negative   Straight leg raise: Negative   Distally the patient's neurovascular status is normal    IMAGING:  Bilateral hip x-rays demonstrate mild degenerative changes with chronic changes at the greater troches.         Follow Up: Return if symptoms worsen or fail to improve.    All questions answered and patient agrees with plan.

## 2025-04-15 NOTE — ASSESSMENT & PLAN NOTE
49-year-old female with morbid obesity with bilateral hip pain left worse than right.  Imaging with mild degenerative changes, likely tendinopathy as well of the abductors.  History of intra-articular injections with very relief.  Discussed role of PT, continued anti-inflammatory medications, continue corticosteroid injections, and surgery.  Discussed surgery in the form of total hip replacement which I do not perform.  Additionally patient's not a candidate for surgery given her BMI of 46.  PT prescription provided.  Patient to follow-up with Dr. Keller for any future intra-articular injections if she wants them.  Discussed she should follow-up with Dr. Kwok should she become a surgical candidate or to discuss surgical options.  Orders:    Ambulatory referral to Orthopedic Surgery    Ambulatory Referral to Physical Therapy; Future

## 2025-04-17 ENCOUNTER — TELEMEDICINE (OUTPATIENT)
Dept: BEHAVIORAL/MENTAL HEALTH CLINIC | Facility: CLINIC | Age: 50
End: 2025-04-17
Payer: COMMERCIAL

## 2025-04-17 ENCOUNTER — OFFICE VISIT (OUTPATIENT)
Dept: CARDIOLOGY CLINIC | Facility: CLINIC | Age: 50
End: 2025-04-17
Payer: COMMERCIAL

## 2025-04-17 VITALS
HEIGHT: 62 IN | OXYGEN SATURATION: 96 % | WEIGHT: 256 LBS | TEMPERATURE: 98.4 F | DIASTOLIC BLOOD PRESSURE: 70 MMHG | BODY MASS INDEX: 47.11 KG/M2 | HEART RATE: 68 BPM | SYSTOLIC BLOOD PRESSURE: 104 MMHG

## 2025-04-17 DIAGNOSIS — I10 ESSENTIAL HYPERTENSION: ICD-10-CM

## 2025-04-17 DIAGNOSIS — G47.33 OSA ON CPAP: ICD-10-CM

## 2025-04-17 DIAGNOSIS — E78.2 MIXED HYPERLIPIDEMIA: ICD-10-CM

## 2025-04-17 DIAGNOSIS — R42 DIZZINESS: Primary | ICD-10-CM

## 2025-04-17 DIAGNOSIS — F33.9 MAJOR DEPRESSION, RECURRENT, CHRONIC (HCC): Primary | ICD-10-CM

## 2025-04-17 DIAGNOSIS — E11.9 TYPE 2 DIABETES MELLITUS WITHOUT COMPLICATION, WITHOUT LONG-TERM CURRENT USE OF INSULIN (HCC): ICD-10-CM

## 2025-04-17 DIAGNOSIS — M79.7 FIBROMYALGIA, PRIMARY: ICD-10-CM

## 2025-04-17 PROCEDURE — T1015 CLINIC SERVICE: HCPCS | Performed by: SOCIAL WORKER

## 2025-04-17 PROCEDURE — 99214 OFFICE O/P EST MOD 30 MIN: CPT | Performed by: INTERNAL MEDICINE

## 2025-04-17 RX ORDER — LISINOPRIL 20 MG/1
10 TABLET ORAL DAILY
Qty: 90 TABLET | Refills: 1 | Status: SHIPPED | OUTPATIENT
Start: 2025-04-17

## 2025-04-17 NOTE — PROGRESS NOTES
[facility] [place]  [address]  [address]  [place] [address] [phone]    Patient Name: Cristy Garcia  YOB: 1975 ;female  MR No: [id number]        Diagnosis ICD-10-CM Associated Orders   1. Dizziness  R42       2. Essential hypertension  I10 lisinopril (ZESTRIL) 20 mg tablet      3. VICTOR HUGO on CPAP  G47.33       4. Mixed hyperlipidemia  E78.2       5. Type 2 diabetes mellitus without complication, without long-term current use of insulin (HCC)  E11.9       6. Fibromyalgia, primary  M79.7            Assessment and recommendations:    1. Dizziness  2. Essential hypertension  -     lisinopril (ZESTRIL) 20 mg tablet; Take 0.5 tablets (10 mg total) by mouth daily  3. VICTOR HUGO on CPAP  4. Mixed hyperlipidemia  5. Type 2 diabetes mellitus without complication, without long-term current use of insulin (HCC)  6. Fibromyalgia, primary     Blood pressure is on the low side and that might be causing her postural dizziness.  I have advised her to reduce the lisinopril to 10 mg daily and keep a regular home blood pressure diary and to reach out to my office with any questions.  She will keep the lisinopril at 10 mg daily as long as her blood pressure remains below 130/80.  Continue metoprolol at the present dose.  Patient remains compliant with her CPAP.  Lipid panel from July 2024 showed total cholesterol 106, LDL 25 and triglycerides 189.  Continue present dose of statins and needs more aggressive lifestyle modification for weight loss.    CHIEF COMPLAINT:      Hypertension, postural dizziness    HPI:   49-year-old female with a complex medical history including hypertension, type 2 diabetes mellitus, hyperlipidemia, fibromyalgia, chronic pain syndrome, chronic arthritis, obstructive sleep apnea presents for follow-up cardiology visit.  She reports that she had been noticing postural dizziness for the last several weeks and was recently seen in the  emergency room as well and they recommended follow-up with cardiology.  She reports that her palpitations are much better now since we started her on low-dose metoprolol.  She also admits to occasional brief atypical chest discomfort.  She is very limited in her activities due to osteoarthritis and uses a walker and also have a caregiver who takes care of most of her daily needs.  She has had dyspnea on exertion for many years.  She gets occasional atypical chest discomfort that only occurs when she has her arms above her head for a few minutes while shampooing her hair.  She recently underwent an echocardiogram in May 2024 which showed mild concentric left ventricular hypertrophy grade with grade 1 diastolic dysfunction and that led to the cardiology referral.    Past Medical History:   Diagnosis Date    Allergic     Seasonal Allergies    Alopecia of scalp     Treated with Proscar    Asthma     Chronic pain disorder     back, bilat knees, bilat hips    CPAP (continuous positive airway pressure) dependence     Diabetes mellitus (HCC)     Fibromyalgia, primary     GERD (gastroesophageal reflux disease)     Hip fx, left, closed, with routine healing, subsequent encounter 05/2017    Hyperlipidemia     Hypertension     Insomnia     Irregular heartbeat     VICTOR HUGO on CPAP           CURRENT  MEDICATIONS:      Current Outpatient Medications:     albuterol (Ventolin HFA) 90 mcg/act inhaler, Inhale 2 puffs every 6 (six) hours as needed for wheezing or shortness of breath, Disp: 18 g, Rfl: 0    atorvastatin (LIPITOR) 20 mg tablet, Take 1 tablet (20 mg total) by mouth daily, Disp: 100 tablet, Rfl: 0    baclofen 10 mg tablet, Take 1 tablet (10 mg total) by mouth 3 (three) times a day, Disp: 90 tablet, Rfl: 1    cetirizine (ZyrTEC) 10 mg tablet, 1 tab po daily x 1 month then prn allergies, Disp: 100 tablet, Rfl: 1    clotrimazole-betamethasone (LOTRISONE) 1-0.05 % cream, APPLY TWICE DAILY TO VAGINAL AREA UNTIL HEALED THEN AS NEEDED  FOR IRRITATION., Disp: 45 g, Rfl: 0    cromolyn (OPTICROM) 4 % ophthalmic solution, Administer 1 drop to both eyes 4 (four) times a day, Disp: 10 mL, Rfl: 2    ergocalciferol (VITAMIN D2) 50,000 units, Take 1 capsule (50,000 Units total) by mouth once a week, Disp: 12 capsule, Rfl: 0    famotidine (PEPCID) 40 MG tablet, TAKE (1) TABLET BY MOUTH DAILY., Disp: 30 tablet, Rfl: 5    finasteride (PROPECIA) 1 MG tablet, Take 1 tablet (1 mg total) by mouth daily, Disp: 30 tablet, Rfl: 5    fluconazole (DIFLUCAN) 150 mg tablet, Take 1 tablet by mouth in the morning, Disp: , Rfl:     fluticasone (FLONASE) 50 mcg/act nasal spray, USE 1 SPRAY INTO EACH NOSTRIL TWICE A DAY FOR 1 MONTH THEN AS NEEDED FOR CONGESTION AND ALLERGIES, Disp: 16 g, Rfl: 1    Fluticasone-Salmeterol (Advair Diskus) 500-50 mcg/dose inhaler, Inhale 1 puff 2 (two) times a day Rinse mouth after use., Disp: 60 blister, Rfl: 5    gabapentin (Neurontin) 600 MG tablet, Take 1 tablet (600 mg total) by mouth 3 (three) times a day, Disp: 90 tablet, Rfl: 2    glipiZIDE (GLUCOTROL) 10 mg tablet, As of 10/16/2024: TAKE (2) TABLETS TWICE DAILY BEFORE MEALS. Strength: 10 mg, Disp: 360 tablet, Rfl: 1    glucose blood test strip, , Disp: , Rfl:     glucose blood test strip, Use as instructed, Disp: 100 each, Rfl: 1    Jardiance 25 MG TABS, TAKE (1) TABLET BY MOUTH DAILY., Disp: 30 tablet, Rfl: 5    ketoconazole (NIZORAL) 2 % cream, Apply topically daily, Disp: 60 g, Rfl: 0    Lancet Devices (OneTouch Delica Plus Lancing) MISC, , Disp: , Rfl:     Lancets (onetouch ultrasoft) lancets, Use as instructed, Disp: 100 each, Rfl: 1    lisinopril (ZESTRIL) 20 mg tablet, Take 0.5 tablets (10 mg total) by mouth daily, Disp: 90 tablet, Rfl: 1    meloxicam (MOBIC) 15 mg tablet, Take 1 tablet (15 mg total) by mouth daily as needed for moderate pain, Disp: 90 tablet, Rfl: 0    metFORMIN (GLUCOPHAGE) 1000 MG tablet, TAKE (1) TABLET TWICE A DAY WITH MEALS., Disp: 200 tablet, Rfl: 1     metoprolol succinate (TOPROL-XL) 25 mg 24 hr tablet, Take 1 tablet (25 mg total) by mouth daily, Disp: 90 tablet, Rfl: 1    nystatin powder, Apply topically 2 (two) times a day, Disp: 60 g, Rfl: 3    omeprazole (PriLOSEC) 20 mg delayed release capsule, Take 1 capsule (20 mg total) by mouth daily, Disp: 30 capsule, Rfl: 5    ondansetron (ZOFRAN) 4 mg tablet, Take 1 tablet (4 mg total) by mouth every 8 (eight) hours as needed for nausea or vomiting, Disp: 30 tablet, Rfl: 5    sertraline (ZOLOFT) 50 mg tablet, TAKE (1) TABLET BY MOUTH DAILY AT BEDTIME., Disp: 90 tablet, Rfl: 1    triamcinolone (KENALOG) 0.025 % cream, Apply 2 x daily to rash until healed then prn rash (Patient taking differently: as needed Apply 2 x daily to rash until healed then prn rash), Disp: 60 g, Rfl: 0    ALLERGIES  Allergies   Allergen Reactions    Trulicity [Dulaglutide] Swelling    Tdap [Tetanus-Diphth-Acell Pertussis] Rash       Lab Results   Component Value Date    LDLCALC 25 07/11/2024    LDLCALC 46 02/22/2024    HDL 43 (L) 07/11/2024    HDL 46 (L) 02/22/2024    CHOLESTEROL 106 07/11/2024    CHOLESTEROL 115 02/22/2024    TRIG 189 (H) 07/11/2024    TRIG 113 02/22/2024    CREATININE 0.61 04/09/2025    CREATININE 0.57 (L) 01/20/2025    K 4.8 04/09/2025    K 4.1 01/20/2025    SODIUM 133 (L) 04/09/2025    SODIUM 136 01/20/2025       I have personally reviewed the most recent ECG April 9, 2025 which showed normal sinus rhythm with age undetermined inferior infarct and poor R wave progression, which are similar to her previous ECGs.      REVIEW OF SYSTEMS   Positive for: Postural dizziness, arthritis, dyspnea on exertion.  Negative for: All remaining as reviewed below and in HPI.    SYSTEM SYMPTOMS REVIEWED:  General--weight change, fever, night sweats  Respiratory--cough, wheezing, shortness of breath, sputum production  Cardiovascular--chest pain, syncope, dyspnea on exertion, edema, decline in exercise tolerance, claudication  "  Gastrointestinal--persistent vomiting, diarrhea, abdominal distention, blood in stool   Muscular or skeletal--joint pain or swelling   Neurologic--headaches, syncope, abnormal movement  Hematologic--history of easy bruising and bleeding   Endocrine--thyroid enlargement, heat or cold intolerance, polyuria   Psychiatric--anxiety, depression     General physical examination:    General appearance: Alert, no acute distress, appears stated age, severe obesity.  HEENT: Mucous membranes are moist.  No obvious abnormality noted.  Neck: Supple with no lymphadenopathy.  No JVD.  Carotid pulses are intact.  No carotid bruit.  Cardiovascular system: Regular rhythm.  Normal S1 and S2.  No murmurs.  No rubs or gallops. Extremities: No edema. No cyanosis.  Pulmonary: Respirations unlabored.  Good air entry bilaterally.  Clear to auscultation bilaterally.  Gastrointestinal: Abdomen is soft and nontender.  Bowel sounds are positive.  Musculoskeletal: Upper Extremities: Normal upper motor strength. Lower Extremity: Normal motor strength. Gait: Normal.  Uses a walker  Skin: Skin is warm. No rashes or lesions.  Neurological: Patient is alert and oriented with no gross motor deficits.  Psychiatric: Mood is normal.  Behavior is normal.    Vitals:    04/17/25 0902   BP: 104/70   Pulse: 68   Temp: 98.4 °F (36.9 °C)   SpO2: 96%      Body mass index is 46.82 kg/m².  Wt Readings from Last 3 Encounters:   04/17/25 116 kg (256 lb)   04/15/25 115 kg (254 lb 6.4 oz)   04/09/25 116 kg (255 lb 11.7 oz)             Feliz Braxton MD, FACC, [place]    Portions of the record  have been created with voice recognition software.  Occasional grammatical mistakes or wrong word or \"sound alike\" substitutions may have occurred due to the inherent limitations of voice recognition software. Please reach out to me directly for any clarifications.   "

## 2025-04-17 NOTE — PSYCH
"Virtual Regular VisitName: Cristy Garcia      : 1975      MRN: [id number]  Encounter Provider: Kristina Guerrero LCSW  Encounter Date: 2025   Encounter department: [facility] [place]  :  Assessment & Plan  Major depression, recurrent, chronic (HCC)         Major depression, recurrent, chronic (HCC)               Goals addressed in session: Goal 1 and Goal 2     DATA: The client reported continued personal growth over the past few weeks and has continued to self-advocate for herself in her community. She reported that her anxiety and has decreased and that she is planning to go to [place] with her friends. During the session we reviewed her triggers, coping skills, and boundaries, with the client being able to verbalize progress in all of her areas. As team we discussed closing the client from  services and at the present time the client is having some apprehension, during our next visit we will revisit the question  During this session, this clinician used the following therapeutic modalities: Cognitive Behavioral Therapy, Mindfulness-based Strategies, Solution-Focused Therapy, and Supportive Psychotherapy    Substance Abuse was addressed during this session. If the client is diagnosed with a co-occurring substance use disorder, please indicate any changes in the frequency or amount of use: none. Stage of change for addressing substance use diagnoses: No substance use/Not applicable    ASSESSMENT:  Cristy presents with a Anxious and Depressed mood. Cristy's affect is Normal range and intensity, which is congruent, with their mood and the content of the session. The client has made progress on their goals as evidenced by increased coping skills and healthy boundaries.    Cristy presents with a none risk of suicide, none risk of self-harm, and none risk of harm to others.    For any risk assessment that surpasses a \"low\" rating, a safety plan must be developed.    A safety plan " was indicated: no  If yes, describe in detail n/a    PLAN: Between sessions, Cristy will practice coping skills when presented with anxiety and or depression. At the next session, the therapist will use Cognitive Behavioral Therapy, Mindfulness-based Strategies, Solution-Focused Therapy, and Supportive Psychotherapy to address the client's MH issues affecting different relationships and environments.    Behavioral Health Treatment Plan [facility]: Diagnosis and Treatment Plan explained to Cristy, Cristy relates understanding diagnosis and is agreeable to Treatment Plan. Yes     Depression Follow-up Plan Completed: Not applicable     Reason for visit is No chief complaint on file.     Recent Visits  No visits were found meeting these conditions.  Showing recent visits within past 7 days and meeting all other requirements  Today's Visits  Date Type Provider Dept   04/17/25 Telemedicine [facility]  [facility]   Showing today's visits and meeting all other requirements  Future Appointments  No visits were found meeting these conditions.  Showing future appointments within next 150 days and meeting all other requirements     History of Present Illness     HPI    Past Medical History   Past Medical History:   Diagnosis Date    Allergic     Seasonal Allergies    Alopecia of scalp     Treated with Proscar    Asthma     Chronic pain disorder     back, bilat knees, bilat hips    CPAP (continuous positive airway pressure) dependence     Diabetes mellitus (HCC)     Fibromyalgia, primary     GERD (gastroesophageal reflux disease)     Hip fx, left, closed, with routine healing, subsequent encounter 05/2017    Hyperlipidemia     Hypertension     Insomnia     Irregular heartbeat     VICTOR HUGO on CPAP      Past Surgical History:   Procedure Laterality Date    CARPAL TUNNEL RELEASE Left     HYSTERECTOMY N/A 12/27/2024    HYSTERECTOMY      IR SPINE AND PAIN PROCEDURE  03/12/2024    IR SPINE AND PAIN PROCEDURE  04/11/2024     IR SPINE AND PAIN PROCEDURE  09/24/2024    IR SPINE AND PAIN PROCEDURE  2/19/2025    IR SPINE AND PAIN PROCEDURE  3/6/2025    LUMBAR EPIDURAL INJECTION      NERVE BLOCK Bilateral 12/29/2020    Procedure: L2 L3 L4 L5 MEDIAL BRANCH BLOCK #1;  Surgeon: Westley Keller MD;  Location: [facility];  Service: Pain Management     NERVE BLOCK Bilateral 01/19/2021    Procedure: L2 L3 L4 L5 MEDIAL BRANCH BLOCK #2;  Surgeon: Westley Keller MD;  Location: [facility];  Service: Pain Management     VA ARTHROCENTESIS ASPIR&/INJ MAJOR JT/BURSA W/O US Bilateral 02/07/2023    Procedure: INJECTION JOINT HIP;  Surgeon: Westley Keller MD;  Location: [facility];  Service: Pain Management     VA HYSTEROSCOPY BX ENDOMETRIUM&/POLYPC W/WO D&C N/A 04/25/2023    Procedure: HYSTEROSCOPY W/  RESECTION UTERINE TUMOR/FIBROID (POLYPECTOMY );  Surgeon: Donovan Ware DO;  Location: [facility];  Service: Gynecology    RADIOFREQUENCY ABLATION Right 02/25/2021    Procedure: L2 L3 L4 L5 RADIO FREQUENCY ABLATION right side;  Surgeon: Westley Keller MD;  [facility]: [facility];  Service: Pain Management     RADIOFREQUENCY ABLATION Left 03/16/2021    Procedure: L2 L3 L4 L5 RADIO FREQUENCY ABLATION;  Surgeon: Westley Keller MD;  [facility]: [facility];  Service: Pain Management     RHIZOTOMY Bilateral 04/04/2023    Procedure: RHIZOTOMY LUMBAR L3, L4, L5 medial branch nerves;  Surgeon: Westley Keller MD;  Location: [facility];  Service: Pain Management     UPPER GASTROINTESTINAL ENDOSCOPY      US GUIDED THYROID BIOPSY  07/14/2023     Current Outpatient Medications   Medication Instructions    albuterol (Ventolin HFA) 90 mcg/act inhaler 2 puffs, Inhalation, Every 6 hours PRN    atorvastatin (LIPITOR) 20 mg, Oral, Daily    baclofen 10 mg, Oral, 3 times daily    cetirizine (ZyrTEC) 10 mg tablet 1 tab po daily x 1 month then prn allergies    clotrimazole-betamethasone (LOTRISONE) 1-0.05 % cream APPLY TWICE DAILY TO VAGINAL AREA UNTIL HEALED THEN AS NEEDED FOR  IRRITATION.    cromolyn (OPTICROM) 4 % ophthalmic solution 1 drop, Both Eyes, 4 times daily    ergocalciferol (VITAMIN D2) 50,000 Units, Oral, Weekly    famotidine (PEPCID) 40 MG tablet TAKE (1) TABLET BY MOUTH DAILY.    finasteride (PROPECIA) 1 mg, Oral, Daily    fluconazole (DIFLUCAN) 150 mg tablet 1 tablet, Daily    fluticasone (FLONASE) 50 mcg/act nasal spray USE 1 SPRAY INTO EACH NOSTRIL TWICE A DAY FOR 1 MONTH THEN AS NEEDED FOR CONGESTION AND ALLERGIES    Fluticasone-Salmeterol (Advair Diskus) 500-50 mcg/dose inhaler 1 puff, Inhalation, 2 times daily, Rinse mouth after use.    gabapentin (NEURONTIN) 600 mg, Oral, 3 times daily    glipiZIDE (GLUCOTROL) 10 mg tablet As of 10/16/2024: TAKE (2) TABLETS TWICE DAILY BEFORE MEALS. Strength: 10 mg    glucose blood test strip     glucose blood test strip Use as instructed    Jardiance 25 mg, Oral, Daily    ketoconazole (NIZORAL) 2 % cream Topical, Daily    Lancet Devices ([employer] Delica Plus Lancing) MISC     Lancets (onetouch ultrasoft) lancets Use as instructed    lisinopril (ZESTRIL) 10 mg, Oral, Daily    meloxicam (MOBIC) 15 mg, Oral, Daily PRN    metFORMIN (GLUCOPHAGE) 1000 MG tablet TAKE (1) TABLET TWICE A DAY WITH MEALS.    metoprolol succinate (TOPROL-XL) 25 mg, Oral, Daily    nystatin powder Topical, 2 times daily    omeprazole (PRILOSEC) 20 mg, Oral, Daily    ondansetron (ZOFRAN) 4 mg, Oral, Every 8 hours PRN    sertraline (ZOLOFT) 50 mg tablet TAKE (1) TABLET BY MOUTH DAILY AT BEDTIME.    triamcinolone (KENALOG) 0.025 % cream Apply 2 x daily to rash until healed then prn rash     Allergies   Allergen Reactions    Trulicity [Dulaglutide] Swelling    Tdap [Tetanus-Diphth-Acell Pertussis] Rash       Objective   LMP  (LMP Unknown)     Video Exam  Physical Exam     Administrative Statements   Encounter provider Kristina Guerrero LCSW    The Patient is located at Home and in the following state in which I hold an active license [place].    The patient was identified  by name and date of birth. Cristy Garcia was informed that this is a telemedicine visit and that the visit is being conducted through the Epic Embedded platform. She agrees to proceed..  My office door was closed. No one else was in the room.  She acknowledged consent and understanding of privacy and security of the video platform. The patient has agreed to participate and understands they can discontinue the visit at any time.    I have spent a total time of 22 minutes in caring for this patient on the day of the visit/encounter including Counseling / Coordination of care, not including the time spent for establishing the audio/video connection.    Visit Time  Start Time: 1401  Stop Time: 1423  Total Visit Time: 22 minutes

## 2025-04-21 ENCOUNTER — OFFICE VISIT (OUTPATIENT)
Dept: DENTISTRY | Facility: CLINIC | Age: 50
End: 2025-04-21

## 2025-04-21 VITALS — SYSTOLIC BLOOD PRESSURE: 132 MMHG | HEART RATE: 81 BPM | TEMPERATURE: 97.5 F | DIASTOLIC BLOOD PRESSURE: 81 MMHG

## 2025-04-21 DIAGNOSIS — Z01.20 ENCOUNTER FOR DENTAL EXAMINATION: Primary | ICD-10-CM

## 2025-04-21 PROCEDURE — D5899 UNSPECIFIED REMOVABLE PROSTHODONTIC PROCEDURE, BY REPORT: HCPCS

## 2025-04-21 PROCEDURE — WIS5014

## 2025-04-21 NOTE — DENTAL PROCEDURE DETAILS
Framework try in / Bite Rims    Cristy Garcia 49 y.o. female presents  for denture bite rims.  PMH reviewed, no changes, ASA 2 - Patient with mild systemic disease with no functional limitations.    Procedure details:  Framework with bite rim tried intra-orally.  Adjusted framework until bite similar with and without framework in mouth    Notched rims and obtained bite registration with Blue bite registration.  Selected tooth shade: Shade A3. Pt confirmed with hand mirror.    Patient dismissed ambulatory and alert.    NV: Wax tooth try in

## 2025-04-21 NOTE — PROGRESS NOTES
Procedure Details  [id number] - OK PARTIAL DENTRUE FRAME TRY IN  Framework try in / Bite Rims    Cristy Garcia 49 y.o. female presents  for denture bite rims.  PMH reviewed, no changes, ASA 2 - Patient with mild systemic disease with no functional limitations.    Procedure details:  Framework with bite rim tried intra-orally.  Adjusted framework until bite similar with and without framework in mouth    Notched rims and obtained bite registration with Blue bite registration.  Selected tooth shade: Shade A3. Pt confirmed with hand mirror.    Patient dismissed ambulatory and alert.    NV: Wax tooth try in

## 2025-04-23 ENCOUNTER — EVALUATION (OUTPATIENT)
Dept: PHYSICAL THERAPY | Facility: CLINIC | Age: 50
End: 2025-04-23
Attending: STUDENT IN AN ORGANIZED HEALTH CARE EDUCATION/TRAINING PROGRAM
Payer: COMMERCIAL

## 2025-04-23 DIAGNOSIS — M16.12 PRIMARY OSTEOARTHRITIS OF LEFT HIP: ICD-10-CM

## 2025-04-23 PROCEDURE — 97535 SELF CARE MNGMENT TRAINING: CPT

## 2025-04-23 PROCEDURE — 97161 PT EVAL LOW COMPLEX 20 MIN: CPT

## 2025-04-23 NOTE — PROGRESS NOTES
PT Evaluation     Today's date: 2025  Patient name: Cristy Garcia  : 1975  MRN: [id number]  Referring provider: Donovan De Guzman,*  Dx:   Encounter Diagnosis     ICD-10-CM    1. Primary osteoarthritis of left hip  M16.12 Ambulatory Referral to Physical Therapy          Start Time: 1045  Stop Time: 1120  Total time in clinic (min): 35 minutes    Assessment  Impairments: abnormal gait, abnormal or restricted ROM, activity intolerance, impaired physical strength, lacks appropriate home exercise program, pain with function, participation limitations and activity limitations    Assessment details: Patient is a 50 y/o female presenting to OP PT w/ c/o L hip pain. Upon evaluation, she presented with significant tenderness globally around L femoroacetabular joint, decreased L hip mobility and strength, and decreased weightbearing tolerance on LLE. Patient was educated in HEP for hip strengthening and pain management and would benefit from skilled PT to improve overall functional mobility.    Goals  STG to be met within 4 weeks:  1. Pt will improve L hip AROM by 25-50% to improve gait pattern.  2. Pt will improve L hip strength by 1/2 muscle grade to improve functional mobility.  3. Pt will report 3/10 pain at worst to improve QOL.  4. Pt will be I with HEP.  5. Pt will improve FOTO score by 10 points.    LTG to be within 8 weeks:  1. Pt will restore L hip AROM WFL to improve functional mobility.  2. Pt will restore L hip strength WFL to improve functional mobility.  3. Pt will meet FOTO score goal to improve functional mobility.  4. Pt will be I in HEP for DC.        Plan  Patient would benefit from: skilled physical therapy and PT eval  Planned modality interventions: cryotherapy and thermotherapy: hydrocollator packs    Planned therapy interventions: abdominal trunk stabilization, flexibility, functional ROM exercises, home exercise program, joint mobilization, manual therapy, neuromuscular  re-education, patient/caregiver education, self care, strengthening, stretching and therapeutic exercise    Frequency: 2x week  Duration in weeks: 6  Treatment plan discussed with: patient        Subjective Evaluation    History of Present Illness  Mechanism of injury: Patient reports she has had L hip pain for quite some time and feels that it always worsens during the winter due to decreased activity. Patient reports she has pain when sleeping on her left side, walking or standing too long, and bending down. She states she typically has pain in both hips and L knee but the L hip is the worst of the three. She states she has a hard time deciphering if pain is from fibromyalgia or worsening OA. She reports having an injection in B hips about 1 month ago but feels the effects did not last as long as it typically does.   Patient Goals  Patient goals for therapy: decreased pain, increased motion and increased strength    Pain  Current pain ratin  At best pain rating: 3  At worst pain ratin  Quality: dull ache, sharp and discomfort  Relieving factors: medications and heat  Aggravating factors: stair climbing, walking, standing and lifting  Progression: worsening    Treatments  Previous treatment: physical therapy  Current treatment: injection treatment and physical therapy        Objective     Palpation   Left   Tenderness of the gluteus merissa, iliopsoas, piriformis and TFL.     Tenderness     Left Hip   Tenderness in the greater trochanter. No tenderness in the ischial tuberosity.     Active Range of Motion   Left Hip   Flexion: 85 degrees with pain  Extension: WFL  Abduction: WFL  Adduction: WFL and with pain  External rotation (90/90): 50 degrees with pain  Internal rotation (90/90): 19 degrees with pain    Right Hip   Flexion: 95 degrees   Extension: WFL  Abduction: WFL  Adduction: WFL  External rotation (90/90): 50 degrees   Internal rotation (90/90): 25 degrees     Strength/Myotome Testing     Left Hip    Planes of Motion   Flexion: 3+  Extension: 3+  Abduction: 4-  Adduction: 4-  External rotation: 4-  Internal rotation: 3+    Right Hip   Planes of Motion   Flexion: 4  Extension: 4-  Abduction: 4  Adduction: 4  External rotation: 4  Internal rotation: 4    Tests     Left Hip   Positive Ely's, ANISA, FADIR and piriformis.   Negative modified Sid, SI compression and SI distraction.              Precautions: PMH OA in B hips, HTN, CPAP, DM, GERD, Fibromyalgia  POC Expiration: 6/4/25      Manuals 4/23       L HS, piriformis, hip ABD, gastroc stretching                                 Neuro Re-Ed        BOSU SLR        BOSU Squats        Monster walks        Front and lateral lunges        Step ups  Front & Lateral        HR        Sit to Stand        Ther Ex        Nustep for ROM/strength        Supine SLR         Bridge c ABD        Clamshells         Standing marches, ABD, ext                        HEP education and instruction 10'       Ther Activity                        Gait Training                        Modalities        CP

## 2025-04-29 ENCOUNTER — OFFICE VISIT (OUTPATIENT)
Dept: PHYSICAL THERAPY | Facility: CLINIC | Age: 50
End: 2025-04-29
Attending: STUDENT IN AN ORGANIZED HEALTH CARE EDUCATION/TRAINING PROGRAM
Payer: COMMERCIAL

## 2025-04-29 DIAGNOSIS — M16.12 PRIMARY OSTEOARTHRITIS OF LEFT HIP: Primary | ICD-10-CM

## 2025-04-29 PROCEDURE — 97140 MANUAL THERAPY 1/> REGIONS: CPT

## 2025-04-29 PROCEDURE — 97110 THERAPEUTIC EXERCISES: CPT

## 2025-04-29 PROCEDURE — 97112 NEUROMUSCULAR REEDUCATION: CPT

## 2025-04-29 NOTE — PROGRESS NOTES
Daily Note     Today's date: 2025  Patient name: Cristy Garcia  : 1975  MRN: [id number]  Referring provider: Donovan De Guzman,*  Dx:   Encounter Diagnosis     ICD-10-CM    1. Primary osteoarthritis of left hip  M16.12                      Subjective: Patient reports that she is doing okay this morning.      Objective: See treatment diary below      Assessment: Tolerated treatment well. Patient exhibited good technique with therapeutic exercises and would benefit from continued PT to increase L hip ROM/strength and endurance to improve her mobility and gait.       Plan: Continue per plan of care.      Precautions: PMH OA in B hips, HTN, CPAP, DM, GERD, Fibromyalgia  POC Expiration: 25      Manuals       L HS, piriformis, hip ABD, gastroc stretching   15'bilat                              Neuro Re-Ed        BOSU SLR        BOSU Squats        Monster walks        Front and lateral lunges        Step ups  Front & Lateral        HR/TR  [id number]      Sit to Stand        Ther Ex        Nustep for ROM/strength  10'L1      Supine SLR   10xbilat      Bridge c ABD  Bridge only 2x10      Clamshells   2x10 L only RTB      Standing marches, ABD, ext  2x5ea bilat                      HEP education and instruction 10'       Ther Activity                        Gait Training                        Modalities        CP  15'MH to [facility]                     Skin No lesions  pink .  ·  HEENT AF flat, sutures open with no clefts. .  ·  Head normocephalic .  ·  Ears normal .  ·  Eyes unable to assess red reflex .  ·  Nose normal .  ·  Mouth normal .  ·  Neck no masses, midline trachea, clavicles intact .  ·  Chest symmetrical conformation with clear breath sounds bilaterally. .  ·  Heart Normal precordial activity. No murmur appreciated. .  ·  Abdomen soft, non-tender with normal bowel sounds and no significant organomegaly. .  ·  Back normal  gluteal creases - symmetric.  ·  Extremities both hips stable .  ·  Genitalia boy  male  both testes descended .  ·  Neurological normal tone and reflexes with symmetrical spontaneous movement. . .

## 2025-04-30 ENCOUNTER — OFFICE VISIT (OUTPATIENT)
Dept: PHYSICAL THERAPY | Facility: CLINIC | Age: 50
End: 2025-04-30
Attending: STUDENT IN AN ORGANIZED HEALTH CARE EDUCATION/TRAINING PROGRAM
Payer: COMMERCIAL

## 2025-04-30 DIAGNOSIS — M16.12 PRIMARY OSTEOARTHRITIS OF LEFT HIP: Primary | ICD-10-CM

## 2025-04-30 PROCEDURE — 97140 MANUAL THERAPY 1/> REGIONS: CPT

## 2025-04-30 PROCEDURE — 97110 THERAPEUTIC EXERCISES: CPT

## 2025-04-30 PROCEDURE — 97112 NEUROMUSCULAR REEDUCATION: CPT

## 2025-04-30 NOTE — PROGRESS NOTES
"Daily Note     Today's date: 2025  Patient name: Cristy Garcia  : 1975  MRN: [id number]  Referring provider: Donovan De Guzman,*  Dx:   Encounter Diagnosis     ICD-10-CM    1. Primary osteoarthritis of left hip  M16.12                      Subjective: Patient reports that she is doing okay this morning.      Objective: See treatment diary below      Assessment: Tolerated treatment well. Patient exhibited good technique with therapeutic exercises and would benefit from continued PT to increase L hip ROM/strength and endurance to improve her overall mobility.      Plan: Continue per plan of care.      Precautions: PMH OA in B hips, HTN, CPAP, DM, GERD, Fibromyalgia  POC Expiration: 25      Manuals      L HS, piriformis, hip ABD, gastroc stretching   15'bilat 10' L                             Neuro Re-Ed        BOSU SLR        BOSU Squats        Monster walks        Front and lateral lunges        Step ups  Front & Lateral        HR/TR  [id number] 4\"step     Sit to Stand        Ther Ex        Nustep for ROM/strength  10'L1 10'L2     Supine SLR   10xbilat [id number]     Bridge c ABD  Bridge only 2x10 C ABD 2x10      Clamshells   2x10 L only RTB 2x10 L only RTB     Standing marches, ABD, ext  2x5ea bilat 10xea bilat                     HEP education and instruction 10'       Ther Activity                        Gait Training                        Modalities        CP  15'MH to Lhip 15'MH   to Lhip                   "

## 2025-05-01 ENCOUNTER — HOSPITAL ENCOUNTER (OUTPATIENT)
Dept: PULMONOLOGY | Facility: HOSPITAL | Age: 50
End: 2025-05-01
Payer: COMMERCIAL

## 2025-05-01 DIAGNOSIS — J45.41 MODERATE PERSISTENT ASTHMA WITH ACUTE EXACERBATION: ICD-10-CM

## 2025-05-01 PROCEDURE — 94760 N-INVAS EAR/PLS OXIMETRY 1: CPT

## 2025-05-01 PROCEDURE — 94729 DIFFUSING CAPACITY: CPT

## 2025-05-01 PROCEDURE — 94726 PLETHYSMOGRAPHY LUNG VOLUMES: CPT

## 2025-05-01 PROCEDURE — 94060 EVALUATION OF WHEEZING: CPT

## 2025-05-01 RX ORDER — ALBUTEROL SULFATE 0.83 MG/ML
2.5 SOLUTION RESPIRATORY (INHALATION) ONCE AS NEEDED
Status: COMPLETED | OUTPATIENT
Start: 2025-05-01 | End: 2025-05-01

## 2025-05-01 RX ADMIN — ALBUTEROL SULFATE 2.5 MG: 2.5 SOLUTION RESPIRATORY (INHALATION) at 13:26

## 2025-05-02 ENCOUNTER — TELEMEDICINE (OUTPATIENT)
Dept: FAMILY MEDICINE CLINIC | Facility: CLINIC | Age: 50
End: 2025-05-02
Payer: COMMERCIAL

## 2025-05-02 VITALS — BODY MASS INDEX: 46.82 KG/M2 | HEIGHT: 62 IN

## 2025-05-02 DIAGNOSIS — J45.41 MODERATE PERSISTENT ASTHMA WITH ACUTE EXACERBATION: Primary | ICD-10-CM

## 2025-05-02 PROCEDURE — 99213 OFFICE O/P EST LOW 20 MIN: CPT

## 2025-05-02 NOTE — ASSESSMENT & PLAN NOTE
Still using albuterol about 3 times a week.  Will f/u with patient once I receive PFT results.  Will refer to pulmonology for consult at this point given on highest strength of Advair.  Main exacerbation factor is her neighbors in her building smoking tobacco and marijuana.  Has been discussing this with nahid for over a year.  I do believe if this is resolved her asthma would be much improved.  Contact office with acute exacerbation symptoms.  Orders:    Ambulatory Referral to Pulmonology; Future

## 2025-05-02 NOTE — PROGRESS NOTES
Virtual Regular VisitName: Cristy Garcia      : 1975      MRN: [id number]  Encounter Provider: Gaurav Merino PA-C  Encounter Date: 2025   Encounter department: [facility]  :  Assessment & Plan  Moderate persistent asthma with acute exacerbation  Still using albuterol about 3 times a week.  Will f/u with patient once I receive PFT results.  Will refer to pulmonology for consult at this point given on highest strength of Advair.  Main exacerbation factor is her neighbors in her building smoking tobacco and marijuana.  Has been discussing this with landlord for over a year.  I do believe if this is resolved her asthma would be much improved.  Contact office with acute exacerbation symptoms.  Orders:    Ambulatory Referral to Pulmonology; Future        History of Present Illness     Pt is a 48yo female presenting with asthma f/u.  Increased Advair last visit one month ago.  PFT completed yesterday.  Not resulted yet.  Utilizes albuterol 3 times a week.  Main exacerbating factor is neighbors smoking tobacco and marijuana in her building.  Has been discussing with landlornuria for over a year with no changes.        Review of Systems   Constitutional:  Negative for appetite change, chills, diaphoresis, fatigue and fever.   HENT:  Negative for congestion, ear discharge, ear pain, postnasal drip, rhinorrhea, sinus pressure, sinus pain, sneezing and sore throat.    Eyes:  Negative for pain, discharge, redness, itching and visual disturbance.   Respiratory:  Positive for cough. Negative for apnea, chest tightness, shortness of breath and wheezing.    Cardiovascular:  Negative for chest pain, palpitations and leg swelling.   Gastrointestinal:  Negative for abdominal pain, blood in stool, constipation, diarrhea, nausea and vomiting.   Endocrine: Negative for cold intolerance, heat intolerance, polydipsia and polyuria.   Genitourinary:  Negative for dysuria, flank pain, frequency, hematuria  "and urgency.   Musculoskeletal:  Negative for arthralgias, back pain, myalgias, neck pain and neck stiffness.   Skin:  Negative for color change and rash.   Allergic/Immunologic: Negative for environmental allergies and food allergies.   Neurological:  Negative for dizziness, tremors, seizures, syncope, speech difficulty, weakness, light-headedness, numbness and headaches.   Hematological:  Negative for adenopathy. Does not bruise/bleed easily.   Psychiatric/Behavioral:  Negative for agitation, confusion, decreased concentration, dysphoric mood, hallucinations, self-injury, sleep disturbance and suicidal ideas. The patient is not nervous/anxious and is not hyperactive.    All other systems reviewed and are negative.      Objective   Ht 5' 2\" (1.575 m)   LMP  (LMP Unknown)   BMI 46.82 kg/m²     Physical Exam  Constitutional:       General: She is not in acute distress.     Appearance: Normal appearance. She is not ill-appearing, toxic-appearing or diaphoretic.   Pulmonary:      Effort: Pulmonary effort is normal. No respiratory distress.   Neurological:      General: No focal deficit present.      Mental Status: She is alert and oriented to person, place, and time. Mental status is at baseline.   Psychiatric:         Mood and Affect: Mood normal.         Behavior: Behavior normal.         Thought Content: Thought content normal.         Judgment: Judgment normal.     Unable to complete full PE due to virtual appt.     Administrative Statements   Encounter provider Gaurav Merino PA-C    The Patient is located at Home and in the following state in which I hold an active license [place].    The patient was identified by name and date of birth. Cristy Garcia was informed that this is a telemedicine visit and that the visit is being conducted through the Epic Embedded platform. She agrees to proceed..  My office door was closed. No one else was in the room.  She acknowledged consent and understanding of privacy and " security of the video platform. The patient has agreed to participate and understands they can discontinue the visit at any time.    I have spent a total time of 20 minutes in caring for this patient on the day of the visit/encounter including Prognosis, Risks and benefits of tx options, Instructions for management, Patient and family education, Importance of tx compliance, Risk factor reductions, Counseling / Coordination of care, Documenting in the medical record, Reviewing/placing orders in the medical record (including tests, medications, and/or procedures), and Obtaining or reviewing history  , not including the time spent for establishing the audio/video connection.

## 2025-05-05 ENCOUNTER — RESULTS FOLLOW-UP (OUTPATIENT)
Dept: FAMILY MEDICINE CLINIC | Facility: CLINIC | Age: 50
End: 2025-05-05

## 2025-05-05 ENCOUNTER — DOCTOR'S OFFICE (OUTPATIENT)
Dept: URBAN - NONMETROPOLITAN AREA CLINIC 1 | Facility: CLINIC | Age: 50
Setting detail: OPHTHALMOLOGY
End: 2025-05-05
Payer: COMMERCIAL

## 2025-05-05 DIAGNOSIS — E11.3293: ICD-10-CM

## 2025-05-05 DIAGNOSIS — H25.013: ICD-10-CM

## 2025-05-05 PROCEDURE — 92134 CPTRZ OPH DX IMG PST SGM RTA: CPT | Performed by: OPTOMETRIST

## 2025-05-05 PROCEDURE — 92014 COMPRE OPH EXAM EST PT 1/>: CPT | Performed by: OPTOMETRIST

## 2025-05-05 ASSESSMENT — CONFRONTATIONAL VISUAL FIELD TEST (CVF)
OS_FINDINGS: FULL
OD_FINDINGS: FULL

## 2025-05-05 ASSESSMENT — REFRACTION_CURRENTRX
OD_AXIS: 012
OD_VPRISM_DIRECTION: SV
OS_AXIS: 065
OS_OVR_VA: 20/
OD_CYLINDER: -1.00
OS_OVR_VA: 20/
OS_VPRISM_DIRECTION: SV
OS_CYLINDER: -2.25
OD_OVR_VA: 20/
OD_OVR_VA: 20/
OD_SPHERE: -3.00
OS_SPHERE: -4.75

## 2025-05-05 ASSESSMENT — REFRACTION_AUTOREFRACTION
OS_SPHERE: -4.25
OD_AXIS: 010
OS_CYLINDER: -2.00
OD_CYLINDER: -1.50
OS_AXIS: 166
OD_SPHERE: -3.00

## 2025-05-05 ASSESSMENT — REFRACTION_MANIFEST
OS_AXIS: 165
OS_SPHERE: -4.50
OS_VA1: 20/25-2
OD_VA1: 20/25
OD_VA2: 20/25
OS_VA2: 20/25-2
OS_ADD: +2.25
OD_CYLINDER: -1.50
OD_SPHERE: -3.00
OD_AXIS: 015
OS_CYLINDER: -2.00
OD_ADD: +2.25

## 2025-05-05 ASSESSMENT — VISUAL ACUITY
OS_BCVA: 20/30
OD_BCVA: 20/30+2

## 2025-05-05 NOTE — PROGRESS NOTES
"Daily Note     Today's date: 2025  Patient name: Cristy Garcia  : 1975  MRN: [id number]  Referring provider: Donovan De Guzman,*  Dx:   Encounter Diagnosis     ICD-10-CM    1. Primary osteoarthritis of left hip  M16.12                      Subjective: Patient reports that she is doing well and tolerating her OPPT well.      Objective: See treatment diary below      Assessment: Tolerated treatment well. Patient exhibited good technique with therapeutic exercises and would benefit from continued PT to increase L hip ROM/strength and endurance to improve her overall mobility.       Plan: Continue per plan of care.      Precautions: PMH OA in B hips, HTN, CPAP, DM, GERD, Fibromyalgia  POC Expiration: 25      Manuals     L HS, piriformis, hip ABD, gastroc stretching   15'bilat 10' L 10' L                            Neuro Re-Ed        BOSU SLR        BOSU Squats        Monster walks        Front and lateral lunges        Step ups  Front & Lateral        HR/TR  9b12ptwqq 7h05pbclx 4\"step 5s38hwosm 4\"step    Sit to Stand        Ther Ex        Nustep for ROM/strength  10'L1 10'L2 10'L3    Supine SLR   10xbilat 1o64rsekp 8j45glpfc    Bridge c ABD  Bridge only 2x10 C ABD 2x10  C ABD 2x10    Clamshells   2x10 L only RTB 2x10 L only RTB Reg,Rev 2x10ea bilat    Standing marches, ABD, ext  2x5ea bilat 10xea bilat 10xea bilat 1.5#                   HEP education and instruction 10'       Ther Activity                        Gait Training                        Modalities        CP  15'MH to Lhip 15'MH   to Lhip 15'MH to Lhip                  "

## 2025-05-06 ENCOUNTER — OFFICE VISIT (OUTPATIENT)
Dept: PHYSICAL THERAPY | Facility: CLINIC | Age: 50
End: 2025-05-06
Attending: STUDENT IN AN ORGANIZED HEALTH CARE EDUCATION/TRAINING PROGRAM
Payer: COMMERCIAL

## 2025-05-06 DIAGNOSIS — M16.12 PRIMARY OSTEOARTHRITIS OF LEFT HIP: Primary | ICD-10-CM

## 2025-05-06 DIAGNOSIS — E11.65 TYPE 2 DIABETES MELLITUS WITH HYPERGLYCEMIA, WITHOUT LONG-TERM CURRENT USE OF INSULIN (HCC): ICD-10-CM

## 2025-05-06 PROCEDURE — 97140 MANUAL THERAPY 1/> REGIONS: CPT

## 2025-05-06 PROCEDURE — 97110 THERAPEUTIC EXERCISES: CPT

## 2025-05-06 PROCEDURE — 97112 NEUROMUSCULAR REEDUCATION: CPT

## 2025-05-07 ENCOUNTER — OFFICE VISIT (OUTPATIENT)
Dept: PHYSICAL THERAPY | Facility: CLINIC | Age: 50
End: 2025-05-07
Attending: STUDENT IN AN ORGANIZED HEALTH CARE EDUCATION/TRAINING PROGRAM
Payer: COMMERCIAL

## 2025-05-07 DIAGNOSIS — M16.12 PRIMARY OSTEOARTHRITIS OF LEFT HIP: Primary | ICD-10-CM

## 2025-05-07 PROCEDURE — 97110 THERAPEUTIC EXERCISES: CPT

## 2025-05-07 PROCEDURE — 97140 MANUAL THERAPY 1/> REGIONS: CPT

## 2025-05-07 PROCEDURE — 97112 NEUROMUSCULAR REEDUCATION: CPT

## 2025-05-07 RX ORDER — EMPAGLIFLOZIN 25 MG/1
25 TABLET, FILM COATED ORAL DAILY
Qty: 30 TABLET | Refills: 5 | Status: SHIPPED | OUTPATIENT
Start: 2025-05-07

## 2025-05-07 NOTE — PROGRESS NOTES
"Daily Note     Today's date: 2025  Patient name: Cristy Garcia  : 1975  MRN: [id number]  Referring provider: Donovan De Guzman,*  Dx:   Encounter Diagnosis     ICD-10-CM    1. Primary osteoarthritis of left hip  M16.12                      Subjective: Patient reports that she is doing well this morning.      Objective: See treatment diary below      Assessment: Tolerated treatment well. Patient exhibited good technique with therapeutic exercises and would benefit from continued PT to increase L hip ROM/strength and endurance to improve her overall functional mobility.      Plan: Continue per plan of care.      Precautions: PMH OA in B hips, HTN, CPAP, DM, GERD, Fibromyalgia  POC Expiration: 25      Manuals     L HS, piriformis, hip ABD, gastroc stretching  15'bilat 10' L 10' L 10'L                            Neuro Re-Ed        BOSU SLR        BOSU Squats        Monster walks        Front and lateral lunges        Step ups  Front & Lateral    10xea bilat 4\"step    HR/TR 3n90ujziq 1n23trlng 4\"step 5a73gawko 4\"step 7z08hfgrm 4\"step    Sit to Stand        Ther Ex        Nustep for ROM/strength 10'L1 10'L2 10'L3 10'L3    Supine SLR  10xbilat 1r05ioldt 9n35wzruc 8c88yevma     Bridge c ABD Bridge only 2x10 C ABD 2x10  C ABD 2x10 C ABD 2x10 GTB    Clamshells  2x10 L only RTB 2x10 L only RTB Reg,Rev 2x10ea bilat Reg,Rev 2x10ea bilat GTB    Standing marches, ABD, ext 2x5ea bilat 10xea bilat 10xea bilat  1.5#                   HEP education and instruction        Ther Activity                        Gait Training                        Modalities        CP 15'MH to Lhip 15'MH   to Lhip 15'MH to Lhip                   "

## 2025-05-12 ENCOUNTER — HOSPITAL ENCOUNTER (OUTPATIENT)
Dept: ULTRASOUND IMAGING | Facility: HOSPITAL | Age: 50
Discharge: HOME | End: 2025-05-12
Attending: OTOLARYNGOLOGY
Payer: COMMERCIAL

## 2025-05-12 DIAGNOSIS — E07.9 THYROID MASS: ICD-10-CM

## 2025-05-12 PROCEDURE — 76536 US EXAM OF HEAD AND NECK: CPT

## 2025-05-13 ENCOUNTER — OFFICE VISIT (OUTPATIENT)
Dept: PHYSICAL THERAPY | Facility: CLINIC | Age: 50
End: 2025-05-13
Attending: STUDENT IN AN ORGANIZED HEALTH CARE EDUCATION/TRAINING PROGRAM
Payer: COMMERCIAL

## 2025-05-13 DIAGNOSIS — M16.12 PRIMARY OSTEOARTHRITIS OF LEFT HIP: Primary | ICD-10-CM

## 2025-05-13 PROCEDURE — 97112 NEUROMUSCULAR REEDUCATION: CPT

## 2025-05-13 PROCEDURE — 97140 MANUAL THERAPY 1/> REGIONS: CPT

## 2025-05-13 PROCEDURE — 97110 THERAPEUTIC EXERCISES: CPT

## 2025-05-13 NOTE — PROGRESS NOTES
[facility] [place] [id number] [address]  [address]  [facility] [id number] [phone]    Patient Name: Cristy Garcia  YOB: 1975 ;female  MR No: [id number]        Diagnosis ICD-10-CM Associated Orders   1. Mild concentric left ventricular hypertrophy  I51.7       2. Essential hypertension  I10       3. Mixed hyperlipidemia  E78.2       4. Morbid obesity with body mass index (BMI) of 45.0 to 49.9 in adult (Formerly [facility])  E66.01     Z68.42       5. VICTOR HUGO on CPAP  G47.33            Assessment and recommendations:    1. Mild concentric left ventricular hypertrophy  Assessment & Plan:  Recent echocardiogram reported normal left ventricle systolic dysfunction with mild concentric left ventricular hypertrophy no significant valvular pathology. This is not surprising in view of her history of hypertension. Goal should be optimal blood pressure control.   2. Essential hypertension  Assessment & Plan:  Blood pressure is much better now on the current combination of lisinopril and metoprolol.  Continue the same strategy.  3. Mixed hyperlipidemia  Assessment & Plan:  Labs from July 2024 showed LDL of 25 and triglycerides of 186.  I would keep the statin at the same dose and further reduction in triglycerides should be achieved with weight loss and exercise.  4. Morbid obesity with body mass index (BMI) of 45.0 to 49.9 in adult (Former[facility])  Assessment & Plan:  Needs more aggressive dietary modification for weight loss.  5. VICTOR HUGO on CPAP  Assessment & Plan:  Patient reports that she is compliant with her CPAP.           CHIEF COMPLAINT:      Hypertension, tachycardia    HPI:   48-year-old female with a complex medical history including hypertension, type 2 diabetes mellitus, hyperlipidemia, fibromyalgia, chronic pain syndrome, chronic arthritis, obstructive sleep apnea presents for follow-up cardiology visit.  She reports that her palpitations are much better now since we started her  on low-dose metoprolol.  She is very limited in her activities due to osteoarthritis and uses a walker and also have a caregiver who takes care of most of her daily needs.  She has had dyspnea on exertion for many years.  She gets occasional atypical chest discomfort that only occurs when she has her arms above her head for a few minutes while shampooing her hair.  She also complains of occasional postural dizziness.  She denies any syncope.  She recently underwent an echocardiogram in May 2024 which showed mild concentric left ventricular hypertrophy grade with grade 1 diastolic dysfunction and that led to the cardiology referral.    Past Medical History:   Diagnosis Date    Allergic     Seasonal Allergies    Alopecia of scalp     Treated with Proscar    Asthma     Chronic pain disorder     back, bilat knees, bilat hips    CPAP (continuous positive airway pressure) dependence     Diabetes mellitus (HCC)     Fibromyalgia, primary     GERD (gastroesophageal reflux disease)     Hip fx, left, closed, with routine healing, subsequent encounter 05/2017    Hyperlipidemia     Hypertension     Insomnia     Irregular heartbeat     VICTOR HUGO on CPAP           CURRENT  MEDICATIONS:      Current Outpatient Medications:     albuterol (Ventolin HFA) 90 mcg/act inhaler, Inhale 2 puffs every 6 (six) hours as needed for wheezing or shortness of breath, Disp: 18 g, Rfl: 5    atorvastatin (LIPITOR) 20 mg tablet, Take 1 tablet (20 mg total) by mouth daily, Disp: 100 tablet, Rfl: 1    celecoxib (CeleBREX) 200 mg capsule, Take 1 capsule (200 mg total) by mouth 2 (two) times a day, Disp: 60 capsule, Rfl: 5    cetirizine (ZyrTEC) 10 mg tablet, 1 tab po daily x 1 month then prn allergies, Disp: 100 tablet, Rfl: 1    clotrimazole-betamethasone (LOTRISONE) 1-0.05 % cream, APPLY TWICE DAILY TO VAGINAL AREA UNTIL HEALED THEN AS NEEDED FOR IRRITATION., Disp: 45 g, Rfl: 3    cromolyn (OPTICROM) 4 % ophthalmic solution, Administer 1 drop to both eyes 4  (four) times a day, Disp: 10 mL, Rfl: 2    ergocalciferol (VITAMIN D2) 50,000 units, Take 1 capsule (50,000 Units total) by mouth once a week, Disp: 12 capsule, Rfl: 1    famotidine (PEPCID) 40 MG tablet, TAKE (1) TABLET BY MOUTH DAILY., Disp: 30 tablet, Rfl: 5    finasteride (PROPECIA) 1 MG tablet, Take 1 tablet (1 mg total) by mouth daily, Disp: 30 tablet, Rfl: 0    fluticasone (FLONASE) 50 mcg/act nasal spray, USE 1 SPRAY INTO EACH NOSTRIL TWICE A DAY FOR 1 MONTH THEN AS NEEDED FOR CONGESTION AND ALLERGIES, Disp: 16 g, Rfl: 1    Fluticasone-Salmeterol (Advair) 250-50 mcg/dose inhaler, Inhale 1 puff 2 (two) times a day Rinse mouth after use., Disp: 200 blister, Rfl: 1    gabapentin (NEURONTIN) 300 mg capsule, TAKE (1) CAPSULE THREE TIMES DAILY., Disp: 270 capsule, Rfl: 3    glipiZIDE (GLUCOTROL) 10 mg tablet, TAKE (2) TABLETS TWICE DAILY BEFORE MEALS., Disp: 360 tablet, Rfl: 1    glucose blood test strip, , Disp: , Rfl:     glucose blood test strip, Use as instructed, Disp: 100 each, Rfl: 1    Jardiance 25 MG TABS, TAKE (1) TABLET BY MOUTH DAILY., Disp: 30 tablet, Rfl: 5    ketoconazole (NIZORAL) 2 % cream, Apply topically daily, Disp: 60 g, Rfl: 0    Lancet Devices (OneTouch Delica Plus Lancing) MISC, , Disp: , Rfl:     Lancets (onetouch ultrasoft) lancets, Use as instructed, Disp: 100 each, Rfl: 1    lisinopril (ZESTRIL) 20 mg tablet, Take 1 tablet (20 mg total) by mouth daily, Disp: 90 tablet, Rfl: 3    metFORMIN (GLUCOPHAGE) 1000 MG tablet, TAKE (1) TABLET TWICE A DAY WITH MEALS., Disp: 200 tablet, Rfl: 1    metoprolol succinate (TOPROL-XL) 25 mg 24 hr tablet, Take 1 tablet (25 mg total) by mouth daily, Disp: 30 tablet, Rfl: 1    nystatin powder, Apply topically 2 (two) times a day, Disp: 60 g, Rfl: 3    omeprazole (PriLOSEC) 40 MG capsule, Take 1 capsule (40 mg total) by mouth daily before breakfast, Disp: 90 capsule, Rfl: 3    ondansetron (ZOFRAN) 4 mg tablet, Take 1 tablet (4 mg total) by mouth every 8  (eight) hours as needed for nausea or vomiting, Disp: 30 tablet, Rfl: 5    sertraline (ZOLOFT) 50 mg tablet, TAKE (1) TABLET BY MOUTH DAILY AT BEDTIME., Disp: 90 tablet, Rfl: 1    triamcinolone (KENALOG) 0.025 % cream, Apply 2 x daily to rash until healed then prn rash, Disp: 60 g, Rfl: 0    fluconazole (DIFLUCAN) 150 mg tablet, TAKE ONE TABLET BY MOUTH ONCE DAILY FOR ONE DAY (Patient not taking: Reported on 11/26/2024), Disp: , Rfl:     ALLERGIES  Allergies   Allergen Reactions    Trulicity [Dulaglutide] Swelling    Tdap [Tetanus-Diphth-Acell Pertussis] Rash       Lab Results   Component Value Date    LDLCALC 25 07/11/2024    LDLCALC 46 02/22/2024    HDL 43 (L) 07/11/2024    HDL 46 (L) 02/22/2024    CHOLESTEROL 106 07/11/2024    CHOLESTEROL 115 02/22/2024    TRIG 189 (H) 07/11/2024    TRIG 113 02/22/2024    CREATININE 0.65 11/11/2024    CREATININE 0.59 (L) 10/23/2024    K 4.3 11/11/2024    K 4.0 10/23/2024    SODIUM 136 11/11/2024    SODIUM 134 (L) 10/23/2024         REVIEW OF SYSTEMS   Positive for: Chronic pain, arthritis, dizziness, dyspnea on exertion.  Negative for: All remaining as reviewed below and in HPI.    SYSTEM SYMPTOMS REVIEWED:  General--weight change, fever, night sweats  Respiratory--cough, wheezing, shortness of breath, sputum production  Cardiovascular--chest pain, syncope, dyspnea on exertion, edema, decline in exercise tolerance, claudication   Gastrointestinal--persistent vomiting, diarrhea, abdominal distention, blood in stool   Muscular or skeletal--joint pain or swelling   Neurologic--headaches, syncope, abnormal movement  Hematologic--history of easy bruising and bleeding   Endocrine--thyroid enlargement, heat or cold intolerance, polyuria   Psychiatric--anxiety, depression     General physical examination:    General appearance: Alert, no acute distress, appears older than stated age, severe obesity.  HEENT: Mucous membranes are moist.  No obvious abnormality noted.  Neck: Supple with no  "lymphadenopathy.  No JVD.  Carotid pulses are intact.  No carotid bruit.  Cardiovascular system: Regular rhythm.  Normal S1 and S2.  No murmurs.  No rubs or gallops. Extremities: No edema. No cyanosis.  Pulmonary: Respirations unlabored.  Good air entry bilaterally.  Clear to auscultation bilaterally.  Gastrointestinal: Abdomen is soft and nontender.  Bowel sounds are positive.  Musculoskeletal: Upper Extremities: Normal upper motor strength. Lower Extremity: Normal motor strength. Gait: Normal.  Uses a walker  Skin: Skin is warm. No rashes or lesions.  Neurological: Patient is alert and oriented with no gross motor deficits.  Psychiatric: Mood is normal.  Behavior is normal.    Vitals:    11/26/24 1130   BP: 118/80   Pulse: 85   Temp: 98.2 °F (36.8 °C)   SpO2: 98%      Body mass index is 44.99 kg/m².  Wt Readings from Last 3 Encounters:   11/26/24 112 kg (246 lb)   11/25/24 111 kg (245 lb 8 oz)   10/23/24 109 kg (240 lb)             Feliz Braxton MD, FACC, RILEY    Portions of the record  have been created with voice recognition software.  Occasional grammatical mistakes or wrong word or \"sound alike\" substitutions may have occurred due to the inherent limitations of voice recognition software. Please reach out to me directly for any clarifications.   " Principal Discharge DX:	Headache   1

## 2025-05-13 NOTE — PROGRESS NOTES
"Daily Note     Today's date: 2025  Patient name: Cristy Garcia  : 1975  MRN: [id number]  Referring provider: Donovan De Guzman,*  Dx:   Encounter Diagnosis     ICD-10-CM    1. Primary osteoarthritis of left hip  M16.12                      Subjective: Patient reports that she is doing well this morning.      Objective: See treatment diary below      Assessment: Tolerated treatment well. Patient exhibited good technique with therapeutic exercises and would benefit from continued PT to increase L hip ROM/strength and endurance to improve her overall mobility.  Patient progressing nicely with no ill effects reported during/post session today.      Plan: Continue per plan of care.      Precautions: PMH OA in B hips, HTN, CPAP, DM, GERD, Fibromyalgia  POC Expiration: 25      Manuals    L HS, piriformis, hip ABD, gastroc stretching  15'bilat 10' L 10' L 10'L 10'L                           Neuro Re-Ed        BOSU SLR        BOSU Squats        Monster walks        Front and lateral lunges        Step ups  Front & Lateral    10xea bilat 4\"step 10xea bilat 4\"step   HR/TR 9a49falny 4q31krasn 4\"step 0l33eqlhm 4\"step 4r35cxxve 4\"step 2e13nfspi 4\"step   Sit to Stand        Ther Ex        Nustep for ROM/strength 10'L1 10'L2 10'L3 10'L3 10'L3   Supine SLR  10xbilat 6b53xmqsq 7s27ltqrf 2s81romzy  9t58yszfl   Bridge c ABD Bridge only 2x10 C ABD 2x10  C ABD 2x10 C ABD 2x10 GTB 2x10 GTB   Clamshells  2x10 L only RTB 2x10 L only RTB Reg,Rev 2x10ea bilat Reg,Rev 2x10ea bilat GTB Reg,Rev 2x10ea bilat GTB   Standing marches, ABD, ext 2x5ea bilat 10xea bilat 10xea bilat  1.5#10xea bilat                   HEP education and instruction        Ther Activity                        Gait Training                        Modalities        CP 15'MH to Lhip 15'MH   to Lhip 15'MH to Lhip  15'MH to Lhip                 "

## 2025-05-15 ENCOUNTER — RESULTS FOLLOW-UP (OUTPATIENT)
Dept: FAMILY MEDICINE CLINIC | Facility: CLINIC | Age: 50
End: 2025-05-15

## 2025-05-15 ENCOUNTER — CLINICAL SUPPORT (OUTPATIENT)
Dept: FAMILY MEDICINE CLINIC | Facility: CLINIC | Age: 50
End: 2025-05-15
Payer: COMMERCIAL

## 2025-05-15 DIAGNOSIS — E11.65 TYPE 2 DIABETES MELLITUS WITH HYPERGLYCEMIA, WITHOUT LONG-TERM CURRENT USE OF INSULIN (HCC): Primary | ICD-10-CM

## 2025-05-15 LAB
LEFT EYE DIABETIC RETINOPATHY: NORMAL
LEFT EYE IMAGE QUALITY: NORMAL
LEFT EYE MACULAR EDEMA: NORMAL
LEFT EYE OTHER RETINOPATHY: NORMAL
RIGHT EYE DIABETIC RETINOPATHY: NORMAL
RIGHT EYE IMAGE QUALITY: NORMAL
RIGHT EYE MACULAR EDEMA: NORMAL
RIGHT EYE OTHER RETINOPATHY: NORMAL
SEVERITY (EYE EXAM): NORMAL

## 2025-05-15 PROCEDURE — 92250 FUNDUS PHOTOGRAPHY W/I&R: CPT

## 2025-05-16 ENCOUNTER — APPOINTMENT (OUTPATIENT)
Dept: PHYSICAL THERAPY | Facility: CLINIC | Age: 50
End: 2025-05-16
Attending: STUDENT IN AN ORGANIZED HEALTH CARE EDUCATION/TRAINING PROGRAM
Payer: COMMERCIAL

## 2025-05-21 DIAGNOSIS — E11.65 TYPE 2 DIABETES MELLITUS WITH HYPERGLYCEMIA, WITHOUT LONG-TERM CURRENT USE OF INSULIN (HCC): ICD-10-CM

## 2025-05-22 ENCOUNTER — APPOINTMENT (OUTPATIENT)
Dept: LAB | Facility: HOSPITAL | Age: 50
End: 2025-05-22
Payer: COMMERCIAL

## 2025-05-22 DIAGNOSIS — E04.2 MULTINODULAR GOITER: ICD-10-CM

## 2025-05-22 DIAGNOSIS — D50.8 OTHER IRON DEFICIENCY ANEMIA: ICD-10-CM

## 2025-05-22 DIAGNOSIS — D75.1 ERYTHROCYTOSIS: ICD-10-CM

## 2025-05-22 DIAGNOSIS — D75.1 RELATIVE POLYCYTHEMIA: ICD-10-CM

## 2025-05-22 DIAGNOSIS — R11.0 NAUSEA: ICD-10-CM

## 2025-05-22 DIAGNOSIS — E78.2 MIXED HYPERLIPIDEMIA: ICD-10-CM

## 2025-05-22 LAB
BASOPHILS # BLD AUTO: 0.11 THOUSANDS/ÂΜL (ref 0–0.1)
BASOPHILS NFR BLD AUTO: 1 % (ref 0–1)
EOSINOPHIL # BLD AUTO: 0.4 THOUSAND/ÂΜL (ref 0–0.61)
EOSINOPHIL NFR BLD AUTO: 4 % (ref 0–6)
ERYTHROCYTE [DISTWIDTH] IN BLOOD BY AUTOMATED COUNT: 13.2 % (ref 11.6–15.1)
FERRITIN SERPL-MCNC: 29 NG/ML (ref 30–307)
HCT VFR BLD AUTO: 49.3 % (ref 34.8–46.1)
HGB BLD-MCNC: 16.7 G/DL (ref 11.5–15.4)
IMM GRANULOCYTES # BLD AUTO: 0.15 THOUSAND/UL (ref 0–0.2)
IMM GRANULOCYTES NFR BLD AUTO: 1 % (ref 0–2)
IRON SATN MFR SERPL: 37 % (ref 15–50)
IRON SERPL-MCNC: 169 UG/DL (ref 50–212)
LYMPHOCYTES # BLD AUTO: 2.61 THOUSANDS/ÂΜL (ref 0.6–4.47)
LYMPHOCYTES NFR BLD AUTO: 25 % (ref 14–44)
MCH RBC QN AUTO: 30.8 PG (ref 26.8–34.3)
MCHC RBC AUTO-ENTMCNC: 33.9 G/DL (ref 31.4–37.4)
MCV RBC AUTO: 91 FL (ref 82–98)
MONOCYTES # BLD AUTO: 0.61 THOUSAND/ÂΜL (ref 0.17–1.22)
MONOCYTES NFR BLD AUTO: 6 % (ref 4–12)
NEUTROPHILS # BLD AUTO: 6.61 THOUSANDS/ÂΜL (ref 1.85–7.62)
NEUTS SEG NFR BLD AUTO: 63 % (ref 43–75)
NRBC BLD AUTO-RTO: 0 /100 WBCS
PLATELET # BLD AUTO: 243 THOUSANDS/UL (ref 149–390)
PMV BLD AUTO: 9 FL (ref 8.9–12.7)
RBC # BLD AUTO: 5.43 MILLION/UL (ref 3.81–5.12)
TIBC SERPL-MCNC: 455 UG/DL (ref 250–450)
TRANSFERRIN SERPL-MCNC: 325 MG/DL (ref 203–362)
TSH SERPL DL<=0.05 MIU/L-ACNC: 1.62 UIU/ML (ref 0.45–4.5)
UIBC SERPL-MCNC: 286 UG/DL (ref 155–355)
WBC # BLD AUTO: 10.49 THOUSAND/UL (ref 4.31–10.16)

## 2025-05-22 PROCEDURE — 36415 COLL VENOUS BLD VENIPUNCTURE: CPT

## 2025-05-22 PROCEDURE — 83550 IRON BINDING TEST: CPT

## 2025-05-22 PROCEDURE — 82728 ASSAY OF FERRITIN: CPT

## 2025-05-22 PROCEDURE — 84443 ASSAY THYROID STIM HORMONE: CPT

## 2025-05-22 PROCEDURE — 85025 COMPLETE CBC W/AUTO DIFF WBC: CPT

## 2025-05-22 PROCEDURE — 86376 MICROSOMAL ANTIBODY EACH: CPT

## 2025-05-22 PROCEDURE — 83540 ASSAY OF IRON: CPT

## 2025-05-22 RX ORDER — ATORVASTATIN CALCIUM 20 MG/1
20 TABLET, FILM COATED ORAL DAILY
Qty: 100 TABLET | Refills: 1 | Status: SHIPPED | OUTPATIENT
Start: 2025-05-22

## 2025-05-23 LAB — THYROPEROXIDASE AB SERPL-ACNC: 24 IU/ML (ref 0–34)

## 2025-05-23 RX ORDER — ONDANSETRON 4 MG/1
4 TABLET, FILM COATED ORAL EVERY 8 HOURS PRN
Qty: 30 TABLET | Refills: 1 | Status: SHIPPED | OUTPATIENT
Start: 2025-05-23

## 2025-05-27 ENCOUNTER — APPOINTMENT (OUTPATIENT)
Dept: LAB | Facility: HOSPITAL | Age: 50
End: 2025-05-27
Payer: COMMERCIAL

## 2025-05-27 ENCOUNTER — OFFICE VISIT (OUTPATIENT)
Dept: HEMATOLOGY ONCOLOGY | Facility: CLINIC | Age: 50
End: 2025-05-27
Payer: COMMERCIAL

## 2025-05-27 VITALS
HEIGHT: 62 IN | OXYGEN SATURATION: 98 % | TEMPERATURE: 98.6 F | SYSTOLIC BLOOD PRESSURE: 118 MMHG | BODY MASS INDEX: 48.58 KG/M2 | RESPIRATION RATE: 18 BRPM | WEIGHT: 264 LBS | HEART RATE: 77 BPM | DIASTOLIC BLOOD PRESSURE: 64 MMHG

## 2025-05-27 DIAGNOSIS — D75.1 RELATIVE POLYCYTHEMIA: ICD-10-CM

## 2025-05-27 DIAGNOSIS — D75.1 POLYCYTHEMIA: Primary | ICD-10-CM

## 2025-05-27 DIAGNOSIS — D75.1 ERYTHROCYTOSIS: ICD-10-CM

## 2025-05-27 DIAGNOSIS — D50.8 OTHER IRON DEFICIENCY ANEMIA: ICD-10-CM

## 2025-05-27 DIAGNOSIS — D75.1 POLYCYTHEMIA: ICD-10-CM

## 2025-05-27 LAB
BASOPHILS # BLD AUTO: 0.13 THOUSANDS/ÂΜL (ref 0–0.1)
BASOPHILS NFR BLD AUTO: 1 % (ref 0–1)
EOSINOPHIL # BLD AUTO: 0.46 THOUSAND/ÂΜL (ref 0–0.61)
EOSINOPHIL NFR BLD AUTO: 4 % (ref 0–6)
ERYTHROCYTE [DISTWIDTH] IN BLOOD BY AUTOMATED COUNT: 13.2 % (ref 11.6–15.1)
HCT VFR BLD AUTO: 49.2 % (ref 34.8–46.1)
HGB BLD-MCNC: 16.4 G/DL (ref 11.5–15.4)
IMM GRANULOCYTES # BLD AUTO: 0.16 THOUSAND/UL (ref 0–0.2)
IMM GRANULOCYTES NFR BLD AUTO: 1 % (ref 0–2)
LYMPHOCYTES # BLD AUTO: 2.78 THOUSANDS/ÂΜL (ref 0.6–4.47)
LYMPHOCYTES NFR BLD AUTO: 25 % (ref 14–44)
MCH RBC QN AUTO: 30.3 PG (ref 26.8–34.3)
MCHC RBC AUTO-ENTMCNC: 33.3 G/DL (ref 31.4–37.4)
MCV RBC AUTO: 91 FL (ref 82–98)
MONOCYTES # BLD AUTO: 0.7 THOUSAND/ÂΜL (ref 0.17–1.22)
MONOCYTES NFR BLD AUTO: 6 % (ref 4–12)
NEUTROPHILS # BLD AUTO: 7.02 THOUSANDS/ÂΜL (ref 1.85–7.62)
NEUTS SEG NFR BLD AUTO: 63 % (ref 43–75)
NRBC BLD AUTO-RTO: 0 /100 WBCS
PLATELET # BLD AUTO: 251 THOUSANDS/UL (ref 149–390)
PMV BLD AUTO: 8.7 FL (ref 8.9–12.7)
RBC # BLD AUTO: 5.41 MILLION/UL (ref 3.81–5.12)
WBC # BLD AUTO: 11.25 THOUSAND/UL (ref 4.31–10.16)

## 2025-05-27 PROCEDURE — 36415 COLL VENOUS BLD VENIPUNCTURE: CPT

## 2025-05-27 PROCEDURE — 82668 ASSAY OF ERYTHROPOIETIN: CPT

## 2025-05-27 PROCEDURE — 99214 OFFICE O/P EST MOD 30 MIN: CPT | Performed by: PHYSICIAN ASSISTANT

## 2025-05-27 PROCEDURE — 85025 COMPLETE CBC W/AUTO DIFF WBC: CPT

## 2025-05-27 NOTE — PROGRESS NOTES
Name: Cristy Garcia      : 1975      MRN: [id number]  Encounter Provider: Tara Squires PA-C  Encounter Date: 2025   Encounter department: [facility] [place]  :  Assessment & Plan  Polycythemia  This is a 49-year-old female with past medical history of polycythemia thought to be primarily secondary to diabetic medication however, the patient does require IV iron supplementation secondary to symptomatology and rule out primary cause of polycythemia despite recent stability.  Recommendation was to go for JAK2 mutation as well as erythropoietin level.  If all checks out, patient will undergo IV iron.  If additional orders need to be given patient will be asked to come back to the office.  Orders:  •  Erythropoietin; Future  •  JAK2 V617F rfx CALR/MPL/E12-15; Future  •  CBC and differential; Future    Other iron deficiency anemia  Lab Results   Component Value Date    FERRITIN 29 (L) 2025     Patient has been on oral iron supplements with minimal toxicity however, the ferritin has not significantly increased and the patient is noting restless sleep/waking up feeling unrested after full nights rest.  Recommendation is to ascertain specific reason for polycythemia rule out primary.  After which if it is safe IV iron will be administered to the patient.  Patient agreed to come to [facility].  Venofer 200 mg IV weekly x 5 doses would be indicated.    I discussed potential side effects of IV iron.  Patient understands contact the office if she experiences these premedications not indicated initially.       Relative polycythemia  And is possible that the patient has relative polycythemia secondary to diabetic med, Jardiance.  Will continue to monitor blood work but before discussing IV iron treatments, primary polycythemia will be ruled out see #1       Return in about 6 months (around 2025) for Labs, [facility] Office Visit.    History of Present  Illness   Chief Complaint   Patient presents with   • Follow-up     Pertinent Medical History   This is a 48-year-old female with past medical history of hypertension, hyperlipidemia, diabetes, GERD, fibromyalgia, asthma, allergy, obstructive sleep apnea and obesity who presents to the hematology clinic for evaluation of CBC abnormalities.     6/19/2020 WBC 8.2, hemoglobin 15.1, MCV 88, platelet count 219, differential WNL  7/22/2021 WBC 9.8, RBC 14.9, hemoglobin 14.9, MCV 90, platelet count 245              ANC = 6.15, ABC=0.09  9/16/2022 RBC 16.4, WBC 9.0, hemoglobin 16.4, MCV 88, platelet count 287              ANC 5.4, ABC = 0.11  1/24/2023 WBC 8.8, RBC 5.2 hemoglobin 15.9, MCV 90, platelet count 247              Differential WNL  4/20/2023 WBC 7.8, hemoglobin 15.2, MCV 90, platelet count 280              Differential WNL    Started Jardiance at 11/7/23 January 2024 patient notes that menstrual cycle became more erratic.  Patient was bleeding heavily throughout the month.  Patient required progesterone to kind of reset her.  Patient now has a menstruation for 3 to 4 days every 3 to 4 weeks.  Patient is no longer on progesterone.     2/12/24 WBC 10.2, RBC 5.5, hemoglobin 13.4, MCV 77, RDW 15.8, platelet count 263  2/22/24 WBC 8.8, RBC 5.4,  hemoglobin 13.1, MCV 77, platelet count 309     4/1/2024 WBC 10.2, RBC 6.0, hemoglobin 14.3, MCV 79 RDW 16.0, platelet count 323              Differential WNL with exception of basophilia of 0.12     Never had a colonoscopy but did have a Cologuard-12/15/2022- at 1 point in the past which was within normal limits.  Patient had a recent CT scan that demonstrated diverticulosis, patient denies rectal bleeding.     6/27/2024: BCR/able FISH negative     7/11/2024:   WBC 8.5, hemoglobin 14.6, hematocrit 44.9, MCV 82, RDW 16.7, platelet count 260              Iron saturation 11%, TIBC 467, ferritin 5  Hemoglobin cascade demonstrates normal hemoglobin present and no variant or beta  thalassemia identified.              Folate 16, B12 450     Patient was started on oral iron 65 mg every other day and has been compliant in taking this since blood work was taken in July 2024.     9/11/2024: WBC 8.15, RBC 5.6, hemoglobin 15.5, hematocrit 49.1, MCV 87, platelets 238    11/11/2024 WBC 9.6, hemoglobin 16.1, MCV 90, platelet count 271              Ferritin 23, iron saturation 31%, TIBC normalizing 423              B12 519, folate greater than 22              Alpha thalassemia DNA analysis: Negative.    5/22/2025 WBC 10.4, hemoglobin 16.7, hematocrit 49.3, MCV 91  05/27/25: Patient in general is noting that she is not having rested sleep.  Patient's been compliant on oral of supplementation.     Review of Systems   Constitutional:  Negative for appetite change, fatigue, fever and unexpected weight change.   HENT:  Negative for nosebleeds.    Respiratory:  Negative for cough, choking and shortness of breath.         Negative hemoptysis.   Cardiovascular:  Negative for chest pain, palpitations and leg swelling.   Gastrointestinal: Negative.  Negative for abdominal distention, abdominal pain, anal bleeding, blood in stool, constipation, diarrhea, nausea and vomiting.   Endocrine: Negative.  Negative for cold intolerance.   Genitourinary: Negative.  Negative for hematuria, menstrual problem, vaginal bleeding, vaginal discharge and vaginal pain.   Musculoskeletal: Negative.  Negative for arthralgias, myalgias, neck pain and neck stiffness.   Skin: Negative.  Negative for color change, pallor and rash.   Allergic/Immunologic: Negative.  Negative for immunocompromised state.   Neurological: Negative.  Negative for weakness and headaches.   Hematological:  Negative for adenopathy. Does not bruise/bleed easily.   All other systems reviewed and are negative.          Objective   /64 (BP Location: Right arm, Patient Position: Sitting, Cuff Size: Large)   Pulse 77   Temp 98.6 °F (37 °C) (Temporal)   Resp  "18   Ht 5' 2\" (1.575 m)   Wt 120 kg (264 lb)   LMP  (LMP Unknown)   SpO2 98%   BMI 48.29 kg/m²     Physical Exam  Constitutional:       General: She is not in acute distress.     Appearance: She is well-developed.   HENT:      Head: Normocephalic and atraumatic.     Eyes:      General: No scleral icterus.     Conjunctiva/sclera: Conjunctivae normal.       Cardiovascular:      Rate and Rhythm: Normal rate.   Pulmonary:      Effort: Pulmonary effort is normal. No respiratory distress.     Skin:     General: Skin is warm.      Coloration: Skin is not pale.      Findings: No rash.     Neurological:      Mental Status: She is alert and oriented to person, place, and time.     Psychiatric:         Thought Content: Thought content normal.         Labs: I have reviewed the following labs:  Results for orders placed or performed in visit on 05/22/25   CBC and differential   Result Value Ref Range    WBC 10.49 (H) 4.31 - 10.16 Thousand/uL    RBC 5.43 (H) 3.81 - 5.12 Million/uL    Hemoglobin 16.7 (H) 11.5 - 15.4 g/dL    Hematocrit 49.3 (H) 34.8 - 46.1 %    MCV 91 82 - 98 fL    MCH 30.8 26.8 - 34.3 pg    MCHC 33.9 31.4 - 37.4 g/dL    RDW 13.2 11.6 - 15.1 %    MPV 9.0 8.9 - 12.7 fL    Platelets 243 149 - 390 Thousands/uL    nRBC 0 /100 WBCs    Segmented % 63 43 - 75 %    Immature Grans % 1 0 - 2 %    Lymphocytes % 25 14 - 44 %    Monocytes % 6 4 - 12 %    Eosinophils Relative 4 0 - 6 %    Basophils Relative 1 0 - 1 %    Absolute Neutrophils 6.61 1.85 - 7.62 Thousands/µL    Absolute Immature Grans 0.15 0.00 - 0.20 Thousand/uL    Absolute Lymphocytes 2.61 0.60 - 4.47 Thousands/µL    Absolute Monocytes 0.61 0.17 - 1.22 Thousand/µL    Eosinophils Absolute 0.40 0.00 - 0.61 Thousand/µL    Basophils Absolute 0.11 (H) 0.00 - 0.10 Thousands/µL   TSH, 3rd generation   Result Value Ref Range    TSH 3RD GENERATON 1.624 0.450 - 4.500 uIU/mL   Anti-microsomal antibody   Result Value Ref Range    THYROID MICROSOMAL ANTIBODY 24 0 - 34 " IU/mL   TIBC Panel (incl. Iron, TIBC, % Iron Saturation)   Result Value Ref Range    Iron Saturation 37 15 - 50 %    TIBC 455 (H) 250 - 450 ug/dL    Iron 169 50 - 212 ug/dL    Transferrin 325 203 - 362 mg/dL    UIBC 286 155 - 355 ug/dL   Result Value Ref Range    Ferritin 29 (L) 30 - 307 ng/mL     Administrative Statements   I have spent a total time of 38 minutes in caring for this patient on the day of the visit/encounter including Instructions for management, Impressions, Counseling / Coordination of care, and Documenting in the medical record.

## 2025-05-27 NOTE — ASSESSMENT & PLAN NOTE
Orders:  •  Erythropoietin; Future  •  JAK2 V617F rfx CALR/MPL/E12-15; Future  •  CBC and differential; Future

## 2025-05-28 LAB — EPO SERPL-ACNC: 8.1 MIU/ML (ref 2.6–18.5)

## 2025-05-29 ENCOUNTER — APPOINTMENT (OUTPATIENT)
Dept: LAB | Facility: HOSPITAL | Age: 50
End: 2025-05-29
Payer: COMMERCIAL

## 2025-05-29 ENCOUNTER — RESULTS FOLLOW-UP (OUTPATIENT)
Dept: HEMATOLOGY ONCOLOGY | Facility: CLINIC | Age: 50
End: 2025-05-29

## 2025-05-29 ENCOUNTER — EVALUATION (OUTPATIENT)
Dept: PHYSICAL THERAPY | Facility: CLINIC | Age: 50
End: 2025-05-29
Attending: STUDENT IN AN ORGANIZED HEALTH CARE EDUCATION/TRAINING PROGRAM
Payer: COMMERCIAL

## 2025-05-29 DIAGNOSIS — D75.1 POLYCYTHEMIA: Primary | ICD-10-CM

## 2025-05-29 DIAGNOSIS — D72.824 BASOPHILIC LEUKOCYTOSIS: ICD-10-CM

## 2025-05-29 DIAGNOSIS — M16.12 PRIMARY OSTEOARTHRITIS OF LEFT HIP: Primary | ICD-10-CM

## 2025-05-29 DIAGNOSIS — D75.1 POLYCYTHEMIA: ICD-10-CM

## 2025-05-29 PROCEDURE — 81270 JAK2 GENE: CPT

## 2025-05-29 PROCEDURE — 97112 NEUROMUSCULAR REEDUCATION: CPT

## 2025-05-29 PROCEDURE — 81339 MPL GENE SEQ ALYS EXON 10: CPT

## 2025-05-29 PROCEDURE — 97110 THERAPEUTIC EXERCISES: CPT

## 2025-05-29 PROCEDURE — 81219 CALR GENE COM VARIANTS: CPT

## 2025-05-29 PROCEDURE — 97140 MANUAL THERAPY 1/> REGIONS: CPT

## 2025-05-29 PROCEDURE — 36415 COLL VENOUS BLD VENIPUNCTURE: CPT

## 2025-05-29 PROCEDURE — 81279 JAK2 GENE TRGT SEQUENCE ALYS: CPT

## 2025-05-29 NOTE — PROGRESS NOTES
"Daily Note     Today's date: 2025  Patient name: Cristy Garcia  : 1975  MRN: [id number]  Referring provider: Donovan De Guzman,*  Dx:   Encounter Diagnosis     ICD-10-CM    1. Primary osteoarthritis of left hip  M16.12                      Subjective: Patient reports to PT ready and enthusiastic to begin her program this morning.      Objective: See treatment diary below      Assessment: Tolerated treatment well. Patient exhibited good technique with therapeutic exercises and would benefit from continued PT to increase L hip ROM/strength and endurance to improve her overall functional mobility.  Patient continues to tolerate her program well with no issues reported during/post session.      Plan: Continue per plan of care.      Precautions: PMH OA in B hips, HTN, CPAP, DM, GERD, Fibromyalgia  POC Expiration: 25      Manuals    L HS, piriformis, hip ABD, gastroc stretching  10' L 10' L 10'L 10'L 10'                           Neuro Re-Ed        BOSU SLR        BOSU Squats        Monster walks        Front and lateral lunges     NV   Step ups  Front & Lateral   10xea bilat 4\"step 10xea bilat 4\"step 10xea bilat 4\"step   HR/TR 1q57devre 4\"step 9x94nvdmu 4\"step 8k13orjtl 4\"step 6s44mtnid 4\"step 6o60tfnma  4\"step   Sit to Stand        Ther Ex        Nustep for ROM/strength 10'L2 10'L3 10'L3 10'L3 10'L3   Supine SLR  1u90wylqb 8v96fezqk 8z96gmuhd  3t01blekg 7s13zmxzd    Bridge c ABD C ABD 2x10  C ABD 2x10 C ABD 2x10 GTB 2x10 GTB 2x10 GTB   Clamshells  2x10 L only RTB Reg,Rev 2x10ea bilat Reg,Rev 2x10ea bilat GTB Reg,Rev 2x10ea bilat GTB Reg,Rev 2x10ea bilat GTB   Standing marches, ABD, ext 10xea bilat 10xea bilat  1.5#10xea bilat 1.5# 10xea bilat                   HEP education and instruction        Ther Activity                        Gait Training                        Modalities        CP 15'MH   to Lhip 15'MH to Lhip  15'MH to Lhip 15'MH to Lhip                   "

## 2025-05-29 NOTE — PROGRESS NOTES
PT Re-Evaluation     Today's date: 2025  Patient name: Cristy Garcia  : 1975  MRN: [id number]  Referring provider: Donovan De Guzman,*  Dx:   Encounter Diagnosis     ICD-10-CM    1. Primary osteoarthritis of left hip  M16.12           Start Time: 0800  Stop Time: 09  Total time in clinic (min): 65 minutes    Assessment  Impairments: abnormal gait, abnormal or restricted ROM, activity intolerance, impaired physical strength, lacks appropriate home exercise program, pain with function, participation limitations and activity limitations    Assessment details: Patient has been seen by OP PT for 7 visits and is making good progress towards goals. Patient is demonstrating improved B hip strength and mobility with decreased pain, however, continues to have very significant tenderness to L hip flexor. Patient was educated in HEP for hip flexor stretching and would benefit for continued skilled PT to improve hip strength and reduce functional impairments.    Goals  STG to be met within 4 weeks:  1. Pt will improve L hip AROM by 25-50% to improve gait pattern. - met  2. Pt will improve L hip strength by 1/2 muscle grade to improve functional mobility. - met  3. Pt will report 3/10 pain at worst to improve QOL. - in progress  4. Pt will be I with HEP. - met  5. Pt will improve FOTO score by 10 points.- DC    LTG to be within 8 weeks:  1. Pt will restore L hip AROM WFL to improve functional mobility. - met  2. Pt will restore L hip strength WFL to improve functional mobility. - in progress  3. Pt will meet FOTO score goal to improve functional mobility. - DC  4. Pt will be I in HEP for DC. - in progress       Plan  Patient would benefit from: skilled physical therapy  Planned modality interventions: cryotherapy and thermotherapy: hydrocollator packs    Planned therapy interventions: abdominal trunk stabilization, flexibility, functional ROM exercises, home exercise program, joint mobilization, manual  therapy, neuromuscular re-education, patient/caregiver education, self care, strengthening, stretching and therapeutic exercise    Frequency: 2x week  Duration in weeks: 2  Treatment plan discussed with: patient        Subjective Evaluation    History of Present Illness  Mechanism of injury: Patient reports she feels she has made about 70% improvement since beginning PT but continues to have aches and pains due to the inclement weather we have recently had.   Patient Goals  Patient goals for therapy: decreased pain, increased motion and increased strength    Pain  Current pain ratin  At best pain rating: 3  At worst pain ratin  Quality: dull ache, sharp and discomfort  Relieving factors: medications and heat  Aggravating factors: stair climbing, walking, standing and lifting  Progression: worsening    Treatments  Previous treatment: physical therapy  Current treatment: injection treatment and physical therapy        Objective     Palpation   Left   Tenderness of the gluteus merissa, iliopsoas, piriformis and TFL.     Tenderness     Left Hip   Tenderness in the greater trochanter. No tenderness in the ischial tuberosity.     Active Range of Motion   Left Hip   Flexion: 90 degrees with pain  Extension: WFL  Abduction: WFL  Adduction: WFL  External rotation (90/90): WFL and with pain  Internal rotation (90/90): 25 degrees with pain    Right Hip   Flexion: 95 degrees   Extension: WFL  Abduction: WFL  Adduction: WFL  Internal rotation (90/90): 25 degrees     Strength/Myotome Testing     Left Hip   Planes of Motion   Flexion: 4  Extension: 4-  Abduction: 4  Adduction: 4  External rotation: 4  Internal rotation: 4-    Right Hip   Planes of Motion   Flexion: 4  Extension: 4-  Abduction: 4+  Adduction: 4  External rotation: 4+  Internal rotation: 4    Tests     Left Hip   Positive Ely's, ANISA, FADIR and piriformis.   Negative modified Sid, SI compression and SI distraction.              Precautions: PMH OA in B  hips, HTN, CPAP, DM, GERD, Fibromyalgia  POC Expiration: 6/12/25

## 2025-05-30 DIAGNOSIS — F33.9 MAJOR DEPRESSION, RECURRENT, CHRONIC (HCC): ICD-10-CM

## 2025-06-02 ENCOUNTER — OFFICE VISIT (OUTPATIENT)
Dept: DENTISTRY | Facility: CLINIC | Age: 50
End: 2025-06-02

## 2025-06-02 VITALS — SYSTOLIC BLOOD PRESSURE: 120 MMHG | HEART RATE: 65 BPM | DIASTOLIC BLOOD PRESSURE: 78 MMHG | TEMPERATURE: 98.4 F

## 2025-06-02 DIAGNOSIS — K08.109 TEETH MISSING: Primary | ICD-10-CM

## 2025-06-02 PROCEDURE — D5899 UNSPECIFIED REMOVABLE PROSTHODONTIC PROCEDURE, BY REPORT: HCPCS | Performed by: DENTIST

## 2025-06-02 PROCEDURE — WIS5003 WAX TRY-IN: Performed by: DENTIST

## 2025-06-02 NOTE — PROGRESS NOTES
Patient informed and consented to treatment.  Reviewed medical history changes noted.  Wax teeth try-in.    Patient satisfied with set-up, shade, and appearance. #3 detached when removing RPD from mouth. It keyed in well to the wax base - patient informed that light occlusal adjustments on #3 might be necessary at insertion.     NV: Insertion.

## 2025-06-03 ENCOUNTER — OFFICE VISIT (OUTPATIENT)
Dept: PHYSICAL THERAPY | Facility: CLINIC | Age: 50
End: 2025-06-03
Attending: STUDENT IN AN ORGANIZED HEALTH CARE EDUCATION/TRAINING PROGRAM
Payer: COMMERCIAL

## 2025-06-03 DIAGNOSIS — M16.12 PRIMARY OSTEOARTHRITIS OF LEFT HIP: Primary | ICD-10-CM

## 2025-06-03 PROCEDURE — 97112 NEUROMUSCULAR REEDUCATION: CPT

## 2025-06-03 PROCEDURE — 97140 MANUAL THERAPY 1/> REGIONS: CPT

## 2025-06-03 PROCEDURE — 97110 THERAPEUTIC EXERCISES: CPT

## 2025-06-03 NOTE — PROGRESS NOTES
"Daily Note     Today's date: 6/3/2025  Patient name: Cristy Garcia  : 1975  MRN: [id number]  Referring provider: Donovan De Guzman,*  Dx:   Encounter Diagnosis     ICD-10-CM    1. Primary osteoarthritis of left hip  M16.12                      Subjective: Patient reports that she is doing well this morning.      Objective: See treatment diary below      Assessment: Tolerated treatment well. Patient exhibited good technique with therapeutic exercises and would benefit from continued PT to increase L hip ROM/strength and endurance to improve her overall mobility and gait.      Plan: Continue per plan of care.      Precautions: PMH OA in B hips, HTN, CPAP, DM, GERD, Fibromyalgia  POC Expiration: 25      Manuals 5/6 5/7 5/13 5/29 6/3   L HS, piriformis, hip ABD, gastroc stretching  10' L 10'L 10'L 10' 10'                           Neuro Re-Ed        BOSU SLR        BOSU Squats        Monster walks        Front and lateral lunges    NV 10xea bilat   Step ups  Front & Lateral  10xea bilat 4\"step 10xea bilat 4\"step 10xea bilat 4\"step 10xea bilat 6\"step   HR/TR 6x10jerju 4\"step 1l38nqbmz 4\"step 7d92gazsc 4\"step 3d05qeczb  4\"step 5m48epumw 6\"step   Sit to Stand        Ther Ex        Nustep for ROM/strength 10'L3 10'L3 10'L3 10'L3 10'L3   Supine SLR  4n67iurlr 7h21lzimm  3y96hgyio 1q54qpxxu     Bridge c ABD C ABD 2x10 C ABD 2x10 GTB 2x10 GTB 2x10 GTB 2x10 GTB   Clamshells  Reg,Rev 2x10ea bilat Reg,Rev 2x10ea bilat GTB Reg,Rev 2x10ea bilat GTB Reg,Rev 2x10ea bilat GTB Reg,Rev 2x10ea bilat GTB   Standing marches, ABD, ext 10xea bilat  1.5#10xea bilat 1.5# 10xea bilat NT                   HEP education and instruction        Ther Activity                        Gait Training                        Modalities        CP 15'MH to Lhip  15'MH to Lhip 15'MH to Lhip 15'MH to Lhip                   "

## 2025-06-04 ENCOUNTER — APPOINTMENT (OUTPATIENT)
Dept: LAB | Facility: HOSPITAL | Age: 50
End: 2025-06-04
Payer: COMMERCIAL

## 2025-06-04 ENCOUNTER — TELEPHONE (OUTPATIENT)
Dept: BEHAVIORAL/MENTAL HEALTH CLINIC | Facility: CLINIC | Age: 50
End: 2025-06-04

## 2025-06-04 ENCOUNTER — DOCUMENTATION (OUTPATIENT)
Dept: BEHAVIORAL/MENTAL HEALTH CLINIC | Facility: CLINIC | Age: 50
End: 2025-06-04

## 2025-06-04 DIAGNOSIS — D72.824 BASOPHILIC LEUKOCYTOSIS: ICD-10-CM

## 2025-06-04 DIAGNOSIS — D75.1 POLYCYTHEMIA: ICD-10-CM

## 2025-06-04 PROCEDURE — 81339 MPL GENE SEQ ALYS EXON 10: CPT

## 2025-06-04 PROCEDURE — 81270 JAK2 GENE: CPT

## 2025-06-04 PROCEDURE — 81279 JAK2 GENE TRGT SEQUENCE ALYS: CPT

## 2025-06-04 PROCEDURE — 81219 CALR GENE COM VARIANTS: CPT

## 2025-06-04 PROCEDURE — 36415 COLL VENOUS BLD VENIPUNCTURE: CPT

## 2025-06-04 RX ORDER — MOMETASONE FUROATE 1 MG/ML
SOLUTION TOPICAL
COMMUNITY
Start: 2025-05-28

## 2025-06-04 NOTE — PROGRESS NOTES
Psychotherapy Discharge Summary    Preferred Name: Cristy Garcia  YOB: 1975    Admission date to psychotherapy: 08/03/20    Referred by: PCP    Presenting Problem: anxiety depression and lack of resources    Course of treatment included : individual therapy     Progress/Outcome of Treatment Goals (brief summary of course of treatment) The client has achieved stable housing and additional community supports and services    Treatment Complications (if any): long term Physical health and homelessness    Treatment Progress: excellent    [facility]    Discharge Medications include: as found in epic    Discharge Date: 06/04/2025    Discharge Diagnosis: No diagnosis found.    Criteria for Discharge: completed treatment goals and objectives and is no longer in need of services    Patient is cleared to return to Kristina Guerrero LCSW for continued treatment.    Rationale: client is an active team member but due to her additional supports client might not need to return to . If needed she could    Aftercare recommendations include (include specific referral names and phone numbers, if appropriate): return to  services if needed  as well as use her community support and services to prevent the need for return    Prognosis: excellent

## 2025-06-04 NOTE — TELEPHONE ENCOUNTER
Contacted the client who reported that she is doing well, mentally, within her home environment, and within her peer relationships. As team we discussed the client being discharged from MH treatment, with all team members in agreement. The Client was given the phone number for the local and national suicide prevention hotline number if needed

## 2025-06-05 ENCOUNTER — OFFICE VISIT (OUTPATIENT)
Dept: PHYSICAL THERAPY | Facility: CLINIC | Age: 50
End: 2025-06-05
Attending: STUDENT IN AN ORGANIZED HEALTH CARE EDUCATION/TRAINING PROGRAM
Payer: COMMERCIAL

## 2025-06-05 DIAGNOSIS — M16.12 PRIMARY OSTEOARTHRITIS OF LEFT HIP: Primary | ICD-10-CM

## 2025-06-05 PROCEDURE — 97110 THERAPEUTIC EXERCISES: CPT

## 2025-06-05 PROCEDURE — 97140 MANUAL THERAPY 1/> REGIONS: CPT

## 2025-06-05 PROCEDURE — 97112 NEUROMUSCULAR REEDUCATION: CPT

## 2025-06-05 NOTE — PROGRESS NOTES
"Daily Note     Today's date: 2025  Patient name: Cristy Garcia  : 1975  MRN: [id number]  Referring provider: Donovan De Guzman,*  Dx:   Encounter Diagnosis     ICD-10-CM    1. Primary osteoarthritis of left hip  M16.12           Start Time: 08  Stop Time: 0955  Total time in clinic (min): 60 minutes    Subjective: Patient reports her L hip is \"ehhh\" today.      Objective: See treatment diary below      Assessment: Tolerated treatment well. Patient demonstrated fatigue post treatment, exhibited good technique with therapeutic exercises, and would benefit from continued PT to improve L hip mobility and strength to reduce functional impairments.      Plan: Continue per plan of care.      Precautions: PMH OA in B hips, HTN, CPAP, DM, GERD, Fibromyalgia  POC Expiration: 25      Manuals 5/7 5/13 5/29 6/3 6/5   L HS, piriformis, hip ABD, gastroc stretching  10'L 10'L 10' 10'                            Neuro Re-Ed        BOSU SLR     10x ea bilat    BOSU March     2'   Monster walks        Front and lateral lunges   NV 10xea bilat    Step ups  Front & Lateral 10xea bilat 4\"step 10xea bilat 4\"step 10xea bilat 4\"step 10xea bilat 6\"step 10x ea bilat 6\" step    HR/TR 7j78dlekc 4\"step 8a69slniu 4\"step 9x28uitab  4\"step 3q80ztfbl 6\"step    Sit to Stand        Ther Ex        Nustep for ROM/strength 10'L3 10'L3 10'L3 10'L3 10' L3   Supine SLR  3b69fphnn  9d93ywhar 3j61tdfpq      Bridge c ABD C ABD 2x10 GTB 2x10 GTB 2x10 GTB 2x10 GTB 2x10 GTB   Clamshells  Reg,Rev 2x10ea bilat GTB Reg,Rev 2x10ea bilat GTB Reg,Rev 2x10ea bilat GTB Reg,Rev 2x10ea bilat GTB Reg, Rev 2x10 ea bilat GTB   Standing marches, ABD, ext  1.5#10xea bilat 1.5# 10xea bilat NT                    HEP education and instruction        Ther Activity                        Gait Training                        Modalities        CP  15'MH to Lhip 15'MH to Lhip 15'MH to Lhip 15' MH to L hip                      "

## 2025-06-11 LAB — SCAN RESULT: NORMAL

## 2025-06-13 DIAGNOSIS — D50.9 IRON DEFICIENCY ANEMIA, UNSPECIFIED IRON DEFICIENCY ANEMIA TYPE: Primary | ICD-10-CM

## 2025-06-13 LAB — SCAN RESULT: NORMAL

## 2025-06-13 RX ORDER — SODIUM CHLORIDE 9 MG/ML
20 INJECTION, SOLUTION INTRAVENOUS ONCE
OUTPATIENT
Start: 2025-06-20

## 2025-06-14 NOTE — PROGRESS NOTES
Pulmonary Consultation   Cristy Garcia 49 y.o. female MRN: [id number]  6/18/2025      Assessment:  Moderate persistent asthma  Likely allergic type II, highest eosinophilic count 1140 cells in 11/2024, PFT with significant BD response  Known environmental triggers, strong perfumes and secondhand smoking  Not well-controlled, likely due to exposure to smoke from neighbors smoking at her residence  On Advair 500/50, using as needed albuterol HFA every day  No signs of exacerbation currently    Plan:  Step up treatment by adding LAMA, Spiriva Respimat 2.52 puffs daily/given sample  Continue Advair 500/50  Reinforced the proper inhaler use technique  Belies her/supplies with [employer] every 6 as needed  Up-to-date on immunization per history  Check [facility] allergy panel for possible need for Biolog[facility] if still not well-controlled  Given a letter stating that she has to be away from smoking, she would like to apply to the [facility]    Obstructive sleep apnea  Using nasal mask, thinks that she is a mouth sleeper  Reports partial improvement of initial symptoms  Ordered mask fitting trial  Will obtain compliance report f at next visit  Return in about 2 months (around 8/18/2025).        History of Present Illness     New Consult for: Asthma      Background  49 y.o. female with a h/o class IV obesity, dyslipidemia, osteoarthritis, DDD, acid reflux, fibromyalgia, major depressive disorder, HTN    Reports first Dx of asthma few years ago, known symptoms of dyspnea, cough which is mainly dry and more at night sometimes wheezing and chest tightness.  Known triggers of exposure to perfumes, dusts, secondhand smoking, cleaning detergent, bleach.  No history of severe attack, hospitalization, or frequent steroid use.    Reports feeling worse over the past several months, suspect one of the neighbors smoking cigarettes and it circulates in the ventilation system.  She resides in assisted living/[facility].   "Noted frequent use of PRN albuterol than usual, PCP uptitrated the Advair to 500.  At baseline independent in ADLs, uses aid/help with medication intake, and care in house due to multiple comorbidities and osteoarthritis.  Lives alone, has a sister who lives in [place] does not have children.      As per chart review  Noted to have elevated hematocrit, normal erythropoietin and negative JAK2 mutation.  Follows with hematology for low ferritin/on iron infusion.  Dx sleep apnea in 6/2023, mild to moderate with AHI 13, using CPAP consistently does not follow with sleep medicine    Social/exposure history  Originally from [facility], moved to [place] for 14 years and back in 2018 to live in [facility]  Smoked briefly as a teenager, but most of her life non-smoker  Nonalcoholic  Possible exposure to mold in her house  Pets: 2 cats, has cats for several years    Family history: Noncontributory  Review of Systems  As per hpi, all other systems reviewed and were negative.        Studies:  Imaging and other studies:  I have personally reviewed pertinent imaging pertinent reports      Pulmonary function testing:     PFT 5/1/2025-ratio 89%, FEV1 1.9 L / 79%, FVC 2.14 L / 72%, TLC 69%, RV 16%, DLCO 112%.  Positive BD response.    PFT/11/2019-ratio 86%, FEV1 1.7 L / 69%, FVC 1.98 L / 62%, TLC 67%, RV 54%, DLCO 65% no BD response.    EKG, Pathology, and Other Studies: I have personally reviewed pertinent reports.          Historical Information   Past Medical History[1]  Past Surgical History[2]  Family History[3]      Medications/Allergies: Reviewed    Vitals: Blood pressure 128/70, pulse 72, temperature 98.7 °F (37.1 °C), temperature source Temporal, height 5' 2\" (1.575 m), weight 118 kg (261 lb), SpO2 97%. Body mass index is 47.74 kg/m². Oxygen Therapy  SpO2: 97 %      Physical Exam  Body mass index is 47.74 kg/m².   Gen: not in acute distress,   Neck/Eyes: supple, no adenopathy, PERRL  Ear: normal appearance, no " "significant hearing impairment  Nose:  normal nasal mucosa, no drainage  Chest: normal respiratory efforts, clear breath sounds bilaterally  CV: RRR, no murmurs appreciated, lower extremity lymphedema  Abdomen: soft, non tender  Extremities:  No observed deformity   Skin: unremarkable  Neuro: AAO X3, no focal motor deficit         Labs:  Lab Results   Component Value Date    WBC 11.25 (H) 05/27/2025    HGB 16.4 (H) 05/27/2025    HCT 49.2 (H) 05/27/2025    MCV 91 05/27/2025     05/27/2025     Lab Results   Component Value Date    CALCIUM 9.1 04/09/2025    K 4.8 04/09/2025    CO2 23 04/09/2025     04/09/2025    BUN 18 04/09/2025    CREATININE 0.61 04/09/2025     No results found for: \"IGE\"  Lab Results   Component Value Date    ALT 26 04/09/2025    AST 30 04/09/2025    ALKPHOS 36 04/09/2025           Portions of the record may have been created with voice recognition software.  Occasional wrong word or \"sound a like\" substitutions may have occurred due to the inherent limitations of voice recognition software.  Read the chart carefully and recognize, using context, where substitutions have occurred    Dixon Dunlap M.D.  [facility]         [1]   Past Medical History:  Diagnosis Date    Allergic     Seasonal Allergies    Alopecia of scalp     Treated with Proscar    Asthma     Chronic pain disorder     back, bilat knees, bilat hips    CPAP (continuous positive airway pressure) dependence     Diabetes mellitus (HCC)     Fibromyalgia, primary     GERD (gastroesophageal reflux disease)     Hip fx, left, closed, with routine healing, subsequent encounter 05/2017    Hyperlipidemia     Hypertension     Insomnia     Irregular heartbeat     VICTOR HUGO on CPAP    [2]   Past Surgical History:  Procedure Laterality Date    CARPAL TUNNEL RELEASE Left     HYSTERECTOMY N/A 12/27/2024    HYSTERECTOMY      IR SPINE AND PAIN PROCEDURE  03/12/2024    IR SPINE AND PAIN PROCEDURE  04/11/2024    " IR SPINE AND PAIN PROCEDURE  09/24/2024    IR SPINE AND PAIN PROCEDURE  2/19/2025    IR SPINE AND PAIN PROCEDURE  3/6/2025    LUMBAR EPIDURAL INJECTION      NERVE BLOCK Bilateral 12/29/2020    Procedure: L2 L3 L4 L5 MEDIAL BRANCH BLOCK #1;  Surgeon: Westley Keller MD;  [facility];  Service: Pain Management     NERVE BLOCK Bilateral 01/19/2021    Procedure: L2 L3 L4 L5 MEDIAL BRANCH BLOCK #2;  Surgeon: Westley Keller MD;  [facility]: [facility];  Service: Pain Management     TN ARTHROCENTESIS ASPIR&/INJ MAJOR JT/BURSA W/O US Bilateral 02/07/2023    Procedure: INJECTION JOINT HIP;  Surgeon: Westley Keller MD;  [facility]: [facility];  Service: Pain Management     TN HYSTEROSCOPY BX ENDOMETRIUM&/POLYPC W/WO D&C N/A 04/25/2023    Procedure: HYSTEROSCOPY W/  RESECTION UTERINE TUMOR/FIBROID (POLYPECTOMY );  Surgeon: Donovan Ware DO;  Location: [facility];  Service: Gynecology    RADIOFREQUENCY ABLATION Right 02/25/2021    Procedure: L2 L3 L4 L5 RADIO FREQUENCY ABLATION right side;  Surgeon: Westley Keller MD;  [facility];  Service: Pain Management     RADIOFREQUENCY ABLATION Left 03/16/2021    Procedure: L2 L3 L4 L5 RADIO FREQUENCY ABLATION;  Surgeon: Westley Keller MD;  [facility];  Service: Pain Management     RHIZOTOMY Bilateral 04/04/2023    Procedure: RHIZOTOMY LUMBAR L3, L4, L5 medial branch nerves;  Surgeon: Westley Keller MD;  [facility]: [facility];  Service: Pain Management     UPPER GASTROINTESTINAL ENDOSCOPY      US GUIDED THYROID BIOPSY  07/14/2023   [3]   Family History  Problem Relation Name Age of Onset    Cancer Father      Hepatitis Father      Asthma Cousin      Hypertension Paternal Grandmother      No Known Problems Mother      No Known Problems Sister      No Known Problems Maternal Grandmother      No Known Problems Maternal Grandfather      No Known Problems Paternal Grandfather      No Known Problems Paternal Aunt      BRCA2 Positive Neg Hx      BRCA2 Negative Neg Hx       BRCA1 Positive Neg Hx      BRCA1 Negative Neg Hx      BRCA 1/2 Neg Hx      Ovarian cancer Neg Hx      Endometrial cancer Neg Hx      Colon cancer Neg Hx      Breast cancer additional onset Neg Hx      Breast cancer Neg Hx

## 2025-06-16 DIAGNOSIS — M79.7 FIBROMYALGIA, PRIMARY: ICD-10-CM

## 2025-06-17 DIAGNOSIS — J01.00 ACUTE MAXILLARY SINUSITIS, RECURRENCE NOT SPECIFIED: ICD-10-CM

## 2025-06-17 DIAGNOSIS — H65.00 ACUTE SEROUS OTITIS MEDIA, RECURRENCE NOT SPECIFIED, UNSPECIFIED LATERALITY: ICD-10-CM

## 2025-06-17 DIAGNOSIS — R05.1 ACUTE COUGH: ICD-10-CM

## 2025-06-17 DIAGNOSIS — R09.82 PND (POST-NASAL DRIP): ICD-10-CM

## 2025-06-17 DIAGNOSIS — J06.9 ACUTE URI: ICD-10-CM

## 2025-06-17 RX ORDER — FLUTICASONE PROPIONATE 50 MCG
SPRAY, SUSPENSION (ML) NASAL
Qty: 16 G | Refills: 0 | Status: SHIPPED | OUTPATIENT
Start: 2025-06-17

## 2025-06-17 RX ORDER — BACLOFEN 10 MG/1
10 TABLET ORAL 3 TIMES DAILY
Qty: 90 TABLET | Refills: 2 | Status: SHIPPED | OUTPATIENT
Start: 2025-06-17

## 2025-06-18 ENCOUNTER — CONSULT (OUTPATIENT)
Dept: PULMONOLOGY | Facility: CLINIC | Age: 50
End: 2025-06-18
Payer: COMMERCIAL

## 2025-06-18 VITALS
OXYGEN SATURATION: 97 % | TEMPERATURE: 98.7 F | DIASTOLIC BLOOD PRESSURE: 70 MMHG | HEART RATE: 72 BPM | HEIGHT: 62 IN | WEIGHT: 261 LBS | SYSTOLIC BLOOD PRESSURE: 128 MMHG | BODY MASS INDEX: 48.03 KG/M2

## 2025-06-18 DIAGNOSIS — J45.41 MODERATE PERSISTENT ASTHMA WITH ACUTE EXACERBATION: Primary | ICD-10-CM

## 2025-06-18 DIAGNOSIS — G47.33 OSA ON CPAP: ICD-10-CM

## 2025-06-18 PROCEDURE — 99214 OFFICE O/P EST MOD 30 MIN: CPT | Performed by: INTERNAL MEDICINE

## 2025-06-18 RX ORDER — IPRATROPIUM BROMIDE AND ALBUTEROL SULFATE 2.5; .5 MG/3ML; MG/3ML
3 SOLUTION RESPIRATORY (INHALATION) EVERY 6 HOURS PRN
Qty: 360 ML | Refills: 5 | Status: SHIPPED | OUTPATIENT
Start: 2025-06-18 | End: 2025-07-18

## 2025-06-18 RX ORDER — TIOTROPIUM BROMIDE INHALATION SPRAY 3.12 UG/1
2 SPRAY, METERED RESPIRATORY (INHALATION) DAILY
Qty: 4 G | Refills: 5 | Status: SHIPPED | OUTPATIENT
Start: 2025-06-18

## 2025-06-18 NOTE — LETTER
June 18, 2025     Patient: Cristy Garcia  YOB: 1975  Date of Visit: 6/18/2025      To Whom it May Concern:    Cristy Garcia is under my professional care. Cristy was seen in my office on 6/18/2025. Cristy has a chronic allergic asthma.  Exposure to secondhand smoking, or any types of smoke or allergens can result in life-threatening asthma attack.      If you have any questions or concerns, please don't hesitate to call.         Sincerely,          Dixon Kay MD        CC: No Recipients

## 2025-06-24 ENCOUNTER — APPOINTMENT (OUTPATIENT)
Dept: LAB | Facility: HOSPITAL | Age: 50
End: 2025-06-24
Payer: COMMERCIAL

## 2025-06-24 DIAGNOSIS — J45.41 MODERATE PERSISTENT ASTHMA WITH ACUTE EXACERBATION: ICD-10-CM

## 2025-06-24 PROCEDURE — 82785 ASSAY OF IGE: CPT

## 2025-06-24 PROCEDURE — 86003 ALLG SPEC IGE CRUDE XTRC EA: CPT

## 2025-06-24 PROCEDURE — 36415 COLL VENOUS BLD VENIPUNCTURE: CPT

## 2025-06-26 ENCOUNTER — RESULTS FOLLOW-UP (OUTPATIENT)
Dept: PULMONOLOGY | Facility: CLINIC | Age: 50
End: 2025-06-26

## 2025-06-26 LAB
A ALTERNATA IGE QN: 0.13 KUA/I (ref 0–0.1)
A FUMIGATUS IGE QN: 0.15 KUA/I (ref 0–0.1)
BERMUDA GRASS IGE QN: 0.22 KUA/I (ref 0–0.1)
BOXELDER IGE QN: 0.19 KUA/I (ref 0–0.1)
C HERBARUM IGE QN: <0.1 KUA/I (ref 0–0.1)
CAT DANDER IGE QN: 0.23 KUA/I (ref 0–0.1)
CMN PIGWEED IGE QN: <0.1 KUA/I (ref 0–0.1)
COMMON RAGWEED IGE QN: <0.1 KUA/I (ref 0–0.1)
COTTONWOOD IGE QN: 0.22 KUA/I (ref 0–0.1)
D FARINAE IGE QN: 0.35 KUA/I (ref 0–0.1)
D PTERONYSS IGE QN: 0.27 KUA/I (ref 0–0.1)
DOG DANDER IGE QN: <0.1 KUA/I (ref 0–0.1)
LONDON PLANE IGE QN: 0.21 KUA/I (ref 0–0.1)
MOUSE URINE PROT IGE QN: <0.1 KUA/I (ref 0–0.1)
MT JUNIPER IGE QN: <0.1 KUA/I (ref 0–0.1)
MUGWORT IGE QN: <0.1 KUA/I (ref 0–0.1)
P NOTATUM IGE QN: <0.1 KUA/I (ref 0–0.1)
ROACH IGE QN: 0.12 KUA/I (ref 0–0.1)
SHEEP SORREL IGE QN: <0.1 KUA/I (ref 0–0.1)
SILVER BIRCH IGE QN: 0.11 KUA/I (ref 0–0.1)
TIMOTHY IGE QN: 0.11 KUA/I (ref 0–0.1)
TOTAL IGE SMQN RAST: 169 KU/L (ref 0–113)
WALNUT IGE QN: 0.16 KUA/I (ref 0–0.1)
WHITE ASH IGE QN: 0.29 KUA/I (ref 0–0.1)
WHITE ELM IGE QN: 0.21 KUA/I (ref 0–0.1)
WHITE MULBERRY IGE QN: <0.1 KUA/I (ref 0–0.1)
WHITE OAK IGE QN: 0.11 KUA/I (ref 0–0.1)

## 2025-06-30 NOTE — TELEPHONE ENCOUNTER
----- Message from Dixon Kay MD sent at 6/26/2025  5:26 PM EDT -----  Weakly positive allergy panel to multiple environmental agents  ----- Message -----  From: Lab, Background User  Sent: 6/26/2025  12:22 PM EDT  To: Dixon Kay MD

## 2025-07-07 ENCOUNTER — TELEPHONE (OUTPATIENT)
Dept: INFUSION CENTER | Facility: CLINIC | Age: 50
End: 2025-07-07

## 2025-07-07 DIAGNOSIS — J30.89 SEASONAL ALLERGIC RHINITIS DUE TO OTHER ALLERGIC TRIGGER: ICD-10-CM

## 2025-07-07 DIAGNOSIS — M47.816 LUMBAR SPONDYLOSIS: ICD-10-CM

## 2025-07-07 DIAGNOSIS — M15.0 PRIMARY OSTEOARTHRITIS INVOLVING MULTIPLE JOINTS: ICD-10-CM

## 2025-07-07 NOTE — TELEPHONE ENCOUNTER
Called and spoke to patient to confirm appointment for Venofer on 7/8/25 at 11:30. Discussed the location of the infusion center, projected length of appointment, visitor policy, and availability of snacks, drinks, and WiFi. All questions answered.

## 2025-07-08 ENCOUNTER — HOSPITAL ENCOUNTER (OUTPATIENT)
Dept: INFUSION CENTER | Facility: CLINIC | Age: 50
Discharge: HOME | End: 2025-07-08
Attending: INTERNAL MEDICINE
Payer: COMMERCIAL

## 2025-07-08 VITALS
TEMPERATURE: 97.9 F | SYSTOLIC BLOOD PRESSURE: 122 MMHG | HEART RATE: 80 BPM | RESPIRATION RATE: 18 BRPM | DIASTOLIC BLOOD PRESSURE: 77 MMHG

## 2025-07-08 DIAGNOSIS — D50.9 IRON DEFICIENCY ANEMIA, UNSPECIFIED IRON DEFICIENCY ANEMIA TYPE: Primary | ICD-10-CM

## 2025-07-08 PROCEDURE — 96365 THER/PROPH/DIAG IV INF INIT: CPT

## 2025-07-08 RX ORDER — CETIRIZINE HYDROCHLORIDE 10 MG/1
TABLET ORAL
Qty: 100 TABLET | Refills: 1 | Status: SHIPPED | OUTPATIENT
Start: 2025-07-08

## 2025-07-08 RX ORDER — SODIUM CHLORIDE 9 MG/ML
20 INJECTION, SOLUTION INTRAVENOUS ONCE
Status: CANCELLED | OUTPATIENT
Start: 2025-07-15

## 2025-07-08 RX ORDER — MELOXICAM 15 MG/1
15 TABLET ORAL DAILY PRN
Qty: 90 TABLET | Refills: 1 | Status: SHIPPED | OUTPATIENT
Start: 2025-07-08

## 2025-07-08 RX ORDER — SODIUM CHLORIDE 9 MG/ML
20 INJECTION, SOLUTION INTRAVENOUS ONCE
Status: COMPLETED | OUTPATIENT
Start: 2025-07-08 | End: 2025-07-08

## 2025-07-08 RX ADMIN — SODIUM CHLORIDE 20 ML/HR: 0.9 INJECTION, SOLUTION INTRAVENOUS at 11:41

## 2025-07-08 RX ADMIN — IRON SUCROSE 200 MG: 20 INJECTION, SOLUTION INTRAVENOUS at 11:41

## 2025-07-08 NOTE — PROGRESS NOTES
Cristy Garcia  tolerated treatment well with no complications.      Cristy Garcia is aware of future appt on Tuesday Jul 15, 2025 12:00 PM.     AVS printed and given to Cristy Garcia.

## 2025-07-09 RX ORDER — GABAPENTIN 600 MG/1
600 TABLET ORAL 3 TIMES DAILY
Qty: 90 TABLET | Refills: 0 | Status: SHIPPED | OUTPATIENT
Start: 2025-07-09

## 2025-07-11 ENCOUNTER — PREP FOR PROCEDURE (OUTPATIENT)
Dept: PAIN MEDICINE | Facility: CLINIC | Age: 50
End: 2025-07-11

## 2025-07-11 ENCOUNTER — OFFICE VISIT (OUTPATIENT)
Dept: PAIN MEDICINE | Facility: CLINIC | Age: 50
End: 2025-07-11
Payer: COMMERCIAL

## 2025-07-11 VITALS — HEIGHT: 62 IN | BODY MASS INDEX: 48.4 KG/M2 | WEIGHT: 263 LBS

## 2025-07-11 DIAGNOSIS — M16.12 PRIMARY OSTEOARTHRITIS OF LEFT HIP: Primary | ICD-10-CM

## 2025-07-11 DIAGNOSIS — M16.0 PRIMARY OSTEOARTHRITIS OF BOTH HIPS: Primary | ICD-10-CM

## 2025-07-11 PROCEDURE — 99214 OFFICE O/P EST MOD 30 MIN: CPT | Performed by: ANESTHESIOLOGY

## 2025-07-11 NOTE — ASSESSMENT & PLAN NOTE
-Left hip intra-articular joint injection; f/u 4 weeks post procedure  -continue gabapentin 600 mg t.i.d; counseled regarding sedative effects of taking this medication and provided up titration calendar.  Counseled not to take medication while driving or operating heavy machinery/using stairs  -continue Meloxicam 15 mg daily prn pain. Patient educated regarding bleeding risk of taking this medication as well as not taking any other nonsteroidal anti-inflammatory medications while taking this medication; counseled thoroughly regarding potential risk of Cardiovascular injury, Kidney injury, Gastrointestinal ulceration/bleeding. Patient voiced understanding  -continue home exercise plan for low back pain and left hip; Physician directed home exercise plan as per AAOS demonstrated and handouts provided that patient plans to participate with for 1 hour, twice a week for the next 6 weeks.     -continue home exercises for left hip pain; Physician directed home exercise plan as per AAOS demonstrated and handouts provided that patient plans to participate with for 1 hour, twice a week for the next 6 weeks.

## 2025-07-11 NOTE — PROGRESS NOTES
Name: Cristy Garcia      : 1975      MRN: [id number]  Encounter Provider: Westley Keller MD  Encounter Date: 2025   Encounter department: [facility] [place]  :  Assessment & Plan  Primary osteoarthritis of left hip    -Left hip intra-articular joint injection; f/u 4 weeks post procedure  -continue gabapentin 600 mg t.i.d; counseled regarding sedative effects of taking this medication and provided up titration calendar.  Counseled not to take medication while driving or operating heavy machinery/using stairs  -continue Meloxicam 15 mg daily prn pain. Patient educated regarding bleeding risk of taking this medication as well as not taking any other nonsteroidal anti-inflammatory medications while taking this medication; counseled thoroughly regarding potential risk of Cardiovascular injury, Kidney injury, Gastrointestinal ulceration/bleeding. Patient voiced understanding  -continue home exercise plan for low back pain and left hip; Physician directed home exercise plan as per AAOS demonstrated and handouts provided that patient plans to participate with for 1 hour, twice a week for the next 6 weeks.     -continue home exercises for left hip pain; Physician directed home exercise plan as per AAOS demonstrated and handouts provided that patient plans to participate with for 1 hour, twice a week for the next 6 weeks.     Complete risks and benefits including bleeding, infection, tissue reaction, nerve injury and allergic reaction were discussed. The approach was demonstrated using models and literature was provided. Verbal and written consent was obtained.    My impressions and treatment recommendations were discussed in detail with the patient who verbalized understanding and had no further questions.  Discharge instructions were provided. I personally saw and examined the patient and I agree with the above discussed plan of care.    History of Present Illness     Poppy  Jose is a 49 y.o. female who presents for a follow up office visit in regards to Follow-up. The patient’s current symptoms include subacute left hip pain described primarily as arthritic in nature.  She describes 8/10 left hip pain that is worse in the mornings and worse at the end of the day.  The pain is characterized by achy, nagging, indolent, crampy, stabbing pain in her left hip.  The patient describes that the pain is worse with standing for long periods of time on hard surfaces as well as with walking.  The patient is a very active individual and feels as though this pain compromises her participation with independent activities of daily living. The pain can be debilitating at times and contribute to significant disability, compromising overall activity and independent activities of daily living. She has tried physical therapy with limited relief of symptoms in the past and is compliant with home PT.  Medications the patient has tried in the past include ibuprofen and turmeric.  She describes no radicular symptoms and has good strength.  She denies any weakness numbness or paresthesias.  The patient denies any bowel or bladder dysfunction, saddle anesthesia or gait abnormality as well.       Current pain medications includes:  gabapentin and meloxicam.  The patient reports that this regimen is providing 30% pain relief.  The patient is reporting no side effects from this pain medication regimen.    Review of Systems   Constitutional:  Positive for activity change.   HENT: Negative.     Eyes: Negative.    Respiratory: Negative.     Cardiovascular: Negative.    Gastrointestinal: Negative.    Endocrine: Negative.    Genitourinary: Negative.    Musculoskeletal:  Positive for arthralgias, back pain, gait problem and myalgias.   Skin: Negative.    Allergic/Immunologic: Negative.    Neurological:  Negative for weakness and numbness.   Hematological: Negative.    Psychiatric/Behavioral: Negative.     All other  "systems reviewed and are negative.      Medical History Reviewed by provider this encounter:     .     Objective   Ht 5' 2\" (1.575 m)   Wt 119 kg (263 lb)   LMP  (LMP Unknown)   BMI 48.10 kg/m²         Physical Exam    Constitutional: normal, well developed, well nourished, alert, in no distress and non-toxic and no overt pain behavior.  Psychiatric:Mood and affect appropriate  Neurologic:Cranial Nerves II-XII grossly intact Sensation grossly intact; no clonus negative dupree's. Reflexes 2+ and brisk. SLR neg left sided  Musculoskeletal:normal gait. 5/5 strength bilaterally with AROM in lower extremities. Difficulty with normal heel toe and tip toe walking. Significant pain with lumbar facet loading bilaterally and with lateral spine rotation, ttp over lumbar paraspinal muscles. Ttp over left hip GTB, pain with internal/external rotation of left hip. Negative wilda's test, negative gaenslen's negative SIJ loading bilaterally.    Review of external notes including any available PT notes, PCP notes and specialist notes was performed at this visit in addition to review of new ordered imaging and past imaging to develop or modify multidisciplinary pain plan    Radiology Results Review: I personally reviewed the following imaging studies: xray hip. My interpretation is mild OA b/l with joint space narrowing, minimal osteophytes     Administrative Statements   I have spent a total time of 30 minutes in caring for this patient on the day of the visit/encounter including Diagnostic results, prognosis, risks and benefits of tx options, instructions for management, patient and family education, importance of tx compliance, risk factor reductions, Impressions, counseling/coordination of care, documenting in the medical record, reviewing/placing orders in the medical record (including tests, medications, and/or procedures), obtaining or reviewing history and communicating with other healthcare professionals .    "

## 2025-07-11 NOTE — PATIENT INSTRUCTIONS
"Patient Education     Back exercises   The Basics   Written by the doctors and editors at [facility]   Why do I need to do back exercises? -- Back exercises can help ease back pain and might help prevent future back pain. Long term, it is important to strengthen the muscles in your lower back, buttocks, and belly. These are your \"core muscles.\" Stretching exercises are also important to keep your muscles flexible.  Below are some stretching and strengthening exercises that might help you. Other forms of movement can help ease or prevent back pain, too. For example, some people like to walk, do aerobic exercise, or do yoga or juan daniel chi. The most important thing is to move your body. Your doctor, nurse, or physical therapist can help you find different types of activity that work for you.  What stretching exercises should I do? -- Below are some examples of stretching exercises. Warm up your muscles before stretching to help prevent injury. To warm up, you can walk, jog, or cycle for a few minutes.  Start by repeating each of these stretches 2 to 3 times. Try to hold each stretch for 5 to 10 seconds, and try to do the stretches 2 to 3 times each day. Breathe slowly and deeply as you do the exercises. Never bounce when doing stretches.   Cat-cow stretch (figure 1) - Start on all fours on the floor, with your hands under your shoulders, knees under your hips, and your back flat. First, tuck your chin and tighten your stomach muscles as you round your back (like a \"cat\"). Hold the stretch for about 10 seconds. Rest for a few seconds as you flatten your back. Next, lift your chin and let your belly and lower back sink toward the floor (like a \"cow\"). Hold the stretch for about 10 seconds.   Single knee-to-chest stretches (figure 2) ? While lying on your back, bend your knees with your feet flat on the floor. Pull 1 knee toward your chest until you feel a stretch in your lower back and buttock area. Lower, and repeat with the " other knee. If you have knee problems, pull your knee up by grabbing the back of your thigh instead of the front of your knee. You can also do this exercise by grabbing both knees at the same time.   Lower trunk rotations (figure 3) ? While lying on your back, bend your knees with your feet flat on the floor. Keep your knees and ankles together, and then drop them to 1 side. Keep both of your shoulders touching the floor until you feel a stretch in the muscles at the side of the back. Repeat on the other side.   Lower back stretches seated (figure 4) ? Sit in a chair with your feet spread about shoulder width apart. Then, lean forward until you feel a stretch in your lower back.  What strengthening exercises should I do? -- Below are some examples of strengthening exercises.  Start by doing each exercise 2 to 3 times. Work up to doing each exercise 10 times. Hold each exercise for 3 to 5 seconds, and try to do the exercises 2 to 3 times each day. Do all exercises slowly.   Shoulder blade squeezes (figure 5) ? Pinch your shoulder blades together on your upper back, and hold 3 to 5 seconds. You can also do these 1 side at a time. Sit with good posture, and make sure that your shoulders do not rise up when you do this exercise. Relax.   Pelvic tilts (figure 6) ? Lie on your back with your knees bent and feet flat on the floor. Tighten your stomach muscles, and press your lower back down to the floor. Relax. You should be able to breathe comfortably during this exercise.   Hip lifts (figure 7) ? Lie on your back with your knees bent and feet flat on the floor. Tighten your stomach muscles, keep your back flat, and lift your buttocks off of the floor. Relax. You should feel this in your buttocks, not in your lower back.  What else should I know?    Exercise, including stretching, might be slightly uncomfortable. But you should not have sharp or severe pain. If you do get severe pain, stop what you are doing. If severe  "pain continues, call your doctor or nurse.   Do not hold your breath when exercising. If you tend to hold your breath, try counting out loud when exercising. If any exercise bothers you, stop right away.   Always warm up before exercising. Warm muscles stretch much easier than cool muscles. Stretching cool muscles can lead to injury.   Doing exercises before a meal can be a good way to get into a routine.  All topics are updated as new evidence becomes available and our peer review process is complete.  This topic retrieved from [employer] on: Apr 03, 2024.  Topic 360962 Version 2.0  Release: 32.2.4 - C32.92  © 2024 UpToDate, Inc. and/or its affiliates. All rights reserved.  figure 1: Cat-cow stretch     Start on all fours on the floor, with hands under your shoulders, knees under your hips, and your back flat. First, tuck your chin and tighten your stomach muscles as you round your back (like a \"cat\"). Hold the stretch for about 10 seconds. Rest for a few seconds as you flatten your back. Next, lift your chin and let your belly and lower back sink toward the floor (like a \"cow\"). Hold the stretch for about 10 seconds.  Graphic [id number] Version 1.0  figure 2: Single knee-to-chest stretch     Lie on your back, bend your knees, and have your feet flat on the floor. Pull 1 knee toward your chest until you feel a stretch in your lower back and buttock area. Repeat with the other knee. If you have knee problems, pull your knee up by grabbing the back of your thigh instead of the front of your knee. You can also do this exercise by grabbing both knees at the same time.  Graphic [id number] Version 1.0  figure 3: Lower trunk rotation     While lying on your back, bend your knees and have your feet flat on the floor. Keep your legs together and then drop them to 1 side. Keep both of your shoulders touching the floor until you feel a stretch in the muscles at the side of the back. Repeat on the other side.  Graphic [id number] Version " 1.0  figure 4: Lower back stretch     Sit in a chair with your feet spread about shoulder width apart. Then, lean forward until you feel a stretch in your lower back.  Graphic [id number] Version 1.0  figure 5: Shoulder blade squeezes     Pinch your shoulder blades together on your upper back and hold for 3 to 5 seconds. Make sure that you are sitting with good posture and that your shoulders do not raise up when you do this exercise. Relax.  Graphic [id number] Version 1.0  figure 6: Pelvic tilts     Lie on your back with your knees bent and feet flat on the floor. Tighten your stomach muscles and press your lower back down to the floor. Relax.  Graphic [id number] Version 1.0  figure 7: Hip lifts     Lie on your back with your knees bent and feet flat on the floor. Tighten your stomach muscles and lift your buttocks off of the floor. Relax.  Graphic [id number] Version 1.0  Consumer Information Use and Disclaimer   Disclaimer: This generalized information is a limited summary of diagnosis, treatment, and/or medication information. It is not meant to be comprehensive and should be used as a tool to help the user understand and/or assess potential diagnostic and treatment options. It does NOT include all information about conditions, treatments, medications, side effects, or risks that may apply to a specific patient. It is not intended to be medical advice or a substitute for the medical advice, diagnosis, or treatment of a health care provider based on the health care provider's examination and assessment of a patient's specific and unique circumstances. Patients must speak with a health care provider for complete information about their health, medical questions, and treatment options, including any risks or benefits regarding use of medications. This information does not endorse any treatments or medications as safe, effective, or approved for treating a specific patient. Up[employer]Date, Inc[employer] and its affiliates disclaim any warranty or  liability relating to this information or the use thereof.The use of this information is governed by the Terms of Use, available at https://www.wolterskluwer.com/en/know/clinical-effectiveness-terms. 2024© UpToDate, Inc. and its affiliates and/or licensors. All rights reserved.  Copyright   © 2024 Up[employer], Inc. and/or its affiliates. All rights reserved.  Patient Education     Hip Bursitis Exercises   About this topic   A bursa is a small, fluid-filled sac. It acts as a cushion between your bone and tendon. A tendon is a thick band that attaches your muscle to the bone. Bursae help the tendons glide and let your joints move easier. In the hip, there are three sets of bursae. There is one around the outer shannon part of your hip joint. It is called the greater trochanteric bursae. Another set of bursae is in front of your hip near the groin area. These are called iliopsoas bursae. The last bursae is the ischial bursae. It covers the bones in your pelvis that you sit on. These bursae can get swollen and hurt. This problem is called hip bursitis. Exercises can help make this problem better.  General   Before starting with a program, ask your doctor if you are healthy enough to do these exercises. Your doctor may have you work with a  or physical therapist to make a safe exercise program to meet your needs.  Stretching Exercises   Stretching exercises keep your muscles flexible. They also stop them from getting tight. Start by doing each of these stretches 2 to 3 times. In order for your body to make changes, you will need to hold these stretches for 20 to 30 seconds. Try to do the stretches 2 to 3 times each day. Do all exercises slowly.  Single knee to chest stretches ? Lie on your back. Pull one knee towards your chest until you feel a stretch in your lower back and buttock area. Repeat with the other knee. If you have knee problems, pull your knee up by grabbing the back of your thigh instead of the front of  your knee. You can also do this exercise by grabbing both knees at the same time.  Deep hip stretches lying down ? Lie on your back and bend one knee, keeping that foot flat on the floor. Cross the other leg over your knee. Pull the bottom leg towards your chest until you feel a stretch in the other buttock. Repeat using the opposite leg as the bottom leg.  Hamstring stretches on back ? Lie on your back with both knees bent and feet flat on the floor. Grab the back of your left thigh. Straighten your knee until you feel a stretch at the back of your thigh. Now, pull your toes down towards your head. Repeat on the other leg.  Iliotibial band stretches:  To stretch the left IT band: Stand up with your right leg crossed over the left one. Try to point the toes of the feet towards each other. Your toes should almost be touching. This is a very awkward position. Lean your upper body towards the right while bending your right knee. You should feel a stretch near the left hip. Hold and repeat.  To stretch the right IT band: Stand up with your left leg crossed over the right one. Try to point the toes of the feet towards each other. Your toes should almost be touching. This is a very awkward position. Lean your upper body towards the left while bending your left knee. You should feel a stretch near the right hip. Hold and repeat.  Strengthening Exercises   Strengthening exercises keep your muscles firm and strong. Start by repeating each exercise 2 to 3 times. Work up to doing each exercise 10 times. Try to do the exercises 2 to 3 times each day. Do all exercises slowly.  Side leg lifts ? Lie on your side. Have your legs straight and lined up with your back. Lift the top leg up while keeping the knee straight. Do not let your leg go forward. Then, do this exercise on your other side.  Bent knee side leg lifts ? Lie on your side with your painful hip on top. Bend your knees up slightly and have your knees and feet together.  Lift your top leg up while keeping your feet together. Lower your leg back down and repeat.               What will the results be?   Less pain and swelling  Better range of motion  Increased strength  Easier to walk and do other activities  Helpful tips   Stay active and work out to keep your muscles strong and flexible.  Keep a healthy weight to avoid putting too much stress on your spine. Eat a healthy diet to keep your muscles healthy.  Be sure you do not hold your breath when exercising. This can raise your blood pressure. If you tend to hold your breath, try counting out loud when exercising. If any exercise bothers you, stop right away.  Always warm up before stretching. Heated muscles stretch much easier than cool muscles. Stretching cool muscles can lead to injury.  Try walking or cycling at an easy pace for a few minutes to warm up your muscles. Do this again after exercising.  Never bounce when doing stretches.  Doing exercises before a meal may be a good way to get into a routine.  After exercising, it is a good idea to use ice. Place an ice pack or a bag of frozen peas wrapped in a towel over the painful part. Never put ice right on the skin. Do not leave the ice on more than 10 to 15 minutes at a time. Ice after activity may help decrease pain and swelling. Never ice before stretching.  Exercise may be slightly uncomfortable, but you should not have sharp pains. If you do get sharp pains, stop what you are doing. If the sharp pains continue, call your doctor.  Avoid sitting on hard surfaces and use a cushion.  Last Reviewed Date   2024-05-09  Consumer Information Use and Disclaimer   This generalized information is a limited summary of diagnosis, treatment, and/or medication information. It is not meant to be comprehensive and should be used as a tool to help the user understand and/or assess potential diagnostic and treatment options. It does NOT include all information about conditions, treatments,  medications, side effects, or risks that may apply to a specific patient. It is not intended to be medical advice or a substitute for the medical advice, diagnosis, or treatment of a health care provider based on the health care provider's examination and assessment of a patient’s specific and unique circumstances. Patients must speak with a health care provider for complete information about their health, medical questions, and treatment options, including any risks or benefits regarding use of medications. This information does not endorse any treatments or medications as safe, effective, or approved for treating a specific patient. Up[employer], [employer] and its affiliates disclaim any warranty or liability relating to this information or the use thereof. The use of this information is governed by the Terms of Use, available at https://www.woltersTripviuwer.com/en/know/clinical-effectiveness-terms   Copyright   Copyright © 2024 [employer], [employer] and its affiliates and/or licensors. All rights reserved.

## 2025-07-11 NOTE — H&P (VIEW-ONLY)
Name: Cristy Garcia      : 1975      MRN: [id number]  Encounter Provider: Westley Keller MD  Encounter Date: 2025   Encounter department: [facility] [place]  :  Assessment & Plan  Primary osteoarthritis of left hip    -Left hip intra-articular joint injection; f/u 4 weeks post procedure  -continue gabapentin 600 mg t.i.d; counseled regarding sedative effects of taking this medication and provided up titration calendar.  Counseled not to take medication while driving or operating heavy machinery/using stairs  -continue Meloxicam 15 mg daily prn pain. Patient educated regarding bleeding risk of taking this medication as well as not taking any other nonsteroidal anti-inflammatory medications while taking this medication; counseled thoroughly regarding potential risk of Cardiovascular injury, Kidney injury, Gastrointestinal ulceration/bleeding. Patient voiced understanding  -continue home exercise plan for low back pain and left hip; Physician directed home exercise plan as per [employer]OS demonstrated and handouts provided that patient plans to participate with for 1 hour, twice a week for the next 6 weeks.     -continue home exercises for left hip pain; Physician directed home exercise plan as per AAOS demonstrated and handouts provided that patient plans to participate with for 1 hour, twice a week for the next 6 weeks.     Complete risks and benefits including bleeding, infection, tissue reaction, nerve injury and allergic reaction were discussed. The approach was demonstrated using models and literature was provided. Verbal and written consent was obtained.    My impressions and treatment recommendations were discussed in detail with the patient who verbalized understanding and had no further questions.  Discharge instructions were provided. I personally saw and examined the patient and I agree with the above discussed plan of care.    History of Present Illness     Poppy  Jose is a 49 y.o. female who presents for a follow up office visit in regards to Follow-up. The patient’s current symptoms include subacute left hip pain described primarily as arthritic in nature.  She describes 8/10 left hip pain that is worse in the mornings and worse at the end of the day.  The pain is characterized by achy, nagging, indolent, crampy, stabbing pain in her left hip.  The patient describes that the pain is worse with standing for long periods of time on hard surfaces as well as with walking.  The patient is a very active individual and feels as though this pain compromises her participation with independent activities of daily living. The pain can be debilitating at times and contribute to significant disability, compromising overall activity and independent activities of daily living. She has tried physical therapy with limited relief of symptoms in the past and is compliant with home PT.  Medications the patient has tried in the past include ibuprofen and turmeric.  She describes no radicular symptoms and has good strength.  She denies any weakness numbness or paresthesias.  The patient denies any bowel or bladder dysfunction, saddle anesthesia or gait abnormality as well.       Current pain medications includes:  gabapentin and meloxicam.  The patient reports that this regimen is providing 30% pain relief.  The patient is reporting no side effects from this pain medication regimen.    Review of Systems   Constitutional:  Positive for activity change.   HENT: Negative.     Eyes: Negative.    Respiratory: Negative.     Cardiovascular: Negative.    Gastrointestinal: Negative.    Endocrine: Negative.    Genitourinary: Negative.    Musculoskeletal:  Positive for arthralgias, back pain, gait problem and myalgias.   Skin: Negative.    Allergic/Immunologic: Negative.    Neurological:  Negative for weakness and numbness.   Hematological: Negative.    Psychiatric/Behavioral: Negative.     All other  "systems reviewed and are negative.      Medical History Reviewed by provider this encounter:     .     Objective   Ht 5' 2\" (1.575 m)   Wt 119 kg (263 lb)   LMP  (LMP Unknown)   BMI 48.10 kg/m²         Physical Exam    Constitutional: normal, well developed, well nourished, alert, in no distress and non-toxic and no overt pain behavior.  Psychiatric:Mood and affect appropriate  Neurologic:Cranial Nerves II-XII grossly intact Sensation grossly intact; no clonus negative dupree's. Reflexes 2+ and brisk. SLR neg left sided  Musculoskeletal:normal gait. 5/5 strength bilaterally with AROM in lower extremities. Difficulty with normal heel toe and tip toe walking. Significant pain with lumbar facet loading bilaterally and with lateral spine rotation, ttp over lumbar paraspinal muscles. Ttp over left hip GTB, pain with internal/external rotation of left hip. Negative wilda's test, negative gaenslen's negative SIJ loading bilaterally.    Review of external notes including any available PT notes, PCP notes and specialist notes was performed at this visit in addition to review of new ordered imaging and past imaging to develop or modify multidisciplinary pain plan    Radiology Results Review: I personally reviewed the following imaging studies: xray hip. My interpretation is mild OA b/l with joint space narrowing, minimal osteophytes     Administrative Statements   I have spent a total time of 30 minutes in caring for this patient on the day of the visit/encounter including Diagnostic results, prognosis, risks and benefits of tx options, instructions for management, patient and family education, importance of tx compliance, risk factor reductions, Impressions, counseling/coordination of care, documenting in the medical record, reviewing/placing orders in the medical record (including tests, medications, and/or procedures), obtaining or reviewing history and communicating with other healthcare professionals .    "

## 2025-07-15 ENCOUNTER — HOSPITAL ENCOUNTER (OUTPATIENT)
Dept: INFUSION CENTER | Facility: CLINIC | Age: 50
Discharge: HOME | End: 2025-07-15
Attending: INTERNAL MEDICINE
Payer: COMMERCIAL

## 2025-07-15 VITALS
TEMPERATURE: 97.9 F | HEART RATE: 85 BPM | SYSTOLIC BLOOD PRESSURE: 118 MMHG | DIASTOLIC BLOOD PRESSURE: 74 MMHG | RESPIRATION RATE: 18 BRPM

## 2025-07-15 DIAGNOSIS — D50.9 IRON DEFICIENCY ANEMIA, UNSPECIFIED IRON DEFICIENCY ANEMIA TYPE: Primary | ICD-10-CM

## 2025-07-15 PROCEDURE — 96365 THER/PROPH/DIAG IV INF INIT: CPT

## 2025-07-15 RX ORDER — SODIUM CHLORIDE 9 MG/ML
20 INJECTION, SOLUTION INTRAVENOUS ONCE
Status: COMPLETED | OUTPATIENT
Start: 2025-07-15 | End: 2025-07-15

## 2025-07-15 RX ORDER — SODIUM CHLORIDE 9 MG/ML
20 INJECTION, SOLUTION INTRAVENOUS ONCE
Status: CANCELLED | OUTPATIENT
Start: 2025-07-23

## 2025-07-15 RX ADMIN — IRON SUCROSE 200 MG: 20 INJECTION, SOLUTION INTRAVENOUS at 12:07

## 2025-07-15 RX ADMIN — SODIUM CHLORIDE 20 ML/HR: 0.9 INJECTION, SOLUTION INTRAVENOUS at 12:07

## 2025-07-17 ENCOUNTER — OFFICE VISIT (OUTPATIENT)
Dept: FAMILY MEDICINE CLINIC | Facility: CLINIC | Age: 50
End: 2025-07-17
Payer: COMMERCIAL

## 2025-07-17 VITALS
TEMPERATURE: 96.2 F | SYSTOLIC BLOOD PRESSURE: 106 MMHG | BODY MASS INDEX: 48.25 KG/M2 | OXYGEN SATURATION: 97 % | HEIGHT: 62 IN | WEIGHT: 262.2 LBS | HEART RATE: 76 BPM | DIASTOLIC BLOOD PRESSURE: 78 MMHG

## 2025-07-17 DIAGNOSIS — Z12.31 ENCOUNTER FOR SCREENING MAMMOGRAM FOR BREAST CANCER: ICD-10-CM

## 2025-07-17 DIAGNOSIS — E11.9 TYPE 2 DIABETES MELLITUS WITHOUT COMPLICATION, WITHOUT LONG-TERM CURRENT USE OF INSULIN (HCC): ICD-10-CM

## 2025-07-17 DIAGNOSIS — E78.2 MIXED HYPERLIPIDEMIA: ICD-10-CM

## 2025-07-17 DIAGNOSIS — M54.12 CERVICAL RADICULOPATHY: ICD-10-CM

## 2025-07-17 DIAGNOSIS — F33.9 MAJOR DEPRESSION, RECURRENT, CHRONIC (HCC): ICD-10-CM

## 2025-07-17 DIAGNOSIS — K21.00 GASTROESOPHAGEAL REFLUX DISEASE WITH ESOPHAGITIS WITHOUT HEMORRHAGE: ICD-10-CM

## 2025-07-17 DIAGNOSIS — I10 ESSENTIAL HYPERTENSION: Primary | ICD-10-CM

## 2025-07-17 DIAGNOSIS — G50.0 TRIGEMINAL NEURALGIA: ICD-10-CM

## 2025-07-17 DIAGNOSIS — K76.0 FATTY LIVER: ICD-10-CM

## 2025-07-17 DIAGNOSIS — E55.9 VITAMIN D DEFICIENCY: ICD-10-CM

## 2025-07-17 DIAGNOSIS — G47.33 OSA ON CPAP: ICD-10-CM

## 2025-07-17 DIAGNOSIS — M54.16 LUMBAR RADICULOPATHY: ICD-10-CM

## 2025-07-17 DIAGNOSIS — D50.9 IRON DEFICIENCY ANEMIA, UNSPECIFIED IRON DEFICIENCY ANEMIA TYPE: ICD-10-CM

## 2025-07-17 DIAGNOSIS — J45.41 MODERATE PERSISTENT ASTHMA WITH ACUTE EXACERBATION: ICD-10-CM

## 2025-07-17 DIAGNOSIS — E53.8 VITAMIN B12 DEFICIENCY: ICD-10-CM

## 2025-07-17 LAB — SL AMB POCT HEMOGLOBIN AIC: 6.8 (ref ?–6.5)

## 2025-07-17 PROCEDURE — 83036 HEMOGLOBIN GLYCOSYLATED A1C: CPT

## 2025-07-17 PROCEDURE — 99214 OFFICE O/P EST MOD 30 MIN: CPT

## 2025-07-17 RX ORDER — LISINOPRIL 10 MG/1
10 TABLET ORAL DAILY
Qty: 100 TABLET | Refills: 3 | Status: SHIPPED | OUTPATIENT
Start: 2025-07-17

## 2025-07-17 NOTE — ASSESSMENT & PLAN NOTE
Continue follow-up with pulmonology.  Would greatly benefit patient to no longer be exposed to secondhand smoke as this is a significant trigger for her asthma.  Unfortunately, her landlord has not been doing much to curb this.

## 2025-07-17 NOTE — ASSESSMENT & PLAN NOTE
Will continue lisinopril 10 mg daily.  If hypertension or hypotension occurs, please contact office.  Encouraged to take at home blood pressures and contact office if persistently elevated.  Orders:    lisinopril (ZESTRIL) 10 mg tablet; Take 1 tablet (10 mg total) by mouth daily

## 2025-07-17 NOTE — ASSESSMENT & PLAN NOTE
Lab Results   Component Value Date    HGBA1C 6.8 (A) 07/17/2025     At goal.  Continue current management.  Educated on healthy diet and activity.  Will continue to monitor.  Orders:    POCT hemoglobin A1c    Comprehensive metabolic panel; Future

## 2025-07-17 NOTE — ASSESSMENT & PLAN NOTE
Given gabapentin treatment and baclofen with no relief, will refer to neurology for consult and evaluation.  Orders:    Ambulatory Referral to Neurology; Future

## 2025-07-17 NOTE — ASSESSMENT & PLAN NOTE
Will continue to monitor.  Continue current management.  Educated on healthy diet and activity.  Orders:    Lipid panel; Future

## 2025-07-17 NOTE — PROGRESS NOTES
Name: Cristy Garcia      : 1975      MRN: [id number]  Encounter Provider: Gaurav Merino PA-C  Encounter Date: 2025   Encounter department: [facility]  :  Assessment & Plan  Essential hypertension  Will continue lisinopril 10 mg daily.  If hypertension or hypotension occurs, please contact office.  Encouraged to take at home blood pressures and contact office if persistently elevated.  Orders:    lisinopril (ZESTRIL) 10 mg tablet; Take 1 tablet (10 mg total) by mouth daily    Moderate persistent asthma with acute exacerbation  Continue follow-up with pulmonology.  Would greatly benefit patient to no longer be exposed to secondhand smoke as this is a significant trigger for her asthma.  Unfortunately, her landlord has not been doing much to curb this.       VICTOR HUGO on CPAP  Continue follow-up with pulmonology.  On CPAP.       Gastroesophageal reflux disease with esophagitis without hemorrhage  Continue follow-up and plan per GI.       Fatty liver  Continue follow-up and plan per GI.       Type 2 diabetes mellitus without complication, without long-term current use of insulin (HCC)    Lab Results   Component Value Date    HGBA1C 6.8 (A) 2025     At goal.  Continue current management.  Educated on healthy diet and activity.  Will continue to monitor.  Orders:    POCT hemoglobin A1c    Comprehensive metabolic panel; Future    Trigeminal neuralgia  Given gabapentin treatment and baclofen with no relief, will refer to neurology for consult and evaluation.  Orders:    Ambulatory Referral to Neurology; Future    Lumbar radiculopathy  Continue follow-up and plan with spine/pain management.       Cervical radiculopathy  Continue follow-up and plan with spine/pain management.       Major depression, recurrent, chronic (HCC)  Stable.  Contact office with worsening.  Denies SI/HI.         Iron deficiency anemia, unspecified iron deficiency anemia type  Continue follow-up and plan per  hematology.         Mixed hyperlipidemia  Will continue to monitor.  Continue current management.  Educated on healthy diet and activity.  Orders:    Lipid panel; Future    Vitamin B12 deficiency  We will continue to monitor.  Orders:    Vitamin B12; Future    Vitamin D deficiency  Will continue to monitor.  Orders:    Vitamin D 25 hydroxy; Future    Encounter for screening mammogram for breast cancer    Orders:    Mammo screening bilateral w 3d and cad; Future           History of Present Illness   Patient is a 49-year-old female presenting for follow-up.  Continues to have left-sided facial pain due to trigeminal neuralgia.  Currently on gabapentin and baclofen.  Patient has been cutting lisinopril in half due to hypotension and symptoms of hypotension.  At home blood pressures have been primarily at goal, and occasionally on the lower end of normal.  Patient continues to have breathing issues despite Spiriva and Advair due to fellow tenants smoking marijuana in her building.      Review of Systems   Constitutional:  Negative for appetite change, chills, diaphoresis, fatigue and fever.   HENT:  Negative for congestion, ear discharge, ear pain, postnasal drip, rhinorrhea, sinus pressure, sinus pain, sneezing and sore throat.    Eyes:  Negative for pain, discharge, redness, itching and visual disturbance.   Respiratory:  Positive for cough, shortness of breath and wheezing. Negative for apnea and chest tightness.    Cardiovascular:  Negative for chest pain, palpitations and leg swelling.   Gastrointestinal:  Negative for abdominal pain, blood in stool, constipation, diarrhea, nausea and vomiting.   Endocrine: Negative for cold intolerance, heat intolerance, polydipsia and polyuria.   Genitourinary:  Negative for dysuria, flank pain, frequency, hematuria and urgency.   Musculoskeletal:  Negative for arthralgias, back pain, myalgias, neck pain and neck stiffness.   Skin:  Negative for color change and rash.  "  Allergic/Immunologic: Negative for environmental allergies and food allergies.   Neurological:  Negative for dizziness, tremors, seizures, syncope, speech difficulty, weakness, light-headedness, numbness and headaches.        Trigeminal neuralgia   Hematological:  Negative for adenopathy. Does not bruise/bleed easily.   Psychiatric/Behavioral:  Negative for agitation, confusion, decreased concentration, dysphoric mood, hallucinations, self-injury, sleep disturbance and suicidal ideas. The patient is not nervous/anxious and is not hyperactive.    All other systems reviewed and are negative.      Objective   /78 (BP Location: Right arm, Patient Position: Sitting)   Pulse 76   Temp (!) 96.2 °F (35.7 °C) (Tympanic)   Ht 5' 2\" (1.575 m)   Wt 119 kg (262 lb 3.2 oz)   LMP  (LMP Unknown)   SpO2 97%   BMI 47.96 kg/m²      Physical Exam  Vitals and nursing note reviewed.   Constitutional:       General: She is not in acute distress.     Appearance: Normal appearance. She is well-developed and normal weight. She is not ill-appearing, toxic-appearing or diaphoretic.   HENT:      Head: Normocephalic and atraumatic.      Right Ear: Tympanic membrane normal.      Left Ear: Tympanic membrane normal.      Nose: Nose normal.      Mouth/Throat:      Mouth: Mucous membranes are moist.      Pharynx: Oropharynx is clear.     Eyes:      Conjunctiva/sclera: Conjunctivae normal.      Pupils: Pupils are equal, round, and reactive to light.     Neck:      Vascular: No carotid bruit.     Cardiovascular:      Rate and Rhythm: Normal rate and regular rhythm.      Pulses: Normal pulses.      Heart sounds: Normal heart sounds. No murmur heard.  Pulmonary:      Effort: Pulmonary effort is normal. No respiratory distress.      Breath sounds: Normal breath sounds. No stridor. No wheezing, rhonchi or rales.   Chest:      Chest wall: No tenderness.   Abdominal:      General: Bowel sounds are normal.      Palpations: Abdomen is soft. " There is no mass.      Tenderness: There is no abdominal tenderness.     Musculoskeletal:         General: Tenderness present. No swelling.      Cervical back: Normal range of motion and neck supple. No tenderness.      Right lower leg: No edema.      Left lower leg: No edema.   Lymphadenopathy:      Cervical: No cervical adenopathy.     Skin:     General: Skin is warm and dry.      Capillary Refill: Capillary refill takes less than 2 seconds.      Findings: No erythema, lesion or rash.     Neurological:      General: No focal deficit present.      Mental Status: She is alert and oriented to person, place, and time. Mental status is at baseline.      Motor: No weakness.      Coordination: Coordination normal.      Gait: Gait abnormal (Antalgic).     Psychiatric:         Mood and Affect: Mood normal.         Behavior: Behavior normal.         Thought Content: Thought content normal.         Judgment: Judgment normal.

## 2025-07-18 DIAGNOSIS — R73.03 PREDIABETES: ICD-10-CM

## 2025-07-20 DIAGNOSIS — R00.2 PALPITATIONS: ICD-10-CM

## 2025-07-21 DIAGNOSIS — R11.0 NAUSEA: ICD-10-CM

## 2025-07-21 RX ORDER — ONDANSETRON 4 MG/1
4 TABLET, FILM COATED ORAL EVERY 8 HOURS PRN
Qty: 30 TABLET | Refills: 2 | Status: SHIPPED | OUTPATIENT
Start: 2025-07-21

## 2025-07-22 ENCOUNTER — HOSPITAL ENCOUNTER (OUTPATIENT)
Dept: INTERVENTIONAL RADIOLOGY/VASCULAR | Facility: HOSPITAL | Age: 50
Discharge: HOME | End: 2025-07-22
Attending: ANESTHESIOLOGY
Payer: COMMERCIAL

## 2025-07-22 VITALS
RESPIRATION RATE: 18 BRPM | WEIGHT: 262 LBS | HEART RATE: 69 BPM | DIASTOLIC BLOOD PRESSURE: 75 MMHG | BODY MASS INDEX: 48.21 KG/M2 | TEMPERATURE: 96.9 F | OXYGEN SATURATION: 94 % | HEIGHT: 62 IN | SYSTOLIC BLOOD PRESSURE: 127 MMHG

## 2025-07-22 DIAGNOSIS — M16.0 PRIMARY OSTEOARTHRITIS OF BOTH HIPS: ICD-10-CM

## 2025-07-22 LAB — GLUCOSE SERPL-MCNC: 147 MG/DL (ref 65–140)

## 2025-07-22 PROCEDURE — 77002 NEEDLE LOCALIZATION BY XRAY: CPT

## 2025-07-22 PROCEDURE — 77002 NEEDLE LOCALIZATION BY XRAY: CPT | Performed by: ANESTHESIOLOGY

## 2025-07-22 PROCEDURE — 82948 REAGENT STRIP/BLOOD GLUCOSE: CPT

## 2025-07-22 PROCEDURE — 20610 DRAIN/INJ JOINT/BURSA W/O US: CPT | Performed by: ANESTHESIOLOGY

## 2025-07-22 RX ORDER — LIDOCAINE HYDROCHLORIDE 10 MG/ML
INJECTION, SOLUTION EPIDURAL; INFILTRATION; INTRACAUDAL; PERINEURAL AS NEEDED
Status: COMPLETED | OUTPATIENT
Start: 2025-07-22 | End: 2025-07-22

## 2025-07-22 RX ORDER — METOPROLOL SUCCINATE 25 MG/1
25 TABLET, EXTENDED RELEASE ORAL DAILY
Qty: 90 TABLET | Refills: 1 | Status: SHIPPED | OUTPATIENT
Start: 2025-07-22

## 2025-07-22 RX ORDER — METHYLPREDNISOLONE ACETATE 80 MG/ML
INJECTION, SUSPENSION INTRA-ARTICULAR; INTRALESIONAL; INTRAMUSCULAR; PARENTERAL; SOFT TISSUE AS NEEDED
Status: COMPLETED | OUTPATIENT
Start: 2025-07-22 | End: 2025-07-22

## 2025-07-22 RX ORDER — BUPIVACAINE HCL/PF 2.5 MG/ML
VIAL (ML) INJECTION AS NEEDED
Status: COMPLETED | OUTPATIENT
Start: 2025-07-22 | End: 2025-07-22

## 2025-07-22 RX ADMIN — LIDOCAINE HYDROCHLORIDE 5 ML: 10 INJECTION, SOLUTION EPIDURAL; INFILTRATION; INTRACAUDAL; PERINEURAL at 13:51

## 2025-07-22 RX ADMIN — METHYLPREDNISOLONE ACETATE 80 MG: 80 INJECTION, SUSPENSION INTRA-ARTICULAR; INTRALESIONAL; INTRAMUSCULAR; SOFT TISSUE at 13:55

## 2025-07-22 RX ADMIN — BUPIVACAINE HYDROCHLORIDE 5 ML: 2.5 INJECTION, SOLUTION EPIDURAL; INFILTRATION; INTRACAUDAL; PERINEURAL at 13:55

## 2025-07-22 RX ADMIN — IOHEXOL 2 ML: 240 INJECTION, SOLUTION INTRATHECAL; INTRAVASCULAR; INTRAVENOUS; ORAL at 13:54

## 2025-07-22 NOTE — DISCHARGE INSTR - AVS FIRST PAGE
YOUR 2 WEEK FOLLOW UP HAS BEEN SCHEDULED; IF YOU WISH TO CHANGE THE FOLLOW UP, PLEASE CALL THE [facility] AT [place]: [phone]      Steroid Joint Injection   WHAT YOU NEED TO KNOW:   A steroid joint injection is a procedure to inject steroid medicine into a joint. Steroid medicine decreases pain and inflammation. The injection may also contain an anesthetic (numbing medicine) to decrease pain. It may be done to treat conditions such as arthritis, gout, or carpal tunnel syndrome. The injections may be given in your knee, ankle, shoulder, elbow, or wrist. Injections may also be given in your hip, toe, thumb, or finger.  DISCHARGE INSTRUCTIONS:   Contact your healthcare provider if:   You have fever or chills.     You have redness or swelling at the injection site.     You have more pain than usual in your joint for more than 72 hours.     You have questions or concerns about your condition or care.    Medicines:   Pain medicine  may be given. Ask how to take this medicine safely.     Take your medicine as directed.  Contact your healthcare provider if you think your medicine is not helping or if you have side effects. Tell your provider if you are allergic to any medicine. Keep a list of the medicines, vitamins, and herbs you take. Include the amounts, and when and why you take them. Bring the list or the pill bottles to follow-up visits. Carry your medicine list with you in case of an emergency.    Self-care:   Leave the bandage on for 8 to 12 hours.  Care for your wound as directed.    Rest the area  as directed. You may need to decrease weight on certain joints, such as the knee, for a period of time. Ask when you can return to your daily activities.     Elevate  your limb where the steroid injection was given. Elevate the limb above the level of your heart as often as you can. This will help decrease swelling and pain. Prop your limb on pillows or blankets to keep it elevated comfortably.          Apply ice  on your joint for 15 to 20 minutes every hour or as directed. Use an ice pack, or put crushed ice in a plastic bag. Cover it with a towel. Ice helps prevent tissue damage and decreases swelling and pain.    © Copyright [employer] 2023 Information is for End User's use only and may not be sold, redistributed or otherwise used for commercial purposes.  The above information is an  only. It is not intended as medical advice for individual conditions or treatments. Talk to your doctor, nurse or pharmacist before following any medical regimen to see if it is safe and effective for you.

## 2025-07-23 ENCOUNTER — HOSPITAL ENCOUNTER (OUTPATIENT)
Dept: INFUSION CENTER | Facility: CLINIC | Age: 50
Discharge: HOME | End: 2025-07-23
Attending: INTERNAL MEDICINE
Payer: COMMERCIAL

## 2025-07-23 VITALS
DIASTOLIC BLOOD PRESSURE: 81 MMHG | SYSTOLIC BLOOD PRESSURE: 131 MMHG | HEART RATE: 79 BPM | TEMPERATURE: 97.2 F | RESPIRATION RATE: 18 BRPM

## 2025-07-23 DIAGNOSIS — D50.9 IRON DEFICIENCY ANEMIA, UNSPECIFIED IRON DEFICIENCY ANEMIA TYPE: Primary | ICD-10-CM

## 2025-07-23 DIAGNOSIS — E11.65 TYPE 2 DIABETES MELLITUS WITH HYPERGLYCEMIA, WITHOUT LONG-TERM CURRENT USE OF INSULIN (HCC): ICD-10-CM

## 2025-07-23 DIAGNOSIS — B37.9 CANDIDIASIS: ICD-10-CM

## 2025-07-23 PROCEDURE — 96365 THER/PROPH/DIAG IV INF INIT: CPT

## 2025-07-23 RX ORDER — SODIUM CHLORIDE 9 MG/ML
20 INJECTION, SOLUTION INTRAVENOUS ONCE
OUTPATIENT
Start: 2025-07-29

## 2025-07-23 RX ORDER — SODIUM CHLORIDE 9 MG/ML
20 INJECTION, SOLUTION INTRAVENOUS ONCE
Status: COMPLETED | OUTPATIENT
Start: 2025-07-23 | End: 2025-07-23

## 2025-07-23 RX ADMIN — IRON SUCROSE 200 MG: 20 INJECTION, SOLUTION INTRAVENOUS at 14:37

## 2025-07-23 RX ADMIN — SODIUM CHLORIDE 20 ML/HR: 0.9 INJECTION, SOLUTION INTRAVENOUS at 14:29

## 2025-07-23 NOTE — PROGRESS NOTES
Pt arrives to unit with no new complaints, tolerated IV venofer without complications. Confirmed next appt on 7/29/25 @ 1100 for last venofer treatment.  AVS provided.

## 2025-07-24 RX ORDER — NYSTATIN 100000 [USP'U]/G
POWDER TOPICAL
Qty: 60 G | Refills: 3 | Status: SHIPPED | OUTPATIENT
Start: 2025-07-24

## 2025-07-25 RX ORDER — KETOCONAZOLE 20 MG/G
CREAM TOPICAL
Qty: 60 G | Refills: 0 | Status: SHIPPED | OUTPATIENT
Start: 2025-07-25

## 2025-07-29 ENCOUNTER — OFFICE VISIT (OUTPATIENT)
Dept: DENTISTRY | Facility: CLINIC | Age: 50
End: 2025-07-29

## 2025-07-29 ENCOUNTER — HOSPITAL ENCOUNTER (OUTPATIENT)
Dept: INFUSION CENTER | Facility: CLINIC | Age: 50
End: 2025-07-29
Attending: INTERNAL MEDICINE

## 2025-07-29 VITALS — SYSTOLIC BLOOD PRESSURE: 114 MMHG | DIASTOLIC BLOOD PRESSURE: 80 MMHG | TEMPERATURE: 97.8 F | HEART RATE: 75 BPM

## 2025-07-29 DIAGNOSIS — K08.109 TEETH MISSING: Primary | ICD-10-CM

## 2025-07-29 PROCEDURE — D5213 MAXILLARY PARTIAL DENTURE - CAST METAL FRAMEWORK WITH RESIN DENTURE BASES (INCLUDING RETENTIVE/CLASPING MATERIALS, RESTS AND TEETH): HCPCS | Performed by: DENTIST

## 2025-07-30 DIAGNOSIS — E11.65 TYPE 2 DIABETES MELLITUS WITH HYPERGLYCEMIA (HCC): ICD-10-CM

## 2025-07-31 RX ORDER — BLOOD SUGAR DIAGNOSTIC
STRIP MISCELLANEOUS
Qty: 100 STRIP | Refills: 1 | Status: SHIPPED | OUTPATIENT
Start: 2025-07-31

## 2025-08-05 DIAGNOSIS — E11.65 TYPE 2 DIABETES MELLITUS WITH HYPERGLYCEMIA (HCC): ICD-10-CM

## 2025-08-05 DIAGNOSIS — M47.816 LUMBAR SPONDYLOSIS: ICD-10-CM

## 2025-08-05 DIAGNOSIS — E55.9 VITAMIN D DEFICIENCY: ICD-10-CM

## 2025-08-06 RX ORDER — GABAPENTIN 600 MG/1
600 TABLET ORAL 3 TIMES DAILY
Qty: 90 TABLET | Refills: 0 | Status: SHIPPED | OUTPATIENT
Start: 2025-08-06

## 2025-08-07 RX ORDER — ERGOCALCIFEROL 1.25 MG/1
50000 CAPSULE, LIQUID FILLED ORAL WEEKLY
Qty: 12 CAPSULE | Refills: 0 | Status: SHIPPED | OUTPATIENT
Start: 2025-08-07

## 2025-08-07 RX ORDER — BLOOD SUGAR DIAGNOSTIC
STRIP MISCELLANEOUS
Qty: 100 STRIP | Refills: 0 | OUTPATIENT
Start: 2025-08-07

## 2025-08-11 ENCOUNTER — OFFICE VISIT (OUTPATIENT)
Dept: GASTROENTEROLOGY | Facility: CLINIC | Age: 50
End: 2025-08-11
Payer: COMMERCIAL

## 2025-08-15 ENCOUNTER — HOSPITAL ENCOUNTER (OUTPATIENT)
Dept: RADIOLOGY | Facility: CLINIC | Age: 50
End: 2025-08-15
Payer: COMMERCIAL

## 2025-08-15 ENCOUNTER — HOSPITAL ENCOUNTER (OUTPATIENT)
Dept: INFUSION CENTER | Facility: CLINIC | Age: 50
End: 2025-08-15
Attending: INTERNAL MEDICINE
Payer: COMMERCIAL

## 2025-08-19 ENCOUNTER — TELEPHONE (OUTPATIENT)
Dept: FAMILY MEDICINE CLINIC | Facility: CLINIC | Age: 50
End: 2025-08-19

## 2025-08-19 DIAGNOSIS — L65.9 ALOPECIA: ICD-10-CM

## 2025-08-19 DIAGNOSIS — E11.65 TYPE 2 DIABETES MELLITUS WITH HYPERGLYCEMIA, WITHOUT LONG-TERM CURRENT USE OF INSULIN (HCC): Primary | ICD-10-CM

## 2025-08-19 RX ORDER — BLOOD-GLUCOSE METER
EACH MISCELLANEOUS DAILY
Qty: 1 KIT | Refills: 0 | Status: SHIPPED | OUTPATIENT
Start: 2025-08-19

## 2025-08-19 RX ORDER — LANCETS
EACH MISCELLANEOUS
Qty: 200 EACH | Refills: 2 | Status: SHIPPED | OUTPATIENT
Start: 2025-08-19

## 2025-08-19 RX ORDER — BLOOD SUGAR DIAGNOSTIC
STRIP MISCELLANEOUS
Qty: 200 STRIP | Refills: 2 | Status: SHIPPED | OUTPATIENT
Start: 2025-08-19 | End: 2025-08-20 | Stop reason: SDUPTHER

## 2025-08-20 ENCOUNTER — TELEPHONE (OUTPATIENT)
Age: 50
End: 2025-08-20

## 2025-08-20 DIAGNOSIS — E11.65 TYPE 2 DIABETES MELLITUS WITH HYPERGLYCEMIA, WITHOUT LONG-TERM CURRENT USE OF INSULIN (HCC): ICD-10-CM

## 2025-08-20 RX ORDER — BLOOD SUGAR DIAGNOSTIC
STRIP MISCELLANEOUS
Qty: 100 STRIP | Refills: 2 | Status: SHIPPED | OUTPATIENT
Start: 2025-08-20

## 2025-08-21 RX ORDER — FINASTERIDE 1 MG/1
1 TABLET, FILM COATED ORAL DAILY
Qty: 30 TABLET | Refills: 5 | Status: SHIPPED | OUTPATIENT
Start: 2025-08-21

## (undated) DEVICE — SYRINGE 10ML LL

## (undated) DEVICE — CHLORAPREP HI-LITE 10.5ML ORANGE

## (undated) DEVICE — NEEDLE SPINAL 22G X 3.5IN  QUINCKE

## (undated) DEVICE — GAUZE SPONGES,USP TYPE VII GAUZE, 12 PLY: Brand: CURITY

## (undated) DEVICE — NEEDLE SPINAL 22G X 5IN QUINCKE

## (undated) DEVICE — NEEDLE 18 G X 1 1/2 SAFETY

## (undated) DEVICE — PLASTIC ADHESIVE BANDAGE: Brand: CURITY

## (undated) DEVICE — Device

## (undated) DEVICE — GLOVE SRG LF STRL BGL SKNSNS 7.5 PF

## (undated) DEVICE — UTILITY MARKER,BLACK WITH LABELS: Brand: DEVON

## (undated) DEVICE — NEEDLE BLUNT 18 G X 1 1/2IN

## (undated) DEVICE — NEEDLE 25G X 1 1/2

## (undated) DEVICE — SCD SEQUENTIAL COMPRESSION COMFORT SLEEVE MEDIUM KNEE LENGTH: Brand: KENDALL SCD

## (undated) DEVICE — GLOVE PI ULTRA TOUCH SZ.7.5

## (undated) DEVICE — 3000CC GUARDIAN II: Brand: GUARDIAN

## (undated) DEVICE — IV EXTENSION TUBING SMALL BORE

## (undated) DEVICE — DRAPE TOWEL: Brand: CONVERTORS

## (undated) DEVICE — DRAPE SHEET THREE QUARTER

## (undated) DEVICE — GAUZE SPONGES,16 PLY: Brand: CURITY

## (undated) DEVICE — ELECTRODE RF 10CM

## (undated) DEVICE — PAD GROUNDING IONIC RF DISP

## (undated) DEVICE — GLOVE INDICATOR PI UNDERGLOVE SZ 7.5 BLUE

## (undated) DEVICE — TELFA ADHESIVE ISLAND DRESSING: Brand: TELFA

## (undated) DEVICE — STRL ALLENTOWN HYSTEROSCOPY PK: Brand: CARDINAL HEALTH

## (undated) DEVICE — SKIN MARKER DUAL TIP WITH RULER CAP, FLEXIBLE RULER AND LABELS: Brand: DEVON

## (undated) DEVICE — TISSUE REMOVAL SYSTEM FLUID MANAGEMENT ACCESSORIES: Brand: SYMPHION

## (undated) DEVICE — SYRINGE 5ML LL

## (undated) DEVICE — PVC URETHRAL CATHETER: Brand: DOVER

## (undated) DEVICE — BETHLEHEM UNIVERSAL MINOR VAG: Brand: CARDINAL HEALTH

## (undated) DEVICE — ELECTRODE RF 15CM

## (undated) DEVICE — PREMIUM DRY TRAY LF: Brand: MEDLINE INDUSTRIES, INC.

## (undated) DEVICE — LIGHT GLOVE GREEN

## (undated) DEVICE — TISSUE REMOVAL SYSTEM RESECTING DEVICE: Brand: SYMPHION

## (undated) DEVICE — TELFA NON-ADHERENT ABSORBENT DRESSING: Brand: TELFA